# Patient Record
Sex: MALE | Race: WHITE | NOT HISPANIC OR LATINO | Employment: OTHER
[De-identification: names, ages, dates, MRNs, and addresses within clinical notes are randomized per-mention and may not be internally consistent; named-entity substitution may affect disease eponyms.]

---

## 2017-01-01 ENCOUNTER — HOSPITAL (OUTPATIENT)
Dept: OTHER | Age: 72
End: 2017-01-01
Attending: PAIN MEDICINE

## 2017-02-01 ENCOUNTER — HOSPITAL (OUTPATIENT)
Dept: OTHER | Age: 72
End: 2017-02-01
Attending: PAIN MEDICINE

## 2017-04-13 ENCOUNTER — PRIOR ORIGINAL RECORDS (OUTPATIENT)
Dept: OTHER | Age: 72
End: 2017-04-13

## 2017-04-13 ENCOUNTER — HOSPITAL (OUTPATIENT)
Dept: OTHER | Age: 72
End: 2017-04-13
Attending: INTERNAL MEDICINE

## 2017-04-13 LAB
ANION GAP SERPL CALC-SCNC: 14 MMOL/L (ref 10–20)
BNP SERPL-MCNC: 104 PG/ML
BUN SERPL-MCNC: 16 MG/DL (ref 6–20)
BUN/CREAT SERPL: 15 (ref 7–25)
CALCIUM SERPL-MCNC: 8.7 MG/DL (ref 8.4–10.2)
CHLORIDE: 109 MMOL/L (ref 98–107)
CHOLEST SERPL-MCNC: 121 MG/DL
CHOLEST/HDLC SERPL: 1.7 {RATIO}
CO2 SERPL-SCNC: 26 MMOL/L (ref 21–32)
CREAT SERPL-MCNC: 1.05 MG/DL (ref 0.67–1.17)
GLUCOSE SERPL-MCNC: 91 MG/DL (ref 65–99)
HDLC SERPL-MCNC: 72 MG/DL
LDLC SERPL CALC-MCNC: 39 MG/DL
LENGTH OF FAST TIME PATIENT: ABNORMAL HR
LENGTH OF FAST TIME PATIENT: NORMAL HR
NONHDLC SERPL-MCNC: 49 MG/DL
POTASSIUM SERPL-SCNC: 4.3 MMOL/L (ref 3.4–5.1)
SODIUM SERPL-SCNC: 145 MMOL/L (ref 135–145)
TRIGLYCERIDE (TRIGP): 52 MG/DL

## 2017-04-14 LAB
BNP: 104 PMOL/L
BUN: 16 MG/DL
CALCIUM: 8.7 MG/DL
CHLORIDE: 109 MEQ/L
CHOLESTEROL, TOTAL: 121 MG/DL
CREATININE, SERUM: 1.05 MG/DL
GLUCOSE: 91 MG/DL
GLUCOSE: 91 MG/DL
HDL CHOLESTEROL: 72 MG/DL
LDL CHOLESTEROL: 39 MG/DL
POTASSIUM, SERUM: 4.3 MEQ/L
SODIUM: 145 MEQ/L
TRIGLYCERIDES: 52 MG/DL

## 2017-04-19 ENCOUNTER — PRIOR ORIGINAL RECORDS (OUTPATIENT)
Dept: OTHER | Age: 72
End: 2017-04-19

## 2017-06-14 ENCOUNTER — PRIOR ORIGINAL RECORDS (OUTPATIENT)
Dept: OTHER | Age: 72
End: 2017-06-14

## 2017-06-15 ENCOUNTER — PRIOR ORIGINAL RECORDS (OUTPATIENT)
Dept: OTHER | Age: 72
End: 2017-06-15

## 2017-08-23 ENCOUNTER — PRIOR ORIGINAL RECORDS (OUTPATIENT)
Dept: OTHER | Age: 72
End: 2017-08-23

## 2017-08-23 ENCOUNTER — HOSPITAL (OUTPATIENT)
Dept: OTHER | Age: 72
End: 2017-08-23
Attending: INTERNAL MEDICINE

## 2017-08-23 LAB
ANION GAP SERPL CALC-SCNC: 14 MMOL/L (ref 10–20)
BNP SERPL-MCNC: 59 PG/ML
BUN SERPL-MCNC: 21 MG/DL (ref 6–20)
BUN/CREAT SERPL: 18 (ref 7–25)
CALCIUM SERPL-MCNC: 9.6 MG/DL (ref 8.4–10.2)
CHLORIDE: 108 MMOL/L (ref 98–107)
CO2 SERPL-SCNC: 27 MMOL/L (ref 21–32)
CREAT SERPL-MCNC: 1.15 MG/DL (ref 0.67–1.17)
GLUCOSE SERPL-MCNC: 98 MG/DL (ref 65–99)
LENGTH OF FAST TIME PATIENT: ABNORMAL HR
POTASSIUM SERPL-SCNC: 4.5 MMOL/L (ref 3.4–5.1)
SODIUM SERPL-SCNC: 144 MMOL/L (ref 135–145)

## 2017-08-24 LAB
BNP: 59 PMOL/L
BUN: 21 MG/DL
CALCIUM: 9.6 MG/DL
CHLORIDE: 108 MEQ/L
CREATININE, SERUM: 1.15 MG/DL
GLUCOSE: 98 MG/DL
POTASSIUM, SERUM: 4.5 MEQ/L
SODIUM: 144 MEQ/L

## 2017-09-13 ENCOUNTER — PRIOR ORIGINAL RECORDS (OUTPATIENT)
Dept: OTHER | Age: 72
End: 2017-09-13

## 2017-09-15 ENCOUNTER — PRIOR ORIGINAL RECORDS (OUTPATIENT)
Dept: OTHER | Age: 72
End: 2017-09-15

## 2017-09-26 ENCOUNTER — HOSPITAL (OUTPATIENT)
Dept: OTHER | Age: 72
End: 2017-09-26
Attending: PAIN MEDICINE

## 2018-01-18 ENCOUNTER — HOSPITAL ENCOUNTER (OUTPATIENT)
Dept: GENERAL RADIOLOGY | Age: 73
Discharge: HOME | End: 2018-01-18
Attending: FAMILY MEDICINE
Payer: MEDICARE

## 2018-01-18 DIAGNOSIS — M54.50 LUMBAGO: ICD-10-CM

## 2018-01-18 PROCEDURE — 72110 X-RAY EXAM L-2 SPINE 4/>VWS: CPT | Performed by: FAMILY MEDICINE

## 2018-03-06 ENCOUNTER — LAB ENCOUNTER (OUTPATIENT)
Dept: LAB | Facility: HOSPITAL | Age: 73
End: 2018-03-06
Attending: UROLOGY
Payer: MEDICARE

## 2018-03-06 DIAGNOSIS — N13.9 ACUTE UNILATERAL OBSTRUCTIVE UROPATHY: Primary | ICD-10-CM

## 2018-03-06 LAB — CREAT SERPL-MCNC: 1.15 MG/DL (ref 0.5–1.5)

## 2018-03-06 PROCEDURE — 36415 COLL VENOUS BLD VENIPUNCTURE: CPT

## 2018-03-06 PROCEDURE — 82565 ASSAY OF CREATININE: CPT

## 2018-03-07 ENCOUNTER — HOSPITAL ENCOUNTER (OUTPATIENT)
Dept: ULTRASOUND IMAGING | Facility: HOSPITAL | Age: 73
Discharge: HOME | End: 2018-03-07
Attending: UROLOGY
Payer: MEDICARE

## 2018-03-07 DIAGNOSIS — N13.9 OBSTRUCTIVE UROPATHY: ICD-10-CM

## 2018-03-07 PROCEDURE — 76857 US EXAM PELVIC LIMITED: CPT | Performed by: UROLOGY

## 2018-03-30 ENCOUNTER — HOSPITAL (OUTPATIENT)
Dept: OTHER | Age: 73
End: 2018-03-30
Attending: ORTHOPAEDIC SURGERY

## 2018-03-30 LAB — URATE SERPL-MCNC: 6.3 MG/DL (ref 3.5–7.2)

## 2018-04-29 ENCOUNTER — HOSPITAL (OUTPATIENT)
Dept: OTHER | Age: 73
End: 2018-04-29
Attending: INTERNAL MEDICINE

## 2018-04-29 LAB
ANALYZER ANC (IANC): ABNORMAL
ANION GAP SERPL CALC-SCNC: 12 MMOL/L (ref 10–20)
APTT PPP: 26 SECONDS (ref 22–30)
APTT PPP: NORMAL S
BASOPHILS # BLD: 0.1 THOUSAND/MCL (ref 0–0.3)
BASOPHILS NFR BLD: 1 %
BUN SERPL-MCNC: 19 MG/DL (ref 6–20)
BUN/CREAT SERPL: 15 (ref 7–25)
CALCIUM SERPL-MCNC: 8.8 MG/DL (ref 8.4–10.2)
CHLORIDE: 110 MMOL/L (ref 98–107)
CO2 SERPL-SCNC: 27 MMOL/L (ref 21–32)
CREAT SERPL-MCNC: 1.29 MG/DL (ref 0.67–1.17)
D DIMER PPP FEU-MCNC: 1.12 MG/L FEU
DIFFERENTIAL METHOD BLD: ABNORMAL
EOSINOPHIL # BLD: 0.2 THOUSAND/MCL (ref 0.1–0.5)
EOSINOPHIL NFR BLD: 3 %
ERYTHROCYTE [DISTWIDTH] IN BLOOD: 14.2 % (ref 11–15)
GLUCOSE SERPL-MCNC: 114 MG/DL (ref 65–99)
HEMATOCRIT: 39.4 % (ref 39–51)
HGB BLD-MCNC: 13 GM/DL (ref 13–17)
INR PPP: 1
LYMPHOCYTES # BLD: 1.1 THOUSAND/MCL (ref 1–4)
LYMPHOCYTES NFR BLD: 21 %
MCH RBC QN AUTO: 29.8 PG (ref 26–34)
MCHC RBC AUTO-ENTMCNC: 33 GM/DL (ref 32–36.5)
MCV RBC AUTO: 90.4 FL (ref 78–100)
MONOCYTES # BLD: 0.4 THOUSAND/MCL (ref 0.3–0.9)
MONOCYTES NFR BLD: 7 %
NEUTROPHILS # BLD: 3.5 THOUSAND/MCL (ref 1.8–7.7)
NEUTROPHILS NFR BLD: 68 %
NEUTS SEG NFR BLD: ABNORMAL %
PERCENT NRBC: ABNORMAL
PLATELET # BLD: 177 THOUSAND/MCL (ref 140–450)
POTASSIUM SERPL-SCNC: 3.2 MMOL/L (ref 3.4–5.1)
PROTHROMBIN TIME: 10.5 SECONDS (ref 9.7–11.8)
PROTHROMBIN TIME: NORMAL
RBC # BLD: 4.36 MILLION/MCL (ref 4.5–5.9)
SODIUM SERPL-SCNC: 146 MMOL/L (ref 135–145)
TROPONIN I SERPL HS-MCNC: <0.02 NG/ML
WBC # BLD: 5.2 THOUSAND/MCL (ref 4.2–11)

## 2018-04-30 ENCOUNTER — DIAGNOSTIC TRANS (OUTPATIENT)
Dept: OTHER | Age: 73
End: 2018-04-30

## 2018-04-30 ENCOUNTER — IMAGING SERVICES (OUTPATIENT)
Dept: OTHER | Age: 73
End: 2018-04-30

## 2018-04-30 ENCOUNTER — CHARTING TRANS (OUTPATIENT)
Dept: OTHER | Age: 73
End: 2018-04-30

## 2018-04-30 LAB
CREAT SERPL-MCNC: 1.14 MG/DL (ref 0.67–1.17)
GLUCOSE BLDC GLUCOMTR-MCNC: 83 MG/DL (ref 65–99)
POTASSIUM SERPL-SCNC: 3.7 MMOL/L (ref 3.4–5.1)
TROPONIN I SERPL HS-MCNC: 0.03 NG/ML

## 2018-05-09 ENCOUNTER — PRIOR ORIGINAL RECORDS (OUTPATIENT)
Dept: OTHER | Age: 73
End: 2018-05-09

## 2018-05-12 ENCOUNTER — PRIOR ORIGINAL RECORDS (OUTPATIENT)
Dept: OTHER | Age: 73
End: 2018-05-12

## 2018-05-12 ENCOUNTER — HOSPITAL (OUTPATIENT)
Dept: OTHER | Age: 73
End: 2018-05-12
Attending: ORTHOPAEDIC SURGERY

## 2018-05-12 LAB
AMORPH SED URNS QL MICRO: NORMAL
ANALYZER ANC (IANC): ABNORMAL
ANION GAP SERPL CALC-SCNC: 12 MMOL/L (ref 10–20)
APPEARANCE UR: CLEAR
BASOPHILS # BLD: 0.1 THOUSAND/MCL (ref 0–0.3)
BASOPHILS NFR BLD: 1 %
BILIRUB UR QL: NEGATIVE
BUN SERPL-MCNC: 19 MG/DL (ref 6–20)
BUN/CREAT SERPL: 19 (ref 7–25)
CALCIUM SERPL-MCNC: 9.4 MG/DL (ref 8.4–10.2)
CAOX CRY URNS QL MICRO: NORMAL
CHLORIDE: 107 MMOL/L (ref 98–107)
CO2 SERPL-SCNC: 28 MMOL/L (ref 21–32)
COLOR UR: YELLOW
CREAT SERPL-MCNC: 1.01 MG/DL (ref 0.67–1.17)
DIFFERENTIAL METHOD BLD: ABNORMAL
EOSINOPHIL # BLD: 0.1 THOUSAND/MCL (ref 0.1–0.5)
EOSINOPHIL NFR BLD: 2 %
EPITH CASTS #/AREA URNS LPF: NORMAL /[LPF]
ERYTHROCYTE [DISTWIDTH] IN BLOOD: 13.8 % (ref 11–15)
FATTY CASTS #/AREA URNS LPF: NORMAL /[LPF]
GLUCOSE SERPL-MCNC: 88 MG/DL (ref 65–99)
GLUCOSE UR-MCNC: NEGATIVE MG/DL
GRAN CASTS #/AREA URNS LPF: NORMAL /[LPF]
HEMATOCRIT: 41.3 % (ref 39–51)
HGB BLD-MCNC: 13.8 GM/DL (ref 13–17)
HGB UR QL: NEGATIVE
HYALINE CASTS #/AREA URNS LPF: NORMAL /[LPF]
INR PPP: 1
KETONES UR-MCNC: NEGATIVE MG/DL
LENGTH OF FAST TIME PATIENT: NORMAL HR
LEUKOCYTE ESTERASE UR QL STRIP: NEGATIVE
LYMPHOCYTES # BLD: 0.9 THOUSAND/MCL (ref 1–4)
LYMPHOCYTES NFR BLD: 15 %
MCH RBC QN AUTO: 30 PG (ref 26–34)
MCHC RBC AUTO-ENTMCNC: 33.4 GM/DL (ref 32–36.5)
MCV RBC AUTO: 89.8 FL (ref 78–100)
MICROSCOPIC (MT): NORMAL
MIXED CELL CASTS #/AREA URNS LPF: NORMAL /[LPF]
MONOCYTES # BLD: 0.4 THOUSAND/MCL (ref 0.3–0.9)
MONOCYTES NFR BLD: 6 %
MUCOUS THREADS URNS QL MICRO: NORMAL
NEUTROPHILS # BLD: 4.6 THOUSAND/MCL (ref 1.8–7.7)
NEUTROPHILS NFR BLD: 76 %
NEUTS SEG NFR BLD: ABNORMAL %
NITRITE UR QL: NEGATIVE
PERCENT NRBC: ABNORMAL
PH UR: 5 UNIT (ref 5–7)
PLATELET # BLD: 214 THOUSAND/MCL (ref 140–450)
POTASSIUM SERPL-SCNC: 4.2 MMOL/L (ref 3.4–5.1)
PROT UR QL: NEGATIVE MG/DL
PROTHROMBIN TIME: 10.2 SECONDS (ref 9.7–11.8)
PROTHROMBIN TIME: NORMAL
RBC # BLD: 4.6 MILLION/MCL (ref 4.5–5.9)
RBC CASTS #/AREA URNS LPF: NORMAL /[LPF]
RENAL EPI CELLS #/AREA URNS HPF: NORMAL /[HPF]
SODIUM SERPL-SCNC: 143 MMOL/L (ref 135–145)
SP GR UR: 1.02 (ref 1–1.03)
SPECIMEN SOURCE: NORMAL
SPERM URNS QL MICRO: NORMAL
T VAGINALIS URNS QL MICRO: NORMAL
TRI-PHOS CRY URNS QL MICRO: NORMAL
URATE CRY URNS QL MICRO: NORMAL
URNS CMNT MICRO: NORMAL
UROBILINOGEN UR QL: 0.2 MG/DL (ref 0–1)
WAXY CASTS #/AREA URNS LPF: NORMAL /[LPF]
WBC # BLD: 6 THOUSAND/MCL (ref 4.2–11)
WBC CASTS #/AREA URNS LPF: NORMAL /[LPF]
YEAST HYPHAE URNS QL MICRO: NORMAL
YEAST URNS QL MICRO: NORMAL

## 2018-05-13 ENCOUNTER — DIAGNOSTIC TRANS (OUTPATIENT)
Dept: OTHER | Age: 73
End: 2018-05-13

## 2018-05-16 ENCOUNTER — PRIOR ORIGINAL RECORDS (OUTPATIENT)
Dept: OTHER | Age: 73
End: 2018-05-16

## 2018-05-21 LAB
BUN: 19 MG/DL
CALCIUM: 9.4 MG/DL
CHLORIDE: 107 MEQ/L
CREATININE, SERUM: 1.01 MG/DL
GLUCOSE: 88 MG/DL
HEMATOCRIT: 41.3 %
HEMOGLOBIN: 13.8 G/DL
MCH: 30 PG
MCHC: 33.4 G/DL
MCV: 89.8 FL
PLATELETS: 214 K/UL
POTASSIUM, SERUM: 4.2 MEQ/L
RED BLOOD COUNT: 4.6 X 10-6/U
SODIUM: 143 MEQ/L
WHITE BLOOD COUNT: 6 X 10-3/U

## 2018-06-11 ENCOUNTER — PRIOR ORIGINAL RECORDS (OUTPATIENT)
Dept: OTHER | Age: 73
End: 2018-06-11

## 2018-06-14 ENCOUNTER — PRIOR ORIGINAL RECORDS (OUTPATIENT)
Dept: OTHER | Age: 73
End: 2018-06-14

## 2018-06-15 ENCOUNTER — PRIOR ORIGINAL RECORDS (OUTPATIENT)
Dept: OTHER | Age: 73
End: 2018-06-15

## 2018-06-18 ENCOUNTER — PRIOR ORIGINAL RECORDS (OUTPATIENT)
Dept: OTHER | Age: 73
End: 2018-06-18

## 2018-06-28 ENCOUNTER — PRIOR ORIGINAL RECORDS (OUTPATIENT)
Dept: OTHER | Age: 73
End: 2018-06-28

## 2018-07-11 ENCOUNTER — PRIOR ORIGINAL RECORDS (OUTPATIENT)
Dept: OTHER | Age: 73
End: 2018-07-11

## 2018-07-13 ENCOUNTER — PRIOR ORIGINAL RECORDS (OUTPATIENT)
Dept: OTHER | Age: 73
End: 2018-07-13

## 2018-08-13 ENCOUNTER — PRIOR ORIGINAL RECORDS (OUTPATIENT)
Dept: OTHER | Age: 73
End: 2018-08-13

## 2018-08-16 ENCOUNTER — PRIOR ORIGINAL RECORDS (OUTPATIENT)
Dept: OTHER | Age: 73
End: 2018-08-16

## 2018-09-10 ENCOUNTER — PRIOR ORIGINAL RECORDS (OUTPATIENT)
Dept: OTHER | Age: 73
End: 2018-09-10

## 2018-09-12 ENCOUNTER — PRIOR ORIGINAL RECORDS (OUTPATIENT)
Dept: OTHER | Age: 73
End: 2018-09-12

## 2018-09-13 ENCOUNTER — PRIOR ORIGINAL RECORDS (OUTPATIENT)
Dept: OTHER | Age: 73
End: 2018-09-13

## 2018-09-19 ENCOUNTER — PRIOR ORIGINAL RECORDS (OUTPATIENT)
Dept: OTHER | Age: 73
End: 2018-09-19

## 2018-09-27 ENCOUNTER — OFFICE VISIT (OUTPATIENT)
Dept: OTOLARYNGOLOGY | Facility: CLINIC | Age: 73
End: 2018-09-27
Payer: MEDICARE

## 2018-09-27 VITALS
BODY MASS INDEX: 34.07 KG/M2 | TEMPERATURE: 98 F | WEIGHT: 230 LBS | HEIGHT: 69 IN | DIASTOLIC BLOOD PRESSURE: 58 MMHG | SYSTOLIC BLOOD PRESSURE: 130 MMHG

## 2018-09-27 DIAGNOSIS — R13.10 DYSPHAGIA, UNSPECIFIED TYPE: Primary | ICD-10-CM

## 2018-09-27 PROCEDURE — 99203 OFFICE O/P NEW LOW 30 MIN: CPT | Performed by: OTOLARYNGOLOGY

## 2018-09-27 PROCEDURE — 31575 DIAGNOSTIC LARYNGOSCOPY: CPT | Performed by: OTOLARYNGOLOGY

## 2018-09-27 RX ORDER — ASPIRIN 325 MG
325 TABLET ORAL DAILY
Status: ON HOLD | COMMUNITY
End: 2020-09-18

## 2018-09-27 RX ORDER — FINASTERIDE 5 MG/1
5 TABLET, FILM COATED ORAL DAILY
COMMUNITY
Start: 2018-09-19 | End: 2021-01-07

## 2018-09-27 RX ORDER — AMLODIPINE BESYLATE 5 MG/1
5 TABLET ORAL DAILY
COMMUNITY
Start: 2018-09-19

## 2018-09-27 RX ORDER — TERAZOSIN 5 MG/1
5 CAPSULE ORAL
COMMUNITY
Start: 2018-09-19 | End: 2021-01-07

## 2018-09-27 RX ORDER — LISINOPRIL 20 MG/1
TABLET ORAL
Status: ON HOLD | COMMUNITY
Start: 2018-08-20 | End: 2020-09-18

## 2018-09-27 RX ORDER — ATORVASTATIN CALCIUM 20 MG/1
20 TABLET, FILM COATED ORAL NIGHTLY
COMMUNITY
Start: 2018-09-19

## 2018-09-27 RX ORDER — NITROGLYCERIN 0.4 MG/1
0.4 TABLET SUBLINGUAL EVERY 5 MIN PRN
COMMUNITY

## 2018-09-27 RX ORDER — CLOPIDOGREL BISULFATE 75 MG/1
75 TABLET ORAL DAILY
Status: ON HOLD | COMMUNITY
Start: 2018-09-19 | End: 2020-09-18

## 2018-09-27 NOTE — PROGRESS NOTES
Chuck Chávez is a 74year old male.  Patient presents with:  Throat Problem: phlegm build up in the back of throat, pt feels that pills and food get stuck in his throat for at least 2 years    HPI:   He has been experiencing increased difficulty with his Overall appearance - Normal.   Head/Face Normal Facial features - Normal. Eyebrows - Normal. Skull - Normal.   Oral/Oropharynx Normal Lips - Normal, Tonsils - Normal, Tongue - Normal    fiberoptic examination of the hypopharynx and larynx demonstrates no l type  He is having problems with dysphagia which seems to be becoming more troublesome over time. I recommended an esophagram to further evaluate his condition. - XR ESOPHAGUS (CPT=74220);  Future  - [place] [facility]      The patient ind

## 2018-10-02 ENCOUNTER — TELEPHONE (OUTPATIENT)
Dept: OTOLARYNGOLOGY | Facility: CLINIC | Age: 73
End: 2018-10-02

## 2018-10-02 ENCOUNTER — HOSPITAL (OUTPATIENT)
Dept: OTHER | Age: 73
End: 2018-10-02
Attending: PAIN MEDICINE

## 2018-10-02 NOTE — TELEPHONE ENCOUNTER
Pt is scheduled tomorrow for test - Spouse called ins co and they told her to call the office to find out if it is covered - - asking if everyone involved accepts medicare

## 2018-10-02 NOTE — TELEPHONE ENCOUNTER
Pt's wife called stating pt has not received a call back. Caller has question on if the test is covered 10-3-18. Pt to arrive at 8am and test is scheduled at 8:30am.  Esophagus.    Call to advise

## 2018-10-03 ENCOUNTER — HOSPITAL ENCOUNTER (OUTPATIENT)
Dept: GENERAL RADIOLOGY | Facility: HOSPITAL | Age: 73
Discharge: HOME | End: 2018-10-03
Attending: OTOLARYNGOLOGY
Payer: MEDICARE

## 2018-10-03 DIAGNOSIS — R13.10 DYSPHAGIA, UNSPECIFIED TYPE: ICD-10-CM

## 2018-10-03 PROCEDURE — 74220 X-RAY XM ESOPHAGUS 1CNTRST: CPT | Performed by: OTOLARYNGOLOGY

## 2018-11-20 ENCOUNTER — MYAURORA ACCOUNT LINK (OUTPATIENT)
Dept: OTHER | Age: 73
End: 2018-11-20

## 2018-11-23 ENCOUNTER — PRIOR ORIGINAL RECORDS (OUTPATIENT)
Dept: OTHER | Age: 73
End: 2018-11-23

## 2018-11-26 ENCOUNTER — PRIOR ORIGINAL RECORDS (OUTPATIENT)
Dept: OTHER | Age: 73
End: 2018-11-26

## 2018-11-28 ENCOUNTER — MYAURORA ACCOUNT LINK (OUTPATIENT)
Dept: OTHER | Age: 73
End: 2018-11-28

## 2018-11-28 ENCOUNTER — PRIOR ORIGINAL RECORDS (OUTPATIENT)
Dept: OTHER | Age: 73
End: 2018-11-28

## 2019-02-01 ENCOUNTER — PRIOR ORIGINAL RECORDS (OUTPATIENT)
Dept: OTHER | Age: 74
End: 2019-02-01

## 2019-02-05 LAB
ALBUMIN SERPL-MCNC: 4.1 G/DL
ALBUMIN/GLOB SERPL: NORMAL {RATIO}
ALP SERPL-CCNC: 67 U/L
ALT SERPL-CCNC: 26 U/L
ANION GAP SERPL CALC-SCNC: NORMAL MMOL/L
AST SERPL-CCNC: 25 U/L
BILIRUB SERPL-MCNC: 0.7 MG/DL
BUN SERPL-MCNC: 27 MG/DL
BUN/CREAT SERPL: NORMAL
CALCIUM SERPL-MCNC: 9.2 MG/DL
CHLORIDE SERPL-SCNC: 110 MMOL/L
CHOLEST SERPL-MCNC: 124 MG/DL
CHOLEST/HDLC SERPL: 2 {RATIO}
CO2 SERPL-SCNC: NORMAL MMOL/L
CREAT SERPL-MCNC: 1.14 MG/DL
GLOBULIN SER-MCNC: 2.9 G/DL
GLUCOSE SERPL-MCNC: 89 MG/DL
HDLC SERPL-MCNC: 62 MG/DL
LDLC SERPL CALC-MCNC: 44 MG/DL
LENGTH OF FAST TIME PATIENT: NORMAL H
LENGTH OF FAST TIME PATIENT: NORMAL H
NONHDLC SERPL-MCNC: 62 MG/DL
POTASSIUM SERPL-SCNC: 4.7 MMOL/L
PROT SERPL-MCNC: 7 G/DL
SODIUM SERPL-SCNC: 143 MMOL/L
TRIGL SERPL-MCNC: 91 MG/DL
VLDLC SERPL CALC-MCNC: NORMAL MG/DL

## 2019-02-07 LAB
ABSOLUTE IMMATURE GRANULOCYTES (OFFPRE24): NORMAL
ALBUMIN SERPL-MCNC: NORMAL G/DL
ALBUMIN/GLOB SERPL: NORMAL {RATIO}
ALP SERPL-CCNC: NORMAL U/L
ALT SERPL-CCNC: NORMAL U/L
ANION GAP SERPL CALC-SCNC: NORMAL MMOL/L
AST SERPL-CCNC: NORMAL U/L
BASO+EOS+MONOS # BLD: NORMAL 10*3/UL
BASO+EOS+MONOS NFR BLD: NORMAL %
BASOPHILS # BLD: NORMAL 10*3/UL
BASOPHILS NFR BLD: NORMAL %
BILIRUB SERPL-MCNC: NORMAL MG/DL
BUN SERPL-MCNC: 25 MG/DL
BUN/CREAT SERPL: NORMAL
CALCIUM SERPL-MCNC: 9.2 MG/DL
CHLORIDE SERPL-SCNC: 112 MMOL/L
CO2 SERPL-SCNC: NORMAL MMOL/L
CREAT SERPL-MCNC: 1.13 MG/DL
DIFFERENTIAL METHOD BLD: NORMAL
EOSINOPHIL # BLD: NORMAL 10*3/UL
EOSINOPHIL NFR BLD: NORMAL %
ERYTHROCYTE [DISTWIDTH] IN BLOOD: NORMAL %
GLOBULIN SER-MCNC: NORMAL G/DL
GLUCOSE SERPL-MCNC: 83 MG/DL
HCT VFR BLD CALC: 43.3 %
HGB BLD-MCNC: 14.3 G/DL
IMMATURE GRANULOCYTES (OFFPRE25): NORMAL
LENGTH OF FAST TIME PATIENT: NORMAL H
LYMPHOCYTES # BLD: NORMAL 10*3/UL
LYMPHOCYTES NFR BLD: NORMAL %
MCH RBC QN AUTO: NORMAL PG
MCHC RBC AUTO-ENTMCNC: NORMAL G/DL
MCV RBC AUTO: NORMAL FL
MONOCYTES # BLD: NORMAL 10*3/UL
MONOCYTES NFR BLD: NORMAL %
MPV (OFFPRE2): NORMAL
NEUTROPHILS # BLD: NORMAL 10*3/UL
NEUTROPHILS NFR BLD: NORMAL %
NRBC BLD MANUAL-RTO: NORMAL %
PLAT MORPH BLD: NORMAL
PLATELET # BLD: 165 10*3/UL
POTASSIUM SERPL-SCNC: 4.2 MMOL/L
PROT SERPL-MCNC: NORMAL G/DL
RBC # BLD: 4.73 10*6/UL
RBC MORPH BLD: NORMAL
SODIUM SERPL-SCNC: 142 MMOL/L
WBC # BLD: 6.2 10*3/UL
WBC MORPH BLD: NORMAL

## 2019-02-28 VITALS
HEIGHT: 69 IN | WEIGHT: 238 LBS | DIASTOLIC BLOOD PRESSURE: 58 MMHG | HEART RATE: 56 BPM | RESPIRATION RATE: 16 BRPM | BODY MASS INDEX: 35.25 KG/M2 | SYSTOLIC BLOOD PRESSURE: 140 MMHG

## 2019-02-28 VITALS
WEIGHT: 233.5 LBS | BODY MASS INDEX: 34.58 KG/M2 | SYSTOLIC BLOOD PRESSURE: 158 MMHG | DIASTOLIC BLOOD PRESSURE: 52 MMHG | RESPIRATION RATE: 16 BRPM | HEIGHT: 69 IN | HEART RATE: 60 BPM

## 2019-02-28 VITALS — HEART RATE: 60 BPM | DIASTOLIC BLOOD PRESSURE: 70 MMHG | WEIGHT: 232 LBS | SYSTOLIC BLOOD PRESSURE: 140 MMHG

## 2019-03-01 VITALS
HEIGHT: 69 IN | RESPIRATION RATE: 16 BRPM | BODY MASS INDEX: 36.58 KG/M2 | WEIGHT: 247 LBS | SYSTOLIC BLOOD PRESSURE: 152 MMHG | HEART RATE: 60 BPM | DIASTOLIC BLOOD PRESSURE: 68 MMHG

## 2019-03-13 RX ORDER — CLOPIDOGREL BISULFATE 75 MG/1
TABLET ORAL
COMMUNITY
Start: 2019-02-20 | End: 2019-08-28 | Stop reason: SDUPTHER

## 2019-03-13 RX ORDER — DIPHENOXYLATE HYDROCHLORIDE AND ATROPINE SULFATE 2.5; .025 MG/1; MG/1
1 TABLET ORAL DAILY
COMMUNITY
Start: 2010-03-23

## 2019-03-13 RX ORDER — ASPIRIN 81 MG/1
1 TABLET ORAL DAILY
COMMUNITY
Start: 2016-09-21

## 2019-03-13 RX ORDER — NITROGLYCERIN 0.4 MG/1
TABLET SUBLINGUAL
COMMUNITY
Start: 2017-09-13 | End: 2019-10-07 | Stop reason: SDUPTHER

## 2019-03-13 RX ORDER — FINASTERIDE 5 MG/1
1 TABLET, FILM COATED ORAL DAILY
COMMUNITY
Start: 2010-03-23

## 2019-03-13 RX ORDER — LISINOPRIL 20 MG/1
TABLET ORAL
COMMUNITY
Start: 2018-08-16 | End: 2019-03-27 | Stop reason: SDUPTHER

## 2019-03-13 RX ORDER — ATORVASTATIN CALCIUM 20 MG/1
TABLET, FILM COATED ORAL
COMMUNITY
Start: 2019-02-20 | End: 2019-08-28 | Stop reason: SDUPTHER

## 2019-03-13 RX ORDER — LISINOPRIL 20 MG/1
TABLET ORAL
COMMUNITY
Start: 2019-01-28 | End: 2019-10-02 | Stop reason: SDUPTHER

## 2019-03-13 RX ORDER — TERAZOSIN 5 MG/1
5 CAPSULE ORAL NIGHTLY
COMMUNITY
Start: 2017-09-13

## 2019-03-13 RX ORDER — AMLODIPINE BESYLATE 5 MG/1
TABLET ORAL
COMMUNITY
Start: 2019-02-20 | End: 2019-08-28 | Stop reason: SDUPTHER

## 2019-03-25 ENCOUNTER — ANCILLARY PROCEDURE (OUTPATIENT)
Dept: CARDIOLOGY | Age: 74
End: 2019-03-25
Attending: INTERNAL MEDICINE

## 2019-03-25 DIAGNOSIS — I35.0 AORTIC STENOSIS: ICD-10-CM

## 2019-03-25 PROCEDURE — 93306 TTE W/DOPPLER COMPLETE: CPT | Performed by: INTERNAL MEDICINE

## 2019-03-27 ENCOUNTER — TELEPHONE (OUTPATIENT)
Dept: CARDIOLOGY | Age: 74
End: 2019-03-27

## 2019-03-27 ENCOUNTER — CLINICAL ABSTRACT (OUTPATIENT)
Dept: CARDIOLOGY | Age: 74
End: 2019-03-27

## 2019-03-27 ENCOUNTER — OFFICE VISIT (OUTPATIENT)
Dept: CARDIOLOGY | Age: 74
End: 2019-03-27

## 2019-03-27 VITALS
RESPIRATION RATE: 16 BRPM | SYSTOLIC BLOOD PRESSURE: 162 MMHG | HEIGHT: 69 IN | DIASTOLIC BLOOD PRESSURE: 66 MMHG | WEIGHT: 241 LBS | HEART RATE: 64 BPM | BODY MASS INDEX: 35.7 KG/M2

## 2019-03-27 DIAGNOSIS — I65.23 ASYMPTOMATIC CAROTID ARTERY STENOSIS, BILATERAL: ICD-10-CM

## 2019-03-27 DIAGNOSIS — I25.10 CORONARY ARTERY DISEASE INVOLVING NATIVE CORONARY ARTERY OF NATIVE HEART WITHOUT ANGINA PECTORIS: Primary | ICD-10-CM

## 2019-03-27 DIAGNOSIS — I35.0 AORTIC VALVE STENOSIS, ETIOLOGY OF CARDIAC VALVE DISEASE UNSPECIFIED: ICD-10-CM

## 2019-03-27 DIAGNOSIS — I10 HYPERTENSION, UNSPECIFIED TYPE: ICD-10-CM

## 2019-03-27 DIAGNOSIS — I71.40 ABDOMINAL AORTIC ANEURYSM (AAA) WITHOUT RUPTURE (CMD): ICD-10-CM

## 2019-03-27 DIAGNOSIS — E78.2 MIXED HYPERLIPIDEMIA: ICD-10-CM

## 2019-03-27 DIAGNOSIS — N18.30 CHRONIC RENAL IMPAIRMENT, STAGE 3 (MODERATE) (CMD): ICD-10-CM

## 2019-03-27 PROBLEM — D68.9 COAGULATION DEFECT, UNSPECIFIED (CMD): Status: ACTIVE | Noted: 2019-03-27

## 2019-03-27 PROCEDURE — 99214 OFFICE O/P EST MOD 30 MIN: CPT | Performed by: INTERNAL MEDICINE

## 2019-03-27 RX ORDER — METHYLPREDNISOLONE 4 MG/1
TABLET ORAL
COMMUNITY
Start: 2016-08-01 | End: 2020-09-04 | Stop reason: ALTCHOICE

## 2019-03-27 RX ORDER — HYDROCODONE BITARTRATE AND ACETAMINOPHEN 5; 325 MG/1; MG/1
1 TABLET ORAL PRN
COMMUNITY
Start: 2016-08-01

## 2019-03-27 RX ORDER — CYCLOBENZAPRINE HCL 10 MG
10 TABLET ORAL
COMMUNITY
Start: 2017-09-10 | End: 2020-09-04 | Stop reason: ALTCHOICE

## 2019-03-27 SDOH — HEALTH STABILITY: PHYSICAL HEALTH: ON AVERAGE, HOW MANY DAYS PER WEEK DO YOU ENGAGE IN MODERATE TO STRENUOUS EXERCISE (LIKE A BRISK WALK)?: 0 DAYS

## 2019-04-24 ENCOUNTER — APPOINTMENT (OUTPATIENT)
Dept: CARDIOLOGY | Age: 74
End: 2019-04-24
Attending: INTERNAL MEDICINE

## 2019-05-14 ENCOUNTER — TELEPHONE (OUTPATIENT)
Dept: CARDIOLOGY | Age: 74
End: 2019-05-14

## 2019-05-16 ENCOUNTER — DOCUMENTATION (OUTPATIENT)
Dept: CARDIOLOGY | Age: 74
End: 2019-05-16

## 2019-05-16 ENCOUNTER — TELEPHONE (OUTPATIENT)
Dept: CARDIOLOGY | Age: 74
End: 2019-05-16

## 2019-05-16 RX ORDER — HYDROCHLOROTHIAZIDE 12.5 MG/1
12.5 TABLET ORAL DAILY
Qty: 90 TABLET | Refills: 1 | Status: SHIPPED | OUTPATIENT
Start: 2019-05-16 | End: 2019-09-26 | Stop reason: SDUPTHER

## 2019-06-19 ENCOUNTER — TELEPHONE (OUTPATIENT)
Dept: CARDIOLOGY | Age: 74
End: 2019-06-19

## 2019-08-20 ENCOUNTER — DOCUMENTATION (OUTPATIENT)
Dept: CARDIOLOGY | Age: 74
End: 2019-08-20

## 2019-08-29 RX ORDER — ATORVASTATIN CALCIUM 20 MG/1
20 TABLET, FILM COATED ORAL EVERY EVENING
Qty: 90 TABLET | Refills: 3 | Status: SHIPPED | OUTPATIENT
Start: 2019-08-29 | End: 2019-10-04 | Stop reason: SDUPTHER

## 2019-08-29 RX ORDER — CLOPIDOGREL BISULFATE 75 MG/1
75 TABLET ORAL DAILY
Qty: 90 TABLET | Refills: 3 | Status: SHIPPED | OUTPATIENT
Start: 2019-08-29 | End: 2020-01-03 | Stop reason: SDUPTHER

## 2019-08-29 RX ORDER — AMLODIPINE BESYLATE 5 MG/1
5 TABLET ORAL DAILY
Qty: 90 TABLET | Refills: 3 | Status: SHIPPED | OUTPATIENT
Start: 2019-08-29 | End: 2020-01-03 | Stop reason: SDUPTHER

## 2019-09-17 ENCOUNTER — APPOINTMENT (OUTPATIENT)
Dept: CARDIOLOGY | Age: 74
End: 2019-09-17
Attending: INTERNAL MEDICINE

## 2019-09-17 ENCOUNTER — ANCILLARY PROCEDURE (OUTPATIENT)
Dept: CARDIOLOGY | Age: 74
End: 2019-09-17
Attending: INTERNAL MEDICINE

## 2019-09-17 DIAGNOSIS — I71.40 ABDOMINAL AORTIC ANEURYSM (AAA) WITHOUT RUPTURE (CMD): ICD-10-CM

## 2019-09-17 DIAGNOSIS — I35.0 AORTIC VALVE STENOSIS, ETIOLOGY OF CARDIAC VALVE DISEASE UNSPECIFIED: ICD-10-CM

## 2019-09-17 PROCEDURE — 0399T TRANSTHORACIC ECHO(TTE) COMPLETE W/ W/O IMAGING AGENT: CPT | Performed by: INTERNAL MEDICINE

## 2019-09-17 PROCEDURE — 93975 VASCULAR STUDY: CPT | Performed by: INTERNAL MEDICINE

## 2019-09-17 PROCEDURE — 93306 TTE W/DOPPLER COMPLETE: CPT | Performed by: INTERNAL MEDICINE

## 2019-09-24 ENCOUNTER — HOSPITAL (OUTPATIENT)
Dept: OTHER | Age: 74
End: 2019-09-24
Attending: PAIN MEDICINE

## 2019-09-26 RX ORDER — HYDROCHLOROTHIAZIDE 12.5 MG/1
TABLET ORAL
Qty: 90 TABLET | Refills: 3 | Status: SHIPPED | OUTPATIENT
Start: 2019-09-26 | End: 2019-10-02 | Stop reason: SDUPTHER

## 2019-10-02 ENCOUNTER — OFFICE VISIT (OUTPATIENT)
Dept: CARDIOLOGY | Age: 74
End: 2019-10-02

## 2019-10-02 VITALS
BODY MASS INDEX: 34.8 KG/M2 | HEART RATE: 52 BPM | SYSTOLIC BLOOD PRESSURE: 158 MMHG | RESPIRATION RATE: 16 BRPM | DIASTOLIC BLOOD PRESSURE: 58 MMHG | WEIGHT: 235 LBS | HEIGHT: 69 IN

## 2019-10-02 DIAGNOSIS — E78.2 MIXED HYPERLIPIDEMIA: ICD-10-CM

## 2019-10-02 DIAGNOSIS — I71.40 ABDOMINAL AORTIC ANEURYSM (AAA) WITHOUT RUPTURE (CMD): ICD-10-CM

## 2019-10-02 DIAGNOSIS — I65.23 ASYMPTOMATIC CAROTID ARTERY STENOSIS, BILATERAL: ICD-10-CM

## 2019-10-02 DIAGNOSIS — I35.0 AORTIC VALVE STENOSIS, ETIOLOGY OF CARDIAC VALVE DISEASE UNSPECIFIED: ICD-10-CM

## 2019-10-02 DIAGNOSIS — I10 ESSENTIAL HYPERTENSION: ICD-10-CM

## 2019-10-02 DIAGNOSIS — I25.10 CORONARY ARTERY DISEASE INVOLVING NATIVE CORONARY ARTERY, ANGINA PRESENCE UNSPECIFIED, UNSPECIFIED WHETHER NATIVE OR TRANSPLANTED HEART: Primary | ICD-10-CM

## 2019-10-02 DIAGNOSIS — N18.9 CHRONIC RENAL IMPAIRMENT, UNSPECIFIED CKD STAGE: ICD-10-CM

## 2019-10-02 DIAGNOSIS — G47.30 SLEEP APNEA, UNSPECIFIED TYPE: ICD-10-CM

## 2019-10-02 PROCEDURE — 99215 OFFICE O/P EST HI 40 MIN: CPT | Performed by: INTERNAL MEDICINE

## 2019-10-02 RX ORDER — LISINOPRIL 20 MG/1
20 TABLET ORAL 2 TIMES DAILY
Qty: 180 TABLET | Refills: 3 | Status: SHIPPED | OUTPATIENT
Start: 2019-10-02 | End: 2019-10-04 | Stop reason: SDUPTHER

## 2019-10-02 RX ORDER — LISINOPRIL 20 MG/1
20 TABLET ORAL DAILY
Qty: 180 TABLET | Refills: 3 | Status: SHIPPED | OUTPATIENT
Start: 2019-10-02 | End: 2019-10-02 | Stop reason: CLARIF

## 2019-10-02 RX ORDER — HYDROCHLOROTHIAZIDE 12.5 MG/1
12.5 TABLET ORAL DAILY
Qty: 90 TABLET | Refills: 0 | Status: SHIPPED | OUTPATIENT
Start: 2019-10-02 | End: 2020-01-03 | Stop reason: SDUPTHER

## 2019-10-02 SDOH — HEALTH STABILITY: PHYSICAL HEALTH: ON AVERAGE, HOW MANY MINUTES DO YOU ENGAGE IN EXERCISE AT THIS LEVEL?: 0 MIN

## 2019-10-02 SDOH — HEALTH STABILITY: PHYSICAL HEALTH: ON AVERAGE, HOW MANY DAYS PER WEEK DO YOU ENGAGE IN MODERATE TO STRENUOUS EXERCISE (LIKE A BRISK WALK)?: 0 DAYS

## 2019-10-02 ASSESSMENT — ENCOUNTER SYMPTOMS
SUSPICIOUS LESIONS: 0
WEIGHT GAIN: 0
CHILLS: 0
HEMOPTYSIS: 0
WEIGHT LOSS: 0
HEMATOCHEZIA: 0
COUGH: 0
ALLERGIC/IMMUNOLOGIC COMMENTS: NO NEW FOOD ALLERGIES
FEVER: 0
BRUISES/BLEEDS EASILY: 0

## 2019-10-04 ENCOUNTER — TELEPHONE (OUTPATIENT)
Dept: CARDIOLOGY | Age: 74
End: 2019-10-04

## 2019-10-04 ENCOUNTER — LAB SERVICES (OUTPATIENT)
Dept: CARDIOLOGY | Age: 74
End: 2019-10-04

## 2019-10-04 RX ORDER — TERAZOSIN 5 MG/1
CAPSULE ORAL
Qty: 90 CAPSULE | Refills: 1 | Status: CANCELLED | OUTPATIENT
Start: 2019-10-04

## 2019-10-04 RX ORDER — ATORVASTATIN CALCIUM 20 MG/1
20 TABLET, FILM COATED ORAL EVERY EVENING
Qty: 90 TABLET | Refills: 3 | Status: CANCELLED | OUTPATIENT
Start: 2019-10-04

## 2019-10-04 RX ORDER — HYDROCHLOROTHIAZIDE 12.5 MG/1
12.5 TABLET ORAL DAILY
Qty: 90 TABLET | Refills: 0 | Status: CANCELLED | OUTPATIENT
Start: 2019-10-04

## 2019-10-04 RX ORDER — AMLODIPINE BESYLATE 5 MG/1
5 TABLET ORAL DAILY
Qty: 90 TABLET | Refills: 3 | Status: CANCELLED | OUTPATIENT
Start: 2019-10-04

## 2019-10-04 RX ORDER — LISINOPRIL 20 MG/1
20 TABLET ORAL 2 TIMES DAILY
Qty: 180 TABLET | Refills: 3 | Status: CANCELLED | OUTPATIENT
Start: 2019-10-04

## 2019-10-04 RX ORDER — CLOPIDOGREL BISULFATE 75 MG/1
75 TABLET ORAL DAILY
Qty: 90 TABLET | Refills: 3 | Status: CANCELLED | OUTPATIENT
Start: 2019-10-04

## 2019-10-04 RX ORDER — FINASTERIDE 5 MG/1
5 TABLET, FILM COATED ORAL DAILY
Qty: 90 TABLET | Refills: 2 | Status: CANCELLED | OUTPATIENT
Start: 2019-10-04

## 2019-10-04 RX ORDER — NITROGLYCERIN 0.4 MG/1
TABLET SUBLINGUAL
Qty: 90 TABLET | Refills: 1 | Status: CANCELLED | OUTPATIENT
Start: 2019-10-04

## 2019-10-07 ENCOUNTER — TELEPHONE (OUTPATIENT)
Dept: CARDIOLOGY | Age: 74
End: 2019-10-07

## 2019-10-07 RX ORDER — ATORVASTATIN CALCIUM 20 MG/1
TABLET, FILM COATED ORAL
Qty: 90 TABLET | Refills: 1 | Status: SHIPPED | OUTPATIENT
Start: 2019-10-07 | End: 2020-01-03 | Stop reason: SDUPTHER

## 2019-10-07 RX ORDER — NITROGLYCERIN 0.4 MG/1
0.4 TABLET SUBLINGUAL EVERY 5 MIN PRN
Qty: 25 TABLET | Refills: 1 | Status: SHIPPED | OUTPATIENT
Start: 2019-10-07 | End: 2020-01-03 | Stop reason: SDUPTHER

## 2019-10-07 RX ORDER — LISINOPRIL 20 MG/1
TABLET ORAL
Qty: 180 TABLET | Refills: 3 | Status: SHIPPED | OUTPATIENT
Start: 2019-10-07 | End: 2020-01-03 | Stop reason: SDUPTHER

## 2020-01-01 ENCOUNTER — EXTERNAL RECORD (OUTPATIENT)
Dept: HEALTH INFORMATION MANAGEMENT | Facility: OTHER | Age: 75
End: 2020-01-01

## 2020-01-03 ENCOUNTER — TELEPHONE (OUTPATIENT)
Dept: CARDIOLOGY | Age: 75
End: 2020-01-03

## 2020-01-03 RX ORDER — HYDROCHLOROTHIAZIDE 12.5 MG/1
12.5 TABLET ORAL DAILY
Qty: 90 TABLET | Refills: 3 | Status: SHIPPED | OUTPATIENT
Start: 2020-01-03 | End: 2020-09-22 | Stop reason: SDUPTHER

## 2020-01-03 RX ORDER — CLOPIDOGREL BISULFATE 75 MG/1
75 TABLET ORAL DAILY
Qty: 90 TABLET | Refills: 3 | Status: SHIPPED | OUTPATIENT
Start: 2020-01-03 | End: 2020-09-22

## 2020-01-03 RX ORDER — NITROGLYCERIN 0.4 MG/1
0.4 TABLET SUBLINGUAL EVERY 5 MIN PRN
Qty: 25 TABLET | Refills: 0 | Status: SHIPPED | OUTPATIENT
Start: 2020-01-03 | End: 2020-01-28 | Stop reason: SDUPTHER

## 2020-01-03 RX ORDER — ATORVASTATIN CALCIUM 20 MG/1
20 TABLET, FILM COATED ORAL DAILY
Qty: 90 TABLET | Refills: 3 | Status: SHIPPED | OUTPATIENT
Start: 2020-01-03 | End: 2021-01-04 | Stop reason: SDUPTHER

## 2020-01-03 RX ORDER — LISINOPRIL 20 MG/1
20 TABLET ORAL 2 TIMES DAILY
Qty: 180 TABLET | Refills: 3 | Status: SHIPPED | OUTPATIENT
Start: 2020-01-03 | End: 2020-06-29

## 2020-01-03 RX ORDER — AMLODIPINE BESYLATE 5 MG/1
5 TABLET ORAL DAILY
Qty: 90 TABLET | Refills: 3 | Status: SHIPPED | OUTPATIENT
Start: 2020-01-03 | End: 2021-01-04 | Stop reason: SDUPTHER

## 2020-01-03 RX ORDER — ERYTHROMYCIN 5 MG/G
OINTMENT OPHTHALMIC
Refills: 3 | COMMUNITY
Start: 2019-12-23 | End: 2020-09-04 | Stop reason: ALTCHOICE

## 2020-01-31 RX ORDER — NITROGLYCERIN 0.4 MG/1
TABLET SUBLINGUAL
Qty: 25 TABLET | Refills: 0 | Status: SHIPPED | OUTPATIENT
Start: 2020-01-31 | End: 2020-02-24 | Stop reason: SDUPTHER

## 2020-02-28 RX ORDER — NITROGLYCERIN 0.4 MG/1
0.4 TABLET SUBLINGUAL EVERY 5 MIN PRN
Qty: 25 TABLET | Refills: 0 | Status: SHIPPED | OUTPATIENT
Start: 2020-02-28 | End: 2021-09-27 | Stop reason: SDUPTHER

## 2020-06-03 ENCOUNTER — TELEPHONE (OUTPATIENT)
Dept: CARDIOLOGY | Age: 75
End: 2020-06-03

## 2020-06-03 RX ORDER — IRBESARTAN 150 MG/1
150 TABLET ORAL 2 TIMES DAILY
Qty: 60 TABLET | Refills: 1 | Status: SHIPPED | OUTPATIENT
Start: 2020-06-03 | End: 2020-06-29 | Stop reason: SDUPTHER

## 2020-06-25 ENCOUNTER — TELEPHONE (OUTPATIENT)
Dept: CARDIOLOGY | Age: 75
End: 2020-06-25

## 2020-06-29 ENCOUNTER — TELEPHONE (OUTPATIENT)
Dept: CARDIOLOGY | Age: 75
End: 2020-06-29

## 2020-06-29 RX ORDER — IRBESARTAN 150 MG/1
150 TABLET ORAL 2 TIMES DAILY
Qty: 180 TABLET | Refills: 3 | Status: SHIPPED | OUTPATIENT
Start: 2020-06-29 | End: 2020-09-08 | Stop reason: SDUPTHER

## 2020-06-30 ENCOUNTER — OFF PREMISE (OUTPATIENT)
Dept: CARDIOLOGY | Age: 75
End: 2020-06-30

## 2020-07-20 ENCOUNTER — TELEPHONE (OUTPATIENT)
Dept: CARDIOLOGY | Age: 75
End: 2020-07-20

## 2020-08-25 DIAGNOSIS — R13.10 DYSPHAGIA: Primary | ICD-10-CM

## 2020-08-25 DIAGNOSIS — Z11.52 ENCOUNTER FOR PREPROCEDURE SCREENING LABORATORY TESTING FOR COVID-19: Primary | ICD-10-CM

## 2020-08-25 DIAGNOSIS — Z01.812 ENCOUNTER FOR PREPROCEDURE SCREENING LABORATORY TESTING FOR COVID-19: Primary | ICD-10-CM

## 2020-08-28 ENCOUNTER — HOSPITAL ENCOUNTER (OUTPATIENT)
Dept: CT IMAGING | Age: 75
Discharge: HOME | End: 2020-08-28
Attending: OTOLARYNGOLOGY

## 2020-08-28 DIAGNOSIS — R22.1 NECK MASS: ICD-10-CM

## 2020-08-28 DIAGNOSIS — R04.2 COUGHING UP BLOOD: ICD-10-CM

## 2020-08-28 DIAGNOSIS — R05.9 COUGH: ICD-10-CM

## 2020-08-28 PROCEDURE — 71260 CT THORAX DX C+: CPT

## 2020-08-28 PROCEDURE — 70491 CT SOFT TISSUE NECK W/DYE: CPT

## 2020-08-28 PROCEDURE — 10002805 HB CONTRAST AGENT: Performed by: OTOLARYNGOLOGY

## 2020-08-28 RX ADMIN — IOHEXOL 100 ML: 350 INJECTION, SOLUTION INTRAVENOUS at 16:15

## 2020-09-01 ENCOUNTER — APPOINTMENT (OUTPATIENT)
Dept: RADIATION ONCOLOGY | Facility: HOSPITAL | Age: 75
End: 2020-09-01
Attending: RADIOLOGY
Payer: MEDICARE

## 2020-09-02 DIAGNOSIS — Z01.812 PRE-PROCEDURAL LABORATORY EXAMINATION: Primary | ICD-10-CM

## 2020-09-04 ENCOUNTER — TELEPHONE (OUTPATIENT)
Dept: PREADMISSION TESTING | Age: 75
End: 2020-09-04

## 2020-09-04 VITALS — WEIGHT: 225 LBS | HEIGHT: 69 IN | BODY MASS INDEX: 33.33 KG/M2

## 2020-09-04 RX ORDER — VIT C/B6/B5/MAGNESIUM/HERB 173 50-5-6-5MG
500 CAPSULE ORAL DAILY
COMMUNITY
End: 2021-09-27

## 2020-09-04 RX ORDER — DESIPRAMINE HYDROCHLORIDE 10 MG/1
40 TABLET ORAL NIGHTLY
COMMUNITY
End: 2021-09-27

## 2020-09-04 RX ORDER — CEFADROXIL 500 MG/1
500 CAPSULE ORAL 3 TIMES DAILY
COMMUNITY
End: 2021-09-27

## 2020-09-04 ASSESSMENT — ACTIVITIES OF DAILY LIVING (ADL)
SENSORY_SUPPORT_DEVICES: EYEGLASSES;OTHER (COMMENT)
ADL_BEFORE_ADMISSION: INDEPENDENT
ADL_SCORE: 12

## 2020-09-08 ENCOUNTER — TELEPHONE (OUTPATIENT)
Dept: CARDIOLOGY | Age: 75
End: 2020-09-08

## 2020-09-08 ENCOUNTER — LAB SERVICES (OUTPATIENT)
Dept: LAB | Age: 75
End: 2020-09-08

## 2020-09-08 ENCOUNTER — TELEPHONE (OUTPATIENT)
Dept: PREADMISSION TESTING | Age: 75
End: 2020-09-08

## 2020-09-08 DIAGNOSIS — Z01.812 PRE-PROCEDURAL LABORATORY EXAMINATION: ICD-10-CM

## 2020-09-08 LAB
SARS-COV-2 RNA RESP QL NAA+PROBE: NOT DETECTED
SERVICE CMNT-IMP: NORMAL
SPECIMEN SOURCE: NORMAL

## 2020-09-09 ENCOUNTER — HOSPITAL ENCOUNTER (OUTPATIENT)
Dept: INTERVENTIONAL RADIOLOGY/VASCULAR | Age: 75
Discharge: HOME | End: 2020-09-09
Attending: FAMILY MEDICINE

## 2020-09-09 ENCOUNTER — APPOINTMENT (OUTPATIENT)
Dept: LAB | Age: 75
End: 2020-09-09

## 2020-09-09 ENCOUNTER — TELEPHONE (OUTPATIENT)
Dept: CARDIOLOGY | Age: 75
End: 2020-09-09

## 2020-09-09 VITALS
TEMPERATURE: 97.3 F | BODY MASS INDEX: 33.99 KG/M2 | HEART RATE: 74 BPM | HEIGHT: 69 IN | WEIGHT: 229.5 LBS | SYSTOLIC BLOOD PRESSURE: 101 MMHG | RESPIRATION RATE: 16 BRPM | DIASTOLIC BLOOD PRESSURE: 62 MMHG | OXYGEN SATURATION: 91 %

## 2020-09-09 DIAGNOSIS — R22.1 NECK MASS: ICD-10-CM

## 2020-09-09 LAB
ANION GAP SERPL CALC-SCNC: 11 MMOL/L (ref 10–20)
BASOPHILS # BLD: 0.1 K/MCL (ref 0–0.3)
BASOPHILS NFR BLD: 1 %
BUN SERPL-MCNC: 37 MG/DL (ref 6–20)
BUN/CREAT SERPL: 30 (ref 7–25)
CALCIUM SERPL-MCNC: 9.1 MG/DL (ref 8.4–10.2)
CHLORIDE SERPL-SCNC: 102 MMOL/L (ref 98–107)
CO2 SERPL-SCNC: 25 MMOL/L (ref 21–32)
CREAT SERPL-MCNC: 1.23 MG/DL (ref 0.67–1.17)
DIFFERENTIAL METHOD BLD: ABNORMAL
EOSINOPHIL # BLD: 0.5 K/MCL (ref 0.1–0.5)
EOSINOPHIL NFR BLD: 5 %
ERYTHROCYTE [DISTWIDTH] IN BLOOD: 14 % (ref 11–15)
GLUCOSE SERPL-MCNC: 124 MG/DL (ref 65–99)
HCT VFR BLD CALC: 39.3 % (ref 39–51)
HGB BLD-MCNC: 12.5 G/DL (ref 13–17)
IMM GRANULOCYTES # BLD AUTO: 0.1 K/MCL (ref 0–0.2)
IMM GRANULOCYTES NFR BLD: 1 %
INR PPP: 1.1
LYMPHOCYTES # BLD: 0.6 K/MCL (ref 1–4)
LYMPHOCYTES NFR BLD: 6 %
MCH RBC QN AUTO: 28.2 PG (ref 26–34)
MCHC RBC AUTO-ENTMCNC: 31.8 G/DL (ref 32–36.5)
MCV RBC AUTO: 88.7 FL (ref 78–100)
MONOCYTES # BLD: 0.8 K/MCL (ref 0.3–0.9)
MONOCYTES NFR BLD: 8 %
NEUTROPHILS # BLD: 8.3 K/MCL (ref 1.8–7.7)
NEUTROPHILS NFR BLD: 79 %
NRBC BLD MANUAL-RTO: 0 /100 WBC
PLATELET # BLD: 257 K/MCL (ref 140–450)
POTASSIUM SERPL-SCNC: 4.1 MMOL/L (ref 3.4–5.1)
PROTHROMBIN TIME: 11.4 SEC (ref 9.7–11.8)
RBC # BLD: 4.43 MIL/MCL (ref 4.5–5.9)
SODIUM SERPL-SCNC: 134 MMOL/L (ref 135–145)
WBC # BLD: 10.4 K/MCL (ref 4.2–11)

## 2020-09-09 PROCEDURE — 85610 PROTHROMBIN TIME: CPT

## 2020-09-09 PROCEDURE — 88344 IMHCHEM/IMCYTCHM EA MLT ANTB: CPT

## 2020-09-09 PROCEDURE — 80048 BASIC METABOLIC PNL TOTAL CA: CPT

## 2020-09-09 PROCEDURE — 36415 COLL VENOUS BLD VENIPUNCTURE: CPT

## 2020-09-09 PROCEDURE — 88173 CYTOPATH EVAL FNA REPORT: CPT

## 2020-09-09 PROCEDURE — 85025 COMPLETE CBC W/AUTO DIFF WBC: CPT

## 2020-09-09 PROCEDURE — 38505 NEEDLE BIOPSY LYMPH NODES: CPT

## 2020-09-09 PROCEDURE — 88341 IMHCHEM/IMCYTCHM EA ADD ANTB: CPT

## 2020-09-09 PROCEDURE — 88305 TISSUE EXAM BY PATHOLOGIST: CPT

## 2020-09-09 PROCEDURE — 10002801 HB RX 250 W/O HCPCS: Performed by: RADIOLOGY

## 2020-09-09 PROCEDURE — 88342 IMHCHEM/IMCYTCHM 1ST ANTB: CPT

## 2020-09-09 PROCEDURE — 88172 CYTP DX EVAL FNA 1ST EA SITE: CPT

## 2020-09-09 RX ORDER — 0.9 % SODIUM CHLORIDE 0.9 %
2 VIAL (ML) INJECTION EVERY 12 HOURS SCHEDULED
Status: DISCONTINUED | OUTPATIENT
Start: 2020-09-09 | End: 2020-09-11 | Stop reason: HOSPADM

## 2020-09-09 RX ORDER — IRBESARTAN 150 MG/1
150 TABLET ORAL 2 TIMES DAILY
Qty: 180 TABLET | Refills: 0 | Status: SHIPPED | OUTPATIENT
Start: 2020-09-09 | End: 2020-09-22

## 2020-09-09 RX ORDER — LIDOCAINE HYDROCHLORIDE 10 MG/ML
INJECTION, SOLUTION INFILTRATION; PERINEURAL PRN
Status: COMPLETED | OUTPATIENT
Start: 2020-09-09 | End: 2020-09-09

## 2020-09-09 RX ADMIN — LIDOCAINE HYDROCHLORIDE 2 ML: 10 INJECTION, SOLUTION INFILTRATION; PERINEURAL at 13:01

## 2020-09-11 ENCOUNTER — APPOINTMENT (OUTPATIENT)
Dept: GENERAL RADIOLOGY | Facility: HOSPITAL | Age: 75
DRG: 843 | End: 2020-09-11
Payer: MEDICARE

## 2020-09-11 ENCOUNTER — APPOINTMENT (OUTPATIENT)
Dept: CT IMAGING | Facility: HOSPITAL | Age: 75
DRG: 843 | End: 2020-09-11
Attending: EMERGENCY MEDICINE
Payer: MEDICARE

## 2020-09-11 ENCOUNTER — HOSPITAL ENCOUNTER (INPATIENT)
Facility: HOSPITAL | Age: 75
LOS: 7 days | Discharge: HOME HEALTH | DRG: 843 | End: 2020-09-18
Attending: EMERGENCY MEDICINE | Admitting: HOSPITALIST
Payer: MEDICARE

## 2020-09-11 ENCOUNTER — APPOINTMENT (OUTPATIENT)
Dept: GENERAL RADIOLOGY | Age: 75
End: 2020-09-11
Attending: OTOLARYNGOLOGY

## 2020-09-11 ENCOUNTER — TELEPHONE (OUTPATIENT)
Dept: CARDIOLOGY | Age: 75
End: 2020-09-11

## 2020-09-11 DIAGNOSIS — C34.12 PRIMARY CANCER OF LEFT UPPER LOBE OF LUNG (HCC): Primary | ICD-10-CM

## 2020-09-11 DIAGNOSIS — R91.8 LUNG MASS: ICD-10-CM

## 2020-09-11 DIAGNOSIS — I31.3 PERICARDIAL EFFUSION: ICD-10-CM

## 2020-09-11 DIAGNOSIS — R09.02 HYPOXIA: ICD-10-CM

## 2020-09-11 PROBLEM — I31.39 PERICARDIAL EFFUSION: Status: ACTIVE | Noted: 2020-09-11

## 2020-09-11 LAB
ANION GAP SERPL CALC-SCNC: 10 MMOL/L (ref 0–18)
BASOPHILS # BLD AUTO: 0.05 X10(3) UL (ref 0–0.2)
BASOPHILS NFR BLD AUTO: 0.4 %
BUN BLD-MCNC: 45 MG/DL (ref 7–18)
BUN/CREAT SERPL: 25.3 (ref 10–20)
CALCIUM BLD-MCNC: 9.5 MG/DL (ref 8.5–10.1)
CHLORIDE SERPL-SCNC: 98 MMOL/L (ref 98–112)
CO2 SERPL-SCNC: 22 MMOL/L (ref 21–32)
CREAT BLD-MCNC: 1.78 MG/DL (ref 0.7–1.3)
DEPRECATED RDW RBC AUTO: 44.4 FL (ref 35.1–46.3)
EOSINOPHIL # BLD AUTO: 0.23 X10(3) UL (ref 0–0.7)
EOSINOPHIL NFR BLD AUTO: 1.6 %
ERYTHROCYTE [DISTWIDTH] IN BLOOD BY AUTOMATED COUNT: 13.9 % (ref 11–15)
GLUCOSE BLD-MCNC: 139 MG/DL (ref 70–99)
HCT VFR BLD AUTO: 39.8 % (ref 39–53)
HGB BLD-MCNC: 13.2 G/DL (ref 13–17.5)
IMM GRANULOCYTES # BLD AUTO: 0.07 X10(3) UL (ref 0–1)
IMM GRANULOCYTES NFR BLD: 0.5 %
LYMPHOCYTES # BLD AUTO: 0.53 X10(3) UL (ref 1–4)
LYMPHOCYTES NFR BLD AUTO: 3.7 %
MCH RBC QN AUTO: 28.9 PG (ref 26–34)
MCHC RBC AUTO-ENTMCNC: 33.2 G/DL (ref 31–37)
MCV RBC AUTO: 87.1 FL (ref 80–100)
MONOCYTES # BLD AUTO: 0.8 X10(3) UL (ref 0.1–1)
MONOCYTES NFR BLD AUTO: 5.6 %
NEUTROPHILS # BLD AUTO: 12.58 X10 (3) UL (ref 1.5–7.7)
NEUTROPHILS # BLD AUTO: 12.58 X10(3) UL (ref 1.5–7.7)
NEUTROPHILS NFR BLD AUTO: 88.2 %
NT-PROBNP SERPL-MCNC: 1030 PG/ML (ref ?–450)
OSMOLALITY SERPL CALC.SUM OF ELEC: 284 MOSM/KG (ref 275–295)
PATH REPORT, FNA: NORMAL
PLATELET # BLD AUTO: 288 10(3)UL (ref 150–450)
POTASSIUM SERPL-SCNC: 4.4 MMOL/L (ref 3.5–5.1)
RBC # BLD AUTO: 4.57 X10(6)UL (ref 3.8–5.8)
SODIUM SERPL-SCNC: 130 MMOL/L (ref 136–145)
TROPONIN I SERPL-MCNC: <0.045 NG/ML (ref ?–0.04)
WBC # BLD AUTO: 14.3 X10(3) UL (ref 4–11)

## 2020-09-11 PROCEDURE — 99223 1ST HOSP IP/OBS HIGH 75: CPT | Performed by: HOSPITALIST

## 2020-09-11 PROCEDURE — 71045 X-RAY EXAM CHEST 1 VIEW: CPT | Performed by: EMERGENCY MEDICINE

## 2020-09-11 PROCEDURE — 71260 CT THORAX DX C+: CPT | Performed by: EMERGENCY MEDICINE

## 2020-09-11 RX ORDER — IRBESARTAN 150 MG/1
TABLET ORAL
Qty: 60 TABLET | Refills: 0 | Status: CANCELLED | OUTPATIENT
Start: 2020-09-11

## 2020-09-11 RX ORDER — MORPHINE SULFATE 4 MG/ML
4 INJECTION, SOLUTION INTRAMUSCULAR; INTRAVENOUS EVERY 2 HOUR PRN
Status: DISCONTINUED | OUTPATIENT
Start: 2020-09-11 | End: 2020-09-18

## 2020-09-11 RX ORDER — CLOPIDOGREL BISULFATE 75 MG/1
75 TABLET ORAL DAILY
Status: DISCONTINUED | OUTPATIENT
Start: 2020-09-11 | End: 2020-09-12

## 2020-09-11 RX ORDER — GUAIFENESIN 100 MG/5ML
200 SOLUTION ORAL EVERY 4 HOURS PRN
Status: DISCONTINUED | OUTPATIENT
Start: 2020-09-11 | End: 2020-09-18

## 2020-09-11 RX ORDER — IPRATROPIUM BROMIDE AND ALBUTEROL SULFATE 2.5; .5 MG/3ML; MG/3ML
3 SOLUTION RESPIRATORY (INHALATION) EVERY 6 HOURS
Status: DISCONTINUED | OUTPATIENT
Start: 2020-09-11 | End: 2020-09-18

## 2020-09-11 RX ORDER — IRBESARTAN 150 MG/1
150 TABLET ORAL 2 TIMES DAILY
Status: ON HOLD | COMMUNITY
End: 2020-09-18

## 2020-09-11 RX ORDER — SODIUM CHLORIDE 9 MG/ML
INJECTION, SOLUTION INTRAVENOUS CONTINUOUS
Status: DISCONTINUED | OUTPATIENT
Start: 2020-09-11 | End: 2020-09-13

## 2020-09-11 RX ORDER — ACETAMINOPHEN 325 MG/1
650 TABLET ORAL EVERY 4 HOURS PRN
Status: DISCONTINUED | OUTPATIENT
Start: 2020-09-11 | End: 2020-09-18

## 2020-09-11 RX ORDER — MORPHINE SULFATE 2 MG/ML
2 INJECTION, SOLUTION INTRAMUSCULAR; INTRAVENOUS EVERY 2 HOUR PRN
Status: DISCONTINUED | OUTPATIENT
Start: 2020-09-11 | End: 2020-09-18

## 2020-09-11 RX ORDER — LOSARTAN POTASSIUM 50 MG/1
50 TABLET ORAL DAILY
Status: DISCONTINUED | OUTPATIENT
Start: 2020-09-11 | End: 2020-09-12

## 2020-09-11 RX ORDER — ASPIRIN 325 MG
325 TABLET ORAL DAILY
Status: DISCONTINUED | OUTPATIENT
Start: 2020-09-11 | End: 2020-09-12

## 2020-09-11 RX ORDER — FINASTERIDE 5 MG/1
5 TABLET, FILM COATED ORAL DAILY
Status: DISCONTINUED | OUTPATIENT
Start: 2020-09-11 | End: 2020-09-18

## 2020-09-11 RX ORDER — METHYLPREDNISOLONE SODIUM SUCCINATE 40 MG/ML
40 INJECTION, POWDER, LYOPHILIZED, FOR SOLUTION INTRAMUSCULAR; INTRAVENOUS EVERY 6 HOURS
Status: DISCONTINUED | OUTPATIENT
Start: 2020-09-11 | End: 2020-09-13

## 2020-09-11 RX ORDER — ATORVASTATIN CALCIUM 20 MG/1
20 TABLET, FILM COATED ORAL NIGHTLY
Status: DISCONTINUED | OUTPATIENT
Start: 2020-09-11 | End: 2020-09-18

## 2020-09-11 RX ORDER — HYDROCODONE BITARTRATE AND ACETAMINOPHEN 5; 325 MG/1; MG/1
1 TABLET ORAL EVERY 6 HOURS PRN
COMMUNITY
End: 2020-10-16

## 2020-09-11 RX ORDER — NITROGLYCERIN 0.4 MG/1
0.4 TABLET SUBLINGUAL EVERY 5 MIN PRN
Status: DISCONTINUED | OUTPATIENT
Start: 2020-09-11 | End: 2020-09-18

## 2020-09-11 RX ORDER — HYDROCODONE BITARTRATE AND ACETAMINOPHEN 5; 325 MG/1; MG/1
1 TABLET ORAL EVERY 4 HOURS PRN
Status: DISCONTINUED | OUTPATIENT
Start: 2020-09-11 | End: 2020-09-18

## 2020-09-11 RX ORDER — HYDROCHLOROTHIAZIDE 12.5 MG/1
12.5 CAPSULE, GELATIN COATED ORAL EVERY MORNING
Status: ON HOLD | COMMUNITY
End: 2020-09-18

## 2020-09-11 RX ORDER — METOCLOPRAMIDE HYDROCHLORIDE 5 MG/ML
5 INJECTION INTRAMUSCULAR; INTRAVENOUS EVERY 8 HOURS PRN
Status: DISCONTINUED | OUTPATIENT
Start: 2020-09-11 | End: 2020-09-18

## 2020-09-11 RX ORDER — IPRATROPIUM BROMIDE AND ALBUTEROL SULFATE 2.5; .5 MG/3ML; MG/3ML
3 SOLUTION RESPIRATORY (INHALATION) EVERY 6 HOURS PRN
Status: DISCONTINUED | OUTPATIENT
Start: 2020-09-11 | End: 2020-09-18

## 2020-09-11 RX ORDER — ACETAMINOPHEN 325 MG/1
650 TABLET ORAL EVERY 6 HOURS PRN
Status: DISCONTINUED | OUTPATIENT
Start: 2020-09-11 | End: 2020-09-18

## 2020-09-11 RX ORDER — ONDANSETRON 2 MG/ML
4 INJECTION INTRAMUSCULAR; INTRAVENOUS EVERY 6 HOURS PRN
Status: DISCONTINUED | OUTPATIENT
Start: 2020-09-11 | End: 2020-09-18

## 2020-09-11 RX ORDER — HYDROCODONE BITARTRATE AND ACETAMINOPHEN 5; 325 MG/1; MG/1
2 TABLET ORAL EVERY 4 HOURS PRN
Status: DISCONTINUED | OUTPATIENT
Start: 2020-09-11 | End: 2020-09-18

## 2020-09-11 RX ORDER — SODIUM CHLORIDE 0.9 % (FLUSH) 0.9 %
3 SYRINGE (ML) INJECTION AS NEEDED
Status: DISCONTINUED | OUTPATIENT
Start: 2020-09-11 | End: 2020-09-18

## 2020-09-11 RX ORDER — HEPARIN SODIUM 5000 [USP'U]/ML
5000 INJECTION, SOLUTION INTRAVENOUS; SUBCUTANEOUS EVERY 12 HOURS SCHEDULED
Status: DISCONTINUED | OUTPATIENT
Start: 2020-09-11 | End: 2020-09-18

## 2020-09-11 RX ORDER — TERAZOSIN 5 MG/1
5 CAPSULE ORAL
Status: DISCONTINUED | OUTPATIENT
Start: 2020-09-11 | End: 2020-09-18

## 2020-09-11 RX ORDER — MORPHINE SULFATE 2 MG/ML
1 INJECTION, SOLUTION INTRAMUSCULAR; INTRAVENOUS EVERY 2 HOUR PRN
Status: DISCONTINUED | OUTPATIENT
Start: 2020-09-11 | End: 2020-09-18

## 2020-09-11 RX ORDER — AMLODIPINE BESYLATE 5 MG/1
5 TABLET ORAL DAILY
Status: DISCONTINUED | OUTPATIENT
Start: 2020-09-11 | End: 2020-09-18

## 2020-09-11 NOTE — ED NOTES
Orders for admission, patient is aware of plan and ready to go upstairs. Any questions, please call ED RN la Floor  at [phone].    Type of COVID test sent:  COVID Suspicion level: Low    Titratable drug(s) infusing:  Rate:    LOC at time of transpo

## 2020-09-11 NOTE — ED PROVIDER NOTES
Patient Seen in: [facility] AND [facility] Emergency Department      History   Patient presents with:  Dyspnea CELSA SOB    Stated Complaint: other    HPI    47-year-old male with history of hypertension, hyperlipidemia, here with complaints of shortness of breath above.    Physical Exam     ED Triage Vitals   BP 09/11/20 1337 (!) 77/47   Pulse 09/11/20 1335 80   Resp 09/11/20 1335 22   Temp 09/11/20 1335 97.8 °F (36.6 °C)   Temp src 09/11/20 1335 Temporal   SpO2 09/11/20 1335 96 %   O2 Device 09/11/20 1335 None (Ro Value Ref Range    Hold Lavender Auto Resulted    RAINBOW DRAW LIGHT GREEN    Collection Time: 09/11/20  2:01 PM   Result Value Ref Range    Hold Lt Green Auto Resulted    RAINBOW DRAW GOLD    Collection Time: 09/11/20  2:01 PM   Result Value Ref Range Pro-Beta Natriuretic Peptide 1,030 (H) <450 pg/mL       Imaging Results Available and Reviewed by me while in ED:  Xr Chest Ap Portable  (cpt=71045)    Result Date: 9/11/2020  CONCLUSION:  1. Moderate cardiomegaly.   Pericardial effusion described on recent left hepatic lobe, indeterminate. A metastatic nodule is in the differential. 7. Moderate emphysema. 8. Coronary artery calcification. 9. Lesser incidental findings as above.    Dictated by (CST): Samia Sprague MD on 9/11/2020 at 4:22 PM     Finalized by effusion  Lung mass    Disposition:  Admit  9/11/2020  5:05 pm    Follow-up:  No follow-up provider specified.   We recommend that you schedule follow up care with a primary care provider within the next three months to obtain basic health screening includi

## 2020-09-11 NOTE — ED INITIAL ASSESSMENT (HPI)
C/o sob x 1 week. Dry cough noted in triage. Recently was diagnosed with lung ca. No resp distress noted. Began complaining of left chest/shoulder pain while being triaged. ecg ordered.

## 2020-09-11 NOTE — H&P
[facility]    PATIENT'S NAME: Cordelia Oliver   ATTENDING PHYSICIAN: Doug Hoffman MD   PATIENT ACCOUNT#:   [id number]    LOCATION:  [place] [address]  MEDICAL RECORD #:   [id number]       YOB: 1945  ADMISSION DATE:       09/11/2 insufficiency which is chronic and stable.   He had nonobstructive bilateral carotid atherosclerosis and abdominal aortic aneurysm, chronic kidney disease stage 2 to 3, obstructive sleep apnea, generalized osteoarthritis, hypertension, hyperlipidemia, enlar Motor and sensory intact. Cranial nerves II through XII are intact. ASSESSMENT AND PLAN:    1.    Left upper and middle lobe mass with extension to the left mediastinum with bilateral lung nodules and bilateral axillary and cervical lymphadenopathy that

## 2020-09-11 NOTE — ED NOTES
Pt assisted to restroom by CYNTHIA Campos. Pt reports when he attempted to sit on the toilet he missed the seat and landed on the ground. Staff called to room by patients wife and found seated on the ground next to the toilet.   Pt denies head injury, assisted e

## 2020-09-12 ENCOUNTER — APPOINTMENT (OUTPATIENT)
Dept: CV DIAGNOSTICS | Facility: HOSPITAL | Age: 75
DRG: 843 | End: 2020-09-12
Attending: HOSPITALIST
Payer: MEDICARE

## 2020-09-12 ENCOUNTER — APPOINTMENT (OUTPATIENT)
Dept: INTERVENTIONAL RADIOLOGY/VASCULAR | Facility: HOSPITAL | Age: 75
DRG: 843 | End: 2020-09-12
Attending: INTERNAL MEDICINE
Payer: MEDICARE

## 2020-09-12 ENCOUNTER — APPOINTMENT (OUTPATIENT)
Dept: CV DIAGNOSTICS | Facility: HOSPITAL | Age: 75
DRG: 843 | End: 2020-09-12
Attending: INTERNAL MEDICINE
Payer: MEDICARE

## 2020-09-12 LAB
ALBUMIN SERPL-MCNC: 3.2 G/DL (ref 3.4–5)
ALBUMIN/GLOB SERPL: 0.8 {RATIO} (ref 1–2)
ALP LIVER SERPL-CCNC: 85 U/L (ref 45–117)
ALT SERPL-CCNC: 35 U/L (ref 16–61)
ANION GAP SERPL CALC-SCNC: 11 MMOL/L (ref 0–18)
AST SERPL-CCNC: 23 U/L (ref 15–37)
BASOPHILS # BLD AUTO: 0.02 X10(3) UL (ref 0–0.2)
BASOPHILS NFR BLD AUTO: 0.2 %
BILIRUB SERPL-MCNC: 0.6 MG/DL (ref 0.1–2)
BUN BLD-MCNC: 49 MG/DL (ref 7–18)
BUN/CREAT SERPL: 31.6 (ref 10–20)
CALCIUM BLD-MCNC: 9.1 MG/DL (ref 8.5–10.1)
CHLORIDE SERPL-SCNC: 99 MMOL/L (ref 98–112)
CO2 SERPL-SCNC: 21 MMOL/L (ref 21–32)
CREAT BLD-MCNC: 1.55 MG/DL (ref 0.7–1.3)
DEPRECATED RDW RBC AUTO: 43.3 FL (ref 35.1–46.3)
EOSINOPHIL # BLD AUTO: 0.03 X10(3) UL (ref 0–0.7)
EOSINOPHIL NFR BLD AUTO: 0.2 %
ERYTHROCYTE [DISTWIDTH] IN BLOOD BY AUTOMATED COUNT: 14 % (ref 11–15)
GLOBULIN PLAS-MCNC: 3.9 G/DL (ref 2.8–4.4)
GLUCOSE BLD-MCNC: 142 MG/DL (ref 70–99)
GLUCOSE PCAR-MCNC: 91 MG/DL
HCT VFR BLD AUTO: 39.1 % (ref 39–53)
HCT VFR FLD CALC: 3.5 %
HGB BLD-MCNC: 13.2 G/DL (ref 13–17.5)
IMM GRANULOCYTES # BLD AUTO: 0.09 X10(3) UL (ref 0–1)
IMM GRANULOCYTES NFR BLD: 0.7 %
LYMPHOCYTES # BLD AUTO: 0.49 X10(3) UL (ref 1–4)
LYMPHOCYTES NFR BLD AUTO: 3.7 %
M PROTEIN MFR SERPL ELPH: 7.1 G/DL (ref 6.4–8.2)
MCH RBC QN AUTO: 28.8 PG (ref 26–34)
MCHC RBC AUTO-ENTMCNC: 33.8 G/DL (ref 31–37)
MCV RBC AUTO: 85.4 FL (ref 80–100)
MONOCYTES # BLD AUTO: 0.18 X10(3) UL (ref 0.1–1)
MONOCYTES NFR BLD AUTO: 1.4 %
NEUTROPHILS # BLD AUTO: 12.5 X10 (3) UL (ref 1.5–7.7)
NEUTROPHILS # BLD AUTO: 12.5 X10(3) UL (ref 1.5–7.7)
NEUTROPHILS NFR BLD AUTO: 93.8 %
OSMOLALITY SERPL CALC.SUM OF ELEC: 287 MOSM/KG (ref 275–295)
PLATELET # BLD AUTO: 284 10(3)UL (ref 150–450)
POTASSIUM SERPL-SCNC: 4.2 MMOL/L (ref 3.5–5.1)
PROCALCITONIN SERPL-MCNC: 0.17 NG/ML (ref ?–0.16)
RBC # BLD AUTO: 4.58 X10(6)UL (ref 3.8–5.8)
RBC # FLD: ABNORMAL /CUMM (ref ?–1)
SARS-COV-2 RNA RESP QL NAA+PROBE: NOT DETECTED
SODIUM SERPL-SCNC: 131 MMOL/L (ref 136–145)
WBC # BLD AUTO: 13.3 X10(3) UL (ref 4–11)
WBC # FLD: 1347 /CUMM (ref ?–1)

## 2020-09-12 PROCEDURE — 93308 TTE F-UP OR LMTD: CPT | Performed by: INTERNAL MEDICINE

## 2020-09-12 PROCEDURE — 99221 1ST HOSP IP/OBS SF/LOW 40: CPT | Performed by: INTERNAL MEDICINE

## 2020-09-12 PROCEDURE — 99152 MOD SED SAME PHYS/QHP 5/>YRS: CPT | Performed by: INTERNAL MEDICINE

## 2020-09-12 PROCEDURE — 93306 TTE W/DOPPLER COMPLETE: CPT | Performed by: HOSPITALIST

## 2020-09-12 PROCEDURE — 33016 PERICARDIOCENTESIS W/IMAGING: CPT | Performed by: INTERNAL MEDICINE

## 2020-09-12 PROCEDURE — 99233 SBSQ HOSP IP/OBS HIGH 50: CPT | Performed by: HOSPITALIST

## 2020-09-12 PROCEDURE — 0W9D30Z DRAINAGE OF PERICARDIAL CAVITY WITH DRAINAGE DEVICE, PERCUTANEOUS APPROACH: ICD-10-PCS | Performed by: INTERNAL MEDICINE

## 2020-09-12 RX ORDER — ACETYLCYSTEINE 200 MG/ML
2 SOLUTION ORAL; RESPIRATORY (INHALATION) 2 TIMES DAILY
Status: DISPENSED | OUTPATIENT
Start: 2020-09-12 | End: 2020-09-16

## 2020-09-12 RX ORDER — LIDOCAINE HYDROCHLORIDE 20 MG/ML
INJECTION, SOLUTION EPIDURAL; INFILTRATION; INTRACAUDAL; PERINEURAL
Status: COMPLETED
Start: 2020-09-12 | End: 2020-09-12

## 2020-09-12 RX ORDER — MIDAZOLAM HYDROCHLORIDE 1 MG/ML
INJECTION INTRAMUSCULAR; INTRAVENOUS
Status: COMPLETED
Start: 2020-09-12 | End: 2020-09-12

## 2020-09-12 RX ORDER — VANCOMYCIN HYDROCHLORIDE 125 MG/1
125 CAPSULE ORAL DAILY
Status: DISCONTINUED | OUTPATIENT
Start: 2020-09-12 | End: 2020-09-18

## 2020-09-12 NOTE — PROGRESS NOTES
[facility]    Progress Note    Scotty Huge Patient Status:  Inpatient    1945 MRN [id number]   Location [facility] Attending John Phan MD   Hosp Day # 1 PCP Chantal Araya MD       Subjective: atorvastatin  20 mg Oral Nightly   • finasteride  5 mg Oral Daily   • metoprolol Tartrate  25 mg Oral BID   • Terazosin HCl  5 mg Oral [email or web address]       Current PRN Inpatient Meds:      Normal Saline Flush, acetaminophen, ondansetron HCl, Metoclopramide HCl Large ill-defined 6.5 cm left upper lobe/lingular mass, which results in extensive bronchovascular encasement/invasion and extends toward the left hilum. This finding is highly suspicious for primary pulmonary neoplasm.   There are innumerable additional r pericardial effusion, suspect malignant  -notified cardiology  -stat echo done at bedside  -taken to IR suite for drainage.   Fluid to be sent for analysis  -monitor in PCU post drainage  -will hold ASA/plavix and plan for pericardial window next week  -onc

## 2020-09-12 NOTE — PLAN OF CARE
Patient resting in bed comfortably. Alert, oriented x 4. Vital signs stable. 92% on 2L nasal cannula, denied shortness of breath. PRN morphine & PRN norco for left axillae/left arm pain. IV zosyn infused. IV fluids infusing. IV solu-medrol infused.  Oncolog and pain management  - Manage/alleviate anxiety  - Utilize distraction and/or relaxation techniques  - Monitor for opioid side effects  - Notify MD/LIP if interventions unsuccessful or patient reports new pain  - Anticipate increased pain with activity and GASTROINTESTINAL - ADULT  Goal: Maintains or returns to baseline bowel function  Description  INTERVENTIONS:  - Assess bowel function  - Maintain adequate hydration with IV or PO as ordered and tolerated  - Evaluate effectiveness of GI medications  - Encou mobilization  - Obtain PT/OT consults as needed  - Advance activity as appropriate  - Communicate ordered activity level and limitations with patient/family  Outcome: Progressing  Goal: Maintain proper alignment of affected body part  Description  INTERVEN

## 2020-09-12 NOTE — CONSULTS
[facility] HOSP - [facility]    Report of Consultation    Tavia Lima Patient Status:  Inpatient    1945 MRN [id number]   Location [facility]5W Attending Skeeter Jeans, MD   Hosp Day # 1 PCP Page MD Toi     Date of Admission:   solution 2 mL, 2 mL, Nebulization, BID  Piperacillin Sod-Tazobactam So (ZOSYN) 4.5 g in dextrose 5 % 100 mL MBP/ADD-vantage, 4.5 g, Intravenous, Q8H  Normal Saline Flush 0.9 % injection 3 mL, 3 mL, Intravenous, PRN  0.9% NaCl infusion, , Intravenous, Abeba Brooke by mouth every morning. Irbesartan 150 MG Oral Tab, Take 150 mg by mouth 2 (two) times a day. HYDROcodone-acetaminophen 5-325 MG Oral Tab, Take 1 tablet by mouth every 6 (six) hours as needed for Pain.   AmLODIPine Besylate 5 MG Oral Tab, Take 5 mg by ayron Neck: no JVD and supple, symmetrical, trachea midline  Pulmonary:  diminished breath sounds bilaterally.   Coarse, Rales  Cardiovascular: S1, S2 normal, regular rate and rhythm  Abdominal: soft, non-tender; bowel sounds normal  Extremities: Mild edema, re for hematogenous metastases. 3. Enlarged mediastinal and left hilar lymph nodes, most likely metastatic in nature. 4. Suspicious lytic lesion involving the right lateral 6th rib with associated mildly displaced likely pathologic fracture.   This is suspicio

## 2020-09-12 NOTE — CONSULTS
[facility] HOSP - [facility]     [facility] Cardiology Consult Note    Anthony Recinos Patient Status:  Inpatient    1945 MRN [id number]   Location [facility]5W Attending Naveed Senior MD   Hosp Day # 1 PCP Noris Craig MD     Reason for cons Smokeless tobacco: Never Used      Tobacco comment: quit in 2004    Alcohol use: No      Frequency: Never    Drug use: No      Medications:  • vancomycin HCl  125 mg Oral Daily   • acetylcysteine  2 mL Nebulization BID   • piperacillin-tazobactam  4.5 g ALT  --  35   BILT  --  0.6   TP  --  7.1        Recent Labs   Lab 09/11/20  1401   TROP <0.045     No results for input(s): BNP in the last 168 hours. No results found for: PT, INR    Studies:  CT chest:  1. No evidence of acute pulmonary embolism.   No with abnormal left ventricular     relaxation (grade 1 diastolic dysfunction). 3. Aortic valve: Thickened and calcified aortic valve leaflets with     restricted opening in systole.  Aortic stenosis; inadequate     doppler envelope to assess severity but a atherosclerosis, AAA, CKD 2-3, TAO, HTN, HLD, COPD here with progressive SOB.  CT chest concerning for moderate pericardial effusion. Echo with large pericardial effusion with tamponade physiology.     Large pericardial effusion: likely malignant  - echo wi

## 2020-09-12 NOTE — PROCEDURES
Procedure: Urgent Pericardiocentesis.     [facility] AND [facility] Cardiology      Yolis Snell Patient Status:  Inpatient    1945 MRN [id number]   [facility] Attending Yamil Grimaldo MD   Hosp Day # 1 P was removed. We used a dilator over the J-wire inserted to the pericardium and then an 8.3 [place] Kika pericardial drain was inserted over the J-wire and positioned in the upper part of the pericardial space.   Pericardial drain was connected to 1 L back and th pericardial drain over the weekend   -Limited echo to assess for reaccumulation of pericardial effusion in a.m.  -Depends on reaccumulation and etiology of pericardial effusion patient may need pericardial window - Plavix was held - no recent coronary sten

## 2020-09-12 NOTE — PROGRESS NOTES
[facility] Pharmacy Note: Antimicrobial Weight Based Dose Adjustment for: piperacillin/tazobactam (Leta Cole)    Newton Pearson is a 66year old patient who has been prescribed piperacillin/tazobactam (ZOSYN) 3.375 g every 8 hours.     Estimated Creatinine Clearance:

## 2020-09-12 NOTE — PROGRESS NOTES
Patient continued to cough and feel full in the mouth throughout shift . Patient was seen by cardiologist and a stat Echo was ordered . Echo was performed and patient was transferred to [address] [place] [address] and to  ICU from [address] [place] [address]. Shift report given to Tatum.

## 2020-09-13 ENCOUNTER — APPOINTMENT (OUTPATIENT)
Dept: CV DIAGNOSTICS | Facility: HOSPITAL | Age: 75
DRG: 843 | End: 2020-09-13
Attending: INTERNAL MEDICINE
Payer: MEDICARE

## 2020-09-13 PROBLEM — C34.12 PRIMARY CANCER OF LEFT UPPER LOBE OF LUNG (HCC): Status: ACTIVE | Noted: 2020-09-11

## 2020-09-13 PROBLEM — I31.39 PERICARDIAL EFFUSION WITH CARDIAC TAMPONADE: Status: ACTIVE | Noted: 2020-09-11

## 2020-09-13 PROBLEM — I31.4 PERICARDIAL EFFUSION WITH CARDIAC TAMPONADE: Status: ACTIVE | Noted: 2020-09-11

## 2020-09-13 PROBLEM — I31.3 PERICARDIAL EFFUSION WITH CARDIAC TAMPONADE: Status: ACTIVE | Noted: 2020-09-11

## 2020-09-13 LAB
ANION GAP SERPL CALC-SCNC: 6 MMOL/L (ref 0–18)
ANION GAP SERPL CALC-SCNC: 7 MMOL/L (ref 0–18)
BASOPHILS # BLD AUTO: 0.02 X10(3) UL (ref 0–0.2)
BASOPHILS NFR BLD AUTO: 0.1 %
BUN BLD-MCNC: 45 MG/DL (ref 7–18)
BUN BLD-MCNC: 46 MG/DL (ref 7–18)
BUN/CREAT SERPL: 26.3 (ref 10–20)
BUN/CREAT SERPL: 30.6 (ref 10–20)
C DIFF TOX B STL QL: NEGATIVE
CALCIUM BLD-MCNC: 8.7 MG/DL (ref 8.5–10.1)
CALCIUM BLD-MCNC: 8.7 MG/DL (ref 8.5–10.1)
CHLORIDE SERPL-SCNC: 102 MMOL/L (ref 98–112)
CHLORIDE SERPL-SCNC: 104 MMOL/L (ref 98–112)
CO2 SERPL-SCNC: 24 MMOL/L (ref 21–32)
CO2 SERPL-SCNC: 26 MMOL/L (ref 21–32)
CREAT BLD-MCNC: 1.47 MG/DL (ref 0.7–1.3)
CREAT BLD-MCNC: 1.75 MG/DL (ref 0.7–1.3)
DEPRECATED RDW RBC AUTO: 42.8 FL (ref 35.1–46.3)
EOSINOPHIL # BLD AUTO: 0 X10(3) UL (ref 0–0.7)
EOSINOPHIL NFR BLD AUTO: 0 %
ERYTHROCYTE [DISTWIDTH] IN BLOOD BY AUTOMATED COUNT: 13.9 % (ref 11–15)
GLUCOSE BLD-MCNC: 135 MG/DL (ref 70–99)
GLUCOSE BLD-MCNC: 181 MG/DL (ref 70–99)
HAV IGM SER QL: 2.8 MG/DL (ref 1.6–2.6)
HAV IGM SER QL: 2.9 MG/DL (ref 1.6–2.6)
HCT VFR BLD AUTO: 37.6 % (ref 39–53)
HGB BLD-MCNC: 12.7 G/DL (ref 13–17.5)
IMM GRANULOCYTES # BLD AUTO: 0.09 X10(3) UL (ref 0–1)
IMM GRANULOCYTES NFR BLD: 0.6 %
LYMPHOCYTES # BLD AUTO: 0.44 X10(3) UL (ref 1–4)
LYMPHOCYTES NFR BLD AUTO: 2.8 %
MCH RBC QN AUTO: 28.7 PG (ref 26–34)
MCHC RBC AUTO-ENTMCNC: 33.8 G/DL (ref 31–37)
MCV RBC AUTO: 84.9 FL (ref 80–100)
MONOCYTES # BLD AUTO: 1.01 X10(3) UL (ref 0.1–1)
MONOCYTES NFR BLD AUTO: 6.5 %
NEUTROPHILS # BLD AUTO: 14.07 X10 (3) UL (ref 1.5–7.7)
NEUTROPHILS # BLD AUTO: 14.07 X10(3) UL (ref 1.5–7.7)
NEUTROPHILS NFR BLD AUTO: 90 %
OSMOLALITY SERPL CALC.SUM OF ELEC: 294 MOSM/KG (ref 275–295)
OSMOLALITY SERPL CALC.SUM OF ELEC: 294 MOSM/KG (ref 275–295)
PLATELET # BLD AUTO: 288 10(3)UL (ref 150–450)
POTASSIUM SERPL-SCNC: 3.9 MMOL/L (ref 3.5–5.1)
POTASSIUM SERPL-SCNC: 3.9 MMOL/L (ref 3.5–5.1)
RBC # BLD AUTO: 4.43 X10(6)UL (ref 3.8–5.8)
SODIUM SERPL-SCNC: 134 MMOL/L (ref 136–145)
SODIUM SERPL-SCNC: 135 MMOL/L (ref 136–145)
WBC # BLD AUTO: 15.6 X10(3) UL (ref 4–11)

## 2020-09-13 PROCEDURE — 99233 SBSQ HOSP IP/OBS HIGH 50: CPT | Performed by: HOSPITALIST

## 2020-09-13 PROCEDURE — 93308 TTE F-UP OR LMTD: CPT | Performed by: INTERNAL MEDICINE

## 2020-09-13 PROCEDURE — 99232 SBSQ HOSP IP/OBS MODERATE 35: CPT | Performed by: INTERNAL MEDICINE

## 2020-09-13 PROCEDURE — 99223 1ST HOSP IP/OBS HIGH 75: CPT | Performed by: INTERNAL MEDICINE

## 2020-09-13 RX ORDER — METHYLPREDNISOLONE SODIUM SUCCINATE 40 MG/ML
40 INJECTION, POWDER, LYOPHILIZED, FOR SOLUTION INTRAMUSCULAR; INTRAVENOUS EVERY 12 HOURS
Status: DISCONTINUED | OUTPATIENT
Start: 2020-09-13 | End: 2020-09-18

## 2020-09-13 NOTE — CONSULTS
[facility]    Report of Hematology/Oncology Consultation    Reanna Lopez Patient Status:  Inpatient    1945 MRN [id number]   Location 237/237-A Attending Gabriela Gunter MD   Date of admission 2020  1:43 PM   PCP RADHA States that for about the past year he has been having the sensation of things getting stuck in his throat. He states it was warm to try to clear his throat and found it difficult.   He states that the symptoms that progressed over time, for which he went the patient underwent with guided FNA of the left cervical lymph nodes. This, positive for CK7, TTF-1/Napsin a negative for P 40 and cytokeratin 20. The patient did not receive the results of the biopsy, which are resulted on 9/11/2020.   He was then admi No current facility-administered medications on file prior to encounter.    hydrochlorothiazide 12.5 MG Oral Cap, Take 12.5 mg by mouth every morning., Disp: , Rfl:   Irbesartan 150 MG Oral Tab, Take 150 mg by mouth 2 (two) times a day., Disp: , Rfl:   HYDR Review of Systems   Constitutional: Positive for appetite change, fatigue and unexpected weight change. HENT:   Positive for hearing loss and voice change. Respiratory: Positive for cough. Negative for shortness of breath.     Cardiovascular: Negative BASIC METABOLIC PANEL (8)    Collection Time: 09/13/20  3:56 AM   Result Value Ref Range    Glucose 135 (H) 70 - 99 mg/dL    Sodium 135 (L) 136 - 145 mmol/L    Potassium 3.9 3.5 - 5.1 mmol/L    Chloride 104 98 - 112 mmol/L    CO2 24.0 21.0 - 32.0 mmol/L CONCLUSION:  1. Moderate cardiomegaly. Pericardial effusion described on recent CT scan. 2. Atherosclerosis. 3. Prominent markings. 4. Masslike area of density in the left parahilar region.   A prior CT chest report from outside institution dated August 28 CONCLUSION:  1. No evidence of acute pulmonary embolism. No aortic dissection. 2. Large ill-defined 6.5 cm left upper lobe/lingular mass, which results in extensive bronchovascular encasement/invasion and extends toward the left hilum.   This finding is hi Discussed with the patient and his wife the natural history of the disease, and that the goal of treatment is palliative.   Discussed that molecular testing will be submitted on his tissue from [facility] or if there are enough cells in the per

## 2020-09-13 NOTE — CM/SW NOTE
Received MDO for evaluation. Spoke with wife Polly for assessment. Patient and Polly live in a trilevel, patient stays on the main floor. Polly helps with all ADLs/IADLs. Patient has a CPAP, cane, walker. No current home services. Was at [facility].     Discussed

## 2020-09-13 NOTE — PROGRESS NOTES
[facility]    Progress Note    Reggie Seip Patient Status:  Inpatient    1945 MRN [id number]   Location [facility] Attending Vivien Christine MD   Hosp Day # 2 PCP Mattie Palomares MD       Subjective: Oral Daily   • atorvastatin  20 mg Oral Nightly   • finasteride  5 mg Oral Daily   • metoprolol Tartrate  25 mg Oral BID   • Terazosin HCl  5 mg Oral [email or web address]       Current PRN Inpatient Meds:      Normal Saline Flush, acetaminophen, ondansetron HCl, Met left upper lobe and lingula as well as pulmonary metastases. 5. Demineralization. 6. Scoliosis. 7. Osteoarthritis. Dictated by (CST): Alona Oakley MD on 9/11/2020 at 2:42 PM     Finalized by (CST):  Alona Oakley MD on 9/11/2020 at 2:47 PM conduction defect -consider left ventricular hypertrophy.  -Old anterior infarct. - Diffuse nonspecific T-abnormality.  ABNORMAL When compared with ECG of 05/19/04 06:25:50loss or anterior r waves rt chest leads Electronically signed on 09/11/2020 at 16:16 illness, I anticipate that, after discharge, patient will require TBD.

## 2020-09-13 NOTE — PLAN OF CARE
Problem: Patient Centered Care  Goal: Patient preferences are identified and integrated in the patient's plan of care  Description  Interventions:  - What would you like us to know as we care for you?  I have palomo implant  - Provide timely, complete, RISK FOR INFECTION - ADULT  Goal: Absence of fever/infection during anticipated neutropenic period  Description  INTERVENTIONS  - Monitor WBC  - Administer growth factors as ordered  - Implement neutropenic guidelines  Outcome: Progressing     Problem: SAF needed  - Establish a toileting routine/schedule  - Consider collaborating with pharmacy to review patient's medication profile  Outcome: Progressing     Problem: METABOLIC/FLUID AND ELECTROLYTES - ADULT  Goal: Electrolytes maintained within normal limits provider's orders  - Instruct and reinforce with patient and family use of appropriate assistive device and precautions (e.g. spinal or hip dislocation precautions)  Outcome: Progressing     Problem: Impaired Swallowing  Goal: Minimize aspiration risk  Francisco

## 2020-09-13 NOTE — PROGRESS NOTES
9 beat run vtach while pt sleeping. Vs remain stable, aysmtomatic.  k was  3.9, mg added to am lab. Dr. Christine Grewal aware. No new orders at this time. Cont to monitor.

## 2020-09-13 NOTE — PROGRESS NOTES
[facility]     [facility] Cardiology Progress Note    Maddie Bernstein Patient Status:  Inpatient    1945 MRN [id number]   Location [facility] 2W/SW Attending Avelino Britt MD   Hosp Day # 2 PCP Ronny Fragoso MD     Subjective 09/12/20  0700 09/13/20  0356   * 142* 135*   BUN 45* 49* 45*   CREATSERUM 1.78* 1.55* 1.47*   GFRAA 42* 49* 52*   GFRNAA 36* 43* 45*   CA 9.5 9.1 8.7   ALB  --  3.2*  --    * 131* 135*   K 4.4 4.2 3.9   CL 98 99 104   CO2 22.0 21.0 24.0   ALK

## 2020-09-13 NOTE — PROGRESS NOTES
[facility]    Progress Note    Flakito Barbour Patient Status:  Inpatient    1945 MRN [id number]   Location [facility] 2W/SW Attending Montana Welsh MD   Hosp Day # 2 PCP Sudheer Adan MD       Subjective:   Flakito Barbour cc today  Window after Plavix and aspiri on hold for 5 days      COPD-decrease Solu-Medrol to 40 twice daily continue duo nebs    Lung mass-consistent with adeno CA of the lung  Work-up after cardiac tamponade is addressed  Postobstructive pneumonia-Zosyn mediastinal and left hilar lymph nodes, most likely metastatic in nature. 4. Suspicious lytic lesion involving the right lateral 6th rib with associated mildly displaced likely pathologic fracture. This is suspicious for osseous metastasis.   Additional mi

## 2020-09-13 NOTE — PLAN OF CARE
Patients vital signs were normal during shift,pain was controlled with  Norco, patient consistently  had non productive cough complaining of fullness of mouth and loss of appetite.   A Stat Echo  was ordered and Cath Lab for Pericardiocentesis and  Patient increased pain with activity and pre-medicate as appropriate  Outcome: Progressing     Problem: RISK FOR INFECTION - ADULT  Goal: Absence of fever/infection during anticipated neutropenic period  Description  INTERVENTIONS  - Monitor WBC  - Administer grow effectiveness of GI medications  - Encourage mobilization and activity  - Obtain nutritional consult as needed  - Establish a toileting routine/schedule  - Consider collaborating with pharmacy to review patient's medication profile  Outcome: Progressing affected body part  Description  INTERVENTIONS:  - Support and protect limb and body alignment per provider's orders  - Instruct and reinforce with patient and family use of appropriate assistive device and precautions (e.g. spinal or hip dislocation preca

## 2020-09-13 NOTE — DIETARY NOTE
ADULT NUTRITION BRIEF NOTE    Pt is at low nutrition risk. PATIENT STATUS: Patient identified at nutrition risk due to a 10# weight loss and eating poorly. Chart reviewed and patient visited during his lunch.   Per patient loss of appetite for ~ 2 week

## 2020-09-13 NOTE — PLAN OF CARE
No acute events overnight.  Pericardial drain output 418ml at 6am.    Problem: PAIN - ADULT  Goal: Verbalizes/displays adequate comfort level or patient's stated pain goal  Description  INTERVENTIONS:  - Encourage pt to monitor pain and request assistance minimize respiratory effort  - Oxygen supplementation based on oxygen saturation or ABGs  - Provide Smoking Cessation handout, if applicable  - Encourage broncho-pulmonary hygiene including cough, deep breathe, Incentive Spirometry  - Assess the need for s Encourage oral intake as appropriate  - Instruct patient on fluid and nutrition restrictions as appropriate  Outcome: Progressing     Problem: MUSCULOSKELETAL - ADULT  Goal: Return mobility to safest level of function  Description  INTERVENTIONS:  - Assess

## 2020-09-14 LAB
ANION GAP SERPL CALC-SCNC: 7 MMOL/L (ref 0–18)
BASOPHILS # BLD AUTO: 0.01 X10(3) UL (ref 0–0.2)
BASOPHILS NFR BLD AUTO: 0.1 %
BUN BLD-MCNC: 40 MG/DL (ref 7–18)
BUN/CREAT SERPL: 32.3 (ref 10–20)
CALCIUM BLD-MCNC: 8.7 MG/DL (ref 8.5–10.1)
CHLORIDE SERPL-SCNC: 106 MMOL/L (ref 98–112)
CO2 SERPL-SCNC: 24 MMOL/L (ref 21–32)
CREAT BLD-MCNC: 1.24 MG/DL (ref 0.7–1.3)
DEPRECATED RDW RBC AUTO: 43.6 FL (ref 35.1–46.3)
EOSINOPHIL # BLD AUTO: 0 X10(3) UL (ref 0–0.7)
EOSINOPHIL NFR BLD AUTO: 0 %
ERYTHROCYTE [DISTWIDTH] IN BLOOD BY AUTOMATED COUNT: 14.1 % (ref 11–15)
GLUCOSE BLD-MCNC: 124 MG/DL (ref 70–99)
HCT VFR BLD AUTO: 37 % (ref 39–53)
HGB BLD-MCNC: 12.4 G/DL (ref 13–17.5)
IMM GRANULOCYTES # BLD AUTO: 0.06 X10(3) UL (ref 0–1)
IMM GRANULOCYTES NFR BLD: 0.5 %
LYMPHOCYTES # BLD AUTO: 0.45 X10(3) UL (ref 1–4)
LYMPHOCYTES NFR BLD AUTO: 3.5 %
MCH RBC QN AUTO: 29 PG (ref 26–34)
MCHC RBC AUTO-ENTMCNC: 33.5 G/DL (ref 31–37)
MCV RBC AUTO: 86.4 FL (ref 80–100)
MONOCYTES # BLD AUTO: 0.63 X10(3) UL (ref 0.1–1)
MONOCYTES NFR BLD AUTO: 4.9 %
NEUTROPHILS # BLD AUTO: 11.69 X10 (3) UL (ref 1.5–7.7)
NEUTROPHILS # BLD AUTO: 11.69 X10(3) UL (ref 1.5–7.7)
NEUTROPHILS NFR BLD AUTO: 91 %
OSMOLALITY SERPL CALC.SUM OF ELEC: 295 MOSM/KG (ref 275–295)
PLATELET # BLD AUTO: 268 10(3)UL (ref 150–450)
POTASSIUM SERPL-SCNC: 3.8 MMOL/L (ref 3.5–5.1)
RBC # BLD AUTO: 4.28 X10(6)UL (ref 3.8–5.8)
SODIUM SERPL-SCNC: 137 MMOL/L (ref 136–145)
WBC # BLD AUTO: 12.8 X10(3) UL (ref 4–11)

## 2020-09-14 PROCEDURE — 99233 SBSQ HOSP IP/OBS HIGH 50: CPT | Performed by: HOSPITALIST

## 2020-09-14 PROCEDURE — 99232 SBSQ HOSP IP/OBS MODERATE 35: CPT | Performed by: INTERNAL MEDICINE

## 2020-09-14 RX ORDER — POTASSIUM CHLORIDE 20 MEQ/1
40 TABLET, EXTENDED RELEASE ORAL ONCE
Status: COMPLETED | OUTPATIENT
Start: 2020-09-14 | End: 2020-09-14

## 2020-09-14 NOTE — PROGRESS NOTES
[facility]    Progress Note    Donavon Parrish Patient Status:  Inpatient    1945 MRN [id number]   Location [facility] Attending Fiorella Ayala MD   Hosp Day # 3 PCP Kenyatta Lutz MD       Subjective: finasteride  5 mg Oral Daily   • metoprolol Tartrate  25 mg Oral BID   • Terazosin HCl  5 mg Oral [email or web address]       Current PRN Inpatient Meds:      Normal Saline Flush, acetaminophen, ondansetron HCl, Metoclopramide HCl, morphINE sulfate **OR** morphINE viramontes describes a large masslike opacity in the left upper lobe and lingula as well as pulmonary metastases. 5. Demineralization. 6. Scoliosis. 7. Osteoarthritis. Dictated by (CST): Bryn Salomon MD on 9/11/2020 at 2:42 PM     Finalized by (CST):  Maurice Avendano tamponade  2/2 large pericardial effusion, suspect malignant  -stat echo was done at bedside 9/12  -taken by cardiology for urgent pericardiocentesis.   1.1 L of SS fluid was removed   -fluid sent for analysis   -repeat echo donr  -monitor in PCU --> ok fo

## 2020-09-14 NOTE — PROGRESS NOTES
[facility]     [facility] Cardiology Progress Note    Maddie Bernstein Patient Status:  Inpatient    1945 MRN [id number]   Location [facility] 2W/SW Attending Avelino Britt MD   Hosp Day # 3 PCP Ronny Fragoso MD     Subjective ALB 3.2*  --   --   --    * 135* 134* 137   K 4.2 3.9 3.9 3.8   CL 99 104 102 106   CO2 21.0 24.0 26.0 24.0   ALKPHO 85  --   --   --    AST 23  --   --   --    ALT 35  --   --   --    BILT 0.6  --   --   --    TP 7.1  --   --   --         Recent L pending  - CV surgery over weekend recommending pericardial window this week after asa/plavix washout.       Severe AS/moderate AI  - progression since last echo in 2019    MD MICH LalaS/AYDEE Cardiology  9/14/2020  8:58 AM

## 2020-09-14 NOTE — CONSULTS
[facility] HOSP - [facility]    Report of Consultation    Mary Astorga Patient Status:  Inpatient    1945 MRN [id number]   Location [facility] 2W/SW Attending Dawson Tran MD   Hosp Day # 3 PCP Everett Mitchell MD     Date of Admission:  5 Daily  acetylcysteine (MUCOMYST) 20 % solution 2 mL, 2 mL, Nebulization, BID  Piperacillin Sod-Tazobactam So (ZOSYN) 4.5 g in dextrose 5 % 100 mL MBP/ADD-vantage, 4.5 g, Intravenous, Q8H  Normal Saline Flush 0.9 % injection 3 mL, 3 mL, Intravenous, PRN  He daily.    atorvastatin 20 MG Oral Tab, Take 20 mg by mouth nightly. Clopidogrel Bisulfate 75 MG Oral Tab, Take 75 mg by mouth daily. finasteride 5 MG Oral Tab, Take 5 mg by mouth daily.     metoprolol Tartrate 25 MG Oral Tab, Take 25 mg by mouth 2 (tw or JVD. Carotids are 2+ without bruits. Lungs are diminished on the left side   Cardiac exam there is a normal rate and rhythm with a normal S1,S2 and no pathological murmurs. No rub or extra heart sounds appreciated.   Abdomen is soft nontender with no do a pericardial window into the left or right pleural space with a VAT or minithoracotomy.   In light of patient's advanced malignancy and especially his severe aortic stenosis general anesthesia and further procedures should be avoided if possible and onl

## 2020-09-14 NOTE — PLAN OF CARE
Problem: RISK FOR INFECTION - ADULT  Goal: Absence of fever/infection during anticipated neutropenic period  Description  INTERVENTIONS  - Monitor WBC  - Administer growth factors as ordered  - Implement neutropenic guidelines  Outcome: Progressing     P needed  - Ensure adequate protection for wounds/incisions during mobilization  - Obtain PT/OT consults as needed  - Advance activity as appropriate  - Communicate ordered activity level and limitations with patient/family  Outcome: Progressing   Patient is

## 2020-09-14 NOTE — PROGRESS NOTES
[facility]    Progress Note    Reanna Lopez Patient Status:  Inpatient    1945 MRN [id number]   Location [facility] 2W/SW Attending Gabriela Gunter MD   Hosp Day # 3 PCP Alondra Malave MD       Subjective:   Reanna Lopez of lung ([facility] [address]  Bronchoscopy after cardiac tamponade issues are completely resolved    COPD-continue steroids    Pneumonia-continue Zosyn        Results:     Lab Results   Component Value Date    WBC 12.8 (H) 09/14/2020    HGB 12.4 (L) 09/14/2020    HCT 37.

## 2020-09-15 ENCOUNTER — APPOINTMENT (OUTPATIENT)
Dept: CT IMAGING | Facility: HOSPITAL | Age: 75
DRG: 843 | End: 2020-09-15
Attending: INTERNAL MEDICINE
Payer: MEDICARE

## 2020-09-15 ENCOUNTER — APPOINTMENT (OUTPATIENT)
Dept: GENERAL RADIOLOGY | Facility: HOSPITAL | Age: 75
DRG: 843 | End: 2020-09-15
Attending: INTERNAL MEDICINE
Payer: MEDICARE

## 2020-09-15 LAB
ANION GAP SERPL CALC-SCNC: 5 MMOL/L (ref 0–18)
BASOPHILS # BLD AUTO: 0.01 X10(3) UL (ref 0–0.2)
BASOPHILS NFR BLD AUTO: 0.1 %
BASOPHILS NFR FLD: 4 %
BUN BLD-MCNC: 32 MG/DL (ref 7–18)
BUN/CREAT SERPL: 27.6 (ref 10–20)
CALCIUM BLD-MCNC: 8.3 MG/DL (ref 8.5–10.1)
CHLORIDE SERPL-SCNC: 107 MMOL/L (ref 98–112)
CO2 SERPL-SCNC: 25 MMOL/L (ref 21–32)
CREAT BLD-MCNC: 1.16 MG/DL (ref 0.7–1.3)
DEPRECATED RDW RBC AUTO: 43.6 FL (ref 35.1–46.3)
EOSINOPHIL # BLD AUTO: 0.01 X10(3) UL (ref 0–0.7)
EOSINOPHIL NFR BLD AUTO: 0.1 %
EOSINOPHIL NFR FLD: 1 %
ERYTHROCYTE [DISTWIDTH] IN BLOOD BY AUTOMATED COUNT: 14.1 % (ref 11–15)
GLUCOSE BLD-MCNC: 110 MG/DL (ref 70–99)
HCT VFR BLD AUTO: 36.8 % (ref 39–53)
HGB BLD-MCNC: 12.2 G/DL (ref 13–17.5)
IMM GRANULOCYTES # BLD AUTO: 0.1 X10(3) UL (ref 0–1)
IMM GRANULOCYTES NFR BLD: 0.8 %
LYMPHOCYTES # BLD AUTO: 0.4 X10(3) UL (ref 1–4)
LYMPHOCYTES NFR BLD AUTO: 3.3 %
LYMPHOCYTES NFR FLD: 16 %
Lab: 1 %
MCH RBC QN AUTO: 28.5 PG (ref 26–34)
MCHC RBC AUTO-ENTMCNC: 33.2 G/DL (ref 31–37)
MCV RBC AUTO: 86 FL (ref 80–100)
MONOCYTES # BLD AUTO: 0.44 X10(3) UL (ref 0.1–1)
MONOCYTES NFR BLD AUTO: 3.7 %
MONOCYTES NFR FLD: 78 %
NEUTROPHILS # BLD AUTO: 11.01 X10 (3) UL (ref 1.5–7.7)
NEUTROPHILS # BLD AUTO: 11.01 X10(3) UL (ref 1.5–7.7)
NEUTROPHILS NFR BLD AUTO: 92 %
NT-PROBNP SERPL-MCNC: 8940 PG/ML (ref ?–450)
OSMOLALITY SERPL CALC.SUM OF ELEC: 292 MOSM/KG (ref 275–295)
PLATELET # BLD AUTO: 270 10(3)UL (ref 150–450)
POTASSIUM SERPL-SCNC: 4.1 MMOL/L (ref 3.5–5.1)
RBC # BLD AUTO: 4.28 X10(6)UL (ref 3.8–5.8)
SODIUM SERPL-SCNC: 137 MMOL/L (ref 136–145)
WBC # BLD AUTO: 12 X10(3) UL (ref 4–11)
WBC OTHER NFR FLD: 0 %

## 2020-09-15 PROCEDURE — 70470 CT HEAD/BRAIN W/O & W/DYE: CPT | Performed by: INTERNAL MEDICINE

## 2020-09-15 PROCEDURE — 99232 SBSQ HOSP IP/OBS MODERATE 35: CPT | Performed by: INTERNAL MEDICINE

## 2020-09-15 PROCEDURE — 99233 SBSQ HOSP IP/OBS HIGH 50: CPT | Performed by: HOSPITALIST

## 2020-09-15 PROCEDURE — 71045 X-RAY EXAM CHEST 1 VIEW: CPT | Performed by: INTERNAL MEDICINE

## 2020-09-15 RX ORDER — LOPERAMIDE HYDROCHLORIDE 2 MG/1
2 CAPSULE ORAL 4 TIMES DAILY PRN
Status: DISCONTINUED | OUTPATIENT
Start: 2020-09-15 | End: 2020-09-18

## 2020-09-15 RX ORDER — GARLIC EXTRACT 500 MG
1 CAPSULE ORAL DAILY
Status: DISCONTINUED | OUTPATIENT
Start: 2020-09-15 | End: 2020-09-18

## 2020-09-15 NOTE — PROGRESS NOTES
Misc. Note    Notified by bedside nurse with concern that the middle part of the tubing from the pericardial drain to Pleurovac noted with clot and concerned tube unable to drain fluid. Fluid noted above clotted area.   RN stated the wife accidentally knock

## 2020-09-15 NOTE — PHYSICAL THERAPY NOTE
PHYSICAL THERAPY EVALUATION - INPATIENT     Room Number: 237/237-A  Evaluation Date: 9/15/2020  Type of Evaluation: Initial   Physician Order: PT Eval and Treat    Presenting Problem: hypoxia  Reason for Therapy: Mobility Dysfunction and Discharge Plannin services to address these deficits in preparation for discharge. DISCHARGE RECOMMENDATIONS  PT Discharge Recommendations: Home with home health PT; Intermittent Supervision    PLAN  PT Treatment Plan: Bed mobility; Body mechanics; Endurance; Patient educati within functional limits     Lower extremity ROM is within functional limits     Lower extremity strength is within functional limits     BALANCE  Static Sitting: Good  Dynamic Sitting: Fair +  Static Standing: Fair  Dynamic Standing: Fair -    ACTIVITY TO Patient is able to demonstrate transfers Sit to/from Stand at assistance level: supervision with walker - rolling     Goal #2  Current Status    Goal #3 Patient is able to ambulate 150 feet with assist device: walker - rolling at assistance level: supervis

## 2020-09-15 NOTE — PLAN OF CARE
Problem: Patient Centered Care  Goal: Patient preferences are identified and integrated in the patient's plan of care  Description  Interventions:  - What would you like us to know as we care for you? Please keep me updated on my care.   - Provide timely, from fall injury  Description  INTERVENTIONS:  - Assess pt frequently for physical needs  - Identify cognitive and physical deficits and behaviors that affect risk of falls.   - Grawn fall precautions as indicated by assessment.  - Educate pt/family on stability and activity tolerance for standing, transferring and ambulating w/ or w/o assistive devices  - Assist with transfers and ambulation using safe patient handling equipment as needed  - Ensure adequate protection for wounds/incisions during mobiliz

## 2020-09-15 NOTE — OCCUPATIONAL THERAPY NOTE
OCCUPATIONAL THERAPY EVALUATION - INPATIENT     Room Number: 237/237-A  Evaluation Date: 9/15/2020  Type of Evaluation: Initial  Presenting Problem: (sob;recent diagnosis of lung ca)    Physician Order: IP Consult to Occupational Therapy  Reason for Roni Grounds benefit from CHERYL. DISCHARGE RECOMMENDATIONS  OT Discharge Recommendations: Home with home health PT/OT; Home;24 hour care/supervision  OT Device Recommendations: Shower chair    PLAN  OT Treatment Plan: Patient/Family education;Patient/Family training;E activity    COGNITION  Alert and oriented x 4    RANGE OF MOTION   Upper extremity ROM is within functional limits     STRENGTH ASSESSMENT  Upper extremity strength is within functional limits     COORDINATION  Gross Motor: WFL   Fine Motor: WFL     ACTIVI

## 2020-09-15 NOTE — PROGRESS NOTES
[facility]    Progress Note    Reggie Seip Patient Status:  Inpatient    1945 MRN [id number]   Location [facility] Attending Vivien Christine MD   Hosp Day # 4 PCP Mattie Palomares MD       Subjective: [email or web address]       Current PRN Inpatient Meds:      Normal Saline Flush, acetaminophen, ondansetron HCl, Metoclopramide HCl, morphINE sulfate **OR** morphINE sulfate **OR** morphINE sulfate, acetaminophen **OR** HYDROcodone-acetaminophen **OR** HYDROcodone-a suspicious for metastatic focus. There is prominent edema adjacent to this finding which involves the right frontal-parietal lobes. 2.  Post cochlear implant.   This device causes streak artifact limiting sensitivity for lesion assessment in the right cere pulmonary nodules, which are suspicious for hematogenous metastases. 3. Enlarged mediastinal and left hilar lymph nodes, most likely metastatic in nature.  4. Suspicious lytic lesion involving the right lateral 6th rib with associated mildly displaced likel left mediastinum with bilateral lung nodules and bilateral axillary and cervical lymphadenopathy. Path consistent with adenocarcinoma   -oncology consult noted  -pulmonary on consult  -CT brain with evidence of brain mets, will defer to oncology.   Suspect

## 2020-09-15 NOTE — PLAN OF CARE
Wife verbally abusive to staff, making snide remarks about \"people not doing their jobs\". C/o garbage not being emptied, the waste basket is empty and housekeeping not cleaning room,  had cleaned the room earlier in the day.  Upset see had not

## 2020-09-15 NOTE — PROGRESS NOTES
[facility]    [facility] Note    Bekah Collins Patient Status:  Inpatient    1945 MRN [id number]   Location [facility] 2W/SW Attending Gladis Dang MD   Hosp Day # 4 PCP Rukhsana Poole MD       Subjective:   Bekah Collins zosyn    Ok to transfer        Results:     Lab Results   Component Value Date    WBC 12.0 (H) 09/15/2020    HGB 12.2 (L) 09/15/2020    HCT 36.8 (L) 09/15/2020    .0 09/15/2020    CREATSERUM 1.16 09/15/2020    BUN 32 (H) 09/15/2020     09/15/2

## 2020-09-15 NOTE — PLAN OF CARE
Problem: Patient Centered Care  Goal: Patient preferences are identified and integrated in the patient's plan of care  Description  Interventions:  - What would you like us to know as we care for you? Please keep me updated on my care.   - Provide timely, Absence of fever/infection during anticipated neutropenic period  Description  INTERVENTIONS  - Monitor WBC  - Administer growth factors as ordered  - Implement neutropenic guidelines  Outcome: Progressing     Problem: SAFETY ADULT - FALL  Goal: Free from routine/schedule  - Consider collaborating with pharmacy to review patient's medication profile  Outcome: Progressing     Problem: METABOLIC/FLUID AND ELECTROLYTES - ADULT  Goal: Electrolytes maintained within normal limits  Description  INTERVENTIONS:  - reinforce with patient and family use of appropriate assistive device and precautions (e.g. spinal or hip dislocation precautions)  Outcome: Progressing     Problem: Impaired Swallowing  Goal: Minimize aspiration risk  Description  Interventions:  - Keli

## 2020-09-15 NOTE — PROGRESS NOTES
[facility] HOSP - [facility]    Progress Note    Yoliscrow Snell Patient Status:  Inpatient    1945 MRN [id number]   Location [facility] 2W/SW Attending Yamil Grimaldo MD   Hosp Day # 4 PCP Francisca Crow MD        Subjective:   Subjective: --Discussed that with current available therapies, and with expectancy is likely years. Treatment will be dependent on results of molecular testing for PDL-1, EGFR, BRAF, ALK, ROS-1, MET and RET have been requested for treatment decision making.   Discusse CONCLUSION:  1. There is a nodular focus in the posterior-lateral aspect of the right parietal lobe near the vertex of the gray-white junction measuring approximately 11 x 9 x 7 mm, suspicious for metastatic focus.   There is prominent edema adjacent to th

## 2020-09-15 NOTE — PROGRESS NOTES
[facility]     [facility] Cardiology Progress Note    Flakito Barbour Patient Status:  Inpatient    1945 MRN [id number]   Location [facility] 2W/SW Attending Montana Welsh MD   Hosp Day # 4 PCP Sudheer Adan MD     Subjective --    *   < > 134* 137 137   K 4.2   < > 3.9 3.8 4.1   CL 99   < > 102 106 107   CO2 21.0   < > 26.0 24.0 25.0   ALKPHO 85  --   --   --   --    AST 23  --   --   --   --    ALT 35  --   --   --   --    BILT 0.6  --   --   --   --    TP 7.1  --   -- this AM. Leave drain in today; hopefully output tapers and can pull tomorrow. - repeat echo 9/13 with trace effusion  - likely malignant in etiology; cytology pending  - seen by CV surgery.  Given risks a/w general anesthesia with his severe AS and advanc

## 2020-09-15 NOTE — PROGRESS NOTES
[facility]    [facility] Note    Flakito Barbour Patient Status:  Inpatient    1945 MRN [id number]   Location [facility] 2W/SW Attending Montana Welsh MD   Hosp Day # 4 PCP Sudheer Adan MD       Subjective:   Flakito Barbour gait  Pericardial drain under dry and clean dressing - secure in place and to atrium. 160 CC out since changed to atrium.      Results:     Lab Results   Component Value Date    WBC 12.0 (H) 09/15/2020    HGB 12.2 (L) 09/15/2020    HCT 36.8 (L) 09/15/2020

## 2020-09-16 PROCEDURE — 99233 SBSQ HOSP IP/OBS HIGH 50: CPT | Performed by: INTERNAL MEDICINE

## 2020-09-16 PROCEDURE — 99232 SBSQ HOSP IP/OBS MODERATE 35: CPT | Performed by: INTERNAL MEDICINE

## 2020-09-16 PROCEDURE — 99233 SBSQ HOSP IP/OBS HIGH 50: CPT | Performed by: HOSPITALIST

## 2020-09-16 RX ORDER — FUROSEMIDE 10 MG/ML
20 INJECTION INTRAMUSCULAR; INTRAVENOUS ONCE
Status: COMPLETED | OUTPATIENT
Start: 2020-09-16 | End: 2020-09-16

## 2020-09-16 NOTE — CDS QUERY
Clarification – Significance of Diagnostic Report/Results  Nessa Nielsen  Dear Doctor:  Clinical information (provided below) suggests a diagnosis in a diagnostic report and/or results.  For accurate ICD-10-CM code assignment to R parietal lobe metastasis. Will have RT consultation for evaluation for CK to solitary brain metastasis. Patient is on solumedrol. Will switch to dexamethasone po 4mg q 8 upon d/c. Will taper to off once completes CK.    HPI: Presented to Hospital with

## 2020-09-16 NOTE — PROGRESS NOTES
[facility]    Progress Note    Niurka Gonzalez Patient Status:  Inpatient    1945 MRN [id number]   Location [facility] 2W/SW Attending Suni Agee MD   Hosp Day # 5 PCP Morelia Moore MD        Subjective:   Subjective: oncology.   Suspect will need rad/onc to see     CAD  -as above, holding antiplts  -cont BB     AS/AI  -echo pending     HL  -statin     HTN  -BP well controlled  -amlodipine     Possible underlying post obstructive pneumonia with respiratory distress  - tr Tico Yang MD on 9/15/2020 at 8:29 AM     Finalized by (CST): Tico Yang MD on 9/15/2020 at 8:38 AM          Xr Chest Ap Portable  (cpt=71045)    Result Date: 9/15/2020  CONCLUSION:  1. Persistent masslike density in the left perihilar region.     Mina Agee

## 2020-09-16 NOTE — PROGRESS NOTES
[facility] HOSP - [facility]     [facility] Cardiology Progress Note    Carmen Mckeon Patient Status:  Inpatient    1945 MRN [id number]   Location [facility] 2W/SW Attending Monserrat Beauchamp MD   Hosp Day # 5 PCP Ann Jose MD     Subjective < > 134* 137 137   K 4.2   < > 3.9 3.8 4.1   CL 99   < > 102 106 107   CO2 21.0   < > 26.0 24.0 25.0   ALKPHO 85  --   --   --   --    AST 23  --   --   --   --    ALT 35  --   --   --   --    BILT 0.6  --   --   --   --    TP 7.1  --   --   --   --     < -> 418 mL 9/13 -> 140 mL 9/14 -> 160 mL 9/15 -> 48 mL (only 11 mL since yesterday AM). - seen by CV surgery.  Given risks a/w general anesthesia with his severe AS and advanced malignancy, felt surgical approach should be avoided at this time unless draina

## 2020-09-16 NOTE — PROGRESS NOTES
[facility]    Progress Note    Mary Astorga Patient Status:  Inpatient    1945 MRN [id number]   Location [facility] 2W/SW Attending Dawson Tran MD   Hosp Day # 5 PCP Everett Mitchell MD       Subjective:   Mary Astorga symmetric reflexes. Normal coordination and gait  Pericardial drain under dry and clean dressing - secure in place and to atrium. 160 CC out since changed to atrium. Results:     Lab Results   Component Value Date    WBC 12.0 (H) 09/15/2020    HGB 12. 2

## 2020-09-16 NOTE — PHYSICAL THERAPY NOTE
Attempted PT treatment- pt supine in bed, declining activity. \"No, not today, tomorrow. I had a very busy morning and I need to rest.\" Patient continued to refuse despite therapist educating pt on benefits of mobilization.      Thank you,  Daisy Owusu

## 2020-09-16 NOTE — PROGRESS NOTES
[facility]    Progress Note    Neha Weaver Patient Status:  Inpatient    1945 MRN [id number]   Location [facility] 2W/SW Attending Emma Layton MD   Hosp Day # 5 PCP Kevan Whitmore MD       Subjective:   Neha Weaver tamponade  Status post drain  CTS involved  Malignant      Primary cancer of left upper lobe of lung ([address]  Consistent with adenocarcinoma stage IV  Metastasis to the brain  Metastasis to pericardial fluid  Bronchoscopy only if indicated by oncology      B 11:21 AM                  Bethany Streeter MD  9/16/2020

## 2020-09-16 NOTE — PLAN OF CARE
Patient up to the bathroom with standby assist and rolling walker. He did have two episodes of diarrhea early in the shift. PRN Imodium and Norco given. Patient resting comfortably at this time.  He is calling appropriately for assistance, bed alarm on, pamela pain and pain management  - Manage/alleviate anxiety  - Utilize distraction and/or relaxation techniques  - Monitor for opioid side effects  - Notify MD/LIP if interventions unsuccessful or patient reports new pain  - Anticipate increased pain with activit GASTROINTESTINAL - ADULT  Goal: Maintains or returns to baseline bowel function  Description  INTERVENTIONS:  - Assess bowel function  - Maintain adequate hydration with IV or PO as ordered and tolerated  - Evaluate effectiveness of GI medications  - Encou mobilization  - Obtain PT/OT consults as needed  - Advance activity as appropriate  - Communicate ordered activity level and limitations with patient/family  Outcome: Progressing  Goal: Maintain proper alignment of affected body part  Description  INTERVEN

## 2020-09-16 NOTE — PROGRESS NOTES
[facility]    Progress Note    Kallie Gill Patient Status:  Inpatient    1945 MRN [id number]   Location [facility] 2W/SW Attending Savanna Yip MD   Hosp Day # 5 PCP Janelle Vasquez MD        Subjective:   Subjective: --Given, discussed that CT scan of the head with and without contrast is c/w one 11 mm R parietal lobe metastasis. Will have RT consultation for evaluation for CK to solitary brain metastasis. Patient is on solumedrol.   Will switch to dexamethasone po 4m CONCLUSION:  1. There is a nodular focus in the posterior-lateral aspect of the right parietal lobe near the vertex of the gray-white junction measuring approximately 11 x 9 x 7 mm, suspicious for metastatic focus.   There is prominent edema adjacent to th

## 2020-09-17 ENCOUNTER — APPOINTMENT (OUTPATIENT)
Dept: CARDIOLOGY | Age: 75
End: 2020-09-17
Attending: INTERNAL MEDICINE

## 2020-09-17 ENCOUNTER — APPOINTMENT (OUTPATIENT)
Dept: RADIATION ONCOLOGY | Facility: HOSPITAL | Age: 75
DRG: 843 | End: 2020-09-17
Attending: RADIOLOGY
Payer: MEDICARE

## 2020-09-17 LAB
ADENOVIRUS F 40/41 PCR: NEGATIVE
ANION GAP SERPL CALC-SCNC: 5 MMOL/L (ref 0–18)
ASTROVIRUS PCR: NEGATIVE
BASOPHILS # BLD AUTO: 0.01 X10(3) UL (ref 0–0.2)
BASOPHILS NFR BLD AUTO: 0.1 %
BUN BLD-MCNC: 41 MG/DL (ref 7–18)
BUN/CREAT SERPL: 30.4 (ref 10–20)
C CAYETANENSIS DNA SPEC QL NAA+PROBE: NEGATIVE
CALCIUM BLD-MCNC: 8.4 MG/DL (ref 8.5–10.1)
CAMPY SP DNA.DIARRHEA STL QL NAA+PROBE: NEGATIVE
CHLORIDE SERPL-SCNC: 107 MMOL/L (ref 98–112)
CO2 SERPL-SCNC: 27 MMOL/L (ref 21–32)
CREAT BLD-MCNC: 1.35 MG/DL (ref 0.7–1.3)
CRYPTOSP DNA SPEC QL NAA+PROBE: NEGATIVE
DEPRECATED RDW RBC AUTO: 45.1 FL (ref 35.1–46.3)
EAEC PAA PLAS AGGR+AATA ST NAA+NON-PRB: NEGATIVE
EC STX1+STX2 + H7 FLIC SPEC NAA+PROBE: NEGATIVE
ENTAMOEBA HISTOLYTICA PCR: NEGATIVE
EOSINOPHIL # BLD AUTO: 0.01 X10(3) UL (ref 0–0.7)
EOSINOPHIL NFR BLD AUTO: 0.1 %
EPEC EAE GENE STL QL NAA+NON-PROBE: NEGATIVE
ERYTHROCYTE [DISTWIDTH] IN BLOOD BY AUTOMATED COUNT: 14.4 % (ref 11–15)
ETEC LTA+ST1A+ST1B TOX ST NAA+NON-PROBE: NEGATIVE
GIARDIA LAMBLIA PCR: NEGATIVE
GLUCOSE BLD-MCNC: 110 MG/DL (ref 70–99)
HAV IGM SER QL: 2.6 MG/DL (ref 1.6–2.6)
HCT VFR BLD AUTO: 40.6 % (ref 39–53)
HGB BLD-MCNC: 13.4 G/DL (ref 13–17.5)
IMM GRANULOCYTES # BLD AUTO: 0.07 X10(3) UL (ref 0–1)
IMM GRANULOCYTES NFR BLD: 0.6 %
LYMPHOCYTES # BLD AUTO: 0.43 X10(3) UL (ref 1–4)
LYMPHOCYTES NFR BLD AUTO: 3.6 %
MCH RBC QN AUTO: 28.8 PG (ref 26–34)
MCHC RBC AUTO-ENTMCNC: 33 G/DL (ref 31–37)
MCV RBC AUTO: 87.3 FL (ref 80–100)
MONOCYTES # BLD AUTO: 0.33 X10(3) UL (ref 0.1–1)
MONOCYTES NFR BLD AUTO: 2.7 %
NEUTROPHILS # BLD AUTO: 11.22 X10 (3) UL (ref 1.5–7.7)
NEUTROPHILS # BLD AUTO: 11.22 X10(3) UL (ref 1.5–7.7)
NEUTROPHILS NFR BLD AUTO: 92.9 %
NOROVIRUS GI/GII PCR: NEGATIVE
OSMOLALITY SERPL CALC.SUM OF ELEC: 299 MOSM/KG (ref 275–295)
P SHIGELLOIDES DNA STL QL NAA+PROBE: NEGATIVE
PHOSPHATE SERPL-MCNC: 3.7 MG/DL (ref 2.5–4.9)
PLATELET # BLD AUTO: 274 10(3)UL (ref 150–450)
POTASSIUM SERPL-SCNC: 4.1 MMOL/L (ref 3.5–5.1)
RBC # BLD AUTO: 4.65 X10(6)UL (ref 3.8–5.8)
ROTAVIRUS A PCR: NEGATIVE
SALMONELLA DNA SPEC QL NAA+PROBE: NEGATIVE
SAPOVIRUS PCR: NEGATIVE
SHIGELLA SP+EIEC IPAH ST NAA+NON-PROBE: NEGATIVE
SODIUM SERPL-SCNC: 139 MMOL/L (ref 136–145)
V CHOLERAE DNA SPEC QL NAA+PROBE: NEGATIVE
VIBRIO DNA SPEC NAA+PROBE: NEGATIVE
WBC # BLD AUTO: 12.1 X10(3) UL (ref 4–11)
YERSINIA DNA SPEC NAA+PROBE: NEGATIVE

## 2020-09-17 PROCEDURE — 99232 SBSQ HOSP IP/OBS MODERATE 35: CPT | Performed by: INTERNAL MEDICINE

## 2020-09-17 PROCEDURE — 99233 SBSQ HOSP IP/OBS HIGH 50: CPT | Performed by: HOSPITALIST

## 2020-09-17 NOTE — PROGRESS NOTES
[facility]    Progress Note    Kalia Camacho Patient Status:  Inpatient    1945 MRN [id number]   Location [facility] 2W/SW Attending Stevie Kebede MD   Hosp Day # 6 PCP Gabe Jack MD        Subjective:   Subjective: mets, will defer to oncology.   Suspect will need rad/onc to see     CAD  -as above, holding antiplts  -cont BB     AS/AI  -echo pending     HL  -statin     HTN  -BP well controlled  -amlodipine     Possible underlying post obstructive pneumonia with respir

## 2020-09-17 NOTE — PHYSICAL THERAPY NOTE
PHYSICAL THERAPY TREATMENT NOTE - INPATIENT     Room Number: 237/237-A       Presenting Problem: hypoxia    Problem List  Principal Problem:    Hypoxia  Active Problems:    Pericardial effusion with cardiac tamponade    Primary cancer of left upper lobe of Static Sitting: Good  Dynamic Sitting: Good           Static Standing: Fair +  Dynamic Standing: Fair    ACTIVITY TOLERANCE                         O2 WALK     SPO2 Ambulation on Room Air: 94     Ambulation oxygen flow (liters per minute): 3 level: supervision   Goal #3   Current Status 300' with the RW SBA   Goal #4 Patient will negotiate 15 stairs/one curb w/ assistive device and SBA   Goal #4   Current Status NT   Goal #5 Patient to demonstrate independence with home activity/exercise instr

## 2020-09-17 NOTE — CONSULTS
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 Moderate cardiomegaly. Pericardial effusion described on recent CT scan. 2. Atherosclerosis.  3. P A metastatic nodule is in the differential. 7. Moderate emphysema. 8. Coronary artery calcification. 9. Lesser incidental findings as above.    Dictated by (CST): Yasmine Viera MD on 9/11/2020 at 4:22 PM     Finalized by (CST): Yasmine Viera MD on 9/11 is nonfocal.    COMPLETED TESTS:  I have reviewed the patient's clinical, radiographic, pathologic and laboratory studies.     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           Results From Past 48 Hours:  Recent Results (from the past 48 hour(s))   BASIC METABOLIC PANEL (8)    Collection Time: 09/17/20  4:46 AM   Result Value Ref Range    Glucose 110 (H) 70 - 99 mg/dL    Sodium 139 136 - 145 mmol/L    Po

## 2020-09-17 NOTE — PROGRESS NOTES
[facility]    Cardiology Progress Note  [facility]    Kalia Camacho Patient Status:  Inpatient    1945 MRN [id number]   Location [facility] 2W/SW Attending Stevie Kebede MD   [facility] Day # 6 PCP ANT Continuous Infusions:     Results:     Lab Results   Component Value Date    WBC 12.1 (H) 09/17/2020    HGB 13.4 09/17/2020    HCT 40.6 09/17/2020    .0 09/17/2020    CREATSERUM 1.35 (H) 09/17/2020    BUN 41 (H) 09/17/2020     09/17/2020 insuff/moderate    HTN, bp reasonable. CAD , resume asa when able.  Ok to not resume plavix                  Zoya Pizarro MD  9/17/2020

## 2020-09-17 NOTE — CM/SW NOTE
Care Progression Note:  Active Acute Medical Issue:   Hypoxia   Cardiac tamponade  2/2 large malignant pericardial effusion  - stat echo was done at bedside 9/12  - taken by cardiology for urgent pericardiocentesis.  1.1 L of SS fluid was removed   - fluid

## 2020-09-17 NOTE — PROGRESS NOTES
[facility] HOSP - [facility]    Progress Note    Warren Donovan Patient Status:  Inpatient    1945 MRN [id number]   Location [facility] 2W/SW Attending Nicole Mclain MD   Hosp Day # 6 PCP Filemon Cisneros MD       Subjective:   Warren Donovan 9/18/20  Metastatic adenocarcinoma of the lung with Mets.  Oncology note appreciated      Results:     Lab Results   Component Value Date    WBC 12.1 (H) 09/17/2020    HGB 13.4 09/17/2020    HCT 40.6 09/17/2020    .0 09/17/2020    CREATSERUM 1.35 (H)

## 2020-09-17 NOTE — PROGRESS NOTES
[facility]    Progress Note    Niurka Gonzalez Patient Status:  Inpatient    1945 MRN [id number]   Location [facility] 2W/SW Attending Suni Agee MD   Hosp Day # 6 PCP Morelia Moore MD       Subjective:   Niurka Gonzalez [facility])  Per oncology    Postobstructive pneumonia-continue Zosyn    Discussed with hospitalist  Discussed with nurse  Discussed with the family        Results:     Lab Results   Component Value Date    WBC 12.1 (H) 09/17/2020    HGB 13.4 09/17/2020    HCT 4

## 2020-09-17 NOTE — PLAN OF CARE
Patient alert and oriented, up to chair and bathroom throughout the shift with stand by assist and front wheel walker. Worked with PT/OT, ambulated in the hallway. VSS. Adequate appetite, frequent loose stools after meals. No complaints of pain.  Adequate o pain and pain management  - Manage/alleviate anxiety  - Utilize distraction and/or relaxation techniques  - Monitor for opioid side effects  - Notify MD/LIP if interventions unsuccessful or patient reports new pain  - Anticipate increased pain with activit GASTROINTESTINAL - ADULT  Goal: Maintains or returns to baseline bowel function  Description  INTERVENTIONS:  - Assess bowel function  - Maintain adequate hydration with IV or PO as ordered and tolerated  - Evaluate effectiveness of GI medications  - Encou mobilization  - Obtain PT/OT consults as needed  - Advance activity as appropriate  - Communicate ordered activity level and limitations with patient/family  Outcome: Progressing  Goal: Maintain proper alignment of affected body part  Description  INTERVEN

## 2020-09-18 ENCOUNTER — APPOINTMENT (OUTPATIENT)
Dept: CV DIAGNOSTICS | Facility: HOSPITAL | Age: 75
DRG: 843 | End: 2020-09-18
Attending: PHYSICIAN ASSISTANT
Payer: MEDICARE

## 2020-09-18 VITALS
OXYGEN SATURATION: 93 % | HEART RATE: 70 BPM | WEIGHT: 223.75 LBS | SYSTOLIC BLOOD PRESSURE: 130 MMHG | RESPIRATION RATE: 19 BRPM | DIASTOLIC BLOOD PRESSURE: 62 MMHG | BODY MASS INDEX: 33.14 KG/M2 | HEIGHT: 69 IN | TEMPERATURE: 98 F

## 2020-09-18 LAB
ADEQUACY OF SPECIMEN: ADEQUATE
ANION GAP SERPL CALC-SCNC: 6 MMOL/L (ref 0–18)
BASOPHILS # BLD AUTO: 0.01 X10(3) UL (ref 0–0.2)
BASOPHILS NFR BLD AUTO: 0.1 %
BUN BLD-MCNC: 39 MG/DL (ref 7–18)
BUN/CREAT SERPL: 27.1 (ref 10–20)
CALCIUM BLD-MCNC: 8.3 MG/DL (ref 8.5–10.1)
CHLORIDE SERPL-SCNC: 106 MMOL/L (ref 98–112)
CO2 SERPL-SCNC: 26 MMOL/L (ref 21–32)
CREAT BLD-MCNC: 1.44 MG/DL (ref 0.7–1.3)
DEPRECATED RDW RBC AUTO: 44.7 FL (ref 35.1–46.3)
EOSINOPHIL # BLD AUTO: 0.06 X10(3) UL (ref 0–0.7)
EOSINOPHIL NFR BLD AUTO: 0.4 %
ERYTHROCYTE [DISTWIDTH] IN BLOOD BY AUTOMATED COUNT: 14.3 % (ref 11–15)
GLUCOSE BLD-MCNC: 110 MG/DL (ref 70–99)
HAV IGM SER QL: 2.6 MG/DL (ref 1.6–2.6)
HCT VFR BLD AUTO: 39.9 % (ref 39–53)
HGB BLD-MCNC: 13.1 G/DL (ref 13–17.5)
IMM GRANULOCYTES # BLD AUTO: 0.14 X10(3) UL (ref 0–1)
IMM GRANULOCYTES NFR BLD: 0.9 %
LYMPHOCYTES # BLD AUTO: 0.45 X10(3) UL (ref 1–4)
LYMPHOCYTES NFR BLD AUTO: 3 %
MCH RBC QN AUTO: 28.6 PG (ref 26–34)
MCHC RBC AUTO-ENTMCNC: 32.8 G/DL (ref 31–37)
MCV RBC AUTO: 87.1 FL (ref 80–100)
MONOCYTES # BLD AUTO: 0.45 X10(3) UL (ref 0.1–1)
MONOCYTES NFR BLD AUTO: 3 %
NEUTROPHILS # BLD AUTO: 13.95 X10 (3) UL (ref 1.5–7.7)
NEUTROPHILS # BLD AUTO: 13.95 X10(3) UL (ref 1.5–7.7)
NEUTROPHILS NFR BLD AUTO: 92.6 %
OSMOLALITY SERPL CALC.SUM OF ELEC: 296 MOSM/KG (ref 275–295)
PLATELET # BLD AUTO: 276 10(3)UL (ref 150–450)
POTASSIUM SERPL-SCNC: 4.3 MMOL/L (ref 3.5–5.1)
RBC # BLD AUTO: 4.58 X10(6)UL (ref 3.8–5.8)
SODIUM SERPL-SCNC: 138 MMOL/L (ref 136–145)
WBC # BLD AUTO: 15.1 X10(3) UL (ref 4–11)

## 2020-09-18 PROCEDURE — 99239 HOSP IP/OBS DSCHRG MGMT >30: CPT | Performed by: HOSPITALIST

## 2020-09-18 PROCEDURE — 99232 SBSQ HOSP IP/OBS MODERATE 35: CPT | Performed by: INTERNAL MEDICINE

## 2020-09-18 PROCEDURE — 93308 TTE F-UP OR LMTD: CPT | Performed by: PHYSICIAN ASSISTANT

## 2020-09-18 RX ORDER — GARLIC EXTRACT 500 MG
1 CAPSULE ORAL DAILY
Qty: 14 CAPSULE | Refills: 0 | Status: SHIPPED | OUTPATIENT
Start: 2020-09-19 | End: 2020-10-02

## 2020-09-18 RX ORDER — ASPIRIN 81 MG/1
81 TABLET, CHEWABLE ORAL DAILY
Status: DISCONTINUED | OUTPATIENT
Start: 2020-09-18 | End: 2020-09-18

## 2020-09-18 RX ORDER — ASPIRIN 81 MG/1
81 TABLET, CHEWABLE ORAL DAILY
Qty: 30 TABLET | Refills: 2 | Status: SHIPPED | OUTPATIENT
Start: 2020-09-19

## 2020-09-18 RX ORDER — AMOXICILLIN AND CLAVULANATE POTASSIUM 875; 125 MG/1; MG/1
1 TABLET, FILM COATED ORAL ONCE
Status: COMPLETED | OUTPATIENT
Start: 2020-09-18 | End: 2020-09-18

## 2020-09-18 RX ORDER — ALBUTEROL SULFATE 90 UG/1
2 AEROSOL, METERED RESPIRATORY (INHALATION) EVERY 6 HOURS PRN
Qty: 1 INHALER | Refills: 0 | Status: SHIPPED | OUTPATIENT
Start: 2020-09-18 | End: 2020-10-16

## 2020-09-18 RX ORDER — LOPERAMIDE HYDROCHLORIDE 2 MG/1
2 CAPSULE ORAL 4 TIMES DAILY PRN
Qty: 20 CAPSULE | Refills: 0 | Status: SHIPPED | OUTPATIENT
Start: 2020-09-18

## 2020-09-18 RX ORDER — VANCOMYCIN HYDROCHLORIDE 125 MG/1
125 CAPSULE ORAL DAILY
Qty: 7 CAPSULE | Refills: 0 | Status: SHIPPED | OUTPATIENT
Start: 2020-09-19 | End: 2020-10-02 | Stop reason: ALTCHOICE

## 2020-09-18 RX ORDER — AMOXICILLIN AND CLAVULANATE POTASSIUM 875; 125 MG/1; MG/1
1 TABLET, FILM COATED ORAL 2 TIMES DAILY
Qty: 14 TABLET | Refills: 0 | Status: SHIPPED | OUTPATIENT
Start: 2020-09-18 | End: 2020-09-25

## 2020-09-18 NOTE — PHYSICAL THERAPY NOTE
PHYSICAL THERAPY TREATMENT NOTE - INPATIENT     Room Number: 237/237-A       Presenting Problem: hypoxia    Problem List  Principal Problem:    Hypoxia  Active Problems:    Pericardial effusion with cardiac tamponade    Primary cancer of left upper lobe of mechanics; Relaxation;Repositioning    BALANCE                                                                                                                     Static Sitting: Good  Dynamic Sitting: Good           Static Standing: Fair +  Dynamic Standin to ambulate 150 feet with assist device: walker - rolling at assistance level: supervision   Goal #3   Current Status 400' with the RW SBA   Goal #4 Patient will negotiate 15 stairs/one curb w/ assistive device and SBA   Goal #4   Current Status NT   Goal

## 2020-09-18 NOTE — OCCUPATIONAL THERAPY NOTE
OCCUPATIONAL THERAPY TREATMENT NOTE - INPATIENT        Room Number: 212/158-K           Presenting Problem: (sob;recent diagnosis of lung ca)    Problem List  Principal Problem:    Hypoxia  Active Problems:    Pericardial effusion with cardiac tamponade therapeutic range on 3L w/ activity    ACTIVITIES OF DAILY LIVING ASSESSMENT  AM-PAC ‘6-Clicks’ Inpatient Daily Activity Short Form  How much help from another person does the patient currently need…  -   Putting on and taking off regular lower body clothi

## 2020-09-18 NOTE — PROGRESS NOTES
[facility]    Cardiology Progress Note  [facility]    Karissa Cordova Patient Status:  Inpatient    1945 MRN [id number]   Location [facility] 2W/SW Attending Satish Aviles MD   [facility] Day # 7 PCP ANT parameters (last 24 hours):      Scheduled Meds:   • aspirin  81 mg Oral Daily   • Acidophilus/Pectin  1 capsule Oral Daily   • MethylPREDNISolone Sodium Succ  40 mg Intravenous Q12H   • vancomycin HCl  125 mg Oral Daily   • piperacillin-tazobactam  4.5 g rate and rhythm, aortic stenosis murmur sameExt: No edema  Abd: Abdomen soft, nontender, nondistended, no organomegaly, bowel sounds present      Assessment   Assessment and Plan:     Pericardial effusion with cardiac tamponade  Repeat echo with trivial ef

## 2020-09-18 NOTE — PROGRESS NOTES
[facility]    Progress Note    Ethyl Glass Patient Status:  Inpatient    1945 MRN [id number]   Location [facility] 2W/SW Attending Michael Tena MD   Hosp Day # 7 PCP Shanta Mccauley MD       Subjective:   Aurora Glass 23 09/12/2020    ALT 35 09/12/2020    MG 2.6 09/18/2020    PHOS 3.7 09/17/2020    TROP <0.045 09/11/2020       No results found.           Assessment and Plan:    S/P Pericardiocentesis 9/12/20 drain removed 9/16/20    Echo today - no significant pericardia

## 2020-09-18 NOTE — PROGRESS NOTES
[facility]    Progress Note    Pallavi Joann Patient Status:  Inpatient    1945 MRN [id number]   Location [facility] 2W/SW Attending Camilla Almeida MD   Hosp Day # 7 PCP Alexandria Harden MD       Subjective:   Pallavi David of left upper lobe of lung ([facility] [address]  Metastatic adenocarcinoma with brain lesion malignant pericardial effusion      Brain metastasis (HCC)  Per oncology     Pneumonia-Augmentin 875 p.o. twice daily for 5 days    COPD-prednisone 40 mg for 5 days and 20 mg for

## 2020-09-18 NOTE — PLAN OF CARE
Hearing aids and glasses with the patient. Weaned off of oxygen, adequate saturations on room air. Saturations >92% while ambulating. Breathing is unlabored and regular. HR stable. BP stable.  Ambulating with wheeling walker in the hallway and up to the bat Consider cultural and social influences on pain and pain management  - Manage/alleviate anxiety  - Utilize distraction and/or relaxation techniques  - Monitor for opioid side effects  - Notify MD/LIP if interventions unsuccessful or patient reports new deven retention  Outcome: Completed     Problem: GASTROINTESTINAL - ADULT  Goal: Maintains or returns to baseline bowel function  Description  INTERVENTIONS:  - Assess bowel function  - Maintain adequate hydration with IV or PO as ordered and tolerated  - Evalua protection for wounds/incisions during mobilization  - Obtain PT/OT consults as needed  - Advance activity as appropriate  - Communicate ordered activity level and limitations with patient/family  Outcome: Completed  Goal: Maintain proper alignment of affe

## 2020-09-18 NOTE — HOME CARE LIAISON
Received referral from [address] [place], [place] [address] Met with patient at the bedside and provided choice. Patient is agreeable to [facility]. Residential brochure provided with contact information. All questions addressed and answered.

## 2020-09-18 NOTE — PROGRESS NOTES
[facility]    Progress Note    Newton Pearson Patient Status:  Inpatient    1945 MRN [id number]   Location [facility] 2W/SW Attending Fernando Avila MD   Hosp Day # 7 PCP Juaquin García MD        Subjective:   Subjective: pending     HL  -statin     HTN  -BP well controlled  -amlodipine     Possible underlying post obstructive pneumonia with respiratory distress  - trend WBC but will be misleading while on steroids   - wean O2, now on 4L  - cont zosyn     Chronic obstructiv

## 2020-09-20 NOTE — DISCHARGE SUMMARY
[facility]    PATIENT'S NAME: Inez Emily   ATTENDING PHYSICIAN: Naresh Babin MD   PATIENT ACCOUNT#:   [id number]    LOCATION:  [address] [place] [address] RECORD #:   [id number]       YOB: 1945  ADMISSION DATE:       09/11/2 Approximately 1.1 L removed. A pericardial drain was placed. The patient had a repeat echo done prior to being discharged which showed severe aortic stenosis and trivial pericardial effusion. Cardiothoracic Surgery was also on consult.   They recommended MEDICATIONS:  Patient will be given a 7-day course of Augmentin along with oral vancomycin prophylaxis.      DISCHARGE INSTRUCTIONS:  The patient will also have close followup with primary care physician, Pulmonology, Cardiology, as well as Hematology/Oncol

## 2020-09-21 DIAGNOSIS — C34.12 PRIMARY CANCER OF LEFT UPPER LOBE OF LUNG (HCC): Primary | ICD-10-CM

## 2020-09-21 DIAGNOSIS — C79.31 BRAIN METASTASIS (HCC): ICD-10-CM

## 2020-09-21 LAB
ALK GENE - RESULT: NEGATIVE
ALK GENE FISH CELL COUNT: 100
BRAF CODON 600 MUTATION DETECT: NOT DETECTED
RET FISH RESULT: NEGATIVE
ROS1 CELL COUNT: 100
ROS1 FISH RESULT: NEGATIVE
TOTAL CELL COUNT: 100

## 2020-09-22 ENCOUNTER — OFFICE VISIT (OUTPATIENT)
Dept: CARDIOLOGY | Age: 75
End: 2020-09-22

## 2020-09-22 ENCOUNTER — NURSE ONLY (OUTPATIENT)
Dept: RADIATION ONCOLOGY | Facility: HOSPITAL | Age: 75
End: 2020-09-22
Attending: RADIOLOGY
Payer: MEDICARE

## 2020-09-22 ENCOUNTER — TELEPHONE (OUTPATIENT)
Dept: CARDIOLOGY | Age: 75
End: 2020-09-22

## 2020-09-22 VITALS
WEIGHT: 224 LBS | HEART RATE: 68 BPM | RESPIRATION RATE: 18 BRPM | OXYGEN SATURATION: 97 % | BODY MASS INDEX: 33 KG/M2 | TEMPERATURE: 98 F | SYSTOLIC BLOOD PRESSURE: 117 MMHG | DIASTOLIC BLOOD PRESSURE: 46 MMHG

## 2020-09-22 VITALS
HEART RATE: 89 BPM | WEIGHT: 225 LBS | SYSTOLIC BLOOD PRESSURE: 100 MMHG | HEIGHT: 69 IN | DIASTOLIC BLOOD PRESSURE: 50 MMHG | BODY MASS INDEX: 33.33 KG/M2

## 2020-09-22 DIAGNOSIS — I10 ESSENTIAL HYPERTENSION: ICD-10-CM

## 2020-09-22 DIAGNOSIS — I35.0 AORTIC VALVE STENOSIS, ETIOLOGY OF CARDIAC VALVE DISEASE UNSPECIFIED: ICD-10-CM

## 2020-09-22 DIAGNOSIS — I25.10 CORONARY ARTERY DISEASE INVOLVING NATIVE CORONARY ARTERY, ANGINA PRESENCE UNSPECIFIED, UNSPECIFIED WHETHER NATIVE OR TRANSPLANTED HEART: Primary | ICD-10-CM

## 2020-09-22 DIAGNOSIS — D68.9 COAGULATION DEFECT, UNSPECIFIED (CMD): ICD-10-CM

## 2020-09-22 DIAGNOSIS — N18.9 CHRONIC RENAL IMPAIRMENT, UNSPECIFIED CKD STAGE: ICD-10-CM

## 2020-09-22 LAB — EGFR BY PYROSEQUENCING RESULT: NOT DETECTED

## 2020-09-22 PROCEDURE — 77470 SPECIAL RADIATION TREATMENT: CPT | Performed by: RADIOLOGY

## 2020-09-22 PROCEDURE — 77290 THER RAD SIMULAJ FIELD CPLX: CPT | Performed by: RADIOLOGY

## 2020-09-22 PROCEDURE — 99212 OFFICE O/P EST SF 10 MIN: CPT

## 2020-09-22 PROCEDURE — 99215 OFFICE O/P EST HI 40 MIN: CPT | Performed by: INTERNAL MEDICINE

## 2020-09-22 PROCEDURE — 77334 RADIATION TREATMENT AID(S): CPT | Performed by: RADIOLOGY

## 2020-09-22 RX ORDER — ALBUTEROL SULFATE 90 UG/1
AEROSOL, METERED RESPIRATORY (INHALATION)
COMMUNITY
Start: 2020-09-19

## 2020-09-22 ASSESSMENT — ENCOUNTER SYMPTOMS
WEIGHT GAIN: 0
HEMOPTYSIS: 0
WEIGHT LOSS: 0
BRUISES/BLEEDS EASILY: 0
SUSPICIOUS LESIONS: 0
FEVER: 0
COUGH: 0
ALLERGIC/IMMUNOLOGIC COMMENTS: NO NEW FOOD ALLERGIES
CHILLS: 0
HEMATOCHEZIA: 0

## 2020-09-22 ASSESSMENT — PATIENT HEALTH QUESTIONNAIRE - PHQ9
CLINICAL INTERPRETATION OF PHQ2 SCORE: NO FURTHER SCREENING NEEDED
1. LITTLE INTEREST OR PLEASURE IN DOING THINGS: NOT AT ALL
CLINICAL INTERPRETATION OF PHQ9 SCORE: NO FURTHER SCREENING NEEDED
SUM OF ALL RESPONSES TO PHQ9 QUESTIONS 1 AND 2: 0
2. FEELING DOWN, DEPRESSED OR HOPELESS: NOT AT ALL
SUM OF ALL RESPONSES TO PHQ9 QUESTIONS 1 AND 2: 0

## 2020-09-22 NOTE — PROGRESS NOTES
Nursing Consultation Note  Patient: Reanna Lopez  YOB: 1945  Age: 66year old  Radiation Oncologist: Dr. Laura Kendrick  Referring Physician: Breanne Apple  Diagnosis:No diagnosis found.   Consult Date: 9/22/2020      Chemotherapy: No  Labs: mouth 4 (four) times daily as needed for Diarrhea. 20 capsule 0   • Albuterol Sulfate  (90 Base) MCG/ACT Inhalation Aero Soln Inhale 2 puffs into the lungs every 6 (six) hours as needed for Wheezing or Shortness of Breath.  1 Inhaler 0   • Amoxicilli file      Food insecurity        Worry: Not on file        Inability: Not on file      Transportation needs        Medical: Not on file        Non-medical: Not on file    Tobacco Use      Smoking status: Former Smoker      Smokeless tobacco: Never Used Outcomes:  No injury, trauma or fall  Knowledge of care plan    Progress Toward Outcome:  Making progress    Pamphlets/Handouts Given to Patient:  Home safety.        Knowledge Deficit Plan Of Care:    Problem:  Knowledge Deficit    Problems related to:

## 2020-09-23 ENCOUNTER — APPOINTMENT (OUTPATIENT)
Dept: CARDIOLOGY | Age: 75
End: 2020-09-23

## 2020-09-23 LAB
Lab: 1.5
Lab: 4.1
Lab: 40
Lab: 6.2

## 2020-09-24 ENCOUNTER — HOSPITAL ENCOUNTER (OUTPATIENT)
Dept: NUCLEAR MEDICINE | Facility: HOSPITAL | Age: 75
Discharge: HOME | End: 2020-09-24
Attending: INTERNAL MEDICINE
Payer: MEDICARE

## 2020-09-24 DIAGNOSIS — C34.12 PRIMARY CANCER OF LEFT UPPER LOBE OF LUNG (HCC): ICD-10-CM

## 2020-09-24 DIAGNOSIS — C79.31 BRAIN METASTASIS (HCC): ICD-10-CM

## 2020-09-24 PROCEDURE — 82962 GLUCOSE BLOOD TEST: CPT

## 2020-09-24 PROCEDURE — 78815 PET IMAGE W/CT SKULL-THIGH: CPT | Performed by: INTERNAL MEDICINE

## 2020-09-24 PROCEDURE — 77295 3-D RADIOTHERAPY PLAN: CPT | Performed by: RADIOLOGY

## 2020-09-25 ENCOUNTER — TELEPHONE (OUTPATIENT)
Dept: PULMONOLOGY | Facility: CLINIC | Age: 75
End: 2020-09-25

## 2020-09-25 ENCOUNTER — OFFICE VISIT (OUTPATIENT)
Dept: RADIATION ONCOLOGY | Facility: HOSPITAL | Age: 75
End: 2020-09-25
Attending: RADIOLOGY
Payer: MEDICARE

## 2020-09-25 VITALS
SYSTOLIC BLOOD PRESSURE: 133 MMHG | TEMPERATURE: 98 F | DIASTOLIC BLOOD PRESSURE: 50 MMHG | HEART RATE: 83 BPM | OXYGEN SATURATION: 96 % | RESPIRATION RATE: 18 BRPM

## 2020-09-25 DIAGNOSIS — C79.31 BRAIN METASTASES (HCC): Primary | ICD-10-CM

## 2020-09-25 PROCEDURE — 77300 RADIATION THERAPY DOSE PLAN: CPT | Performed by: RADIOLOGY

## 2020-09-25 PROCEDURE — 77336 RADIATION PHYSICS CONSULT: CPT | Performed by: RADIOLOGY

## 2020-09-25 PROCEDURE — 77334 RADIATION TREATMENT AID(S): CPT | Performed by: RADIOLOGY

## 2020-09-25 PROCEDURE — 77280 THER RAD SIMULAJ FIELD SMPL: CPT | Performed by: RADIOLOGY

## 2020-09-25 PROCEDURE — 77372 SRS LINEAR BASED: CPT | Performed by: RADIOLOGY

## 2020-09-25 NOTE — PROGRESS NOTES
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       Dear Val Jiménez and colleagues,    As you recall, Mr Griselda Allred is a pleasant 66year old mal

## 2020-09-25 NOTE — PATIENT INSTRUCTIONS
Follow up with Dr. Dimitry Ambrose in 3 months. Call [phone] to schedule a follow up appointment. Schedule your CT scan prior to your follow up. Call [phone] to schedule.

## 2020-09-25 NOTE — PROGRESS NOTES
c    [facility] Radiation Treatment Management Note 1-5    Patient:  Yolis Snell  Age:  66year old  Visit Diagnosis:  No diagnosis found.   Primary Rad/Onc:  Dr. Go Ritter Matt    Site Delivered Dose (cGy) Prescribed Dose (cGy) Fraction #

## 2020-09-25 NOTE — TELEPHONE ENCOUNTER
Pts wife Vianey Jacob states that pt was referred by Dr. Eun Belle due to problems with breathing. Pt has cancer and tumor is getting in the way of breathing. Pt would like to be seen sooner than first available. Aware PJC out of office today. Please call.

## 2020-09-28 ENCOUNTER — TELEPHONE (OUTPATIENT)
Dept: HEMATOLOGY/ONCOLOGY | Facility: HOSPITAL | Age: 75
End: 2020-09-28

## 2020-09-28 ENCOUNTER — APPOINTMENT (OUTPATIENT)
Dept: CV DIAGNOSTICS | Facility: HOSPITAL | Age: 75
End: 2020-09-28
Attending: EMERGENCY MEDICINE
Payer: MEDICARE

## 2020-09-28 ENCOUNTER — OFFICE VISIT (OUTPATIENT)
Dept: HEMATOLOGY/ONCOLOGY | Facility: HOSPITAL | Age: 75
End: 2020-09-28
Attending: INTERNAL MEDICINE
Payer: MEDICARE

## 2020-09-28 ENCOUNTER — APPOINTMENT (OUTPATIENT)
Dept: GENERAL RADIOLOGY | Facility: HOSPITAL | Age: 75
End: 2020-09-28
Attending: EMERGENCY MEDICINE
Payer: MEDICARE

## 2020-09-28 ENCOUNTER — HOSPITAL ENCOUNTER (EMERGENCY)
Facility: HOSPITAL | Age: 75
Discharge: HOME | End: 2020-09-28
Attending: EMERGENCY MEDICINE
Payer: MEDICARE

## 2020-09-28 VITALS
SYSTOLIC BLOOD PRESSURE: 142 MMHG | OXYGEN SATURATION: 96 % | TEMPERATURE: 97 F | WEIGHT: 218 LBS | DIASTOLIC BLOOD PRESSURE: 55 MMHG | HEART RATE: 81 BPM | RESPIRATION RATE: 18 BRPM | HEIGHT: 69 IN | BODY MASS INDEX: 32.29 KG/M2

## 2020-09-28 VITALS
HEART RATE: 84 BPM | WEIGHT: 200 LBS | OXYGEN SATURATION: 96 % | SYSTOLIC BLOOD PRESSURE: 125 MMHG | BODY MASS INDEX: 28 KG/M2 | TEMPERATURE: 98 F | DIASTOLIC BLOOD PRESSURE: 70 MMHG | HEIGHT: 71 IN | RESPIRATION RATE: 18 BRPM

## 2020-09-28 DIAGNOSIS — C34.12 PRIMARY CANCER OF LEFT UPPER LOBE OF LUNG (HCC): Primary | ICD-10-CM

## 2020-09-28 DIAGNOSIS — J91.0 MALIGNANT PLEURAL EFFUSION: ICD-10-CM

## 2020-09-28 DIAGNOSIS — R06.02 SHORTNESS OF BREATH: Primary | ICD-10-CM

## 2020-09-28 DIAGNOSIS — I31.4 PERICARDIAL EFFUSION WITH CARDIAC TAMPONADE: ICD-10-CM

## 2020-09-28 DIAGNOSIS — C78.00 MALIGNANT NEOPLASM METASTATIC TO LUNG, UNSPECIFIED LATERALITY (HCC): ICD-10-CM

## 2020-09-28 DIAGNOSIS — I31.3 PERICARDIAL EFFUSION WITH CARDIAC TAMPONADE: ICD-10-CM

## 2020-09-28 DIAGNOSIS — K63.89 MESENTERIC MASS: ICD-10-CM

## 2020-09-28 DIAGNOSIS — I31.3 MALIGNANT PERICARDIAL EFFUSION (HCC): ICD-10-CM

## 2020-09-28 DIAGNOSIS — C80.1 MALIGNANT PERICARDIAL EFFUSION (HCC): ICD-10-CM

## 2020-09-28 DIAGNOSIS — C79.51 BONE METASTASIS (HCC): ICD-10-CM

## 2020-09-28 DIAGNOSIS — C79.31 BRAIN METASTASIS (HCC): ICD-10-CM

## 2020-09-28 PROBLEM — I31.31 MALIGNANT PERICARDIAL EFFUSION: Status: ACTIVE | Noted: 2020-09-28

## 2020-09-28 PROCEDURE — 80048 BASIC METABOLIC PNL TOTAL CA: CPT | Performed by: EMERGENCY MEDICINE

## 2020-09-28 PROCEDURE — 99214 OFFICE O/P EST MOD 30 MIN: CPT | Performed by: INTERNAL MEDICINE

## 2020-09-28 PROCEDURE — 84484 ASSAY OF TROPONIN QUANT: CPT | Performed by: EMERGENCY MEDICINE

## 2020-09-28 PROCEDURE — 85730 THROMBOPLASTIN TIME PARTIAL: CPT | Performed by: EMERGENCY MEDICINE

## 2020-09-28 PROCEDURE — 93010 ELECTROCARDIOGRAM REPORT: CPT | Performed by: EMERGENCY MEDICINE

## 2020-09-28 PROCEDURE — 93308 TTE F-UP OR LMTD: CPT | Performed by: EMERGENCY MEDICINE

## 2020-09-28 PROCEDURE — 71045 X-RAY EXAM CHEST 1 VIEW: CPT | Performed by: EMERGENCY MEDICINE

## 2020-09-28 PROCEDURE — 99284 EMERGENCY DEPT VISIT MOD MDM: CPT

## 2020-09-28 PROCEDURE — 85025 COMPLETE CBC W/AUTO DIFF WBC: CPT | Performed by: EMERGENCY MEDICINE

## 2020-09-28 PROCEDURE — 83880 ASSAY OF NATRIURETIC PEPTIDE: CPT | Performed by: EMERGENCY MEDICINE

## 2020-09-28 PROCEDURE — 80061 LIPID PANEL: CPT | Performed by: EMERGENCY MEDICINE

## 2020-09-28 PROCEDURE — 36415 COLL VENOUS BLD VENIPUNCTURE: CPT

## 2020-09-28 PROCEDURE — 93005 ELECTROCARDIOGRAM TRACING: CPT

## 2020-09-28 PROCEDURE — 85610 PROTHROMBIN TIME: CPT | Performed by: EMERGENCY MEDICINE

## 2020-09-28 RX ORDER — ALPRAZOLAM 0.25 MG/1
TABLET ORAL 3 TIMES DAILY PRN
Qty: 20 TABLET | Refills: 0 | Status: SHIPPED | OUTPATIENT
Start: 2020-09-28 | End: 2020-10-02

## 2020-09-28 RX ORDER — ALPRAZOLAM 0.25 MG/1
TABLET ORAL NIGHTLY PRN
Qty: 2 TABLET | Refills: 0 | Status: SHIPPED | OUTPATIENT
Start: 2020-09-28 | End: 2020-10-05

## 2020-09-28 NOTE — CONSULTS
[facility]    PATIENT'S NAME: Cordelia Oliver   ATTENDING PHYSICIAN: Matias Mendez MD   CONSULTING PHYSICIAN: Naima Colindres.  Emily Resendez MD   PATIENT ACCOUNT#:   [id number]    LOCATION:  [place] [address]  MEDICAL RECORD #:   [id number]       DATE OF BIRTH: Home medications:  Albuterol inhaler p.r.n., amlodipine 5 q.a.m., aspirin 81 q.a.m., atorvastatin 20 at bedtime, Norpramin 40 mg q.p.m., finasteride 5 mg daily, metoprolol tartrate 25 b.i.d., multivitamin, Hytrin 5 mg at bedtime. ALLERGIES:  Demerol. fluid overloaded otherwise. 2.   Minimal elevation of troponin. No chest pain and no acute EKG abnormalities. Doubt this is an acute coronary syndrome. 3.   Non-small cell lung cancer with metastases. PLAN:    1. Repeat troponins.   If same or be

## 2020-09-28 NOTE — ED NOTES
Patient discharged home in no acute distress in care of spouse. A&Ox4, skin p/w/d, denies cp/sob. Ambulating with steady gait and verbalized understanding of d/c instructions and prescriptions. Wheelchair provided to bring patient to ED entrance.  MD Fay Og a

## 2020-09-28 NOTE — CONSULTS
Cardiology (consult dictated)    Assessment:  1. Orthopnea. Probable mild CHF, secondary to AS/AI. No symptoms with ADLs. 2. Non small cell lung cancer, with metastases    3. Minimal troponin elevation. No chest pain.  EKG different from 9/11/20, but wit

## 2020-09-28 NOTE — TELEPHONE ENCOUNTER
Dr. Zofia Low office called and notified Dr. Alexei Jenkins requesting pt to be seen. Informed wife called and no availability till [place]. Per office pt will be seen this week and will call pt.

## 2020-09-28 NOTE — PROGRESS NOTES
HPI     Caron Guardian is a 66year old male here for f/u of Primary cancer of left upper lobe of lung (hcc)  (primary encounter diagnosis)  Brain metastasis (hcc)  Pericardial effusion with cardiac tamponade  Malignant pericardial effusion (hcc)  Bone • Terazosin HCl 5 MG Oral Cap Take 5 mg by mouth Gopi@[employer]. • nitroGLYCERIN 0.4 MG Sublingual SL Tab Place 0.4 mg under the tongue every 5 (five) minutes as needed for Chest pain.      • Loperamide HCl 2 MG Oral Cap Take 1 capsule (2 mg total) by yaron Malignant neoplasm metastatic to lung, unspecified laterality (hcc)  Malignant pleural effusion  Mesenteric mass    Aidan Louiseolphus a 66year old male with a diagnosis of metastatic adenocarcinoma of the lung.     --Patient presented with cardiac tampona --He is having a second opinion at [facility] with Dr Tasneem Castro tomorrow. Discussed with the patient and his wife that would appreciate her feedback as to first-line therapy for MET mutation with either the cabmatinib versus the crizotinib.   --Given symptoms that HEAD/NECK:    There is focally diminished activity in the right parietal region, and there is corresponding surrounding vasogenic edema in this region, compatible with metastatic disease. Multiple prominent cervical lymph nodes are noted.  A r Aortic annular and valvular calcifications are observed. Hypodensity of the intracardiac blood pool relative to the myocardium may relate to underlying anemia. Atherosclerotic vascular calcifications are present in the coronary vessels.  Trace Metastatic foci in the proximal humeri have SUV max of 8.9 on the left and 8.1 on the right. Metastatic lesions of the ribs are present.  An infiltrative lesion of the right lateral 6th rib demonstrates associated pathologic fracture and has a 5. Extensive osseous metastatic disease is seen throughout the axial and appendicular skeleton. Notably, there are metastatic lesions involving the bilateral acetabula and proximal femora. These could predispose to pathologic fractures.      6. Bilateral ce M.S. Controls performed as expected.    INTERPRETIVE INFORMATION: MET Gene Amplification, FISH     Fluorescence in situ hybridization (FISH) analysis for MET gene   amplification was performed on a section from a paraffin-embedded   tissue block using [facility] [employer]. See Compliance Statement A: [email or web address]   Scoring Method Manual    Comment: Performed by [place] [address], [address] [phone]   www. Giorgio Kim MD, Lab.  Director   Total Cell Count 40    Resulting is determined by pyrosequencing. Limitations:  Mutations in other locations within the EGFR gene or   in other genes will not be detected. Limit of Detection:  10 percent mutant alleles.        Clinical Disclaimer: Results of this test must alway ROS1-rearranged carcinomas, except for rare instances of cryptic   rearrangements. Assay range and limit of detection were generated   using normal and known positive cases respectively.      ROS1 rearrangement occurs in approximately 1 percent of non-small evaluated tumor cells is considered a positive result.  Based on   the assay performance during test validation, the test is expected   to detect 100 percent of ALK rearrangements in patients with   ALK-rearranged carcinomas, except for rare instances of cr is probably sporadic and not associated with Del Angel syndrome   (HNPCC). However, if a BRAF mutation is not detected, the tumor   may either be sporadic or Del Angel syndrome associated.       Detection of BRAF mutations may also be useful in determining   patie differentially labeled fluorescent probes targeting the upstream   (5') and downstream (3') flanking regions of the RET gene ([employer]).  Cells were evaluated from regions of tumor identified   on histopathologic review of a matching hematoxylin- PD-L1 22C3 IHC W/TUMOR PROPORTION SCORE (TPS) INTERPRETATION, [facility])  Order: [id number]  Collected:  9/12/2020 17:05 Status:  Final result Dx:  Primary cancer of left upper lobe of . ..   Component 9/12/20 1705   PD-L1 Client Block ID [id number] metastatic nonsquamous NSCLC, with no EGFR or ALK genomic tumor   aberrations.      Pembrolizumab is approved in combination with carboplatin and   either paclitaxel or paclitaxel protein-bound, as first-line   treatment of patients with metastatic squamous

## 2020-09-29 ENCOUNTER — OFFICE VISIT (OUTPATIENT)
Dept: PULMONOLOGY | Facility: CLINIC | Age: 75
End: 2020-09-29
Payer: MEDICARE

## 2020-09-29 ENCOUNTER — TELEPHONE (OUTPATIENT)
Dept: HEMATOLOGY/ONCOLOGY | Facility: HOSPITAL | Age: 75
End: 2020-09-29

## 2020-09-29 VITALS
TEMPERATURE: 97 F | RESPIRATION RATE: 18 BRPM | SYSTOLIC BLOOD PRESSURE: 117 MMHG | HEART RATE: 93 BPM | WEIGHT: 215 LBS | OXYGEN SATURATION: 96 % | DIASTOLIC BLOOD PRESSURE: 71 MMHG | BODY MASS INDEX: 30.1 KG/M2 | HEIGHT: 71 IN

## 2020-09-29 DIAGNOSIS — C78.00 MALIGNANT NEOPLASM METASTATIC TO LUNG, UNSPECIFIED LATERALITY (HCC): Primary | ICD-10-CM

## 2020-09-29 PROCEDURE — 99204 OFFICE O/P NEW MOD 45 MIN: CPT | Performed by: INTERNAL MEDICINE

## 2020-09-29 PROCEDURE — G0463 HOSPITAL OUTPT CLINIC VISIT: HCPCS | Performed by: INTERNAL MEDICINE

## 2020-09-29 PROCEDURE — 1111F DSCHRG MED/CURRENT MED MERGE: CPT | Performed by: INTERNAL MEDICINE

## 2020-09-29 RX ORDER — IPRATROPIUM BROMIDE AND ALBUTEROL SULFATE 2.5; .5 MG/3ML; MG/3ML
3 SOLUTION RESPIRATORY (INHALATION) 4 TIMES DAILY
Qty: 120 VIAL | Refills: 5 | Status: SHIPPED | OUTPATIENT
Start: 2020-09-29 | End: 2020-09-30

## 2020-09-29 RX ORDER — FLUTICASONE FUROATE, UMECLIDINIUM BROMIDE AND VILANTEROL TRIFENATATE 100; 62.5; 25 UG/1; UG/1; UG/1
1 POWDER RESPIRATORY (INHALATION) DAILY
Qty: 1 EACH | Refills: 6 | Status: SHIPPED | OUTPATIENT
Start: 2020-09-29 | End: 2020-10-01

## 2020-09-29 NOTE — PROGRESS NOTES
[facility]:           As you know, Mr. Odalys Macias is a 78-year-old male who I am now evaluating for dyspnea. HISTORY OF PRESENT ILLNESS: The patient had smoked 1/2 pack/day having quit 17 years ago.   Over the past year, he has been feeling poorly had significant orthopnea, particularly in the middle of the night despite using his CPAP device for obstructive sleep apnea. While at [facility], supplemental oxygen was ordered.     PAST MEDICAL AND SURGICAL HISTORY:   1.  Stage IV lung adenocarcinoma with m left perihilar lymphadenopathy, compatible with metastatic disease. 4. Diminished activity in the right parietal region with surrounding vasogenic edema, compatible with intracranial metastatic disease.      5. Extensive osseous metastatic disease is se catheter; however, I think it reasonable to wait and see how quickly the pleural fluid re-accumulates. RECOMMENDATIONS:  1. Follow-up oncology  2. Repeat chest x-ray at the 3-week interval  3.   Contact me immediately if new respiratory problem in the

## 2020-09-29 NOTE — ED PROVIDER NOTES
Patient Seen in: [facility] AND [facility] Emergency Department      History   Patient presents with:  Difficulty Breathing    Stated Complaint: CP    HPI    66year old male with multiple medical issues including CAD, HTN, new diagnosis of metastatic adenocarci (36.4 °C) (Temporal)   Resp 18   Ht 180.3 cm (5' 11\")   Wt 90.7 kg   SpO2 96%   BMI 27.89 kg/m²         Physical Exam  Vitals signs and nursing note reviewed. Constitutional:       General: He is not in acute distress.      Appearance: He is well-develop Pro-Beta Natriuretic Peptide 3,905 (*)     All other components within normal limits   CBC W/ DIFFERENTIAL - Abnormal; Notable for the following components:    HGB 12.7 (*)     RDW-SD 47.4 (*)     Lymphocyte Absolute 0.53 (*)     Eosinophil Absolute 0.82 ( 9/28/2020 at 3:33 PM            Radiology exams  Viewed and reviewed by myself and findings discussed with patient including need for follow up    Critical Care:  I spent a total of 30 minutes of critical care time in obtaining history, performing a physica Prescribed:  Discharge Medication List as of 9/28/2020  5:35 PM

## 2020-09-29 NOTE — TELEPHONE ENCOUNTER
Received call from Dr. Marissa Ritter at [facility] regarding second opinion for patient.   She states that she also discussed with an expert at [facility] on MET positive lung cancer, and that best response to therapy is with 10 copies of the MET mutation, the patie

## 2020-09-30 ENCOUNTER — TELEPHONE (OUTPATIENT)
Dept: PULMONOLOGY | Facility: CLINIC | Age: 75
End: 2020-09-30

## 2020-09-30 DIAGNOSIS — Z51.81 MEDICATION MONITORING ENCOUNTER: ICD-10-CM

## 2020-09-30 DIAGNOSIS — J44.9 CHRONIC OBSTRUCTIVE PULMONARY DISEASE, UNSPECIFIED COPD TYPE (HCC): Primary | ICD-10-CM

## 2020-09-30 DIAGNOSIS — C34.12 PRIMARY CANCER OF LEFT UPPER LOBE OF LUNG (HCC): Primary | ICD-10-CM

## 2020-09-30 DIAGNOSIS — C78.00 MALIGNANT NEOPLASM METASTATIC TO LUNG, UNSPECIFIED LATERALITY (HCC): ICD-10-CM

## 2020-09-30 DIAGNOSIS — Z08 ENCOUNTER FOR FOLLOW-UP EXAMINATION AFTER COMPLETED TREATMENT FOR MALIGNANT NEOPLASM: ICD-10-CM

## 2020-09-30 DIAGNOSIS — C79.51 BONE METASTASIS (HCC): ICD-10-CM

## 2020-09-30 DIAGNOSIS — C79.31 BRAIN METASTASIS (HCC): ICD-10-CM

## 2020-09-30 NOTE — TELEPHONE ENCOUNTER
Patient's wife is following up on this. She states the patient is having difficulty breathing due to nebulizer machine. RN not available.  Please advise thank you # [phone]

## 2020-09-30 NOTE — TELEPHONE ENCOUNTER
Rx for nebulizer placed per Dr. James Day office notes from yesterday. DME rx, office visit encounter 9/29/20, & pt reg facesheet faxed to [facility] at #[phone]. Confirmation rcvd.  Spoke to HAUL @ [facility] (p. #[phone]) to see if nebulizer can be delivered

## 2020-09-30 NOTE — TELEPHONE ENCOUNTER
Pt states she is calling to talk to Sabiha Ramirez about her insurance and the nebulizer machine. Please call.

## 2020-09-30 NOTE — TELEPHONE ENCOUNTER
Informed Tucker dx code C78.00. She voiced understanding. Confirmed nothing further is needed from our office at this time & C78.00 is a qualifying dx code.

## 2020-09-30 NOTE — TELEPHONE ENCOUNTER
Pharmacy called in to get diagnosis code added to medication ipratropium-albuterol 0.5-2.5 (3) MG/3ML Inhalation Solution.  Please advise thank you

## 2020-09-30 NOTE — TELEPHONE ENCOUNTER
Rolan requests new script w/ dx code. Dr. Christina Bowers- [facility] sign rx if agreeable.

## 2020-09-30 NOTE — TELEPHONE ENCOUNTER
Per Jojo Copeland @ [facility] diagnosis of malignant neoplasm metastatic to lung is not a qualifying diagnosis & they will be able to deliver nebulizer tomorrow once they obtain new rx w/ qualifying dx. Dr. Adeline Martinez- pls sign rx w/ new dx if agreeable.

## 2020-10-01 ENCOUNTER — TELEPHONE (OUTPATIENT)
Dept: PULMONOLOGY | Facility: CLINIC | Age: 75
End: 2020-10-01

## 2020-10-01 RX ORDER — IPRATROPIUM BROMIDE AND ALBUTEROL SULFATE 2.5; .5 MG/3ML; MG/3ML
3 SOLUTION RESPIRATORY (INHALATION) 4 TIMES DAILY
Qty: 120 VIAL | Refills: 5 | Status: SHIPPED | OUTPATIENT
Start: 2020-10-01 | End: 2021-03-12

## 2020-10-01 RX ORDER — FLUTICASONE FUROATE, UMECLIDINIUM BROMIDE AND VILANTEROL TRIFENATATE 100; 62.5; 25 UG/1; UG/1; UG/1
1 POWDER RESPIRATORY (INHALATION) DAILY
Qty: 1 EACH | Refills: 6 | Status: SHIPPED | OUTPATIENT
Start: 2020-10-01 | End: 2021-03-15

## 2020-10-01 RX ORDER — IPRATROPIUM BROMIDE AND ALBUTEROL SULFATE 2.5; .5 MG/3ML; MG/3ML
3 SOLUTION RESPIRATORY (INHALATION) 4 TIMES DAILY
Qty: 120 VIAL | Refills: 5 | Status: SHIPPED | OUTPATIENT
Start: 2020-10-01 | End: 2020-10-01

## 2020-10-01 NOTE — TELEPHONE ENCOUNTER
Wife notified of below including Dr. Farideh Kaiser orders. Polly stts they will wait a couple days to see if pt's breathing gets worse and visiting nurse did come yesterday & said there were no crackles. Explained rx for Trelegy ready for p/u.  She stts they will b

## 2020-10-01 NOTE — TELEPHONE ENCOUNTER
Patient’s wife called in to leave a urgent message. She attempted to  the nebulizer medication but there is no code for CPOD for insurance to pay for it. Missing diagnosis code. Please resend prescription with the diagnosis code.  Please advise thank

## 2020-10-01 NOTE — TELEPHONE ENCOUNTER
Discussed below w/ spouse. Per wife pt has increased dyspnea when he gets up to go to bathroom & fatigue since seen by MD. She stts yesterday pt had difficulty walking & now is using walker inside of the home.  Spouse stts pt had fluid accumulate around [facility]

## 2020-10-01 NOTE — TELEPHONE ENCOUNTER
DME rx, consult, & pt reg facesheet faxed to [facility] at #[phone]. Confirmation rcvd.

## 2020-10-01 NOTE — TELEPHONE ENCOUNTER
Per Sydni Alejandre @ [facility] pt's nebulizer machine delivered today at 2:50 pm & dx code of emphysema used per office visit notes (9/29/20).

## 2020-10-01 NOTE — PATIENT INSTRUCTIONS
Medication Education Record: IV Therapy    Learner:  Patient    Barriers / Limitations:  None    Psychosocial Assessment:  patient psychosocial response appropriate    Diagnosis: Lung cancer     IV Cancer Treatment Name(s): pembrolizumab pemetrexed carbopl your provider):  Prochloperazine (Compazine) 10mg every 8 hours  Take as needed and Ondansetron (Zofran) 8 mg every 8 hours  Take as needed    Premedication- Dexamethasone 4 mg twice daily the day before, the day of and the day after chemotherapy     Helpf Care  o Avoid direct sunlight.  o Wear a broad-spectrum sunscreen with an SPF of 30 or higher on any skin exposed to the sun. Re-apply every 2 hours if in the sun and after bathing or sweating.   o For dry skin, use an alcohol-free lotion twice per day, es http://www.duvall.info/. html        Safety and Handling of Chemotherapy  While you or your family member is receiving chemotherapy, whether in the clinic or at home, the following precautions must be taken to lessen any exposure to the medications and for several months or years after therapy. Chemotherapy can have harmful side effects to the fetus, especially in the first trimester.   In addition, menstrual cycles can become irregular during and after treatment, so you may not know if y

## 2020-10-01 NOTE — TELEPHONE ENCOUNTER
Pls see TE 10/1/20 for Dr. Chris Stark. Per Samreen Montes (Pharmacist) dx code C78.00 not covered by Medicare. Explained RN was told C78.00 is a qualifying dx code & new rx w/ updated dx code will be sent. She voiced understanding.  New rx sent w/ dx code of J43.9 per mike

## 2020-10-01 NOTE — TELEPHONE ENCOUNTER
RN, the prescriptions are signed. No additional advice. If the shortness of breath is truly worse, we can order the chest x-ray now.

## 2020-10-02 ENCOUNTER — HOSPITAL ENCOUNTER (OUTPATIENT)
Dept: GENERAL RADIOLOGY | Facility: HOSPITAL | Age: 75
Discharge: HOME | End: 2020-10-02
Attending: INTERNAL MEDICINE
Payer: MEDICARE

## 2020-10-02 ENCOUNTER — NURSE ONLY (OUTPATIENT)
Dept: HEMATOLOGY/ONCOLOGY | Facility: HOSPITAL | Age: 75
End: 2020-10-02
Attending: NURSE PRACTITIONER
Payer: MEDICARE

## 2020-10-02 ENCOUNTER — TELEPHONE (OUTPATIENT)
Dept: HEMATOLOGY/ONCOLOGY | Facility: HOSPITAL | Age: 75
End: 2020-10-02

## 2020-10-02 VITALS
HEART RATE: 99 BPM | HEIGHT: 69 IN | OXYGEN SATURATION: 96 % | WEIGHT: 214 LBS | BODY MASS INDEX: 31.7 KG/M2 | RESPIRATION RATE: 20 BRPM | TEMPERATURE: 98 F | DIASTOLIC BLOOD PRESSURE: 58 MMHG | SYSTOLIC BLOOD PRESSURE: 119 MMHG

## 2020-10-02 DIAGNOSIS — Z51.81 MEDICATION MONITORING ENCOUNTER: ICD-10-CM

## 2020-10-02 DIAGNOSIS — R53.1 WEAKNESS: ICD-10-CM

## 2020-10-02 DIAGNOSIS — Z08 ENCOUNTER FOR FOLLOW-UP EXAMINATION AFTER COMPLETED TREATMENT FOR MALIGNANT NEOPLASM: ICD-10-CM

## 2020-10-02 DIAGNOSIS — C79.31 BRAIN METASTASIS (HCC): ICD-10-CM

## 2020-10-02 DIAGNOSIS — C78.00 MALIGNANT NEOPLASM METASTATIC TO LUNG, UNSPECIFIED LATERALITY (HCC): Primary | ICD-10-CM

## 2020-10-02 DIAGNOSIS — C78.00 MALIGNANT NEOPLASM METASTATIC TO LUNG, UNSPECIFIED LATERALITY (HCC): ICD-10-CM

## 2020-10-02 DIAGNOSIS — C79.51 BONE METASTASIS (HCC): ICD-10-CM

## 2020-10-02 DIAGNOSIS — C34.12 PRIMARY CANCER OF LEFT UPPER LOBE OF LUNG (HCC): ICD-10-CM

## 2020-10-02 DIAGNOSIS — C34.12 PRIMARY CANCER OF LEFT UPPER LOBE OF LUNG (HCC): Primary | ICD-10-CM

## 2020-10-02 PROCEDURE — 84439 ASSAY OF FREE THYROXINE: CPT

## 2020-10-02 PROCEDURE — 84443 ASSAY THYROID STIM HORMONE: CPT

## 2020-10-02 PROCEDURE — 99211 OFF/OP EST MAY X REQ PHY/QHP: CPT

## 2020-10-02 PROCEDURE — 80053 COMPREHEN METABOLIC PANEL: CPT

## 2020-10-02 PROCEDURE — 84480 ASSAY TRIIODOTHYRONINE (T3): CPT

## 2020-10-02 PROCEDURE — 85025 COMPLETE CBC W/AUTO DIFF WBC: CPT

## 2020-10-02 PROCEDURE — 36415 COLL VENOUS BLD VENIPUNCTURE: CPT

## 2020-10-02 PROCEDURE — 73552 X-RAY EXAM OF FEMUR 2/>: CPT | Performed by: INTERNAL MEDICINE

## 2020-10-02 RX ORDER — PROCHLORPERAZINE MALEATE 10 MG
10 TABLET ORAL EVERY 8 HOURS PRN
Qty: 30 TABLET | Refills: 3 | Status: SHIPPED | OUTPATIENT
Start: 2020-10-02 | End: 2021-12-28

## 2020-10-02 RX ORDER — FOLIC ACID 1 MG/1
1 TABLET ORAL DAILY
Qty: 90 TABLET | Refills: 3 | Status: SHIPPED | OUTPATIENT
Start: 2020-10-02 | End: 2021-04-26

## 2020-10-02 RX ORDER — ONDANSETRON HYDROCHLORIDE 8 MG/1
8 TABLET, FILM COATED ORAL EVERY 8 HOURS PRN
Qty: 30 TABLET | Refills: 3 | Status: SHIPPED | OUTPATIENT
Start: 2020-10-02 | End: 2021-12-28

## 2020-10-02 RX ORDER — DEXAMETHASONE 4 MG/1
4 TABLET ORAL 2 TIMES DAILY
Qty: 18 TABLET | Refills: 6 | Status: SHIPPED | OUTPATIENT
Start: 2020-10-02 | End: 2021-12-28

## 2020-10-02 NOTE — TELEPHONE ENCOUNTER
Polly picked up pt's script today in clinic. She requests to know how she can obtain the adapter for O2 to CPAP for pt. Spouse aware RN will f/u s/p contacting [facility]. Per Laron Whaley @ [facility] pt will have to contact oxygen supplier for adapter for CPAP.

## 2020-10-02 NOTE — TELEPHONE ENCOUNTER
Called pt's wife and pt is scheduled for treatment 8am. She says that is early but willing to take it to start treatment asap. Will schedule subsequent treaments later in the day. Reviewed dexamethasone to start MTW. Emotional support provided.

## 2020-10-05 ENCOUNTER — TELEPHONE (OUTPATIENT)
Dept: HEMATOLOGY/ONCOLOGY | Facility: HOSPITAL | Age: 75
End: 2020-10-05

## 2020-10-05 RX ORDER — CYANOCOBALAMIN 1000 UG/ML
1000 INJECTION INTRAMUSCULAR; SUBCUTANEOUS ONCE
Status: CANCELLED | OUTPATIENT
Start: 2020-10-05

## 2020-10-05 NOTE — TELEPHONE ENCOUNTER
Polly called and said Niru Bryson starts chemo tomorrow- she read the information and it said he was supposed to have a B12 inj one week prior to starting. He did not get one.    Also, wanted to know what number they should call should he get sick after hours or o

## 2020-10-05 NOTE — TELEPHONE ENCOUNTER
Returned phone call and notified pt will receive B12 injection tomorrow at start of tx. Informed to call [phone] if needs to call if pt not feeling well. Wife verbalizes understanding.

## 2020-10-06 ENCOUNTER — TELEPHONE (OUTPATIENT)
Dept: HEMATOLOGY/ONCOLOGY | Facility: HOSPITAL | Age: 75
End: 2020-10-06

## 2020-10-06 ENCOUNTER — OFFICE VISIT (OUTPATIENT)
Dept: HEMATOLOGY/ONCOLOGY | Facility: HOSPITAL | Age: 75
End: 2020-10-06
Attending: NURSE PRACTITIONER
Payer: MEDICARE

## 2020-10-06 VITALS
TEMPERATURE: 98 F | WEIGHT: 212.19 LBS | DIASTOLIC BLOOD PRESSURE: 83 MMHG | BODY MASS INDEX: 31 KG/M2 | SYSTOLIC BLOOD PRESSURE: 143 MMHG | HEART RATE: 78 BPM | RESPIRATION RATE: 18 BRPM | OXYGEN SATURATION: 96 %

## 2020-10-06 DIAGNOSIS — C79.51 BONE METASTASIS (HCC): ICD-10-CM

## 2020-10-06 DIAGNOSIS — C34.12 PRIMARY CANCER OF LEFT UPPER LOBE OF LUNG (HCC): Primary | ICD-10-CM

## 2020-10-06 DIAGNOSIS — Z51.81 MEDICATION MONITORING ENCOUNTER: ICD-10-CM

## 2020-10-06 PROCEDURE — 96367 TX/PROPH/DG ADDL SEQ IV INF: CPT

## 2020-10-06 PROCEDURE — 96413 CHEMO IV INFUSION 1 HR: CPT

## 2020-10-06 PROCEDURE — 96372 THER/PROPH/DIAG INJ SC/IM: CPT

## 2020-10-06 PROCEDURE — 96375 TX/PRO/DX INJ NEW DRUG ADDON: CPT

## 2020-10-06 PROCEDURE — 96417 CHEMO IV INFUS EACH ADDL SEQ: CPT

## 2020-10-06 PROCEDURE — 96411 CHEMO IV PUSH ADDL DRUG: CPT

## 2020-10-06 RX ORDER — ALPRAZOLAM 0.5 MG/1
0.5 TABLET ORAL 2 TIMES DAILY PRN
Qty: 60 TABLET | Refills: 0 | Status: SHIPPED | OUTPATIENT
Start: 2020-10-06 | End: 2020-11-05

## 2020-10-06 RX ORDER — CYANOCOBALAMIN 1000 UG/ML
INJECTION INTRAMUSCULAR; SUBCUTANEOUS
Status: COMPLETED
Start: 2020-10-06 | End: 2020-10-06

## 2020-10-06 RX ORDER — CYANOCOBALAMIN 1000 UG/ML
1000 INJECTION INTRAMUSCULAR; SUBCUTANEOUS ONCE
Status: COMPLETED | OUTPATIENT
Start: 2020-10-06 | End: 2020-10-06

## 2020-10-06 RX ADMIN — CYANOCOBALAMIN 1000 MCG: 1000 INJECTION INTRAMUSCULAR; SUBCUTANEOUS at 08:59:00

## 2020-10-06 NOTE — TELEPHONE ENCOUNTER
Called pt wife back. She is requesting refill of alpazolam. Pt is taking 2 tabs twice daily for anxiety. Discussed with Dr Pleitez Click will send Rx for 0.5 mg tabs for bid. She was also asking re flu shots. OK to get flu shots because it is not a live vaccine.  Pt

## 2020-10-06 NOTE — PROGRESS NOTES
Pt here for C  1  D 1   Carbo/Alimta/Keytruda/B12/Xgeva      arrives Ambulating independently, using walker   accompanied by Self           Patient reports possible pregnancy since last therapy cycle: Not Applicable    Modifications in dose or schedule: No

## 2020-10-06 NOTE — PATIENT INSTRUCTIONS
Medication Education Record: IV Therapy    Recommended Anti-nausea medications (as directed by your provider):  Prochloperazine (Compazine) 10mg every 8 hours  Take as needed and Ondansetron (Zofran) 8 mg every 8 hours  Take as needed    Premedication- Dex diarrhea or constipation, please contact the triage nurse for further instructions. Skin Care  o Avoid direct sunlight.  o Wear a broad-spectrum sunscreen with an SPF of 30 or higher on any skin exposed to the sun.   Re-apply every 2 hours if in the sun an chemotherapy for yourself or family members: http://www.duvall.info/. html        Safety and Handling of Chemotherapy  While you or your family member is receiving chemotherapy, whether in the clinic or at home, the following precaution treatment to prevent pregnancy while on these medications and for several months or years after therapy. Chemotherapy can have harmful side effects to the fetus, especially in the first trimester.   In addition, menstrual cycles can become irregular during

## 2020-10-06 NOTE — TELEPHONE ENCOUNTER
Needs a refill on one of his medications but also has a few questions. Please give her a call back. [phone].

## 2020-10-07 ENCOUNTER — TELEPHONE (OUTPATIENT)
Dept: HEMATOLOGY/ONCOLOGY | Facility: HOSPITAL | Age: 75
End: 2020-10-07

## 2020-10-07 NOTE — TELEPHONE ENCOUNTER
Called patient post C1D1, no complaints, had some pain during night, took Norco,  Reinforced plan and to call for any questions or problems. She verbalizes understanding.

## 2020-10-12 ENCOUNTER — APPOINTMENT (OUTPATIENT)
Dept: CARDIOLOGY | Age: 75
End: 2020-10-12
Attending: INTERNAL MEDICINE

## 2020-10-13 ENCOUNTER — ANCILLARY PROCEDURE (OUTPATIENT)
Dept: CARDIOLOGY | Age: 75
End: 2020-10-13
Attending: INTERNAL MEDICINE

## 2020-10-13 ENCOUNTER — TELEPHONE (OUTPATIENT)
Dept: CARDIOLOGY | Age: 75
End: 2020-10-13

## 2020-10-13 ENCOUNTER — TELEPHONE (OUTPATIENT)
Dept: PULMONOLOGY | Facility: CLINIC | Age: 75
End: 2020-10-13

## 2020-10-13 DIAGNOSIS — I35.0 AORTIC VALVE STENOSIS, ETIOLOGY OF CARDIAC VALVE DISEASE UNSPECIFIED: ICD-10-CM

## 2020-10-13 DIAGNOSIS — I10 ESSENTIAL HYPERTENSION: ICD-10-CM

## 2020-10-13 DIAGNOSIS — D68.9 COAGULATION DEFECT, UNSPECIFIED (CMD): ICD-10-CM

## 2020-10-13 DIAGNOSIS — I25.10 CORONARY ARTERY DISEASE INVOLVING NATIVE CORONARY ARTERY, ANGINA PRESENCE UNSPECIFIED, UNSPECIFIED WHETHER NATIVE OR TRANSPLANTED HEART: ICD-10-CM

## 2020-10-13 DIAGNOSIS — N18.9 CHRONIC RENAL IMPAIRMENT, UNSPECIFIED CKD STAGE: ICD-10-CM

## 2020-10-13 PROCEDURE — 93306 TTE W/DOPPLER COMPLETE: CPT | Performed by: INTERNAL MEDICINE

## 2020-10-13 NOTE — TELEPHONE ENCOUNTER
Spoke with wife regarding message below. Wife states patient has been moaning when moving around due to \"pain in chest between breast\", not eating and having trouble breathing for the last 4-5 days.  Wife states patient has lung cancer and has had fluid d

## 2020-10-13 NOTE — TELEPHONE ENCOUNTER
Spoke with wife Addis Deshpande (POA- form locacted in Media 9/20/20). Informed wife of Dr. Perera Pole message below. Chest xray scheduled for tomorrow 10/14/20 at 11:15 AM. Verified date, time and location. Wife voiced understanding.

## 2020-10-13 NOTE — TELEPHONE ENCOUNTER
Pts wife Jose Segura states that pt is having a lot of pain between breast bone. Pt called cardiologist and echo was done and it was not his heart. Pt was told to call Dr. Radha Crum. Pain for last 3 days. Pt has lung cancer. Call transferred to RN.

## 2020-10-14 ENCOUNTER — TELEPHONE (OUTPATIENT)
Dept: HEMATOLOGY/ONCOLOGY | Facility: HOSPITAL | Age: 75
End: 2020-10-14

## 2020-10-14 ENCOUNTER — HOSPITAL ENCOUNTER (OUTPATIENT)
Dept: GENERAL RADIOLOGY | Facility: HOSPITAL | Age: 75
Discharge: HOME | End: 2020-10-14
Attending: INTERNAL MEDICINE
Payer: MEDICARE

## 2020-10-14 DIAGNOSIS — C78.00 MALIGNANT NEOPLASM METASTATIC TO LUNG, UNSPECIFIED LATERALITY (HCC): ICD-10-CM

## 2020-10-14 PROCEDURE — 71046 X-RAY EXAM CHEST 2 VIEWS: CPT | Performed by: INTERNAL MEDICINE

## 2020-10-14 NOTE — TELEPHONE ENCOUNTER
Wife stopped at desk- xray is done- please call with results-  Also- wife left letter/instructions for letter needed for [employer]- put in RN box

## 2020-10-14 NOTE — TELEPHONE ENCOUNTER
Returned phone call and left message to disregard the information about Tribecta due to plan was changed to current tx plan as discussed with Dr. Espiridion Galeazzi. Informed that Dr. Mitch Henderson wants pt on the current tx plan.  Instructed to call [phone] if has further

## 2020-10-14 NOTE — TELEPHONE ENCOUNTER
Dr. Rothman Roll- Please advice on chest XR done today and review/sign pended letter for [employer] if agreeable.

## 2020-10-14 NOTE — TELEPHONE ENCOUNTER
Polly received a insurance form regarding the medication Tabrecta and they need more information about the Tumor ans Mutation and the form is time sensitive and Polly need to speak to someone regarding this this matter. She would like to speak with Timmy Garcia.

## 2020-10-14 NOTE — TELEPHONE ENCOUNTER
Letter to Jesse faxed to [phone]. Fax confirmation received. [employer] letter sent to HIM for scanning.

## 2020-10-15 ENCOUNTER — TELEPHONE (OUTPATIENT)
Dept: PULMONOLOGY | Facility: CLINIC | Age: 75
End: 2020-10-15

## 2020-10-15 ENCOUNTER — TELEPHONE (OUTPATIENT)
Dept: CARDIOLOGY | Age: 75
End: 2020-10-15

## 2020-10-15 NOTE — TELEPHONE ENCOUNTER
Polly states she is returning RN's call - doesn't know what the call was about. PLease call. Thank you.

## 2020-10-15 NOTE — TELEPHONE ENCOUNTER
Reviewed pt chart. He recently had a CXR completed due to acute pain and SOB. Dr. Saleem Carter called them directly and discussed the results at length yesterday. Michelle Johnston also faxed a letter to [address] [place] [address] yesterday afternoon to keep electricity on.      I called Polly and

## 2020-10-16 ENCOUNTER — APPOINTMENT (OUTPATIENT)
Dept: CARDIOLOGY | Age: 75
End: 2020-10-16
Attending: INTERNAL MEDICINE

## 2020-10-16 ENCOUNTER — TELEPHONE (OUTPATIENT)
Dept: INTERNAL MEDICINE CLINIC | Facility: CLINIC | Age: 75
End: 2020-10-16

## 2020-10-16 ENCOUNTER — OFFICE VISIT (OUTPATIENT)
Dept: INTERNAL MEDICINE CLINIC | Facility: CLINIC | Age: 75
End: 2020-10-16
Payer: MEDICARE

## 2020-10-16 VITALS
HEART RATE: 84 BPM | HEIGHT: 69 IN | DIASTOLIC BLOOD PRESSURE: 61 MMHG | WEIGHT: 202 LBS | SYSTOLIC BLOOD PRESSURE: 95 MMHG | BODY MASS INDEX: 29.92 KG/M2

## 2020-10-16 DIAGNOSIS — C79.51 BONE METASTASIS (HCC): ICD-10-CM

## 2020-10-16 DIAGNOSIS — C34.12 PRIMARY CANCER OF LEFT UPPER LOBE OF LUNG (HCC): ICD-10-CM

## 2020-10-16 DIAGNOSIS — J44.9 CHRONIC OBSTRUCTIVE PULMONARY DISEASE, UNSPECIFIED COPD TYPE (HCC): ICD-10-CM

## 2020-10-16 DIAGNOSIS — C79.31 BRAIN METASTASIS (HCC): ICD-10-CM

## 2020-10-16 DIAGNOSIS — C78.00 MALIGNANT NEOPLASM METASTATIC TO LUNG, UNSPECIFIED LATERALITY (HCC): ICD-10-CM

## 2020-10-16 DIAGNOSIS — R13.10 DYSPHAGIA, UNSPECIFIED TYPE: ICD-10-CM

## 2020-10-16 DIAGNOSIS — J91.0 MALIGNANT PLEURAL EFFUSION: ICD-10-CM

## 2020-10-16 DIAGNOSIS — Z76.89 ESTABLISHING CARE WITH NEW DOCTOR, ENCOUNTER FOR: Primary | ICD-10-CM

## 2020-10-16 DIAGNOSIS — Z23 NEED FOR INFLUENZA VACCINATION: ICD-10-CM

## 2020-10-16 PROCEDURE — 99203 OFFICE O/P NEW LOW 30 MIN: CPT | Performed by: INTERNAL MEDICINE

## 2020-10-16 PROCEDURE — G0008 ADMIN INFLUENZA VIRUS VAC: HCPCS | Performed by: INTERNAL MEDICINE

## 2020-10-16 PROCEDURE — G0463 HOSPITAL OUTPT CLINIC VISIT: HCPCS | Performed by: INTERNAL MEDICINE

## 2020-10-16 PROCEDURE — 90662 IIV NO PRSV INCREASED AG IM: CPT | Performed by: INTERNAL MEDICINE

## 2020-10-16 RX ORDER — ALBUTEROL SULFATE 90 UG/1
2 AEROSOL, METERED RESPIRATORY (INHALATION) EVERY 6 HOURS PRN
Qty: 2 INHALER | Refills: 5 | Status: SHIPPED | OUTPATIENT
Start: 2020-10-16

## 2020-10-16 RX ORDER — HYDROCODONE BITARTRATE AND ACETAMINOPHEN 10; 325 MG/1; MG/1
1 TABLET ORAL EVERY 6 HOURS PRN
Qty: 120 TABLET | Refills: 0 | Status: SHIPPED | OUTPATIENT
Start: 2020-10-16 | End: 2020-10-17

## 2020-10-16 RX ORDER — NALOXONE HYDROCHLORIDE 4 MG/.1ML
4 SPRAY, METERED NASAL AS NEEDED
Qty: 1 KIT | Refills: 0 | Status: SHIPPED | OUTPATIENT
Start: 2020-10-16

## 2020-10-16 RX ORDER — DESIPRAMINE HYDROCHLORIDE 10 MG/1
40 TABLET ORAL NIGHTLY
COMMUNITY
End: 2020-10-19

## 2020-10-16 RX ORDER — HYDROCODONE BITARTRATE AND ACETAMINOPHEN 5; 325 MG/1; MG/1
1 TABLET ORAL EVERY 6 HOURS
Qty: 120 TABLET | Refills: 0 | Status: SHIPPED | OUTPATIENT
Start: 2020-10-16 | End: 2020-10-17 | Stop reason: DRUGHIGH

## 2020-10-16 RX ORDER — HYDROCHLOROTHIAZIDE 12.5 MG/1
12.5 TABLET ORAL DAILY
COMMUNITY
End: 2021-12-13

## 2020-10-16 NOTE — TELEPHONE ENCOUNTER
That's fine, [place] 5[place]. Since he is in severe pain will rx 10mg and have them ffup with pain management.

## 2020-10-16 NOTE — TELEPHONE ENCOUNTER
Patient's wife has a medication question, she believes it should be 10 mg of hydrocodone-acetaminophen. She does not want to  the medication until issue resolved.  Please advise           HYDROcodone-acetaminophen 5-325 MG Oral Tab

## 2020-10-16 NOTE — PROGRESS NOTES
History of Present Illness   Patient ID: Billee Guardian is a 66year old male. Chief Complaint: Establish Care      HPI   57-year-old gentleman who presents to [facility]. Previously they were seeing Dr. Susanna Power.   August 31, 2020 patient was jorge is 29.83 kg/m². BP Readings from Last 3 Encounters:  10/16/20 : 95/61  10/06/20 : 143/83  10/02/20 : 119/58      Physical Exam   Nursing note and vitals reviewed. Constitutional: He is oriented to person, place, and time. He appears well-developed.    HE Problems:  Patient Active Problem List    Medication monitoring encounter      Malignant pericardial effusion (HCC)      Bone metastasis (HCC)      Lung metastasis (HCC)      Malignant pleural effusion      Mesenteric mass      Brain metastasis ([facility] [address] for Nausea. 30 tablet 3   • folic acid 1 MG Oral Tab Take 1 tablet (1 mg total) by mouth daily. 90 tablet 3   • dexamethasone (DECADRON) 4 MG tablet Take 1 tablet (4 mg total) by mouth 2 (two) times daily.  Take 4 mg twice daily the day before chemotherapy, left upper lobe of lung (HCC)  - GASTRO - INTERNAL    3. Malignant neoplasm metastatic to lung, unspecified [facility]    4. Bone metastasis (HCC)  - HYDROcodone-acetaminophen 5-325 MG Oral Tab; Take 1 tablet by mouth every 6 (six) hours.   Dispense: any acute changes in weight, respiratory status or any chest pains. Advised to continue monitoring blood pressure at home and if systolic is consistently below 100 the need to contact Dr. Lara Colon office right away and stop taking amlodipine.   Aspiration s

## 2020-10-17 ENCOUNTER — TELEPHONE (OUTPATIENT)
Dept: INTERNAL MEDICINE CLINIC | Facility: CLINIC | Age: 75
End: 2020-10-17

## 2020-10-17 DIAGNOSIS — C79.51 BONE METASTASIS: ICD-10-CM

## 2020-10-17 RX ORDER — HYDROCODONE BITARTRATE AND ACETAMINOPHEN 10; 325 MG/1; MG/1
1 TABLET ORAL EVERY 6 HOURS PRN
Qty: 120 TABLET | Refills: 0 | Status: SHIPPED | OUTPATIENT
Start: 2020-10-17 | End: 2020-11-17

## 2020-10-17 RX ORDER — HYDROCODONE BITARTRATE AND ACETAMINOPHEN 5; 325 MG/1; MG/1
1 TABLET ORAL EVERY 6 HOURS
Qty: 120 TABLET | Refills: 0 | Status: CANCELLED | OUTPATIENT
Start: 2020-10-17 | End: 2020-11-16

## 2020-10-17 NOTE — TELEPHONE ENCOUNTER
Alanis/ Pharmacy Tech of [address] [facility] is calling regarding patient's script HYDROcodone-acetaminophen (NORCO)  MG Oral Tab. Momo Gardner states both scripts were cancelled and she is requesting new script. Please reference back to encounter from yesterday 10/16/2020.

## 2020-10-17 NOTE — TELEPHONE ENCOUNTER
Stacia checking the status of the medication the patient need it he is in pain  Have stage four cancer and waiting on the the medicine       Please advise   [phone]

## 2020-10-19 ENCOUNTER — APPOINTMENT (OUTPATIENT)
Dept: CARDIOLOGY | Age: 75
End: 2020-10-19

## 2020-10-19 ENCOUNTER — OFFICE VISIT (OUTPATIENT)
Dept: PAIN CLINIC | Facility: HOSPITAL | Age: 75
End: 2020-10-19
Attending: INTERNAL MEDICINE
Payer: MEDICARE

## 2020-10-19 ENCOUNTER — TELEPHONE (OUTPATIENT)
Dept: INTERNAL MEDICINE CLINIC | Facility: CLINIC | Age: 75
End: 2020-10-19

## 2020-10-19 VITALS
HEIGHT: 69 IN | SYSTOLIC BLOOD PRESSURE: 104 MMHG | DIASTOLIC BLOOD PRESSURE: 62 MMHG | HEART RATE: 77 BPM | BODY MASS INDEX: 29.92 KG/M2 | RESPIRATION RATE: 18 BRPM | WEIGHT: 202 LBS

## 2020-10-19 DIAGNOSIS — C80.1 MALIGNANT PERICARDIAL EFFUSION (HCC): ICD-10-CM

## 2020-10-19 DIAGNOSIS — C79.51 BONE METASTASIS (HCC): ICD-10-CM

## 2020-10-19 DIAGNOSIS — C34.12 PRIMARY CANCER OF LEFT UPPER LOBE OF LUNG (HCC): ICD-10-CM

## 2020-10-19 DIAGNOSIS — G89.3 CANCER RELATED PAIN: Primary | ICD-10-CM

## 2020-10-19 DIAGNOSIS — I31.3 MALIGNANT PERICARDIAL EFFUSION (HCC): ICD-10-CM

## 2020-10-19 DIAGNOSIS — C79.31 BRAIN METASTASES (HCC): ICD-10-CM

## 2020-10-19 PROCEDURE — 99201 HC OUTPT EVAL AND MGNT NEW PT LEVEL 1: CPT

## 2020-10-19 RX ORDER — MORPHINE SULFATE 15 MG/1
15 TABLET, FILM COATED, EXTENDED RELEASE ORAL EVERY 12 HOURS SCHEDULED
Qty: 60 TABLET | Refills: 0 | Status: SHIPPED | OUTPATIENT
Start: 2020-10-19 | End: 2020-11-16

## 2020-10-19 NOTE — TELEPHONE ENCOUNTER
Patient's wife called saying she reviewed patient's AVS and noticed that Desipramine 10 mg was on his list of medications     Patient's wife states she went to the pharmacy and there was no prescription available for  for Desipramine     Advised thi

## 2020-10-19 NOTE — CHRONIC PAIN
[facility]  Report of Consultation    Gage Lewis Patient Status:  Outpatient    1945 MRN [id number]   Location [address] [facility] Attending Joseph Vidal MD    0 PCP Felicitas Choi MD       Date of Co Hcl [Meperi*    UNKNOWN    Medications:  No current facility-administered medications for this visit. Scheduled Meds:  Continuous Infusions:  PRN Meds:.    Review of Systems:  Pertinent items are noted in HPI.     Physical Exam:  Blood pressure 104/62, pu Negative.                =====  CONCLUSION:      Grossly unchanged confluent left perihilar mass. Improved aeration left perihilar lung and right lung base. No new focal opacity in the lungs or significant pleural effusion.            Dictated by (CST) Limited views are notable for postoperative changes of the lenses bilaterally. Trace metallic streak artifact from dental amalgam is noted.   LUNGS/PLEURA:           Background parenchymal findings of mild upper lobe predominant paraseptal emphysema are not right antecubital injection. No axillary lymphadenopathy or definite pathologic FDG activity is identified.     ABDOMEN/PELVIS:       Focal hypermetabolic activity is perceived in the spleen, with SUV of 5.6, without discernible underlying lesion visible on 11.6. Sacral metastases have SUV max of 12.6-13.6. An infiltrative superomedial right acetabular lesion has an SUV max of 12.4. A lesion of the left medial wall of the left acetabulum has SUV max of   11.9.                Hypermetabolic lesions of the proxi suggested. 11. Ascending thoracic aortic dilatation. 12. Dilatation of the main pulmonary artery trunk may relate to underlying pulmonary hypertension. 13. Small volume intraperitoneal ascites. 14. Mild central mesenteric edema. 15.  A BRAINSTEM:    No edema, hemorrhage, mass, acute infarction, or significant atrophy. CALVARIUM:    Streak artifact is present across the right cerebral hemisphere related to right-sided cochlear implant. Prior partial right mastoidectomy.   Fluid is pr current Norco 10 every 4 hours as needed patient to call pain clinic on Friday to report change in pain symptoms. 2.  Use of Dulcolax as well as MiraLAX for constipation  3.   Patient to call pain clinic on Friday to report outcome of MS Contin use      Co

## 2020-10-20 NOTE — TELEPHONE ENCOUNTER
They did not request any refills except for Norco, ideally medications need to be reconciled with what he is taking at home as they are unclear on what he is taking lei they come to the office and since he comes from an outside institution medications may

## 2020-10-21 NOTE — TELEPHONE ENCOUNTER
Pt spouse calling back and MD message given. Pt spouse verb understanding. Pt spouse states she usually gets pt [place] from [place] due to cost, and pt has still not received med.   Pt is running low and per spouse Dr Sebas Proctor did tell her he would [facility]

## 2020-10-26 ENCOUNTER — OFFICE VISIT (OUTPATIENT)
Dept: HEMATOLOGY/ONCOLOGY | Facility: HOSPITAL | Age: 75
End: 2020-10-26
Attending: NURSE PRACTITIONER
Payer: MEDICARE

## 2020-10-26 ENCOUNTER — TELEPHONE (OUTPATIENT)
Dept: HEMATOLOGY/ONCOLOGY | Facility: HOSPITAL | Age: 75
End: 2020-10-26

## 2020-10-26 VITALS
RESPIRATION RATE: 18 BRPM | DIASTOLIC BLOOD PRESSURE: 50 MMHG | WEIGHT: 203 LBS | BODY MASS INDEX: 30.07 KG/M2 | HEIGHT: 69 IN | TEMPERATURE: 98 F | HEART RATE: 87 BPM | SYSTOLIC BLOOD PRESSURE: 133 MMHG | OXYGEN SATURATION: 96 %

## 2020-10-26 DIAGNOSIS — C78.00 MALIGNANT NEOPLASM METASTATIC TO LUNG, UNSPECIFIED LATERALITY (HCC): ICD-10-CM

## 2020-10-26 DIAGNOSIS — C34.12 PRIMARY CANCER OF LEFT UPPER LOBE OF LUNG (HCC): ICD-10-CM

## 2020-10-26 DIAGNOSIS — C34.12 PRIMARY CANCER OF LEFT UPPER LOBE OF LUNG (HCC): Primary | ICD-10-CM

## 2020-10-26 DIAGNOSIS — G89.3 CANCER RELATED PAIN: ICD-10-CM

## 2020-10-26 DIAGNOSIS — Z51.11 CHEMOTHERAPY MANAGEMENT, ENCOUNTER FOR: ICD-10-CM

## 2020-10-26 DIAGNOSIS — Z51.81 MEDICATION MONITORING ENCOUNTER: Primary | ICD-10-CM

## 2020-10-26 DIAGNOSIS — C79.31 BRAIN METASTASIS (HCC): ICD-10-CM

## 2020-10-26 DIAGNOSIS — J91.0 MALIGNANT PLEURAL EFFUSION: ICD-10-CM

## 2020-10-26 DIAGNOSIS — C79.51 BONE METASTASIS (HCC): ICD-10-CM

## 2020-10-26 PROCEDURE — 99215 OFFICE O/P EST HI 40 MIN: CPT | Performed by: PHYSICIAN ASSISTANT

## 2020-10-26 PROCEDURE — 36415 COLL VENOUS BLD VENIPUNCTURE: CPT

## 2020-10-26 PROCEDURE — 85025 COMPLETE CBC W/AUTO DIFF WBC: CPT

## 2020-10-26 PROCEDURE — 80053 COMPREHEN METABOLIC PANEL: CPT

## 2020-10-26 RX ORDER — MORPHINE SULFATE 15 MG/1
15 TABLET ORAL EVERY 4 HOURS PRN
Qty: 180 TABLET | Refills: 0 | Status: SHIPPED | OUTPATIENT
Start: 2020-10-26 | End: 2020-12-07

## 2020-10-26 RX ORDER — POTASSIUM CHLORIDE 1500 MG/1
20 TABLET, FILM COATED, EXTENDED RELEASE ORAL 2 TIMES DAILY
Qty: 60 TABLET | Refills: 3 | Status: SHIPPED | OUTPATIENT
Start: 2020-10-26 | End: 2020-11-25

## 2020-10-26 NOTE — PATIENT INSTRUCTIONS
1. Proceed with cycle 2 chemotherapy     2. STOP hydrocodone and STAT immediate relief morphine 15 mg ONE tablet every 4 hours. Caution fatigue, constipation and confusion/dizziness    3. Start potassium chloride today 20 mEq twice daily    4.   Call as

## 2020-10-26 NOTE — PROGRESS NOTES
JOANNE Allred is a 66year old male here for f/u of Primary cancer of left upper lobe of lung (hcc)  (primary encounter diagnosis)  Brain metastasis (hcc)  Bone metastasis (hcc)  Malignant neoplasm metastatic to lung, unspecified laterality (hc • hydrochlorothiazide 12.5 MG Oral Tab Take 12.5 mg by mouth daily. • Naloxone HCl 4 MG/0.1ML Nasal Liquid 4 mg by Nasal route as needed. If patient remains unresponsive, repeat dose in other nostril 2-5 minutes after first dose.  1 kit 0   • Albuterol • atorvastatin 20 MG Oral Tab Take 20 mg by mouth nightly. • finasteride 5 MG Oral Tab Take 5 mg by mouth daily. • metoprolol Tartrate 25 MG Oral Tab Take 25 mg by mouth 2 (two) times daily.        • Terazosin HCl 5 MG Oral Cap Take 5 mg by mout Malignant neoplasm metastatic to lung, unspecified laterality (hcc)  Malignant pleural effusion  Cancer related pain  Chemotherapy management, encounter for    Conner Peck a 66year old male with a diagnosis of metastatic adenocarcinoma of the lung. --He is having a second opinion at [facility] with Dr Woody Mcdonald tomorrow. Discussed with the patient and his wife that would appreciate her feedback as to first-line therapy for MET mutation with either the cabmatinib versus the crizotinib.     S/p 1 cycle palliati HGB 10.6 (L) 13.0 - 17.5 g/dL    HCT 31.9 (L) 39.0 - 53.0 %    MCV 85.3 80.0 - 100.0 fL    MCH 28.3 26.0 - 34.0 pg    MCHC 33.2 31.0 - 37.0 g/dL    RDW-SD 42.8 35.1 - 46.3 fL    RDW 13.9 11.0 - 15.0 %    .0 150.0 - 450.0 10(3)uL    Neutrophil Absol HEAD/NECK:    There is focally diminished activity in the right parietal region, and there is corresponding surrounding vasogenic edema in this region, compatible with metastatic disease. Multiple prominent cervical lymph nodes are noted.  A r Aortic annular and valvular calcifications are observed. Hypodensity of the intracardiac blood pool relative to the myocardium may relate to underlying anemia. Atherosclerotic vascular calcifications are present in the coronary vessels.  Trace Metastatic foci in the proximal humeri have SUV max of 8.9 on the left and 8.1 on the right. Metastatic lesions of the ribs are present.  An infiltrative lesion of the right lateral 6th rib demonstrates associated pathologic fracture and has a 5. Extensive osseous metastatic disease is seen throughout the axial and appendicular skeleton. Notably, there are metastatic lesions involving the bilateral acetabula and proximal femora. These could predispose to pathologic fractures.      6. Bilateral ce M.S. Controls performed as expected.    INTERPRETIVE INFORMATION: MET Gene Amplification, FISH     Fluorescence in situ hybridization (FISH) analysis for MET gene   amplification was performed on a section from a paraffin-embedded   tissue block using [facility]. See Compliance Statement A: [employer]   Scoring Method Manual    Comment: Performed by [place] [address], [address] [facility] [phone]   www. Lee Yousif MD, Lab.  Director   Total Cell Count 40    Resulting is determined by pyrosequencing. Limitations:  Mutations in other locations within the EGFR gene or   in other genes will not be detected. Limit of Detection:  10 percent mutant alleles.        Clinical Disclaimer: Results of this test must alway ROS1-rearranged carcinomas, except for rare instances of cryptic   rearrangements. Assay range and limit of detection were generated   using normal and known positive cases respectively.      ROS1 rearrangement occurs in approximately 1 percent of non-small evaluated tumor cells is considered a positive result.  Based on   the assay performance during test validation, the test is expected   to detect 100 percent of ALK rearrangements in patients with   ALK-rearranged carcinomas, except for rare instances of cr is probably sporadic and not associated with Del Angel syndrome   (HNPCC). However, if a BRAF mutation is not detected, the tumor   may either be sporadic or Del Angel syndrome associated.       Detection of BRAF mutations may also be useful in determining   patie differentially labeled fluorescent probes targeting the upstream   (5') and downstream (3') flanking regions of the RET gene ([employer]).  Cells were evaluated from regions of tumor identified   on histopathologic review of a matching hematoxylin- PD-L1 22C3 IHC W/TUMOR PROPORTION SCORE (TPS) INTERPRETATION, [facility])  Order: [id number]  Collected:  9/12/2020 17:05 Status:  Final result Dx:  Primary cancer of left upper lobe of . ..   Component 9/12/20 170   PD-L1 Client Block ID [id number] metastatic nonsquamous NSCLC, with no EGFR or ALK genomic tumor   aberrations.      Pembrolizumab is approved in combination with carboplatin and   either paclitaxel or paclitaxel protein-bound, as first-line   treatment of patients with metastatic squamous

## 2020-10-27 ENCOUNTER — OFFICE VISIT (OUTPATIENT)
Dept: HEMATOLOGY/ONCOLOGY | Facility: HOSPITAL | Age: 75
End: 2020-10-27
Attending: NURSE PRACTITIONER
Payer: MEDICARE

## 2020-10-27 ENCOUNTER — TELEPHONE (OUTPATIENT)
Dept: HEMATOLOGY/ONCOLOGY | Facility: HOSPITAL | Age: 75
End: 2020-10-27

## 2020-10-27 VITALS
DIASTOLIC BLOOD PRESSURE: 58 MMHG | HEART RATE: 77 BPM | TEMPERATURE: 97 F | RESPIRATION RATE: 16 BRPM | SYSTOLIC BLOOD PRESSURE: 127 MMHG

## 2020-10-27 DIAGNOSIS — C34.12 PRIMARY CANCER OF LEFT UPPER LOBE OF LUNG (HCC): ICD-10-CM

## 2020-10-27 DIAGNOSIS — Z51.81 MEDICATION MONITORING ENCOUNTER: Primary | ICD-10-CM

## 2020-10-27 PROBLEM — Z45.2 ENCOUNTER FOR CENTRAL LINE CARE: Status: ACTIVE | Noted: 2020-10-27

## 2020-10-27 PROCEDURE — 96367 TX/PROPH/DG ADDL SEQ IV INF: CPT

## 2020-10-27 PROCEDURE — 96411 CHEMO IV PUSH ADDL DRUG: CPT

## 2020-10-27 PROCEDURE — 96372 THER/PROPH/DIAG INJ SC/IM: CPT

## 2020-10-27 PROCEDURE — 96413 CHEMO IV INFUSION 1 HR: CPT

## 2020-10-27 PROCEDURE — 96417 CHEMO IV INFUS EACH ADDL SEQ: CPT

## 2020-10-27 PROCEDURE — 96375 TX/PRO/DX INJ NEW DRUG ADDON: CPT

## 2020-10-27 NOTE — PROGRESS NOTES
Pt here for C2 keytruda, alimta, carboplatin + ziextenzo       arrives Ambulating independently, using walker   accompanied by Self           Patient reports possible pregnancy since last therapy cycle: Not Applicable    Modifications in dose or schedule: by this nurse.  Ordered every 4 weeks last given on 10/6

## 2020-10-27 NOTE — TELEPHONE ENCOUNTER
Returned phone call. Per wife pt has prechemo visit on 12/7 and chemo not scheduled till 12/15. Informed would send message to scheduling to correct dates.

## 2020-10-27 NOTE — TELEPHONE ENCOUNTER
Polly called saying she thinks the dates for her 's chemos are wrong because the dates are too far apart.

## 2020-10-30 NOTE — TELEPHONE ENCOUNTER
Wife is aware that letter for [employer] was faxed. Reassured her that Dr. Sulema Asencio can address pt's CPAP needs. Instructed her to contact us when the need arises. She voiced understanding & was agreeable.

## 2020-11-02 ENCOUNTER — TELEPHONE (OUTPATIENT)
Dept: INTERNAL MEDICINE CLINIC | Facility: CLINIC | Age: 75
End: 2020-11-02

## 2020-11-02 NOTE — TELEPHONE ENCOUNTER
Kwame Fernandez RN from [place] [address] wants to re-certify patient for another episode of home health to manage respiratory issues. Kwame Fernandez states her phone # has a confidential voicemail for a verbal order.

## 2020-11-03 ENCOUNTER — NURSE ONLY (OUTPATIENT)
Dept: HEMATOLOGY/ONCOLOGY | Facility: HOSPITAL | Age: 75
End: 2020-11-03
Attending: INTERNAL MEDICINE
Payer: MEDICARE

## 2020-11-03 VITALS
RESPIRATION RATE: 16 BRPM | HEART RATE: 82 BPM | DIASTOLIC BLOOD PRESSURE: 68 MMHG | TEMPERATURE: 98 F | SYSTOLIC BLOOD PRESSURE: 108 MMHG

## 2020-11-03 DIAGNOSIS — C79.51 BONE METASTASIS (HCC): ICD-10-CM

## 2020-11-03 DIAGNOSIS — Z45.2 ENCOUNTER FOR CENTRAL LINE CARE: Primary | ICD-10-CM

## 2020-11-03 DIAGNOSIS — Z51.81 MEDICATION MONITORING ENCOUNTER: ICD-10-CM

## 2020-11-03 PROCEDURE — 96372 THER/PROPH/DIAG INJ SC/IM: CPT

## 2020-11-03 NOTE — TELEPHONE ENCOUNTER
Left detailed message to CINTIA BOURNE that Dr. Carmina Carvalho is okay to re certify home health for respiratory issues. Gave RN Triage number for further questions.

## 2020-11-03 NOTE — PROGRESS NOTES
Xgeva given in right lower abdomen. Patient denies having any dental procedures, does not have any dental procedures scheduled. Ca level from 10/26 9.0  Discharged back to lobby per wheelchair. [place] will call wife to .

## 2020-11-05 RX ORDER — ALPRAZOLAM 0.5 MG/1
TABLET ORAL
Qty: 60 TABLET | Refills: 0 | Status: SHIPPED | OUTPATIENT
Start: 2020-11-05 | End: 2020-12-01

## 2020-11-16 ENCOUNTER — OFFICE VISIT (OUTPATIENT)
Dept: HEMATOLOGY/ONCOLOGY | Facility: HOSPITAL | Age: 75
End: 2020-11-16
Attending: NURSE PRACTITIONER
Payer: MEDICARE

## 2020-11-16 ENCOUNTER — NURSE ONLY (OUTPATIENT)
Dept: HEMATOLOGY/ONCOLOGY | Facility: HOSPITAL | Age: 75
End: 2020-11-16
Attending: PHYSICIAN ASSISTANT
Payer: MEDICARE

## 2020-11-16 VITALS
TEMPERATURE: 97 F | DIASTOLIC BLOOD PRESSURE: 83 MMHG | OXYGEN SATURATION: 97 % | WEIGHT: 198 LBS | HEART RATE: 78 BPM | RESPIRATION RATE: 16 BRPM | SYSTOLIC BLOOD PRESSURE: 100 MMHG | HEIGHT: 69 IN | BODY MASS INDEX: 29.33 KG/M2

## 2020-11-16 DIAGNOSIS — R79.89 ABNORMAL TSH: Primary | ICD-10-CM

## 2020-11-16 DIAGNOSIS — I31.3 MALIGNANT PERICARDIAL EFFUSION (HCC): ICD-10-CM

## 2020-11-16 DIAGNOSIS — R53.0 NEOPLASTIC MALIGNANT RELATED FATIGUE: ICD-10-CM

## 2020-11-16 DIAGNOSIS — C79.51 BONE METASTASIS (HCC): ICD-10-CM

## 2020-11-16 DIAGNOSIS — D64.9 ANEMIA, UNSPECIFIED TYPE: ICD-10-CM

## 2020-11-16 DIAGNOSIS — G89.3 CANCER RELATED PAIN: ICD-10-CM

## 2020-11-16 DIAGNOSIS — J91.0 MALIGNANT PLEURAL EFFUSION: ICD-10-CM

## 2020-11-16 DIAGNOSIS — Z51.12 ENCOUNTER FOR ANTINEOPLASTIC IMMUNOTHERAPY: ICD-10-CM

## 2020-11-16 DIAGNOSIS — R79.89 ABNORMAL TSH: ICD-10-CM

## 2020-11-16 DIAGNOSIS — C79.31 BRAIN METASTASIS (HCC): ICD-10-CM

## 2020-11-16 DIAGNOSIS — C34.12 PRIMARY CANCER OF LEFT UPPER LOBE OF LUNG (HCC): Primary | ICD-10-CM

## 2020-11-16 DIAGNOSIS — C80.1 MALIGNANT PERICARDIAL EFFUSION (HCC): ICD-10-CM

## 2020-11-16 DIAGNOSIS — Z51.11 CHEMOTHERAPY MANAGEMENT, ENCOUNTER FOR: ICD-10-CM

## 2020-11-16 DIAGNOSIS — Z45.2 ENCOUNTER FOR CENTRAL LINE CARE: Primary | ICD-10-CM

## 2020-11-16 DIAGNOSIS — C34.12 PRIMARY CANCER OF LEFT UPPER LOBE OF LUNG (HCC): ICD-10-CM

## 2020-11-16 DIAGNOSIS — Z51.81 MEDICATION MONITORING ENCOUNTER: ICD-10-CM

## 2020-11-16 PROCEDURE — 84443 ASSAY THYROID STIM HORMONE: CPT

## 2020-11-16 PROCEDURE — 84481 FREE ASSAY (FT-3): CPT

## 2020-11-16 PROCEDURE — 84439 ASSAY OF FREE THYROXINE: CPT

## 2020-11-16 PROCEDURE — 83540 ASSAY OF IRON: CPT

## 2020-11-16 PROCEDURE — 99215 OFFICE O/P EST HI 40 MIN: CPT | Performed by: PHYSICIAN ASSISTANT

## 2020-11-16 PROCEDURE — 82746 ASSAY OF FOLIC ACID SERUM: CPT

## 2020-11-16 PROCEDURE — 85025 COMPLETE CBC W/AUTO DIFF WBC: CPT

## 2020-11-16 PROCEDURE — 80053 COMPREHEN METABOLIC PANEL: CPT

## 2020-11-16 PROCEDURE — 82607 VITAMIN B-12: CPT

## 2020-11-16 PROCEDURE — 36415 COLL VENOUS BLD VENIPUNCTURE: CPT

## 2020-11-16 PROCEDURE — 84466 ASSAY OF TRANSFERRIN: CPT

## 2020-11-16 RX ORDER — MORPHINE SULFATE 15 MG/1
15 TABLET, FILM COATED, EXTENDED RELEASE ORAL EVERY 12 HOURS SCHEDULED
Qty: 60 TABLET | Refills: 0 | Status: CANCELLED | OUTPATIENT
Start: 2020-11-16 | End: 2020-12-16

## 2020-11-16 RX ORDER — MORPHINE SULFATE 15 MG/1
15 TABLET, FILM COATED, EXTENDED RELEASE ORAL EVERY 12 HOURS SCHEDULED
Qty: 60 TABLET | Refills: 0 | Status: SHIPPED | OUTPATIENT
Start: 2020-11-16 | End: 2020-12-11

## 2020-11-16 RX ORDER — LEVOTHYROXINE SODIUM 0.05 MG/1
50 TABLET ORAL
Qty: 30 TABLET | Refills: 1 | Status: SHIPPED | OUTPATIENT
Start: 2020-11-16 | End: 2020-12-11

## 2020-11-16 NOTE — TELEPHONE ENCOUNTER
Patient's wife requesting a refill. HYDROcodone-acetaminophen (NORCO)  MG Oral Tab 120 tablet 0 10/17/2020 11/16/2020   Sig:   Take 1 tablet by mouth every 6 (six) hours as needed for Pain.      Route:   Oral     Note to Pharmacy:   Chronic pain 2n

## 2020-11-16 NOTE — PATIENT INSTRUCTIONS
1. Proceed with cycle 3    2. Schedule CT head by calling [phone]    1. Schedule follow-up with Dr. Kavita Fulton 2-3 days after CT head complete    4. Call as needed    5. Follow-up in 3 weeks before cycle 4.   Will repeat imaging after cycle 4 to assess re

## 2020-11-16 NOTE — PROGRESS NOTES
JOANNE Collins is a 66year old male here for f/u of Primary cancer of left upper lobe of lung (hcc)  (primary encounter diagnosis)  Brain metastasis (hcc)  Bone metastasis (hcc)  Malignant pericardial effusion (hcc)  Malignant pleural effusion • Potassium Chloride ER 20 MEQ Oral Tab CR Take 20 mEq by mouth 2 (two) times daily. 60 tablet 3   • morphINE Sulfate ER 15 MG Oral Tab CR Take 1 tablet (15 mg total) by mouth every 12 (twelve) hours.  60 tablet 0   • HYDROcodone-acetaminophen (NORCO) 10-32 • Loperamide HCl 2 MG Oral Cap Take 1 capsule (2 mg total) by mouth 4 (four) times daily as needed for Diarrhea. 20 capsule 0   • AmLODIPine Besylate 5 MG Oral Tab Take 5 mg by mouth daily. • atorvastatin 20 MG Oral Tab Take 20 mg by mouth nightly. Neck: No Supple, normal AROM  Chest: Non-labored breathing, clear to auscultation. Heart: Regular rate and rhythm, + 3/6 murmur.   Abdomen- soft, non-tender, non-distended, normal BS  BLE:  No edema        ASSESSMENT/PLAN:   Primary cancer of left upper lo --Discussed that given positive MET mutation, per revised NCCN guidelines from August 2020 the preferred agent to initiate therapy is capmatinib.   Written information was discussed and provided to the patient and wife and discussed that the main toxicity t Collection Time: 11/16/20  9:45 AM   Result Value Ref Range    Glucose 86 70 - 99 mg/dL    Sodium 140 136 - 145 mmol/L    Potassium 3.8 3.5 - 5.1 mmol/L    Chloride 108 98 - 112 mmol/L    CO2 27.0 21.0 - 32.0 mmol/L    Anion Gap 5 0 - 18 mmol/L    BUN 19 PROCEDURE:  PET/CT STANDARD BODY SCAN (YRI=[id number])     COMPARISON: [facility], CT BRAIN (W+WO) (QNB=[id number]), 9/15/2020, 7:04 AM.  [facility], CT CHEST PE AORTA (IV ONLY) (CPT=[id number]), 9/11/2020, 4:00 PM.     INDICATIONS:  Initi LUNGS/PLEURA:           Background parenchymal findings of mild upper lobe predominant paraseptal emphysema are noted. A dominant spiculated mass in the left upper lobe measures up to 3.8 x 7.4 cm (series 2, image 152).  This is markedly hypermetabolic with ABDOMEN/PELVIS:       Focal hypermetabolic activity is perceived in the spleen, with SUV of 5.6, without discernible underlying lesion visible on CT images.                There is an indeterminate central mesenteric mass demonstrating irregular coarse calc A lesion of the left iliac wing has an SUV max of 11.6. Sacral metastases have SUV max of 12.6-13.6. An infiltrative superomedial right acetabular lesion has an SUV max of 12.4.  A lesion of the left medial wall of the left acetabulum has SUV m 10. Nonspecific diffuse hypermetabolic activity throughout the thyroid gland, without focally discernible localized activity. Correlation with thyroid function testing is suggested. 11. Ascending thoracic aortic dilatation.      12. Dilatation of the ma amplification is defined as either a MET/CEP7 ratio of 2.0 or   greater or an average MET gene copy number per cell of 6.0 or   greater.  Based on the assay performance during test validation,   the test is expected to detect MET amplification status in 100 Adequacy: Satisfactory for evaluation  General Category: Positive for malignancy  Diagnosis:  · Cytomorphologic features diagnostic of adenocarcinoma   · See comment      Non Gyne Interpretation    MalignantAbnormal                        Collected:  9/12/ Specimen Collected: 09/12/20 17:05 Last Resulted: 09/22/20 10:35              Collected:  9/12/2020 17:05 Status:  Final result Dx:  Primary cancer of left upper lobe of . ..   Component 9/12/20 1700   ROS1 FISH Reference Number [id number]    ROS1 FISH Sour Scoring Method Manual    Comment: Performed by Morro Johnson , [address] [phone]   www. Mckayla Martell MD, Lab.  Director   Resulting Agency [facility]         Specimen Collected: 09/12/20 17:05 Last Resulted: 09/21/20 Test developed and characteristics determined by [facility].  See Compliance Statement A: [employer]    ALK FISH Reference Number [id number]    ALK Gene FISH Cell Count 100    Scoring Method Manual    Comment: Performed by Rosio Rodriguez DNA is isolated from microdissected tumor tissue and amplified for   exon 15 of the BRAF gene. Mutation status is determined by   pyrosequencing. Limitations: Mutations in other locations within the BRAF gene or   in other genes will not be detected. RET-rearranged carcinomas, except for rare instances of cryptic   rearrangements. Assay range and limit of detection were generated   using normal and known positive cases respectively.      RET rearrangements occur in approximately 1-2 percent of lung   ad determined using the Tumor Proportion Score (TPS), which is the   percentage of viable tumor cells showing partial or complete   membrane staining at any intensity.      Pembrolizumab is approved as a single agent for the first-line   treatment of patients qualify for treatment with pembrolizumab [facility]). This test is validated and FDA-approved for NSCLC specimens only. For all other specimen types, results should be interpreted with   caution and within the appropriate clinical context.  The use of

## 2020-11-17 ENCOUNTER — OFFICE VISIT (OUTPATIENT)
Dept: HEMATOLOGY/ONCOLOGY | Facility: HOSPITAL | Age: 75
End: 2020-11-17
Attending: INTERNAL MEDICINE
Payer: MEDICARE

## 2020-11-17 ENCOUNTER — APPOINTMENT (OUTPATIENT)
Dept: HEMATOLOGY/ONCOLOGY | Facility: HOSPITAL | Age: 75
End: 2020-11-17
Payer: MEDICARE

## 2020-11-17 VITALS
OXYGEN SATURATION: 99 % | TEMPERATURE: 99 F | SYSTOLIC BLOOD PRESSURE: 113 MMHG | HEART RATE: 73 BPM | RESPIRATION RATE: 16 BRPM | DIASTOLIC BLOOD PRESSURE: 41 MMHG

## 2020-11-17 DIAGNOSIS — Z45.2 ENCOUNTER FOR CENTRAL LINE CARE: Primary | ICD-10-CM

## 2020-11-17 DIAGNOSIS — Z51.81 MEDICATION MONITORING ENCOUNTER: ICD-10-CM

## 2020-11-17 DIAGNOSIS — C34.12 PRIMARY CANCER OF LEFT UPPER LOBE OF LUNG (HCC): ICD-10-CM

## 2020-11-17 PROCEDURE — 96413 CHEMO IV INFUSION 1 HR: CPT

## 2020-11-17 PROCEDURE — 96372 THER/PROPH/DIAG INJ SC/IM: CPT

## 2020-11-17 PROCEDURE — 96417 CHEMO IV INFUS EACH ADDL SEQ: CPT

## 2020-11-17 PROCEDURE — 96411 CHEMO IV PUSH ADDL DRUG: CPT

## 2020-11-17 PROCEDURE — 96367 TX/PROPH/DG ADDL SEQ IV INF: CPT

## 2020-11-17 PROCEDURE — 96375 TX/PRO/DX INJ NEW DRUG ADDON: CPT

## 2020-11-17 RX ORDER — HYDROCODONE BITARTRATE AND ACETAMINOPHEN 10; 325 MG/1; MG/1
1 TABLET ORAL EVERY 6 HOURS PRN
Qty: 120 TABLET | Refills: 0 | Status: SHIPPED | OUTPATIENT
Start: 2020-11-17 | End: 2020-12-17

## 2020-11-17 NOTE — PROGRESS NOTES
Pt here for C keytruda, alimta, carboplatin + ziextenzo       arrives Ambulating independently, using walker   accompanied by Self           Patient reports possible pregnancy since last therapy cycle: Not Applicable    Modifications in dose or schedule: N

## 2020-11-23 ENCOUNTER — TELEPHONE (OUTPATIENT)
Dept: HEMATOLOGY/ONCOLOGY | Facility: HOSPITAL | Age: 75
End: 2020-11-23

## 2020-11-23 ENCOUNTER — OFFICE VISIT (OUTPATIENT)
Dept: HEMATOLOGY/ONCOLOGY | Facility: HOSPITAL | Age: 75
End: 2020-11-23
Attending: INTERNAL MEDICINE
Payer: MEDICARE

## 2020-11-23 ENCOUNTER — TELEPHONE (OUTPATIENT)
Dept: INTERNAL MEDICINE CLINIC | Facility: CLINIC | Age: 75
End: 2020-11-23

## 2020-11-23 VITALS
HEIGHT: 69 IN | TEMPERATURE: 99 F | SYSTOLIC BLOOD PRESSURE: 100 MMHG | DIASTOLIC BLOOD PRESSURE: 51 MMHG | OXYGEN SATURATION: 100 % | BODY MASS INDEX: 29.27 KG/M2 | WEIGHT: 197.63 LBS | RESPIRATION RATE: 18 BRPM | HEART RATE: 82 BPM

## 2020-11-23 DIAGNOSIS — K20.80 ESOPHAGITIS DUE TO DRUG: ICD-10-CM

## 2020-11-23 DIAGNOSIS — C34.12 PRIMARY CANCER OF LEFT UPPER LOBE OF LUNG (HCC): ICD-10-CM

## 2020-11-23 DIAGNOSIS — R13.19 OTHER DYSPHAGIA: ICD-10-CM

## 2020-11-23 DIAGNOSIS — C79.31 BRAIN METASTASIS (HCC): Primary | ICD-10-CM

## 2020-11-23 DIAGNOSIS — T36.4X5A ESOPHAGITIS DUE TO DRUG: ICD-10-CM

## 2020-11-23 DIAGNOSIS — C79.51 BONE METASTASIS (HCC): ICD-10-CM

## 2020-11-23 DIAGNOSIS — Z51.11 CHEMOTHERAPY MANAGEMENT, ENCOUNTER FOR: ICD-10-CM

## 2020-11-23 PROCEDURE — 99214 OFFICE O/P EST MOD 30 MIN: CPT | Performed by: NURSE PRACTITIONER

## 2020-11-23 NOTE — PROGRESS NOTES
JOANNE Castelan is a 66year old male here for f/u of Brain metastasis (hcc)  (primary encounter diagnosis)  Bone metastasis (hcc)  Primary cancer of left upper lobe of lung (hcc)  Other dysphagia    Had SBRT to solitary brain metastasis complete Negative except acute care visit HPI as above. Current Outpatient Medications   Medication Sig Dispense Refill   • maalox/diphenhydramine/lidocaine viscous Oral Suspension Take 5 mL by mouth 4 (four) times daily before meals and nightly.  480 mL 2 • Fluticasone-Umeclidin-Vilant (TRELEGY ELLIPTA) 100-62.5-25 MCG/INH Inhalation Aerosol Powder, Breath Activated Inhale 1 puff into the lungs daily. 1 each 6   • aspirin 81 MG Oral Chew Tab Chew 1 tablet (81 mg total) by mouth daily.  30 tablet 2   • cycloS Physical Exam   General: Patient is alert, no distress, presents with walker (last visit was w/c). HEENT: EOMs intact. PERRLA. Oral cavity clear. Posterior pharynx with mild erythematous injection.    Neck: No Supple, normal AROM  Chest: Non-labored breat --Discussed that given positive MET mutation, per revised NCCN guidelines from August 2020 the preferred agent to initiate therapy is capmatinib.   Written information was discussed and provided to the patient and wife and discussed that the main toxicity t --Discussed that likely in his kimberley when chemo toxicities may be heightened. Encouraged supportive care. --START stomatitis cocktail QID. Prn norco for any increased pain relief. Discussed aspiration precautions.   --Small frequent meals/snacks and supple Multiple prominent cervical lymph nodes are noted. A right-sided level II lymph node measuring 1.4 x 2.0 cm (series 2, image 200) has SUV max of 6.4. A reference left-sided abdominal II lymph node measuring 1.1 x 1.5 cm has SUV max of 6.6. Aortic annular and valvular calcifications are observed. Hypodensity of the intracardiac blood pool relative to the myocardium may relate to underlying anemia. Atherosclerotic vascular calcifications are present in the coronary vessels.  Trace Metastatic foci in the proximal humeri have SUV max of 8.9 on the left and 8.1 on the right. Metastatic lesions of the ribs are present.  An infiltrative lesion of the right lateral 6th rib demonstrates associated pathologic fracture and has a 5. Extensive osseous metastatic disease is seen throughout the axial and appendicular skeleton. Notably, there are metastatic lesions involving the bilateral acetabula and proximal femora. These could predispose to pathologic fractures.      6. Bilateral ce M.S. Controls performed as expected.    INTERPRETIVE INFORMATION: MET Gene Amplification, FISH     Fluorescence in situ hybridization (FISH) analysis for MET gene   amplification was performed on a section from a paraffin-embedded   tissue block using diffe Laboratories. See Compliance Statement A: [employer]/CS   Scoring Method Manual    Comment: Performed by [facility] [place] [address], [address] [facility] [phone]   www. Evan Mahoney MD, Lab.  Director   Total Cell Count 40    Resulting is determined by pyrosequencing. Limitations:  Mutations in other locations within the EGFR gene or   in other genes will not be detected. Limit of Detection:  10 percent mutant alleles.        Clinical Disclaimer: Results of this test must alway ROS1-rearranged carcinomas, except for rare instances of cryptic   rearrangements. Assay range and limit of detection were generated   using normal and known positive cases respectively.      ROS1 rearrangement occurs in approximately 1 percent of non-small evaluated tumor cells is considered a positive result.  Based on   the assay performance during test validation, the test is expected   to detect 100 percent of ALK rearrangements in patients with   ALK-rearranged carcinomas, except for rare instances of cr is probably sporadic and not associated with Del Angel syndrome   (HNPCC). However, if a BRAF mutation is not detected, the tumor   may either be sporadic or Del Angel syndrome associated.       Detection of BRAF mutations may also be useful in determining   patie differentially labeled fluorescent probes targeting the upstream   (5') and downstream (3') flanking regions of the RET gene ([place]).  Cells were evaluated from regions of tumor identified   on histopathologic review of a matching hematoxylin- PD-L1 22C3 IHC W/TUMOR PROPORTION SCORE (TPS) INTERPRETATION, Shreya Erickson [facility])  Order: [id number]  Collected:  9/12/2020 17:05 Status:  Final result Dx:  Primary cancer of left upper lobe of . ..   Component 9/12/20 1708   PD-L1 Client Block ID [id number] metastatic nonsquamous NSCLC, with no EGFR or ALK genomic tumor   aberrations.      Pembrolizumab is approved in combination with carboplatin and   either paclitaxel or paclitaxel protein-bound, as first-line   treatment of patients with metastatic squamous

## 2020-11-23 NOTE — PATIENT INSTRUCTIONS
Small frequent proteins snacks/meals and nutritional supplements (premiere supplements)--~every 3 hours. Continue good oral hydration.     For inflammation to throat/esophagus: magic mouthwash/GI cocktail (Equal parts liquid Benadryl, liquid viscious lidoca

## 2020-11-23 NOTE — TELEPHONE ENCOUNTER
Liv Lopez from [facility] orders for patient will be sent. Also requesting order for speech therapy evaluation due to horse voice and food getting stuck in throat. Confidential voicemail.  Ok to leave message

## 2020-11-23 NOTE — TELEPHONE ENCOUNTER
Lung Cancer - 11/17/20 - C3D1 Carbo/Keytruda/Alimta/Ziextenzo    [facility] feels as if something in throat making swallowingeating difficult. Voice hoarse, no fevers, no know Covid exposure. No other complaints per patient at this time.   Requesting to be see

## 2020-11-30 ENCOUNTER — TELEPHONE (OUTPATIENT)
Dept: INTERNAL MEDICINE CLINIC | Facility: CLINIC | Age: 75
End: 2020-11-30

## 2020-11-30 ENCOUNTER — MED REC SCAN ONLY (OUTPATIENT)
Dept: INTERNAL MEDICINE CLINIC | Facility: CLINIC | Age: 75
End: 2020-11-30

## 2020-11-30 NOTE — TELEPHONE ENCOUNTER
Carolee from  is following up on an order that was faxed. Please sign and faxed back.      Order Numbers-  [id number]      [phone] fax

## 2020-12-01 ENCOUNTER — NURSE ONLY (OUTPATIENT)
Dept: HEMATOLOGY/ONCOLOGY | Facility: HOSPITAL | Age: 75
End: 2020-12-01
Attending: INTERNAL MEDICINE
Payer: MEDICARE

## 2020-12-01 VITALS
SYSTOLIC BLOOD PRESSURE: 130 MMHG | HEART RATE: 95 BPM | OXYGEN SATURATION: 98 % | DIASTOLIC BLOOD PRESSURE: 62 MMHG | RESPIRATION RATE: 16 BRPM | TEMPERATURE: 99 F

## 2020-12-01 DIAGNOSIS — C79.51 BONE METASTASIS (HCC): ICD-10-CM

## 2020-12-01 DIAGNOSIS — Z45.2 ENCOUNTER FOR CENTRAL LINE CARE: Primary | ICD-10-CM

## 2020-12-01 DIAGNOSIS — Z51.81 MEDICATION MONITORING ENCOUNTER: ICD-10-CM

## 2020-12-01 PROCEDURE — 96372 THER/PROPH/DIAG INJ SC/IM: CPT

## 2020-12-01 RX ORDER — ALPRAZOLAM 0.5 MG/1
0.5 TABLET ORAL 2 TIMES DAILY PRN
Qty: 60 TABLET | Refills: 0 | Status: SHIPPED | OUTPATIENT
Start: 2020-12-01 | End: 2020-12-27

## 2020-12-01 NOTE — PROGRESS NOTES
Xgeva given in right lower abdomen. Patient denies having any dental procedures, does not have any dental procedures scheduled. Ca level from 11/16 8.9  Discharged back to [facility], ambulatory with rollator walker, wife waiting in [facility].

## 2020-12-07 ENCOUNTER — TELEPHONE (OUTPATIENT)
Dept: INTERNAL MEDICINE CLINIC | Facility: CLINIC | Age: 75
End: 2020-12-07

## 2020-12-07 ENCOUNTER — OFFICE VISIT (OUTPATIENT)
Dept: HEMATOLOGY/ONCOLOGY | Facility: HOSPITAL | Age: 75
End: 2020-12-07
Attending: INTERNAL MEDICINE
Payer: MEDICARE

## 2020-12-07 VITALS
OXYGEN SATURATION: 97 % | HEIGHT: 69 IN | RESPIRATION RATE: 18 BRPM | SYSTOLIC BLOOD PRESSURE: 145 MMHG | HEART RATE: 83 BPM | BODY MASS INDEX: 29.62 KG/M2 | DIASTOLIC BLOOD PRESSURE: 46 MMHG | WEIGHT: 200 LBS | TEMPERATURE: 98 F

## 2020-12-07 DIAGNOSIS — Z51.81 MEDICATION MONITORING ENCOUNTER: ICD-10-CM

## 2020-12-07 DIAGNOSIS — C34.12 PRIMARY CANCER OF LEFT UPPER LOBE OF LUNG (HCC): Primary | ICD-10-CM

## 2020-12-07 DIAGNOSIS — Z51.11 CHEMOTHERAPY MANAGEMENT, ENCOUNTER FOR: ICD-10-CM

## 2020-12-07 DIAGNOSIS — D64.9 ANEMIA, UNSPECIFIED TYPE: ICD-10-CM

## 2020-12-07 DIAGNOSIS — C79.51 BONE METASTASIS (HCC): ICD-10-CM

## 2020-12-07 DIAGNOSIS — C34.12 PRIMARY CANCER OF LEFT UPPER LOBE OF LUNG (HCC): ICD-10-CM

## 2020-12-07 DIAGNOSIS — C78.00 MALIGNANT NEOPLASM METASTATIC TO LUNG, UNSPECIFIED LATERALITY (HCC): ICD-10-CM

## 2020-12-07 DIAGNOSIS — I31.3 MALIGNANT PERICARDIAL EFFUSION (HCC): ICD-10-CM

## 2020-12-07 DIAGNOSIS — C80.1 MALIGNANT PERICARDIAL EFFUSION (HCC): ICD-10-CM

## 2020-12-07 DIAGNOSIS — Z45.2 ENCOUNTER FOR CENTRAL LINE CARE: Primary | ICD-10-CM

## 2020-12-07 DIAGNOSIS — C79.31 BRAIN METASTASIS (HCC): ICD-10-CM

## 2020-12-07 PROCEDURE — 85025 COMPLETE CBC W/AUTO DIFF WBC: CPT

## 2020-12-07 PROCEDURE — 80053 COMPREHEN METABOLIC PANEL: CPT

## 2020-12-07 PROCEDURE — 36415 COLL VENOUS BLD VENIPUNCTURE: CPT

## 2020-12-07 PROCEDURE — 99215 OFFICE O/P EST HI 40 MIN: CPT | Performed by: INTERNAL MEDICINE

## 2020-12-07 RX ORDER — CYANOCOBALAMIN 1000 UG/ML
1000 INJECTION INTRAMUSCULAR; SUBCUTANEOUS ONCE
Status: CANCELLED | OUTPATIENT
Start: 2020-12-08

## 2020-12-07 NOTE — PROGRESS NOTES
HPI     Billee Guardian is a 66year old male here for f/u of Primary cancer of left upper lobe of lung (hcc)  (primary encounter diagnosis)  Malignant pericardial effusion (hcc)  Malignant neoplasm metastatic to lung, unspecified laterality (hcc)  Brain abdominal pain, diarrhea, nausea and vomiting. Hiccups. Genitourinary: Negative for dysuria and frequency. Musculoskeletal: Positive for back pain (chronic LBP, stable.) and gait problem (uses walker and furniture walks).  Negative for arthralgi as needed for Nausea. 30 tablet 3   • Ondansetron HCl (ZOFRAN) 8 MG tablet Take 1 tablet (8 mg total) by mouth every 8 (eight) hours as needed for Nausea. 30 tablet 3   • folic acid 1 MG Oral Tab Take 1 tablet (1 mg total) by mouth daily.  90 tablet 3   • d SURGICAL HISTORY Left     leg surgery     Social History    Tobacco Use      Smoking status: Former Smoker      Smokeless tobacco: Never Used      Tobacco comment: quit in 2004    Alcohol use: No      Frequency: Never    Drug use: No      Family History have any further symptoms of reaccumulation of fluid. --Patient completed SBRT to solitary brain metastases on 9/25/2020. --Staging CT scan consistent with diffuse disease with metastases and bilateral lungs, and throughout the bones.   He does have les Improved/controlled. MS Contin 15 prescribed by Pain specialist. Requesting refill of MS Contin 15 mg. Patient discontinued MSIR 15 mg. Using one tablet Norco 10/325 mg every 1-3 days. Constipation:  Controlled with daily MiraLAX.     Anemia:  Likely Neutrophil Absolute 5.85 1.50 - 7.70 x10(3) uL    Lymphocyte Absolute 0.78 (L) 1.00 - 4.00 x10(3) uL    Monocyte Absolute 0.66 0.10 - 1.00 x10(3) uL    Eosinophil Absolute 0.04 0.00 - 0.70 x10(3) uL    Basophil Absolute 0.03 0.00 - 0.20 x10(3) uL    Immatu

## 2020-12-07 NOTE — TELEPHONE ENCOUNTER
Carolee-[facility] checking status on the order # [id number] for plan of care for the patient faxed on 11/30 and states will refax order today, 12/7, waiting for the signature and date and have not received from 11/30.     SEE COMMUNICATION from

## 2020-12-08 ENCOUNTER — APPOINTMENT (OUTPATIENT)
Dept: HEMATOLOGY/ONCOLOGY | Facility: HOSPITAL | Age: 75
End: 2020-12-08
Payer: MEDICARE

## 2020-12-08 ENCOUNTER — OFFICE VISIT (OUTPATIENT)
Dept: HEMATOLOGY/ONCOLOGY | Facility: HOSPITAL | Age: 75
End: 2020-12-08
Attending: INTERNAL MEDICINE
Payer: MEDICARE

## 2020-12-08 VITALS
DIASTOLIC BLOOD PRESSURE: 81 MMHG | RESPIRATION RATE: 18 BRPM | TEMPERATURE: 98 F | OXYGEN SATURATION: 97 % | SYSTOLIC BLOOD PRESSURE: 121 MMHG

## 2020-12-08 DIAGNOSIS — D64.9 ANEMIA, UNSPECIFIED TYPE: ICD-10-CM

## 2020-12-08 DIAGNOSIS — C34.12 PRIMARY CANCER OF LEFT UPPER LOBE OF LUNG (HCC): Primary | ICD-10-CM

## 2020-12-08 PROCEDURE — 86850 RBC ANTIBODY SCREEN: CPT

## 2020-12-08 PROCEDURE — 86901 BLOOD TYPING SEROLOGIC RH(D): CPT

## 2020-12-08 PROCEDURE — 96411 CHEMO IV PUSH ADDL DRUG: CPT

## 2020-12-08 PROCEDURE — 96367 TX/PROPH/DG ADDL SEQ IV INF: CPT

## 2020-12-08 PROCEDURE — 86920 COMPATIBILITY TEST SPIN: CPT

## 2020-12-08 PROCEDURE — 86900 BLOOD TYPING SEROLOGIC ABO: CPT

## 2020-12-08 PROCEDURE — 96417 CHEMO IV INFUS EACH ADDL SEQ: CPT

## 2020-12-08 PROCEDURE — 96413 CHEMO IV INFUSION 1 HR: CPT

## 2020-12-08 PROCEDURE — 96372 THER/PROPH/DIAG INJ SC/IM: CPT

## 2020-12-08 RX ORDER — CYANOCOBALAMIN 1000 UG/ML
1000 INJECTION INTRAMUSCULAR; SUBCUTANEOUS ONCE
Status: COMPLETED | OUTPATIENT
Start: 2020-12-08 | End: 2020-12-08

## 2020-12-08 RX ORDER — CYANOCOBALAMIN 1000 UG/ML
INJECTION INTRAMUSCULAR; SUBCUTANEOUS
Status: COMPLETED
Start: 2020-12-08 | End: 2020-12-08

## 2020-12-08 RX ADMIN — CYANOCOBALAMIN 1000 MCG: 1000 INJECTION INTRAMUSCULAR; SUBCUTANEOUS at 10:09:00

## 2020-12-08 NOTE — PROGRESS NOTES
Pt here for C4 Keytruda, Alimta, Carboplatin + Ziextenzo      Arrives Ambulating independently, using walker   accompanied by Self . Pt offers no new complaints today.             Patient reports possible pregnancy since last therapy cycle: Not Applicabl sleep/rest  family support/coping well  free of infection  maintains/gains weight  no active bleeding  nutritional needs met  optimal lab values  O2 saturations are WNL for patient  patient supported/coping well  symptoms relieved/minimized  understands pl

## 2020-12-08 NOTE — TELEPHONE ENCOUNTER
Yes, we did receive orders from [facility], orders were signed and sent 11/30, fax confirmation was successful.   Will send fax again

## 2020-12-09 DIAGNOSIS — C34.12 PRIMARY CANCER OF LEFT UPPER LOBE OF LUNG (HCC): ICD-10-CM

## 2020-12-09 DIAGNOSIS — D64.9 ANEMIA, UNSPECIFIED TYPE: Primary | ICD-10-CM

## 2020-12-09 RX ORDER — CLOPIDOGREL BISULFATE 75 MG/1
TABLET ORAL
Qty: 90 TABLET | Refills: 3 | Status: CANCELLED | OUTPATIENT
Start: 2020-12-09

## 2020-12-10 ENCOUNTER — OFFICE VISIT (OUTPATIENT)
Dept: HEMATOLOGY/ONCOLOGY | Facility: HOSPITAL | Age: 75
End: 2020-12-10
Attending: INTERNAL MEDICINE
Payer: MEDICARE

## 2020-12-10 VITALS
OXYGEN SATURATION: 96 % | TEMPERATURE: 98 F | SYSTOLIC BLOOD PRESSURE: 126 MMHG | DIASTOLIC BLOOD PRESSURE: 45 MMHG | RESPIRATION RATE: 18 BRPM | HEART RATE: 67 BPM

## 2020-12-10 DIAGNOSIS — D64.9 ANEMIA, UNSPECIFIED TYPE: Primary | ICD-10-CM

## 2020-12-10 DIAGNOSIS — C34.12 PRIMARY CANCER OF LEFT UPPER LOBE OF LUNG (HCC): ICD-10-CM

## 2020-12-10 PROCEDURE — 36430 TRANSFUSION BLD/BLD COMPNT: CPT

## 2020-12-10 NOTE — PROGRESS NOTES
Pt arrived for 1 unit of PRBC for HGB 7.7. Blood consent signed. He is hard of heading. PIV started in left FA on 3rd attempt by Raymond Lemus RN. Blood transfusion tolerated, no s/s of reaction noted. Pt tolerated well.  He is aware of future appointments

## 2020-12-11 RX ORDER — LEVOTHYROXINE SODIUM 0.05 MG/1
50 TABLET ORAL
Qty: 30 TABLET | Refills: 1 | Status: SHIPPED | OUTPATIENT
Start: 2020-12-11 | End: 2020-12-28

## 2020-12-11 RX ORDER — MORPHINE SULFATE 15 MG/1
15 TABLET, FILM COATED, EXTENDED RELEASE ORAL EVERY 12 HOURS SCHEDULED
Qty: 60 TABLET | Refills: 0 | Status: CANCELLED | OUTPATIENT
Start: 2020-12-11 | End: 2021-01-10

## 2020-12-11 RX ORDER — LEVOTHYROXINE SODIUM 0.05 MG/1
50 TABLET ORAL
Qty: 30 TABLET | Refills: 1 | Status: CANCELLED | OUTPATIENT
Start: 2020-12-11

## 2020-12-11 RX ORDER — MORPHINE SULFATE 15 MG/1
15 TABLET, FILM COATED, EXTENDED RELEASE ORAL EVERY 12 HOURS SCHEDULED
Qty: 60 TABLET | Refills: 0 | Status: SHIPPED | OUTPATIENT
Start: 2020-12-11 | End: 2021-01-15

## 2020-12-16 ENCOUNTER — TELEPHONE (OUTPATIENT)
Dept: PULMONOLOGY | Facility: CLINIC | Age: 75
End: 2020-12-16

## 2020-12-16 NOTE — TELEPHONE ENCOUNTER
Spouse states the [address] letter that was sent in October needs to be re faxed - they need the Drs signature on it - they are not accepting PA signature - pls have  sign and fax to [phone]

## 2020-12-17 NOTE — TELEPHONE ENCOUNTER
Note printed and placed in [facility] folder for sign off.

## 2020-12-18 ENCOUNTER — TELEPHONE (OUTPATIENT)
Dept: CARDIOLOGY | Age: 75
End: 2020-12-18

## 2020-12-18 RX ORDER — AMLODIPINE BESYLATE 5 MG/1
TABLET ORAL
Qty: 90 TABLET | Refills: 3 | Status: CANCELLED | OUTPATIENT
Start: 2020-12-18

## 2020-12-18 RX ORDER — ATORVASTATIN CALCIUM 20 MG/1
TABLET, FILM COATED ORAL
Qty: 90 TABLET | Refills: 3 | Status: CANCELLED | OUTPATIENT
Start: 2020-12-18

## 2020-12-18 RX ORDER — HYDROCHLOROTHIAZIDE 12.5 MG/1
TABLET ORAL
Qty: 90 TABLET | Refills: 3 | Status: CANCELLED | OUTPATIENT
Start: 2020-12-18

## 2020-12-19 ENCOUNTER — MED REC SCAN ONLY (OUTPATIENT)
Dept: INTERNAL MEDICINE CLINIC | Facility: CLINIC | Age: 75
End: 2020-12-19

## 2020-12-22 ENCOUNTER — HOSPITAL ENCOUNTER (OUTPATIENT)
Dept: NUCLEAR MEDICINE | Facility: HOSPITAL | Age: 75
Discharge: HOME | End: 2020-12-22
Attending: INTERNAL MEDICINE
Payer: MEDICARE

## 2020-12-22 DIAGNOSIS — C79.51 BONE METASTASIS (HCC): ICD-10-CM

## 2020-12-22 DIAGNOSIS — C78.00 MALIGNANT NEOPLASM METASTATIC TO LUNG, UNSPECIFIED LATERALITY (HCC): ICD-10-CM

## 2020-12-22 DIAGNOSIS — C79.31 BRAIN METASTASIS (HCC): ICD-10-CM

## 2020-12-22 DIAGNOSIS — I31.3 MALIGNANT PERICARDIAL EFFUSION (HCC): ICD-10-CM

## 2020-12-22 DIAGNOSIS — C80.1 MALIGNANT PERICARDIAL EFFUSION (HCC): ICD-10-CM

## 2020-12-22 DIAGNOSIS — C34.12 PRIMARY CANCER OF LEFT UPPER LOBE OF LUNG (HCC): ICD-10-CM

## 2020-12-22 PROCEDURE — 82962 GLUCOSE BLOOD TEST: CPT

## 2020-12-22 PROCEDURE — 78815 PET IMAGE W/CT SKULL-THIGH: CPT | Performed by: INTERNAL MEDICINE

## 2020-12-26 ENCOUNTER — HOSPITAL ENCOUNTER (OUTPATIENT)
Dept: CT IMAGING | Facility: HOSPITAL | Age: 75
Discharge: HOME | End: 2020-12-26
Attending: RADIOLOGY
Payer: MEDICARE

## 2020-12-26 DIAGNOSIS — C79.31 BRAIN METASTASES (HCC): ICD-10-CM

## 2020-12-26 PROCEDURE — 70470 CT HEAD/BRAIN W/O & W/DYE: CPT | Performed by: RADIOLOGY

## 2020-12-28 ENCOUNTER — OFFICE VISIT (OUTPATIENT)
Dept: HEMATOLOGY/ONCOLOGY | Facility: HOSPITAL | Age: 75
End: 2020-12-28
Attending: INTERNAL MEDICINE
Payer: MEDICARE

## 2020-12-28 ENCOUNTER — APPOINTMENT (OUTPATIENT)
Dept: RADIATION ONCOLOGY | Facility: HOSPITAL | Age: 75
End: 2020-12-28
Attending: RADIOLOGY
Payer: MEDICARE

## 2020-12-28 VITALS
HEIGHT: 69 IN | TEMPERATURE: 98 F | OXYGEN SATURATION: 94 % | RESPIRATION RATE: 18 BRPM | DIASTOLIC BLOOD PRESSURE: 56 MMHG | WEIGHT: 204 LBS | HEART RATE: 88 BPM | SYSTOLIC BLOOD PRESSURE: 148 MMHG | BODY MASS INDEX: 30.21 KG/M2

## 2020-12-28 DIAGNOSIS — Z51.81 MEDICATION MONITORING ENCOUNTER: ICD-10-CM

## 2020-12-28 DIAGNOSIS — Z29.8 ENCOUNTER FOR IMMUNOTHERAPY: ICD-10-CM

## 2020-12-28 DIAGNOSIS — I31.3 MALIGNANT PERICARDIAL EFFUSION (HCC): ICD-10-CM

## 2020-12-28 DIAGNOSIS — C80.1 MALIGNANT PERICARDIAL EFFUSION (HCC): ICD-10-CM

## 2020-12-28 DIAGNOSIS — Z45.2 ENCOUNTER FOR CENTRAL LINE CARE: Primary | ICD-10-CM

## 2020-12-28 DIAGNOSIS — Z51.11 CHEMOTHERAPY MANAGEMENT, ENCOUNTER FOR: ICD-10-CM

## 2020-12-28 DIAGNOSIS — C34.12 PRIMARY CANCER OF LEFT UPPER LOBE OF LUNG (HCC): Primary | ICD-10-CM

## 2020-12-28 DIAGNOSIS — C34.12 PRIMARY CANCER OF LEFT UPPER LOBE OF LUNG (HCC): ICD-10-CM

## 2020-12-28 DIAGNOSIS — C78.00 MALIGNANT NEOPLASM METASTATIC TO LUNG, UNSPECIFIED LATERALITY (HCC): ICD-10-CM

## 2020-12-28 DIAGNOSIS — C79.51 BONE METASTASIS (HCC): ICD-10-CM

## 2020-12-28 DIAGNOSIS — E03.2 HYPOTHYROIDISM DUE TO MEDICATION: ICD-10-CM

## 2020-12-28 PROBLEM — Z29.89 ENCOUNTER FOR IMMUNOTHERAPY: Status: ACTIVE | Noted: 2020-12-28

## 2020-12-28 PROCEDURE — 99215 OFFICE O/P EST HI 40 MIN: CPT | Performed by: INTERNAL MEDICINE

## 2020-12-28 PROCEDURE — 36415 COLL VENOUS BLD VENIPUNCTURE: CPT

## 2020-12-28 PROCEDURE — 80053 COMPREHEN METABOLIC PANEL: CPT

## 2020-12-28 PROCEDURE — 84443 ASSAY THYROID STIM HORMONE: CPT

## 2020-12-28 PROCEDURE — 85007 BL SMEAR W/DIFF WBC COUNT: CPT

## 2020-12-28 PROCEDURE — 85027 COMPLETE CBC AUTOMATED: CPT

## 2020-12-28 PROCEDURE — 85025 COMPLETE CBC W/AUTO DIFF WBC: CPT

## 2020-12-28 RX ORDER — ALPRAZOLAM 0.5 MG/1
0.5 TABLET ORAL 2 TIMES DAILY PRN
Qty: 60 TABLET | Refills: 0 | Status: SHIPPED | OUTPATIENT
Start: 2020-12-28 | End: 2021-01-25

## 2020-12-28 RX ORDER — LEVOTHYROXINE SODIUM 0.07 MG/1
75 TABLET ORAL
Qty: 30 TABLET | Refills: 1 | Status: SHIPPED | OUTPATIENT
Start: 2020-12-28 | End: 2021-02-16

## 2020-12-28 NOTE — PROGRESS NOTES
JOANNE Morales is a 66year old male here for f/u of Primary cancer of left upper lobe of lung (hcc)  (primary encounter diagnosis)  Bone metastasis (hcc)  Malignant pericardial effusion (hcc)  Malignant neoplasm metastatic to lung, unspecified pain improved.) and leg swelling. Gastrointestinal: Positive for constipation (improved). Negative for abdominal pain, blood in stool, diarrhea, nausea and vomiting. Hiccups. No epigastric pain.    Genitourinary: Negative for dysuria, frequency Nausea. 30 tablet 3   • folic acid 1 MG Oral Tab Take 1 tablet (1 mg total) by mouth daily. 90 tablet 3   • dexamethasone (DECADRON) 4 MG tablet Take 1 tablet (4 mg total) by mouth 2 (two) times daily.  Take 4 mg twice daily the day before chemotherapy, the Tobacco comment: quit in 2004    Alcohol use: No      Frequency: Never    Drug use: No      Family History   Problem Relation Age of Onset   • Cancer Father    • Hypertension Mother    • Hypertension Brother          PHYSICAL EXAM:    /56 (BP Locatio consistent with diffuse disease with metastases and bilateral lungs, and throughout the bones. He does have lesions at the bilateral acetabulum for which plain films will be ordered as patient could be at risk of impending pathologic fractures.   Discussed no abnormalities on the right. I recommend that the patient see Dr. Yahir Zuniga again for direct visualization of the right vocal cord given findings on PET/CT. Discussed this may be activity from compensation of the paralysis of the left.     Proceed with 1. 11 0.70 - 1.30 mg/dL    BUN/CREA Ratio 18.0 10.0 - 20.0    Calcium, Total 9.0 8.5 - 10.1 mg/dL    Calculated Osmolality 288 275 - 295 mOsm/kg    GFR, Non- 64 >=60    GFR, -American 74 >=60    ALT 38 16 - 61 U/L    AST 25 15 - 37 U/ (W+WO) (CPT=70470)     COMPARISON: [facility], PET/CT STANDARD BODY SCAN (NYS=[id number]), 12/22/2020, 9:13 AM.  [facility], CT BRAIN (W+WO) (WSG=[id number]), 9/15/2020, 7:04 AM.     INDICATIONS:  Brain metastases 1month post cyberkni postoperative changes of the lenses bilaterally. OTHER:             Atherosclerotic vascular calcifications are perceived in the carotid siphons and distal vertebral arteries.                =====  CONCLUSION:   1.  Favorable interval treatment response as scanner for   attenuation correction and anatomic localization only. FASTING PATIENT BLOOD GLUCOSE:             75 mg/dL. UPTAKE TIME: 77 minutes. FINDINGS:  REFERENCE VALUES: The SUV max of the mediastinal blood pool is 2.4.  The SUV max of th seen SUV max in this region measuring 4.8, decreased from 7.3. A small residual left pleural effusion is noted with SUV max of 5.7. MEDIASTINUM/ERLIN:    A prevascular space lymph node has resolved.  Precarinal lymphadenopathy and metabolic ac dilatation is seen, measuring at least 3.3 x 3.2 cm in the axial plane. 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 MUSCULOSKELETAL:  Extensive osseous metastatic disease is demonstrated.  Lesions in the proximal and d Cervical lymphadenopathy has decreased, and hypermetabolic activity has resolved. 6. Indeterminate partially calcified mesenteric mass with persistent low-level hypermetabolic activity.  This could reflect metastatic disease or separate tumor, such as c

## 2020-12-29 ENCOUNTER — APPOINTMENT (OUTPATIENT)
Dept: HEMATOLOGY/ONCOLOGY | Facility: HOSPITAL | Age: 75
End: 2020-12-29
Payer: MEDICARE

## 2020-12-29 ENCOUNTER — TELEPHONE (OUTPATIENT)
Dept: HEMATOLOGY/ONCOLOGY | Facility: HOSPITAL | Age: 75
End: 2020-12-29

## 2020-12-29 NOTE — TELEPHONE ENCOUNTER
Nnamdi's wife, January Williamson, called. She would like to speak with Michael Alves regarding his bone injection and if she should bring him in today for it. Please call.

## 2020-12-29 NOTE — TELEPHONE ENCOUNTER
Returned phone call and notified that Jorden Graves will be given on 1/5 same day as tx. Wife verbalizes understanding.

## 2020-12-30 ENCOUNTER — OFFICE VISIT (OUTPATIENT)
Dept: RADIATION ONCOLOGY | Facility: HOSPITAL | Age: 75
End: 2020-12-30
Attending: RADIOLOGY
Payer: MEDICARE

## 2020-12-30 ENCOUNTER — TELEPHONE (OUTPATIENT)
Dept: HEMATOLOGY/ONCOLOGY | Facility: HOSPITAL | Age: 75
End: 2020-12-30

## 2020-12-30 VITALS
OXYGEN SATURATION: 99 % | HEART RATE: 78 BPM | DIASTOLIC BLOOD PRESSURE: 40 MMHG | BODY MASS INDEX: 30.21 KG/M2 | WEIGHT: 204 LBS | SYSTOLIC BLOOD PRESSURE: 110 MMHG | HEIGHT: 69 IN | RESPIRATION RATE: 18 BRPM | TEMPERATURE: 97 F

## 2020-12-30 DIAGNOSIS — C79.31 BRAIN METASTASES (HCC): Primary | ICD-10-CM

## 2020-12-30 PROCEDURE — 99211 OFF/OP EST MAY X REQ PHY/QHP: CPT

## 2020-12-30 NOTE — TELEPHONE ENCOUNTER
Wife presented to  asking to speak to me. Wife called back and informed Dr. Stuart Craig sent PET scan results and LM for Dr. Soham Manzo and waiting for call back. Wife verbalizes understanding.

## 2020-12-30 NOTE — PROGRESS NOTES
Pt seen in follow up with Dr Tara Diaz, having completed CK brain 9/25/20. Accompanied by his wife. 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 Pain very well controlled with current regimen.  Recent CT brain and PET scan available for MD review

## 2020-12-30 NOTE — PROGRESS NOTES
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 3. Intracranial atherosclerosis. 4. Right canal wall up mastoidectomy with a right cochlear implant. Stable chronic subtotal right mastoid tip opacification.   Please note t partially visualized. 15. Lesser incidental findings as above. Remote.    Dictated by (CST): Karli Hayes MD on 12/22/2020 at 11:29 PM     Finalized by (CST): Karli Hayes MD on 12/22/2020 at 11:58 PM            ROS    A 12 Point review of system Inhalation Aerosol Powder, Breath Activated Inhale 1 puff into the lungs daily. 1 each 6   • aspirin 81 MG Oral Chew Tab Chew 1 tablet (81 mg total) by mouth daily.  30 tablet 2   • cycloSPORINE 0.05 % Ophthalmic Emulsion Place into both eyes 2 (two) times 92.1 kg (203 lb)       PHYSICAL EXAM  Blood pressure 110/40, pulse 78, temperature 97.3 °F (36.3 °C), temperature source Oral, resp. rate 18, height 1.753 m (5' 9\"), weight 92.5 kg (204 lb), SpO2 99 %.   PERFORMANCE STATUS 1 , denies PAIN  GENERAL:  Pt is

## 2021-01-04 ENCOUNTER — TELEPHONE (OUTPATIENT)
Dept: HEMATOLOGY/ONCOLOGY | Facility: HOSPITAL | Age: 76
End: 2021-01-04

## 2021-01-04 NOTE — TELEPHONE ENCOUNTER
Wife called and left message that Dr. Shawna Zhao spoke to Dr. Ro Rodriguez and ok to make f/u appointment.

## 2021-01-05 ENCOUNTER — OFFICE VISIT (OUTPATIENT)
Dept: HEMATOLOGY/ONCOLOGY | Facility: HOSPITAL | Age: 76
End: 2021-01-05
Attending: INTERNAL MEDICINE
Payer: MEDICARE

## 2021-01-05 VITALS
RESPIRATION RATE: 18 BRPM | OXYGEN SATURATION: 98 % | SYSTOLIC BLOOD PRESSURE: 114 MMHG | DIASTOLIC BLOOD PRESSURE: 47 MMHG | TEMPERATURE: 98 F

## 2021-01-05 DIAGNOSIS — Z51.81 MEDICATION MONITORING ENCOUNTER: ICD-10-CM

## 2021-01-05 DIAGNOSIS — C34.12 PRIMARY CANCER OF LEFT UPPER LOBE OF LUNG (HCC): ICD-10-CM

## 2021-01-05 DIAGNOSIS — C79.51 BONE METASTASIS (HCC): ICD-10-CM

## 2021-01-05 DIAGNOSIS — Z45.2 ENCOUNTER FOR CENTRAL LINE CARE: Primary | ICD-10-CM

## 2021-01-05 LAB
ALBUMIN SERPL-MCNC: 3.3 G/DL (ref 3.4–5)
ALBUMIN/GLOB SERPL: 0.8 {RATIO} (ref 1–2)
ALP LIVER SERPL-CCNC: 87 U/L
ALT SERPL-CCNC: 22 U/L
ANION GAP SERPL CALC-SCNC: 6 MMOL/L (ref 0–18)
AST SERPL-CCNC: 16 U/L (ref 15–37)
BASOPHILS # BLD AUTO: 0.03 X10(3) UL (ref 0–0.2)
BASOPHILS NFR BLD AUTO: 0.4 %
BILIRUB SERPL-MCNC: 0.3 MG/DL (ref 0.1–2)
BUN BLD-MCNC: 23 MG/DL (ref 7–18)
BUN/CREAT SERPL: 21.5 (ref 10–20)
CALCIUM BLD-MCNC: 9.4 MG/DL (ref 8.5–10.1)
CHLORIDE SERPL-SCNC: 108 MMOL/L (ref 98–112)
CO2 SERPL-SCNC: 24 MMOL/L (ref 21–32)
CREAT BLD-MCNC: 1.07 MG/DL
DEPRECATED RDW RBC AUTO: 100.5 FL (ref 35.1–46.3)
EOSINOPHIL # BLD AUTO: 0 X10(3) UL (ref 0–0.7)
EOSINOPHIL NFR BLD AUTO: 0 %
ERYTHROCYTE [DISTWIDTH] IN BLOOD BY AUTOMATED COUNT: 26.8 % (ref 11–15)
GLOBULIN PLAS-MCNC: 3.9 G/DL (ref 2.8–4.4)
GLUCOSE BLD-MCNC: 106 MG/DL (ref 70–99)
HCT VFR BLD AUTO: 28.5 %
HGB BLD-MCNC: 9 G/DL
IMM GRANULOCYTES # BLD AUTO: 0.04 X10(3) UL (ref 0–1)
IMM GRANULOCYTES NFR BLD: 0.5 %
LYMPHOCYTES # BLD AUTO: 0.39 X10(3) UL (ref 1–4)
LYMPHOCYTES NFR BLD AUTO: 5 %
M PROTEIN MFR SERPL ELPH: 7.2 G/DL (ref 6.4–8.2)
MCH RBC QN AUTO: 33.2 PG (ref 26–34)
MCHC RBC AUTO-ENTMCNC: 31.6 G/DL (ref 31–37)
MCV RBC AUTO: 105.2 FL
MONOCYTES # BLD AUTO: 0.37 X10(3) UL (ref 0.1–1)
MONOCYTES NFR BLD AUTO: 4.7 %
NEUTROPHILS # BLD AUTO: 6.99 X10 (3) UL (ref 1.5–7.7)
NEUTROPHILS # BLD AUTO: 6.99 X10(3) UL (ref 1.5–7.7)
NEUTROPHILS NFR BLD AUTO: 89.4 %
OSMOLALITY SERPL CALC.SUM OF ELEC: 290 MOSM/KG (ref 275–295)
PATIENT FASTING Y/N/NP: NO
PLATELET # BLD AUTO: 250 10(3)UL (ref 150–450)
PLATELET MORPHOLOGY: NORMAL
POTASSIUM SERPL-SCNC: 4.6 MMOL/L (ref 3.5–5.1)
RBC # BLD AUTO: 2.71 X10(6)UL
SODIUM SERPL-SCNC: 138 MMOL/L (ref 136–145)
WBC # BLD AUTO: 7.8 X10(3) UL (ref 4–11)

## 2021-01-05 PROCEDURE — 85025 COMPLETE CBC W/AUTO DIFF WBC: CPT

## 2021-01-05 PROCEDURE — 96411 CHEMO IV PUSH ADDL DRUG: CPT

## 2021-01-05 PROCEDURE — 96372 THER/PROPH/DIAG INJ SC/IM: CPT

## 2021-01-05 PROCEDURE — 96413 CHEMO IV INFUSION 1 HR: CPT

## 2021-01-05 PROCEDURE — 80053 COMPREHEN METABOLIC PANEL: CPT

## 2021-01-05 PROCEDURE — 96375 TX/PRO/DX INJ NEW DRUG ADDON: CPT

## 2021-01-05 NOTE — PROGRESS NOTES
Pt here for C5 Keytruda, Alimta and xgeva injection    Arrives Ambulating independently, using walker   accompanied by Self . Pt offers no new complaints today. Pt reported that he lost his balance in the lobby after his lab draw.   Pt did not have melody teaching  optimize nutrition status  patient/family supported  chemotherapy teaching  encourage activity as tolerated  monitor effect of therapy  monitor effect of nausea/vomiting management  monitor lab values  promoted rest  provided general teaching  Ex

## 2021-01-06 ENCOUNTER — PATIENT MESSAGE (OUTPATIENT)
Dept: INTERNAL MEDICINE CLINIC | Facility: CLINIC | Age: 76
End: 2021-01-06

## 2021-01-06 DIAGNOSIS — N40.0 BENIGN PROSTATIC HYPERPLASIA, UNSPECIFIED WHETHER LOWER URINARY TRACT SYMPTOMS PRESENT: Primary | ICD-10-CM

## 2021-01-06 NOTE — PROGRESS NOTES
Pt fell in the lobby coming back from his lab appointment. Witnessed by patients in lobby. Mendel Rand states that he was attempting to sit down after returning from his lab appointment and got dizzy.   He fell and landed on his butt and leaned into the douglas

## 2021-01-06 NOTE — TELEPHONE ENCOUNTER
From: Gloria Sales  To: Stefany Nolen MD  Sent: 1/6/2021 4:44 PM CST  Subject: Other    I need a refill on 2 medications that I am not currently able to request through my chart. Finasteride, 5mg, 1x am daily, and Terazosin HCL, 5mg, 1x pm daily.   We us

## 2021-01-07 ENCOUNTER — TELEPHONE (OUTPATIENT)
Dept: INTERNAL MEDICINE CLINIC | Facility: CLINIC | Age: 76
End: 2021-01-07

## 2021-01-07 RX ORDER — ATORVASTATIN CALCIUM 20 MG/1
20 TABLET, FILM COATED ORAL DAILY
Qty: 90 TABLET | Refills: 2 | Status: SHIPPED | OUTPATIENT
Start: 2021-01-07 | End: 2021-09-16 | Stop reason: SDUPTHER

## 2021-01-07 RX ORDER — HYDROCHLOROTHIAZIDE 12.5 MG/1
TABLET ORAL
Qty: 90 TABLET | Refills: 3 | Status: CANCELLED | OUTPATIENT
Start: 2021-01-07

## 2021-01-07 RX ORDER — FINASTERIDE 5 MG/1
5 TABLET, FILM COATED ORAL DAILY
Qty: 90 TABLET | Refills: 0 | Status: SHIPPED | OUTPATIENT
Start: 2021-01-07 | End: 2021-03-09

## 2021-01-07 RX ORDER — AMLODIPINE BESYLATE 5 MG/1
TABLET ORAL
Qty: 90 TABLET | Refills: 3 | Status: CANCELLED | OUTPATIENT
Start: 2021-01-07

## 2021-01-07 RX ORDER — ATORVASTATIN CALCIUM 20 MG/1
TABLET, FILM COATED ORAL
Qty: 90 TABLET | Refills: 3 | Status: CANCELLED | OUTPATIENT
Start: 2021-01-07

## 2021-01-07 RX ORDER — AMLODIPINE BESYLATE 5 MG/1
5 TABLET ORAL DAILY
Qty: 90 TABLET | Refills: 2 | Status: SHIPPED | OUTPATIENT
Start: 2021-01-07 | End: 2021-09-16 | Stop reason: SDUPTHER

## 2021-01-07 RX ORDER — TERAZOSIN 5 MG/1
5 CAPSULE ORAL
Qty: 90 CAPSULE | Refills: 0 | Status: SHIPPED | OUTPATIENT
Start: 2021-01-07 | End: 2021-03-09

## 2021-01-07 NOTE — TELEPHONE ENCOUNTER
Spouse advised of recommendations agreed with plan for follow up appt. Reports meds were being prescribed by his prior PCP, he will need future refills. She will call back to schedule follow up virtual visit for patient.

## 2021-01-07 NOTE — TELEPHONE ENCOUNTER
Paulina Pichardo with [facility] called. She needs a new order for re-certification for Home Health for Every other week. Verbal Order Acceptable. Please Advise.

## 2021-01-07 NOTE — TELEPHONE ENCOUNTER
Please schedule doximity or epic video to discuss these meds I have only seen him 1x last year and these meds were not discussed/provided by me. Last filled in our system in 2018?  Ideally these should come from the managing specialist but will give limited

## 2021-01-08 NOTE — TELEPHONE ENCOUNTER
Candy Fredrickson calling again to check status, did not receive order.  They are concerned about this delaying care as there is paperwork that must be done before recerification process can be completed

## 2021-01-08 NOTE — TELEPHONE ENCOUNTER
Left message on her personal answering machine about re certification. Instructed to call us back to verify received the order.

## 2021-01-11 ENCOUNTER — TELEPHONE (OUTPATIENT)
Dept: CARDIOLOGY | Age: 76
End: 2021-01-11

## 2021-01-11 RX ORDER — HYDROCHLOROTHIAZIDE 12.5 MG/1
12.5 TABLET ORAL DAILY
COMMUNITY
End: 2021-01-11 | Stop reason: SDUPTHER

## 2021-01-11 RX ORDER — HYDROCHLOROTHIAZIDE 12.5 MG/1
12.5 TABLET ORAL DAILY
Qty: 90 TABLET | Refills: 2 | Status: SHIPPED | OUTPATIENT
Start: 2021-01-11 | End: 2021-09-16 | Stop reason: SDUPTHER

## 2021-01-11 NOTE — TELEPHONE ENCOUNTER
Candy Fredrickson called back to inform that she did receive the message. No further action required.

## 2021-01-12 ENCOUNTER — HOSPITAL ENCOUNTER (EMERGENCY)
Facility: HOSPITAL | Age: 76
Discharge: HOME | End: 2021-01-12
Attending: EMERGENCY MEDICINE
Payer: MEDICARE

## 2021-01-12 ENCOUNTER — TELEPHONE (OUTPATIENT)
Dept: HEMATOLOGY/ONCOLOGY | Facility: HOSPITAL | Age: 76
End: 2021-01-12

## 2021-01-12 ENCOUNTER — APPOINTMENT (OUTPATIENT)
Dept: CT IMAGING | Facility: HOSPITAL | Age: 76
End: 2021-01-12
Attending: EMERGENCY MEDICINE
Payer: MEDICARE

## 2021-01-12 VITALS
RESPIRATION RATE: 19 BRPM | OXYGEN SATURATION: 90 % | BODY MASS INDEX: 29.62 KG/M2 | WEIGHT: 200 LBS | TEMPERATURE: 97 F | SYSTOLIC BLOOD PRESSURE: 104 MMHG | DIASTOLIC BLOOD PRESSURE: 51 MMHG | HEART RATE: 74 BPM | HEIGHT: 69 IN

## 2021-01-12 DIAGNOSIS — R10.9 ABDOMINAL PAIN, ACUTE: Primary | ICD-10-CM

## 2021-01-12 DIAGNOSIS — R53.1 WEAKNESS GENERALIZED: ICD-10-CM

## 2021-01-12 DIAGNOSIS — E86.0 DEHYDRATION: ICD-10-CM

## 2021-01-12 LAB
ALBUMIN SERPL-MCNC: 3.4 G/DL (ref 3.4–5)
ALP LIVER SERPL-CCNC: 70 U/L
ALT SERPL-CCNC: 28 U/L
ANION GAP SERPL CALC-SCNC: 5 MMOL/L (ref 0–18)
AST SERPL-CCNC: 25 U/L (ref 15–37)
BASOPHILS # BLD AUTO: 0.02 X10(3) UL (ref 0–0.2)
BASOPHILS NFR BLD AUTO: 0.5 %
BILIRUB DIRECT SERPL-MCNC: 0.3 MG/DL (ref 0–0.2)
BILIRUB SERPL-MCNC: 0.9 MG/DL (ref 0.1–2)
BILIRUB UR QL: NEGATIVE
BUN BLD-MCNC: 27 MG/DL (ref 7–18)
BUN/CREAT SERPL: 24.8 (ref 10–20)
CALCIUM BLD-MCNC: 8.8 MG/DL (ref 8.5–10.1)
CHLORIDE SERPL-SCNC: 104 MMOL/L (ref 98–112)
CLARITY UR: CLEAR
CO2 SERPL-SCNC: 26 MMOL/L (ref 21–32)
COLOR UR: YELLOW
CREAT BLD-MCNC: 1.09 MG/DL
DEPRECATED RDW RBC AUTO: 84.7 FL (ref 35.1–46.3)
EOSINOPHIL # BLD AUTO: 0.05 X10(3) UL (ref 0–0.7)
EOSINOPHIL NFR BLD AUTO: 1.2 %
ERYTHROCYTE [DISTWIDTH] IN BLOOD BY AUTOMATED COUNT: 21.9 % (ref 11–15)
GLUCOSE BLD-MCNC: 95 MG/DL (ref 70–99)
GLUCOSE UR-MCNC: NEGATIVE MG/DL
HCT VFR BLD AUTO: 26.5 %
HGB BLD-MCNC: 8.4 G/DL
HGB RETIC QN AUTO: 38.9 PG (ref 28.2–36.6)
HGB UR QL STRIP.AUTO: NEGATIVE
IMM GRANULOCYTES # BLD AUTO: 0.06 X10(3) UL (ref 0–1)
IMM GRANULOCYTES NFR BLD: 1.5 %
IMM RETICS NFR: 0.07 RATIO (ref 0.1–0.3)
KETONES UR-MCNC: NEGATIVE MG/DL
LEUKOCYTE ESTERASE UR QL STRIP.AUTO: NEGATIVE
LIPASE SERPL-CCNC: 47 U/L (ref 73–393)
LYMPHOCYTES # BLD AUTO: 0.22 X10(3) UL (ref 1–4)
LYMPHOCYTES NFR BLD AUTO: 5.4 %
M PROTEIN MFR SERPL ELPH: 7.3 G/DL (ref 6.4–8.2)
MCH RBC QN AUTO: 34.1 PG (ref 26–34)
MCHC RBC AUTO-ENTMCNC: 31.7 G/DL (ref 31–37)
MCV RBC AUTO: 107.7 FL
MONOCYTES # BLD AUTO: 0.06 X10(3) UL (ref 0.1–1)
MONOCYTES NFR BLD AUTO: 1.5 %
NEUTROPHILS # BLD AUTO: 3.68 X10 (3) UL (ref 1.5–7.7)
NEUTROPHILS # BLD AUTO: 3.68 X10(3) UL (ref 1.5–7.7)
NEUTROPHILS NFR BLD AUTO: 89.9 %
NITRITE UR QL STRIP.AUTO: NEGATIVE
OSMOLALITY SERPL CALC.SUM OF ELEC: 285 MOSM/KG (ref 275–295)
PH UR: 5 [PH] (ref 5–8)
PLATELET # BLD AUTO: 119 10(3)UL (ref 150–450)
PLATELET MORPHOLOGY: NORMAL
POTASSIUM SERPL-SCNC: 3.9 MMOL/L (ref 3.5–5.1)
PROT UR-MCNC: NEGATIVE MG/DL
RBC # BLD AUTO: 2.46 X10(6)UL
RETICS # AUTO: 5.9 X10(3) UL (ref 22.5–147.5)
RETICS/RBC NFR AUTO: 0.2 %
SODIUM SERPL-SCNC: 135 MMOL/L (ref 136–145)
SP GR UR STRIP: 1.02 (ref 1–1.03)
UROBILINOGEN UR STRIP-ACNC: <2
WBC # BLD AUTO: 4.1 X10(3) UL (ref 4–11)

## 2021-01-12 PROCEDURE — 85045 AUTOMATED RETICULOCYTE COUNT: CPT | Performed by: EMERGENCY MEDICINE

## 2021-01-12 PROCEDURE — 99284 EMERGENCY DEPT VISIT MOD MDM: CPT

## 2021-01-12 PROCEDURE — 85025 COMPLETE CBC W/AUTO DIFF WBC: CPT | Performed by: EMERGENCY MEDICINE

## 2021-01-12 PROCEDURE — 80048 BASIC METABOLIC PNL TOTAL CA: CPT | Performed by: EMERGENCY MEDICINE

## 2021-01-12 PROCEDURE — 83690 ASSAY OF LIPASE: CPT | Performed by: EMERGENCY MEDICINE

## 2021-01-12 PROCEDURE — 80076 HEPATIC FUNCTION PANEL: CPT | Performed by: EMERGENCY MEDICINE

## 2021-01-12 PROCEDURE — 96360 HYDRATION IV INFUSION INIT: CPT

## 2021-01-12 PROCEDURE — 81003 URINALYSIS AUTO W/O SCOPE: CPT | Performed by: EMERGENCY MEDICINE

## 2021-01-12 PROCEDURE — 96361 HYDRATE IV INFUSION ADD-ON: CPT

## 2021-01-12 PROCEDURE — 74177 CT ABD & PELVIS W/CONTRAST: CPT | Performed by: EMERGENCY MEDICINE

## 2021-01-12 RX ORDER — SODIUM CHLORIDE 9 MG/ML
INJECTION, SOLUTION INTRAVENOUS ONCE
Status: COMPLETED | OUTPATIENT
Start: 2021-01-12 | End: 2021-01-12

## 2021-01-12 NOTE — TELEPHONE ENCOUNTER
Patient's wife called indicating that her  \"was not doing well\". He is experiencing weakness, fatigue, yesterday he had stomach burning and he can barely walk. Thank you.  Kayy

## 2021-01-12 NOTE — TELEPHONE ENCOUNTER
Toxicities: C5 D1 Pebrolizumab/Premetrexed Disodium/Denosumab on 1/5/2021     Fatigue/Dyspnea/Weakness/Abdominal Pain      Fatigue: Grade 3 (Since Sunday Renita Nelson has been very fatigued. It has continued through to today. No chills, shakes or fever. Denies cold/flu symptoms. Denies any known Covid 19+ contacts. Needs assist with ADLs.)  Dyspnea: Grade 2 Casey Menjivar is now having shortness of breath with minimal exertion. He can only walk a few steps and then has to sit down to catch his breath. He denies chest pain, chest pressure. No pain when he takes a deep breath. His wife Verito Montemayor reports this is new for him.)  Generalized Muscle Weakness: Grade 2 (Since Sunday Renita Nelson has had progressive weakness. Today he can barely walk.)  Abdominal Pain: (Wife reports Renita Nelson started having abdominal pain on Sunday. He is having an \"aching/burning\" pain throughout the abdomen. Last night his pain was strong. Today the pain is less. He is tolerating a soft, bland diet. No vomiting.)    I explained to Polly and Renita Alcarazholly he will need to be evaluated in the [address] [place] [address] ER. His symptoms may be due to his treatment, but they also are on the Covid 19 symptom profile. They agreed with the plan. I recommended they call 911. Both Polly and Renita Nelson feel they can get him into the car. Renita Nelson is eating now. She will bring him over when he is done. Report called to the [address] [place] [address] Triage RN. ETA 1 hour.

## 2021-01-12 NOTE — ED INITIAL ASSESSMENT (HPI)
Pt to er with generalized abd pain and fatigue that started yesterday. Pt states that he currently is getting treatment for lung ca and believes last chemo was 1/5. Pt denies any vomiting or diarrhea. Pt states he just feels fatigued and generally weak.

## 2021-01-12 NOTE — ED PROVIDER NOTES
Patient Seen in: [facility] AND [facility] Emergency Department    History   Patient presents with:  Abdomen/Flank Pain    Stated Complaint: Weakness, Abd pain, Difficulty Breathing Hx lung cancer, cycle 5 on 1/5    HPI    Patient is here with complaint of weakn maalox/diphenhydramine/lidocaine viscous Oral Suspension,  Take 5 mL by mouth 4 (four) times daily before meals and nightly. hydrochlorothiazide 12.5 MG Oral Tab,  Take 12.5 mg by mouth daily.    Naloxone HCl 4 MG/0.1ML Nasal Liquid,  4 mg by Nasal route pain.         Review of Systems  Constitutional: no fever  HEENT: No cough, no congestion  Cardiovascular: no chest pain  Respiratory: no shortness of breath  Gastrointestinal:  no nausea, no vomiting  Genitourinary: no dysuria        Positive for stated c it straight so that it flows. I did show his wife as well so that she could see that. I had been in contact with oncologist Dr. Altagracia Alvarez, she does not know the patient well. She requested also doing a reticulocyte count which was ordered.   At this point we w RETICULOCYTE COUNT - Abnormal; Notable for the following components:    Retic% 0.2 (*)     Retic Absolute 5.9 (*)     Retic IRF 0.067 (*)     Reticulocyte Hemoglobin Equivalent 38.9 (*)     All other components within normal limits   CBC W/ DIFFERENTIAL

## 2021-01-15 ENCOUNTER — TELEPHONE (OUTPATIENT)
Dept: HEMATOLOGY/ONCOLOGY | Facility: HOSPITAL | Age: 76
End: 2021-01-15

## 2021-01-15 RX ORDER — MORPHINE SULFATE 15 MG/1
15 TABLET, FILM COATED, EXTENDED RELEASE ORAL EVERY 12 HOURS SCHEDULED
Qty: 60 TABLET | Refills: 0 | Status: SHIPPED | OUTPATIENT
Start: 2021-01-15 | End: 2021-01-19

## 2021-01-15 NOTE — TELEPHONE ENCOUNTER
Geeta called saying the patient changed to [facility], and his ins will only allow a 7 day supply of morphine. She says they could only dispense 14 tablets for 7 day supply. She says they may call for a new rx next week.

## 2021-01-15 NOTE — TELEPHONE ENCOUNTER
Patient need a refill on his Morphine Sulfate Er 15 mg oral tab per wife, he only have enough for two days left.

## 2021-01-18 ENCOUNTER — APPOINTMENT (OUTPATIENT)
Dept: HEMATOLOGY/ONCOLOGY | Facility: HOSPITAL | Age: 76
End: 2021-01-18
Payer: MEDICARE

## 2021-01-19 ENCOUNTER — TELEPHONE (OUTPATIENT)
Dept: HEMATOLOGY/ONCOLOGY | Facility: HOSPITAL | Age: 76
End: 2021-01-19

## 2021-01-19 ENCOUNTER — APPOINTMENT (OUTPATIENT)
Dept: HEMATOLOGY/ONCOLOGY | Facility: HOSPITAL | Age: 76
End: 2021-01-19
Payer: MEDICARE

## 2021-01-19 ENCOUNTER — OFFICE VISIT (OUTPATIENT)
Dept: ENDOCRINOLOGY CLINIC | Facility: CLINIC | Age: 76
End: 2021-01-19
Payer: MEDICARE

## 2021-01-19 VITALS
HEIGHT: 69 IN | BODY MASS INDEX: 30.81 KG/M2 | DIASTOLIC BLOOD PRESSURE: 60 MMHG | HEART RATE: 87 BPM | WEIGHT: 208 LBS | SYSTOLIC BLOOD PRESSURE: 116 MMHG

## 2021-01-19 DIAGNOSIS — G89.3 CANCER RELATED PAIN: Primary | ICD-10-CM

## 2021-01-19 DIAGNOSIS — C34.12 PRIMARY CANCER OF LEFT UPPER LOBE OF LUNG (HCC): ICD-10-CM

## 2021-01-19 DIAGNOSIS — E03.2 HYPOTHYROIDISM DUE TO MEDICATION: Primary | ICD-10-CM

## 2021-01-19 DIAGNOSIS — E03.9 HYPOTHYROIDISM, UNSPECIFIED TYPE: ICD-10-CM

## 2021-01-19 PROCEDURE — 99204 OFFICE O/P NEW MOD 45 MIN: CPT | Performed by: INTERNAL MEDICINE

## 2021-01-19 RX ORDER — MORPHINE SULFATE 15 MG/1
15 TABLET, FILM COATED, EXTENDED RELEASE ORAL EVERY 12 HOURS SCHEDULED
Qty: 60 TABLET | Refills: 0 | Status: SHIPPED | OUTPATIENT
Start: 2021-01-19 | End: 2021-02-16

## 2021-01-19 RX ORDER — MORPHINE SULFATE 15 MG/1
15 TABLET, FILM COATED, EXTENDED RELEASE ORAL EVERY 12 HOURS SCHEDULED
Qty: 60 TABLET | Refills: 0 | Status: CANCELLED | OUTPATIENT
Start: 2021-01-19 | End: 2021-02-18

## 2021-01-19 NOTE — H&P
Name: Niurka Gonzalez  Date: 1/19/2021    Referring Physician: Dr. Eron Amaya    Patient presents with:  Consult  Thyroid Problem: Medication discussion      HISTORY OF PRESENT ILLNESS   Niurka Gonzalez is a 64year old male who presents for Patient pre 0.5 MG Oral Tab, Take 1 tablet (0.5 mg total) by mouth 2 (two) times daily as needed for Anxiety. , Disp: 60 tablet, Rfl: 0  •  Levothyroxine Sodium 75 MCG Oral Tab, Take 1 tablet (75 mcg total) by mouth before breakfast., Disp: 30 tablet, Rfl: 1  •  maalox Powder, Breath Activated, Inhale 1 puff into the lungs daily. , Disp: 1 each, Rfl: 0  •  Loperamide HCl 2 MG Oral Cap, Take 1 capsule (2 mg total) by mouth 4 (four) times daily as needed for Diarrhea., Disp: 20 capsule, Rfl: 0  •  AmLODIPine Besylate 5 MG O place  Nutritional:  no abnormal weight gain or loss    Labs:  Date  TSH  FT4  LT4  10/02/20 28.80  0.7   11/16/20 26.00  0.7  12/28/20 89.20    50mcg    Imaging:  None     ASSESSMENT/PLAN:  -This is a 64year-old man patient with an umknown duration of hy

## 2021-01-19 NOTE — TELEPHONE ENCOUNTER
Wife calling to let you know that they have a new pharmacy [facility] and they only filled Nnamdi's morphINE Sulfate ER 15 MG Oral Tab CR for 7 days. She is not sure how to get the full amount of prescribed prescription.  Thank you Stuart Castano

## 2021-01-19 NOTE — TELEPHONE ENCOUNTER
Polly walked in and was looking to speak with somebody about her . She had some questions about an upcoming lab and the covid vaccine that Bob Maldonado has scheduled.  She said that she should be home in about 30 min and asked that she get a call back to go

## 2021-01-19 NOTE — TELEPHONE ENCOUNTER
Called to [facility] and was told no script filled there. Called to [facility]- 7 days supply given since it was first Rx given last week. They will now accept a full Rx for morphine Rx sent. Called Polly and left message- new 30 day Rx sent to [facility]

## 2021-01-19 NOTE — TELEPHONE ENCOUNTER
Called Polly back. Manjeet Adam saw the endocrinologist today. He does not want the TSH drawn until 2/2/21 d/t recent dosage change. I will adjust appt notes to reflect.  She also wanted to let us know that the [place] is offering Covid vaccine at this time and spouse m

## 2021-01-25 ENCOUNTER — OFFICE VISIT (OUTPATIENT)
Dept: HEMATOLOGY/ONCOLOGY | Facility: HOSPITAL | Age: 76
End: 2021-01-25
Attending: NURSE PRACTITIONER
Payer: MEDICARE

## 2021-01-25 VITALS
BODY MASS INDEX: 30.96 KG/M2 | TEMPERATURE: 99 F | WEIGHT: 209 LBS | HEART RATE: 68 BPM | RESPIRATION RATE: 18 BRPM | HEIGHT: 69 IN | SYSTOLIC BLOOD PRESSURE: 115 MMHG | OXYGEN SATURATION: 99 % | DIASTOLIC BLOOD PRESSURE: 44 MMHG

## 2021-01-25 DIAGNOSIS — Z51.11 CHEMOTHERAPY MANAGEMENT, ENCOUNTER FOR: ICD-10-CM

## 2021-01-25 DIAGNOSIS — C79.51 BONE METASTASIS (HCC): ICD-10-CM

## 2021-01-25 DIAGNOSIS — I31.3 MALIGNANT PERICARDIAL EFFUSION (HCC): ICD-10-CM

## 2021-01-25 DIAGNOSIS — C80.1 MALIGNANT PERICARDIAL EFFUSION (HCC): ICD-10-CM

## 2021-01-25 DIAGNOSIS — C34.12 PRIMARY CANCER OF LEFT UPPER LOBE OF LUNG (HCC): ICD-10-CM

## 2021-01-25 DIAGNOSIS — C79.31 BRAIN METASTASIS (HCC): ICD-10-CM

## 2021-01-25 DIAGNOSIS — Z51.81 MEDICATION MONITORING ENCOUNTER: ICD-10-CM

## 2021-01-25 DIAGNOSIS — Z45.2 ENCOUNTER FOR CENTRAL LINE CARE: Primary | ICD-10-CM

## 2021-01-25 DIAGNOSIS — C34.12 PRIMARY CANCER OF LEFT UPPER LOBE OF LUNG (HCC): Primary | ICD-10-CM

## 2021-01-25 LAB
ALBUMIN SERPL-MCNC: 3.1 G/DL (ref 3.4–5)
ALBUMIN/GLOB SERPL: 0.7 {RATIO} (ref 1–2)
ALP LIVER SERPL-CCNC: 76 U/L
ALT SERPL-CCNC: 29 U/L
ANION GAP SERPL CALC-SCNC: 4 MMOL/L (ref 0–18)
AST SERPL-CCNC: 30 U/L (ref 15–37)
BASOPHILS # BLD AUTO: 0.01 X10(3) UL (ref 0–0.2)
BASOPHILS NFR BLD AUTO: 0.2 %
BILIRUB SERPL-MCNC: 0.4 MG/DL (ref 0.1–2)
BUN BLD-MCNC: 25 MG/DL (ref 7–18)
BUN/CREAT SERPL: 22.5 (ref 10–20)
CALCIUM BLD-MCNC: 9.2 MG/DL (ref 8.5–10.1)
CHLORIDE SERPL-SCNC: 110 MMOL/L (ref 98–112)
CO2 SERPL-SCNC: 26 MMOL/L (ref 21–32)
CREAT BLD-MCNC: 1.11 MG/DL
DEPRECATED RDW RBC AUTO: 86.9 FL (ref 35.1–46.3)
EOSINOPHIL # BLD AUTO: 0.16 X10(3) UL (ref 0–0.7)
EOSINOPHIL NFR BLD AUTO: 3.6 %
ERYTHROCYTE [DISTWIDTH] IN BLOOD BY AUTOMATED COUNT: 21.3 % (ref 11–15)
GLOBULIN PLAS-MCNC: 4.3 G/DL (ref 2.8–4.4)
GLUCOSE BLD-MCNC: 80 MG/DL (ref 70–99)
HCT VFR BLD AUTO: 26.8 %
HGB BLD-MCNC: 8.4 G/DL
IMM GRANULOCYTES # BLD AUTO: 0.02 X10(3) UL (ref 0–1)
IMM GRANULOCYTES NFR BLD: 0.4 %
LYMPHOCYTES # BLD AUTO: 0.48 X10(3) UL (ref 1–4)
LYMPHOCYTES NFR BLD AUTO: 10.7 %
M PROTEIN MFR SERPL ELPH: 7.4 G/DL (ref 6.4–8.2)
MCH RBC QN AUTO: 35.3 PG (ref 26–34)
MCHC RBC AUTO-ENTMCNC: 31.3 G/DL (ref 31–37)
MCV RBC AUTO: 112.6 FL
MONOCYTES # BLD AUTO: 0.48 X10(3) UL (ref 0.1–1)
MONOCYTES NFR BLD AUTO: 10.7 %
NEUTROPHILS # BLD AUTO: 3.35 X10 (3) UL (ref 1.5–7.7)
NEUTROPHILS # BLD AUTO: 3.35 X10(3) UL (ref 1.5–7.7)
NEUTROPHILS NFR BLD AUTO: 74.4 %
OSMOLALITY SERPL CALC.SUM OF ELEC: 293 MOSM/KG (ref 275–295)
PLATELET # BLD AUTO: 213 10(3)UL (ref 150–450)
PLATELET MORPHOLOGY: NORMAL
POTASSIUM SERPL-SCNC: 4.2 MMOL/L (ref 3.5–5.1)
RBC # BLD AUTO: 2.38 X10(6)UL
SODIUM SERPL-SCNC: 140 MMOL/L (ref 136–145)
WBC # BLD AUTO: 4.5 X10(3) UL (ref 4–11)

## 2021-01-25 PROCEDURE — 80053 COMPREHEN METABOLIC PANEL: CPT

## 2021-01-25 PROCEDURE — 85060 BLOOD SMEAR INTERPRETATION: CPT

## 2021-01-25 PROCEDURE — 85025 COMPLETE CBC W/AUTO DIFF WBC: CPT

## 2021-01-25 PROCEDURE — 36415 COLL VENOUS BLD VENIPUNCTURE: CPT

## 2021-01-25 PROCEDURE — 99215 OFFICE O/P EST HI 40 MIN: CPT | Performed by: NURSE PRACTITIONER

## 2021-01-25 RX ORDER — POTASSIUM CHLORIDE 1500 MG/1
TABLET, FILM COATED, EXTENDED RELEASE ORAL 2 TIMES DAILY
COMMUNITY
Start: 2021-01-15 | End: 2021-03-08

## 2021-01-25 RX ORDER — ALPRAZOLAM 0.5 MG/1
0.5 TABLET ORAL 2 TIMES DAILY PRN
Qty: 60 TABLET | Refills: 0 | Status: SHIPPED | OUTPATIENT
Start: 2021-01-25 | End: 2021-02-25

## 2021-01-25 NOTE — PROGRESS NOTES
JOANNE Gill is a 64year old male here for f/u of Primary cancer of left upper lobe of lung (hcc)  (primary encounter diagnosis)  Brain metastasis (hcc)  Bone metastasis (hcc)  Malignant pericardial effusion (hcc)  Chemotherapy management, en change (hoarse, chronic). Negative for trouble swallowing. Respiratory: Negative for cough and shortness of breath (MICHAEL, not currently). Cardiovascular: Negative for chest pain (mid sternal pain improved.) and leg swelling.    Gastrointestinal: Positi Albuterol Sulfate  (90 Base) MCG/ACT Inhalation Aero Soln Inhale 2 puffs into the lungs every 6 (six) hours as needed for Wheezing or Shortness of Breath.  2 Inhaler 5   • Prochlorperazine Maleate (COMPAZINE) 10 mg tablet Take 1 tablet (10 mg total) Hyperlipidemia    • Lung cancer ([facility] [address]     metastatic adenocarcinoma of the lung     Past Surgical History:   Procedure Laterality Date   • OTHER SURGICAL HISTORY      cardiac stent    • OTHER SURGICAL HISTORY Left     leg surgery     Social History    [place] the lung. Discussed he has stage IV disease. --Thus far as, the patient does not have any further symptoms of reaccumulation of fluid. --Patient completed SBRT to solitary brain metastases on 9/25/2020.     --Staging CT scan consistent with diffuse disea cord, the patient did see Dr. Darby Harp who is an ENT for evaluation of the left vocal cord paralysis. This was on December 7, 2020.   Patient had close range videostroboscopy, which showed a paralysis of the left vocal cord and no abnormalities on the ri orders of the defined types were placed in this encounter.       Results From Past 48 Hours:  Recent Results (from the past 48 hour(s))   COMP METABOLIC PANEL (14)    Collection Time: 01/25/21 10:42 AM   Result Value Ref Range    Glucose 80 70 - 99 mg/dL

## 2021-01-26 ENCOUNTER — OFFICE VISIT (OUTPATIENT)
Dept: HEMATOLOGY/ONCOLOGY | Facility: HOSPITAL | Age: 76
End: 2021-01-26
Attending: INTERNAL MEDICINE
Payer: MEDICARE

## 2021-01-26 VITALS
HEART RATE: 81 BPM | DIASTOLIC BLOOD PRESSURE: 51 MMHG | RESPIRATION RATE: 18 BRPM | SYSTOLIC BLOOD PRESSURE: 145 MMHG | OXYGEN SATURATION: 97 % | TEMPERATURE: 98 F

## 2021-01-26 DIAGNOSIS — Z51.81 MEDICATION MONITORING ENCOUNTER: ICD-10-CM

## 2021-01-26 DIAGNOSIS — Z23 NEED FOR VACCINATION: ICD-10-CM

## 2021-01-26 DIAGNOSIS — C34.12 PRIMARY CANCER OF LEFT UPPER LOBE OF LUNG (HCC): ICD-10-CM

## 2021-01-26 DIAGNOSIS — Z45.2 ENCOUNTER FOR CENTRAL LINE CARE: Primary | ICD-10-CM

## 2021-01-26 PROCEDURE — 96367 TX/PROPH/DG ADDL SEQ IV INF: CPT

## 2021-01-26 PROCEDURE — 96411 CHEMO IV PUSH ADDL DRUG: CPT

## 2021-01-26 PROCEDURE — 96413 CHEMO IV INFUSION 1 HR: CPT

## 2021-01-26 NOTE — PROGRESS NOTES
Pt here for C6 Keytruda, Walker     Arrives Ambulating independently, using walker   accompanied by Self . Pt offers no new complaints today. Patient states he is eating and drinking well.      Patient reports possible pregnancy since last therapy cyc towards outcome:  unchanged    Education Record    Learner:  Patient  Barriers / Limitations:  None  Method:  discussion  Outcome:  Expressed understanding  Comments:

## 2021-01-30 ENCOUNTER — MED REC SCAN ONLY (OUTPATIENT)
Dept: INTERNAL MEDICINE CLINIC | Facility: CLINIC | Age: 76
End: 2021-01-30

## 2021-02-01 DIAGNOSIS — Z51.11 CHEMOTHERAPY MANAGEMENT, ENCOUNTER FOR: ICD-10-CM

## 2021-02-01 DIAGNOSIS — Z29.8 ENCOUNTER FOR IMMUNOTHERAPY: ICD-10-CM

## 2021-02-01 DIAGNOSIS — E03.2 HYPOTHYROIDISM DUE TO MEDICATION: ICD-10-CM

## 2021-02-01 DIAGNOSIS — C34.12 PRIMARY CANCER OF LEFT UPPER LOBE OF LUNG (HCC): Primary | ICD-10-CM

## 2021-02-02 ENCOUNTER — OFFICE VISIT (OUTPATIENT)
Dept: HEMATOLOGY/ONCOLOGY | Facility: HOSPITAL | Age: 76
End: 2021-02-02
Attending: INTERNAL MEDICINE
Payer: MEDICARE

## 2021-02-02 ENCOUNTER — HOSPITAL ENCOUNTER (OUTPATIENT)
Dept: GENERAL RADIOLOGY | Facility: HOSPITAL | Age: 76
Discharge: HOME | End: 2021-02-02
Attending: NURSE PRACTITIONER
Payer: MEDICARE

## 2021-02-02 ENCOUNTER — TELEPHONE (OUTPATIENT)
Dept: HEMATOLOGY/ONCOLOGY | Facility: HOSPITAL | Age: 76
End: 2021-02-02

## 2021-02-02 VITALS
RESPIRATION RATE: 18 BRPM | SYSTOLIC BLOOD PRESSURE: 144 MMHG | TEMPERATURE: 98 F | OXYGEN SATURATION: 96 % | HEART RATE: 83 BPM | DIASTOLIC BLOOD PRESSURE: 61 MMHG

## 2021-02-02 VITALS
HEIGHT: 69 IN | RESPIRATION RATE: 18 BRPM | BODY MASS INDEX: 29.92 KG/M2 | OXYGEN SATURATION: 96 % | HEART RATE: 83 BPM | SYSTOLIC BLOOD PRESSURE: 144 MMHG | DIASTOLIC BLOOD PRESSURE: 61 MMHG | TEMPERATURE: 98 F | WEIGHT: 202 LBS

## 2021-02-02 DIAGNOSIS — Z51.81 MEDICATION MONITORING ENCOUNTER: ICD-10-CM

## 2021-02-02 DIAGNOSIS — D64.9 ANEMIA, UNSPECIFIED TYPE: ICD-10-CM

## 2021-02-02 DIAGNOSIS — Z29.8 ENCOUNTER FOR IMMUNOTHERAPY: ICD-10-CM

## 2021-02-02 DIAGNOSIS — E03.2 HYPOTHYROIDISM DUE TO MEDICATION: ICD-10-CM

## 2021-02-02 DIAGNOSIS — C79.51 BONE METASTASIS (HCC): ICD-10-CM

## 2021-02-02 DIAGNOSIS — C34.12 PRIMARY CANCER OF LEFT UPPER LOBE OF LUNG (HCC): ICD-10-CM

## 2021-02-02 DIAGNOSIS — Z51.11 CHEMOTHERAPY MANAGEMENT, ENCOUNTER FOR: ICD-10-CM

## 2021-02-02 DIAGNOSIS — C79.31 BRAIN METASTASIS (HCC): ICD-10-CM

## 2021-02-02 DIAGNOSIS — C34.12 PRIMARY CANCER OF LEFT UPPER LOBE OF LUNG (HCC): Primary | ICD-10-CM

## 2021-02-02 DIAGNOSIS — R35.0 URINARY FREQUENCY: ICD-10-CM

## 2021-02-02 DIAGNOSIS — Z45.2 ENCOUNTER FOR CENTRAL LINE CARE: ICD-10-CM

## 2021-02-02 LAB
ALBUMIN SERPL-MCNC: 3.4 G/DL (ref 3.4–5)
ANTIBODY SCREEN: NEGATIVE
BASOPHILS # BLD AUTO: 0.01 X10(3) UL (ref 0–0.2)
BASOPHILS NFR BLD AUTO: 0.3 %
BILIRUB UR QL: NEGATIVE
CALCIUM BLD-MCNC: 9.2 MG/DL (ref 8.5–10.1)
CLARITY UR: CLEAR
COLOR UR: YELLOW
CREAT BLD-MCNC: 1.16 MG/DL
DEPRECATED RDW RBC AUTO: 73.8 FL (ref 35.1–46.3)
EOSINOPHIL # BLD AUTO: 0.02 X10(3) UL (ref 0–0.7)
EOSINOPHIL NFR BLD AUTO: 0.6 %
ERYTHROCYTE [DISTWIDTH] IN BLOOD BY AUTOMATED COUNT: 17.8 % (ref 11–15)
GLUCOSE UR-MCNC: NEGATIVE MG/DL
HAV IGM SER QL: 2.4 MG/DL (ref 1.6–2.6)
HCT VFR BLD AUTO: 26.4 %
HGB BLD-MCNC: 8.2 G/DL
HGB UR QL STRIP.AUTO: NEGATIVE
IMM GRANULOCYTES # BLD AUTO: 0.01 X10(3) UL (ref 0–1)
IMM GRANULOCYTES NFR BLD: 0.3 %
KETONES UR-MCNC: NEGATIVE MG/DL
LEUKOCYTE ESTERASE UR QL STRIP.AUTO: NEGATIVE
LYMPHOCYTES # BLD AUTO: 0.27 X10(3) UL (ref 1–4)
LYMPHOCYTES NFR BLD AUTO: 8.3 %
MCH RBC QN AUTO: 34.5 PG (ref 26–34)
MCHC RBC AUTO-ENTMCNC: 31.1 G/DL (ref 31–37)
MCV RBC AUTO: 110.9 FL
MONOCYTES # BLD AUTO: 0.03 X10(3) UL (ref 0.1–1)
MONOCYTES NFR BLD AUTO: 0.9 %
NEUTROPHILS # BLD AUTO: 2.91 X10 (3) UL (ref 1.5–7.7)
NEUTROPHILS # BLD AUTO: 2.91 X10(3) UL (ref 1.5–7.7)
NEUTROPHILS NFR BLD AUTO: 89.6 %
NITRITE UR QL STRIP.AUTO: NEGATIVE
PH UR: 5 [PH] (ref 5–8)
PHOSPHATE SERPL-MCNC: 2.7 MG/DL (ref 2.5–4.9)
PLATELET # BLD AUTO: 124 10(3)UL (ref 150–450)
PLATELET MORPHOLOGY: NORMAL
PROT UR-MCNC: NEGATIVE MG/DL
RBC # BLD AUTO: 2.38 X10(6)UL
RH BLOOD TYPE: POSITIVE
SP GR UR STRIP: 1.02 (ref 1–1.03)
TSI SER-ACNC: 44.8 MIU/ML (ref 0.36–3.74)
UROBILINOGEN UR STRIP-ACNC: <2
WBC # BLD AUTO: 3.3 X10(3) UL (ref 4–11)

## 2021-02-02 PROCEDURE — 83735 ASSAY OF MAGNESIUM: CPT

## 2021-02-02 PROCEDURE — 84443 ASSAY THYROID STIM HORMONE: CPT

## 2021-02-02 PROCEDURE — 36415 COLL VENOUS BLD VENIPUNCTURE: CPT

## 2021-02-02 PROCEDURE — 82565 ASSAY OF CREATININE: CPT

## 2021-02-02 PROCEDURE — 85025 COMPLETE CBC W/AUTO DIFF WBC: CPT

## 2021-02-02 PROCEDURE — 86900 BLOOD TYPING SEROLOGIC ABO: CPT

## 2021-02-02 PROCEDURE — 82040 ASSAY OF SERUM ALBUMIN: CPT

## 2021-02-02 PROCEDURE — 81003 URINALYSIS AUTO W/O SCOPE: CPT

## 2021-02-02 PROCEDURE — 96372 THER/PROPH/DIAG INJ SC/IM: CPT

## 2021-02-02 PROCEDURE — 84100 ASSAY OF PHOSPHORUS: CPT

## 2021-02-02 PROCEDURE — 99215 OFFICE O/P EST HI 40 MIN: CPT | Performed by: NURSE PRACTITIONER

## 2021-02-02 PROCEDURE — 82310 ASSAY OF CALCIUM: CPT

## 2021-02-02 PROCEDURE — 86901 BLOOD TYPING SEROLOGIC RH(D): CPT

## 2021-02-02 PROCEDURE — 71046 X-RAY EXAM CHEST 2 VIEWS: CPT | Performed by: NURSE PRACTITIONER

## 2021-02-02 PROCEDURE — 86850 RBC ANTIBODY SCREEN: CPT

## 2021-02-02 NOTE — TELEPHONE ENCOUNTER
Called Polly and let her know Bobs labs results are stable. CXR did not show pneumonia. Urine did not show infection. Thyroid level is high but it is improving. Continue to keep him active and eating as best as he can.

## 2021-02-02 NOTE — PROGRESS NOTES
Patient arrives ambulatory with rollator walker, reports he is very weak. Appears short of breath with exertion. Will see Kirby UNDERWOOD for Acute Care visit at [phone]. Labs drawn from left hand with #23g butterfly X1, then sent to lab.   Calcium 9.2   Margo

## 2021-02-02 NOTE — PROGRESS NOTES
JOANNE Bonds is a 64year old male here for f/u of Primary cancer of left upper lobe of lung (hcc)  (primary encounter diagnosis)  Brain metastasis (hcc)    Had SBRT to solitary brain metastasis completed treatment on 09/25/20. Off steroids. Review of Systems   Constitutional: Positive for fatigue. Negative for appetite change, fever and unexpected weight change. HENT:   Positive for hearing loss and voice change (hoarse, chronic). Negative for trouble swallowing.     Respiratory: Negativ Oral Tab Take 12.5 mg by mouth daily. • Naloxone HCl 4 MG/0.1ML Nasal Liquid 4 mg by Nasal route as needed. If patient remains unresponsive, repeat dose in other nostril 2-5 minutes after first dose.  1 kit 0   • Albuterol Sulfate  (90 Base) MCG/ [Meperi*    UNKNOWN    Past Medical History:   Diagnosis Date   • Aneurysm of abdominal vessel (HCC)    • Aortic valve defect    • Brain cancer [facility])    • Essential hypertension    • Heart attack ([facility] [address]    • Hyperlipidemia    • [facility]     metasta diagnosis of metastatic adenocarcinoma of the lung. --Patient presented with cardiac tamponade.  He is status post pericardiocentesis.    --Cytology was c/w adenocarcinoma of the lung. Discussed he has stage IV disease.   --Thus far as, the patient does by the pleural enhancement likely pseudoprogression and will continue to improve with time given his excellent response thus far.   Regarding the activity of the right vocal cord, the patient did see Dr. Viviane Resendez who is an ENT for evaluation of the left v Controlled with daily MiraLAX. Anemia:  Likely chemotherapy-induced. Will have treatment deferred. Hypothyroidism:  Increase levothyroxine, refer to endocrinology. Due to immunotherapy. Call prn.  RTC prior to C7      All questions answered to - 0.20 x10(3) uL    Immature Granulocyte Absolute 0.01 0.00 - 1.00 x10(3) uL    Neutrophil % 89.6 %    Lymphocyte % 8.3 %    Monocyte % 0.9 %    Eosinophil % 0.6 %    Basophil % 0.3 %    Immature Granulocyte % 0.3 %   ABORH (BLOOD TYPE)    Collection Time:

## 2021-02-08 ENCOUNTER — APPOINTMENT (OUTPATIENT)
Dept: HEMATOLOGY/ONCOLOGY | Facility: HOSPITAL | Age: 76
End: 2021-02-08
Payer: MEDICARE

## 2021-02-15 ENCOUNTER — OFFICE VISIT (OUTPATIENT)
Dept: HEMATOLOGY/ONCOLOGY | Facility: HOSPITAL | Age: 76
End: 2021-02-15
Attending: NURSE PRACTITIONER
Payer: MEDICARE

## 2021-02-15 VITALS
HEIGHT: 69.02 IN | WEIGHT: 203 LBS | SYSTOLIC BLOOD PRESSURE: 114 MMHG | BODY MASS INDEX: 30.07 KG/M2 | OXYGEN SATURATION: 98 % | DIASTOLIC BLOOD PRESSURE: 44 MMHG | TEMPERATURE: 98 F | HEART RATE: 69 BPM | RESPIRATION RATE: 18 BRPM

## 2021-02-15 DIAGNOSIS — C34.12 PRIMARY CANCER OF LEFT UPPER LOBE OF LUNG (HCC): ICD-10-CM

## 2021-02-15 DIAGNOSIS — I31.3 MALIGNANT PERICARDIAL EFFUSION (HCC): ICD-10-CM

## 2021-02-15 DIAGNOSIS — C80.1 MALIGNANT PERICARDIAL EFFUSION (HCC): ICD-10-CM

## 2021-02-15 DIAGNOSIS — Z51.11 CHEMOTHERAPY MANAGEMENT, ENCOUNTER FOR: ICD-10-CM

## 2021-02-15 DIAGNOSIS — Z45.2 ENCOUNTER FOR CENTRAL LINE CARE: Primary | ICD-10-CM

## 2021-02-15 DIAGNOSIS — C79.51 BONE METASTASIS (HCC): ICD-10-CM

## 2021-02-15 DIAGNOSIS — Z51.81 MEDICATION MONITORING ENCOUNTER: ICD-10-CM

## 2021-02-15 DIAGNOSIS — C34.12 PRIMARY CANCER OF LEFT UPPER LOBE OF LUNG (HCC): Primary | ICD-10-CM

## 2021-02-15 DIAGNOSIS — C79.31 BRAIN METASTASIS (HCC): ICD-10-CM

## 2021-02-15 LAB
ALBUMIN SERPL-MCNC: 3.2 G/DL (ref 3.4–5)
ALBUMIN/GLOB SERPL: 0.7 {RATIO} (ref 1–2)
ALP LIVER SERPL-CCNC: 78 U/L
ALT SERPL-CCNC: 31 U/L
ANION GAP SERPL CALC-SCNC: 5 MMOL/L (ref 0–18)
AST SERPL-CCNC: 33 U/L (ref 15–37)
BASOPHILS # BLD AUTO: 0.02 X10(3) UL (ref 0–0.2)
BASOPHILS NFR BLD AUTO: 0.5 %
BILIRUB SERPL-MCNC: 0.5 MG/DL (ref 0.1–2)
BUN BLD-MCNC: 21 MG/DL (ref 7–18)
BUN/CREAT SERPL: 18.1 (ref 10–20)
CALCIUM BLD-MCNC: 9 MG/DL (ref 8.5–10.1)
CHLORIDE SERPL-SCNC: 104 MMOL/L (ref 98–112)
CO2 SERPL-SCNC: 31 MMOL/L (ref 21–32)
CREAT BLD-MCNC: 1.16 MG/DL
DEPRECATED RDW RBC AUTO: 75.9 FL (ref 35.1–46.3)
EOSINOPHIL # BLD AUTO: 0.09 X10(3) UL (ref 0–0.7)
EOSINOPHIL NFR BLD AUTO: 2 %
ERYTHROCYTE [DISTWIDTH] IN BLOOD BY AUTOMATED COUNT: 18.9 % (ref 11–15)
GLOBULIN PLAS-MCNC: 4.3 G/DL (ref 2.8–4.4)
GLUCOSE BLD-MCNC: 96 MG/DL (ref 70–99)
HCT VFR BLD AUTO: 24.9 %
HGB BLD-MCNC: 7.9 G/DL
IMM GRANULOCYTES # BLD AUTO: 0.04 X10(3) UL (ref 0–1)
IMM GRANULOCYTES NFR BLD: 0.9 %
IRON SATURATION: 19 %
IRON SERPL-MCNC: 63 UG/DL
LYMPHOCYTES # BLD AUTO: 0.65 X10(3) UL (ref 1–4)
LYMPHOCYTES NFR BLD AUTO: 14.7 %
M PROTEIN MFR SERPL ELPH: 7.5 G/DL (ref 6.4–8.2)
MCH RBC QN AUTO: 35.3 PG (ref 26–34)
MCHC RBC AUTO-ENTMCNC: 31.7 G/DL (ref 31–37)
MCV RBC AUTO: 111.2 FL
MONOCYTES # BLD AUTO: 0.41 X10(3) UL (ref 0.1–1)
MONOCYTES NFR BLD AUTO: 9.3 %
NEUTROPHILS # BLD AUTO: 3.21 X10 (3) UL (ref 1.5–7.7)
NEUTROPHILS # BLD AUTO: 3.21 X10(3) UL (ref 1.5–7.7)
NEUTROPHILS NFR BLD AUTO: 72.6 %
OSMOLALITY SERPL CALC.SUM OF ELEC: 293 MOSM/KG (ref 275–295)
PLATELET # BLD AUTO: 163 10(3)UL (ref 150–450)
PLATELET MORPHOLOGY: NORMAL
POTASSIUM SERPL-SCNC: 3.3 MMOL/L (ref 3.5–5.1)
RBC # BLD AUTO: 2.24 X10(6)UL
SODIUM SERPL-SCNC: 140 MMOL/L (ref 136–145)
TOTAL IRON BINDING CAPACITY: 332 UG/DL (ref 240–450)
TRANSFERRIN SERPL-MCNC: 223 MG/DL (ref 200–360)
WBC # BLD AUTO: 4.4 X10(3) UL (ref 4–11)

## 2021-02-15 PROCEDURE — 36415 COLL VENOUS BLD VENIPUNCTURE: CPT

## 2021-02-15 PROCEDURE — 80053 COMPREHEN METABOLIC PANEL: CPT

## 2021-02-15 PROCEDURE — 84466 ASSAY OF TRANSFERRIN: CPT

## 2021-02-15 PROCEDURE — 85025 COMPLETE CBC W/AUTO DIFF WBC: CPT

## 2021-02-15 PROCEDURE — 83540 ASSAY OF IRON: CPT

## 2021-02-15 PROCEDURE — 99215 OFFICE O/P EST HI 40 MIN: CPT | Performed by: NURSE PRACTITIONER

## 2021-02-15 RX ORDER — CYANOCOBALAMIN 1000 UG/ML
1000 INJECTION INTRAMUSCULAR; SUBCUTANEOUS ONCE
Status: CANCELLED | OUTPATIENT
Start: 2021-02-16

## 2021-02-15 NOTE — PROGRESS NOTES
JOANNE Camporen Freire is a 64year old male here for f/u of Primary cancer of left upper lobe of lung (hcc)  (primary encounter diagnosis)  Brain metastasis (hcc)  Malignant pericardial effusion (hcc)  Bone metastasis (hcc)  Chemotherapy management, en their Covid vaccine at [place], just received part 2. Pt had a low grade temp afterwards. Mariposa Nascimento ECOG PS 2    Review of Systems:     Review of Systems   Constitutional: Positive for fatigue. Negative for appetite change, fever and unexpected weight change.    H Oral Suspension Take 5 mL by mouth 4 (four) times daily before meals and nightly. 480 mL 2   • hydrochlorothiazide 12.5 MG Oral Tab Take 12.5 mg by mouth daily. • Naloxone HCl 4 MG/0.1ML Nasal Liquid 4 mg by Nasal route as needed.  If patient remains un Sublingual SL Tab Place 0.4 mg under the tongue every 5 (five) minutes as needed for Chest pain.        Allergies:     Demerol Hcl [Meperi*    UNKNOWN    Past Medical History:   Diagnosis Date   • Aneurysm of abdominal vessel (HCC)    • Aortic valve defect lobe of lung (hcc)  (primary encounter diagnosis)  Brain metastasis (hcc)  Malignant pericardial effusion (hcc)  Bone metastasis (hcc)  Chemotherapy management, encounter for    Manasa Cabral a 64year old male with a diagnosis of metastatic adenocarc therapy for MET mutation with either the cabmatinib versus the crizotinib. S/p 4 cycle palliative Carboplatin/Pemetrexed/Pembrolizumab.   PET/CT showing very favorable response to therapy, discussed that some of the area by the pleural enhancement likely 10/325 mg prn, none recently. Constipation:  Controlled with daily MiraLAX. Anemia:  Likely chemotherapy-induced. Hypothyroidism:  Increase levothyroxine, refer to endocrinology. Due to immunotherapy. Call prn.  RTC prior to C8      All ques Eosinophil Absolute 0.09 0.00 - 0.70 x10(3) uL    Basophil Absolute 0.02 0.00 - 0.20 x10(3) uL    Immature Granulocyte Absolute 0.04 0.00 - 1.00 x10(3) uL    Neutrophil % 72.6 %    Lymphocyte % 14.7 %    Monocyte % 9.3 %    Eosinophil % 2.0 %    Basophil %

## 2021-02-16 ENCOUNTER — OFFICE VISIT (OUTPATIENT)
Dept: HEMATOLOGY/ONCOLOGY | Facility: HOSPITAL | Age: 76
End: 2021-02-16
Attending: INTERNAL MEDICINE
Payer: MEDICARE

## 2021-02-16 ENCOUNTER — TELEPHONE (OUTPATIENT)
Dept: HEMATOLOGY/ONCOLOGY | Facility: HOSPITAL | Age: 76
End: 2021-02-16

## 2021-02-16 VITALS
DIASTOLIC BLOOD PRESSURE: 56 MMHG | RESPIRATION RATE: 18 BRPM | OXYGEN SATURATION: 97 % | SYSTOLIC BLOOD PRESSURE: 139 MMHG | HEART RATE: 80 BPM | TEMPERATURE: 98 F

## 2021-02-16 DIAGNOSIS — Z45.2 ENCOUNTER FOR CENTRAL LINE CARE: Primary | ICD-10-CM

## 2021-02-16 DIAGNOSIS — C34.12 PRIMARY CANCER OF LEFT UPPER LOBE OF LUNG (HCC): ICD-10-CM

## 2021-02-16 DIAGNOSIS — Z51.81 MEDICATION MONITORING ENCOUNTER: ICD-10-CM

## 2021-02-16 DIAGNOSIS — G89.3 CANCER RELATED PAIN: ICD-10-CM

## 2021-02-16 PROCEDURE — 96375 TX/PRO/DX INJ NEW DRUG ADDON: CPT

## 2021-02-16 PROCEDURE — 96413 CHEMO IV INFUSION 1 HR: CPT

## 2021-02-16 PROCEDURE — 96372 THER/PROPH/DIAG INJ SC/IM: CPT

## 2021-02-16 PROCEDURE — 96411 CHEMO IV PUSH ADDL DRUG: CPT

## 2021-02-16 RX ORDER — LEVOTHYROXINE SODIUM 0.07 MG/1
75 TABLET ORAL
Qty: 30 TABLET | Refills: 1 | OUTPATIENT
Start: 2021-02-16

## 2021-02-16 RX ORDER — CYANOCOBALAMIN 1000 UG/ML
INJECTION INTRAMUSCULAR; SUBCUTANEOUS
Status: DISCONTINUED
Start: 2021-02-16 | End: 2021-02-16

## 2021-02-16 RX ORDER — LEVOTHYROXINE SODIUM 0.07 MG/1
75 TABLET ORAL
Qty: 30 TABLET | Refills: 1 | Status: SHIPPED | OUTPATIENT
Start: 2021-02-16 | End: 2021-05-27

## 2021-02-16 RX ORDER — CYANOCOBALAMIN 1000 UG/ML
1000 INJECTION INTRAMUSCULAR; SUBCUTANEOUS ONCE
Status: COMPLETED | OUTPATIENT
Start: 2021-02-16 | End: 2021-02-16

## 2021-02-16 RX ADMIN — CYANOCOBALAMIN 1000 MCG: 1000 INJECTION INTRAMUSCULAR; SUBCUTANEOUS at 09:54:00

## 2021-02-16 NOTE — TELEPHONE ENCOUNTER
The thyroid med that pt picked up was 50mg instead of the 75 that mycreyt stated. [address] said they would need a nw script.  Please advise

## 2021-02-16 NOTE — PROGRESS NOTES
PPt here for C7 D1 Keytruda, Alimta , Vit B12 Inj    Arrives Ambulating independently, using walker   accompanied by Self . Pt offers no new complaints today. Patient states he is eating and drinking well.      Patient reports possible pregnancy since maintain oral integrity  adequate sleep/rest  family support/coping well  free of infection  maintains/gains weight  no active bleeding  nutritional needs met  optimal lab values  O2 saturations are WNL for patient  patient supported/coping well  symptoms

## 2021-02-16 NOTE — TELEPHONE ENCOUNTER
[facility] [address] [facility], [phone] need a refill on Levothyroxine 75 mcg oral tab

## 2021-02-17 RX ORDER — MORPHINE SULFATE 15 MG/1
15 TABLET, FILM COATED, EXTENDED RELEASE ORAL EVERY 12 HOURS SCHEDULED
Qty: 60 TABLET | Refills: 0 | Status: SHIPPED | OUTPATIENT
Start: 2021-02-17 | End: 2021-03-14

## 2021-02-22 ENCOUNTER — TELEPHONE (OUTPATIENT)
Dept: HEMATOLOGY/ONCOLOGY | Facility: HOSPITAL | Age: 76
End: 2021-02-22

## 2021-02-22 ENCOUNTER — OFFICE VISIT (OUTPATIENT)
Dept: HEMATOLOGY/ONCOLOGY | Facility: HOSPITAL | Age: 76
End: 2021-02-22
Attending: INTERNAL MEDICINE
Payer: MEDICARE

## 2021-02-22 ENCOUNTER — APPOINTMENT (OUTPATIENT)
Dept: CT IMAGING | Facility: HOSPITAL | Age: 76
End: 2021-02-22
Attending: EMERGENCY MEDICINE
Payer: MEDICARE

## 2021-02-22 ENCOUNTER — APPOINTMENT (OUTPATIENT)
Dept: GENERAL RADIOLOGY | Facility: HOSPITAL | Age: 76
End: 2021-02-22
Payer: MEDICARE

## 2021-02-22 ENCOUNTER — HOSPITAL ENCOUNTER (OUTPATIENT)
Facility: HOSPITAL | Age: 76
Setting detail: OBSERVATION
Discharge: HOME | End: 2021-02-23
Admitting: HOSPITALIST
Payer: MEDICARE

## 2021-02-22 ENCOUNTER — NURSE ONLY (OUTPATIENT)
Dept: HEMATOLOGY/ONCOLOGY | Facility: HOSPITAL | Age: 76
End: 2021-02-22
Attending: PHYSICIAN ASSISTANT
Payer: MEDICARE

## 2021-02-22 VITALS
HEART RATE: 85 BPM | SYSTOLIC BLOOD PRESSURE: 138 MMHG | DIASTOLIC BLOOD PRESSURE: 57 MMHG | WEIGHT: 205 LBS | RESPIRATION RATE: 18 BRPM | TEMPERATURE: 98 F | HEIGHT: 69 IN | BODY MASS INDEX: 30.36 KG/M2 | OXYGEN SATURATION: 96 %

## 2021-02-22 DIAGNOSIS — R53.1 WEAKNESS: ICD-10-CM

## 2021-02-22 DIAGNOSIS — C34.12 PRIMARY CANCER OF LEFT UPPER LOBE OF LUNG (HCC): Primary | ICD-10-CM

## 2021-02-22 DIAGNOSIS — M25.512 ACUTE PAIN OF LEFT SHOULDER: ICD-10-CM

## 2021-02-22 DIAGNOSIS — C79.51 BONE METASTASIS (HCC): ICD-10-CM

## 2021-02-22 DIAGNOSIS — D64.9 ANEMIA, UNSPECIFIED TYPE: Primary | ICD-10-CM

## 2021-02-22 DIAGNOSIS — D64.9 ANEMIA, UNSPECIFIED TYPE: ICD-10-CM

## 2021-02-22 LAB
ALBUMIN SERPL-MCNC: 3.1 G/DL (ref 3.4–5)
ALBUMIN/GLOB SERPL: 0.8 {RATIO} (ref 1–2)
ALP LIVER SERPL-CCNC: 68 U/L
ALT SERPL-CCNC: 47 U/L
ANION GAP SERPL CALC-SCNC: 4 MMOL/L (ref 0–18)
ANION GAP SERPL CALC-SCNC: 5 MMOL/L (ref 0–18)
ANTIBODY SCREEN: NEGATIVE
APTT PPP: 30 SECONDS (ref 23.2–35.3)
AST SERPL-CCNC: 38 U/L (ref 15–37)
BASOPHILS # BLD: 0.04 X10(3) UL (ref 0–0.2)
BASOPHILS NFR BLD: 1 %
BILIRUB SERPL-MCNC: 0.8 MG/DL (ref 0.1–2)
BUN BLD-MCNC: 24 MG/DL (ref 7–18)
BUN BLD-MCNC: 26 MG/DL (ref 7–18)
BUN/CREAT SERPL: 21.2 (ref 10–20)
BUN/CREAT SERPL: 23 (ref 10–20)
CALCIUM BLD-MCNC: 9.1 MG/DL (ref 8.5–10.1)
CALCIUM BLD-MCNC: 9.2 MG/DL (ref 8.5–10.1)
CHLORIDE SERPL-SCNC: 104 MMOL/L (ref 98–112)
CHLORIDE SERPL-SCNC: 105 MMOL/L (ref 98–112)
CO2 SERPL-SCNC: 30 MMOL/L (ref 21–32)
CO2 SERPL-SCNC: 30 MMOL/L (ref 21–32)
CREAT BLD-MCNC: 1.13 MG/DL
CREAT BLD-MCNC: 1.13 MG/DL
DEPRECATED RDW RBC AUTO: 74.2 FL (ref 35.1–46.3)
EOSINOPHIL # BLD: 0.04 X10(3) UL (ref 0–0.7)
EOSINOPHIL NFR BLD: 1 %
ERYTHROCYTE [DISTWIDTH] IN BLOOD BY AUTOMATED COUNT: 17.7 % (ref 11–15)
GLOBULIN PLAS-MCNC: 4.1 G/DL (ref 2.8–4.4)
GLUCOSE BLD-MCNC: 102 MG/DL (ref 70–99)
GLUCOSE BLD-MCNC: 129 MG/DL (ref 70–99)
HCT VFR BLD AUTO: 22.8 %
HGB BLD-MCNC: 7.2 G/DL
INR BLD: 1.14 (ref 0.9–1.2)
LYMPHOCYTES NFR BLD: 0.36 X10(3) UL (ref 1–4)
LYMPHOCYTES NFR BLD: 9 %
M PROTEIN MFR SERPL ELPH: 7.2 G/DL (ref 6.4–8.2)
MCH RBC QN AUTO: 35.8 PG (ref 26–34)
MCHC RBC AUTO-ENTMCNC: 31.6 G/DL (ref 31–37)
MCV RBC AUTO: 113.4 FL
MONOCYTES # BLD: 0.04 X10(3) UL (ref 0.1–1)
MONOCYTES NFR BLD: 1 %
NEUTROPHILS # BLD AUTO: 3.64 X10 (3) UL (ref 1.5–7.7)
NEUTROPHILS NFR BLD: 82 %
NEUTS BAND NFR BLD: 6 %
NEUTS HYPERSEG # BLD: 3.52 X10(3) UL (ref 1.5–7.7)
NT-PROBNP SERPL-MCNC: 1159 PG/ML (ref ?–450)
OSMOLALITY SERPL CALC.SUM OF ELEC: 291 MOSM/KG (ref 275–295)
OSMOLALITY SERPL CALC.SUM OF ELEC: 296 MOSM/KG (ref 275–295)
PLATELET # BLD AUTO: 117 10(3)UL (ref 150–450)
PLATELET MORPHOLOGY: NORMAL
POTASSIUM SERPL-SCNC: 3.7 MMOL/L (ref 3.5–5.1)
POTASSIUM SERPL-SCNC: 3.8 MMOL/L (ref 3.5–5.1)
PROTHROMBIN TIME: 14.4 SECONDS (ref 11.8–14.5)
RBC # BLD AUTO: 2.01 X10(6)UL
RH BLOOD TYPE: POSITIVE
SARS-COV-2 RNA RESP QL NAA+PROBE: NOT DETECTED
SODIUM SERPL-SCNC: 138 MMOL/L (ref 136–145)
SODIUM SERPL-SCNC: 140 MMOL/L (ref 136–145)
TOTAL CELLS COUNTED: 100
TROPONIN I SERPL-MCNC: <0.045 NG/ML (ref ?–0.04)
WBC # BLD AUTO: 4 X10(3) UL (ref 4–11)

## 2021-02-22 PROCEDURE — 99214 OFFICE O/P EST MOD 30 MIN: CPT | Performed by: PHYSICIAN ASSISTANT

## 2021-02-22 PROCEDURE — 71260 CT THORAX DX C+: CPT | Performed by: EMERGENCY MEDICINE

## 2021-02-22 PROCEDURE — 71045 X-RAY EXAM CHEST 1 VIEW: CPT

## 2021-02-22 PROCEDURE — 36415 COLL VENOUS BLD VENIPUNCTURE: CPT

## 2021-02-22 PROCEDURE — 99220 INITIAL OBSERVATION CARE,LEVL III: CPT | Performed by: HOSPITALIST

## 2021-02-22 RX ORDER — METOCLOPRAMIDE HYDROCHLORIDE 5 MG/ML
10 INJECTION INTRAMUSCULAR; INTRAVENOUS EVERY 8 HOURS PRN
Status: DISCONTINUED | OUTPATIENT
Start: 2021-02-22 | End: 2021-02-23

## 2021-02-22 RX ORDER — FINASTERIDE 5 MG/1
5 TABLET, FILM COATED ORAL DAILY
Status: DISCONTINUED | OUTPATIENT
Start: 2021-02-22 | End: 2021-02-23

## 2021-02-22 RX ORDER — LEVOTHYROXINE SODIUM 0.07 MG/1
75 TABLET ORAL
Status: DISCONTINUED | OUTPATIENT
Start: 2021-02-22 | End: 2021-02-23

## 2021-02-22 RX ORDER — ALPRAZOLAM 0.5 MG/1
0.5 TABLET ORAL 2 TIMES DAILY PRN
Status: DISCONTINUED | OUTPATIENT
Start: 2021-02-22 | End: 2021-02-23

## 2021-02-22 RX ORDER — FOLIC ACID 1 MG/1
1 TABLET ORAL DAILY
Status: DISCONTINUED | OUTPATIENT
Start: 2021-02-22 | End: 2021-02-23

## 2021-02-22 RX ORDER — IPRATROPIUM BROMIDE AND ALBUTEROL SULFATE 2.5; .5 MG/3ML; MG/3ML
3 SOLUTION RESPIRATORY (INHALATION) EVERY 6 HOURS PRN
Status: DISCONTINUED | OUTPATIENT
Start: 2021-02-22 | End: 2021-02-23

## 2021-02-22 RX ORDER — TERAZOSIN 5 MG/1
5 CAPSULE ORAL
Status: DISCONTINUED | OUTPATIENT
Start: 2021-02-22 | End: 2021-02-23

## 2021-02-22 RX ORDER — MORPHINE SULFATE 15 MG/1
15 TABLET, FILM COATED, EXTENDED RELEASE ORAL EVERY 12 HOURS SCHEDULED
Status: DISCONTINUED | OUTPATIENT
Start: 2021-02-22 | End: 2021-02-23

## 2021-02-22 RX ORDER — AMLODIPINE BESYLATE 5 MG/1
5 TABLET ORAL DAILY
Status: DISCONTINUED | OUTPATIENT
Start: 2021-02-22 | End: 2021-02-23

## 2021-02-22 RX ORDER — NITROGLYCERIN 0.4 MG/1
0.4 TABLET SUBLINGUAL EVERY 5 MIN PRN
Status: DISCONTINUED | OUTPATIENT
Start: 2021-02-22 | End: 2021-02-23

## 2021-02-22 RX ORDER — LOPERAMIDE HYDROCHLORIDE 2 MG/1
2 CAPSULE ORAL 4 TIMES DAILY PRN
Status: DISCONTINUED | OUTPATIENT
Start: 2021-02-22 | End: 2021-02-23

## 2021-02-22 RX ORDER — PROCHLORPERAZINE MALEATE 10 MG
10 TABLET ORAL EVERY 8 HOURS PRN
Status: DISCONTINUED | OUTPATIENT
Start: 2021-02-22 | End: 2021-02-23

## 2021-02-22 RX ORDER — HYDROCHLOROTHIAZIDE 25 MG/1
12.5 TABLET ORAL DAILY
Status: DISCONTINUED | OUTPATIENT
Start: 2021-02-22 | End: 2021-02-23

## 2021-02-22 RX ORDER — ONDANSETRON 2 MG/ML
4 INJECTION INTRAMUSCULAR; INTRAVENOUS EVERY 6 HOURS PRN
Status: DISCONTINUED | OUTPATIENT
Start: 2021-02-22 | End: 2021-02-23

## 2021-02-22 RX ORDER — ATORVASTATIN CALCIUM 20 MG/1
20 TABLET, FILM COATED ORAL NIGHTLY
Status: DISCONTINUED | OUTPATIENT
Start: 2021-02-22 | End: 2021-02-23

## 2021-02-22 RX ORDER — ACETAMINOPHEN 325 MG/1
650 TABLET ORAL EVERY 6 HOURS PRN
Status: DISCONTINUED | OUTPATIENT
Start: 2021-02-22 | End: 2021-02-23

## 2021-02-22 RX ORDER — ASPIRIN 81 MG/1
81 TABLET, CHEWABLE ORAL DAILY
Status: DISCONTINUED | OUTPATIENT
Start: 2021-02-22 | End: 2021-02-23

## 2021-02-22 RX ORDER — ALBUTEROL SULFATE 90 UG/1
2 AEROSOL, METERED RESPIRATORY (INHALATION) EVERY 6 HOURS PRN
Status: DISCONTINUED | OUTPATIENT
Start: 2021-02-22 | End: 2021-02-23

## 2021-02-22 RX ORDER — ONDANSETRON 4 MG/1
8 TABLET, FILM COATED ORAL EVERY 8 HOURS PRN
Status: DISCONTINUED | OUTPATIENT
Start: 2021-02-22 | End: 2021-02-23

## 2021-02-22 RX ORDER — TEMAZEPAM 15 MG/1
15 CAPSULE ORAL NIGHTLY PRN
Status: DISCONTINUED | OUTPATIENT
Start: 2021-02-22 | End: 2021-02-23

## 2021-02-22 NOTE — PROGRESS NOTES
S:  64year old male presents today, with his spouse, with c/o progressive weakness since Friday, 2/19/21, following his 7th cycle of maintenance Pemetrexed/Pembrolizumab on 2/16/21.   He reports anorexia, early satiety, dyspnea even at rest, and left shoul

## 2021-02-22 NOTE — ED INITIAL ASSESSMENT (HPI)
Hx cardaic tamponade arrives c/o left shoudler pain. Hx lung ca w mets in left upper lobe    And low hbg on laws.  +fatige/weakness    [place]reme [place]

## 2021-02-22 NOTE — TELEPHONE ENCOUNTER
Lung Cancer - 2/16/21 - C1D1 Keytruda/Alimta - cyanocobalamin    Spouse called, Bob Sober weak not eating, having left shoulder pain (5-6/10)) not helped by MS BID ER, has cough, no fevers, no chest pain, little sob same, not needed 02.   Denies dizziness or l

## 2021-02-23 ENCOUNTER — TELEPHONE (OUTPATIENT)
Dept: HEMATOLOGY/ONCOLOGY | Facility: HOSPITAL | Age: 76
End: 2021-02-23

## 2021-02-23 VITALS
BODY MASS INDEX: 30.05 KG/M2 | TEMPERATURE: 98 F | HEIGHT: 69 IN | OXYGEN SATURATION: 96 % | DIASTOLIC BLOOD PRESSURE: 62 MMHG | SYSTOLIC BLOOD PRESSURE: 132 MMHG | RESPIRATION RATE: 18 BRPM | HEART RATE: 78 BPM | WEIGHT: 202.88 LBS

## 2021-02-23 LAB
ANION GAP SERPL CALC-SCNC: 6 MMOL/L (ref 0–18)
BASOPHILS # BLD AUTO: 0.01 X10(3) UL (ref 0–0.2)
BASOPHILS # BLD: 0.04 X10(3) UL (ref 0–0.2)
BASOPHILS NFR BLD AUTO: 0.4 %
BASOPHILS NFR BLD: 1 %
BUN BLD-MCNC: 23 MG/DL (ref 7–18)
BUN/CREAT SERPL: 21.5 (ref 10–20)
CALCIUM BLD-MCNC: 8.7 MG/DL (ref 8.5–10.1)
CHLORIDE SERPL-SCNC: 104 MMOL/L (ref 98–112)
CO2 SERPL-SCNC: 29 MMOL/L (ref 21–32)
CREAT BLD-MCNC: 1.07 MG/DL
DEPRECATED RDW RBC AUTO: 72.9 FL (ref 35.1–46.3)
DEPRECATED RDW RBC AUTO: 74 FL (ref 35.1–46.3)
EOSINOPHIL # BLD AUTO: 0.01 X10(3) UL (ref 0–0.7)
EOSINOPHIL # BLD: 0.07 X10(3) UL (ref 0–0.7)
EOSINOPHIL NFR BLD AUTO: 0.4 %
EOSINOPHIL NFR BLD: 2 %
ERYTHROCYTE [DISTWIDTH] IN BLOOD BY AUTOMATED COUNT: 17.9 % (ref 11–15)
ERYTHROCYTE [DISTWIDTH] IN BLOOD BY AUTOMATED COUNT: 18.4 % (ref 11–15)
GLUCOSE BLD-MCNC: 125 MG/DL (ref 70–99)
HAV IGM SER QL: 2.4 MG/DL (ref 1.6–2.6)
HCT VFR BLD AUTO: 22.1 %
HCT VFR BLD AUTO: 23.3 %
HGB BLD-MCNC: 7 G/DL
HGB BLD-MCNC: 7.6 G/DL
HGB BLD-MCNC: 8.6 G/DL
IMM GRANULOCYTES # BLD AUTO: 0.03 X10(3) UL (ref 0–1)
IMM GRANULOCYTES NFR BLD: 1.1 %
LYMPHOCYTES # BLD AUTO: 0.29 X10(3) UL (ref 1–4)
LYMPHOCYTES NFR BLD AUTO: 11 %
LYMPHOCYTES NFR BLD: 0.3 X10(3) UL (ref 1–4)
LYMPHOCYTES NFR BLD: 8 %
MCH RBC QN AUTO: 35 PG (ref 26–34)
MCH RBC QN AUTO: 35.9 PG (ref 26–34)
MCHC RBC AUTO-ENTMCNC: 31.7 G/DL (ref 31–37)
MCHC RBC AUTO-ENTMCNC: 32.6 G/DL (ref 31–37)
MCV RBC AUTO: 107.4 FL
MCV RBC AUTO: 113.3 FL
MONOCYTES # BLD AUTO: 0.04 X10(3) UL (ref 0.1–1)
MONOCYTES # BLD: 0.04 X10(3) UL (ref 0.1–1)
MONOCYTES NFR BLD AUTO: 1.5 %
MONOCYTES NFR BLD: 1 %
NEUTROPHILS # BLD AUTO: 2.26 X10 (3) UL (ref 1.5–7.7)
NEUTROPHILS # BLD AUTO: 2.26 X10(3) UL (ref 1.5–7.7)
NEUTROPHILS # BLD AUTO: 3.26 X10 (3) UL (ref 1.5–7.7)
NEUTROPHILS NFR BLD AUTO: 85.6 %
NEUTROPHILS NFR BLD: 80 %
NEUTS BAND NFR BLD: 8 %
NEUTS HYPERSEG # BLD: 3.26 X10(3) UL (ref 1.5–7.7)
OSMOLALITY SERPL CALC.SUM OF ELEC: 293 MOSM/KG (ref 275–295)
PHOSPHATE SERPL-MCNC: 2.7 MG/DL (ref 2.5–4.9)
PLATELET # BLD AUTO: 115 10(3)UL (ref 150–450)
PLATELET # BLD AUTO: 96 10(3)UL (ref 150–450)
PLATELET MORPHOLOGY: NORMAL
POTASSIUM SERPL-SCNC: 3.6 MMOL/L (ref 3.5–5.1)
RBC # BLD AUTO: 1.95 X10(6)UL
RBC # BLD AUTO: 2.17 X10(6)UL
SODIUM SERPL-SCNC: 139 MMOL/L (ref 136–145)
TOTAL CELLS COUNTED: 100
WBC # BLD AUTO: 2.6 X10(3) UL (ref 4–11)
WBC # BLD AUTO: 3.7 X10(3) UL (ref 4–11)

## 2021-02-23 PROCEDURE — 99217 OBSERVATION CARE DISCHARGE: CPT | Performed by: INTERNAL MEDICINE

## 2021-02-23 PROCEDURE — 99215 OFFICE O/P EST HI 40 MIN: CPT | Performed by: INTERNAL MEDICINE

## 2021-02-23 PROCEDURE — 99214 OFFICE O/P EST MOD 30 MIN: CPT | Performed by: STUDENT IN AN ORGANIZED HEALTH CARE EDUCATION/TRAINING PROGRAM

## 2021-02-23 PROCEDURE — 30233N1 TRANSFUSION OF NONAUTOLOGOUS RED BLOOD CELLS INTO PERIPHERAL VEIN, PERCUTANEOUS APPROACH: ICD-10-PCS | Performed by: INTERNAL MEDICINE

## 2021-02-23 RX ORDER — POTASSIUM CHLORIDE 20 MEQ/1
40 TABLET, EXTENDED RELEASE ORAL EVERY 4 HOURS
Status: COMPLETED | OUTPATIENT
Start: 2021-02-23 | End: 2021-02-23

## 2021-02-23 NOTE — CONSULTS
[facility]    Cardiology Consultation  [facility]    Scotty Vela Patient Status:  Observation    1945 MRN [id number]   Location [facility] 4W/SW/SE Attending Temitope Bach MD   [facility] Day # 0 PC ondansetron HCl (ZOFRAN) injection 4 mg, 4 mg, Intravenous, Q6H PRN    •  Metoclopramide HCl (REGLAN) injection 10 mg, 10 mg, Intravenous, Q8H PRN    •  temazepam (RESTORIL) cap 15 mg, 15 mg, Oral, Nightly PRN    •  Albuterol Sulfate  (90 Base) MCG/ Tab, Take 1 tablet (0.5 mg total) by mouth 2 (two) times daily as needed for Anxiety. •  finasteride 5 MG Oral Tab, Take 1 tablet (5 mg total) by mouth daily. •  Terazosin HCl 5 MG Oral Cap, Take 1 capsule (5 mg total) by mouth Abdifatah@[employer].     •  hydr Fluticasone-Umeclidin-Vilant 100-62.5-25 MCG/INH Inhalation Aerosol Powder, Breath Activated, Inhale 1 puff into the lungs daily. •  Loperamide HCl 2 MG Oral Cap, Take 1 capsule (2 mg total) by mouth 4 (four) times daily as needed for Diarrhea.     •  ni -- 1 x    Total Output -- 475 --       Net I/O     -- 383.8 360        Scheduled Meds:   • amLODIPine Besylate  5 mg Oral Daily   • aspirin  81 mg Oral Daily   • atorvastatin  20 mg Oral Nightly   • finasteride  5 mg Oral Daily   • Fluticasone-Umeclidin-Vi Lab 02/22/21  1402 02/22/21  1614 02/23/21  0225   * 102* 125*   BUN 24* 26* 23*   CREATSERUM 1.13 1.13 1.07   GFRAA 72 72 76   GFRNAA 62 62 66   CA 9.2 9.1 8.7    138 139   K 3.8 3.7 3.6    104 104   CO2 30.0 30.0 29.0     Recent Labs of vertebral body height is new since 4/24/20. . 3.  Cardiomegaly. Pulmonary artery enlargement. These findings suggest pulmonary artery hypertension. 4.  Coronary atherosclerosis. 5.  Cardiac valvular calcifications.   Fusiform aneurysmal enlargement of t stenosis, prior history of CAD s/p remote PCI  - Brief asymptomatic 7 second run early this morning, occasional PVCs on telemetry  - Given his cancer diagnoses and poor prognosis, would not recommend aggressive treatment  - Continue metoprolol 25 mg BID, i

## 2021-02-23 NOTE — CM/SW NOTE
2/23 203pm: SW was notified the pt did not want to work with PT as he stated he is at his baseline. Plan will be for the pt. To return home with RN services from [place] [id number]. Resume order has been entered. --------------------  2/23 1142am: The pt.

## 2021-02-23 NOTE — PLAN OF CARE
Pt A/O x4. VSS. Tolerating diet. Denies nausea. Cardiology on consult, no new orders. Pt okay to dc from their standpoint. 1 unit of PRBCs transfused, repeat hgb 8.6. K+ replaced per protocol. No calls from tele. Ambulating with assist. Voiding.  Augustin MS Co non-pharmacological measures as appropriate and evaluate response  - Consider cultural and social influences on pain and pain management  - Manage/alleviate anxiety  - Utilize distraction and/or relaxation techniques  - Monitor for opioid side effects  - N Progressing     Problem: RESPIRATORY - ADULT  Goal: Achieves optimal ventilation and oxygenation  Description: INTERVENTIONS:  - Assess for changes in respiratory status  - Assess for changes in mentation and behavior  - Position to facilitate oxygenation rectal medication administration  - Ensure safe mobilization of patient  - Hold pressure on venipuncture sites to achieve adequate hemostasis  - Assess for signs and symptoms of internal bleeding  - Monitor lab trends  - Patient is to report abnormal signs

## 2021-02-23 NOTE — PLAN OF CARE
Problem: Patient Centered Care  Goal: Patient preferences are identified and integrated in the patient's plan of care  Description: Interventions:  - What would you like us to know as we care for you?   - Provide timely, complete, and accurate informatio FALL  Goal: Free from fall injury  Description: INTERVENTIONS:  - Assess pt frequently for physical needs  - Identify cognitive and physical deficits and behaviors that affect risk of falls.   - Laurier fall precautions as indicated by assessment.  - Educ breathe, Incentive Spirometry  - Assess the need for suctioning and perform as needed  - Assess and instruct to report SOB or any respiratory difficulty  - Respiratory Therapy support as indicated  - Manage/alleviate anxiety  - Monitor for signs/symptoms o admit from ED. Patient alert and oriented x 4. Vitals stable. Very [place] - bilateral hearing aids/cochlear implants in place. On room air.  Remote tele - received a call about 17 beats of V-tach, -150, now SR HR 84 - Dr. Otilia Sparrow paged - awaiting respo

## 2021-02-23 NOTE — ED NOTES
Orders for admission, patient is aware of plan and ready to go upstairs. Any questions, please call ED CINTIA Copeland extension [phone].       Type of COVID test sent: Rapid  COVID Suspicion level: Not detected    Titratable drug(s) infusing: NA  Rate: NA    LOC

## 2021-02-23 NOTE — ED PROVIDER NOTES
Patient Seen in: [facility] AND [facility] Emergency Department      History   Patient presents with:  Fatigue  Abnormal Result    Stated Complaint: weakness, low hgb, r/o cardiac tamponade    HPI/Subjective:   HPI    64 yoM with metastatic stage IV non-small ce °C) (Oral)   Resp 20   Ht 175.3 cm (5' 9\")   Wt 92 kg   SpO2 94%   BMI 29.96 kg/m²         Physical Exam  Vitals signs and nursing note reviewed. Constitutional:       Appearance: He is well-developed. HENT:      Head: Normocephalic and atraumatic. (*)     RDW-SD 74.0 (*)     RDW 17.9 (*)     .0 (*)     All other components within normal limits   PROTHROMBIN TIME (PT) - Normal   PTT, ACTIVATED - Normal   TROPONIN I - Normal   RAPID SARS-COV-2 BY PCR - Normal   CBC WITH DIFFERENTIAL WITH PLATEL demonstrated on 9/11/20 has resolved. Multiple lucent and sclerotic foci are present within the bilateral ribs, compatible with bone metastasis.   Several these foci appear to be new since PET-CT from 9/24/20 and therefore recommend correlation with bone s Clinical Impression:  Anemia, unspecified type  (primary encounter diagnosis)    Disposition:  Admit  2/22/2021  7:27 pm    Follow-up:  No follow-up provider specified.   We recommend that you schedule follow up care with a primary care provider within

## 2021-02-23 NOTE — DISCHARGE SUMMARY
[facility]    Discharge Summary    Sujit Kay Patient Status:  Observation    1945 MRN [id number]   Location [facility] 4W/SW/SE Attending Mahsa Martin MD   Hosp Day # 0 PCP Jocelyn Brown MD     Date of Admission: results found for this visit on 02/22/21. IMAGING STUDIES: SOME MAY NEED FOLLOW UP WITH PCP   Xr Chest Ap Portable  (cpt=71045)    Result Date: 2/22/2021  CONCLUSION:  1.  Chronic opacification left lower lung with chronic left basilar pleural effusion/r humerus are not included in the field of imaging. There is lucency within the inferior aspect of the left scapula, compatible with bone metastasis, as  this lucency was FDG avid on 9/24/20.   Dictated by (CST): Tavo Pleitez MD on 2/22/2021 at 7:08 PM     F Discharge Medications:      Discharge Medications      CONTINUE taking these medications      Instructions Prescription details   Albuterol Sulfate  (90 Base) MCG/ACT Aers      Inhale 2 puffs into the lungs every 6 (six) hours as needed for [facility] 1 tablet (75 mcg total) by mouth before breakfast.   Quantity: 30 tablet  Refills: 1     Loperamide HCl 2 MG Caps  Commonly known as: IMODIUM      Take 1 capsule (2 mg total) by mouth 4 (four) times daily as needed for Diarrhea.    Quantity: 20 capsule  Ref Interventional, Cardiology    Miguelito Diaz MD Consulting Physician  Hematology and Oncology    Maren Neely MD Consulting Physician  ONCOLOGY            Other Discharge Instructions:       ----------------------------------------------------  >30

## 2021-02-23 NOTE — H&P
[facility]    PATIENT'S NAME: Joeschasity Lennons   ATTENDING PHYSICIAN: Cayla Horn MD   PATIENT ACCOUNT#:   [id number]    LOCATION:  [address] [place] [address] RECORD #:   [id number]       YOB: 1945  ADMISSION DATE:       02/22/20 Hernia repair, pericardiocentesis, and right cochlear implant. MEDICATIONS:  Please see medication reconciliation list.    ALLERGIES:  Demerol, side effects. No known drug allergies. SOCIAL HISTORY:  Ex-tobacco user.   No current tobacco, alcohol, or admitted to general medical floor for observation. Blood transfusion. Continue his home medications. No current evidence of pericardial effusion. Further recommendations to follow.     Dictated By Klever Sotomayor MD  d: 02/22/2021 19:46:45  t: 02/22/202

## 2021-02-23 NOTE — TELEPHONE ENCOUNTER
Liza Espinoza RN Called back. We reviewed plan of care to be sure we are all on same plan. She agreed, there has been no hospice discussion from her as pt is still on active treatment. She will continue to see patient post discharge.

## 2021-02-23 NOTE — CONSULTS
[facility]OSP - [facility]    Report of Hematology/Oncology Consultation    Bekah Collins Patient Status:  Inpatient    1945 MRN [id number]   Location 439/439-A Attending Sari Darby MD   Date of admission 2021  5:54 PM   PCP Deanna Lane Primary cancer of left upper lobe of lung ([facility]   9/11/2020 Initial Diagnosis    Primary cancer of left upper lobe of lung ([facility] [address]     9/21/2020 Cancer Staged    Staging form: Lung, AJCC 8th Edition  - Clinical stage from 9/21/2020: Stage IVB (pM1c)     10/6 •  [COMPLETED] pembrolizumab (KEYTRUDA) 200 mg in sodium chloride 0.9% 108 mL chemo-IVPB, 200 mg, Intravenous, Once, Viv Sarmiento APRN, Stopped at 01/26/21 1050    •  [COMPLETED] PEMEtrexed Disodium (ALIMTA) 1,075 mg in sodium chloride 0.9% 143 mL chem •  maalox/diphenhydramine/lidocaine viscous Oral Suspension, Take 5 mL by mouth 4 (four) times daily before meals and nightly., Disp: 480 mL, Rfl: 2    •  Naloxone HCl 4 MG/0.1ML Nasal Liquid, 4 mg by Nasal route as needed.  If patient remains unresponsive, • Levothyroxine Sodium  75 mcg Oral Before breakfast   • maalox/diphenhydramine/lidocaine viscous  5 mL Oral TID AC and HS   • metoprolol Tartrate  25 mg Oral BID   • morphINE Sulfate ER  15 mg Oral Q12H   • Terazosin HCl  5 mg Oral Sol@[employer]         Fami GFR, -American 72 >=60    ALT 47 16 - 61 U/L    AST 38 (H) 15 - 37 U/L    Alkaline Phosphatase 68 45 - 117 U/L    Bilirubin, Total 0.8 0.1 - 2.0 mg/dL    Total Protein 7.2 6.4 - 8.2 g/dL    Albumin 3.1 (L) 3.4 - 5.0 g/dL    Globulin  4.1 2.8 - 4.4 Collection Time: 02/22/21  4:14 PM   Result Value Ref Range    Hold Lavender Auto Resulted    RAINBOW DRAW LIGHT GREEN    Collection Time: 02/22/21  4:14 PM   Result Value Ref Range    Hold Lt Green Auto Resulted    RAINBOW DRAW GOLD    Collection Time: 0 Rapid SARS-CoV-2 by PCR Not Detected Not Detected   PREPARE RBC    Collection Time: 02/22/21  9:22 PM   Result Value Ref Range    Blood Product [id number]     Unit Number [id number]     UNIT ABO O     UNIT RH POS     Product Status Issued     Expiratio Collection Time: 02/23/21  2:25 AM   Result Value Ref Range    Magnesium 2.4 1.6 - 2.6 mg/dL   PHOSPHORUS    Collection Time: 02/23/21  2:25 AM   Result Value Ref Range    Phosphorus 2.7 2.5 - 4.9 mg/dL         Imaging:  Xr Chest Ap Portable  (cpt=71072) CONCLUSION:  1. No acute pulmonary embolus to the subsegmental pulmonary artery level. No acute aortic injury.  2.  There is a wedge-shaped pulmonary opacity in the left upper lobe which has decreased in size since prior exam compatible with favorable pos Staging form: Lung, AJCC 8th Edition  - Clinical stage from 9/21/2020: Stage IVB (pM1c) - Signed by Veda Reynolds MD on 9/28/2020  Prognostic indicators: PDL1 %  MET amplified. EGFR, ALK, ROS-1, BRAF, and RET negative.       Patient was admitted se

## 2021-02-23 NOTE — PHYSICAL THERAPY NOTE
Orders received and chart reviewed. Discussed with CINTIA Proctor. Patient admitted with anemia and weakness. Approached patient in his room. Patient states he has been ambulating in room with walker, believes to be at his baseline.  Patient expressed no concer

## 2021-02-23 NOTE — TELEPHONE ENCOUNTER
Call placed to [facility] to speak with patient nurse. She was unavailable, spoke with Vicki Hunter- per documentation pt has been seen by [facility] recently. Discussion included hospice care in future, they will call back in 6 weeks time.  Pt is

## 2021-02-23 NOTE — PROGRESS NOTES
[facility]    Progress Note    Joseph Bonds Patient Status:  Observation    1945 MRN [id number]   Location [facility] 4W/SW/SE Attending Chhaya Dominguez MD   Hosp Day # 0 PCP Anamika Livingston MD     CC: Latisha dhaliwal finasteride  5 mg Oral Daily   • Fluticasone-Umeclidin-Vilant  1 puff Inhalation Daily   • folic acid  1 mg Oral Daily   • hydrochlorothiazide  12.5 mg Oral Daily   • Levothyroxine Sodium  75 mcg Oral Before breakfast   • maalox/diphenhydramine/lidocaine v effusion/reaction.     Dictated by (CST): Marisela Hammer MD on 2/22/2021 at 5:43 PM     Finalized by (CST): Luigi Mulligan MD on 2/22/2021 at 5:45 PM          Ct Chest Pe Aorta (iv Only) (cpt=71260)    Result Date: 2/22/2021  CONCLUSION: Finalized by (CST): Darek Torres MD on 2/22/2021 at 7:21 PM             Ekg 12-lead    Result Date: 2/22/2021  ECG Report  Interpretation  -------------------------- Sinus Rhythm Left bundle branch block ABNORMAL When compared with ECG of 09/28/2020 16:

## 2021-02-24 LAB
BLOOD TYPE BARCODE: 5100
BLOOD TYPE BARCODE: 5100

## 2021-02-25 ENCOUNTER — TELEPHONE (OUTPATIENT)
Dept: INTERNAL MEDICINE CLINIC | Facility: CLINIC | Age: 76
End: 2021-02-25

## 2021-02-25 DIAGNOSIS — C34.12 PRIMARY CANCER OF LEFT UPPER LOBE OF LUNG (HCC): ICD-10-CM

## 2021-02-25 NOTE — TELEPHONE ENCOUNTER
Fiorella Porter with [facility] calling to inform that there is a delay with re-certifying patient, they will be meeting with patient early next week, week of 3/1/21. May return call if any questions.

## 2021-02-26 RX ORDER — ALPRAZOLAM 0.5 MG/1
0.5 TABLET ORAL 2 TIMES DAILY PRN
Qty: 60 TABLET | Refills: 1 | Status: SHIPPED | OUTPATIENT
Start: 2021-02-26 | End: 2021-03-29

## 2021-03-01 ENCOUNTER — TELEPHONE (OUTPATIENT)
Dept: HEMATOLOGY/ONCOLOGY | Facility: HOSPITAL | Age: 76
End: 2021-03-01

## 2021-03-01 NOTE — TELEPHONE ENCOUNTER
[address] [facility] at [phone] is calling  Because the patient's feet are swollen and redness around his ankles that does not faizan, pain is 7/10 and he is having pain in lower extremities and it's hard for him to walk, Dr. Margo Salcedo pt

## 2021-03-01 NOTE — TELEPHONE ENCOUNTER
Called David Weaver from [facility] back, she wanted to someone look at [place] legs due to more swelling legs and shooting discomfort when walking, making it hard for him to walk.   Stating pain can be 7/10 wih shooting pain, no pain in calf's, selene

## 2021-03-02 ENCOUNTER — NURSE ONLY (OUTPATIENT)
Dept: HEMATOLOGY/ONCOLOGY | Facility: HOSPITAL | Age: 76
End: 2021-03-02
Attending: INTERNAL MEDICINE
Payer: MEDICARE

## 2021-03-02 VITALS
WEIGHT: 202.81 LBS | DIASTOLIC BLOOD PRESSURE: 53 MMHG | RESPIRATION RATE: 18 BRPM | OXYGEN SATURATION: 97 % | TEMPERATURE: 98 F | SYSTOLIC BLOOD PRESSURE: 120 MMHG | HEIGHT: 69 IN | BODY MASS INDEX: 30.04 KG/M2 | HEART RATE: 78 BPM

## 2021-03-02 DIAGNOSIS — C34.12 PRIMARY CANCER OF LEFT UPPER LOBE OF LUNG (HCC): Primary | ICD-10-CM

## 2021-03-02 DIAGNOSIS — C79.51 BONE METASTASIS (HCC): ICD-10-CM

## 2021-03-02 DIAGNOSIS — Z51.81 MEDICATION MONITORING ENCOUNTER: ICD-10-CM

## 2021-03-02 DIAGNOSIS — C79.31 BRAIN METASTASIS (HCC): ICD-10-CM

## 2021-03-02 DIAGNOSIS — Z45.2 ENCOUNTER FOR CENTRAL LINE CARE: Primary | ICD-10-CM

## 2021-03-02 PROCEDURE — 96372 THER/PROPH/DIAG INJ SC/IM: CPT

## 2021-03-02 PROCEDURE — 99213 OFFICE O/P EST LOW 20 MIN: CPT | Performed by: NURSE PRACTITIONER

## 2021-03-02 RX ORDER — AMOXICILLIN AND CLAVULANATE POTASSIUM 875; 125 MG/1; MG/1
1 TABLET, FILM COATED ORAL 2 TIMES DAILY
Qty: 28 TABLET | Refills: 0 | Status: SHIPPED | OUTPATIENT
Start: 2021-03-02 | End: 2021-03-16

## 2021-03-02 NOTE — PROGRESS NOTES
Patient arrives per wheelchair for Xgeva and Acute Care visit with [facility]. Patient has acute care visit due to swelling both ankles. Patient denies any dental issues, no scheduled dental work scheduled.   I gave Baron Smallwood while patient was in Exam Room

## 2021-03-02 NOTE — PROGRESS NOTES
Tavia Janie is a 64year old here today for acurte care visit re Primary cancer of left upper lobe of lung (hcc)  (primary encounter diagnosis)  Brain metastasis (hcc)  Bone metastasis (hcc)        Pt's wife called yesterday asking for patient to be ev

## 2021-03-03 ENCOUNTER — TELEPHONE (OUTPATIENT)
Dept: INTERNAL MEDICINE CLINIC | Facility: CLINIC | Age: 76
End: 2021-03-03

## 2021-03-03 DIAGNOSIS — C79.31 BRAIN METASTASIS (HCC): ICD-10-CM

## 2021-03-03 DIAGNOSIS — G89.3 CANCER RELATED PAIN: ICD-10-CM

## 2021-03-03 DIAGNOSIS — R53.1 WEAKNESS: ICD-10-CM

## 2021-03-03 DIAGNOSIS — C34.12 PRIMARY CANCER OF LEFT UPPER LOBE OF LUNG (HCC): Primary | ICD-10-CM

## 2021-03-03 NOTE — TELEPHONE ENCOUNTER
Vanessa Arellano RN from [facility] contacted office. Needs to \"re-certify\" patient for home health. Will need  a new order to be evaluated every other week.     Thank you

## 2021-03-04 ENCOUNTER — MED REC SCAN ONLY (OUTPATIENT)
Dept: INTERNAL MEDICINE CLINIC | Facility: CLINIC | Age: 76
End: 2021-03-04

## 2021-03-08 ENCOUNTER — OFFICE VISIT (OUTPATIENT)
Dept: HEMATOLOGY/ONCOLOGY | Facility: HOSPITAL | Age: 76
End: 2021-03-08
Attending: NURSE PRACTITIONER
Payer: MEDICARE

## 2021-03-08 ENCOUNTER — HOSPITAL ENCOUNTER (OUTPATIENT)
Dept: ULTRASOUND IMAGING | Facility: HOSPITAL | Age: 76
Discharge: HOME | End: 2021-03-08
Attending: NURSE PRACTITIONER
Payer: MEDICARE

## 2021-03-08 ENCOUNTER — NURSE ONLY (OUTPATIENT)
Dept: HEMATOLOGY/ONCOLOGY | Facility: HOSPITAL | Age: 76
End: 2021-03-08
Attending: PHYSICIAN ASSISTANT
Payer: MEDICARE

## 2021-03-08 ENCOUNTER — TELEPHONE (OUTPATIENT)
Dept: INTERNAL MEDICINE CLINIC | Facility: CLINIC | Age: 76
End: 2021-03-08

## 2021-03-08 VITALS
HEIGHT: 69 IN | BODY MASS INDEX: 29.77 KG/M2 | TEMPERATURE: 98 F | SYSTOLIC BLOOD PRESSURE: 114 MMHG | HEART RATE: 82 BPM | RESPIRATION RATE: 18 BRPM | WEIGHT: 201 LBS | DIASTOLIC BLOOD PRESSURE: 55 MMHG | OXYGEN SATURATION: 93 %

## 2021-03-08 DIAGNOSIS — C79.51 BONE METASTASIS (HCC): ICD-10-CM

## 2021-03-08 DIAGNOSIS — N40.0 BENIGN PROSTATIC HYPERPLASIA, UNSPECIFIED WHETHER LOWER URINARY TRACT SYMPTOMS PRESENT: ICD-10-CM

## 2021-03-08 DIAGNOSIS — C34.12 PRIMARY CANCER OF LEFT UPPER LOBE OF LUNG (HCC): ICD-10-CM

## 2021-03-08 DIAGNOSIS — M79.89 PAIN AND SWELLING OF LOWER LEG, LEFT: ICD-10-CM

## 2021-03-08 DIAGNOSIS — M79.662 PAIN AND SWELLING OF LOWER LEG, LEFT: ICD-10-CM

## 2021-03-08 DIAGNOSIS — Z29.8 ENCOUNTER FOR IMMUNOTHERAPY: ICD-10-CM

## 2021-03-08 DIAGNOSIS — E03.2 HYPOTHYROIDISM DUE TO MEDICATION: ICD-10-CM

## 2021-03-08 DIAGNOSIS — C34.12 PRIMARY CANCER OF LEFT UPPER LOBE OF LUNG (HCC): Primary | ICD-10-CM

## 2021-03-08 DIAGNOSIS — C79.31 BRAIN METASTASIS (HCC): ICD-10-CM

## 2021-03-08 DIAGNOSIS — Z45.2 ENCOUNTER FOR CENTRAL LINE CARE: Primary | ICD-10-CM

## 2021-03-08 DIAGNOSIS — Z51.81 MEDICATION MONITORING ENCOUNTER: ICD-10-CM

## 2021-03-08 LAB
ALBUMIN SERPL-MCNC: 3 G/DL (ref 3.4–5)
ALBUMIN/GLOB SERPL: 0.7 {RATIO} (ref 1–2)
ALP LIVER SERPL-CCNC: 175 U/L
ALT SERPL-CCNC: 61 U/L
ANION GAP SERPL CALC-SCNC: 4 MMOL/L (ref 0–18)
AST SERPL-CCNC: 43 U/L (ref 15–37)
BASOPHILS # BLD AUTO: 0.01 X10(3) UL (ref 0–0.2)
BASOPHILS NFR BLD AUTO: 0.3 %
BILIRUB SERPL-MCNC: 0.4 MG/DL (ref 0.1–2)
BUN BLD-MCNC: 22 MG/DL (ref 7–18)
BUN/CREAT SERPL: 18.3 (ref 10–20)
CALCIUM BLD-MCNC: 9.2 MG/DL (ref 8.5–10.1)
CHLORIDE SERPL-SCNC: 103 MMOL/L (ref 98–112)
CO2 SERPL-SCNC: 32 MMOL/L (ref 21–32)
CREAT BLD-MCNC: 1.2 MG/DL
DEPRECATED RDW RBC AUTO: 67.1 FL (ref 35.1–46.3)
EOSINOPHIL # BLD AUTO: 0.06 X10(3) UL (ref 0–0.7)
EOSINOPHIL NFR BLD AUTO: 2 %
ERYTHROCYTE [DISTWIDTH] IN BLOOD BY AUTOMATED COUNT: 18.7 % (ref 11–15)
GLOBULIN PLAS-MCNC: 4.6 G/DL (ref 2.8–4.4)
GLUCOSE BLD-MCNC: 90 MG/DL (ref 70–99)
HCT VFR BLD AUTO: 24.9 %
HGB BLD-MCNC: 8.2 G/DL
IMM GRANULOCYTES # BLD AUTO: 0.02 X10(3) UL (ref 0–1)
IMM GRANULOCYTES NFR BLD: 0.7 %
LYMPHOCYTES # BLD AUTO: 0.63 X10(3) UL (ref 1–4)
LYMPHOCYTES NFR BLD AUTO: 20.9 %
M PROTEIN MFR SERPL ELPH: 7.6 G/DL (ref 6.4–8.2)
MCH RBC QN AUTO: 34.9 PG (ref 26–34)
MCHC RBC AUTO-ENTMCNC: 32.9 G/DL (ref 31–37)
MCV RBC AUTO: 106 FL
MONOCYTES # BLD AUTO: 0.46 X10(3) UL (ref 0.1–1)
MONOCYTES NFR BLD AUTO: 15.2 %
NEUTROPHILS # BLD AUTO: 1.84 X10 (3) UL (ref 1.5–7.7)
NEUTROPHILS # BLD AUTO: 1.84 X10(3) UL (ref 1.5–7.7)
NEUTROPHILS NFR BLD AUTO: 60.9 %
OSMOLALITY SERPL CALC.SUM OF ELEC: 291 MOSM/KG (ref 275–295)
PLATELET # BLD AUTO: 139 10(3)UL (ref 150–450)
PLATELET MORPHOLOGY: NORMAL
POTASSIUM SERPL-SCNC: 3.1 MMOL/L (ref 3.5–5.1)
RBC # BLD AUTO: 2.35 X10(6)UL
SODIUM SERPL-SCNC: 139 MMOL/L (ref 136–145)
TSI SER-ACNC: 61.5 MIU/ML (ref 0.36–3.74)
URATE SERPL-MCNC: 7.6 MG/DL
WBC # BLD AUTO: 3 X10(3) UL (ref 4–11)

## 2021-03-08 PROCEDURE — 84443 ASSAY THYROID STIM HORMONE: CPT

## 2021-03-08 PROCEDURE — 93971 EXTREMITY STUDY: CPT | Performed by: NURSE PRACTITIONER

## 2021-03-08 PROCEDURE — 85025 COMPLETE CBC W/AUTO DIFF WBC: CPT

## 2021-03-08 PROCEDURE — 80053 COMPREHEN METABOLIC PANEL: CPT

## 2021-03-08 PROCEDURE — 84550 ASSAY OF BLOOD/URIC ACID: CPT

## 2021-03-08 PROCEDURE — 99215 OFFICE O/P EST HI 40 MIN: CPT | Performed by: NURSE PRACTITIONER

## 2021-03-08 PROCEDURE — 36415 COLL VENOUS BLD VENIPUNCTURE: CPT

## 2021-03-08 RX ORDER — HYDROCODONE BITARTRATE AND ACETAMINOPHEN 5; 325 MG/1; MG/1
1 TABLET ORAL EVERY 6 HOURS PRN
Qty: 30 TABLET | Refills: 0 | Status: SHIPPED | OUTPATIENT
Start: 2021-03-08 | End: 2021-03-11

## 2021-03-08 RX ORDER — POTASSIUM CHLORIDE 1500 MG/1
1 TABLET, FILM COATED, EXTENDED RELEASE ORAL 2 TIMES DAILY
Qty: 60 TABLET | Refills: 0 | Status: SHIPPED | OUTPATIENT
Start: 2021-03-08 | End: 2021-04-27

## 2021-03-08 NOTE — PROGRESS NOTES
JOANNE     Anthony Recinos is a 64year old male here for f/u of Primary cancer of left upper lobe of lung (hcc)  (primary encounter diagnosis)  Brain metastasis (hcc)  Bone metastasis (hcc)  Encounter for immunotherapy    Had SBRT to solitary brain metasta Pt had a low grade temp afterwards. ECOG PS 2    Review of Systems:     Review of Systems   Constitutional: Positive for fatigue. Negative for appetite change, fever and unexpected weight change.    HENT:   Positive for hearing loss and voice change capsule (5 mg total) by mouth [employer] 90 capsule 0   • maalox/diphenhydramine/lidocaine viscous Oral Suspension Take 5 mL by mouth 4 (four) times daily before meals and nightly.  480 mL 2   • hydrochlorothiazide 12.5 MG Oral Tab Take 12.5 mg by mouth da Tartrate 25 MG Oral Tab Take 25 mg by mouth 2 (two) times daily. • nitroGLYCERIN 0.4 MG Sublingual SL Tab Place 0.4 mg under the tongue every 5 (five) minutes as needed for Chest pain.        Allergies:     Demerol Hcl [Meperi*    UNKNOWN    Past Medi cervical, supraclavicular,B axillary, or inguinal regions. Psych/Depression: nl  No spinal tenderness.         ASSESSMENT/PLAN:   Primary cancer of left upper lobe of lung (hcc)  (primary encounter diagnosis)  Brain metastasis (hcc)  Bone metastasis (hcc Dr Bharati Walton tomorrow. Discussed with the patient and his wife that would appreciate her feedback as to first-line therapy for MET mutation with either the cabmatinib versus the crizotinib. S/p 4 cycle palliative Carboplatin/Pemetrexed/Pembrolizumab.   PET refill of MS Contin 15 mg. Patient discontinued MSIR 15 mg. Norco 5/325 prn for foot pain        Constipation:  Controlled with daily MiraLAX. Anemia:  Likely chemotherapy-induced. Hypothyroidism: Continue levothyroxine, refer to endocrinology. .0 (L) 150.0 - 450.0 10(3)uL    Neutrophil Absolute Prelim 1.84 1.50 - 7.70 x10 (3) uL    Neutrophil Absolute 1.84 1.50 - 7.70 x10(3) uL    Lymphocyte Absolute 0.63 (L) 1.00 - 4.00 x10(3) uL    Monocyte Absolute 0.46 0.10 - 1.00 x10(3) uL    Eosinop

## 2021-03-08 NOTE — TELEPHONE ENCOUNTER
Goldie from [facility] calling to follow up on outstanding orders from 3/1 Order number [id number]

## 2021-03-09 ENCOUNTER — APPOINTMENT (OUTPATIENT)
Dept: HEMATOLOGY/ONCOLOGY | Facility: HOSPITAL | Age: 76
End: 2021-03-09
Payer: MEDICARE

## 2021-03-09 RX ORDER — TERAZOSIN 5 MG/1
CAPSULE ORAL
Qty: 90 CAPSULE | Refills: 0 | Status: SHIPPED | OUTPATIENT
Start: 2021-03-09 | End: 2021-05-18

## 2021-03-09 RX ORDER — FINASTERIDE 5 MG/1
TABLET, FILM COATED ORAL
Qty: 90 TABLET | Refills: 0 | Status: SHIPPED | OUTPATIENT
Start: 2021-03-09 | End: 2021-05-18

## 2021-03-09 NOTE — TELEPHONE ENCOUNTER
Spoke with nuris from [facility], have not received order #[id number]. She is going to fax now.  Will look out for fax

## 2021-03-11 ENCOUNTER — TELEPHONE (OUTPATIENT)
Dept: HEMATOLOGY/ONCOLOGY | Facility: HOSPITAL | Age: 76
End: 2021-03-11

## 2021-03-11 RX ORDER — HYDROCODONE BITARTRATE AND ACETAMINOPHEN 5; 325 MG/1; MG/1
1 TABLET ORAL EVERY 6 HOURS PRN
Qty: 30 TABLET | Refills: 0 | Status: CANCELLED | OUTPATIENT
Start: 2021-03-11

## 2021-03-11 RX ORDER — HYDROCODONE BITARTRATE AND ACETAMINOPHEN 5; 325 MG/1; MG/1
1 TABLET ORAL EVERY 6 HOURS PRN
Qty: 90 TABLET | Refills: 0 | Status: SHIPPED | OUTPATIENT
Start: 2021-03-11 | End: 2021-06-14

## 2021-03-11 NOTE — TELEPHONE ENCOUNTER
Called and spoke with Polly the [facility] pharmacy is stating a prior Naoma Sharan is still needed- Did send info and filled out prior auth on sure [employer]. [employer] stated prior auth not needed on website.   Per [facility] -prior Naoma Sharan is still needed- sent me Key to

## 2021-03-11 NOTE — TELEPHONE ENCOUNTER
[address], [address], [phone] is requesting a notification to the Pharmacy when prior authorization is approved for Hydrocod/Acetam 5-325 mg, Qty 30, Sig: Take 1 tablet by mouth every 6 Hours as need

## 2021-03-11 NOTE — TELEPHONE ENCOUNTER
Patient's wife called looking to speak with Iwona Alexander about the patient's hydrocodone from [facility]. She has been having issues getting it filled and keeps receiving phone calls from [facility]. de de/ [facility] [place]

## 2021-03-12 RX ORDER — IPRATROPIUM BROMIDE AND ALBUTEROL SULFATE 2.5; .5 MG/3ML; MG/3ML
SOLUTION RESPIRATORY (INHALATION)
Qty: 360 ML | Refills: 0 | Status: SHIPPED | OUTPATIENT
Start: 2021-03-12 | End: 2021-03-15

## 2021-03-12 NOTE — TELEPHONE ENCOUNTER
Wife requesting written script for medication. Please call [phone] once script is ready for pickup. Wife states it is ok to leave a VM.          Current Outpatient Medications:     •  Fluticasone-Umeclidin-Vilant (TRELEGY ELLIPTA) 100-62.5-25 MCG/INH I

## 2021-03-14 DIAGNOSIS — G89.3 CANCER RELATED PAIN: ICD-10-CM

## 2021-03-15 ENCOUNTER — TELEPHONE (OUTPATIENT)
Dept: PULMONOLOGY | Facility: CLINIC | Age: 76
End: 2021-03-15

## 2021-03-15 ENCOUNTER — OFFICE VISIT (OUTPATIENT)
Dept: HEMATOLOGY/ONCOLOGY | Facility: HOSPITAL | Age: 76
End: 2021-03-15
Attending: INTERNAL MEDICINE
Payer: MEDICARE

## 2021-03-15 VITALS
SYSTOLIC BLOOD PRESSURE: 84 MMHG | WEIGHT: 200.69 LBS | RESPIRATION RATE: 16 BRPM | OXYGEN SATURATION: 97 % | HEART RATE: 63 BPM | BODY MASS INDEX: 29.72 KG/M2 | TEMPERATURE: 98 F | DIASTOLIC BLOOD PRESSURE: 31 MMHG | HEIGHT: 69 IN

## 2021-03-15 DIAGNOSIS — C79.31 BRAIN METASTASIS (HCC): ICD-10-CM

## 2021-03-15 DIAGNOSIS — Z51.81 MEDICATION MONITORING ENCOUNTER: ICD-10-CM

## 2021-03-15 DIAGNOSIS — C79.51 BONE METASTASIS (HCC): ICD-10-CM

## 2021-03-15 DIAGNOSIS — C34.12 PRIMARY CANCER OF LEFT UPPER LOBE OF LUNG (HCC): Primary | ICD-10-CM

## 2021-03-15 DIAGNOSIS — C80.1 MALIGNANT PERICARDIAL EFFUSION (HCC): ICD-10-CM

## 2021-03-15 DIAGNOSIS — Z51.11 CHEMOTHERAPY MANAGEMENT, ENCOUNTER FOR: ICD-10-CM

## 2021-03-15 DIAGNOSIS — C34.12 PRIMARY CANCER OF LEFT UPPER LOBE OF LUNG (HCC): ICD-10-CM

## 2021-03-15 DIAGNOSIS — I31.3 MALIGNANT PERICARDIAL EFFUSION (HCC): ICD-10-CM

## 2021-03-15 DIAGNOSIS — Z45.2 ENCOUNTER FOR CENTRAL LINE CARE: Primary | ICD-10-CM

## 2021-03-15 LAB
ALBUMIN SERPL-MCNC: 3.2 G/DL (ref 3.4–5)
ALBUMIN/GLOB SERPL: 0.7 {RATIO} (ref 1–2)
ALP LIVER SERPL-CCNC: 123 U/L
ALT SERPL-CCNC: 30 U/L
ANION GAP SERPL CALC-SCNC: 8 MMOL/L (ref 0–18)
AST SERPL-CCNC: 23 U/L (ref 15–37)
BASOPHILS # BLD AUTO: 0.04 X10(3) UL (ref 0–0.2)
BASOPHILS NFR BLD AUTO: 0.9 %
BILIRUB SERPL-MCNC: 0.4 MG/DL (ref 0.1–2)
BUN BLD-MCNC: 24 MG/DL (ref 7–18)
BUN/CREAT SERPL: 20.2 (ref 10–20)
CALCIUM BLD-MCNC: 10.1 MG/DL (ref 8.5–10.1)
CHLORIDE SERPL-SCNC: 104 MMOL/L (ref 98–112)
CO2 SERPL-SCNC: 26 MMOL/L (ref 21–32)
CREAT BLD-MCNC: 1.19 MG/DL
DEPRECATED RDW RBC AUTO: 80.9 FL (ref 35.1–46.3)
EOSINOPHIL # BLD AUTO: 0.07 X10(3) UL (ref 0–0.7)
EOSINOPHIL NFR BLD AUTO: 1.5 %
ERYTHROCYTE [DISTWIDTH] IN BLOOD BY AUTOMATED COUNT: 20.1 % (ref 11–15)
GLOBULIN PLAS-MCNC: 4.3 G/DL (ref 2.8–4.4)
GLUCOSE BLD-MCNC: 84 MG/DL (ref 70–99)
HCT VFR BLD AUTO: 30.9 %
HGB BLD-MCNC: 9.8 G/DL
IMM GRANULOCYTES # BLD AUTO: 0.02 X10(3) UL (ref 0–1)
IMM GRANULOCYTES NFR BLD: 0.4 %
LYMPHOCYTES # BLD AUTO: 0.68 X10(3) UL (ref 1–4)
LYMPHOCYTES NFR BLD AUTO: 14.5 %
M PROTEIN MFR SERPL ELPH: 7.5 G/DL (ref 6.4–8.2)
MCH RBC QN AUTO: 35.4 PG (ref 26–34)
MCHC RBC AUTO-ENTMCNC: 31.7 G/DL (ref 31–37)
MCV RBC AUTO: 111.6 FL
MONOCYTES # BLD AUTO: 0.45 X10(3) UL (ref 0.1–1)
MONOCYTES NFR BLD AUTO: 9.6 %
NEUTROPHILS # BLD AUTO: 3.42 X10 (3) UL (ref 1.5–7.7)
NEUTROPHILS # BLD AUTO: 3.42 X10(3) UL (ref 1.5–7.7)
NEUTROPHILS NFR BLD AUTO: 73.1 %
OSMOLALITY SERPL CALC.SUM OF ELEC: 289 MOSM/KG (ref 275–295)
PATIENT FASTING Y/N/NP: NO
PLATELET # BLD AUTO: 182 10(3)UL (ref 150–450)
PLATELET MORPHOLOGY: NORMAL
POTASSIUM SERPL-SCNC: 4 MMOL/L (ref 3.5–5.1)
RBC # BLD AUTO: 2.77 X10(6)UL
SODIUM SERPL-SCNC: 138 MMOL/L (ref 136–145)
WBC # BLD AUTO: 4.7 X10(3) UL (ref 4–11)

## 2021-03-15 PROCEDURE — 99215 OFFICE O/P EST HI 40 MIN: CPT | Performed by: NURSE PRACTITIONER

## 2021-03-15 PROCEDURE — 80053 COMPREHEN METABOLIC PANEL: CPT

## 2021-03-15 PROCEDURE — 85025 COMPLETE CBC W/AUTO DIFF WBC: CPT

## 2021-03-15 PROCEDURE — 36415 COLL VENOUS BLD VENIPUNCTURE: CPT

## 2021-03-15 PROCEDURE — 85060 BLOOD SMEAR INTERPRETATION: CPT

## 2021-03-15 RX ORDER — MORPHINE SULFATE 15 MG/1
15 TABLET, FILM COATED, EXTENDED RELEASE ORAL EVERY 12 HOURS SCHEDULED
Qty: 60 TABLET | Refills: 0 | Status: SHIPPED | OUTPATIENT
Start: 2021-03-15 | End: 2021-04-11

## 2021-03-15 NOTE — PROGRESS NOTES
JOANNE Lewis is a 64year old male here for f/u of No diagnosis found. Had SBRT to solitary brain metastasis completed treatment on 09/25/20. Off steroids. Denies HAs. S/p 4 cycles Carboplatin/Pemetrexed/Pembrolizumab.      Currently s for trouble swallowing. Respiratory: Negative for cough and shortness of breath (MICHAEL, not currently). Cardiovascular: Negative for chest pain (mid sternal pain improved.) L lower leg +2 edema. Gastrointestinal: Positive for constipation (improved). tablet 1   • maalox/diphenhydramine/lidocaine viscous Oral Suspension Take 5 mL by mouth 4 (four) times daily before meals and nightly. 480 mL 2   • hydrochlorothiazide 12.5 MG Oral Tab Take 12.5 mg by mouth daily.      • Naloxone HCl 4 MG/0.1ML Nasal Liqui (two) times daily. • nitroGLYCERIN 0.4 MG Sublingual SL Tab Place 0.4 mg under the tongue every 5 (five) minutes as needed for Chest pain.        Allergies:     Demerol Hcl [Meperi*    UNKNOWN    Past Medical History:   Diagnosis Date   • Aneurysm of diagnosis of metastatic adenocarcinoma of the lung. --Patient presented with cardiac tamponade.  He is status post pericardiocentesis.    --Cytology was c/w adenocarcinoma of the lung. Discussed he has stage IV disease.   --Thus far as, the patient does by the pleural enhancement likely pseudoprogression and will continue to improve with time given his excellent response thus far.   Regarding the activity of the right vocal cord, the patient did see Dr. Yoshi Pickens who is an ENT for evaluation of the left v chemotherapy-induced. improving. Less fatigued. Monitor. Hypothyroidism, likely acquired from immunotherapy:  Continue levothyroxine, refer to endocrinology. Due to immunotherapy.  Last level 61 on 3/8--reinforced that dosing needs at least ~6 weeks

## 2021-03-15 NOTE — TELEPHONE ENCOUNTER
Polly states pharm will not refill Ipratropium Albuterol Neb Solution without ICD 10 Dx Code - please sent new rx. Thank you.

## 2021-03-16 ENCOUNTER — OFFICE VISIT (OUTPATIENT)
Dept: HEMATOLOGY/ONCOLOGY | Facility: HOSPITAL | Age: 76
End: 2021-03-16
Attending: INTERNAL MEDICINE
Payer: MEDICARE

## 2021-03-16 ENCOUNTER — TELEPHONE (OUTPATIENT)
Dept: PULMONOLOGY | Facility: CLINIC | Age: 76
End: 2021-03-16

## 2021-03-16 VITALS
RESPIRATION RATE: 16 BRPM | TEMPERATURE: 98 F | HEART RATE: 71 BPM | DIASTOLIC BLOOD PRESSURE: 49 MMHG | SYSTOLIC BLOOD PRESSURE: 128 MMHG | OXYGEN SATURATION: 96 %

## 2021-03-16 DIAGNOSIS — C34.12 PRIMARY CANCER OF LEFT UPPER LOBE OF LUNG (HCC): ICD-10-CM

## 2021-03-16 DIAGNOSIS — Z51.81 MEDICATION MONITORING ENCOUNTER: Primary | ICD-10-CM

## 2021-03-16 PROCEDURE — 96413 CHEMO IV INFUSION 1 HR: CPT

## 2021-03-16 PROCEDURE — 96367 TX/PROPH/DG ADDL SEQ IV INF: CPT

## 2021-03-16 RX ORDER — FLUTICASONE FUROATE, UMECLIDINIUM BROMIDE AND VILANTEROL TRIFENATATE 100; 62.5; 25 UG/1; UG/1; UG/1
1 POWDER RESPIRATORY (INHALATION) DAILY
Qty: 1 EACH | Refills: 6 | Status: SHIPPED | OUTPATIENT
Start: 2021-03-16

## 2021-03-16 RX ORDER — IPRATROPIUM BROMIDE AND ALBUTEROL SULFATE 2.5; .5 MG/3ML; MG/3ML
3 SOLUTION RESPIRATORY (INHALATION) 4 TIMES DAILY
Qty: 360 ML | Refills: 5 | Status: SHIPPED | OUTPATIENT
Start: 2021-03-16 | End: 2021-03-16

## 2021-03-16 RX ORDER — IPRATROPIUM BROMIDE AND ALBUTEROL SULFATE 2.5; .5 MG/3ML; MG/3ML
3 SOLUTION RESPIRATORY (INHALATION) 4 TIMES DAILY
Qty: 360 ML | Refills: 5 | Status: SHIPPED | OUTPATIENT
Start: 2021-03-16 | End: 2021-08-16

## 2021-03-16 NOTE — PROGRESS NOTES
Pt here for C8D1 Keytruda    Arrives Ambulating independently, using rolling walker accompanied by Self . Pt offers no new complaints today.             Patient reports possible pregnancy since last therapy cycle: Not Applicable    Modifications in dose relieved/minimized  understands plan of care  Progress towards outcome:  unchanged    Education Record    Learner:  Patient  Barriers / Limitations:  None  Method:  discussion  Outcome:  Expressed understanding  Comments:

## 2021-03-16 NOTE — TELEPHONE ENCOUNTER
[facility] states dx code for Ipratropium Albuterol is incorrect. Requesting RN to correct dx to J43.9 and send new rx reflecting corrected ICD 10 code. For additional questions please call. Thank you.

## 2021-03-16 NOTE — TELEPHONE ENCOUNTER
Spoke with patient's wife informed her that refill was sent to [facility]. Wife requesting Trelegy prescription mailed to patient. Prescription mailed to address on file.

## 2021-03-16 NOTE — TELEPHONE ENCOUNTER
pts wife calling to check status on refill. Wife states that the pt. Is out of his med., and pt. Has Stage 4 lung Cancer Please call pts wife. Wife states that the Rx needs to have ICD 10 code. Wife states that it has been 5 days and the pt.  Is not able to

## 2021-03-16 NOTE — TELEPHONE ENCOUNTER
pts wife is calling to f/up on getting refill. Rx must have ICD 10 code on it. Wife states that the pt. Has Run out of Ipratropium Bromide 0.5,mg and Albuterol Sulfate 3mg. Inhalation solution.      Pts wife also wants to know if she is able to  writ

## 2021-03-23 ENCOUNTER — OFFICE VISIT (OUTPATIENT)
Dept: ENDOCRINOLOGY CLINIC | Facility: CLINIC | Age: 76
End: 2021-03-23
Payer: MEDICARE

## 2021-03-23 VITALS
HEIGHT: 69 IN | BODY MASS INDEX: 29.62 KG/M2 | DIASTOLIC BLOOD PRESSURE: 62 MMHG | WEIGHT: 200 LBS | HEART RATE: 69 BPM | SYSTOLIC BLOOD PRESSURE: 130 MMHG

## 2021-03-23 DIAGNOSIS — E03.2 HYPOTHYROIDISM DUE TO MEDICATION: Primary | ICD-10-CM

## 2021-03-23 PROCEDURE — 1111F DSCHRG MED/CURRENT MED MERGE: CPT | Performed by: INTERNAL MEDICINE

## 2021-03-23 PROCEDURE — 99214 OFFICE O/P EST MOD 30 MIN: CPT | Performed by: INTERNAL MEDICINE

## 2021-03-23 RX ORDER — LEVOTHYROXINE SODIUM 112 UG/1
112 TABLET ORAL
Qty: 90 TABLET | Refills: 1 | Status: SHIPPED | OUTPATIENT
Start: 2021-03-23 | End: 2021-05-27

## 2021-03-23 NOTE — PROGRESS NOTES
Name: Bekah Collins  Date: 3/23/21    Referring Physician: Dr. Cara Fleming    Patient presents with:  Hypothyroidism: follow up      INITIAL VISIT:   Bekah Collins is a 64year old male who presents for Patient presents with:  Hypothyroidism: follow ipratropium-albuterol 0.5-2.5 (3) MG/3ML Inhalation Solution, Take 3 mL by nebulization 4 (four) times daily. DX code: R09.02,, Disp: 360 mL, Rfl: 5  •  morphINE Sulfate ER 15 MG Oral Tab CR, Take 1 tablet (15 mg total) by mouth every 12 (twelve) hours. , D mouth daily. , Disp: 90 tablet, Rfl: 3  •  dexamethasone (DECADRON) 4 MG tablet, Take 1 tablet (4 mg total) by mouth 2 (two) times daily. Take 4 mg twice daily the day before chemotherapy, the day of chemotherapy, and the day after chemotherapy. , Disp: 18 t oriented to time, self, and place  Nutritional:  no abnormal weight gain or loss    Labs:  Date  TSH  FT4  LT4mcg  10/02/20 28.80  0.7   11/16/20 26.00  0.7  12/28/20 89.20    50  02/02/21 44.80  03/08/21 61.50    75    Imaging:  None     ASSESSMENT/PLAN: determined for follow-up. Greater than 50% of the time was spent in counseling, anticipatory guidance, and coordnation of care. Patient and family member concerns were answered to the best of my knowledge. 3/23/21  Apurva De MD

## 2021-03-29 DIAGNOSIS — C34.12 PRIMARY CANCER OF LEFT UPPER LOBE OF LUNG (HCC): ICD-10-CM

## 2021-03-29 RX ORDER — ALPRAZOLAM 0.5 MG/1
0.5 TABLET ORAL 2 TIMES DAILY PRN
Qty: 60 TABLET | Refills: 1 | Status: SHIPPED | OUTPATIENT
Start: 2021-03-29 | End: 2021-04-27

## 2021-03-30 ENCOUNTER — NURSE ONLY (OUTPATIENT)
Dept: HEMATOLOGY/ONCOLOGY | Facility: HOSPITAL | Age: 76
End: 2021-03-30
Attending: INTERNAL MEDICINE
Payer: MEDICARE

## 2021-03-30 VITALS
SYSTOLIC BLOOD PRESSURE: 150 MMHG | HEART RATE: 65 BPM | RESPIRATION RATE: 18 BRPM | DIASTOLIC BLOOD PRESSURE: 64 MMHG | TEMPERATURE: 98 F | OXYGEN SATURATION: 99 %

## 2021-03-30 DIAGNOSIS — Z45.2 ENCOUNTER FOR CENTRAL LINE CARE: Primary | ICD-10-CM

## 2021-03-30 DIAGNOSIS — Z51.81 MEDICATION MONITORING ENCOUNTER: ICD-10-CM

## 2021-03-30 DIAGNOSIS — C79.51 BONE METASTASIS (HCC): ICD-10-CM

## 2021-03-30 PROCEDURE — 96372 THER/PROPH/DIAG INJ SC/IM: CPT

## 2021-03-30 NOTE — PROGRESS NOTES
Pt to infusion for monthly Xgeva. Arrived ambulating with cane. Pt feeling well, denies any recent dental work. No mouth/jaw pain. Injection given in L lower abdomen and tolerated well. Discharged with next appointments scheduled.

## 2021-03-31 ENCOUNTER — HOSPITAL ENCOUNTER (OUTPATIENT)
Dept: NUCLEAR MEDICINE | Facility: HOSPITAL | Age: 76
Discharge: HOME | End: 2021-03-31
Attending: NURSE PRACTITIONER
Payer: MEDICARE

## 2021-03-31 DIAGNOSIS — C79.31 BRAIN METASTASIS (HCC): ICD-10-CM

## 2021-03-31 DIAGNOSIS — C34.12 PRIMARY CANCER OF LEFT UPPER LOBE OF LUNG (HCC): ICD-10-CM

## 2021-03-31 DIAGNOSIS — C79.51 BONE METASTASIS (HCC): ICD-10-CM

## 2021-03-31 PROCEDURE — 78815 PET IMAGE W/CT SKULL-THIGH: CPT | Performed by: NURSE PRACTITIONER

## 2021-03-31 PROCEDURE — 82962 GLUCOSE BLOOD TEST: CPT

## 2021-04-01 ENCOUNTER — TELEPHONE (OUTPATIENT)
Dept: INTERNAL MEDICINE CLINIC | Facility: CLINIC | Age: 76
End: 2021-04-01

## 2021-04-05 ENCOUNTER — OFFICE VISIT (OUTPATIENT)
Dept: HEMATOLOGY/ONCOLOGY | Facility: HOSPITAL | Age: 76
End: 2021-04-05
Attending: NURSE PRACTITIONER
Payer: MEDICARE

## 2021-04-05 ENCOUNTER — NURSE ONLY (OUTPATIENT)
Dept: HEMATOLOGY/ONCOLOGY | Facility: HOSPITAL | Age: 76
End: 2021-04-05
Attending: INTERNAL MEDICINE
Payer: MEDICARE

## 2021-04-05 VITALS
BODY MASS INDEX: 28.88 KG/M2 | OXYGEN SATURATION: 95 % | HEART RATE: 73 BPM | DIASTOLIC BLOOD PRESSURE: 57 MMHG | RESPIRATION RATE: 18 BRPM | SYSTOLIC BLOOD PRESSURE: 126 MMHG | HEIGHT: 69 IN | TEMPERATURE: 98 F | WEIGHT: 195 LBS

## 2021-04-05 DIAGNOSIS — Z29.8 ENCOUNTER FOR IMMUNOTHERAPY: ICD-10-CM

## 2021-04-05 DIAGNOSIS — C34.12 PRIMARY CANCER OF LEFT UPPER LOBE OF LUNG (HCC): Primary | ICD-10-CM

## 2021-04-05 DIAGNOSIS — I31.3 MALIGNANT PERICARDIAL EFFUSION (HCC): ICD-10-CM

## 2021-04-05 DIAGNOSIS — C80.1 MALIGNANT PERICARDIAL EFFUSION (HCC): ICD-10-CM

## 2021-04-05 DIAGNOSIS — C79.51 BONE METASTASIS (HCC): ICD-10-CM

## 2021-04-05 DIAGNOSIS — C79.31 BRAIN METASTASIS (HCC): ICD-10-CM

## 2021-04-05 PROCEDURE — 85025 COMPLETE CBC W/AUTO DIFF WBC: CPT

## 2021-04-05 PROCEDURE — 84443 ASSAY THYROID STIM HORMONE: CPT

## 2021-04-05 PROCEDURE — 80053 COMPREHEN METABOLIC PANEL: CPT

## 2021-04-05 PROCEDURE — 99215 OFFICE O/P EST HI 40 MIN: CPT | Performed by: NURSE PRACTITIONER

## 2021-04-05 PROCEDURE — 36415 COLL VENOUS BLD VENIPUNCTURE: CPT

## 2021-04-05 NOTE — PROGRESS NOTES
JOANNE Cordova is a 64year old male here for f/u of Primary cancer of left upper lobe of lung (hcc)  (primary encounter diagnosis)  Brain metastasis (hcc)  Malignant pericardial effusion (hcc)  Bone metastasis (hcc)  Encounter for immunotherapy for fatigue. Negative for appetite change, fever and unexpected weight change. HENT:   Positive for hearing loss and voice change (hoarse, chronic). Negative for trouble swallowing.     Respiratory: Negative for cough and shortness of breath (MICHAEL, not cur HCL 5 MG Oral Cap TAKE 1 CAPSULE EVERY DAY AT 4:00 PM 90 capsule 0   • FINASTERIDE 5 MG Oral Tab TAKE 1 TABLET EVERY DAY 90 tablet 0   • Potassium Chloride ER 20 MEQ Oral Tab CR Take 1 tablet by mouth 2 (two) times a day.  60 tablet 0   • Levothyroxine Sodi Besylate 5 MG Oral Tab Take 5 mg by mouth daily. • atorvastatin 20 MG Oral Tab Take 20 mg by mouth nightly. • metoprolol Tartrate 25 MG Oral Tab Take 25 mg by mouth 2 (two) times daily.        • nitroGLYCERIN 0.4 MG Sublingual SL Tab Place 0.4 m Neurological: Grossly intact. Lymphatics: There is no palpable lymphadenopathy throughout in the cervical, supraclavicular,B axillary, or inguinal regions. Psych/Depression: nl  No spinal tenderness.         ASSESSMENT/PLAN:   Primary cancer of left Crizotinib. Patient also has PDL 1 positivity, and at time of progression could have immunotherapy. --He is having a second opinion at [facility] with Dr Yoshi Fritz tomorrow.   Discussed with the patient and his wife that would appreciate her feedback as to first-l with Cycle 9 - pembrolizumab alone     Reviewed PET scan with Dr Samuel Hernandez-   PET scan after cycle 8 -L pleural effusion, opacity   Continue to monitor - follow up 2 months    CT brain ordered by MD Vernell -wife to schedule in coming weeks and f/u with RadOnc.     C Time: 04/05/21 10:23 AM   Result Value Ref Range    TSH 20.200 (H) 0.358 - 3.740 mIU/mL   CBC W/ DIFFERENTIAL    Collection Time: 04/05/21 10:23 AM   Result Value Ref Range    WBC 4.6 4.0 - 11.0 x10(3) uL    RBC 3.08 (L) 3.80 - 5.80 x10(6)uL    HGB 10.4 (L right antecubital vein.  Low dose non-contrast CT scanning was performed using a dedicated integrated PET/CT scanner for   attenuation correction and anatomic localization only.       FASTING PATIENT BLOOD GLUCOSE: 84 mg/dL.     UPTAKE TIME: 65 minutes.   Normal.  No pathologic FDG activity.     ABDOMEN/PELVIS: Previously seen partially calcified central mesenteric mass is re-identified.  This measures 31 x 35 mm (image 104), previously it measured 35 x 45 mm.  This is increased activity with an SUV max deacon size and activity in the left upper lobe mass   6. Stable subcarinal lymph node.     7. Persistent hypermetabolic retroperitoneal lymphadenopathy   8.  Stable mesenteric mass.  This is partially calcified and has mildly increased activity.  This is unlikely

## 2021-04-06 ENCOUNTER — OFFICE VISIT (OUTPATIENT)
Dept: HEMATOLOGY/ONCOLOGY | Facility: HOSPITAL | Age: 76
End: 2021-04-06
Attending: INTERNAL MEDICINE
Payer: MEDICARE

## 2021-04-06 VITALS
RESPIRATION RATE: 18 BRPM | OXYGEN SATURATION: 95 % | HEART RATE: 70 BPM | TEMPERATURE: 98 F | DIASTOLIC BLOOD PRESSURE: 55 MMHG | SYSTOLIC BLOOD PRESSURE: 141 MMHG

## 2021-04-06 DIAGNOSIS — C34.12 PRIMARY CANCER OF LEFT UPPER LOBE OF LUNG (HCC): ICD-10-CM

## 2021-04-06 DIAGNOSIS — Z45.2 ENCOUNTER FOR CENTRAL LINE CARE: Primary | ICD-10-CM

## 2021-04-06 DIAGNOSIS — Z51.81 MEDICATION MONITORING ENCOUNTER: ICD-10-CM

## 2021-04-06 PROCEDURE — 96375 TX/PRO/DX INJ NEW DRUG ADDON: CPT

## 2021-04-06 PROCEDURE — 96413 CHEMO IV INFUSION 1 HR: CPT

## 2021-04-06 NOTE — PROGRESS NOTES
Pt here for C8D1 Keytruda  Due with next tx xgeva. Arrives Ambulating independently, using rolling walker accompanied by Self .             Patient reports possible pregnancy since last therapy cycle: Not Applicable    Modifications in dose or schedule: relieved/minimized  understands plan of care  Progress towards outcome:  unchanged    Education Record    Learner:  Patient  Barriers / Limitations:  None  Method:  discussion  Outcome:  Expressed understanding  Comments: reviewed schedule, xgeva due at th

## 2021-04-11 DIAGNOSIS — G89.3 CANCER RELATED PAIN: ICD-10-CM

## 2021-04-12 ENCOUNTER — HOSPITAL ENCOUNTER (OUTPATIENT)
Dept: CT IMAGING | Facility: HOSPITAL | Age: 76
Discharge: HOME | End: 2021-04-12
Attending: RADIOLOGY
Payer: MEDICARE

## 2021-04-12 ENCOUNTER — TELEPHONE (OUTPATIENT)
Dept: PULMONOLOGY | Facility: CLINIC | Age: 76
End: 2021-04-12

## 2021-04-12 DIAGNOSIS — C79.31 BRAIN METASTASES (HCC): ICD-10-CM

## 2021-04-12 PROCEDURE — 70470 CT HEAD/BRAIN W/O & W/DYE: CPT | Performed by: RADIOLOGY

## 2021-04-12 RX ORDER — LEVOFLOXACIN 500 MG/1
500 TABLET, FILM COATED ORAL DAILY
Qty: 7 TABLET | Refills: 0 | Status: SHIPPED | OUTPATIENT
Start: 2021-04-12 | End: 2021-04-26 | Stop reason: ALTCHOICE

## 2021-04-12 RX ORDER — MORPHINE SULFATE 15 MG/1
15 TABLET, FILM COATED, EXTENDED RELEASE ORAL EVERY 12 HOURS SCHEDULED
Qty: 60 TABLET | Refills: 0 | Status: SHIPPED | OUTPATIENT
Start: 2021-04-12 | End: 2021-05-17

## 2021-04-12 NOTE — TELEPHONE ENCOUNTER
pt'omerfe Polly states that the home health nurse told her that if his sputum that he is spitting up turns green to let Dr know and it is has a green tinge to it. Please call pt is stage 4 lung cancer.

## 2021-04-12 NOTE — TELEPHONE ENCOUNTER
Spoke to patient and spouse regarding message below. Patient has Stage IV lung adenocarcinoma and has been spitting up green sputum since Saturday night. According to spouse, Patient had PET scan on 3/31/21 with some abnormalities.  Denies fever or any othe

## 2021-04-15 ENCOUNTER — OFFICE VISIT (OUTPATIENT)
Dept: RADIATION ONCOLOGY | Facility: HOSPITAL | Age: 76
End: 2021-04-15
Attending: RADIOLOGY
Payer: MEDICARE

## 2021-04-15 VITALS
TEMPERATURE: 98 F | BODY MASS INDEX: 28.94 KG/M2 | RESPIRATION RATE: 18 BRPM | HEIGHT: 69 IN | HEART RATE: 78 BPM | OXYGEN SATURATION: 95 % | WEIGHT: 195.38 LBS | DIASTOLIC BLOOD PRESSURE: 67 MMHG | SYSTOLIC BLOOD PRESSURE: 154 MMHG

## 2021-04-15 DIAGNOSIS — C79.31 BRAIN METASTASES (HCC): Primary | ICD-10-CM

## 2021-04-15 PROCEDURE — 99211 OFF/OP EST MAY X REQ PHY/QHP: CPT

## 2021-04-15 NOTE — PROGRESS NOTES
Pt seen in follow up with Dr Robert Rodriguez, having completed CK brain 9/25/20. Accompanied by his wife. Using a quad cane for ambulation. C/o moderate to severe fatigue, and states \"I can't do anything, I'm so tired. \" Pt currently receiving Lum Comas and xge

## 2021-04-15 NOTE — PROGRESS NOTES
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 The enlarging left pleural effusion 5. Continued interval decrease in size and activity in the left upper lobe mass 6. Stable subcarinal lymph node. 7. Persistent hypermetabolic retroperitoneal lymphadenopathy 8.  Stable mesenteric ma 0.5-2.5 (3) MG/3ML Inhalation Solution Take 3 mL by nebulization 4 (four) times daily. DX code: R09.02, 360 mL 5   • HYDROcodone-acetaminophen 5-325 MG Oral Tab Take 1 tablet by mouth every 6 (six) hours as needed for Pain.  90 tablet 0   • TERAZOSIN HCL 5 tablet 2   • cycloSPORINE 0.05 % Ophthalmic Emulsion Place into both eyes 2 (two) times daily. • Fluticasone-Umeclidin-Vilant 100-62.5-25 MCG/INH Inhalation Aerosol Powder, Breath Activated Inhale 1 puff into the lungs daily.  1 each 0   • Loperamide HC 1,  Denies PAIN  GENERAL:  Pt is alert and oriented times three in no acute distress. HEENT:  PERRLA, EOMI, oropharynx is clear with no lesions. NECK:   no cervical, supraclavicular or axillary lymphadenopathy.    CHEST:  Clear  EXTREMITIES:  There is n PEMEtrexed Disodium (ALIMTA)  mg/m2  = 1,075 mg 500 mg/m2  = 1,075 mg 500 mg/m2  = 1,075 mg 500 mg/m2  = 1,075 mg 500 mg/m2  = 1,075 mg 500 mg/m2  = 1,075 mg 400 mg/m2  = 850 mg

## 2021-04-26 ENCOUNTER — OFFICE VISIT (OUTPATIENT)
Dept: HEMATOLOGY/ONCOLOGY | Facility: HOSPITAL | Age: 76
End: 2021-04-26
Attending: NURSE PRACTITIONER
Payer: MEDICARE

## 2021-04-26 ENCOUNTER — NURSE ONLY (OUTPATIENT)
Dept: HEMATOLOGY/ONCOLOGY | Facility: HOSPITAL | Age: 76
End: 2021-04-26
Attending: INTERNAL MEDICINE
Payer: MEDICARE

## 2021-04-26 VITALS
HEIGHT: 69 IN | RESPIRATION RATE: 18 BRPM | HEART RATE: 65 BPM | BODY MASS INDEX: 29.18 KG/M2 | SYSTOLIC BLOOD PRESSURE: 117 MMHG | TEMPERATURE: 98 F | WEIGHT: 197 LBS | OXYGEN SATURATION: 95 % | DIASTOLIC BLOOD PRESSURE: 46 MMHG

## 2021-04-26 DIAGNOSIS — Z51.81 MEDICATION MONITORING ENCOUNTER: ICD-10-CM

## 2021-04-26 DIAGNOSIS — G89.3 CANCER RELATED PAIN: ICD-10-CM

## 2021-04-26 DIAGNOSIS — C80.1 MALIGNANT PERICARDIAL EFFUSION (HCC): ICD-10-CM

## 2021-04-26 DIAGNOSIS — C34.12 PRIMARY CANCER OF LEFT UPPER LOBE OF LUNG (HCC): ICD-10-CM

## 2021-04-26 DIAGNOSIS — C34.12 PRIMARY CANCER OF LEFT UPPER LOBE OF LUNG (HCC): Primary | ICD-10-CM

## 2021-04-26 DIAGNOSIS — Z45.2 ENCOUNTER FOR CENTRAL LINE CARE: Primary | ICD-10-CM

## 2021-04-26 DIAGNOSIS — C79.31 BRAIN METASTASIS (HCC): ICD-10-CM

## 2021-04-26 DIAGNOSIS — I31.3 MALIGNANT PERICARDIAL EFFUSION (HCC): ICD-10-CM

## 2021-04-26 DIAGNOSIS — C78.00 MALIGNANT NEOPLASM METASTATIC TO LUNG, UNSPECIFIED LATERALITY (HCC): ICD-10-CM

## 2021-04-26 DIAGNOSIS — C79.51 BONE METASTASIS (HCC): ICD-10-CM

## 2021-04-26 DIAGNOSIS — Z51.11 CHEMOTHERAPY MANAGEMENT, ENCOUNTER FOR: ICD-10-CM

## 2021-04-26 PROCEDURE — 99215 OFFICE O/P EST HI 40 MIN: CPT | Performed by: INTERNAL MEDICINE

## 2021-04-26 PROCEDURE — 36415 COLL VENOUS BLD VENIPUNCTURE: CPT

## 2021-04-26 PROCEDURE — 85025 COMPLETE CBC W/AUTO DIFF WBC: CPT

## 2021-04-26 PROCEDURE — 80053 COMPREHEN METABOLIC PANEL: CPT

## 2021-04-26 RX ORDER — CYANOCOBALAMIN 1000 UG/ML
1000 INJECTION INTRAMUSCULAR; SUBCUTANEOUS ONCE
Status: CANCELLED | OUTPATIENT
Start: 2021-04-27

## 2021-04-26 NOTE — PROGRESS NOTES
JOANNE Snell is a 64year old male here for f/u of Primary cancer of left upper lobe of lung (hcc)  (primary encounter diagnosis)  Malignant pericardial effusion (hcc)  Malignant neoplasm metastatic to lung, unspecified laterality (hcc)  Brain for chest pain (when has a deep cough. ). Negative for leg swelling and palpitations. Gastrointestinal: Positive for constipation (clear lax and two stool softeners daily). Negative for abdominal pain, diarrhea and nausea.    Musculoskeletal: Positive for MG Oral Tab Take 12.5 mg by mouth daily. • Naloxone HCl 4 MG/0.1ML Nasal Liquid 4 mg by Nasal route as needed. If patient remains unresponsive, repeat dose in other nostril 2-5 minutes after first dose.  1 kit 0   • Albuterol Sulfate  (90 Base) M Aortic valve defect    • Brain cancer [facility])    • Essential hypertension    • Heart attack ([facility] [address]    • Hyperlipidemia    • Lung cancer ([facility] [address]     metastatic adenocarcinoma of the lung     Past Surgical History:   Procedure Laterality Date   • OTHER SURGICAL H metastasis (hcc)  Cancer related pain  Chemotherapy management, encounter for    Patient completed 4 cycles of carboplatin/pemetrexed he was then initiated on maintenance with and pembrolizumab. He had an excellent response to treatment.       Pemetrexed a (from the past 48 hour(s))   COMP METABOLIC PANEL (14)    Collection Time: 04/26/21 10:31 AM   Result Value Ref Range    Glucose 127 (H) 70 - 99 mg/dL    Sodium 141 136 - 145 mmol/L    Potassium 3.9 3.5 - 5.1 mmol/L    Chloride 106 98 - 112 mmol/L    CO2 2

## 2021-04-27 ENCOUNTER — OFFICE VISIT (OUTPATIENT)
Dept: HEMATOLOGY/ONCOLOGY | Facility: HOSPITAL | Age: 76
End: 2021-04-27
Attending: INTERNAL MEDICINE
Payer: MEDICARE

## 2021-04-27 VITALS
HEART RATE: 81 BPM | TEMPERATURE: 98 F | SYSTOLIC BLOOD PRESSURE: 145 MMHG | OXYGEN SATURATION: 95 % | RESPIRATION RATE: 18 BRPM | DIASTOLIC BLOOD PRESSURE: 69 MMHG

## 2021-04-27 DIAGNOSIS — C34.12 PRIMARY CANCER OF LEFT UPPER LOBE OF LUNG (HCC): ICD-10-CM

## 2021-04-27 DIAGNOSIS — C79.51 BONE METASTASIS (HCC): ICD-10-CM

## 2021-04-27 DIAGNOSIS — Z51.81 MEDICATION MONITORING ENCOUNTER: ICD-10-CM

## 2021-04-27 DIAGNOSIS — C34.12 PRIMARY CANCER OF LEFT UPPER LOBE OF LUNG (HCC): Primary | ICD-10-CM

## 2021-04-27 DIAGNOSIS — Z45.2 ENCOUNTER FOR CENTRAL LINE CARE: ICD-10-CM

## 2021-04-27 PROCEDURE — 96372 THER/PROPH/DIAG INJ SC/IM: CPT

## 2021-04-27 PROCEDURE — 96367 TX/PROPH/DG ADDL SEQ IV INF: CPT

## 2021-04-27 PROCEDURE — 96413 CHEMO IV INFUSION 1 HR: CPT

## 2021-04-27 RX ORDER — CYANOCOBALAMIN 1000 UG/ML
INJECTION INTRAMUSCULAR; SUBCUTANEOUS
Status: COMPLETED
Start: 2021-04-27 | End: 2021-04-27

## 2021-04-27 RX ORDER — CYANOCOBALAMIN 1000 UG/ML
1000 INJECTION INTRAMUSCULAR; SUBCUTANEOUS ONCE
Status: COMPLETED | OUTPATIENT
Start: 2021-04-27 | End: 2021-04-27

## 2021-04-27 RX ADMIN — CYANOCOBALAMIN 1000 MCG: 1000 INJECTION INTRAMUSCULAR; SUBCUTANEOUS at 09:45:00

## 2021-04-28 RX ORDER — ALPRAZOLAM 0.5 MG/1
0.5 TABLET ORAL 2 TIMES DAILY PRN
Qty: 60 TABLET | Refills: 1 | Status: SHIPPED | OUTPATIENT
Start: 2021-04-28 | End: 2021-06-22

## 2021-04-28 RX ORDER — POTASSIUM CHLORIDE 1500 MG/1
1 TABLET, EXTENDED RELEASE ORAL 2 TIMES DAILY
Qty: 60 TABLET | Refills: 0 | Status: SHIPPED | OUTPATIENT
Start: 2021-04-28 | End: 2021-06-22

## 2021-04-29 ENCOUNTER — TELEPHONE (OUTPATIENT)
Dept: HEMATOLOGY/ONCOLOGY | Facility: HOSPITAL | Age: 76
End: 2021-04-29

## 2021-04-29 NOTE — TELEPHONE ENCOUNTER
Emmanuelle Bustillo in scheduling received a call from Demetra Lozano says he needed to repeat pet scan in two months. Last pet scan done in March. Please advise.  Thank you alma

## 2021-05-04 DIAGNOSIS — Z01.812 PRE-PROCEDURAL LABORATORY EXAMINATION: Primary | ICD-10-CM

## 2021-05-04 DIAGNOSIS — M54.50 LOW BACK PAIN: Primary | ICD-10-CM

## 2021-05-05 ENCOUNTER — TELEPHONE (OUTPATIENT)
Dept: INTERNAL MEDICINE CLINIC | Facility: CLINIC | Age: 76
End: 2021-05-05

## 2021-05-05 NOTE — TELEPHONE ENCOUNTER
Derick Flores from [facility] is requesting a verbal order to discharge patient on 5/19, no further skill needs. Please call back.     Voicemail is confidential, please leave a detailed message if no answer

## 2021-05-07 NOTE — TELEPHONE ENCOUNTER
Called and spoke to Milagros Patton from home health. Relayed message per Dr. Murray Suh. Milagros Patton had no further questions.

## 2021-05-08 ENCOUNTER — HOSPITAL ENCOUNTER (OUTPATIENT)
Dept: LAB | Age: 76
Discharge: HOME | End: 2021-05-08
Attending: PAIN MEDICINE

## 2021-05-08 DIAGNOSIS — Z01.812 PRE-PROCEDURAL LABORATORY EXAMINATION: ICD-10-CM

## 2021-05-08 LAB
SARS-COV-2 RNA RESP QL NAA+PROBE: NOT DETECTED
SERVICE CMNT-IMP: NORMAL
SERVICE CMNT-IMP: NORMAL

## 2021-05-08 PROCEDURE — U0005 INFEC AGEN DETEC AMPLI PROBE: HCPCS

## 2021-05-11 ENCOUNTER — HOSPITAL ENCOUNTER (OUTPATIENT)
Dept: GASTROENTEROLOGY | Age: 76
Discharge: HOME | End: 2021-05-11
Attending: PAIN MEDICINE

## 2021-05-11 ENCOUNTER — HOSPITAL ENCOUNTER (OUTPATIENT)
Dept: GENERAL RADIOLOGY | Age: 76
Discharge: HOME | End: 2021-05-11
Attending: PAIN MEDICINE

## 2021-05-11 ENCOUNTER — ANESTHESIA EVENT (OUTPATIENT)
Dept: GASTROENTEROLOGY | Age: 76
End: 2021-05-11

## 2021-05-11 ENCOUNTER — ANESTHESIA (OUTPATIENT)
Dept: GASTROENTEROLOGY | Age: 76
End: 2021-05-11

## 2021-05-11 VITALS
WEIGHT: 194.22 LBS | RESPIRATION RATE: 16 BRPM | HEIGHT: 69 IN | BODY MASS INDEX: 28.77 KG/M2 | HEART RATE: 56 BPM | OXYGEN SATURATION: 92 % | TEMPERATURE: 98.2 F | DIASTOLIC BLOOD PRESSURE: 60 MMHG | SYSTOLIC BLOOD PRESSURE: 128 MMHG

## 2021-05-11 DIAGNOSIS — R52 ENCOUNTER FOR PAIN MANAGEMENT: ICD-10-CM

## 2021-05-11 DIAGNOSIS — M54.50 LOW BACK PAIN: ICD-10-CM

## 2021-05-11 PROCEDURE — 64493 INJ PARAVERT F JNT L/S 1 LEV: CPT

## 2021-05-11 PROCEDURE — 10002801 HB RX 250 W/O HCPCS: Performed by: PAIN MEDICINE

## 2021-05-11 PROCEDURE — 13000001 HB PHASE II RECOVERY EA 30 MINUTES

## 2021-05-11 PROCEDURE — 72100 X-RAY EXAM L-S SPINE 2/3 VWS: CPT

## 2021-05-11 PROCEDURE — 10002800 HB RX 250 W HCPCS: Performed by: PAIN MEDICINE

## 2021-05-11 PROCEDURE — 10004451 HB PACU RECOVERY 1ST 30 MINUTES

## 2021-05-11 PROCEDURE — 10002807 HB RX 258: Performed by: PAIN MEDICINE

## 2021-05-11 PROCEDURE — 13000067 HB PAIN MANAGEMENT GROUP 2

## 2021-05-11 PROCEDURE — 10002805 HB CONTRAST AGENT: Performed by: PAIN MEDICINE

## 2021-05-11 PROCEDURE — 10002800 HB RX 250 W HCPCS

## 2021-05-11 PROCEDURE — 13000008 HB ANESTHESIA MAC OUTSIDE OR

## 2021-05-11 RX ORDER — TRIAMCINOLONE ACETONIDE 40 MG/ML
INJECTION, SUSPENSION INTRA-ARTICULAR; INTRAMUSCULAR PRN
Status: COMPLETED | OUTPATIENT
Start: 2021-05-11 | End: 2021-05-11

## 2021-05-11 RX ORDER — LEVOTHYROXINE SODIUM 0.12 MG/1
125 TABLET ORAL DAILY
COMMUNITY

## 2021-05-11 RX ORDER — SODIUM CHLORIDE 9 MG/ML
INJECTION, SOLUTION INTRAVENOUS CONTINUOUS
Status: DISCONTINUED | OUTPATIENT
Start: 2021-05-11 | End: 2021-05-13 | Stop reason: HOSPADM

## 2021-05-11 RX ORDER — MIDAZOLAM HYDROCHLORIDE 1 MG/ML
INJECTION, SOLUTION INTRAMUSCULAR; INTRAVENOUS PRN
Status: DISCONTINUED | OUTPATIENT
Start: 2021-05-11 | End: 2021-05-11

## 2021-05-11 RX ORDER — MORPHINE SULFATE 15 MG/1
15 TABLET ORAL EVERY 4 HOURS PRN
COMMUNITY
End: 2021-09-27

## 2021-05-11 RX ORDER — BUPIVACAINE HYDROCHLORIDE 2.5 MG/ML
INJECTION, SOLUTION EPIDURAL; INFILTRATION; INTRACAUDAL PRN
Status: COMPLETED | OUTPATIENT
Start: 2021-05-11 | End: 2021-05-11

## 2021-05-11 RX ORDER — PROPOFOL 10 MG/ML
INJECTION, EMULSION INTRAVENOUS PRN
Status: DISCONTINUED | OUTPATIENT
Start: 2021-05-11 | End: 2021-05-11

## 2021-05-11 RX ORDER — LIDOCAINE HYDROCHLORIDE 10 MG/ML
INJECTION, SOLUTION INFILTRATION; PERINEURAL PRN
Status: COMPLETED | OUTPATIENT
Start: 2021-05-11 | End: 2021-05-11

## 2021-05-11 RX ADMIN — LIDOCAINE HYDROCHLORIDE 2 ML: 10 INJECTION, SOLUTION INFILTRATION; PERINEURAL at 09:26

## 2021-05-11 RX ADMIN — SODIUM CHLORIDE: 9 INJECTION, SOLUTION INTRAVENOUS at 08:44

## 2021-05-11 RX ADMIN — TRIAMCINOLONE ACETONIDE 80 MG: 40 INJECTION, SUSPENSION INTRA-ARTICULAR; INTRAMUSCULAR at 09:28

## 2021-05-11 RX ADMIN — PROPOFOL 40 MCG/KG/MIN: 10 INJECTION, EMULSION INTRAVENOUS at 09:20

## 2021-05-11 RX ADMIN — BUPIVACAINE HYDROCHLORIDE 1 ML: 2.5 INJECTION, SOLUTION EPIDURAL; INFILTRATION; INTRACAUDAL; PERINEURAL at 09:28

## 2021-05-11 RX ADMIN — IOHEXOL 50 ML: 300 INJECTION, SOLUTION INTRAVENOUS at 09:32

## 2021-05-11 RX ADMIN — FENTANYL CITRATE 100 MCG: 50 INJECTION, SOLUTION INTRAMUSCULAR; INTRAVENOUS at 09:18

## 2021-05-11 RX ADMIN — IOHEXOL 2 ML: 300 INJECTION, SOLUTION INTRAVENOUS at 09:27

## 2021-05-11 RX ADMIN — MIDAZOLAM HYDROCHLORIDE 2 MG: 1 INJECTION, SOLUTION INTRAMUSCULAR; INTRAVENOUS at 09:18

## 2021-05-11 RX ADMIN — PROPOFOL 40 MG: 10 INJECTION, EMULSION INTRAVENOUS at 09:20

## 2021-05-11 ASSESSMENT — ACTIVITIES OF DAILY LIVING (ADL)
ADL_BEFORE_ADMISSION: NEEDS/REQUIRES ASSISTANCE
ADL_SCORE: 1
HISTORY OF FALLING IN THE LAST YEAR (PRIOR TO ADMISSION): NO
MOBILITY_ASSIST_DEVICES: WHEELCHAIR;CANE
TRANSFERRING: NEEDS ASSISTANCE
NEEDS_ASSIST: YES
SENSORY_SUPPORT_DEVICES: EYEGLASSES;HEARING AIDS

## 2021-05-11 ASSESSMENT — PAIN SCALES - GENERAL
PAINLEVEL_OUTOF10: 0

## 2021-05-11 ASSESSMENT — PAIN SCALES - WONG BAKER: WONGBAKER_NUMERICALRESPONSE: 0

## 2021-05-11 ASSESSMENT — COGNITIVE AND FUNCTIONAL STATUS - GENERAL: ARE YOU DEAF OR DO YOU HAVE SERIOUS DIFFICULTY  HEARING: YES

## 2021-05-15 DIAGNOSIS — N40.0 BENIGN PROSTATIC HYPERPLASIA, UNSPECIFIED WHETHER LOWER URINARY TRACT SYMPTOMS PRESENT: ICD-10-CM

## 2021-05-17 ENCOUNTER — NURSE ONLY (OUTPATIENT)
Dept: HEMATOLOGY/ONCOLOGY | Facility: HOSPITAL | Age: 76
End: 2021-05-17
Attending: INTERNAL MEDICINE
Payer: MEDICARE

## 2021-05-17 ENCOUNTER — OFFICE VISIT (OUTPATIENT)
Dept: HEMATOLOGY/ONCOLOGY | Facility: HOSPITAL | Age: 76
End: 2021-05-17
Attending: NURSE PRACTITIONER
Payer: MEDICARE

## 2021-05-17 VITALS
TEMPERATURE: 99 F | RESPIRATION RATE: 18 BRPM | DIASTOLIC BLOOD PRESSURE: 44 MMHG | HEIGHT: 69 IN | OXYGEN SATURATION: 97 % | HEART RATE: 52 BPM | BODY MASS INDEX: 28.73 KG/M2 | WEIGHT: 194 LBS | SYSTOLIC BLOOD PRESSURE: 133 MMHG

## 2021-05-17 DIAGNOSIS — G89.3 CANCER RELATED PAIN: ICD-10-CM

## 2021-05-17 DIAGNOSIS — C34.12 PRIMARY CANCER OF LEFT UPPER LOBE OF LUNG (HCC): ICD-10-CM

## 2021-05-17 DIAGNOSIS — C79.31 BRAIN METASTASIS (HCC): ICD-10-CM

## 2021-05-17 DIAGNOSIS — Z51.81 MEDICATION MONITORING ENCOUNTER: ICD-10-CM

## 2021-05-17 DIAGNOSIS — E03.2 HYPOTHYROIDISM DUE TO MEDICATION: ICD-10-CM

## 2021-05-17 DIAGNOSIS — Z45.2 ENCOUNTER FOR CENTRAL LINE CARE: Primary | ICD-10-CM

## 2021-05-17 DIAGNOSIS — C79.51 BONE METASTASIS (HCC): ICD-10-CM

## 2021-05-17 DIAGNOSIS — Z51.11 CHEMOTHERAPY MANAGEMENT, ENCOUNTER FOR: ICD-10-CM

## 2021-05-17 DIAGNOSIS — C34.12 PRIMARY CANCER OF LEFT UPPER LOBE OF LUNG (HCC): Primary | ICD-10-CM

## 2021-05-17 PROCEDURE — 84443 ASSAY THYROID STIM HORMONE: CPT

## 2021-05-17 PROCEDURE — 36415 COLL VENOUS BLD VENIPUNCTURE: CPT

## 2021-05-17 PROCEDURE — 85025 COMPLETE CBC W/AUTO DIFF WBC: CPT

## 2021-05-17 PROCEDURE — 99215 OFFICE O/P EST HI 40 MIN: CPT | Performed by: NURSE PRACTITIONER

## 2021-05-17 PROCEDURE — 80053 COMPREHEN METABOLIC PANEL: CPT

## 2021-05-17 PROCEDURE — 84439 ASSAY OF FREE THYROXINE: CPT

## 2021-05-17 RX ORDER — MORPHINE SULFATE 15 MG/1
15 TABLET, FILM COATED, EXTENDED RELEASE ORAL EVERY 12 HOURS SCHEDULED
Qty: 60 TABLET | Refills: 0 | Status: SHIPPED | OUTPATIENT
Start: 2021-05-17 | End: 2021-06-14

## 2021-05-17 NOTE — PROGRESS NOTES
HPI     Warren Donovan is a 64year old male here for f/u of Primary cancer of left upper lobe of lung (hcc)  (primary encounter diagnosis)  Brain metastasis (hcc)  Bone metastasis (hcc)  Chemotherapy management, encounter for    Had SBRT to solitary br Skin: Negative for rash. Neurological: Positive for dizziness (once in a while.) and gait problem (uses cane). Negative for headaches. Hematological: Negative for adenopathy. Psychiatric/Behavioral: Negative for sleep disturbance.            Current tablet (10 mg total) by mouth every 8 (eight) hours as needed for Nausea. 30 tablet 3   • Ondansetron HCl (ZOFRAN) 8 MG tablet Take 1 tablet (8 mg total) by mouth every 8 (eight) hours as needed for Nausea.  30 tablet 3   • aspirin 81 MG Oral Chew Tab Chew No      Family History   Problem Relation Age of Onset   • Cancer Father    • Hypertension Mother    • Hypertension Brother          PHYSICAL EXAM:    /44 (BP Location: Left arm, Patient Position: Sitting, Cuff Size: large)   Pulse 52   Temp 98.7 °F PET/CT on 3/31/2021 with left pleural effusion/opacity in the base. These could be infectious or inflammatory in nature, and malignancy cannot be excluded. Therefore recommend a follow-up imaging with a PET/CT, which will be in 2 months.   This will be en THYROID STIM HORMONE    Collection Time: 05/17/21  9:15 AM   Result Value Ref Range    TSH 26.400 (H) 0.358 - 3.740 mIU/mL   CBC W/ DIFFERENTIAL    Collection Time: 05/17/21  9:15 AM   Result Value Ref Range    WBC 5.6 4.0 - 11.0 x10(3) uL    RBC 3.93 3.80

## 2021-05-18 ENCOUNTER — TELEPHONE (OUTPATIENT)
Dept: HEMATOLOGY/ONCOLOGY | Facility: HOSPITAL | Age: 76
End: 2021-05-18

## 2021-05-18 ENCOUNTER — OFFICE VISIT (OUTPATIENT)
Dept: HEMATOLOGY/ONCOLOGY | Facility: HOSPITAL | Age: 76
End: 2021-05-18
Attending: INTERNAL MEDICINE
Payer: MEDICARE

## 2021-05-18 VITALS
RESPIRATION RATE: 18 BRPM | DIASTOLIC BLOOD PRESSURE: 49 MMHG | TEMPERATURE: 98 F | OXYGEN SATURATION: 99 % | SYSTOLIC BLOOD PRESSURE: 135 MMHG | HEART RATE: 55 BPM

## 2021-05-18 DIAGNOSIS — Z51.81 MEDICATION MONITORING ENCOUNTER: ICD-10-CM

## 2021-05-18 DIAGNOSIS — C34.12 PRIMARY CANCER OF LEFT UPPER LOBE OF LUNG (HCC): ICD-10-CM

## 2021-05-18 DIAGNOSIS — Z45.2 ENCOUNTER FOR CENTRAL LINE CARE: Primary | ICD-10-CM

## 2021-05-18 PROCEDURE — 96413 CHEMO IV INFUSION 1 HR: CPT

## 2021-05-18 PROCEDURE — 96375 TX/PRO/DX INJ NEW DRUG ADDON: CPT

## 2021-05-18 RX ORDER — HEPARIN SODIUM (PORCINE) LOCK FLUSH IV SOLN 100 UNIT/ML 100 UNIT/ML
5 SOLUTION INTRAVENOUS ONCE
Status: CANCELLED | OUTPATIENT
Start: 2021-05-25

## 2021-05-18 RX ORDER — SODIUM CHLORIDE 9 MG/ML
10 INJECTION INTRAVENOUS ONCE
Status: CANCELLED | OUTPATIENT
Start: 2021-05-25

## 2021-05-18 RX ORDER — HEPARIN SODIUM (PORCINE) LOCK FLUSH IV SOLN 100 UNIT/ML 100 UNIT/ML
5 SOLUTION INTRAVENOUS ONCE
Status: CANCELLED | OUTPATIENT
Start: 2021-05-18

## 2021-05-18 RX ORDER — FINASTERIDE 5 MG/1
TABLET, FILM COATED ORAL
Qty: 90 TABLET | Refills: 0 | Status: SHIPPED | OUTPATIENT
Start: 2021-05-18 | End: 2021-07-20

## 2021-05-18 RX ORDER — SODIUM CHLORIDE 9 MG/ML
10 INJECTION INTRAVENOUS ONCE
Status: CANCELLED | OUTPATIENT
Start: 2021-05-18

## 2021-05-18 RX ORDER — TERAZOSIN 5 MG/1
CAPSULE ORAL
Qty: 90 CAPSULE | Refills: 0 | Status: SHIPPED | OUTPATIENT
Start: 2021-05-18 | End: 2021-07-20

## 2021-05-18 NOTE — TELEPHONE ENCOUNTER
Wife called and notified that Dr. Tremaine Clark place and order for IR port insertion due to difficulty finding veins for tx. Informed IR will call to schedule. Wife verbalizes understanding.

## 2021-05-18 NOTE — PROGRESS NOTES
Pt here for C11D1 Keytruda    Arrives Ambulating independently, using cane accompanied by Self . Pt offers no new complaints today.     S    Patient reports possible pregnancy since last therapy cycle: Not Applicable    Modifications in dose or schedule: relieved/minimized  understands plan of care  Progress towards outcome:  unchanged    Education Record    Learner:  Patient  Barriers / Limitations:  None  Method:  discussion  Outcome:  Expressed understanding  Comments:

## 2021-05-22 ENCOUNTER — LAB ENCOUNTER (OUTPATIENT)
Dept: LAB | Facility: HOSPITAL | Age: 76
End: 2021-05-22
Attending: RADIOLOGY
Payer: MEDICARE

## 2021-05-22 DIAGNOSIS — Z01.818 PREOP TESTING: ICD-10-CM

## 2021-05-24 RX ORDER — SODIUM CHLORIDE 9 MG/ML
INJECTION, SOLUTION INTRAVENOUS CONTINUOUS
Status: CANCELLED | OUTPATIENT
Start: 2021-05-24

## 2021-05-25 ENCOUNTER — HOSPITAL ENCOUNTER (OUTPATIENT)
Dept: INTERVENTIONAL RADIOLOGY/VASCULAR | Facility: HOSPITAL | Age: 76
Discharge: HOME | End: 2021-05-25
Attending: INTERNAL MEDICINE | Admitting: RADIOLOGY
Payer: MEDICARE

## 2021-05-25 ENCOUNTER — NURSE ONLY (OUTPATIENT)
Dept: HEMATOLOGY/ONCOLOGY | Facility: HOSPITAL | Age: 76
End: 2021-05-25
Attending: INTERNAL MEDICINE
Payer: MEDICARE

## 2021-05-25 VITALS
OXYGEN SATURATION: 97 % | HEART RATE: 53 BPM | DIASTOLIC BLOOD PRESSURE: 43 MMHG | SYSTOLIC BLOOD PRESSURE: 140 MMHG | RESPIRATION RATE: 18 BRPM | TEMPERATURE: 98 F

## 2021-05-25 VITALS
SYSTOLIC BLOOD PRESSURE: 116 MMHG | BODY MASS INDEX: 28.73 KG/M2 | HEART RATE: 54 BPM | WEIGHT: 194 LBS | HEIGHT: 69 IN | OXYGEN SATURATION: 98 % | DIASTOLIC BLOOD PRESSURE: 49 MMHG | RESPIRATION RATE: 16 BRPM

## 2021-05-25 DIAGNOSIS — Z45.2 ENCOUNTER FOR CENTRAL LINE CARE: Primary | ICD-10-CM

## 2021-05-25 DIAGNOSIS — C34.12 PRIMARY CANCER OF LEFT UPPER LOBE OF LUNG (HCC): ICD-10-CM

## 2021-05-25 DIAGNOSIS — Z01.818 PREOP TESTING: Primary | ICD-10-CM

## 2021-05-25 DIAGNOSIS — Z45.2 ENCOUNTER FOR CENTRAL LINE CARE: ICD-10-CM

## 2021-05-25 DIAGNOSIS — Z51.81 MEDICATION MONITORING ENCOUNTER: ICD-10-CM

## 2021-05-25 DIAGNOSIS — C79.51 BONE METASTASIS (HCC): ICD-10-CM

## 2021-05-25 PROCEDURE — 36415 COLL VENOUS BLD VENIPUNCTURE: CPT

## 2021-05-25 PROCEDURE — 36561 INSERT TUNNELED CV CATH: CPT

## 2021-05-25 PROCEDURE — 02HV33Z INSERTION OF INFUSION DEVICE INTO SUPERIOR VENA CAVA, PERCUTANEOUS APPROACH: ICD-10-PCS | Performed by: RADIOLOGY

## 2021-05-25 PROCEDURE — 96372 THER/PROPH/DIAG INJ SC/IM: CPT

## 2021-05-25 PROCEDURE — 99152 MOD SED SAME PHYS/QHP 5/>YRS: CPT

## 2021-05-25 PROCEDURE — 77001 FLUOROGUIDE FOR VEIN DEVICE: CPT

## 2021-05-25 RX ORDER — HEPARIN SODIUM (PORCINE) LOCK FLUSH IV SOLN 100 UNIT/ML 100 UNIT/ML
SOLUTION INTRAVENOUS
Status: COMPLETED
Start: 2021-05-25 | End: 2021-05-25

## 2021-05-25 RX ORDER — SODIUM CHLORIDE 9 MG/ML
10 INJECTION INTRAVENOUS ONCE
Status: CANCELLED | OUTPATIENT
Start: 2021-06-22

## 2021-05-25 RX ORDER — MIDAZOLAM HYDROCHLORIDE 1 MG/ML
INJECTION INTRAMUSCULAR; INTRAVENOUS
Status: COMPLETED
Start: 2021-05-25 | End: 2021-05-25

## 2021-05-25 RX ORDER — CEFAZOLIN SODIUM/WATER 2 G/20 ML
SYRINGE (ML) INTRAVENOUS
Status: COMPLETED
Start: 2021-05-25 | End: 2021-05-25

## 2021-05-25 RX ORDER — HEPARIN SODIUM (PORCINE) LOCK FLUSH IV SOLN 100 UNIT/ML 100 UNIT/ML
5 SOLUTION INTRAVENOUS ONCE
Status: CANCELLED | OUTPATIENT
Start: 2021-06-22

## 2021-05-25 RX ORDER — LIDOCAINE HYDROCHLORIDE 20 MG/ML
INJECTION, SOLUTION EPIDURAL; INFILTRATION; INTRACAUDAL; PERINEURAL
Status: COMPLETED
Start: 2021-05-25 | End: 2021-05-25

## 2021-05-25 NOTE — PROGRESS NOTES
Patient here for monthly Xgeva today. Calcium from Lab draw on 5/17 9.5. Patient denies Jaw pain or any other dental issues today. Xgeva given in Left lower abdomen, no bleeding noted. Site left open to air.     Discharged ambulatory with 4 point cane

## 2021-05-25 NOTE — INTERVAL H&P NOTE
The above referenced H&P was reviewed by Evalina Fabry, MD on 5/25/2021, the patient was examined and no significant changes have occurred in the patient's condition since the H&P was performed.   Risks, benefits, alternative treatments and consequences of no

## 2021-05-25 NOTE — IVS NOTE
Post Procedure: Port Insertion    Pt is able to sit up and ambulate without difficulty. Pt voided and tolerated fluids/food. Pt's vital signs are stable. Procedural site remains dry and intact with good circulation, motion and sensation.  No signs and sympt

## 2021-05-26 ENCOUNTER — OFFICE VISIT (OUTPATIENT)
Dept: ENDOCRINOLOGY CLINIC | Facility: CLINIC | Age: 76
End: 2021-05-26
Payer: MEDICARE

## 2021-05-26 ENCOUNTER — PATIENT MESSAGE (OUTPATIENT)
Dept: ENDOCRINOLOGY CLINIC | Facility: CLINIC | Age: 76
End: 2021-05-26

## 2021-05-26 VITALS
HEART RATE: 59 BPM | WEIGHT: 194 LBS | BODY MASS INDEX: 28.73 KG/M2 | DIASTOLIC BLOOD PRESSURE: 57 MMHG | HEIGHT: 69 IN | RESPIRATION RATE: 16 BRPM | SYSTOLIC BLOOD PRESSURE: 131 MMHG

## 2021-05-26 DIAGNOSIS — E03.2 HYPOTHYROIDISM DUE TO MEDICATION: Primary | ICD-10-CM

## 2021-05-26 PROCEDURE — 99214 OFFICE O/P EST MOD 30 MIN: CPT | Performed by: INTERNAL MEDICINE

## 2021-05-26 PROCEDURE — 1111F DSCHRG MED/CURRENT MED MERGE: CPT | Performed by: INTERNAL MEDICINE

## 2021-05-26 NOTE — PROGRESS NOTES
Name: Tavia Lima  Date: 5/26/21    Referring Physician: Dr. Lulu Miller    Patient presents with:  Hypothyroidism: follow up.        INITIAL VISIT:   Tavia Lima is a 64year old male who presents for Patient presents with:  Hypothyroidism: follo Rfl: 0  •  morphINE Sulfate ER 15 MG Oral Tab CR, Take 1 tablet (15 mg total) by mouth every 12 (twelve) hours. , Disp: 60 tablet, Rfl: 0  •  Potassium Chloride ER 20 MEQ Oral Tab CR, Take 1 tablet by mouth 2 (two) times a day., Disp: 60 tablet, Rfl: 0  • tablet (4 mg total) by mouth 2 (two) times daily. Take 4 mg twice daily the day before chemotherapy, the day of chemotherapy, and the day after chemotherapy. , Disp: 18 tablet, Rfl: 6  •  aspirin 81 MG Oral Chew Tab, Chew 1 tablet (81 mg total) by mouth aidee no acute distress, well developed  Eyes: normal conjunctivae, sclera. Psychiatric:  oriented to time, self, and place  Nutritional:  no abnormal weight gain or loss    Labs:  Date  TSH  FT4  LT4mcg  10/02/20 28.80  0.7   11/16/20 26.00  0.7  12/28/20 89. 2

## 2021-05-27 RX ORDER — LEVOTHYROXINE SODIUM 0.12 MG/1
125 TABLET ORAL
Qty: 90 TABLET | Refills: 0 | Status: SHIPPED | OUTPATIENT
Start: 2021-05-27 | End: 2021-06-23

## 2021-05-27 NOTE — TELEPHONE ENCOUNTER
From: Chuck Chávez  To: Simon Anand. Deonte Cuello MD  Sent: 5/26/2021 11:24 PM CDT  Subject: Prescription Question    I don't see a prescription for the new dosage of Levothyroxine. Are we changing the dosage or just staying with the 112mg?

## 2021-05-27 NOTE — TELEPHONE ENCOUNTER
Per OV 5/26/21, \"- He will continue the 112mcg until he runs out and then increase to 125mcg. He will then return to clinic with blood tests in two months. \"     Levothyroxine 125mcg Rx was sent today.

## 2021-06-01 ENCOUNTER — TELEPHONE (OUTPATIENT)
Dept: PULMONOLOGY | Facility: CLINIC | Age: 76
End: 2021-06-01

## 2021-06-01 DIAGNOSIS — J44.9 CHRONIC OBSTRUCTIVE PULMONARY DISEASE, UNSPECIFIED COPD TYPE (HCC): Primary | ICD-10-CM

## 2021-06-01 NOTE — TELEPHONE ENCOUNTER
Spoke with patient's wife Polly (SANDRA on file), states patient's nebulizer broke requesting new order for nebulizer machine to be faxed to [facility] Dr. Ariana Evans- Please review/sign pended order for nebulizer machine.

## 2021-06-01 NOTE — TELEPHONE ENCOUNTER
Incoming call from patient's wife Artem . Informed her order faxed to [facility]. Patient verbalized understanding, states she will give [facility] a call.

## 2021-06-01 NOTE — TELEPHONE ENCOUNTER
Wife called in to request a nebulizer order for the pt. Please fax to [facility] [phone] . Please call wife when the order is put in.   Pt needs this as soon as possible

## 2021-06-02 ENCOUNTER — HOSPITAL ENCOUNTER (OUTPATIENT)
Dept: NUCLEAR MEDICINE | Facility: HOSPITAL | Age: 76
Discharge: HOME | End: 2021-06-02
Attending: NURSE PRACTITIONER
Payer: MEDICARE

## 2021-06-02 DIAGNOSIS — C34.12 PRIMARY CANCER OF LEFT UPPER LOBE OF LUNG (HCC): ICD-10-CM

## 2021-06-02 DIAGNOSIS — C79.51 BONE METASTASIS (HCC): ICD-10-CM

## 2021-06-02 PROCEDURE — 82962 GLUCOSE BLOOD TEST: CPT

## 2021-06-02 PROCEDURE — 78815 PET IMAGE W/CT SKULL-THIGH: CPT | Performed by: NURSE PRACTITIONER

## 2021-06-07 ENCOUNTER — NURSE ONLY (OUTPATIENT)
Dept: HEMATOLOGY/ONCOLOGY | Facility: HOSPITAL | Age: 76
End: 2021-06-07
Attending: NURSE PRACTITIONER
Payer: MEDICARE

## 2021-06-07 ENCOUNTER — NURSE ONLY (OUTPATIENT)
Dept: HEMATOLOGY/ONCOLOGY | Facility: HOSPITAL | Age: 76
End: 2021-06-07
Attending: INTERNAL MEDICINE
Payer: MEDICARE

## 2021-06-07 VITALS
TEMPERATURE: 98 F | WEIGHT: 189 LBS | HEART RATE: 59 BPM | OXYGEN SATURATION: 97 % | RESPIRATION RATE: 18 BRPM | DIASTOLIC BLOOD PRESSURE: 57 MMHG | BODY MASS INDEX: 27.99 KG/M2 | SYSTOLIC BLOOD PRESSURE: 126 MMHG | HEIGHT: 69 IN

## 2021-06-07 DIAGNOSIS — Z29.8 ENCOUNTER FOR IMMUNOTHERAPY: ICD-10-CM

## 2021-06-07 DIAGNOSIS — C79.51 BONE METASTASIS (HCC): ICD-10-CM

## 2021-06-07 DIAGNOSIS — Z51.81 MEDICATION MONITORING ENCOUNTER: ICD-10-CM

## 2021-06-07 DIAGNOSIS — C34.12 PRIMARY CANCER OF LEFT UPPER LOBE OF LUNG (HCC): Primary | ICD-10-CM

## 2021-06-07 DIAGNOSIS — C34.12 PRIMARY CANCER OF LEFT UPPER LOBE OF LUNG (HCC): ICD-10-CM

## 2021-06-07 DIAGNOSIS — Z45.2 ENCOUNTER FOR CENTRAL LINE CARE: Primary | ICD-10-CM

## 2021-06-07 DIAGNOSIS — C79.31 BRAIN METASTASIS (HCC): ICD-10-CM

## 2021-06-07 PROCEDURE — 85025 COMPLETE CBC W/AUTO DIFF WBC: CPT

## 2021-06-07 PROCEDURE — 80053 COMPREHEN METABOLIC PANEL: CPT

## 2021-06-07 PROCEDURE — 99215 OFFICE O/P EST HI 40 MIN: CPT | Performed by: NURSE PRACTITIONER

## 2021-06-07 PROCEDURE — 36591 DRAW BLOOD OFF VENOUS DEVICE: CPT

## 2021-06-07 RX ORDER — SODIUM CHLORIDE 9 MG/ML
INJECTION INTRAVENOUS
Status: DISCONTINUED
Start: 2021-06-07 | End: 2021-06-07

## 2021-06-07 NOTE — PROGRESS NOTES
HPI     Parker Brown is a 64year old male here for f/u of Primary cancer of left upper lobe of lung (hcc)  (primary encounter diagnosis)  Bone metastasis (hcc)  Brain metastasis (hcc)  Encounter for immunotherapy    Had SBRT to solitary brain metasta for arthralgias. No bone pain   Skin: Negative for rash. Neurological: Positive for dizziness (once in a while.) and gait problem (uses cane). Negative for headaches. Hematological: Negative for adenopathy. Bruises/bleeds easily.    Psychiatric/B 5   • Prochlorperazine Maleate (COMPAZINE) 10 mg tablet Take 1 tablet (10 mg total) by mouth every 8 (eight) hours as needed for Nausea.  30 tablet 3   • Ondansetron HCl (ZOFRAN) 8 MG tablet Take 1 tablet (8 mg total) by mouth every 8 (eight) hours as neede use: No    Drug use: No      Family History   Problem Relation Age of Onset   • Cancer Father    • Hypertension Mother    • Hypertension Brother          PHYSICAL EXAM:    /57 (BP Location: Left arm, Patient Position: Sitting, Cuff Size: adult)   Pul pembrolizumab. PET scan continues to show improvement- reviewed with patient and wife. Proceed with cycle 12. Patient's last PET/CT on 3/31/2021 with left pleural effusion/opacity in the base.   These could be infectious or inflammatory in nature, a - 4.4 g/dL    A/G Ratio 0.7 (L) 1.0 - 2.0    FASTING Patient not present    CBC W/ DIFFERENTIAL    Collection Time: 06/07/21  9:22 AM   Result Value Ref Range    WBC 4.1 4.0 - 11.0 x10(3) uL    RBC 3.63 (L) 3.80 - 5.80 x10(6)uL    HGB 10.6 (L) 13.0 - 17.5 injected into a right antecubital vein.  Low dose non-contrast CT scanning was performed using a dedicated integrated PET/CT scanner for   attenuation correction and anatomic localization only.       FASTING PATIENT BLOOD GLUCOSE: 78 mg/dL.     UPTAKE TIME pathologic FDG activity.     ABDOMEN/PELVIS: The previously seen partially calcified central mesenteric mass is re-identified.  This measures 29 x 44 mm (image 108), previously it measured 31 x 35 mm .  This has persistent mildly increased activity.  The S interval decrease in size and activity in the subcarinal lymph node   7. There has been interval decrease in the size and activity in the paratracheal soft tissue fullness/lymph node in the right neck   8.  Persistent hypermetabolic retroperitoneal lymphade

## 2021-06-08 ENCOUNTER — OFFICE VISIT (OUTPATIENT)
Dept: HEMATOLOGY/ONCOLOGY | Facility: HOSPITAL | Age: 76
End: 2021-06-08
Attending: INTERNAL MEDICINE
Payer: MEDICARE

## 2021-06-08 VITALS
OXYGEN SATURATION: 99 % | HEART RATE: 59 BPM | DIASTOLIC BLOOD PRESSURE: 45 MMHG | SYSTOLIC BLOOD PRESSURE: 133 MMHG | TEMPERATURE: 98 F | RESPIRATION RATE: 18 BRPM

## 2021-06-08 DIAGNOSIS — Z51.81 MEDICATION MONITORING ENCOUNTER: ICD-10-CM

## 2021-06-08 DIAGNOSIS — Z45.2 ENCOUNTER FOR CENTRAL LINE CARE: Primary | ICD-10-CM

## 2021-06-08 DIAGNOSIS — C34.12 PRIMARY CANCER OF LEFT UPPER LOBE OF LUNG (HCC): ICD-10-CM

## 2021-06-08 PROCEDURE — 96413 CHEMO IV INFUSION 1 HR: CPT

## 2021-06-08 PROCEDURE — 96367 TX/PROPH/DG ADDL SEQ IV INF: CPT

## 2021-06-08 RX ORDER — HEPARIN SODIUM (PORCINE) LOCK FLUSH IV SOLN 100 UNIT/ML 100 UNIT/ML
SOLUTION INTRAVENOUS
Status: COMPLETED
Start: 2021-06-08 | End: 2021-06-08

## 2021-06-08 RX ORDER — HEPARIN SODIUM (PORCINE) LOCK FLUSH IV SOLN 100 UNIT/ML 100 UNIT/ML
5 SOLUTION INTRAVENOUS ONCE
Status: COMPLETED | OUTPATIENT
Start: 2021-06-08 | End: 2021-06-08

## 2021-06-08 RX ORDER — SODIUM CHLORIDE 9 MG/ML
INJECTION INTRAVENOUS
Status: DISCONTINUED
Start: 2021-06-08 | End: 2021-06-08

## 2021-06-08 RX ORDER — HEPARIN SODIUM (PORCINE) LOCK FLUSH IV SOLN 100 UNIT/ML 100 UNIT/ML
5 SOLUTION INTRAVENOUS ONCE
Status: CANCELLED | OUTPATIENT
Start: 2021-06-22

## 2021-06-08 RX ORDER — SODIUM CHLORIDE 9 MG/ML
10 INJECTION INTRAVENOUS ONCE
Status: CANCELLED | OUTPATIENT
Start: 2021-06-22

## 2021-06-08 RX ADMIN — HEPARIN SODIUM (PORCINE) LOCK FLUSH IV SOLN 100 UNIT/ML 500 UNITS: 100 SOLUTION INTRAVENOUS at 10:50:00

## 2021-06-08 NOTE — PROGRESS NOTES
Pt here for C12D1 Keytruda    Arrives Ambulating independently, using cane accompanied by Self . Pt offers no new complaints today.         Patient reports possible pregnancy since last therapy cycle: Not Applicable    Modifications in dose or schedule discussion  Outcome:  Expressed understanding  Comments:

## 2021-06-14 ENCOUNTER — OFFICE VISIT (OUTPATIENT)
Dept: HEMATOLOGY/ONCOLOGY | Facility: HOSPITAL | Age: 76
End: 2021-06-14
Attending: INTERNAL MEDICINE
Payer: MEDICARE

## 2021-06-14 ENCOUNTER — TELEPHONE (OUTPATIENT)
Dept: HEMATOLOGY/ONCOLOGY | Facility: HOSPITAL | Age: 76
End: 2021-06-14

## 2021-06-14 VITALS
SYSTOLIC BLOOD PRESSURE: 126 MMHG | TEMPERATURE: 98 F | DIASTOLIC BLOOD PRESSURE: 61 MMHG | HEIGHT: 69 IN | RESPIRATION RATE: 18 BRPM | HEART RATE: 60 BPM | BODY MASS INDEX: 28.29 KG/M2 | OXYGEN SATURATION: 97 % | WEIGHT: 191 LBS

## 2021-06-14 DIAGNOSIS — C79.31 BRAIN METASTASIS (HCC): ICD-10-CM

## 2021-06-14 DIAGNOSIS — I31.3 MALIGNANT PERICARDIAL EFFUSION (HCC): ICD-10-CM

## 2021-06-14 DIAGNOSIS — C78.00 MALIGNANT NEOPLASM METASTATIC TO LUNG, UNSPECIFIED LATERALITY (HCC): ICD-10-CM

## 2021-06-14 DIAGNOSIS — Z29.8 ENCOUNTER FOR IMMUNOTHERAPY: ICD-10-CM

## 2021-06-14 DIAGNOSIS — C79.51 BONE METASTASIS (HCC): ICD-10-CM

## 2021-06-14 DIAGNOSIS — C80.1 MALIGNANT PERICARDIAL EFFUSION (HCC): ICD-10-CM

## 2021-06-14 DIAGNOSIS — C34.12 PRIMARY CANCER OF LEFT UPPER LOBE OF LUNG (HCC): Primary | ICD-10-CM

## 2021-06-14 PROCEDURE — 99215 OFFICE O/P EST HI 40 MIN: CPT | Performed by: INTERNAL MEDICINE

## 2021-06-14 RX ORDER — HYDROCODONE BITARTRATE AND ACETAMINOPHEN 10; 325 MG/1; MG/1
TABLET ORAL EVERY 6 HOURS PRN
Qty: 120 TABLET | Refills: 0 | Status: SHIPPED | OUTPATIENT
Start: 2021-06-14

## 2021-06-14 NOTE — TELEPHONE ENCOUNTER
Called to pharmacy and clarified pt is tapering off of morphine and onto norco prn. No further questions.

## 2021-06-14 NOTE — PROGRESS NOTES
JOANNE Allred is a 64year old male here for f/u of Primary cancer of left upper lobe of lung (hcc)  (primary encounter diagnosis)  Bone metastasis (hcc)  Malignant neoplasm metastatic to lung, unspecified laterality (hcc)  Brain metastasis (hc 5 MG Oral Tab TAKE 1 TABLET EVERY DAY 90 tablet 0   • TERAZOSIN HCL 5 MG Oral Cap TAKE 1 CAPSULE EVERY DAY AT 4:00 PM 90 capsule 0   • morphINE Sulfate ER 15 MG Oral Tab CR Take 1 tablet (15 mg total) by mouth every 12 (twelve) hours.  60 tablet 0   • Potas • aspirin 81 MG Oral Chew Tab Chew 1 tablet (81 mg total) by mouth daily. 30 tablet 2   • cycloSPORINE 0.05 % Ophthalmic Emulsion Place into both eyes 2 (two) times daily.      • Fluticasone-Umeclidin-Vilant 100-62.5-25 MCG/INH Inhalation Aerosol Powder, lb)  05/18/21 : 88 kg (194 lb)  05/17/21 : 88 kg (194 lb)  04/26/21 : 89.4 kg (197 lb)    General: Patient is alert, not in acute distress. HEENT: EOMs intact. PERRL. No oropharyngeal reddness Cochlear implant  Neck: No JVD. No palpable lymphadenopathy. Dr. Beauchamp Loosen, which has been ordered. Cancer-related pain:  discontinue morphine. Will transition to prn norco 5-10 mg prn. Constipation:  Controlled with daily MiraLAX. Anemia:  Likely chemotherapy-induced. improving. Less fatigued. Monitor.     Hypo CREATININE      0.70 - 1.30 mg/dL 1.04   BUN/CREAT Ratio      10.0 - 20.0 27.9 (H)   CALCIUM      8.5 - 10.1 mg/dL 9.3   CALCULATED OSMOLALITY      275 - 295 mOsm/kg 297 (H)   eGFR NON-AFR.  AMERICAN      >=60 68   eGFR AFRICAN AMERICAN      >=60 79   ALT

## 2021-06-18 ENCOUNTER — TELEPHONE (OUTPATIENT)
Dept: HEMATOLOGY/ONCOLOGY | Facility: HOSPITAL | Age: 76
End: 2021-06-18

## 2021-06-18 ENCOUNTER — HOSPITAL ENCOUNTER (EMERGENCY)
Facility: HOSPITAL | Age: 76
Discharge: HOME | End: 2021-06-18
Attending: EMERGENCY MEDICINE
Payer: MEDICARE

## 2021-06-18 VITALS
HEART RATE: 76 BPM | TEMPERATURE: 97 F | DIASTOLIC BLOOD PRESSURE: 62 MMHG | SYSTOLIC BLOOD PRESSURE: 136 MMHG | RESPIRATION RATE: 18 BRPM | OXYGEN SATURATION: 96 %

## 2021-06-18 DIAGNOSIS — Z92.89 HISTORY OF IMMUNOTHERAPY: ICD-10-CM

## 2021-06-18 DIAGNOSIS — R19.7 DIARRHEA, UNSPECIFIED TYPE: Primary | ICD-10-CM

## 2021-06-18 PROCEDURE — 87427 SHIGA-LIKE TOXIN AG IA: CPT | Performed by: EMERGENCY MEDICINE

## 2021-06-18 PROCEDURE — 99284 EMERGENCY DEPT VISIT MOD MDM: CPT

## 2021-06-18 PROCEDURE — 80053 COMPREHEN METABOLIC PANEL: CPT | Performed by: EMERGENCY MEDICINE

## 2021-06-18 PROCEDURE — 87046 STOOL CULTR AEROBIC BACT EA: CPT | Performed by: EMERGENCY MEDICINE

## 2021-06-18 PROCEDURE — 83735 ASSAY OF MAGNESIUM: CPT | Performed by: EMERGENCY MEDICINE

## 2021-06-18 PROCEDURE — 82272 OCCULT BLD FECES 1-3 TESTS: CPT | Performed by: EMERGENCY MEDICINE

## 2021-06-18 PROCEDURE — 87493 C DIFF AMPLIFIED PROBE: CPT | Performed by: EMERGENCY MEDICINE

## 2021-06-18 PROCEDURE — 85025 COMPLETE CBC W/AUTO DIFF WBC: CPT | Performed by: EMERGENCY MEDICINE

## 2021-06-18 PROCEDURE — 87045 FECES CULTURE AEROBIC BACT: CPT | Performed by: EMERGENCY MEDICINE

## 2021-06-18 PROCEDURE — 96360 HYDRATION IV INFUSION INIT: CPT

## 2021-06-18 RX ORDER — POTASSIUM CHLORIDE 20 MEQ/1
40 TABLET, EXTENDED RELEASE ORAL ONCE
Status: COMPLETED | OUTPATIENT
Start: 2021-06-18 | End: 2021-06-18

## 2021-06-18 RX ORDER — POTASSIUM CHLORIDE 20 MEQ/1
20 TABLET, EXTENDED RELEASE ORAL 2 TIMES DAILY
Qty: 6 TABLET | Refills: 0 | Status: SHIPPED | OUTPATIENT
Start: 2021-06-18 | End: 2021-06-21

## 2021-06-18 RX ORDER — DICYCLOMINE HCL 20 MG
20 TABLET ORAL 4 TIMES DAILY PRN
Qty: 20 TABLET | Refills: 0 | Status: SHIPPED | OUTPATIENT
Start: 2021-06-18 | End: 2021-06-23

## 2021-06-18 NOTE — TELEPHONE ENCOUNTER
Called Polly and spoke to her. Pt was constipated and had to dig himself out last weekend. Tues he had a small amount of diarrhea. Wed had a small amount of diarrhea, went out to dinner and ate Fish and then the diarrhea became worse.  Wed night and Thurs, Th

## 2021-06-18 NOTE — ED PROVIDER NOTES
Patient Seen in: [facility] Emergency Department    History   Patient presents with:  Diarrhea    Stated Complaint: [facility]     HPI    63 yo M with PMH CAD, HL, lung CA with metastatic disease on Savannah Prows presenting Suspension,  Take 5 mL by mouth 4 (four) times daily before meals and nightly. hydrochlorothiazide 12.5 MG Oral Tab,  Take 12.5 mg by mouth daily. Naloxone HCl 4 MG/0.1ML Nasal Liquid,  4 mg by Nasal route as needed.  If patient remains unresponsive, re Vaping Use      Vaping Use: Never used    Alcohol use: No    Drug use: No      Review of Systems :  Constitutional: As per HPI  Respiratory: Negative for cough and shortness of breath. Cardiovascular: Negative for chest pain and palpitations.    Krystal Garza All other components within normal limits   MAGNESIUM - Normal   C. DIFFICILE(TOXIGENIC)PCR - Normal   CBC WITH DIFFERENTIAL WITH PLATELET    Narrative: The following orders were created for panel order CBC With Differential With Platelet.   Procedure throughout this encounter with handwashing performed prior and after patient evaluation without personal hand/facial/oropharyngeal contact and gloves worn throughout encounter. See note and/or contact this provider for further PPE details.     We recommend

## 2021-06-18 NOTE — ED INITIAL ASSESSMENT (HPI)
Pt was constipated and had to self disimpact himself  last weekend. Tues he had a small amount of diarrhea. Wed had a small amount of diarrhea, went out to dinner and ate Fish and then the diarrhea became worse.  Wed night and Thurs, Thursday night worse di

## 2021-06-18 NOTE — TELEPHONE ENCOUNTER
Lung Cancer - 6/8/21 - C12 Keytruda    Diarrhea - Grade 2-3 - having \"Massive\" diarrhea stool-liquid brown, has been incontinent at times. Started on Wednesday.  Had seen MD on Monday and had had constipation to where he had to\"dig it out\", due to pain

## 2021-06-21 ENCOUNTER — TELEPHONE (OUTPATIENT)
Dept: HEMATOLOGY/ONCOLOGY | Facility: HOSPITAL | Age: 76
End: 2021-06-21

## 2021-06-21 RX ORDER — DIPHENOXYLATE HYDROCHLORIDE AND ATROPINE SULFATE 2.5; .025 MG/1; MG/1
1 TABLET ORAL 4 TIMES DAILY PRN
Qty: 30 TABLET | Refills: 1 | Status: SHIPPED | OUTPATIENT
Start: 2021-06-21

## 2021-06-21 NOTE — TELEPHONE ENCOUNTER
Polly called back. Narciso Pallas took two doses of imodium at 1:30pm. He drank some broth and drank 1/2 a bottle of child's pedialyte. He had another yellow, liquid stool. Polly is asking what to do now? I updated [facility], APN.  She said she may give him another i

## 2021-06-21 NOTE — TELEPHONE ENCOUNTER
Condition Update: Diarrhea    Diarrhea: (Polly reports after Ilir Ozuna was discharged from the [address] [place] [address] ER on Friday he has continued to have multiple episodes of liquid stool. She is giving him Dicyclomine HCL 1 tab four times a day.  She has not given him any imodi

## 2021-06-22 ENCOUNTER — NURSE ONLY (OUTPATIENT)
Dept: HEMATOLOGY/ONCOLOGY | Facility: HOSPITAL | Age: 76
End: 2021-06-22
Attending: INTERNAL MEDICINE
Payer: MEDICARE

## 2021-06-22 VITALS
SYSTOLIC BLOOD PRESSURE: 111 MMHG | OXYGEN SATURATION: 97 % | DIASTOLIC BLOOD PRESSURE: 53 MMHG | HEART RATE: 80 BPM | RESPIRATION RATE: 18 BRPM | TEMPERATURE: 98 F

## 2021-06-22 DIAGNOSIS — Z45.2 ENCOUNTER FOR CENTRAL LINE CARE: Primary | ICD-10-CM

## 2021-06-22 DIAGNOSIS — C34.12 PRIMARY CANCER OF LEFT UPPER LOBE OF LUNG (HCC): ICD-10-CM

## 2021-06-22 DIAGNOSIS — C79.51 BONE METASTASIS (HCC): ICD-10-CM

## 2021-06-22 DIAGNOSIS — E03.2 HYPOTHYROIDISM DUE TO MEDICATION: ICD-10-CM

## 2021-06-22 DIAGNOSIS — Z51.81 MEDICATION MONITORING ENCOUNTER: ICD-10-CM

## 2021-06-22 PROCEDURE — 96372 THER/PROPH/DIAG INJ SC/IM: CPT

## 2021-06-22 PROCEDURE — 96361 HYDRATE IV INFUSION ADD-ON: CPT

## 2021-06-22 PROCEDURE — 96360 HYDRATION IV INFUSION INIT: CPT

## 2021-06-22 RX ORDER — HEPARIN SODIUM (PORCINE) LOCK FLUSH IV SOLN 100 UNIT/ML 100 UNIT/ML
5 SOLUTION INTRAVENOUS ONCE
Status: COMPLETED | OUTPATIENT
Start: 2021-06-22 | End: 2021-06-22

## 2021-06-22 RX ORDER — SODIUM CHLORIDE 9 MG/ML
10 INJECTION INTRAVENOUS ONCE
Status: CANCELLED | OUTPATIENT
Start: 2021-07-20

## 2021-06-22 RX ORDER — HEPARIN SODIUM (PORCINE) LOCK FLUSH IV SOLN 100 UNIT/ML 100 UNIT/ML
5 SOLUTION INTRAVENOUS ONCE
Status: CANCELLED | OUTPATIENT
Start: 2021-07-20

## 2021-06-22 RX ORDER — SODIUM CHLORIDE 9 MG/ML
INJECTION INTRAVENOUS
Status: DISCONTINUED
Start: 2021-06-22 | End: 2021-06-22

## 2021-06-22 RX ORDER — HEPARIN SODIUM (PORCINE) LOCK FLUSH IV SOLN 100 UNIT/ML 100 UNIT/ML
SOLUTION INTRAVENOUS
Status: COMPLETED
Start: 2021-06-22 | End: 2021-06-22

## 2021-06-22 RX ORDER — ALPRAZOLAM 0.5 MG/1
0.5 TABLET ORAL 2 TIMES DAILY PRN
Qty: 60 TABLET | Refills: 1 | Status: SHIPPED | OUTPATIENT
Start: 2021-06-22 | End: 2021-10-06

## 2021-06-22 RX ORDER — POTASSIUM CHLORIDE 1500 MG/1
1 TABLET, EXTENDED RELEASE ORAL 2 TIMES DAILY
Qty: 60 TABLET | Refills: 0 | Status: SHIPPED | OUTPATIENT
Start: 2021-06-22 | End: 2021-08-17

## 2021-06-22 RX ADMIN — HEPARIN SODIUM (PORCINE) LOCK FLUSH IV SOLN 100 UNIT/ML 500 UNITS: 100 SOLUTION INTRAVENOUS at 10:52:00

## 2021-06-22 NOTE — PROGRESS NOTES
Patient here for Valentine Robins. Arrives ambulatory with a cane. Patient reports he is feeling weak. States he had diarrhea for a week, was in the Er on 6/18. Patient states no diarrhea since yesterday after he took a second dose of Imodium.  Patient states he i

## 2021-06-22 NOTE — PATIENT INSTRUCTIONS
Cancer and Nutrition: Foods to Manage Diarrhea   During cancer treatment, you need enough calories and protein. The foods you choose can also help you cope with side effects of cancer and its treatment.  One side effect of cancer or cancer treatment is di help stop diarrhea. Take this medicine exactly as directed. Marco last reviewed this educational content on 12/1/2020  © 4096-1872 [employer]. All rights reserved.  This information is not intended as a substitute for professional medica

## 2021-06-22 NOTE — PROGRESS NOTES
Pt added on for fluids after xgeva injection in lab. Reports multiple days of diarrhea and has not been able to eat or drink for \"a while\". Appeared to tolerate fluids w/o issue.   Discharge home ambulating independently w/ assist of quad cane to person

## 2021-06-23 RX ORDER — LEVOTHYROXINE SODIUM 125 UG/1
TABLET ORAL
Qty: 90 TABLET | Refills: 0 | Status: SHIPPED | OUTPATIENT
Start: 2021-06-23 | End: 2021-10-06

## 2021-06-23 RX ORDER — LEVOTHYROXINE SODIUM 112 UG/1
TABLET ORAL
Qty: 90 TABLET | Refills: 0 | Status: SHIPPED | OUTPATIENT
Start: 2021-06-23 | End: 2021-06-29 | Stop reason: ALTCHOICE

## 2021-06-23 NOTE — TELEPHONE ENCOUNTER
[facility] checking status on refills. States patient is picking up meds today. Please call. Thank you.

## 2021-06-23 NOTE — TELEPHONE ENCOUNTER
LOV: 05/26/21    LR: 05/27/21    \" - He will continue the 112mcg until he runs out and then increase to 125mcg. He will then return to clinic with blood tests in two months. \"    Please refuse request for 112 mcg dose. Thank you.

## 2021-06-28 ENCOUNTER — APPOINTMENT (OUTPATIENT)
Dept: HEMATOLOGY/ONCOLOGY | Facility: HOSPITAL | Age: 76
End: 2021-06-28
Attending: INTERNAL MEDICINE
Payer: MEDICARE

## 2021-06-28 NOTE — PROGRESS NOTES
Pt here for C13 Keytruda (deferred). Arrives Ambulating with cane. Patient reports possible pregnancy since last therapy cycle: Not Applicable    Modifications in dose or schedule:  Yes, pt being deferred one week to be reevaluated for diarrhea.   Hydra

## 2021-06-29 ENCOUNTER — OFFICE VISIT (OUTPATIENT)
Dept: HEMATOLOGY/ONCOLOGY | Facility: HOSPITAL | Age: 76
End: 2021-06-29
Attending: NURSE PRACTITIONER
Payer: MEDICARE

## 2021-06-29 ENCOUNTER — NURSE ONLY (OUTPATIENT)
Dept: HEMATOLOGY/ONCOLOGY | Facility: HOSPITAL | Age: 76
End: 2021-06-29
Attending: INTERNAL MEDICINE
Payer: MEDICARE

## 2021-06-29 VITALS
BODY MASS INDEX: 26.36 KG/M2 | HEART RATE: 77 BPM | WEIGHT: 178 LBS | SYSTOLIC BLOOD PRESSURE: 109 MMHG | RESPIRATION RATE: 18 BRPM | DIASTOLIC BLOOD PRESSURE: 68 MMHG | TEMPERATURE: 98 F | HEIGHT: 69.02 IN | OXYGEN SATURATION: 98 %

## 2021-06-29 VITALS
RESPIRATION RATE: 18 BRPM | OXYGEN SATURATION: 98 % | HEART RATE: 77 BPM | WEIGHT: 178 LBS | TEMPERATURE: 98 F | SYSTOLIC BLOOD PRESSURE: 109 MMHG | BODY MASS INDEX: 26.36 KG/M2 | DIASTOLIC BLOOD PRESSURE: 68 MMHG | HEIGHT: 69 IN

## 2021-06-29 DIAGNOSIS — Z45.2 ENCOUNTER FOR CENTRAL LINE CARE: Primary | ICD-10-CM

## 2021-06-29 DIAGNOSIS — Z51.81 MEDICATION MONITORING ENCOUNTER: ICD-10-CM

## 2021-06-29 DIAGNOSIS — C34.12 PRIMARY CANCER OF LEFT UPPER LOBE OF LUNG (HCC): Primary | ICD-10-CM

## 2021-06-29 DIAGNOSIS — C34.12 PRIMARY CANCER OF LEFT UPPER LOBE OF LUNG (HCC): ICD-10-CM

## 2021-06-29 DIAGNOSIS — C79.51 BONE METASTASIS (HCC): ICD-10-CM

## 2021-06-29 DIAGNOSIS — Z51.11 CHEMOTHERAPY MANAGEMENT, ENCOUNTER FOR: ICD-10-CM

## 2021-06-29 DIAGNOSIS — Z45.2 ENCOUNTER FOR CENTRAL LINE CARE: ICD-10-CM

## 2021-06-29 DIAGNOSIS — C79.31 BRAIN METASTASIS (HCC): ICD-10-CM

## 2021-06-29 DIAGNOSIS — C78.00 MALIGNANT NEOPLASM METASTATIC TO LUNG, UNSPECIFIED LATERALITY (HCC): ICD-10-CM

## 2021-06-29 PROCEDURE — 80053 COMPREHEN METABOLIC PANEL: CPT

## 2021-06-29 PROCEDURE — 99215 OFFICE O/P EST HI 40 MIN: CPT | Performed by: NURSE PRACTITIONER

## 2021-06-29 PROCEDURE — 85025 COMPLETE CBC W/AUTO DIFF WBC: CPT

## 2021-06-29 PROCEDURE — 84443 ASSAY THYROID STIM HORMONE: CPT

## 2021-06-29 PROCEDURE — 96360 HYDRATION IV INFUSION INIT: CPT

## 2021-06-29 RX ORDER — HEPARIN SODIUM (PORCINE) LOCK FLUSH IV SOLN 100 UNIT/ML 100 UNIT/ML
5 SOLUTION INTRAVENOUS ONCE
Status: CANCELLED | OUTPATIENT
Start: 2021-07-20

## 2021-06-29 RX ORDER — SODIUM CHLORIDE 9 MG/ML
INJECTION INTRAVENOUS
Status: DISPENSED
Start: 2021-06-29 | End: 2021-06-29

## 2021-06-29 RX ORDER — SODIUM CHLORIDE 9 MG/ML
10 INJECTION INTRAVENOUS ONCE
Status: CANCELLED | OUTPATIENT
Start: 2021-07-20

## 2021-06-29 RX ORDER — HEPARIN SODIUM (PORCINE) LOCK FLUSH IV SOLN 100 UNIT/ML 100 UNIT/ML
SOLUTION INTRAVENOUS
Status: COMPLETED
Start: 2021-06-29 | End: 2021-06-29

## 2021-06-29 RX ORDER — HEPARIN SODIUM (PORCINE) LOCK FLUSH IV SOLN 100 UNIT/ML 100 UNIT/ML
5 SOLUTION INTRAVENOUS ONCE
Status: COMPLETED | OUTPATIENT
Start: 2021-06-29 | End: 2021-06-29

## 2021-06-29 RX ADMIN — HEPARIN SODIUM (PORCINE) LOCK FLUSH IV SOLN 100 UNIT/ML 500 UNITS: 100 SOLUTION INTRAVENOUS at 11:20:00

## 2021-06-29 NOTE — PROGRESS NOTES
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            Current Outpatient Medications   Medication Sig Dispense Refill   • EUTHYROX 125 MCG Oral Tab TAKE 1 TABLET BY MOUTH BEFORE BREAKFAST 90 tablet 0   • EUTHYROX 112 MCG Oral Tab TAKE 1 TABLET BY MOUTH BEFORE BREAKFAST 90 ta 8 MG tablet Take 1 tablet (8 mg total) by mouth every 8 (eight) hours as needed for Nausea. 30 tablet 3   • dexamethasone (DECADRON) 4 MG tablet Take 1 tablet (4 mg total) by mouth 2 (two) times daily.  Take 4 mg twice daily the day before chemotherapy, the 109/68 (BP Location: Left arm, Patient Position: Sitting, Cuff Size: adult)   Pulse 77   Temp 97.8 °F (36.6 °C) (Oral)   Resp 18   Ht 1.753 m (5' 9\")   Wt 80.7 kg (178 lb)   SpO2 98%   BMI 26.29 kg/m²   Wt Readings from Last 6 Encounters:  06/14/21 : 86. 6 and wife. HOLD cycle 13 due to diarrhea   Hydrate today 1 liter 0.9 NS    Plan to treat next week if diarrhea resolves.    If diarrhea worsens or does not resolve will treat as immune mediated colitis- prednisone 1 mg / kg /day  Taper by 10 mg every 3 d g/dL    Globulin  4.7 (H) 2.8 - 4.4 g/dL    A/G Ratio 0.7 (L) 1.0 - 2.0    FASTING Patient not present    ASSAY, THYROID STIM HORMONE    Collection Time: 06/29/21  8:25 AM   Result Value Ref Range    TSH 6.910 (H) 0.358 - 3.740 mIU/mL   CBC W/ DIFFERENTIAL 0.00 - 1.00 x10(3) uL 0.01   Neutrophils %      % 72.3   Lymphocytes %      % 14.1   Monocytes %      % 7.8   Eosinophils %      % 4.9   Basophils %      % 0.7   Immature Granulocyte %      % 0.2   Glucose      70 - 99 mg/dL 81   Sodium      136 - 145 mmol

## 2021-07-05 ENCOUNTER — APPOINTMENT (OUTPATIENT)
Dept: CT IMAGING | Facility: HOSPITAL | Age: 76
End: 2021-07-05
Attending: EMERGENCY MEDICINE
Payer: MEDICARE

## 2021-07-05 ENCOUNTER — APPOINTMENT (OUTPATIENT)
Dept: GENERAL RADIOLOGY | Facility: HOSPITAL | Age: 76
End: 2021-07-05
Attending: EMERGENCY MEDICINE
Payer: MEDICARE

## 2021-07-05 ENCOUNTER — HOSPITAL ENCOUNTER (EMERGENCY)
Facility: HOSPITAL | Age: 76
Discharge: HOME | End: 2021-07-05
Attending: EMERGENCY MEDICINE
Payer: MEDICARE

## 2021-07-05 VITALS
HEART RATE: 80 BPM | TEMPERATURE: 98 F | WEIGHT: 178 LBS | OXYGEN SATURATION: 96 % | RESPIRATION RATE: 22 BRPM | BODY MASS INDEX: 26 KG/M2 | SYSTOLIC BLOOD PRESSURE: 122 MMHG | DIASTOLIC BLOOD PRESSURE: 67 MMHG

## 2021-07-05 DIAGNOSIS — R07.9 RIGHT-SIDED CHEST PAIN: Primary | ICD-10-CM

## 2021-07-05 LAB
ALBUMIN SERPL-MCNC: 3 G/DL (ref 3.4–5)
ALP LIVER SERPL-CCNC: 72 U/L
ALT SERPL-CCNC: 18 U/L
ANION GAP SERPL CALC-SCNC: 8 MMOL/L (ref 0–18)
AST SERPL-CCNC: 12 U/L (ref 15–37)
BASOPHILS # BLD AUTO: 0.03 X10(3) UL (ref 0–0.2)
BASOPHILS NFR BLD AUTO: 0.4 %
BILIRUB DIRECT SERPL-MCNC: 0.2 MG/DL (ref 0–0.2)
BILIRUB SERPL-MCNC: 0.5 MG/DL (ref 0.1–2)
BUN BLD-MCNC: 29 MG/DL (ref 7–18)
BUN/CREAT SERPL: 28.7 (ref 10–20)
CALCIUM BLD-MCNC: 9.6 MG/DL (ref 8.5–10.1)
CHLORIDE SERPL-SCNC: 107 MMOL/L (ref 98–112)
CO2 SERPL-SCNC: 23 MMOL/L (ref 21–32)
CREAT BLD-MCNC: 1.01 MG/DL
D DIMER PPP FEU-MCNC: 4.02 UG/ML FEU (ref ?–0.78)
DEPRECATED RDW RBC AUTO: 54.2 FL (ref 35.1–46.3)
EOSINOPHIL # BLD AUTO: 0.09 X10(3) UL (ref 0–0.7)
EOSINOPHIL NFR BLD AUTO: 1.1 %
ERYTHROCYTE [DISTWIDTH] IN BLOOD BY AUTOMATED COUNT: 16.5 % (ref 11–15)
GLUCOSE BLD-MCNC: 142 MG/DL (ref 70–99)
HCT VFR BLD AUTO: 34.9 %
HGB BLD-MCNC: 11 G/DL
IMM GRANULOCYTES # BLD AUTO: 0.04 X10(3) UL (ref 0–1)
IMM GRANULOCYTES NFR BLD: 0.5 %
LIPASE SERPL-CCNC: 97 U/L (ref 73–393)
LYMPHOCYTES # BLD AUTO: 0.36 X10(3) UL (ref 1–4)
LYMPHOCYTES NFR BLD AUTO: 4.4 %
M PROTEIN MFR SERPL ELPH: 8.1 G/DL (ref 6.4–8.2)
MCH RBC QN AUTO: 28.4 PG (ref 26–34)
MCHC RBC AUTO-ENTMCNC: 31.5 G/DL (ref 31–37)
MCV RBC AUTO: 89.9 FL
MONOCYTES # BLD AUTO: 0.39 X10(3) UL (ref 0.1–1)
MONOCYTES NFR BLD AUTO: 4.8 %
NEUTROPHILS # BLD AUTO: 7.28 X10 (3) UL (ref 1.5–7.7)
NEUTROPHILS # BLD AUTO: 7.28 X10(3) UL (ref 1.5–7.7)
NEUTROPHILS NFR BLD AUTO: 88.8 %
OSMOLALITY SERPL CALC.SUM OF ELEC: 294 MOSM/KG (ref 275–295)
PLATELET # BLD AUTO: 204 10(3)UL (ref 150–450)
POTASSIUM SERPL-SCNC: 3.4 MMOL/L (ref 3.5–5.1)
RBC # BLD AUTO: 3.88 X10(6)UL
SODIUM SERPL-SCNC: 138 MMOL/L (ref 136–145)
TROPONIN I SERPL-MCNC: <0.045 NG/ML (ref ?–0.04)
WBC # BLD AUTO: 8.2 X10(3) UL (ref 4–11)

## 2021-07-05 PROCEDURE — 85025 COMPLETE CBC W/AUTO DIFF WBC: CPT | Performed by: EMERGENCY MEDICINE

## 2021-07-05 PROCEDURE — 80048 BASIC METABOLIC PNL TOTAL CA: CPT | Performed by: EMERGENCY MEDICINE

## 2021-07-05 PROCEDURE — 93005 ELECTROCARDIOGRAM TRACING: CPT

## 2021-07-05 PROCEDURE — 36415 COLL VENOUS BLD VENIPUNCTURE: CPT

## 2021-07-05 PROCEDURE — 85379 FIBRIN DEGRADATION QUANT: CPT | Performed by: EMERGENCY MEDICINE

## 2021-07-05 PROCEDURE — 80076 HEPATIC FUNCTION PANEL: CPT | Performed by: EMERGENCY MEDICINE

## 2021-07-05 PROCEDURE — 71045 X-RAY EXAM CHEST 1 VIEW: CPT | Performed by: EMERGENCY MEDICINE

## 2021-07-05 PROCEDURE — 71260 CT THORAX DX C+: CPT | Performed by: EMERGENCY MEDICINE

## 2021-07-05 PROCEDURE — 99284 EMERGENCY DEPT VISIT MOD MDM: CPT

## 2021-07-05 PROCEDURE — 93010 ELECTROCARDIOGRAM REPORT: CPT | Performed by: EMERGENCY MEDICINE

## 2021-07-05 PROCEDURE — 83690 ASSAY OF LIPASE: CPT | Performed by: EMERGENCY MEDICINE

## 2021-07-05 PROCEDURE — 84484 ASSAY OF TROPONIN QUANT: CPT | Performed by: EMERGENCY MEDICINE

## 2021-07-05 RX ORDER — HEPARIN SODIUM (PORCINE) LOCK FLUSH IV SOLN 100 UNIT/ML 100 UNIT/ML
SOLUTION INTRAVENOUS
Status: COMPLETED
Start: 2021-07-05 | End: 2021-07-05

## 2021-07-05 RX ORDER — HYDROCODONE BITARTRATE AND ACETAMINOPHEN 5; 325 MG/1; MG/1
1-2 TABLET ORAL EVERY 4 HOURS PRN
Qty: 15 TABLET | Refills: 0 | Status: SHIPPED | OUTPATIENT
Start: 2021-07-05

## 2021-07-05 RX ORDER — HEPARIN SODIUM (PORCINE) LOCK FLUSH IV SOLN 100 UNIT/ML 100 UNIT/ML
5 SOLUTION INTRAVENOUS ONCE
Status: COMPLETED | OUTPATIENT
Start: 2021-07-05 | End: 2021-07-05

## 2021-07-05 NOTE — ED QUICK NOTES
ASSUMED CARE TO THIS PT , RECEIVED REPORT FROM SUELLEN CHAMBERLAIN  PER SUELLEN CHAMBERLAIN PT  CAME IN FOR CHEST PAIN. PT HAS A PORT TO THE RIGHT INTERNAL JUGULAR. PT SEEN ON CART A/O X 4, BREATHING IS UNLABORED.   WILL CONTINUE TO MONITOR PT.

## 2021-07-05 NOTE — ED INITIAL ASSESSMENT (HPI)
Patient complains of chest pain for a couple of days with diff in breathing, states he fell 5 days ago and the pain began after

## 2021-07-05 NOTE — ED PROVIDER NOTES
Patient Seen in: [facility] AND [facility] Emergency Department      History   Patient presents with:  Chest Pain Angina    Stated Complaint: cp    HPI/Subjective:   HPI    55-year-old male with history of hypertension, hyperlipidemia, valvular heart disease, an 1223 96 %   O2 Device 07/05/21 1510 None (Room air)       Current:/67   Pulse 80   Temp 98 °F (36.7 °C)   Resp 22   Wt 80.7 kg   SpO2 96%   BMI 26.27 kg/m²         Physical Exam      General Appearance: alert, no distress  Eyes: pupils equal and roun DIFFERENTIAL[[id number]]          Abnormal            Final result                 Please view results for these tests on the individual orders.    RAINBOW DRAW LAVENDER   RAINBOW DRAW GOLD   RAINBOW DRAW LIGHT GREEN     EKG    Rate, intervals and axes as no

## 2021-07-06 ENCOUNTER — OFFICE VISIT (OUTPATIENT)
Dept: INTERNAL MEDICINE CLINIC | Facility: CLINIC | Age: 76
End: 2021-07-06
Payer: MEDICARE

## 2021-07-06 VITALS
HEART RATE: 83 BPM | DIASTOLIC BLOOD PRESSURE: 70 MMHG | SYSTOLIC BLOOD PRESSURE: 120 MMHG | TEMPERATURE: 97 F | WEIGHT: 177 LBS | HEIGHT: 69 IN | BODY MASS INDEX: 26.22 KG/M2

## 2021-07-06 DIAGNOSIS — R07.81 RIB PAIN ON RIGHT SIDE: Primary | ICD-10-CM

## 2021-07-06 PROCEDURE — 99214 OFFICE O/P EST MOD 30 MIN: CPT | Performed by: INTERNAL MEDICINE

## 2021-07-06 NOTE — PROGRESS NOTES
History of Present Illness   Patient ID: Karissa Cordova is a 64year old male. 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   This maicol General: No focal deficit present. Mental Status: He is alert and oriented to person, place, and time. Mental status is at baseline. Psychiatric:         Mood and Affect: Mood normal.         Thought Content:  Thought content normal.          Assessm Oral Tab CR Take 1 tablet by mouth 2 (two) times a day. 60 tablet 0   • ALPRAZolam 0.5 MG Oral Tab Take 1 tablet (0.5 mg total) by mouth 2 (two) times daily as needed for Anxiety.  60 tablet 1   • diphenoxylate-atropine 2.5-0.025 MG Oral Tab Take 1 tablet b chemotherapy. 18 tablet 6   • aspirin 81 MG Oral Chew Tab Chew 1 tablet (81 mg total) by mouth daily. 30 tablet 2   • cycloSPORINE 0.05 % Ophthalmic Emulsion Place into both eyes 2 (two) times daily.      • Fluticasone-Umeclidin-Vilant 100-62.5-25 MCG/INH I

## 2021-07-07 ENCOUNTER — NURSE ONLY (OUTPATIENT)
Dept: HEMATOLOGY/ONCOLOGY | Facility: HOSPITAL | Age: 76
End: 2021-07-07
Attending: INTERNAL MEDICINE
Payer: MEDICARE

## 2021-07-07 ENCOUNTER — TELEPHONE (OUTPATIENT)
Dept: HEMATOLOGY/ONCOLOGY | Facility: HOSPITAL | Age: 76
End: 2021-07-07

## 2021-07-07 VITALS
HEART RATE: 82 BPM | RESPIRATION RATE: 18 BRPM | OXYGEN SATURATION: 96 % | BODY MASS INDEX: 26 KG/M2 | DIASTOLIC BLOOD PRESSURE: 53 MMHG | SYSTOLIC BLOOD PRESSURE: 140 MMHG | TEMPERATURE: 98 F | WEIGHT: 179 LBS

## 2021-07-07 VITALS
OXYGEN SATURATION: 96 % | BODY MASS INDEX: 26.51 KG/M2 | HEIGHT: 69 IN | SYSTOLIC BLOOD PRESSURE: 140 MMHG | HEART RATE: 82 BPM | TEMPERATURE: 98 F | WEIGHT: 179 LBS | RESPIRATION RATE: 18 BRPM | DIASTOLIC BLOOD PRESSURE: 53 MMHG

## 2021-07-07 DIAGNOSIS — Z51.11 CHEMOTHERAPY MANAGEMENT, ENCOUNTER FOR: ICD-10-CM

## 2021-07-07 DIAGNOSIS — C78.00 MALIGNANT NEOPLASM METASTATIC TO LUNG, UNSPECIFIED LATERALITY (HCC): ICD-10-CM

## 2021-07-07 DIAGNOSIS — Z45.2 ENCOUNTER FOR CENTRAL LINE CARE: ICD-10-CM

## 2021-07-07 DIAGNOSIS — C79.51 BONE METASTASIS (HCC): ICD-10-CM

## 2021-07-07 DIAGNOSIS — C34.12 PRIMARY CANCER OF LEFT UPPER LOBE OF LUNG (HCC): Primary | ICD-10-CM

## 2021-07-07 DIAGNOSIS — Z29.8 ENCOUNTER FOR IMMUNOTHERAPY: ICD-10-CM

## 2021-07-07 DIAGNOSIS — C79.31 BRAIN METASTASIS (HCC): ICD-10-CM

## 2021-07-07 PROCEDURE — 99214 OFFICE O/P EST MOD 30 MIN: CPT | Performed by: NURSE PRACTITIONER

## 2021-07-07 PROCEDURE — 96413 CHEMO IV INFUSION 1 HR: CPT

## 2021-07-07 PROCEDURE — 96375 TX/PRO/DX INJ NEW DRUG ADDON: CPT

## 2021-07-07 RX ORDER — SODIUM CHLORIDE 9 MG/ML
10 INJECTION INTRAVENOUS ONCE
Status: CANCELLED | OUTPATIENT
Start: 2021-07-20

## 2021-07-07 RX ORDER — HEPARIN SODIUM (PORCINE) LOCK FLUSH IV SOLN 100 UNIT/ML 100 UNIT/ML
SOLUTION INTRAVENOUS
Status: DISCONTINUED
Start: 2021-07-07 | End: 2021-07-07

## 2021-07-07 RX ORDER — HEPARIN SODIUM (PORCINE) LOCK FLUSH IV SOLN 100 UNIT/ML 100 UNIT/ML
5 SOLUTION INTRAVENOUS ONCE
Status: COMPLETED | OUTPATIENT
Start: 2021-07-07 | End: 2021-07-07

## 2021-07-07 RX ORDER — SODIUM CHLORIDE 9 MG/ML
INJECTION INTRAVENOUS
Status: DISCONTINUED
Start: 2021-07-07 | End: 2021-07-07

## 2021-07-07 RX ORDER — HEPARIN SODIUM (PORCINE) LOCK FLUSH IV SOLN 100 UNIT/ML 100 UNIT/ML
5 SOLUTION INTRAVENOUS ONCE
Status: CANCELLED | OUTPATIENT
Start: 2021-07-20

## 2021-07-07 RX ADMIN — HEPARIN SODIUM (PORCINE) LOCK FLUSH IV SOLN 100 UNIT/ML 500 UNITS: 100 SOLUTION INTRAVENOUS at 16:28:00

## 2021-07-07 NOTE — PROGRESS NOTES
HPI     Carmen Mckeon is a 64year old male here for f/u of Primary cancer of left upper lobe of lung (hcc)  (primary encounter diagnosis)  Malignant neoplasm metastatic to lung, unspecified laterality (hcc)  Bone metastasis (hcc)  Brain metastasis (hc Psychiatric/Behavioral: Positive for sleep disturbance (uses CPAP).            Current Outpatient Medications   Medication Sig Dispense Refill   • HYDROcodone-acetaminophen 5-325 MG Oral Tab Take 1-2 tablets by mouth every 4 (four) hours as needed for [employer] mouth every 8 (eight) hours as needed for Nausea. 30 tablet 3   • Ondansetron HCl (ZOFRAN) 8 MG tablet Take 1 tablet (8 mg total) by mouth every 8 (eight) hours as needed for Nausea.  30 tablet 3   • dexamethasone (DECADRON) 4 MG tablet Take 1 tablet (4 mg Father    • Hypertension Mother    • Hypertension Brother          PHYSICAL EXAM:    /53 (BP Location: Left arm, Patient Position: Sitting, Cuff Size: adult)   Pulse 82   Temp 98.2 °F (36.8 °C) (Oral)   Resp 18   Ht 1.753 m (5' 9\")   Wt 81.2 kg (179 maintenance therapy with single agent pembrolizumab. PET scan after cycle 11 continues to show improvement- reviewed with patient and wife.      Proceed with cycle 13 today ( delay x 1 week due to diarrhea)     We will have repeat imaging in September 2021 x10(3) uL 0.03   Immature Granulocyte Absolute      0.00 - 1.00 x10(3) uL 0.04   Neutrophils %      % 88.8   Lymphocytes %      % 4.4   Monocytes %      % 4.8   Eosinophils %      % 1.1   Basophils %      % 0.4   Immature Granulocyte %      % 0.5   Glucose Granulocyte Absolute      0.00 - 1.00 x10(3) uL 0.01   Neutrophils %      % 72.3   Lymphocytes %      % 14.1   Monocytes %      % 7.8   Eosinophils %      % 4.9   Basophils %      % 0.7   Immature Granulocyte %      % 0.2   Glucose      70 - 99 mg/dL 81

## 2021-07-07 NOTE — PROGRESS NOTES
Pt here for [address]   Arrives Ambulating with cane, accompanied by Self           Pregnancy screening: Not applicable    Modifications in dose or schedule: No     Frequency of blood return and site check throughout administration: Prior to [employer]

## 2021-07-12 ENCOUNTER — TELEPHONE (OUTPATIENT)
Dept: HEMATOLOGY/ONCOLOGY | Facility: HOSPITAL | Age: 76
End: 2021-07-12

## 2021-07-12 NOTE — TELEPHONE ENCOUNTER
Returned phone call and scheduled 7/27/21 appointment with Dr. Emily Doyle. Wife verbalizes understanding.

## 2021-07-12 NOTE — TELEPHONE ENCOUNTER
PT's wife calling. Stated follow-up appointment for 7/19 was cancelled (possible because of deferred Chemo ). Wife Stated That Mallory  Did want to see PT as he has lost a lot of weight. Has a follow-up with Viv Rai on the 27th.   Please contact

## 2021-07-13 ENCOUNTER — TELEPHONE (OUTPATIENT)
Dept: CARDIOLOGY | Age: 76
End: 2021-07-13

## 2021-07-13 DIAGNOSIS — I35.0 AORTIC VALVE STENOSIS, ETIOLOGY OF CARDIAC VALVE DISEASE UNSPECIFIED: Primary | ICD-10-CM

## 2021-07-13 DIAGNOSIS — I10 ESSENTIAL HYPERTENSION: ICD-10-CM

## 2021-07-13 DIAGNOSIS — E78.2 MIXED HYPERLIPIDEMIA: ICD-10-CM

## 2021-07-13 DIAGNOSIS — I65.23 ASYMPTOMATIC CAROTID ARTERY STENOSIS, BILATERAL: ICD-10-CM

## 2021-07-19 ENCOUNTER — APPOINTMENT (OUTPATIENT)
Dept: HEMATOLOGY/ONCOLOGY | Facility: HOSPITAL | Age: 76
End: 2021-07-19
Attending: INTERNAL MEDICINE
Payer: MEDICARE

## 2021-07-19 ENCOUNTER — APPOINTMENT (OUTPATIENT)
Dept: HEMATOLOGY/ONCOLOGY | Facility: HOSPITAL | Age: 76
End: 2021-07-19
Attending: NURSE PRACTITIONER
Payer: MEDICARE

## 2021-07-19 DIAGNOSIS — N40.0 BENIGN PROSTATIC HYPERPLASIA, UNSPECIFIED WHETHER LOWER URINARY TRACT SYMPTOMS PRESENT: ICD-10-CM

## 2021-07-20 ENCOUNTER — APPOINTMENT (OUTPATIENT)
Dept: HEMATOLOGY/ONCOLOGY | Facility: HOSPITAL | Age: 76
End: 2021-07-20
Attending: INTERNAL MEDICINE
Payer: MEDICARE

## 2021-07-20 DIAGNOSIS — Z51.81 MEDICATION MONITORING ENCOUNTER: ICD-10-CM

## 2021-07-20 DIAGNOSIS — Z45.2 ENCOUNTER FOR CENTRAL LINE CARE: Primary | ICD-10-CM

## 2021-07-20 DIAGNOSIS — C79.51 BONE METASTASIS (HCC): ICD-10-CM

## 2021-07-20 PROCEDURE — 96372 THER/PROPH/DIAG INJ SC/IM: CPT

## 2021-07-20 RX ORDER — HEPARIN SODIUM (PORCINE) LOCK FLUSH IV SOLN 100 UNIT/ML 100 UNIT/ML
5 SOLUTION INTRAVENOUS ONCE
Status: CANCELLED | OUTPATIENT
Start: 2021-08-17

## 2021-07-20 RX ORDER — TERAZOSIN 5 MG/1
5 CAPSULE ORAL DAILY
Qty: 90 CAPSULE | Refills: 3 | Status: SHIPPED | OUTPATIENT
Start: 2021-07-20 | End: 2022-05-05

## 2021-07-20 RX ORDER — SODIUM CHLORIDE 9 MG/ML
10 INJECTION INTRAVENOUS ONCE
Status: CANCELLED | OUTPATIENT
Start: 2021-08-17

## 2021-07-20 RX ORDER — FINASTERIDE 5 MG/1
5 TABLET, FILM COATED ORAL DAILY
Qty: 90 TABLET | Refills: 3 | Status: SHIPPED | OUTPATIENT
Start: 2021-07-20 | End: 2022-05-05

## 2021-07-20 NOTE — PROGRESS NOTES
Pt to infusion for Xgeva. Arrived ambulating with cane. Pt denies any recent or planned dental work. Xgeva given in L lower abdomen and tolerated well. Discharged with next appointment scheduled.

## 2021-07-26 ENCOUNTER — TELEPHONE (OUTPATIENT)
Dept: INTERNAL MEDICINE CLINIC | Facility: CLINIC | Age: 76
End: 2021-07-26

## 2021-07-26 ENCOUNTER — TELEPHONE (OUTPATIENT)
Dept: PULMONOLOGY | Facility: CLINIC | Age: 76
End: 2021-07-26

## 2021-07-26 DIAGNOSIS — C78.00 MALIGNANT NEOPLASM METASTATIC TO LUNG, UNSPECIFIED LATERALITY (HCC): Primary | ICD-10-CM

## 2021-07-26 NOTE — TELEPHONE ENCOUNTER
Pt's wife states she needs an order for a portable O2 machine. .. pt can not breathe outside. . Please call

## 2021-07-26 NOTE — TELEPHONE ENCOUNTER
Per patient's spouse, patient received the double shingles vaccine in April and June of 2019 through the [place].

## 2021-07-27 ENCOUNTER — NURSE ONLY (OUTPATIENT)
Dept: HEMATOLOGY/ONCOLOGY | Facility: HOSPITAL | Age: 76
End: 2021-07-27
Attending: INTERNAL MEDICINE
Payer: MEDICARE

## 2021-07-27 VITALS
BODY MASS INDEX: 26.07 KG/M2 | SYSTOLIC BLOOD PRESSURE: 125 MMHG | DIASTOLIC BLOOD PRESSURE: 58 MMHG | HEIGHT: 69 IN | TEMPERATURE: 98 F | WEIGHT: 176 LBS | RESPIRATION RATE: 18 BRPM | HEART RATE: 85 BPM | OXYGEN SATURATION: 97 %

## 2021-07-27 DIAGNOSIS — C34.12 PRIMARY CANCER OF LEFT UPPER LOBE OF LUNG (HCC): ICD-10-CM

## 2021-07-27 DIAGNOSIS — C34.12 PRIMARY CANCER OF LEFT UPPER LOBE OF LUNG (HCC): Primary | ICD-10-CM

## 2021-07-27 DIAGNOSIS — C79.51 BONE METASTASIS (HCC): ICD-10-CM

## 2021-07-27 DIAGNOSIS — Z45.2 ENCOUNTER FOR CENTRAL LINE CARE: Primary | ICD-10-CM

## 2021-07-27 DIAGNOSIS — E03.2 HYPOTHYROIDISM DUE TO MEDICATION: ICD-10-CM

## 2021-07-27 DIAGNOSIS — C79.31 BRAIN METASTASIS (HCC): ICD-10-CM

## 2021-07-27 DIAGNOSIS — K52.1 DIARRHEA DUE TO DRUG: ICD-10-CM

## 2021-07-27 DIAGNOSIS — I31.3 MALIGNANT PERICARDIAL EFFUSION (HCC): ICD-10-CM

## 2021-07-27 DIAGNOSIS — Z51.81 MEDICATION MONITORING ENCOUNTER: ICD-10-CM

## 2021-07-27 DIAGNOSIS — Z29.8 ENCOUNTER FOR IMMUNOTHERAPY: ICD-10-CM

## 2021-07-27 DIAGNOSIS — C80.1 MALIGNANT PERICARDIAL EFFUSION (HCC): ICD-10-CM

## 2021-07-27 DIAGNOSIS — J91.0 MALIGNANT PLEURAL EFFUSION: ICD-10-CM

## 2021-07-27 DIAGNOSIS — C78.00 MALIGNANT NEOPLASM METASTATIC TO LUNG, UNSPECIFIED LATERALITY (HCC): ICD-10-CM

## 2021-07-27 LAB
ALBUMIN SERPL-MCNC: 3.1 G/DL (ref 3.4–5)
ALBUMIN/GLOB SERPL: 0.6 {RATIO} (ref 1–2)
ALP LIVER SERPL-CCNC: 69 U/L
ALT SERPL-CCNC: 25 U/L
ANION GAP SERPL CALC-SCNC: 7 MMOL/L (ref 0–18)
AST SERPL-CCNC: 15 U/L (ref 15–37)
BASOPHILS # BLD AUTO: 0.04 X10(3) UL (ref 0–0.2)
BASOPHILS NFR BLD AUTO: 0.8 %
BILIRUB SERPL-MCNC: 0.6 MG/DL (ref 0.1–2)
BUN BLD-MCNC: 25 MG/DL (ref 7–18)
BUN/CREAT SERPL: 25.3 (ref 10–20)
CALCIUM BLD-MCNC: 9.7 MG/DL (ref 8.5–10.1)
CHLORIDE SERPL-SCNC: 107 MMOL/L (ref 98–112)
CO2 SERPL-SCNC: 25 MMOL/L (ref 21–32)
CREAT BLD-MCNC: 0.99 MG/DL
DEPRECATED RDW RBC AUTO: 55.7 FL (ref 35.1–46.3)
EOSINOPHIL # BLD AUTO: 0.13 X10(3) UL (ref 0–0.7)
EOSINOPHIL NFR BLD AUTO: 2.5 %
ERYTHROCYTE [DISTWIDTH] IN BLOOD BY AUTOMATED COUNT: 16.8 % (ref 11–15)
GLOBULIN PLAS-MCNC: 4.8 G/DL (ref 2.8–4.4)
GLUCOSE BLD-MCNC: 95 MG/DL (ref 70–99)
HCT VFR BLD AUTO: 33 %
HGB BLD-MCNC: 10.6 G/DL
IMM GRANULOCYTES # BLD AUTO: 0.01 X10(3) UL (ref 0–1)
IMM GRANULOCYTES NFR BLD: 0.2 %
LYMPHOCYTES # BLD AUTO: 0.47 X10(3) UL (ref 1–4)
LYMPHOCYTES NFR BLD AUTO: 9 %
M PROTEIN MFR SERPL ELPH: 7.9 G/DL (ref 6.4–8.2)
MCH RBC QN AUTO: 28.8 PG (ref 26–34)
MCHC RBC AUTO-ENTMCNC: 32.1 G/DL (ref 31–37)
MCV RBC AUTO: 89.7 FL
MONOCYTES # BLD AUTO: 0.56 X10(3) UL (ref 0.1–1)
MONOCYTES NFR BLD AUTO: 10.8 %
NEUTROPHILS # BLD AUTO: 3.99 X10 (3) UL (ref 1.5–7.7)
NEUTROPHILS # BLD AUTO: 3.99 X10(3) UL (ref 1.5–7.7)
NEUTROPHILS NFR BLD AUTO: 76.7 %
OSMOLALITY SERPL CALC.SUM OF ELEC: 292 MOSM/KG (ref 275–295)
PLATELET # BLD AUTO: 196 10(3)UL (ref 150–450)
POTASSIUM SERPL-SCNC: 3.4 MMOL/L (ref 3.5–5.1)
RBC # BLD AUTO: 3.68 X10(6)UL
SODIUM SERPL-SCNC: 139 MMOL/L (ref 136–145)
WBC # BLD AUTO: 5.2 X10(3) UL (ref 4–11)

## 2021-07-27 PROCEDURE — 99215 OFFICE O/P EST HI 40 MIN: CPT | Performed by: INTERNAL MEDICINE

## 2021-07-27 PROCEDURE — 85025 COMPLETE CBC W/AUTO DIFF WBC: CPT

## 2021-07-27 PROCEDURE — 80053 COMPREHEN METABOLIC PANEL: CPT

## 2021-07-27 PROCEDURE — 36591 DRAW BLOOD OFF VENOUS DEVICE: CPT

## 2021-07-27 RX ORDER — HEPARIN SODIUM (PORCINE) LOCK FLUSH IV SOLN 100 UNIT/ML 100 UNIT/ML
5 SOLUTION INTRAVENOUS ONCE
Status: CANCELLED | OUTPATIENT
Start: 2021-08-17

## 2021-07-27 RX ORDER — HEPARIN SODIUM (PORCINE) LOCK FLUSH IV SOLN 100 UNIT/ML 100 UNIT/ML
5 SOLUTION INTRAVENOUS ONCE
Status: COMPLETED | OUTPATIENT
Start: 2021-07-27 | End: 2021-07-27

## 2021-07-27 RX ORDER — SODIUM CHLORIDE 9 MG/ML
INJECTION INTRAVENOUS
Status: DISCONTINUED
Start: 2021-07-27 | End: 2021-07-27

## 2021-07-27 RX ORDER — HEPARIN SODIUM (PORCINE) LOCK FLUSH IV SOLN 100 UNIT/ML 100 UNIT/ML
SOLUTION INTRAVENOUS
Status: COMPLETED
Start: 2021-07-27 | End: 2021-07-27

## 2021-07-27 RX ORDER — SODIUM CHLORIDE 9 MG/ML
10 INJECTION INTRAVENOUS ONCE
Status: CANCELLED | OUTPATIENT
Start: 2021-08-17

## 2021-07-27 RX ADMIN — HEPARIN SODIUM (PORCINE) LOCK FLUSH IV SOLN 100 UNIT/ML 500 UNITS: 100 SOLUTION INTRAVENOUS at 12:45:00

## 2021-07-27 NOTE — TELEPHONE ENCOUNTER
Left message for patients wife asking her to have the [facility] fax over the documentation of the shingles vaccines he received

## 2021-07-28 NOTE — TELEPHONE ENCOUNTER
Incoming call from patient's wife Mika Buys providing pulmo office with Dr. James Huerta fax number [phone] Attn: Gia White. DME order faxed to Dr. James Huerta office. Fax confirmation received.

## 2021-07-28 NOTE — TELEPHONE ENCOUNTER
Spoke with patient's wife Polly (SANDRA on file) requesting orders for a portable oxygen concentrator, going to try to get the POC through the [place] Dr. Dennie Laurel.   Patient currently has home oxygen 3L through [address] [place] [address] and smaller tanks which are too hea

## 2021-07-30 ENCOUNTER — TELEPHONE (OUTPATIENT)
Dept: PULMONOLOGY | Facility: CLINIC | Age: 76
End: 2021-07-30

## 2021-07-30 NOTE — TELEPHONE ENCOUNTER
Spouse calling back re CPAP - pt needs an order to get a new CPAP per the [employer] company   Asking for an emergency order

## 2021-07-30 NOTE — TELEPHONE ENCOUNTER
Spoke to [facility] and she will have them fax that over the next time he is there

## 2021-08-02 RX ORDER — AMLODIPINE BESYLATE 5 MG/1
TABLET ORAL
Qty: 90 TABLET | Refills: 2 | Status: CANCELLED | OUTPATIENT
Start: 2021-08-02

## 2021-08-02 RX ORDER — ATORVASTATIN CALCIUM 20 MG/1
TABLET, FILM COATED ORAL
Qty: 90 TABLET | Refills: 2 | Status: CANCELLED | OUTPATIENT
Start: 2021-08-02

## 2021-08-02 RX ORDER — HYDROCHLOROTHIAZIDE 12.5 MG/1
TABLET ORAL
Qty: 90 TABLET | Refills: 2 | Status: CANCELLED | OUTPATIENT
Start: 2021-08-02

## 2021-08-02 NOTE — TELEPHONE ENCOUNTER
Please see telephone encounter from 7/26. Patient needs appointment and is scheduled tomorrow 8/3. Left message notifying patient.

## 2021-08-03 ENCOUNTER — OFFICE VISIT (OUTPATIENT)
Dept: HEMATOLOGY/ONCOLOGY | Facility: HOSPITAL | Age: 76
End: 2021-08-03
Attending: INTERNAL MEDICINE
Payer: MEDICARE

## 2021-08-03 ENCOUNTER — OFFICE VISIT (OUTPATIENT)
Dept: PULMONOLOGY | Facility: CLINIC | Age: 76
End: 2021-08-03
Payer: MEDICARE

## 2021-08-03 ENCOUNTER — HOSPITAL ENCOUNTER (OUTPATIENT)
Dept: CT IMAGING | Facility: HOSPITAL | Age: 76
Discharge: HOME | End: 2021-08-03
Attending: RADIOLOGY
Payer: MEDICARE

## 2021-08-03 VITALS
BODY MASS INDEX: 26.61 KG/M2 | RESPIRATION RATE: 18 BRPM | DIASTOLIC BLOOD PRESSURE: 59 MMHG | HEART RATE: 77 BPM | HEIGHT: 69 IN | SYSTOLIC BLOOD PRESSURE: 119 MMHG | OXYGEN SATURATION: 97 % | WEIGHT: 179.63 LBS

## 2021-08-03 VITALS
RESPIRATION RATE: 18 BRPM | WEIGHT: 179 LBS | DIASTOLIC BLOOD PRESSURE: 56 MMHG | BODY MASS INDEX: 26.51 KG/M2 | HEART RATE: 81 BPM | OXYGEN SATURATION: 96 % | SYSTOLIC BLOOD PRESSURE: 143 MMHG | TEMPERATURE: 99 F | HEIGHT: 69.02 IN

## 2021-08-03 VITALS
WEIGHT: 179 LBS | BODY MASS INDEX: 26.51 KG/M2 | TEMPERATURE: 99 F | HEIGHT: 69 IN | RESPIRATION RATE: 18 BRPM | DIASTOLIC BLOOD PRESSURE: 56 MMHG | SYSTOLIC BLOOD PRESSURE: 143 MMHG | HEART RATE: 81 BPM | OXYGEN SATURATION: 96 %

## 2021-08-03 DIAGNOSIS — I31.3 MALIGNANT PERICARDIAL EFFUSION (HCC): Primary | ICD-10-CM

## 2021-08-03 DIAGNOSIS — Z51.81 MEDICATION MONITORING ENCOUNTER: ICD-10-CM

## 2021-08-03 DIAGNOSIS — C80.1 MALIGNANT PERICARDIAL EFFUSION (HCC): Primary | ICD-10-CM

## 2021-08-03 DIAGNOSIS — Z29.8 ENCOUNTER FOR IMMUNOTHERAPY: ICD-10-CM

## 2021-08-03 DIAGNOSIS — C79.51 BONE METASTASIS (HCC): ICD-10-CM

## 2021-08-03 DIAGNOSIS — I31.3 MALIGNANT PERICARDIAL EFFUSION (HCC): ICD-10-CM

## 2021-08-03 DIAGNOSIS — C79.31 BRAIN METASTASES (HCC): ICD-10-CM

## 2021-08-03 DIAGNOSIS — C34.12 PRIMARY CANCER OF LEFT UPPER LOBE OF LUNG (HCC): ICD-10-CM

## 2021-08-03 DIAGNOSIS — C80.1 MALIGNANT PERICARDIAL EFFUSION (HCC): ICD-10-CM

## 2021-08-03 DIAGNOSIS — C34.12 PRIMARY CANCER OF LEFT UPPER LOBE OF LUNG (HCC): Primary | ICD-10-CM

## 2021-08-03 DIAGNOSIS — Z45.2 ENCOUNTER FOR CENTRAL LINE CARE: Primary | ICD-10-CM

## 2021-08-03 DIAGNOSIS — G47.33 OSA (OBSTRUCTIVE SLEEP APNEA): ICD-10-CM

## 2021-08-03 DIAGNOSIS — C79.31 BRAIN METASTASIS (HCC): ICD-10-CM

## 2021-08-03 LAB
ALBUMIN SERPL-MCNC: 2.9 G/DL (ref 3.4–5)
ALBUMIN/GLOB SERPL: 0.6 {RATIO} (ref 1–2)
ALP LIVER SERPL-CCNC: 70 U/L
ALT SERPL-CCNC: 23 U/L
ANION GAP SERPL CALC-SCNC: 6 MMOL/L (ref 0–18)
AST SERPL-CCNC: 17 U/L (ref 15–37)
BASOPHILS # BLD AUTO: 0.04 X10(3) UL (ref 0–0.2)
BASOPHILS NFR BLD AUTO: 0.7 %
BILIRUB SERPL-MCNC: 0.4 MG/DL (ref 0.1–2)
BUN BLD-MCNC: 22 MG/DL (ref 7–18)
BUN/CREAT SERPL: 22.2 (ref 10–20)
CALCIUM BLD-MCNC: 8.4 MG/DL (ref 8.5–10.1)
CHLORIDE SERPL-SCNC: 106 MMOL/L (ref 98–112)
CO2 SERPL-SCNC: 26 MMOL/L (ref 21–32)
CREAT BLD-MCNC: 0.99 MG/DL
DEPRECATED RDW RBC AUTO: 54 FL (ref 35.1–46.3)
EOSINOPHIL # BLD AUTO: 0.14 X10(3) UL (ref 0–0.7)
EOSINOPHIL NFR BLD AUTO: 2.6 %
ERYTHROCYTE [DISTWIDTH] IN BLOOD BY AUTOMATED COUNT: 16.6 % (ref 11–15)
GLOBULIN PLAS-MCNC: 4.9 G/DL (ref 2.8–4.4)
GLUCOSE BLD-MCNC: 114 MG/DL (ref 70–99)
HCT VFR BLD AUTO: 31.5 %
HGB BLD-MCNC: 10 G/DL
IMM GRANULOCYTES # BLD AUTO: 0.01 X10(3) UL (ref 0–1)
IMM GRANULOCYTES NFR BLD: 0.2 %
LYMPHOCYTES # BLD AUTO: 0.35 X10(3) UL (ref 1–4)
LYMPHOCYTES NFR BLD AUTO: 6.5 %
M PROTEIN MFR SERPL ELPH: 7.8 G/DL (ref 6.4–8.2)
MCH RBC QN AUTO: 28.3 PG (ref 26–34)
MCHC RBC AUTO-ENTMCNC: 31.7 G/DL (ref 31–37)
MCV RBC AUTO: 89.2 FL
MONOCYTES # BLD AUTO: 0.48 X10(3) UL (ref 0.1–1)
MONOCYTES NFR BLD AUTO: 9 %
NEUTROPHILS # BLD AUTO: 4.33 X10 (3) UL (ref 1.5–7.7)
NEUTROPHILS # BLD AUTO: 4.33 X10(3) UL (ref 1.5–7.7)
NEUTROPHILS NFR BLD AUTO: 81 %
OSMOLALITY SERPL CALC.SUM OF ELEC: 290 MOSM/KG (ref 275–295)
PLATELET # BLD AUTO: 209 10(3)UL (ref 150–450)
POTASSIUM SERPL-SCNC: 3.9 MMOL/L (ref 3.5–5.1)
RBC # BLD AUTO: 3.53 X10(6)UL
SODIUM SERPL-SCNC: 138 MMOL/L (ref 136–145)
WBC # BLD AUTO: 5.4 X10(3) UL (ref 4–11)

## 2021-08-03 PROCEDURE — 85025 COMPLETE CBC W/AUTO DIFF WBC: CPT

## 2021-08-03 PROCEDURE — 99213 OFFICE O/P EST LOW 20 MIN: CPT | Performed by: INTERNAL MEDICINE

## 2021-08-03 PROCEDURE — 70470 CT HEAD/BRAIN W/O & W/DYE: CPT | Performed by: RADIOLOGY

## 2021-08-03 PROCEDURE — 99215 OFFICE O/P EST HI 40 MIN: CPT | Performed by: NURSE PRACTITIONER

## 2021-08-03 PROCEDURE — 96413 CHEMO IV INFUSION 1 HR: CPT

## 2021-08-03 PROCEDURE — 96367 TX/PROPH/DG ADDL SEQ IV INF: CPT

## 2021-08-03 PROCEDURE — 80053 COMPREHEN METABOLIC PANEL: CPT

## 2021-08-03 RX ORDER — SODIUM CHLORIDE 9 MG/ML
10 INJECTION INTRAVENOUS ONCE
Status: CANCELLED | OUTPATIENT
Start: 2021-08-17

## 2021-08-03 RX ORDER — SODIUM CHLORIDE 9 MG/ML
INJECTION INTRAVENOUS
Status: DISCONTINUED
Start: 2021-08-03 | End: 2021-08-03

## 2021-08-03 RX ORDER — FLUTICASONE FUROATE, UMECLIDINIUM BROMIDE AND VILANTEROL TRIFENATATE 100; 62.5; 25 UG/1; UG/1; UG/1
1 POWDER RESPIRATORY (INHALATION) DAILY
Qty: 3 EACH | Refills: 3 | Status: SHIPPED | OUTPATIENT
Start: 2021-08-03 | End: 2021-09-14

## 2021-08-03 RX ORDER — HEPARIN SODIUM (PORCINE) LOCK FLUSH IV SOLN 100 UNIT/ML 100 UNIT/ML
5 SOLUTION INTRAVENOUS ONCE
Status: COMPLETED | OUTPATIENT
Start: 2021-08-03 | End: 2021-08-03

## 2021-08-03 RX ORDER — HEPARIN SODIUM (PORCINE) LOCK FLUSH IV SOLN 100 UNIT/ML 100 UNIT/ML
SOLUTION INTRAVENOUS
Status: COMPLETED
Start: 2021-08-03 | End: 2021-08-03

## 2021-08-03 RX ORDER — HEPARIN SODIUM (PORCINE) LOCK FLUSH IV SOLN 100 UNIT/ML 100 UNIT/ML
5 SOLUTION INTRAVENOUS ONCE
Status: CANCELLED | OUTPATIENT
Start: 2021-08-17

## 2021-08-03 RX ADMIN — HEPARIN SODIUM (PORCINE) LOCK FLUSH IV SOLN 100 UNIT/ML 500 UNITS: 100 SOLUTION INTRAVENOUS at 16:05:00

## 2021-08-03 NOTE — PROGRESS NOTES
The patient is a 78-year-old male who Bryant from prior evaluation comes in now for follow-up. He is done remarkably well over the past year since I had last seen him.   His recent CT scan of the chest did not demonstrate worsening burden of the [facility] SITE

## 2021-08-03 NOTE — PROGRESS NOTES
Pt here for C14D1 Keytruda    Arrives Ambulating independently, using cane accompanied by Self . Pt offers no new complaints today. Arrives from clinic appointment prior to infusion.     Patient reports possible pregnancy since last therapy cycle: N for patient  patient supported/coping well  symptoms relieved/minimized  understands plan of care  Progress towards outcome:  unchanged    Education Record    Learner:  Patient  Barriers / Limitations:  None  Method:  discussion  Outcome:  Expressed unders

## 2021-08-03 NOTE — PROGRESS NOTES
JOANNE     Brandan Freire is a 64year old male here for f/u of Primary cancer of left upper lobe of lung (hcc)  (primary encounter diagnosis)  Malignant pericardial effusion (hcc)  Bone metastasis (hcc)  Brain metastasis (hcc)  Encounter for immunotherapy into the lungs daily. 3 each 3   • Terazosin HCl 5 MG Oral Cap Take 1 capsule (5 mg total) by mouth daily. AT 4PM 90 capsule 3   • finasteride 5 MG Oral Tab Take 1 tablet (5 mg total) by mouth daily.  90 tablet 3   • HYDROcodone-acetaminophen 5-325 MG Oral HCl (ZOFRAN) 8 MG tablet Take 1 tablet (8 mg total) by mouth every 8 (eight) hours as needed for Nausea. 30 tablet 3   • dexamethasone (DECADRON) 4 MG tablet Take 1 tablet (4 mg total) by mouth 2 (two) times daily.  Take 4 mg twice daily the day before chem EXAM:    /56 (BP Location: Left arm, Patient Position: Sitting, Cuff Size: adult)   Pulse 81   Temp 99 °F (37.2 °C) (Oral)   Resp 18   Ht 1.753 m (5' 9\")   Wt 81.2 kg (179 lb)   SpO2 96%   BMI 26.43 kg/m²   Wt Readings from Last 6 Encounters:  08/03 deferred a week d/t diarrhea- which has resolved. Proceed with cycle 14   Immune mediated colitis:  Monitor closely for diarrhea. If no improvement with imodium, start on steroids.      Gastritis: prilosec once a day in am.    We will have repeat imaging present    CBC W/ DIFFERENTIAL    Collection Time: 08/03/21  1:50 PM   Result Value Ref Range    WBC 5.4 4.0 - 11.0 x10(3) uL    RBC 3.53 (L) 3.80 - 5.80 x10(6)uL    HGB 10.0 (L) 13.0 - 17.5 g/dL    HCT 31.5 (L) 39.0 - 53.0 %    MCV 89.2 80.0 - 100.0 fL

## 2021-08-05 ENCOUNTER — TELEPHONE (OUTPATIENT)
Dept: PULMONOLOGY | Facility: CLINIC | Age: 76
End: 2021-08-05

## 2021-08-05 NOTE — TELEPHONE ENCOUNTER
Dr. Adriane Vasquez sign pended CPAP order (ordered during office visit 8/3/21). I believe you have to addend your office visit note to sign order.

## 2021-08-05 NOTE — TELEPHONE ENCOUNTER
Spoke to Alisa regarding message below. CPAP order was faxed to [facility]. Fax confirmation received. *patient wants CPAP ordered faxed to [address] if [facility] doesn't have [facility] in stock.  Spoke to [employer] at [address] who stated they

## 2021-08-06 ENCOUNTER — APPOINTMENT (OUTPATIENT)
Dept: RADIATION ONCOLOGY | Facility: HOSPITAL | Age: 76
End: 2021-08-06
Attending: RADIOLOGY
Payer: MEDICARE

## 2021-08-06 NOTE — TELEPHONE ENCOUNTER
Dr. Yaakov Johnson addend office visit note 8/3/21 to state \"broken BEYOND REPAIR\" and sign pended CPAP order attached to office visit note.

## 2021-08-10 ENCOUNTER — APPOINTMENT (OUTPATIENT)
Dept: HEMATOLOGY/ONCOLOGY | Facility: HOSPITAL | Age: 76
End: 2021-08-10
Attending: INTERNAL MEDICINE
Payer: MEDICARE

## 2021-08-12 ENCOUNTER — TELEPHONE (OUTPATIENT)
Dept: PULMONOLOGY | Facility: CLINIC | Age: 76
End: 2021-08-12

## 2021-08-12 ENCOUNTER — OFFICE VISIT (OUTPATIENT)
Dept: RADIATION ONCOLOGY | Facility: HOSPITAL | Age: 76
End: 2021-08-12
Attending: RADIOLOGY
Payer: MEDICARE

## 2021-08-12 VITALS
RESPIRATION RATE: 16 BRPM | HEART RATE: 80 BPM | HEIGHT: 69.02 IN | WEIGHT: 176 LBS | OXYGEN SATURATION: 96 % | SYSTOLIC BLOOD PRESSURE: 117 MMHG | TEMPERATURE: 98 F | DIASTOLIC BLOOD PRESSURE: 49 MMHG | BODY MASS INDEX: 26.07 KG/M2

## 2021-08-12 DIAGNOSIS — C79.31 BRAIN METASTASIS (HCC): Primary | ICD-10-CM

## 2021-08-12 PROCEDURE — 99211 OFF/OP EST MAY X REQ PHY/QHP: CPT

## 2021-08-12 NOTE — PROGRESS NOTES
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      Dear Val P[place] and  Lab 08/03/21  1350   WBC 5.4   HGB 10.0*   .0   NE 4.33            Imaging:  CT BRAIN (W+WO) (QOJ=[id number])    Result Date: 8/3/2021  CONCLUSION:  1. No current evidence of metastatic disease.   Small focus of gliosis/encephalomalacia at site of previ 100-62.5-25 MCG/INH Inhalation Aerosol Powder, Breath Activated Inhale 1 puff into the lungs daily. 1 each 6   • ipratropium-albuterol 0.5-2.5 (3) MG/3ML Inhalation Solution Take 3 mL by nebulization 4 (four) times daily.  DX code: R09.02, 360 mL 5   • hydr MG tablet Take 1 tablet (4 mg total) by mouth 2 (two) times daily. Take 4 mg twice daily the day before chemotherapy, the day of chemotherapy, and the day after chemotherapy.  (Patient not taking: Reported on 8/12/2021 ) 18 tablet 6       PAIN:   , Pain Sco adenocarcinoma of the lung with brain metastases, s/p Cyberknife SRS on  9/25/2020, doing well radiographically, to continue further immunotherapy.       Patient is unable to have an MRI due to cochlear device  He also denies any new neurologic symptoms and

## 2021-08-12 NOTE — TELEPHONE ENCOUNTER
Spoke to [employer] at [facility]. Everything has been received and they are in the verification process. [facility] will call [address] [phone] office by the end of the day to update. Patient informed.

## 2021-08-12 NOTE — PROGRESS NOTES
Nursing Follow-Up Note    Patient: Donavon Parrish  YOB: 1945  Age: 64year old  Radiation Oncologist: Dr. Sybil Bustillo  Referring Physician: Micah Nguyen  Chief Complaint: Patient presents with:  Radiation    Date: 8/12/2021    Toxicities: None

## 2021-08-12 NOTE — TELEPHONE ENCOUNTER
Deadra Means requesting notes to say \"Patient was benefiting and using prior to machine breaking beyond repair. \" (she apologizes for not mentioning this previously)  Also requesting copies of baseline sleep study. Please fax to [phone].   For additional q

## 2021-08-12 NOTE — PATIENT INSTRUCTIONS
Get another CT scan in 3-4 months. Dr. Ramsey Hilton will have a virtual appointment with you following. Call [phone] to schedule    Call [phone] for any questions or concerns.

## 2021-08-13 NOTE — TELEPHONE ENCOUNTER
Addended note faxed to [facility]. Fax confirmation received.

## 2021-08-13 NOTE — TELEPHONE ENCOUNTER
Jeovany Whitney 21 hours ago (11:35 AM)   Natalie Mckeon requesting notes to say \"Patient was benefiting and using prior to machine breaking beyond repair. \" (she apologizes for not mentioning this previously)  Also requesting copies of baseline sleep st

## 2021-08-13 NOTE — TELEPHONE ENCOUNTER
Dr. Gutierrez Johnson addend office visit note to say \"patient was benefiting and using prior to machine breaking beyond repair\".

## 2021-08-16 RX ORDER — IPRATROPIUM BROMIDE AND ALBUTEROL SULFATE 2.5; .5 MG/3ML; MG/3ML
3 SOLUTION RESPIRATORY (INHALATION) 4 TIMES DAILY
Qty: 360 ML | Refills: 5 | Status: SHIPPED | OUTPATIENT
Start: 2021-08-16

## 2021-08-17 ENCOUNTER — APPOINTMENT (OUTPATIENT)
Dept: HEMATOLOGY/ONCOLOGY | Facility: HOSPITAL | Age: 76
End: 2021-08-17
Attending: INTERNAL MEDICINE
Payer: MEDICARE

## 2021-08-17 ENCOUNTER — APPOINTMENT (OUTPATIENT)
Dept: HEMATOLOGY/ONCOLOGY | Facility: HOSPITAL | Age: 76
End: 2021-08-17
Attending: NURSE PRACTITIONER
Payer: MEDICARE

## 2021-08-17 DIAGNOSIS — C79.51 BONE METASTASIS (HCC): ICD-10-CM

## 2021-08-17 DIAGNOSIS — Z45.2 ENCOUNTER FOR CENTRAL LINE CARE: Primary | ICD-10-CM

## 2021-08-17 DIAGNOSIS — Z51.81 MEDICATION MONITORING ENCOUNTER: ICD-10-CM

## 2021-08-17 PROCEDURE — 96372 THER/PROPH/DIAG INJ SC/IM: CPT

## 2021-08-17 RX ORDER — POTASSIUM CHLORIDE 1500 MG/1
1 TABLET, EXTENDED RELEASE ORAL 2 TIMES DAILY
Qty: 60 TABLET | Refills: 0 | Status: SHIPPED | OUTPATIENT
Start: 2021-08-17 | End: 2021-09-15

## 2021-08-17 RX ORDER — SODIUM CHLORIDE 9 MG/ML
10 INJECTION INTRAVENOUS ONCE
Status: CANCELLED | OUTPATIENT
Start: 2021-08-24

## 2021-08-17 RX ORDER — HEPARIN SODIUM (PORCINE) LOCK FLUSH IV SOLN 100 UNIT/ML 100 UNIT/ML
5 SOLUTION INTRAVENOUS ONCE
Status: CANCELLED | OUTPATIENT
Start: 2021-08-24

## 2021-08-17 NOTE — TELEPHONE ENCOUNTER
Ralf Cedillo @ [facility] st rx is verification now, unable to tell if anything else is needed yet, & to call back tomorrow around 10 am.

## 2021-08-17 NOTE — PROGRESS NOTES
Patient arrives for every 4 week xgeva. Reports he is well. Denies any plans or recent dental work. Labs present from 8/3/21. Xgeva given in left lower abdomen, patient tolerated well. Discharged ambulating with cane with future appointments scheduled.

## 2021-08-17 NOTE — TELEPHONE ENCOUNTER
Per Grady Amaya @ [facility] they only rcvd old office notes prior to addendum. Explained office notes from 8/3/21 w/ addendum will be sent to #[phone] shortly. He voiced understanding. Office visit enctr 8/3/21 w/ addendum faxed to [facility] @ #[phone].  Confirmatio

## 2021-08-17 NOTE — TELEPHONE ENCOUNTER
Polly is aware that office notes w/ addendum were faxed to [facility] & confirmation rcvd. Instructed her to f/u w/ [facility] at #[phone] if she doesn't hear anything from them w/in 2-3 days. Reassured her to contact us if we may be of further assistance.  She voiced

## 2021-08-18 NOTE — TELEPHONE ENCOUNTER
Per Coleman Scheuermann @ [facility] they are looking for sleep studies for pt.  Jerzy Flores pt rcvd machine from [place] on 7/29/15, they have CPAP titration study dated 7/23/15 from [facility]), & their notes state pt had sleep study prior to seeing Dr. Isabella Mayo

## 2021-08-24 ENCOUNTER — OFFICE VISIT (OUTPATIENT)
Dept: HEMATOLOGY/ONCOLOGY | Facility: HOSPITAL | Age: 76
End: 2021-08-24
Attending: INTERNAL MEDICINE
Payer: MEDICARE

## 2021-08-24 ENCOUNTER — APPOINTMENT (OUTPATIENT)
Dept: CARDIOLOGY | Age: 76
End: 2021-08-24
Attending: INTERNAL MEDICINE

## 2021-08-24 VITALS
WEIGHT: 176 LBS | TEMPERATURE: 98 F | BODY MASS INDEX: 26.07 KG/M2 | RESPIRATION RATE: 16 BRPM | OXYGEN SATURATION: 97 % | HEIGHT: 69 IN | HEART RATE: 74 BPM | SYSTOLIC BLOOD PRESSURE: 118 MMHG | DIASTOLIC BLOOD PRESSURE: 56 MMHG

## 2021-08-24 DIAGNOSIS — J91.0 MALIGNANT PLEURAL EFFUSION: ICD-10-CM

## 2021-08-24 DIAGNOSIS — C80.1 MALIGNANT PERICARDIAL EFFUSION (HCC): ICD-10-CM

## 2021-08-24 DIAGNOSIS — C34.12 PRIMARY CANCER OF LEFT UPPER LOBE OF LUNG (HCC): Primary | ICD-10-CM

## 2021-08-24 DIAGNOSIS — Z51.12 ENCOUNTER FOR ANTINEOPLASTIC IMMUNOTHERAPY: ICD-10-CM

## 2021-08-24 DIAGNOSIS — C34.12 PRIMARY CANCER OF LEFT UPPER LOBE OF LUNG (HCC): ICD-10-CM

## 2021-08-24 DIAGNOSIS — K52.1 DIARRHEA DUE TO DRUG: ICD-10-CM

## 2021-08-24 DIAGNOSIS — Z45.2 ENCOUNTER FOR CENTRAL LINE CARE: Primary | ICD-10-CM

## 2021-08-24 DIAGNOSIS — E03.2 HYPOTHYROIDISM DUE TO MEDICATION: ICD-10-CM

## 2021-08-24 DIAGNOSIS — I31.3 MALIGNANT PERICARDIAL EFFUSION (HCC): ICD-10-CM

## 2021-08-24 DIAGNOSIS — C79.31 BRAIN METASTASIS (HCC): ICD-10-CM

## 2021-08-24 DIAGNOSIS — Z51.81 MEDICATION MONITORING ENCOUNTER: ICD-10-CM

## 2021-08-24 DIAGNOSIS — D64.9 ANEMIA, UNSPECIFIED TYPE: ICD-10-CM

## 2021-08-24 LAB
ALBUMIN SERPL-MCNC: 2.9 G/DL (ref 3.4–5)
ALBUMIN/GLOB SERPL: 0.6 {RATIO} (ref 1–2)
ALP LIVER SERPL-CCNC: 65 U/L
ALT SERPL-CCNC: 18 U/L
ANION GAP SERPL CALC-SCNC: 4 MMOL/L (ref 0–18)
AST SERPL-CCNC: 10 U/L (ref 15–37)
BASOPHILS # BLD AUTO: 0.05 X10(3) UL (ref 0–0.2)
BASOPHILS NFR BLD AUTO: 0.9 %
BILIRUB SERPL-MCNC: 0.4 MG/DL (ref 0.1–2)
BUN BLD-MCNC: 25 MG/DL (ref 7–18)
BUN/CREAT SERPL: 27.8 (ref 10–20)
CALCIUM BLD-MCNC: 9.4 MG/DL (ref 8.5–10.1)
CHLORIDE SERPL-SCNC: 111 MMOL/L (ref 98–112)
CO2 SERPL-SCNC: 27 MMOL/L (ref 21–32)
CREAT BLD-MCNC: 0.9 MG/DL
DEPRECATED HBV CORE AB SER IA-ACNC: 405.5 NG/ML
DEPRECATED RDW RBC AUTO: 54.4 FL (ref 35.1–46.3)
EOSINOPHIL # BLD AUTO: 0.18 X10(3) UL (ref 0–0.7)
EOSINOPHIL NFR BLD AUTO: 3.2 %
ERYTHROCYTE [DISTWIDTH] IN BLOOD BY AUTOMATED COUNT: 16.2 % (ref 11–15)
GLOBULIN PLAS-MCNC: 4.9 G/DL (ref 2.8–4.4)
GLUCOSE BLD-MCNC: 87 MG/DL (ref 70–99)
HCT VFR BLD AUTO: 32.2 %
HGB BLD-MCNC: 9.9 G/DL
IMM GRANULOCYTES # BLD AUTO: 0.02 X10(3) UL (ref 0–1)
IMM GRANULOCYTES NFR BLD: 0.4 %
IRON SATURATION: 16 %
IRON SERPL-MCNC: 35 UG/DL
LYMPHOCYTES # BLD AUTO: 0.62 X10(3) UL (ref 1–4)
LYMPHOCYTES NFR BLD AUTO: 11 %
M PROTEIN MFR SERPL ELPH: 7.8 G/DL (ref 6.4–8.2)
MCH RBC QN AUTO: 28.2 PG (ref 26–34)
MCHC RBC AUTO-ENTMCNC: 30.7 G/DL (ref 31–37)
MCV RBC AUTO: 91.7 FL
MONOCYTES # BLD AUTO: 0.46 X10(3) UL (ref 0.1–1)
MONOCYTES NFR BLD AUTO: 8.1 %
NEUTROPHILS # BLD AUTO: 4.33 X10 (3) UL (ref 1.5–7.7)
NEUTROPHILS # BLD AUTO: 4.33 X10(3) UL (ref 1.5–7.7)
NEUTROPHILS NFR BLD AUTO: 76.4 %
OSMOLALITY SERPL CALC.SUM OF ELEC: 298 MOSM/KG (ref 275–295)
PLATELET # BLD AUTO: 215 10(3)UL (ref 150–450)
POTASSIUM SERPL-SCNC: 3.3 MMOL/L (ref 3.5–5.1)
RBC # BLD AUTO: 3.51 X10(6)UL
SODIUM SERPL-SCNC: 142 MMOL/L (ref 136–145)
TOTAL IRON BINDING CAPACITY: 225 UG/DL (ref 240–450)
TRANSFERRIN SERPL-MCNC: 151 MG/DL (ref 200–360)
TSI SER-ACNC: 3.34 MIU/ML (ref 0.36–3.74)
VIT B12 SERPL-MCNC: 382 PG/ML (ref 193–986)
WBC # BLD AUTO: 5.7 X10(3) UL (ref 4–11)

## 2021-08-24 PROCEDURE — 96413 CHEMO IV INFUSION 1 HR: CPT

## 2021-08-24 PROCEDURE — 84466 ASSAY OF TRANSFERRIN: CPT

## 2021-08-24 PROCEDURE — 85025 COMPLETE CBC W/AUTO DIFF WBC: CPT

## 2021-08-24 PROCEDURE — 82607 VITAMIN B-12: CPT

## 2021-08-24 PROCEDURE — 84443 ASSAY THYROID STIM HORMONE: CPT

## 2021-08-24 PROCEDURE — 80053 COMPREHEN METABOLIC PANEL: CPT

## 2021-08-24 PROCEDURE — 83540 ASSAY OF IRON: CPT

## 2021-08-24 PROCEDURE — 99215 OFFICE O/P EST HI 40 MIN: CPT | Performed by: INTERNAL MEDICINE

## 2021-08-24 PROCEDURE — 83921 ORGANIC ACID SINGLE QUANT: CPT

## 2021-08-24 PROCEDURE — 82728 ASSAY OF FERRITIN: CPT

## 2021-08-24 PROCEDURE — 96367 TX/PROPH/DG ADDL SEQ IV INF: CPT

## 2021-08-24 RX ORDER — HEPARIN SODIUM (PORCINE) LOCK FLUSH IV SOLN 100 UNIT/ML 100 UNIT/ML
SOLUTION INTRAVENOUS
Status: DISCONTINUED
Start: 2021-08-24 | End: 2021-08-24

## 2021-08-24 RX ORDER — SODIUM CHLORIDE 9 MG/ML
INJECTION INTRAVENOUS
Status: DISCONTINUED
Start: 2021-08-24 | End: 2021-08-24

## 2021-08-24 NOTE — PROGRESS NOTES
HPI Reggie Seip is a 64year old male here for f/u of Primary cancer of left upper lobe of lung (hcc)  (primary encounter diagnosis)  Malignant pericardial effusion (hcc)  Malignant pleural effusion  Brain metastasis (hcc)  Encounter for antineop 0.5-2.5 (3) MG/3ML Inhalation Solution Take 3 mL by nebulization 4 (four) times daily.  DX code: R09.02, 360 mL 5   • fluticasone-Umeclidin-Vilant (TRELEGY ELLIPTA) 100-62.5-25 MCG/INH Inhalation Aerosol Powder, Breath Activated Inhale 1 puff into the lungs Oral Chew Tab Chew 1 tablet (81 mg total) by mouth daily. 30 tablet 2   • cycloSPORINE 0.05 % Ophthalmic Emulsion Place into both eyes 2 (two) times daily.      • Fluticasone-Umeclidin-Vilant 100-62.5-25 MCG/INH Inhalation Aerosol Powder, Breath Activated I • Cancer Father    • Hypertension Mother    • Hypertension Brother          PHYSICAL EXAM:    /56 (BP Location: Left arm, Patient Position: Sitting, Cuff Size: adult)   Pulse 74   Temp 97.8 °F (36.6 °C) (Oral)   Resp 16   Ht 1.753 m (5' 9\")   Wt 7 maintenance therapy with single agent pembrolizumab. PET scan after cycle 11 continues to show improvement- reviewed with patient and wife. Proceed with cycle 15.      Immune mediated colitis:  Will hold treatment this week and work on imodium to [employer] 2.0 mg/dL    Total Protein 7.8 6.4 - 8.2 g/dL    Albumin 2.9 (L) 3.4 - 5.0 g/dL    Globulin  4.9 (H) 2.8 - 4.4 g/dL    A/G Ratio 0.6 (L) 1.0 - 2.0    FASTING Patient not present    ASSAY, THYROID STIM HORMONE    Collection Time: 08/24/21  9:23 AM   Result

## 2021-08-24 NOTE — PROGRESS NOTES
Pt here for C15D1 Keytruda.   Arrives Ambulating with cane, accompanied by Self            Pregnancy screening: Not applicable     Modifications in dose or schedule: No                              Frequency of blood return and site check throughout adminis

## 2021-08-27 LAB — MMA: 0.24 UMOL/L

## 2021-09-07 ENCOUNTER — APPOINTMENT (OUTPATIENT)
Dept: HEMATOLOGY/ONCOLOGY | Facility: HOSPITAL | Age: 76
End: 2021-09-07
Attending: NURSE PRACTITIONER
Payer: MEDICARE

## 2021-09-09 ENCOUNTER — HOSPITAL ENCOUNTER (OUTPATIENT)
Dept: NUCLEAR MEDICINE | Facility: HOSPITAL | Age: 76
Discharge: HOME | End: 2021-09-09
Attending: INTERNAL MEDICINE
Payer: MEDICARE

## 2021-09-09 DIAGNOSIS — C79.31 BRAIN METASTASIS (HCC): ICD-10-CM

## 2021-09-09 DIAGNOSIS — C80.1 MALIGNANT PERICARDIAL EFFUSION (HCC): ICD-10-CM

## 2021-09-09 DIAGNOSIS — C34.12 PRIMARY CANCER OF LEFT UPPER LOBE OF LUNG (HCC): ICD-10-CM

## 2021-09-09 DIAGNOSIS — J91.0 MALIGNANT PLEURAL EFFUSION: ICD-10-CM

## 2021-09-09 DIAGNOSIS — I31.3 MALIGNANT PERICARDIAL EFFUSION (HCC): ICD-10-CM

## 2021-09-09 LAB — GLUCOSE BLDC GLUCOMTR-MCNC: 86 MG/DL (ref 70–99)

## 2021-09-09 PROCEDURE — 78815 PET IMAGE W/CT SKULL-THIGH: CPT | Performed by: INTERNAL MEDICINE

## 2021-09-09 PROCEDURE — 82962 GLUCOSE BLOOD TEST: CPT

## 2021-09-14 ENCOUNTER — OFFICE VISIT (OUTPATIENT)
Dept: HEMATOLOGY/ONCOLOGY | Facility: HOSPITAL | Age: 76
End: 2021-09-14
Attending: NURSE PRACTITIONER
Payer: MEDICARE

## 2021-09-14 ENCOUNTER — NURSE ONLY (OUTPATIENT)
Dept: HEMATOLOGY/ONCOLOGY | Facility: HOSPITAL | Age: 76
End: 2021-09-14
Attending: INTERNAL MEDICINE
Payer: MEDICARE

## 2021-09-14 VITALS
WEIGHT: 182 LBS | OXYGEN SATURATION: 99 % | HEART RATE: 71 BPM | BODY MASS INDEX: 27 KG/M2 | SYSTOLIC BLOOD PRESSURE: 129 MMHG | RESPIRATION RATE: 18 BRPM | DIASTOLIC BLOOD PRESSURE: 54 MMHG | TEMPERATURE: 98 F

## 2021-09-14 VITALS
SYSTOLIC BLOOD PRESSURE: 129 MMHG | HEART RATE: 71 BPM | OXYGEN SATURATION: 99 % | WEIGHT: 182 LBS | BODY MASS INDEX: 27 KG/M2 | DIASTOLIC BLOOD PRESSURE: 54 MMHG | TEMPERATURE: 98 F | RESPIRATION RATE: 18 BRPM

## 2021-09-14 DIAGNOSIS — Z51.81 MEDICATION MONITORING ENCOUNTER: ICD-10-CM

## 2021-09-14 DIAGNOSIS — C78.00 MALIGNANT NEOPLASM METASTATIC TO LUNG, UNSPECIFIED LATERALITY (HCC): ICD-10-CM

## 2021-09-14 DIAGNOSIS — Z51.81 MEDICATION MONITORING ENCOUNTER: Primary | ICD-10-CM

## 2021-09-14 DIAGNOSIS — C80.1 MALIGNANT PERICARDIAL EFFUSION (HCC): ICD-10-CM

## 2021-09-14 DIAGNOSIS — C79.51 BONE METASTASIS (HCC): ICD-10-CM

## 2021-09-14 DIAGNOSIS — C34.12 PRIMARY CANCER OF LEFT UPPER LOBE OF LUNG (HCC): ICD-10-CM

## 2021-09-14 DIAGNOSIS — Z45.2 ENCOUNTER FOR CENTRAL LINE CARE: Primary | ICD-10-CM

## 2021-09-14 DIAGNOSIS — Z29.8 ENCOUNTER FOR IMMUNOTHERAPY: ICD-10-CM

## 2021-09-14 DIAGNOSIS — I31.3 MALIGNANT PERICARDIAL EFFUSION (HCC): ICD-10-CM

## 2021-09-14 DIAGNOSIS — C79.31 BRAIN METASTASIS (HCC): ICD-10-CM

## 2021-09-14 DIAGNOSIS — C34.12 PRIMARY CANCER OF LEFT UPPER LOBE OF LUNG (HCC): Primary | ICD-10-CM

## 2021-09-14 LAB
ALBUMIN SERPL-MCNC: 2.9 G/DL (ref 3.4–5)
ALBUMIN/GLOB SERPL: 0.6 {RATIO} (ref 1–2)
ALP LIVER SERPL-CCNC: 62 U/L
ALT SERPL-CCNC: 18 U/L
ANION GAP SERPL CALC-SCNC: 8 MMOL/L (ref 0–18)
AST SERPL-CCNC: 15 U/L (ref 15–37)
BASOPHILS # BLD AUTO: 0.04 X10(3) UL (ref 0–0.2)
BASOPHILS NFR BLD AUTO: 0.8 %
BILIRUB SERPL-MCNC: 0.4 MG/DL (ref 0.1–2)
BUN BLD-MCNC: 24 MG/DL (ref 7–18)
BUN/CREAT SERPL: 25 (ref 10–20)
CALCIUM BLD-MCNC: 8.9 MG/DL (ref 8.5–10.1)
CHLORIDE SERPL-SCNC: 112 MMOL/L (ref 98–112)
CO2 SERPL-SCNC: 23 MMOL/L (ref 21–32)
CREAT BLD-MCNC: 0.96 MG/DL
DEPRECATED RDW RBC AUTO: 51.8 FL (ref 35.1–46.3)
EOSINOPHIL # BLD AUTO: 0.19 X10(3) UL (ref 0–0.7)
EOSINOPHIL NFR BLD AUTO: 3.6 %
ERYTHROCYTE [DISTWIDTH] IN BLOOD BY AUTOMATED COUNT: 15.8 % (ref 11–15)
GLOBULIN PLAS-MCNC: 4.6 G/DL (ref 2.8–4.4)
GLUCOSE BLD-MCNC: 90 MG/DL (ref 70–99)
HCT VFR BLD AUTO: 32.7 %
HGB BLD-MCNC: 10.1 G/DL
IMM GRANULOCYTES # BLD AUTO: 0.01 X10(3) UL (ref 0–1)
IMM GRANULOCYTES NFR BLD: 0.2 %
LYMPHOCYTES # BLD AUTO: 0.52 X10(3) UL (ref 1–4)
LYMPHOCYTES NFR BLD AUTO: 10 %
MCH RBC QN AUTO: 27.6 PG (ref 26–34)
MCHC RBC AUTO-ENTMCNC: 30.9 G/DL (ref 31–37)
MCV RBC AUTO: 89.3 FL
MONOCYTES # BLD AUTO: 0.32 X10(3) UL (ref 0.1–1)
MONOCYTES NFR BLD AUTO: 6.1 %
NEUTROPHILS # BLD AUTO: 4.13 X10 (3) UL (ref 1.5–7.7)
NEUTROPHILS # BLD AUTO: 4.13 X10(3) UL (ref 1.5–7.7)
NEUTROPHILS NFR BLD AUTO: 79.3 %
OSMOLALITY SERPL CALC.SUM OF ELEC: 300 MOSM/KG (ref 275–295)
PLATELET # BLD AUTO: 226 10(3)UL (ref 150–450)
POTASSIUM SERPL-SCNC: 3.2 MMOL/L (ref 3.5–5.1)
PROT SERPL-MCNC: 7.5 G/DL (ref 6.4–8.2)
RBC # BLD AUTO: 3.66 X10(6)UL
SODIUM SERPL-SCNC: 143 MMOL/L (ref 136–145)
WBC # BLD AUTO: 5.2 X10(3) UL (ref 4–11)

## 2021-09-14 PROCEDURE — 80053 COMPREHEN METABOLIC PANEL: CPT

## 2021-09-14 PROCEDURE — 96375 TX/PRO/DX INJ NEW DRUG ADDON: CPT

## 2021-09-14 PROCEDURE — 99215 OFFICE O/P EST HI 40 MIN: CPT | Performed by: NURSE PRACTITIONER

## 2021-09-14 PROCEDURE — 85025 COMPLETE CBC W/AUTO DIFF WBC: CPT

## 2021-09-14 PROCEDURE — 96372 THER/PROPH/DIAG INJ SC/IM: CPT

## 2021-09-14 PROCEDURE — 96413 CHEMO IV INFUSION 1 HR: CPT

## 2021-09-14 RX ORDER — SODIUM CHLORIDE 9 MG/ML
INJECTION INTRAVENOUS
Status: DISCONTINUED
Start: 2021-09-14 | End: 2021-09-14

## 2021-09-14 RX ORDER — SODIUM CHLORIDE 9 MG/ML
10 INJECTION INTRAVENOUS ONCE
Status: CANCELLED | OUTPATIENT
Start: 2021-09-21

## 2021-09-14 RX ORDER — HEPARIN SODIUM (PORCINE) LOCK FLUSH IV SOLN 100 UNIT/ML 100 UNIT/ML
5 SOLUTION INTRAVENOUS ONCE
Status: COMPLETED | OUTPATIENT
Start: 2021-09-14 | End: 2021-09-14

## 2021-09-14 RX ORDER — HEPARIN SODIUM (PORCINE) LOCK FLUSH IV SOLN 100 UNIT/ML 100 UNIT/ML
5 SOLUTION INTRAVENOUS ONCE
Status: CANCELLED | OUTPATIENT
Start: 2021-09-21

## 2021-09-14 RX ORDER — HEPARIN SODIUM (PORCINE) LOCK FLUSH IV SOLN 100 UNIT/ML 100 UNIT/ML
SOLUTION INTRAVENOUS
Status: COMPLETED
Start: 2021-09-14 | End: 2021-09-14

## 2021-09-14 RX ADMIN — HEPARIN SODIUM (PORCINE) LOCK FLUSH IV SOLN 100 UNIT/ML 500 UNITS: 100 SOLUTION INTRAVENOUS at 14:25:00

## 2021-09-14 NOTE — PROGRESS NOTES
HPI     Kalia Camacho is a 64year old male here for f/u of Primary cancer of left upper lobe of lung (hcc)  (primary encounter diagnosis)  Malignant pericardial effusion (hcc)  Malignant neoplasm metastatic to lung, unspecified laterality (hcc)  Bone by mouth 2 (two) times a day. 60 tablet 0   • ipratropium-albuterol 0.5-2.5 (3) MG/3ML Inhalation Solution Take 3 mL by nebulization 4 (four) times daily.  DX code: R09.02, 360 mL 5   • fluticasone-Umeclidin-Vilant (Michele Villa) 495-21.3-47 MCG/INH Vesta Belt tablet Take 1 tablet (10 mg total) by mouth every 8 (eight) hours as needed for Nausea. 30 tablet 3   • Ondansetron HCl (ZOFRAN) 8 MG tablet Take 1 tablet (8 mg total) by mouth every 8 (eight) hours as needed for Nausea.  30 tablet 3   • dexamethasone (DECA No    Drug use: No      Family History   Problem Relation Age of Onset   • Cancer Father    • Hypertension Mother    • Hypertension Brother          PHYSICAL EXAM:    /54 (BP Location: Left arm, Patient Position: Sitting, Cuff Size: adult)   Pulse 71 He is currently status post cycle 15 of maintenance therapy with single agent pembrolizumab. PET scan after cycle 15 continues to show improvement- reviewed with patient and wife. Proceed with cycle 16 pembrolizumab.      Immune mediated colitis: i 3.4 - 5.0 g/dL    Globulin  4.6 (H) 2.8 - 4.4 g/dL    A/G Ratio 0.6 (L) 1.0 - 2.0    FASTING Patient not present    CBC W/ DIFFERENTIAL    Collection Time: 09/14/21 10:45 AM   Result Value Ref Range    WBC 5.2 4.0 - 11.0 x10(3) uL    RBC 3.66 (L) 3.80 - 5. PATIENT BLOOD GLUCOSE: 86 mg/dL.     UPTAKE TIME: 66 minutes.             FINDINGS:   Mediastinal blood pool is 2.5 max SUV, and hepatic blood pool is 3.7 max SUV.       HEAD/NECK: Normal.  No pathologic FDG activity.     LUNGS: There has been a slight dec Joshua Mulligan MD on 9/09/2021 at 10:40 AM

## 2021-09-14 NOTE — PROGRESS NOTES
Pt here for C16D1 Keytruda and X-Geva. Arrives Ambulating with cane, accompanied by Self            Pregnancy screening: Not applicable     Modifications in dose or schedule: No    No complaints today. Denies any diarrhea. X-Geva due today.  Denies any rec

## 2021-09-15 RX ORDER — POTASSIUM CHLORIDE 1500 MG/1
1 TABLET, EXTENDED RELEASE ORAL 2 TIMES DAILY
Qty: 60 TABLET | Refills: 3 | Status: SHIPPED | OUTPATIENT
Start: 2021-09-15 | End: 2021-10-20

## 2021-09-16 ENCOUNTER — ANCILLARY PROCEDURE (OUTPATIENT)
Dept: CARDIOLOGY | Age: 76
End: 2021-09-16
Attending: INTERNAL MEDICINE

## 2021-09-16 ENCOUNTER — LAB SERVICES (OUTPATIENT)
Dept: LAB | Age: 76
End: 2021-09-16

## 2021-09-16 ENCOUNTER — TELEPHONE (OUTPATIENT)
Dept: INTERNAL MEDICINE CLINIC | Facility: CLINIC | Age: 76
End: 2021-09-16

## 2021-09-16 DIAGNOSIS — I35.0 AORTIC VALVE STENOSIS, ETIOLOGY OF CARDIAC VALVE DISEASE UNSPECIFIED: ICD-10-CM

## 2021-09-16 DIAGNOSIS — E78.2 MIXED HYPERLIPIDEMIA: ICD-10-CM

## 2021-09-16 DIAGNOSIS — I10 ESSENTIAL HYPERTENSION: ICD-10-CM

## 2021-09-16 DIAGNOSIS — I65.23 ASYMPTOMATIC CAROTID ARTERY STENOSIS, BILATERAL: ICD-10-CM

## 2021-09-16 LAB
CHOLEST SERPL-MCNC: 107 MG/DL
CHOLEST/HDLC SERPL: 2.1 {RATIO}
FASTING DURATION TIME PATIENT: 12 HOURS (ref 0–999)
HDLC SERPL-MCNC: 52 MG/DL
LDLC SERPL CALC-MCNC: 45 MG/DL
NONHDLC SERPL-MCNC: 55 MG/DL
TRIGL SERPL-MCNC: 49 MG/DL

## 2021-09-16 PROCEDURE — 93306 TTE W/DOPPLER COMPLETE: CPT | Performed by: INTERNAL MEDICINE

## 2021-09-16 PROCEDURE — 36415 COLL VENOUS BLD VENIPUNCTURE: CPT | Performed by: CLINICAL MEDICAL LABORATORY

## 2021-09-16 PROCEDURE — 80061 LIPID PANEL: CPT | Performed by: CLINICAL MEDICAL LABORATORY

## 2021-09-16 PROCEDURE — 93356 MYOCRD STRAIN IMG SPCKL TRCK: CPT | Performed by: INTERNAL MEDICINE

## 2021-09-16 PROCEDURE — 93880 EXTRACRANIAL BILAT STUDY: CPT | Performed by: INTERNAL MEDICINE

## 2021-09-16 PROCEDURE — 76376 3D RENDER W/INTRP POSTPROCES: CPT | Performed by: INTERNAL MEDICINE

## 2021-09-16 RX ORDER — ALBUTEROL SULFATE 90 UG/1
2 AEROSOL, METERED RESPIRATORY (INHALATION) EVERY 6 HOURS PRN
Qty: 2 EACH | Refills: 5 | Status: SHIPPED | OUTPATIENT
Start: 2021-09-16

## 2021-09-16 RX ORDER — AMLODIPINE BESYLATE 5 MG/1
5 TABLET ORAL DAILY
Qty: 90 TABLET | Refills: 0 | Status: SHIPPED | OUTPATIENT
Start: 2021-09-16 | End: 2021-09-27 | Stop reason: SDUPTHER

## 2021-09-16 RX ORDER — HYDROCHLOROTHIAZIDE 12.5 MG/1
12.5 TABLET ORAL DAILY
Qty: 90 TABLET | Refills: 0 | Status: SHIPPED | OUTPATIENT
Start: 2021-09-16 | End: 2021-09-27 | Stop reason: SDUPTHER

## 2021-09-16 RX ORDER — ATORVASTATIN CALCIUM 20 MG/1
20 TABLET, FILM COATED ORAL DAILY
Qty: 90 TABLET | Refills: 0 | Status: SHIPPED | OUTPATIENT
Start: 2021-09-16 | End: 2021-09-27 | Stop reason: SDUPTHER

## 2021-09-16 NOTE — TELEPHONE ENCOUNTER
Sam/Linn [phone] states that the insurance does not cover the albuterol inhaler. It only covers the ventolin. Would, the doctor like to prescribe this medication instead.

## 2021-09-16 NOTE — TELEPHONE ENCOUNTER
Dr. Karl Andres, please disregard. Spoke to pharmacy at [facility]. Patient is requesting refill of Albuterol inhaler last refilled by Dr. Davonna Schirmer 10/16/20. Albuterol is no longer being covered by insurance, only Ventolin inhaler. Medication pended for review.

## 2021-09-20 ENCOUNTER — TELEPHONE (OUTPATIENT)
Dept: CARDIOLOGY | Age: 76
End: 2021-09-20

## 2021-09-20 ENCOUNTER — TELEPHONE (OUTPATIENT)
Dept: PULMONOLOGY | Facility: CLINIC | Age: 76
End: 2021-09-20

## 2021-09-20 DIAGNOSIS — G47.33 OSA (OBSTRUCTIVE SLEEP APNEA): Primary | ICD-10-CM

## 2021-09-20 NOTE — TELEPHONE ENCOUNTER
Polly requesting to speak with RN regarding Cpap. Please call to discuss treatment - has new machine and states pressures is too strong. Thank you.

## 2021-09-20 NOTE — TELEPHONE ENCOUNTER
RN, facility decrease of CPAP pressure to 8 cm water pressure as well as the adaptive device he is requesting. Tell the patient that he needs a download of his data at the 2 to 3-month period of time.

## 2021-09-20 NOTE — TELEPHONE ENCOUNTER
Signed order faxed to [facility] at [phone]. Fax confirmation received. Spoke with patient's wife informed her of Dr. Gracia Sickle message/order. Wife verbalized understanding.   Follow up appointment with Dr. Christina Bowers scheduled on 11/22/21 at 4:30pm, verified d

## 2021-09-20 NOTE — TELEPHONE ENCOUNTER
Spoke with patient's wife, Jose Segura, states patient's CPAP pressure is too high. Patient cannot sleep with pressure set at 10cwp. Requesting order for decrease in pressure to 5 or 6cwp. Also requesting order for adapter for oxygen bleed-in for CPAP.

## 2021-09-21 ENCOUNTER — MED REC SCAN ONLY (OUTPATIENT)
Dept: INTERNAL MEDICINE CLINIC | Facility: CLINIC | Age: 76
End: 2021-09-21

## 2021-09-27 ENCOUNTER — OFFICE VISIT (OUTPATIENT)
Dept: CARDIOLOGY | Age: 76
End: 2021-09-27

## 2021-09-27 VITALS
BODY MASS INDEX: 26.59 KG/M2 | OXYGEN SATURATION: 97 % | SYSTOLIC BLOOD PRESSURE: 116 MMHG | WEIGHT: 179.5 LBS | HEIGHT: 69 IN | HEART RATE: 68 BPM | DIASTOLIC BLOOD PRESSURE: 62 MMHG

## 2021-09-27 DIAGNOSIS — I25.10 CORONARY ARTERY DISEASE WITHOUT ANGINA PECTORIS, UNSPECIFIED VESSEL OR LESION TYPE, UNSPECIFIED WHETHER NATIVE OR TRANSPLANTED HEART: Primary | ICD-10-CM

## 2021-09-27 DIAGNOSIS — I35.0 AORTIC VALVE STENOSIS, ETIOLOGY OF CARDIAC VALVE DISEASE UNSPECIFIED: ICD-10-CM

## 2021-09-27 DIAGNOSIS — N18.9 CHRONIC RENAL IMPAIRMENT, UNSPECIFIED CKD STAGE: ICD-10-CM

## 2021-09-27 DIAGNOSIS — I31.31 MALIGNANT PERICARDIAL EFFUSION: ICD-10-CM

## 2021-09-27 DIAGNOSIS — I65.23 ASYMPTOMATIC CAROTID ARTERY STENOSIS, BILATERAL: ICD-10-CM

## 2021-09-27 DIAGNOSIS — I10 ESSENTIAL HYPERTENSION: ICD-10-CM

## 2021-09-27 PROBLEM — J91.0 MALIGNANT PLEURAL EFFUSION: Status: ACTIVE | Noted: 2020-09-28

## 2021-09-27 PROCEDURE — 99214 OFFICE O/P EST MOD 30 MIN: CPT | Performed by: INTERNAL MEDICINE

## 2021-09-27 RX ORDER — HYDROCHLOROTHIAZIDE 12.5 MG/1
12.5 TABLET ORAL DAILY
Qty: 90 TABLET | Refills: 3 | Status: CANCELLED | OUTPATIENT
Start: 2021-09-27

## 2021-09-27 RX ORDER — DIPHENOXYLATE HYDROCHLORIDE AND ATROPINE SULFATE 2.5; .025 MG/1; MG/1
TABLET ORAL
COMMUNITY
Start: 2021-06-21 | End: 2021-09-27

## 2021-09-27 RX ORDER — IPRATROPIUM BROMIDE AND ALBUTEROL SULFATE 2.5; .5 MG/3ML; MG/3ML
3 SOLUTION RESPIRATORY (INHALATION)
COMMUNITY
Start: 2021-08-16

## 2021-09-27 RX ORDER — NITROGLYCERIN 0.4 MG/1
0.4 TABLET SUBLINGUAL EVERY 5 MIN PRN
Qty: 25 TABLET | Refills: 1 | Status: SHIPPED | OUTPATIENT
Start: 2021-09-27 | End: 2023-01-01

## 2021-09-27 RX ORDER — ATORVASTATIN CALCIUM 20 MG/1
20 TABLET, FILM COATED ORAL DAILY
Qty: 90 TABLET | Refills: 3 | Status: SHIPPED | OUTPATIENT
Start: 2021-09-27 | End: 2022-01-01

## 2021-09-27 RX ORDER — HYDROCHLOROTHIAZIDE 12.5 MG/1
12.5 TABLET ORAL DAILY
Qty: 90 TABLET | Refills: 3 | Status: SHIPPED | OUTPATIENT
Start: 2021-09-27 | End: 2022-06-06

## 2021-09-27 RX ORDER — ALPRAZOLAM 0.5 MG/1
0.5 TABLET ORAL 2 TIMES DAILY PRN
COMMUNITY
Start: 2021-07-22

## 2021-09-27 RX ORDER — AMLODIPINE BESYLATE 5 MG/1
5 TABLET ORAL DAILY
Qty: 90 TABLET | Refills: 3 | Status: SHIPPED | OUTPATIENT
Start: 2021-09-27 | End: 2022-01-01

## 2021-09-27 RX ORDER — AMLODIPINE BESYLATE 5 MG/1
5 TABLET ORAL DAILY
Qty: 90 TABLET | Refills: 3 | Status: CANCELLED | OUTPATIENT
Start: 2021-09-27

## 2021-09-27 RX ORDER — ATORVASTATIN CALCIUM 20 MG/1
20 TABLET, FILM COATED ORAL DAILY
Qty: 90 TABLET | Refills: 3 | Status: CANCELLED | OUTPATIENT
Start: 2021-09-27

## 2021-09-27 SDOH — HEALTH STABILITY: PHYSICAL HEALTH: ON AVERAGE, HOW MANY DAYS PER WEEK DO YOU ENGAGE IN MODERATE TO STRENUOUS EXERCISE (LIKE A BRISK WALK)?: 7 DAYS

## 2021-09-27 ASSESSMENT — PATIENT HEALTH QUESTIONNAIRE - PHQ9
SUM OF ALL RESPONSES TO PHQ9 QUESTIONS 1 AND 2: 0
SUM OF ALL RESPONSES TO PHQ9 QUESTIONS 1 AND 2: 0
2. FEELING DOWN, DEPRESSED OR HOPELESS: NOT AT ALL
CLINICAL INTERPRETATION OF PHQ2 SCORE: NO FURTHER SCREENING NEEDED
CLINICAL INTERPRETATION OF PHQ9 SCORE: NO FURTHER SCREENING NEEDED
1. LITTLE INTEREST OR PLEASURE IN DOING THINGS: NOT AT ALL

## 2021-09-28 ENCOUNTER — APPOINTMENT (OUTPATIENT)
Dept: HEMATOLOGY/ONCOLOGY | Facility: HOSPITAL | Age: 76
End: 2021-09-28
Attending: NURSE PRACTITIONER
Payer: MEDICARE

## 2021-09-28 ENCOUNTER — APPOINTMENT (OUTPATIENT)
Dept: HEMATOLOGY/ONCOLOGY | Facility: HOSPITAL | Age: 76
End: 2021-09-28
Attending: INTERNAL MEDICINE
Payer: MEDICARE

## 2021-10-04 ENCOUNTER — CASE MANAGEMENT (OUTPATIENT)
Dept: CARE COORDINATION | Age: 76
End: 2021-10-04

## 2021-10-04 NOTE — PROGRESS NOTES
Pt here for C17 Keytruda.   Arrives Ambulating with cane from [facility].     Pregnancy screening: Not applicable     Modifications in dose or schedule: No                              Frequency of blood return and site check throughout administration: Prior to ad

## 2021-10-05 ENCOUNTER — TELEPHONE (OUTPATIENT)
Dept: HEMATOLOGY/ONCOLOGY | Facility: HOSPITAL | Age: 76
End: 2021-10-05

## 2021-10-05 ENCOUNTER — NURSE ONLY (OUTPATIENT)
Dept: HEMATOLOGY/ONCOLOGY | Facility: HOSPITAL | Age: 76
End: 2021-10-05
Attending: INTERNAL MEDICINE
Payer: MEDICARE

## 2021-10-05 ENCOUNTER — OFFICE VISIT (OUTPATIENT)
Dept: HEMATOLOGY/ONCOLOGY | Facility: HOSPITAL | Age: 76
End: 2021-10-05
Attending: NURSE PRACTITIONER
Payer: MEDICARE

## 2021-10-05 VITALS
BODY MASS INDEX: 26.66 KG/M2 | HEIGHT: 69 IN | WEIGHT: 180 LBS | DIASTOLIC BLOOD PRESSURE: 61 MMHG | RESPIRATION RATE: 18 BRPM | TEMPERATURE: 98 F | OXYGEN SATURATION: 96 % | SYSTOLIC BLOOD PRESSURE: 131 MMHG | HEART RATE: 78 BPM

## 2021-10-05 VITALS
RESPIRATION RATE: 18 BRPM | TEMPERATURE: 98 F | SYSTOLIC BLOOD PRESSURE: 131 MMHG | BODY MASS INDEX: 27 KG/M2 | HEART RATE: 78 BPM | WEIGHT: 180 LBS | OXYGEN SATURATION: 96 % | DIASTOLIC BLOOD PRESSURE: 61 MMHG

## 2021-10-05 DIAGNOSIS — C34.12 PRIMARY CANCER OF LEFT UPPER LOBE OF LUNG (HCC): Primary | ICD-10-CM

## 2021-10-05 DIAGNOSIS — Z45.2 ENCOUNTER FOR CENTRAL LINE CARE: Primary | ICD-10-CM

## 2021-10-05 DIAGNOSIS — Z29.8 ENCOUNTER FOR IMMUNOTHERAPY: ICD-10-CM

## 2021-10-05 DIAGNOSIS — C79.51 BONE METASTASIS (HCC): ICD-10-CM

## 2021-10-05 DIAGNOSIS — C79.31 BRAIN METASTASIS (HCC): ICD-10-CM

## 2021-10-05 DIAGNOSIS — C34.12 PRIMARY CANCER OF LEFT UPPER LOBE OF LUNG (HCC): ICD-10-CM

## 2021-10-05 DIAGNOSIS — Z51.81 MEDICATION MONITORING ENCOUNTER: ICD-10-CM

## 2021-10-05 DIAGNOSIS — C78.00 MALIGNANT NEOPLASM METASTATIC TO LUNG, UNSPECIFIED LATERALITY (HCC): ICD-10-CM

## 2021-10-05 DIAGNOSIS — C80.1 MALIGNANT PERICARDIAL EFFUSION (HCC): ICD-10-CM

## 2021-10-05 DIAGNOSIS — I31.3 MALIGNANT PERICARDIAL EFFUSION (HCC): ICD-10-CM

## 2021-10-05 PROCEDURE — 96413 CHEMO IV INFUSION 1 HR: CPT

## 2021-10-05 PROCEDURE — 84443 ASSAY THYROID STIM HORMONE: CPT

## 2021-10-05 PROCEDURE — 85025 COMPLETE CBC W/AUTO DIFF WBC: CPT

## 2021-10-05 PROCEDURE — 99215 OFFICE O/P EST HI 40 MIN: CPT | Performed by: NURSE PRACTITIONER

## 2021-10-05 PROCEDURE — 96375 TX/PRO/DX INJ NEW DRUG ADDON: CPT

## 2021-10-05 PROCEDURE — 80053 COMPREHEN METABOLIC PANEL: CPT

## 2021-10-05 RX ORDER — HEPARIN SODIUM (PORCINE) LOCK FLUSH IV SOLN 100 UNIT/ML 100 UNIT/ML
5 SOLUTION INTRAVENOUS ONCE
Status: CANCELLED | OUTPATIENT
Start: 2021-10-12

## 2021-10-05 RX ORDER — SODIUM CHLORIDE 9 MG/ML
INJECTION INTRAVENOUS
Status: DISCONTINUED
Start: 2021-10-05 | End: 2021-10-05

## 2021-10-05 RX ORDER — HEPARIN SODIUM (PORCINE) LOCK FLUSH IV SOLN 100 UNIT/ML 100 UNIT/ML
5 SOLUTION INTRAVENOUS ONCE
Status: COMPLETED | OUTPATIENT
Start: 2021-10-05 | End: 2021-10-05

## 2021-10-05 RX ORDER — SODIUM CHLORIDE 9 MG/ML
10 INJECTION INTRAVENOUS ONCE
Status: CANCELLED | OUTPATIENT
Start: 2021-10-12

## 2021-10-05 RX ADMIN — HEPARIN SODIUM (PORCINE) LOCK FLUSH IV SOLN 100 UNIT/ML 500 UNITS: 100 SOLUTION INTRAVENOUS at 14:00:00

## 2021-10-05 NOTE — PROGRESS NOTES
HPI     Billee Guardian is a 64year old male here for f/u of Primary cancer of left upper lobe of lung (hcc)  (primary encounter diagnosis)  Malignant pericardial effusion (hcc)  Malignant neoplasm metastatic to lung, unspecified laterality (hcc)  Bone disturbance (uses CPAP). Current Outpatient Medications   Medication Sig Dispense Refill   • Albuterol Sulfate HFA (VENTOLIN HFA) 108 (90 Base) MCG/ACT Inhalation Aero Soln Inhale 2 puffs into the lungs every 6 (six) hours as needed for Wheezing. 108 (90 Base) MCG/ACT Inhalation Aero Soln Inhale 2 puffs into the lungs every 6 (six) hours as needed for Wheezing or Shortness of Breath.  2 Inhaler 5   • Prochlorperazine Maleate (COMPAZINE) 10 mg tablet Take 1 tablet (10 mg total) by mouth every 8 (eigh History    Tobacco Use      Smoking status: Former Smoker      Smokeless tobacco: Never Used      Tobacco comment: quit in 2004    Vaping Use      Vaping Use: Never used    Alcohol use: No    Drug use: No      Family History   Problem Relation Age of Onset maintenance pembrolizumab. Had SBRT to solitary brain metastasis completed treatment on 09/25/20. CT head 12/21/20 with marked response to SBRT.   Hypokalemia: Potassium chloride 20 mEq BID -resume      He is currently status post cycle 16 of micheleanc 15 - 37 U/L    Alkaline Phosphatase 65 45 - 117 U/L    Bilirubin, Total 0.3 0.1 - 2.0 mg/dL    Total Protein 7.8 6.4 - 8.2 g/dL    Albumin 3.0 (L) 3.4 - 5.0 g/dL    Globulin  4.8 (H) 2.8 - 4.4 g/dL    A/G Ratio 0.6 (L) 1.0 - 2.0    FASTING Patient not pres

## 2021-10-05 NOTE — TELEPHONE ENCOUNTER
Polly came up and said that she forgot to ask if [place] is able to get his flu shot. She asked if she could get a call just to go over that.

## 2021-10-06 ENCOUNTER — CASE MANAGEMENT (OUTPATIENT)
Dept: CARE COORDINATION | Age: 76
End: 2021-10-06

## 2021-10-06 DIAGNOSIS — E03.2 HYPOTHYROIDISM DUE TO MEDICATION: ICD-10-CM

## 2021-10-06 DIAGNOSIS — C34.12 PRIMARY CANCER OF LEFT UPPER LOBE OF LUNG (HCC): ICD-10-CM

## 2021-10-07 ENCOUNTER — TELEPHONE (OUTPATIENT)
Dept: ENDOCRINOLOGY CLINIC | Facility: CLINIC | Age: 76
End: 2021-10-07

## 2021-10-07 DIAGNOSIS — C34.12 PRIMARY CANCER OF LEFT UPPER LOBE OF LUNG (HCC): ICD-10-CM

## 2021-10-07 RX ORDER — ALPRAZOLAM 0.5 MG/1
0.5 TABLET ORAL 2 TIMES DAILY PRN
Qty: 60 TABLET | Refills: 1 | Status: SHIPPED | OUTPATIENT
Start: 2021-10-07 | End: 2021-12-21

## 2021-10-07 RX ORDER — LEVOTHYROXINE SODIUM 0.12 MG/1
125 TABLET ORAL
Qty: 90 TABLET | Refills: 0 | Status: SHIPPED | OUTPATIENT
Start: 2021-10-07 | End: 2021-12-23

## 2021-10-07 RX ORDER — ALPRAZOLAM 0.5 MG/1
0.5 TABLET ORAL 2 TIMES DAILY PRN
Qty: 60 TABLET | Refills: 1 | Status: SHIPPED | OUTPATIENT
Start: 2021-10-07 | End: 2021-10-07

## 2021-10-07 NOTE — TELEPHONE ENCOUNTER
LOV 05/26/21. RTC 2 months. No f/u scheduled at this time. Sent patient a [employer] message today for a follow up appointment. Pended 90 days supply.

## 2021-10-08 ENCOUNTER — CASE MANAGEMENT (OUTPATIENT)
Dept: CARE COORDINATION | Age: 76
End: 2021-10-08

## 2021-10-11 ENCOUNTER — CASE MANAGEMENT (OUTPATIENT)
Dept: CARE COORDINATION | Age: 76
End: 2021-10-11

## 2021-10-12 ENCOUNTER — NURSE ONLY (OUTPATIENT)
Dept: HEMATOLOGY/ONCOLOGY | Facility: HOSPITAL | Age: 76
End: 2021-10-12
Attending: INTERNAL MEDICINE
Payer: MEDICARE

## 2021-10-12 DIAGNOSIS — Z45.2 ENCOUNTER FOR CENTRAL LINE CARE: Primary | ICD-10-CM

## 2021-10-12 DIAGNOSIS — C79.51 BONE METASTASIS (HCC): ICD-10-CM

## 2021-10-12 DIAGNOSIS — Z51.81 MEDICATION MONITORING ENCOUNTER: ICD-10-CM

## 2021-10-12 PROCEDURE — 96372 THER/PROPH/DIAG INJ SC/IM: CPT

## 2021-10-12 RX ORDER — HEPARIN SODIUM (PORCINE) LOCK FLUSH IV SOLN 100 UNIT/ML 100 UNIT/ML
5 SOLUTION INTRAVENOUS ONCE
Status: CANCELLED | OUTPATIENT
Start: 2021-10-19

## 2021-10-12 RX ORDER — SODIUM CHLORIDE 9 MG/ML
10 INJECTION INTRAVENOUS ONCE
Status: CANCELLED | OUTPATIENT
Start: 2021-10-19

## 2021-10-12 NOTE — PROGRESS NOTES
Monthly Xgeva given in L lower abd and tolerated well. Pt denies any current mouth/jaw pain. No recent or planned dental procedures. Next injection scheduled.

## 2021-10-15 ENCOUNTER — TELEPHONE (OUTPATIENT)
Dept: PULMONOLOGY | Facility: CLINIC | Age: 76
End: 2021-10-15

## 2021-10-19 ENCOUNTER — CASE MANAGEMENT (OUTPATIENT)
Dept: CARE COORDINATION | Age: 76
End: 2021-10-19

## 2021-10-21 RX ORDER — POTASSIUM CHLORIDE 1500 MG/1
1 TABLET, EXTENDED RELEASE ORAL 2 TIMES DAILY
Qty: 60 TABLET | Refills: 3 | Status: SHIPPED | OUTPATIENT
Start: 2021-10-21 | End: 2021-12-13

## 2021-10-26 ENCOUNTER — NURSE ONLY (OUTPATIENT)
Dept: HEMATOLOGY/ONCOLOGY | Facility: HOSPITAL | Age: 76
End: 2021-10-26
Attending: INTERNAL MEDICINE
Payer: MEDICARE

## 2021-10-26 ENCOUNTER — OFFICE VISIT (OUTPATIENT)
Dept: HEMATOLOGY/ONCOLOGY | Facility: HOSPITAL | Age: 76
End: 2021-10-26
Attending: NURSE PRACTITIONER
Payer: MEDICARE

## 2021-10-26 VITALS
HEIGHT: 69 IN | TEMPERATURE: 97 F | OXYGEN SATURATION: 97 % | BODY MASS INDEX: 26.51 KG/M2 | SYSTOLIC BLOOD PRESSURE: 133 MMHG | WEIGHT: 179 LBS | RESPIRATION RATE: 18 BRPM | HEART RATE: 76 BPM | DIASTOLIC BLOOD PRESSURE: 62 MMHG

## 2021-10-26 DIAGNOSIS — Z51.81 MEDICATION MONITORING ENCOUNTER: ICD-10-CM

## 2021-10-26 DIAGNOSIS — C34.12 PRIMARY CANCER OF LEFT UPPER LOBE OF LUNG (HCC): ICD-10-CM

## 2021-10-26 DIAGNOSIS — C79.31 BRAIN METASTASIS (HCC): ICD-10-CM

## 2021-10-26 DIAGNOSIS — I31.3 MALIGNANT PERICARDIAL EFFUSION (HCC): ICD-10-CM

## 2021-10-26 DIAGNOSIS — C79.51 BONE METASTASIS (HCC): ICD-10-CM

## 2021-10-26 DIAGNOSIS — Z45.2 ENCOUNTER FOR CENTRAL LINE CARE: Primary | ICD-10-CM

## 2021-10-26 DIAGNOSIS — Z29.8 ENCOUNTER FOR IMMUNOTHERAPY: ICD-10-CM

## 2021-10-26 DIAGNOSIS — C78.00 MALIGNANT NEOPLASM METASTATIC TO LUNG, UNSPECIFIED LATERALITY (HCC): ICD-10-CM

## 2021-10-26 DIAGNOSIS — C80.1 MALIGNANT PERICARDIAL EFFUSION (HCC): ICD-10-CM

## 2021-10-26 DIAGNOSIS — C34.12 PRIMARY CANCER OF LEFT UPPER LOBE OF LUNG (HCC): Primary | ICD-10-CM

## 2021-10-26 PROCEDURE — 96375 TX/PRO/DX INJ NEW DRUG ADDON: CPT

## 2021-10-26 PROCEDURE — 85025 COMPLETE CBC W/AUTO DIFF WBC: CPT

## 2021-10-26 PROCEDURE — 96413 CHEMO IV INFUSION 1 HR: CPT

## 2021-10-26 PROCEDURE — 80053 COMPREHEN METABOLIC PANEL: CPT

## 2021-10-26 PROCEDURE — 99215 OFFICE O/P EST HI 40 MIN: CPT | Performed by: NURSE PRACTITIONER

## 2021-10-26 RX ORDER — HEPARIN SODIUM (PORCINE) LOCK FLUSH IV SOLN 100 UNIT/ML 100 UNIT/ML
5 SOLUTION INTRAVENOUS ONCE
Status: COMPLETED | OUTPATIENT
Start: 2021-10-26 | End: 2021-10-26

## 2021-10-26 RX ORDER — HEPARIN SODIUM (PORCINE) LOCK FLUSH IV SOLN 100 UNIT/ML 100 UNIT/ML
5 SOLUTION INTRAVENOUS ONCE
Status: CANCELLED | OUTPATIENT
Start: 2021-11-09

## 2021-10-26 RX ORDER — SODIUM CHLORIDE 9 MG/ML
10 INJECTION INTRAVENOUS ONCE
Status: CANCELLED | OUTPATIENT
Start: 2021-11-09

## 2021-10-26 RX ORDER — SODIUM CHLORIDE 9 MG/ML
INJECTION INTRAVENOUS
Status: DISCONTINUED
Start: 2021-10-26 | End: 2021-10-26

## 2021-10-26 RX ORDER — HEPARIN SODIUM (PORCINE) LOCK FLUSH IV SOLN 100 UNIT/ML 100 UNIT/ML
SOLUTION INTRAVENOUS
Status: COMPLETED
Start: 2021-10-26 | End: 2021-10-26

## 2021-10-26 RX ADMIN — HEPARIN SODIUM (PORCINE) LOCK FLUSH IV SOLN 100 UNIT/ML 500 UNITS: 100 SOLUTION INTRAVENOUS at 13:45:00

## 2021-10-26 NOTE — PROGRESS NOTES
JOANNE Fields is a 64year old male here for f/u of Primary cancer of left upper lobe of lung (hcc)  (primary encounter diagnosis)  Malignant pericardial effusion (hcc)  Malignant neoplasm metastatic to lung, unspecified laterality (hcc)  Brain Psychiatric/Behavioral: Positive for sleep disturbance (uses CPAP). Current Outpatient Medications   Medication Sig Dispense Refill   • Potassium Chloride ER 20 MEQ Oral Tab CR Take 1 tablet by mouth 2 (two) times a day.  60 tablet 3   • levothy nostril 2-5 minutes after first dose. 1 kit 0   • Albuterol Sulfate  (90 Base) MCG/ACT Inhalation Aero Soln Inhale 2 puffs into the lungs every 6 (six) hours as needed for Wheezing or Shortness of Breath.  2 Inhaler 5   • Prochlorperazine Maleate (CO SURGICAL HISTORY Left     leg surgery     Social History    Tobacco Use      Smoking status: Former Smoker      Smokeless tobacco: Never Used      Tobacco comment: quit in 2004    Vaping Use      Vaping Use: Never used    Alcohol use: No    Drug use:  No pemetrexed was discontinued and he is on maintenance pembrolizumab. Had SBRT to solitary brain metastasis completed treatment on 09/25/20. CT head 12/21/20 with marked response to SBRT.   Hypokalemia: Potassium chloride 20 mEq BID -resume      He is cur American 61 >=60    GFR, -American 71 >=60    ALT 19 16 - 61 U/L    AST 14 (L) 15 - 37 U/L    Alkaline Phosphatase 64 45 - 117 U/L    Bilirubin, Total 0.5 0.1 - 2.0 mg/dL    Total Protein 8.0 6.4 - 8.2 g/dL    Albumin 2.9 (L) 3.4 - 5.0 g/dL    Globu

## 2021-10-26 NOTE — PROGRESS NOTES
Pt here for C18 Keytruda.   Arrives Ambulating with cane from [facility] denies any current complaints.      Pregnancy screening: Not applicable     Modifications in dose or schedule: No                              Port accessed in lab, flushed and noted with pos

## 2021-10-29 LAB
AORTIC VALVE AREA: 0.77
AV MEAN GRADIENT (AVMG): 39
AV MEAN VELOCITY (AVMV): 3.02
AV PEAK GRADIENT (AVPG): 65
AV PEAK VELOCITY (AVPV): 4.01
AV STENOSIS SEVERITY TEXT: NORMAL
LV EF: NORMAL %

## 2021-11-01 ENCOUNTER — OFFICE VISIT (OUTPATIENT)
Dept: INTERNAL MEDICINE CLINIC | Facility: CLINIC | Age: 76
End: 2021-11-01
Payer: MEDICARE

## 2021-11-01 VITALS
DIASTOLIC BLOOD PRESSURE: 71 MMHG | WEIGHT: 179 LBS | SYSTOLIC BLOOD PRESSURE: 115 MMHG | BODY MASS INDEX: 26.51 KG/M2 | HEIGHT: 69 IN | HEART RATE: 67 BPM

## 2021-11-01 DIAGNOSIS — R53.1 WEAKNESS: ICD-10-CM

## 2021-11-01 DIAGNOSIS — C78.00 MALIGNANT NEOPLASM METASTATIC TO LUNG, UNSPECIFIED LATERALITY (HCC): ICD-10-CM

## 2021-11-01 DIAGNOSIS — C79.51 BONE METASTASIS (HCC): ICD-10-CM

## 2021-11-01 DIAGNOSIS — R09.02 HYPOXIA: ICD-10-CM

## 2021-11-01 DIAGNOSIS — C79.31 BRAIN METASTASIS (HCC): ICD-10-CM

## 2021-11-01 DIAGNOSIS — G89.3 CANCER RELATED PAIN: ICD-10-CM

## 2021-11-01 DIAGNOSIS — N40.0 BENIGN PROSTATIC HYPERPLASIA, UNSPECIFIED WHETHER LOWER URINARY TRACT SYMPTOMS PRESENT: Primary | ICD-10-CM

## 2021-11-01 PROCEDURE — 99213 OFFICE O/P EST LOW 20 MIN: CPT | Performed by: INTERNAL MEDICINE

## 2021-11-01 NOTE — PROGRESS NOTES
History of Present Illness   Patient ID: Lory Castelan is a 64year old male. Today's Date: 11/01/21  Chief Complaint: Urinary Symptoms (discuss medication) and Forms Completion (parking placard)      HPI   1.  He has been on finasteride and terazosin Neurological:      General: No focal deficit present. Mental Status: He is alert and oriented to person, place, and time. Mental status is at baseline.    Psychiatric:         Mood and Affect: Mood normal.         Behavior: Behavior normal. daily.     • Naloxone HCl 4 MG/0.1ML Nasal Liquid 4 mg by Nasal route as needed. If patient remains unresponsive, repeat dose in other nostril 2-5 minutes after first dose.  1 kit 0   • Albuterol Sulfate  (90 Base) MCG/ACT Inhalation Aero Soln Inhale these medications discussed, recommend he follow-up with urology for further evaluation discussion and to see if these medications are still needed/beneficial, would like to reduce polypharmacy. Parking placard forms completed.       Follow Up: As needed/i

## 2021-11-09 ENCOUNTER — NURSE ONLY (OUTPATIENT)
Dept: HEMATOLOGY/ONCOLOGY | Facility: HOSPITAL | Age: 76
End: 2021-11-09
Attending: INTERNAL MEDICINE
Payer: MEDICARE

## 2021-11-09 VITALS
TEMPERATURE: 97 F | SYSTOLIC BLOOD PRESSURE: 120 MMHG | RESPIRATION RATE: 16 BRPM | HEART RATE: 67 BPM | DIASTOLIC BLOOD PRESSURE: 51 MMHG | OXYGEN SATURATION: 100 %

## 2021-11-09 DIAGNOSIS — C79.51 BONE METASTASIS (HCC): ICD-10-CM

## 2021-11-09 DIAGNOSIS — Z45.2 ENCOUNTER FOR CENTRAL LINE CARE: Primary | ICD-10-CM

## 2021-11-09 DIAGNOSIS — Z51.81 MEDICATION MONITORING ENCOUNTER: ICD-10-CM

## 2021-11-09 PROCEDURE — 96372 THER/PROPH/DIAG INJ SC/IM: CPT

## 2021-11-09 RX ORDER — SODIUM CHLORIDE 9 MG/ML
10 INJECTION INTRAVENOUS ONCE
Status: CANCELLED | OUTPATIENT
Start: 2021-11-16

## 2021-11-09 RX ORDER — HEPARIN SODIUM (PORCINE) LOCK FLUSH IV SOLN 100 UNIT/ML 100 UNIT/ML
5 SOLUTION INTRAVENOUS ONCE
Status: CANCELLED | OUTPATIENT
Start: 2021-11-16

## 2021-11-09 NOTE — PROGRESS NOTES
Patient here for Xgeva injection. Patient denies any dental issues or Jaw Pain. Does not have any dental procedures scheduled. Calcium drawn on 10/26 9.1. Xgeva given subcutaneous in left lower abdomen. Tolerated injection well.   No bleeding not

## 2021-11-16 ENCOUNTER — OFFICE VISIT (OUTPATIENT)
Dept: HEMATOLOGY/ONCOLOGY | Facility: HOSPITAL | Age: 76
End: 2021-11-16
Attending: INTERNAL MEDICINE
Payer: MEDICARE

## 2021-11-16 VITALS
TEMPERATURE: 98 F | RESPIRATION RATE: 16 BRPM | HEIGHT: 69 IN | OXYGEN SATURATION: 99 % | HEART RATE: 64 BPM | BODY MASS INDEX: 26.98 KG/M2 | WEIGHT: 182.19 LBS | SYSTOLIC BLOOD PRESSURE: 145 MMHG | DIASTOLIC BLOOD PRESSURE: 53 MMHG

## 2021-11-16 VITALS
WEIGHT: 182 LBS | SYSTOLIC BLOOD PRESSURE: 145 MMHG | BODY MASS INDEX: 26.96 KG/M2 | RESPIRATION RATE: 16 BRPM | OXYGEN SATURATION: 99 % | HEART RATE: 64 BPM | HEIGHT: 69 IN | TEMPERATURE: 98 F | DIASTOLIC BLOOD PRESSURE: 53 MMHG

## 2021-11-16 DIAGNOSIS — Z29.8 ENCOUNTER FOR IMMUNOTHERAPY: ICD-10-CM

## 2021-11-16 DIAGNOSIS — I31.3 MALIGNANT PERICARDIAL EFFUSION (HCC): ICD-10-CM

## 2021-11-16 DIAGNOSIS — Z51.81 MEDICATION MONITORING ENCOUNTER: ICD-10-CM

## 2021-11-16 DIAGNOSIS — C79.31 BRAIN METASTASIS (HCC): ICD-10-CM

## 2021-11-16 DIAGNOSIS — C79.51 BONE METASTASIS (HCC): ICD-10-CM

## 2021-11-16 DIAGNOSIS — Z45.2 ENCOUNTER FOR CENTRAL LINE CARE: Primary | ICD-10-CM

## 2021-11-16 DIAGNOSIS — C34.12 PRIMARY CANCER OF LEFT UPPER LOBE OF LUNG (HCC): ICD-10-CM

## 2021-11-16 DIAGNOSIS — C78.00 MALIGNANT NEOPLASM METASTATIC TO LUNG, UNSPECIFIED LATERALITY (HCC): ICD-10-CM

## 2021-11-16 DIAGNOSIS — C80.1 MALIGNANT PERICARDIAL EFFUSION (HCC): ICD-10-CM

## 2021-11-16 DIAGNOSIS — R53.83 FATIGUE, UNSPECIFIED TYPE: ICD-10-CM

## 2021-11-16 DIAGNOSIS — C34.12 PRIMARY CANCER OF LEFT UPPER LOBE OF LUNG (HCC): Primary | ICD-10-CM

## 2021-11-16 PROCEDURE — 96375 TX/PRO/DX INJ NEW DRUG ADDON: CPT

## 2021-11-16 PROCEDURE — 80053 COMPREHEN METABOLIC PANEL: CPT

## 2021-11-16 PROCEDURE — 99215 OFFICE O/P EST HI 40 MIN: CPT | Performed by: NURSE PRACTITIONER

## 2021-11-16 PROCEDURE — 85025 COMPLETE CBC W/AUTO DIFF WBC: CPT

## 2021-11-16 PROCEDURE — 84443 ASSAY THYROID STIM HORMONE: CPT

## 2021-11-16 PROCEDURE — 96413 CHEMO IV INFUSION 1 HR: CPT

## 2021-11-16 PROCEDURE — 84439 ASSAY OF FREE THYROXINE: CPT

## 2021-11-16 RX ORDER — HEPARIN SODIUM (PORCINE) LOCK FLUSH IV SOLN 100 UNIT/ML 100 UNIT/ML
SOLUTION INTRAVENOUS
Status: DISPENSED
Start: 2021-11-16 | End: 2021-11-17

## 2021-11-16 RX ORDER — HEPARIN SODIUM (PORCINE) LOCK FLUSH IV SOLN 100 UNIT/ML 100 UNIT/ML
5 SOLUTION INTRAVENOUS ONCE
Status: CANCELLED | OUTPATIENT
Start: 2021-12-07

## 2021-11-16 RX ORDER — SODIUM CHLORIDE 9 MG/ML
INJECTION INTRAVENOUS
Status: DISCONTINUED
Start: 2021-11-16 | End: 2021-11-16

## 2021-11-16 RX ORDER — SODIUM CHLORIDE 9 MG/ML
10 INJECTION INTRAVENOUS ONCE
Status: CANCELLED | OUTPATIENT
Start: 2021-12-07

## 2021-11-16 RX ORDER — HEPARIN SODIUM (PORCINE) LOCK FLUSH IV SOLN 100 UNIT/ML 100 UNIT/ML
5 SOLUTION INTRAVENOUS ONCE
Status: COMPLETED | OUTPATIENT
Start: 2021-11-16 | End: 2021-11-16

## 2021-11-16 RX ADMIN — HEPARIN SODIUM (PORCINE) LOCK FLUSH IV SOLN 100 UNIT/ML 500 UNITS: 100 SOLUTION INTRAVENOUS at 13:54:00

## 2021-11-16 NOTE — PROGRESS NOTES
HPI     Joseph Bonds is a 64year old male here for f/u of Primary cancer of left upper lobe of lung (hcc)  (primary encounter diagnosis)  Malignant pericardial effusion (hcc)  Malignant neoplasm metastatic to lung, unspecified laterality (hcc)  Bone Positive for gait problem (uses cane). Negative for dizziness and headaches. Hematological: Negative for adenopathy. Does not bruise/bleed easily. Psychiatric/Behavioral: Positive for sleep disturbance (uses CPAP).            Current Outpatient [facility] Nasal route as needed. If patient remains unresponsive, repeat dose in other nostril 2-5 minutes after first dose.  1 kit 0   • Albuterol Sulfate  (90 Base) MCG/ACT Inhalation Aero Soln Inhale 2 puffs into the lungs every 6 (six) hours as needed for Smoking status: Former Smoker      Smokeless tobacco: Never Used      Tobacco comment: quit in 2004    Vaping Use      Vaping Use: Never used    Alcohol use: No    Drug use: No      Family History   Problem Relation Age of Onset   • Cancer Father    • Hyp 09/25/20. CT head 12/21/20 with marked response to SBRT. Hypokalemia: Potassium chloride 20 mEq BID -resume      He is currently status post cycle 18 of maintenance therapy with single agent pembrolizumab.   PET scan after cycle 15 continues to show impro U/L    Bilirubin, Total 0.4 0.1 - 2.0 mg/dL    Total Protein 8.0 6.4 - 8.2 g/dL    Albumin 2.9 (L) 3.4 - 5.0 g/dL    Globulin  5.1 (H) 2.8 - 4.4 g/dL    A/G Ratio 0.6 (L) 1.0 - 2.0    FASTING Patient not present    ASSAY, THYROID STIM HORMONE    Collection

## 2021-11-16 NOTE — PROGRESS NOTES
Pt here for C19 Keytruda.   Arrives Ambulating with cane from [facility] denies any current complaints.      Pregnancy screening: Not applicable     Modifications in dose or schedule: No                              Port accessed in lab, flushed and noted with pos

## 2021-11-22 ENCOUNTER — OFFICE VISIT (OUTPATIENT)
Dept: PULMONOLOGY | Facility: CLINIC | Age: 76
End: 2021-11-22
Payer: MEDICARE

## 2021-11-22 VITALS
HEIGHT: 69 IN | HEART RATE: 69 BPM | BODY MASS INDEX: 26.81 KG/M2 | RESPIRATION RATE: 16 BRPM | WEIGHT: 181 LBS | DIASTOLIC BLOOD PRESSURE: 57 MMHG | SYSTOLIC BLOOD PRESSURE: 114 MMHG | OXYGEN SATURATION: 97 %

## 2021-11-22 DIAGNOSIS — C34.12 PRIMARY CANCER OF LEFT UPPER LOBE OF LUNG (HCC): Primary | ICD-10-CM

## 2021-11-22 PROBLEM — J44.9 CHRONIC OBSTRUCTIVE PULMONARY DISEASE, UNSPECIFIED (HCC): Status: ACTIVE | Noted: 2021-11-22

## 2021-11-22 PROCEDURE — 99213 OFFICE O/P EST LOW 20 MIN: CPT | Performed by: INTERNAL MEDICINE

## 2021-11-22 NOTE — PROGRESS NOTES
The patient is a 55-year-old male who Bryant from prior evaluation comes in now for follow-up. In general, he is doing pretty well.   He is looking for oxygen recertification but may want to pursue that through the [place] which she had initially provided hi

## 2021-12-03 ENCOUNTER — HOSPITAL ENCOUNTER (OUTPATIENT)
Dept: NUCLEAR MEDICINE | Facility: HOSPITAL | Age: 76
Discharge: HOME | End: 2021-12-03
Attending: NURSE PRACTITIONER
Payer: MEDICARE

## 2021-12-03 DIAGNOSIS — I31.3 MALIGNANT PERICARDIAL EFFUSION (HCC): ICD-10-CM

## 2021-12-03 DIAGNOSIS — C80.1 MALIGNANT PERICARDIAL EFFUSION (HCC): ICD-10-CM

## 2021-12-03 DIAGNOSIS — C79.31 BRAIN METASTASIS (HCC): ICD-10-CM

## 2021-12-03 DIAGNOSIS — C78.00 MALIGNANT NEOPLASM METASTATIC TO LUNG, UNSPECIFIED LATERALITY (HCC): ICD-10-CM

## 2021-12-03 DIAGNOSIS — C79.51 BONE METASTASIS (HCC): ICD-10-CM

## 2021-12-03 DIAGNOSIS — C34.12 PRIMARY CANCER OF LEFT UPPER LOBE OF LUNG (HCC): ICD-10-CM

## 2021-12-03 PROCEDURE — 82962 GLUCOSE BLOOD TEST: CPT

## 2021-12-03 PROCEDURE — 78815 PET IMAGE W/CT SKULL-THIGH: CPT | Performed by: NURSE PRACTITIONER

## 2021-12-07 ENCOUNTER — APPOINTMENT (OUTPATIENT)
Dept: HEMATOLOGY/ONCOLOGY | Facility: HOSPITAL | Age: 76
End: 2021-12-07
Attending: INTERNAL MEDICINE
Payer: MEDICARE

## 2021-12-07 ENCOUNTER — HOSPITAL ENCOUNTER (INPATIENT)
Facility: HOSPITAL | Age: 76
LOS: 6 days | Discharge: HOME | DRG: 683 | End: 2021-12-13
Attending: EMERGENCY MEDICINE | Admitting: HOSPITALIST
Payer: MEDICARE

## 2021-12-07 ENCOUNTER — OFFICE VISIT (OUTPATIENT)
Dept: HEMATOLOGY/ONCOLOGY | Facility: HOSPITAL | Age: 76
End: 2021-12-07
Attending: NURSE PRACTITIONER
Payer: MEDICARE

## 2021-12-07 VITALS
OXYGEN SATURATION: 98 % | HEART RATE: 80 BPM | SYSTOLIC BLOOD PRESSURE: 143 MMHG | DIASTOLIC BLOOD PRESSURE: 62 MMHG | HEIGHT: 69 IN | BODY MASS INDEX: 25.48 KG/M2 | TEMPERATURE: 98 F | RESPIRATION RATE: 16 BRPM | WEIGHT: 172 LBS

## 2021-12-07 VITALS — BODY MASS INDEX: 25 KG/M2 | WEIGHT: 171.69 LBS

## 2021-12-07 DIAGNOSIS — C79.31 BRAIN METASTASIS (HCC): ICD-10-CM

## 2021-12-07 DIAGNOSIS — Z29.8 ENCOUNTER FOR IMMUNOTHERAPY: ICD-10-CM

## 2021-12-07 DIAGNOSIS — C34.12 PRIMARY CANCER OF LEFT UPPER LOBE OF LUNG (HCC): ICD-10-CM

## 2021-12-07 DIAGNOSIS — C80.1 MALIGNANT PERICARDIAL EFFUSION (HCC): ICD-10-CM

## 2021-12-07 DIAGNOSIS — C79.51 BONE METASTASIS (HCC): ICD-10-CM

## 2021-12-07 DIAGNOSIS — I31.3 MALIGNANT PERICARDIAL EFFUSION (HCC): ICD-10-CM

## 2021-12-07 DIAGNOSIS — Z45.2 ENCOUNTER FOR CENTRAL LINE CARE: Primary | ICD-10-CM

## 2021-12-07 DIAGNOSIS — Z51.81 MEDICATION MONITORING ENCOUNTER: ICD-10-CM

## 2021-12-07 DIAGNOSIS — C34.12 PRIMARY CANCER OF LEFT UPPER LOBE OF LUNG (HCC): Primary | ICD-10-CM

## 2021-12-07 DIAGNOSIS — N17.9 AKI (ACUTE KIDNEY INJURY) (HCC): Primary | ICD-10-CM

## 2021-12-07 DIAGNOSIS — E86.0 DEHYDRATION: ICD-10-CM

## 2021-12-07 PROCEDURE — 80053 COMPREHEN METABOLIC PANEL: CPT

## 2021-12-07 PROCEDURE — 36591 DRAW BLOOD OFF VENOUS DEVICE: CPT

## 2021-12-07 PROCEDURE — 85025 COMPLETE CBC W/AUTO DIFF WBC: CPT

## 2021-12-07 PROCEDURE — 99223 1ST HOSP IP/OBS HIGH 75: CPT | Performed by: HOSPITALIST

## 2021-12-07 PROCEDURE — 99215 OFFICE O/P EST HI 40 MIN: CPT | Performed by: NURSE PRACTITIONER

## 2021-12-07 PROCEDURE — 99223 1ST HOSP IP/OBS HIGH 75: CPT | Performed by: INTERNAL MEDICINE

## 2021-12-07 RX ORDER — ONDANSETRON 2 MG/ML
4 INJECTION INTRAMUSCULAR; INTRAVENOUS EVERY 6 HOURS PRN
Status: DISCONTINUED | OUTPATIENT
Start: 2021-12-07 | End: 2021-12-13

## 2021-12-07 RX ORDER — DIPHENOXYLATE HYDROCHLORIDE AND ATROPINE SULFATE 2.5; .025 MG/1; MG/1
1 TABLET ORAL 4 TIMES DAILY PRN
Status: DISCONTINUED | OUTPATIENT
Start: 2021-12-07 | End: 2021-12-13

## 2021-12-07 RX ORDER — IPRATROPIUM BROMIDE AND ALBUTEROL SULFATE 2.5; .5 MG/3ML; MG/3ML
3 SOLUTION RESPIRATORY (INHALATION) 4 TIMES DAILY
Status: DISCONTINUED | OUTPATIENT
Start: 2021-12-07 | End: 2021-12-08

## 2021-12-07 RX ORDER — HYDROCODONE BITARTRATE AND ACETAMINOPHEN 10; 325 MG/1; MG/1
1 TABLET ORAL EVERY 6 HOURS PRN
Status: DISCONTINUED | OUTPATIENT
Start: 2021-12-07 | End: 2021-12-13

## 2021-12-07 RX ORDER — TERAZOSIN 5 MG/1
5 CAPSULE ORAL DAILY
Status: DISCONTINUED | OUTPATIENT
Start: 2021-12-07 | End: 2021-12-13

## 2021-12-07 RX ORDER — ALPRAZOLAM 0.5 MG/1
0.5 TABLET ORAL 2 TIMES DAILY PRN
Status: DISCONTINUED | OUTPATIENT
Start: 2021-12-07 | End: 2021-12-13

## 2021-12-07 RX ORDER — METOCLOPRAMIDE HYDROCHLORIDE 5 MG/ML
5 INJECTION INTRAMUSCULAR; INTRAVENOUS EVERY 8 HOURS PRN
Status: DISCONTINUED | OUTPATIENT
Start: 2021-12-07 | End: 2021-12-13

## 2021-12-07 RX ORDER — ATORVASTATIN CALCIUM 20 MG/1
20 TABLET, FILM COATED ORAL NIGHTLY
Status: DISCONTINUED | OUTPATIENT
Start: 2021-12-07 | End: 2021-12-13

## 2021-12-07 RX ORDER — ALBUTEROL SULFATE 90 UG/1
2 AEROSOL, METERED RESPIRATORY (INHALATION) EVERY 6 HOURS PRN
Status: DISCONTINUED | OUTPATIENT
Start: 2021-12-07 | End: 2021-12-13

## 2021-12-07 RX ORDER — ACETAMINOPHEN 325 MG/1
650 TABLET ORAL EVERY 6 HOURS PRN
Status: DISCONTINUED | OUTPATIENT
Start: 2021-12-07 | End: 2021-12-13

## 2021-12-07 RX ORDER — HEPARIN SODIUM 5000 [USP'U]/ML
5000 INJECTION, SOLUTION INTRAVENOUS; SUBCUTANEOUS EVERY 12 HOURS SCHEDULED
Status: DISCONTINUED | OUTPATIENT
Start: 2021-12-07 | End: 2021-12-13

## 2021-12-07 RX ORDER — LEVOTHYROXINE SODIUM 0.12 MG/1
125 TABLET ORAL
Status: DISCONTINUED | OUTPATIENT
Start: 2021-12-08 | End: 2021-12-13

## 2021-12-07 RX ORDER — SODIUM CHLORIDE 9 MG/ML
INJECTION, SOLUTION INTRAVENOUS CONTINUOUS
Status: DISCONTINUED | OUTPATIENT
Start: 2021-12-07 | End: 2021-12-07

## 2021-12-07 RX ORDER — IPRATROPIUM BROMIDE AND ALBUTEROL SULFATE 2.5; .5 MG/3ML; MG/3ML
3 SOLUTION RESPIRATORY (INHALATION) 4 TIMES DAILY
Status: DISCONTINUED | OUTPATIENT
Start: 2021-12-07 | End: 2021-12-07

## 2021-12-07 RX ORDER — LOPERAMIDE HYDROCHLORIDE 2 MG/1
2 CAPSULE ORAL 4 TIMES DAILY PRN
Status: DISCONTINUED | OUTPATIENT
Start: 2021-12-07 | End: 2021-12-13

## 2021-12-07 NOTE — PROGRESS NOTES
Pt here for C20 Keytruda.   Arrives Ambulating with cane from [facility].     Pregnancy screening: Not applicable     Modifications in dose or schedule: No                              Frequency of blood return and site check throughout administration: Prior to ad

## 2021-12-07 NOTE — PROGRESS NOTES
HPI     Billee Guardian is a 64year old male here for f/u of Primary cancer of left upper lobe of lung (hcc)  (primary encounter diagnosis)  Malignant pericardial effusion (hcc)  Brain metastasis (hcc)  Bone metastasis (hcc)  Encounter for immunotherapy Musculoskeletal: Positive for gait problem (uses cane). Negative for arthralgias and back pain. No bone pain   Skin: Negative for rash. Neurological: Positive for gait problem (uses cane). Negative for dizziness and headaches.    Hematological: N Suspension Take 5 mL by mouth 4 (four) times daily before meals and nightly. 480 mL 2   • hydrochlorothiazide 12.5 MG Oral Tab Take 12.5 mg by mouth daily. • Naloxone HCl 4 MG/0.1ML Nasal Liquid 4 mg by Nasal route as needed.  If patient remains unrespo History:   Diagnosis Date   • Aneurysm of abdominal vessel (HCC)    • Aortic valve defect    • Brain cancer [facility])    • Essential hypertension    • Heart attack ([facility]    • Hyperlipidemia    • Lung cancer ([facility]     metastatic adenocarcinoma of the lung     P (hcc)  Encounter for immunotherapy    Patient completed 4 cycles of carboplatin/pemetrexed he was then initiated on maintenance with and pembrolizumab. He had an excellent response to treatment. Pemetrexed and pembrolizumab.   However, given anemia, t mg/dL    Sodium 139 136 - 145 mmol/L    Potassium 3.9 3.5 - 5.1 mmol/L    Chloride 116 (H) 98 - 112 mmol/L    CO2 11.0 (L) 21.0 - 32.0 mmol/L    Anion Gap 12 0 - 18 mmol/L    BUN 90 (H) 7 - 18 mg/dL    Creatinine 4.05 (H) 0.70 - 1.30 mg/dL    BUN/CREA [facility], CT BRAIN (W+WO) (KLX=[id number]), 8/03/2021, 9:17 AM. [facility], PET STANDARD BODY SCAN (BFS=[id number]),   9/09/2021, 8:15 AM.       INDICATIONS: Subsequent treatment strategy.  64year-old male with left upper lobe lung cancer calcified soft tissue focus located beneath the pancreatic body measuring approximately 29 x 50 mm (currently 3.2 max SUV, previously 2.3 max SUV).  Petey Hove is a 20 x 27 mm (currently 2 point max SUV, previously 2.6 max   SUV) periaortic node in the upper re

## 2021-12-07 NOTE — ED QUICK NOTES
Orders for admission, patient is aware of plan and ready to go upstairs. Any questions, please call ED RN Libertad Payan  at extension [phone].    Type of COVID test sent: Rapid  COVID Suspicion level: Low    LOC at time of transport: A&O 4/4

## 2021-12-07 NOTE — CONSULTS
[facility]    Report of Consultation    Date of Admission:  12/7/2021  Date of Consult:  12/7/2021   Reason for Consultation:     OVI and metabolic acidosis     History of Present Illness:   Patient is a 64year old male with pmh of metasta loss and sore throat  Respiratory: negative for cough, hemoptysis and wheezing  Cardiovascular: negative for chest pain, exertional dyspnea  Gastrointestinal: negative for abdominal pain  Genitourinary:negative for dysuria, frequency and hematuria  Hematol Range Status   02/22/2021 30.0 23.2 - 35.3 seconds Final     INR   Date Value Ref Range Status   02/22/2021 1.14 0.90 - 1.20 Final     Comment:     Only the INR (not the Protime value) should be utilized for   the monitoring of oral anticoagulant therapy.

## 2021-12-07 NOTE — ED PROVIDER NOTES
Patient Seen in: [facility] AND [facility] Emergency Department      History   Patient presents with:  Abnormal Result    Stated Complaint: Abnormal labs    Subjective:   HPI    64-year-old male with metastatic lung cancer undergoing immunotherapy presents for e 10\")   Wt 78 kg   SpO2 93%   BMI 24.67 kg/m²         Physical Exam  Vitals and nursing note reviewed. Constitutional:       Appearance: He is well-developed. HENT:      Head: Normocephalic and atraumatic.       Nose: Nose normal.      Mouth/Throat: change compared to prior EKG from July 5, 2021                       [facility]      Vital signs stable in the ED. Patient appears hypovolemic and dehydrated.   Admission disposition: 12/7/2021  3:41 PM       Laboratory studies reviewed with creatinine of 4.05 co

## 2021-12-07 NOTE — ED INITIAL ASSESSMENT (HPI)
SENT FROM CANCER CENTER FOR EVALUATION OF ABNORMAL LABS. PT UNSURE OF WHICH LABS WERE ABNORMAL. C/O FEELING FATIGUE. HISTORY OF LUNG CA WITH METS.

## 2021-12-08 ENCOUNTER — APPOINTMENT (OUTPATIENT)
Dept: ULTRASOUND IMAGING | Facility: HOSPITAL | Age: 76
DRG: 683 | End: 2021-12-08
Attending: INTERNAL MEDICINE
Payer: MEDICARE

## 2021-12-08 PROCEDURE — 99233 SBSQ HOSP IP/OBS HIGH 50: CPT | Performed by: HOSPITALIST

## 2021-12-08 PROCEDURE — 76770 US EXAM ABDO BACK WALL COMP: CPT | Performed by: INTERNAL MEDICINE

## 2021-12-08 PROCEDURE — 99233 SBSQ HOSP IP/OBS HIGH 50: CPT | Performed by: INTERNAL MEDICINE

## 2021-12-08 PROCEDURE — 99222 1ST HOSP IP/OBS MODERATE 55: CPT | Performed by: INTERNAL MEDICINE

## 2021-12-08 RX ORDER — IPRATROPIUM BROMIDE AND ALBUTEROL SULFATE 2.5; .5 MG/3ML; MG/3ML
3 SOLUTION RESPIRATORY (INHALATION)
Status: DISCONTINUED | OUTPATIENT
Start: 2021-12-08 | End: 2021-12-13

## 2021-12-08 RX ORDER — IPRATROPIUM BROMIDE AND ALBUTEROL SULFATE 2.5; .5 MG/3ML; MG/3ML
3 SOLUTION RESPIRATORY (INHALATION) EVERY 6 HOURS PRN
Status: DISCONTINUED | OUTPATIENT
Start: 2021-12-08 | End: 2021-12-13

## 2021-12-08 RX ORDER — POTASSIUM CHLORIDE 20 MEQ/1
20 TABLET, EXTENDED RELEASE ORAL ONCE
Status: COMPLETED | OUTPATIENT
Start: 2021-12-08 | End: 2021-12-08

## 2021-12-08 NOTE — H&P
[facility]    PATIENT'S NAME: Blue Paris   ATTENDING PHYSICIAN: Veronica Mckeon MD   PATIENT ACCOUNT#:   [id number]    LOCATION:  [address] [place] [address] RECORD #:   [id number]       YOB: 1945  ADMISSION DATE:       12/07/20 right cochlear implant. MEDICATIONS:  Please see medication reconciliation list.    ALLERGIES:  Demerol side effects. No known drug allergies. SOCIAL HISTORY:  Ex-tobacco user. No current tobacco, alcohol, or drug use. Lives with his wife.   Kt Solorzano nephrology and hematology/oncology consults. Monitor his electrolytes and kidney function. Follow up on blood cultures. Further recommendations to follow.     Dictated By Tia Colunga MD  d: 12/07/2021 15:26:10  t: 12/07/2021 17:48:13  [facility] [id number]

## 2021-12-08 NOTE — PLAN OF CARE
A&Ox4 , difficulty hearing, R cochlear implant at bedside. General diet. Voiding with a urinal at bedside. Ambulates with an assist and walker. Pt denies pain. Infusing sodium bicarb 75 mEq with Dextrose 5-NaCl 45 at 75ml and 1 g Rocephin for UTI.    Proble ordered  - Evaluate effectiveness of ordered antiemetic medications  - Provide nonpharmacologic comfort measures as appropriate  - Advance diet as tolerated, if ordered  - Obtain nutritional consult as needed  - Evaluate fluid balance  Outcome: Progressing

## 2021-12-08 NOTE — CONSULTS
Oncology Consultation Note    Patient Name: Aurora Glass   YOB: 1945   Medical Record Number: [id number]   CSN: [id number]   Consulting Physician: Tashia Arceo MD  Referring Physician(s): Addie Russ MD  Date of Consultation: 12/8/2021 Marital status:       Spouse name: Not on file      Number of children: Not on file      Years of education: Not on file      Highest education level: Not on file    Occupational History      Not on file    Tobacco Use      Smoking status: Former °F (36.7 °C) (Oral)   Resp 18   Ht 1.778 m (5' 10\")   Wt 78 kg (171 lb 15.3 oz)   SpO2 97%   BMI 24.67 kg/m²     Physical Examination:    General: Patient is alert and oriented x 3, not in acute distress.   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

## 2021-12-08 NOTE — PROGRESS NOTES
[facility]    Progress Note    Kallie Gill Patient Status:  Inpatient    1945 MRN [id number]   Location [facility] 4W/SW/SE Attending Luis Slaughter MD   Hosp Day # 1 PCP Tori Tiwari MD     Subjective:   Subjective defect -consider left ventricular hypertrophy.  -Left atrial enlargement.  -Old inferior infarct.  - Nonspecific T-abnormality.  ABNORMAL When compared with ECG of 07/05/2021 12:20:47 no pvcs Electronically signed on 12/07/2021 at 15:02 by Sondra Almanzar

## 2021-12-08 NOTE — PROGRESS NOTES
[facility]  Nephrology Daily Progress Note    Brandan Freire  [id number]  64year old      HPI:   Brandan Freire is a 64year old male. States he feels OK. No CP or SOB. Eating and drinking OK.        ROS:     Constitutional:  Negative 6.8 12/08/2021    HGB 9.0 12/08/2021    HCT 28.1 12/08/2021    .0 12/08/2021    CREATSERUM 3.99 12/08/2021    BUN 93 12/08/2021     12/08/2021    K 3.6 12/08/2021     12/08/2021    CO2 14.0 12/08/2021     12/08/2021    CA 7.8 12/0 drop, Both Eyes, QID PRN  •  diphenoxylate-atropine (LOMOTIL) 2.5-0.025 MG per tab 1 tablet, 1 tablet, Oral, QID PRN  •  fluticasone-umeclidin-vilant (TRELEGY ELLIPTA) 100-62.5-25 MCG/INH inhaler 1 puff, 1 puff, Inhalation, Daily  •  HYDROcodone-acetaminop

## 2021-12-08 NOTE — PLAN OF CARE
Admitted to 471 from ER. Oriented to room and safety precautions. Pts wife at bedside. Sodium bicarb infusing per orders. Pt voided 200mL, PVR 51mL. Walking stand-by assist with walker. Denies pain.  Bed in lowest position, call light in reach, fall precaut

## 2021-12-09 PROCEDURE — 99232 SBSQ HOSP IP/OBS MODERATE 35: CPT | Performed by: STUDENT IN AN ORGANIZED HEALTH CARE EDUCATION/TRAINING PROGRAM

## 2021-12-09 PROCEDURE — 99233 SBSQ HOSP IP/OBS HIGH 50: CPT | Performed by: INTERNAL MEDICINE

## 2021-12-09 PROCEDURE — 99233 SBSQ HOSP IP/OBS HIGH 50: CPT | Performed by: HOSPITALIST

## 2021-12-09 RX ORDER — POTASSIUM CHLORIDE 20 MEQ/1
40 TABLET, EXTENDED RELEASE ORAL ONCE
Status: COMPLETED | OUTPATIENT
Start: 2021-12-09 | End: 2021-12-09

## 2021-12-09 NOTE — PROGRESS NOTES
[facility] AT [facility]  Inpatient Progress Note    Carmen Mckeon Patient Status:  Inpatient    1945 MRN [id number]   Location [facility] 4W/SW/SE Attending Monserrat Beauchamp MD   Hosp Day # 2 PCP MD Keila Schaffer 0.2-0. 2-1 % ophthalmic solution 1 drop, 1 drop, Both Eyes, QID PRN  diphenoxylate-atropine (LOMOTIL) 2.5-0.025 MG per tab 1 tablet, 1 tablet, Oral, QID PRN  fluticasone-umeclidin-vilant (TRELEGY ELLIPTA) 100-62.5-25 MCG/INH inhaler 1 puff, 1 puff, Inhalati improving, but would possibly expect a more significant improvement in the setting of prerenal OVI. We will have a low threshold to start steroids with prednisone 1 mg/kg, will discuss with nephrology.     [facility] high [place], [id number] [address]. Shazia Patricia.

## 2021-12-09 NOTE — PROGRESS NOTES
[facility]    Progress Note      Subjective:     Patient lying comfortably -no sob or chest pain or NV    Review of Systems:     Constitutional: negative for fatigue, fevers and weight loss  Eyes: negative for irritation, redness and visu injection 5,000 Units, 5,000 Units, Subcutaneous, 2 times per day  acetaminophen (TYLENOL) tab 650 mg, 650 mg, Oral, Q6H PRN  ondansetron (ZOFRAN) injection 4 mg, 4 mg, Intravenous, Q6H PRN  metoclopramide (REGLAN) injection 5 mg, 5 mg, Intravenous, Q8H NJ 02/22/2021 30.0 23.2 - 35.3 seconds Final     INR   Date Value Ref Range Status   02/22/2021 1.14 0.90 - 1.20 Final     Comment:     Only the INR (not the Protime value) should be utilized for   the monitoring of oral anticoagulant therapy.      Recommend another 24-48 hrs to see how Cr is responding as its down trending currently.       Loc Arredondo MD  12/9/2021

## 2021-12-09 NOTE — PLAN OF CARE
Patient alert and oriented x4. Hard of hearing. General diet, tolerating well. Up with standby assist with walker. Voiding freely. Continuing IV fluids and antibiotics. Call light in reach. Frequent rounding performed.      Problem: Patient Centered Care  G Evaluate effectiveness of ordered antiemetic medications  - Provide nonpharmacologic comfort measures as appropriate  - Advance diet as tolerated, if ordered  - Obtain nutritional consult as needed  - Evaluate fluid balance  Outcome: Not Progressing  Goal: results as appropriate  - Fluid restriction as ordered  - Instruct patient on fluid and nutrition restrictions as appropriate  Outcome: Not Progressing  Goal: Hemodynamic stability and optimal renal function maintained  Description: INTERVENTIONS:  - Monit

## 2021-12-09 NOTE — PROGRESS NOTES
[facility]    Progress Note    Joseph Bonds Patient Status:  Inpatient    1945 MRN [id number]   Location [facility] 4W/SW/SE Attending Farzaneh Cowart MD   Hosp Day # 2 PCP Anamika Livingston MD     Subjective:   Subjective Rhythm -Intraventricular conduction defect -consider left ventricular hypertrophy.  -Left atrial enlargement.  -Old inferior infarct.  - Nonspecific T-abnormality.  ABNORMAL When compared with ECG of 07/05/2021 12:20:47 no pvcs Electronically signed on 12/0

## 2021-12-09 NOTE — PLAN OF CARE
Problem: Patient Centered Care  Goal: Patient preferences are identified and integrated in the patient's plan of care  Description: Interventions:  - What would you like us to know as we care for you?  I'm suppose to have immunotherapy today  - Provide ti treatment plan  12/9/2021 0509 by Joanie Olmedo RN  Outcome: Progressing  12/9/2021 0509 by Joanie Olmedo RN  Outcome: Progressing  12/9/2021 0509 by Joanie Olmedo RN  Outcome: Progressing     Problem: GASTROINTESTINAL - ADULT  Goal: Afua Ledesma by Maricarmen Eaton, RN  Outcome: Progressing  12/9/2021 0509 by Maricarmen Marroquin RN  Outcome: Progressing  12/9/2021 0509 by Maricarmen Marroquin RN  Outcome: Progressing     Problem: GENITOURINARY - ADULT  Goal: Absence of urinary retention  Descr appropriate  12/9/2021 0509 by Laura Heath, RN  Outcome: Progressing  12/9/2021 0509 by Laura Heath, RN  Outcome: Progressing     Patient is alert and oriented x 4. Vitals stable. On room air during day. Wears CPAP at night.  [place] - right brit

## 2021-12-09 NOTE — DIETARY NOTE
ADULT NUTRITION INITIAL ASSESSMENT    Pt is at high nutrition risk. Pt meets severe malnutrition criteria.       CRITERIA FOR MALNUTRITION DIAGNOSIS:  Criteria for severe malnutrition diagnosis: acute illness/injury related to wt loss greater than 5% in 1 Meeting Needs: No, even with oral nutrition supplements (ONS) ordered  Percent Meals Eaten (last 3 days)     Date/Time Percent Meals Eaten (%)    12/08/21 1000 80 %    12/08/21 1513 75 %    12/08/21 1812 75 %    12/09/21 0820 90 %         Food Allergies: N 11.2 oz)  11/22/21 : 82.1 kg (181 lb)  11/16/21 : 82.6 kg (182 lb)  11/16/21 : 82.6 kg (182 lb 3.2 oz)  11/01/21 : 81.2 kg (179 lb)  10/26/21 : 81.2 kg (179 lb)  10/05/21 : 81.6 kg (180 lb)  10/05/21 : 81.6 kg (180 lb)    NUTRITION DIAGNOSIS/PROBLEM:  Maln improved GI status    DIETITIAN FOLLOW UP: RD to follow and monitor nutrition status      Gia Brewer, [address] [place], C/ Willie Troncoso

## 2021-12-10 PROCEDURE — 99233 SBSQ HOSP IP/OBS HIGH 50: CPT | Performed by: INTERNAL MEDICINE

## 2021-12-10 PROCEDURE — 99233 SBSQ HOSP IP/OBS HIGH 50: CPT | Performed by: STUDENT IN AN ORGANIZED HEALTH CARE EDUCATION/TRAINING PROGRAM

## 2021-12-10 RX ORDER — MAGNESIUM SULFATE 1 G/100ML
1 INJECTION INTRAVENOUS ONCE
Status: COMPLETED | OUTPATIENT
Start: 2021-12-10 | End: 2021-12-10

## 2021-12-10 RX ORDER — POTASSIUM CHLORIDE 20 MEQ/1
40 TABLET, EXTENDED RELEASE ORAL ONCE
Status: COMPLETED | OUTPATIENT
Start: 2021-12-10 | End: 2021-12-10

## 2021-12-10 NOTE — PROGRESS NOTES
[facility]    Progress Note      Subjective:     Patient lying comfortably -no sob or chest pain or NV    Review of Systems:     Constitutional: negative for fatigue, fevers and weight loss  Eyes: negative for irritation, redness and visu mg, 650 mg, Oral, Q6H PRN  ondansetron (ZOFRAN) injection 4 mg, 4 mg, Intravenous, Q6H PRN  metoclopramide (REGLAN) injection 5 mg, 5 mg, Intravenous, Q8H PRN  sodium bicarbonate 75 mEq in Dextrose-NaCl 5-0.45 % 1,000 mL infusion, 50 mL/hr, Intravenous, Co 0.3  --   --   --   --    TP 8.4*  --   --   --   --     < > = values in this interval not displayed.      PTT   Date Value Ref Range Status   02/22/2021 30.0 23.2 - 35.3 seconds Final     INR   Date Value Ref Range Status   02/22/2021 1.14 0.90 - 1.20 Sandra

## 2021-12-10 NOTE — SLP NOTE
SPEECH PATHOLOGY NOTE:    Order received, chart reviewed. Attempted to see pt for bedside swallow evaluation 2/2 c/o Globus sensation inconsistent.   Pt sleeping soundly at the time with RN kindly requesting to defer this assessment until 12/10 when pt keon

## 2021-12-10 NOTE — PROGRESS NOTES
[facility] AT [facility]emat[facility]On[facility]  Inpatient Progress Note    Bekah Collins Patient Status:  Inpatient    1945 MRN [id number]   Location [facility] 4W/SW/SE Attending Bhaskar Wong MD   Hosp Day # 3 PCP MD Silvana Garcia (ARTIFICAL TEARS) 0.2-0.2-1 % ophthalmic solution 1 drop, 1 drop, Both Eyes, QID PRN  diphenoxylate-atropine (LOMOTIL) 2.5-0.025 MG per tab 1 tablet, 1 tablet, Oral, QID PRN  fluticasone-umeclidin-vilant (TRELEGY ELLIPTA) 100-62.5-25 MCG/INH inhaler 1 puff improving, but would possibly expect a more significant improvement in the setting of prerenal OVI.   I am inclined to start prednisone 1mg/kg today, but after an extensive conversation with the patient and family we will continue IVF and hold off on steroi

## 2021-12-10 NOTE — PLAN OF CARE
Pt A/O x4, forgetful at times. Hard of hearing. Zofran & Reglan PRN for nausea. IVF continued. IV abx initiated. Imodium for diarrhea. Ambulating with assist. UA. Wife at bedside aware of care of plan. Fall precautions in place. Call light within reach.  C adequate hydration with IV or PO as ordered and tolerated  - Nasogastric tube to low intermittent suction as ordered  - Evaluate effectiveness of ordered antiemetic medications  - Provide nonpharmacologic comfort measures as appropriate  - Advance diet as replacement as ordered  - Monitor response to electrolyte replacements, including rhythm and repeat lab results as appropriate  - Fluid restriction as ordered  - Instruct patient on fluid and nutrition restrictions as appropriate  Outcome: Gianfranco Mosley pt to call for assistance with activity based on assessment  - Modify environment to reduce risk of injury  - Provide assistive devices as appropriate  - Consider OT/PT consult to assist with strengthening/mobility  - Encourage toileting schedule  Outcome:

## 2021-12-10 NOTE — PLAN OF CARE
Problem: Patient Centered Care  Goal: Patient preferences are identified and integrated in the patient's plan of care  Description: Interventions:  - What would you like us to know as we care for you?  I'm suppose to have immunotherapy today  - Provide ti Progressing  Goal: Maintains or returns to baseline bowel function  Description: INTERVENTIONS:  - Assess bowel function  - Maintain adequate hydration with IV or PO as ordered and tolerated  - Evaluate effectiveness of GI medications  - Encourage mobiliza Monitor labs and assess for signs and symptoms of volume excess or deficit  - Monitor intake, output and patient weight  - Monitor urine specific gravity, serum osmolarity and serum sodium as indicated or ordered  - Monitor response to interventions for pa

## 2021-12-10 NOTE — PROGRESS NOTES
[facility]    Progress Note    Saminaluis angel Vidaleshillary Patient Status:  Inpatient    1945 MRN [id number]   Location [facility] 4W/SW/SE Attending Nicole Mclain MD   Hosp Day # 3 PCP Kaye Vergara MD     Subjective:   Subjective mediated nephritis  -Creatinine with mild improvement  -Currently on bicarb drip appreciate nephrology recommendations  -Metabolic acidosis with mild improvement  -We will continue monitor closely    Acute UTI  -UA reviewed  -on IV Rocephin  -We will await

## 2021-12-10 NOTE — SLP NOTE
ADULT SWALLOWING EVALUATION    ASSESSMENT    ASSESSMENT/OVERALL IMPRESSION:  PPE REQUIRED. THIS THERAPIST WORE GLOVES, DROPLET MASK, AND GOGGLES FOR DURATION OF EVALUATION. HANDS WASHED UPON ENTRANCE/EXIT. SLP BSSE orders received and acknowledged.  RONAL aguayo evaluation. Extensive education completed and recommendations remain on whiteboard. Pt and pt's RN v/u to all recommendations and POC. PLAN: SLP to f/u x2 meal assessments, monitor imaging, and VFSS if clinically warranted.      RECOMMENDATIONS   Diet R Quality: Clear  Respiratory Status: Unlabored  Consistencies Trialed: Thin liquids;Puree; Soft solid;Hard solid  Method of Presentation: Self presentation;Spoon;Cup;Straw;Single sips  Patient Positioning: Upright;Midline    Oral Phase of Swallow:  Within [facility]

## 2021-12-11 PROCEDURE — 99233 SBSQ HOSP IP/OBS HIGH 50: CPT | Performed by: HOSPITALIST

## 2021-12-11 PROCEDURE — 99233 SBSQ HOSP IP/OBS HIGH 50: CPT | Performed by: INTERNAL MEDICINE

## 2021-12-11 PROCEDURE — 99232 SBSQ HOSP IP/OBS MODERATE 35: CPT | Performed by: STUDENT IN AN ORGANIZED HEALTH CARE EDUCATION/TRAINING PROGRAM

## 2021-12-11 RX ORDER — MAGNESIUM OXIDE 400 MG (241.3 MG MAGNESIUM) TABLET
400 TABLET ONCE
Status: COMPLETED | OUTPATIENT
Start: 2021-12-11 | End: 2021-12-11

## 2021-12-11 RX ORDER — PANTOPRAZOLE SODIUM 40 MG/1
40 TABLET, DELAYED RELEASE ORAL
Status: DISCONTINUED | OUTPATIENT
Start: 2021-12-12 | End: 2021-12-13

## 2021-12-11 RX ORDER — POTASSIUM CHLORIDE 20 MEQ/1
20 TABLET, EXTENDED RELEASE ORAL ONCE
Status: COMPLETED | OUTPATIENT
Start: 2021-12-11 | End: 2021-12-11

## 2021-12-11 RX ORDER — MELATONIN
2000 DAILY
Status: DISCONTINUED | OUTPATIENT
Start: 2021-12-11 | End: 2021-12-13

## 2021-12-11 RX ORDER — DOCUSATE SODIUM 100 MG/1
100 CAPSULE, LIQUID FILLED ORAL 2 TIMES DAILY
Status: DISCONTINUED | OUTPATIENT
Start: 2021-12-11 | End: 2021-12-13

## 2021-12-11 RX ORDER — POTASSIUM CHLORIDE 20 MEQ/1
40 TABLET, EXTENDED RELEASE ORAL ONCE
Status: COMPLETED | OUTPATIENT
Start: 2021-12-11 | End: 2021-12-11

## 2021-12-11 NOTE — PROGRESS NOTES
[facility] AT [facility]ematology/Oncology Group  Inpatient Progress Note    Loryirlanda Badillobalwinder Patient Status:  Inpatient    1945 MRN [id number]   Location [facility] 4W/SW/SE Attending Jolyn Libman, MD   Hosp Day # 4 PCP MD Martin Perez tab 0.5 mg, 0.5 mg, Oral, BID PRN  atorvastatin (LIPITOR) tab 20 mg, 20 mg, Oral, Nightly  glycerin-hypromellose- (ARTIFICAL TEARS) 0.2-0.2-1 % ophthalmic solution 1 drop, 1 drop, Both Eyes, QID PRN  diphenoxylate-atropine (LOMOTIL) 2.5-0.025 MG per in the setting of dehydration vs possible immune mediated nephritis. Renal function only marginally improved, will start prednisone today. Appreciate nephrology assistance.      [facility] high    Ceci Guard, [id number] [address] Shazia Patricia. Hematology/Oncology Group

## 2021-12-11 NOTE — PLAN OF CARE
No acute changes overnight. No complaints of pain. No complaints of nausea/vomiting. IVF running. Tolerating diet without nausea/vomiting. Cpap worn for most of the night. Up x1 assist and walker. Voiding freely. Will continue to monitor.        Problem: P Nasogastric tube to low intermittent suction as ordered  - Evaluate effectiveness of ordered antiemetic medications  - Provide nonpharmacologic comfort measures as appropriate  - Advance diet as tolerated, if ordered  - Obtain nutritional consult as needed replacements, including rhythm and repeat lab results as appropriate  - Fluid restriction as ordered  - Instruct patient on fluid and nutrition restrictions as appropriate  Outcome: Progressing  Goal: Hemodynamic stability and optimal renal function mainta assessment  - Modify environment to reduce risk of injury  - Provide assistive devices as appropriate  - Consider OT/PT consult to assist with strengthening/mobility  - Encourage toileting schedule  Outcome: Progressing     Problem: [address] Cespedes

## 2021-12-11 NOTE — PROGRESS NOTES
[facility] - [facility]    Progress Note      Subjective:     Patient lying comfortably -no sob or chest pain or NV    Wife at bedside     Review of Systems:     Constitutional: negative for fatigue, fevers and weight loss  Eyes: negative for irritati nebulizer solution 3 mL, 3 mL, Nebulization, Q6H PRN  Heparin Sodium (Porcine) 5000 UNIT/ML injection 5,000 Units, 5,000 Units, Subcutaneous, 2 times per day  acetaminophen (TYLENOL) tab 650 mg, 650 mg, Oral, Q6H PRN  ondansetron (ZOFRAN) injection 4 mg, 4 --   --   --  68   AST 24  --   --   --  27   ALT 47  --   --   --  45   BILT 0.3  --   --   --  0.3   TP 8.4*  --   --   --  7.5    < > = values in this interval not displayed.      PTT   Date Value Ref Range Status   02/22/2021 30.0 23.2 - 35.3 seconds Fi improvement in renal function then likely won't be able to get [facility] - will reassess and rediscuss pending response  - defer kidney biopsy for limited functional status and will get response with prednisone trial  - start vitamin D and PPI  - check urin

## 2021-12-11 NOTE — PROGRESS NOTES
[facility]    Progress Note    Leilani Morales Patient Status:  Inpatient    1945 MRN [id number]   Location [facility] 4W/SW/SE Attending Larry Morse MD   Hosp Day # 4 PCP Caryn Pedroza MD     Subjective:   Subjective nephritis  -Creatinine with mild improvement  -Currently on bicarb drip appreciate nephrology recommendations  -Metabolic acidosis with mild improvement  -We will continue monitor closely    Acute UTI  -UA reviewed  -on IV Rocephin  -We will await finaliza

## 2021-12-12 PROCEDURE — 99233 SBSQ HOSP IP/OBS HIGH 50: CPT | Performed by: HOSPITALIST

## 2021-12-12 PROCEDURE — 99233 SBSQ HOSP IP/OBS HIGH 50: CPT | Performed by: INTERNAL MEDICINE

## 2021-12-12 RX ORDER — MAGNESIUM OXIDE 400 MG (241.3 MG MAGNESIUM) TABLET
400 TABLET ONCE
Status: COMPLETED | OUTPATIENT
Start: 2021-12-12 | End: 2021-12-12

## 2021-12-12 RX ORDER — POTASSIUM CHLORIDE 20 MEQ/1
40 TABLET, EXTENDED RELEASE ORAL ONCE
Status: COMPLETED | OUTPATIENT
Start: 2021-12-12 | End: 2021-12-12

## 2021-12-12 NOTE — CDS QUERY
How to answer this Query:  1.) Click \"Edit button\" on the toolbar.  2.) Type an \"X\" in the bracket for the diagnosis that applies. (You may also add additional clinical details as you feel necessary to substantiate your response).    3.) Finally click \ each) Daily Vanilla or Chocolate. Rational/use of oral supplements discussed.   - Vitamin and mineral supplements: none  - Feeding assistance: meal set up  - Nutrition education: not appropriate at this time    Other:SEE BRIGIDA JUNG FOR FURTHER DETAILS    If yo

## 2021-12-12 NOTE — PROGRESS NOTES
[facility] - [facility]    Progress Note      Subjective:     C/o nausea for last 2-3 days    S/p BM yesterday     No abd.pain or diarrhea     Extreme [place]    Review of Systems:     Constitutional: negative for fatigue, fevers and weight loss  Eyes: ne Sodium (ROCEPHIN) 2 g in sodium chloride 0.9% 100 mL IVPB-ADDV, 2 g, Intravenous, Q24H  ipratropium-albuterol (DUONEB) nebulizer solution 3 mL, 3 mL, Nebulization, TID  ipratropium-albuterol (DUONEB) nebulizer solution 3 mL, 3 mL, Nebulization, Q6H PRN  He CREATSERUM 4.05*   < > 3.71*   < > 3.46*  --  3.37*  --  3.29*   GFRAA 15*   < > 17*   < > 18*  --  19*  --  20*   GFRNAA 13*   < > 15*   < > 16*  --  17*  --  17*   CA 7.8*   < > 7.6*   < > 7.1*  --  7.1*  --  6.8*   ALB 2.8*  --  2.5*  --   --   --  2. BUN/Cr was 32/1.22 mg/dl in November with an eGFR 56 and baseline around 1.0 mg/dl with an eGFR 71 ml/min  - UA mild protein with WBC- moderate blood with no RBC - CS negtive  - CPK negative  - urine eos +ve - concern for immune mediated npehritis from [place]

## 2021-12-12 NOTE — PLAN OF CARE
Alert and oriented x 4. Room air. Ambulates to bathroom and chair. Pt reports feeling nauseous after medications and breakfast. Unrelieved by zofran. Patient denies pain. Urine culture specimen sent to lab. Magnesium and potassium replaced.    Right ABI co intermittent suction as ordered  - Evaluate effectiveness of ordered antiemetic medications  - Provide nonpharmacologic comfort measures as appropriate  - Advance diet as tolerated, if ordered  - Obtain nutritional consult as needed  - Evaluate fluid shannan rhythm and repeat lab results as appropriate  - Fluid restriction as ordered  - Instruct patient on fluid and nutrition restrictions as appropriate  Outcome: Progressing  Goal: Hemodynamic stability and optimal renal function maintained  Description: INTER to reduce risk of injury  - Provide assistive devices as appropriate  - Consider OT/PT consult to assist with strengthening/mobility  - Encourage toileting schedule  Outcome: Progressing     Problem: DISCHARGE PLANNING  Goal: Discharge to home or other fac

## 2021-12-12 NOTE — PLAN OF CARE
Alert and oriented x 4. Room air. IV antibiotics refused. Ambulates with walker. Hard of Hearing with R cochlear implant. Trending creatinine. Reports constipation. Colace given. Fall precautions in place.      Problem: Patient Centered Care  Goal: Patient ordered antiemetic medications  - Provide nonpharmacologic comfort measures as appropriate  - Advance diet as tolerated, if ordered  - Obtain nutritional consult as needed  - Evaluate fluid balance  Outcome: Progressing  Goal: Maintains or returns to basel restriction as ordered  - Instruct patient on fluid and nutrition restrictions as appropriate  Outcome: Progressing  Goal: Hemodynamic stability and optimal renal function maintained  Description: INTERVENTIONS:  - Monitor labs and assess for signs and sym as appropriate  - Consider OT/PT consult to assist with strengthening/mobility  - Encourage toileting schedule  Outcome: Progressing     Problem: DISCHARGE PLANNING  Goal: Discharge to home or other facility with appropriate resources  Description: Farhana Ba

## 2021-12-12 NOTE — PROGRESS NOTES
[facility]    Progress Note    Parker Brown Patient Status:  Inpatient    1945 MRN [id number]   Location [facility] 4W/SW/SE Attending Dhruv Blackman MD   Hosp Day # 5 PCP Edd Martinez MD     Subjective:   Subjective (acute kidney injury) ([facility] [address]  -Likely prerenal vs immune mediated nephritis  -Creatinine with mild improvement  -Currently on bicarb drip appreciate nephrology recommendations  -Metabolic acidosis with mild improvement  -We will continue monitor closely

## 2021-12-12 NOTE — PLAN OF CARE
Demetra Lozano is alert and oriented. [place], with R cochlear implant. CPAP at night. Voiding. Denied pain and nausea. Repeat potassium 3.6. Pt is having an increasingly hard time communicating, his hearing aids are not working.      Problem: Patient Centered Care effectiveness of ordered antiemetic medications  - Provide nonpharmacologic comfort measures as appropriate  - Advance diet as tolerated, if ordered  - Obtain nutritional consult as needed  - Evaluate fluid balance  Outcome: Progressing  Goal: Maintains or Fluid restriction as ordered  - Instruct patient on fluid and nutrition restrictions as appropriate  Outcome: Progressing  Goal: Hemodynamic stability and optimal renal function maintained  Description: INTERVENTIONS:  - Monitor labs and assess for signs a devices as appropriate  - Consider OT/PT consult to assist with strengthening/mobility  - Encourage toileting schedule  Outcome: Progressing     Problem: DISCHARGE PLANNING  Goal: Discharge to home or other facility with appropriate resources  Description:

## 2021-12-13 VITALS
DIASTOLIC BLOOD PRESSURE: 58 MMHG | SYSTOLIC BLOOD PRESSURE: 140 MMHG | HEART RATE: 69 BPM | BODY MASS INDEX: 24.61 KG/M2 | TEMPERATURE: 98 F | WEIGHT: 171.94 LBS | HEIGHT: 70 IN | OXYGEN SATURATION: 97 % | RESPIRATION RATE: 16 BRPM

## 2021-12-13 PROCEDURE — 99233 SBSQ HOSP IP/OBS HIGH 50: CPT | Performed by: INTERNAL MEDICINE

## 2021-12-13 PROCEDURE — 99239 HOSP IP/OBS DSCHRG MGMT >30: CPT | Performed by: HOSPITALIST

## 2021-12-13 RX ORDER — PREDNISONE 1 MG/1
35 TABLET ORAL 2 TIMES DAILY
Qty: 210 TABLET | Refills: 0 | Status: SHIPPED | OUTPATIENT
Start: 2021-12-13 | End: 2021-12-17

## 2021-12-13 RX ORDER — CEFUROXIME AXETIL 500 MG/1
250 TABLET ORAL 2 TIMES DAILY
Qty: 3 TABLET | Refills: 0 | Status: SHIPPED | OUTPATIENT
Start: 2021-12-13 | End: 2021-12-13

## 2021-12-13 RX ORDER — PANTOPRAZOLE SODIUM 40 MG/1
40 TABLET, DELAYED RELEASE ORAL
Qty: 14 TABLET | Refills: 0 | Status: SHIPPED | OUTPATIENT
Start: 2021-12-14 | End: 2021-12-28

## 2021-12-13 RX ORDER — CHOLECALCIFEROL (VITAMIN D3) 25 MCG
2000 TABLET ORAL DAILY
Qty: 60 TABLET | Refills: 0 | Status: SHIPPED | OUTPATIENT
Start: 2021-12-14

## 2021-12-13 RX ORDER — CEFUROXIME AXETIL 500 MG/1
250 TABLET ORAL 2 TIMES DAILY
Qty: 5 TABLET | Refills: 0 | Status: SHIPPED | OUTPATIENT
Start: 2021-12-13 | End: 2021-12-18

## 2021-12-13 NOTE — DISCHARGE SUMMARY
[facility]  Discharge Summary    Leilani Morales Patient Status:  Inpatient    1945 MRN [id number]   Location [facility] 4W/SW/SE Attending Larry Morse MD   [facility] Day # 6 PCP Caryn Pedroza MD     Date of Admission: 2021    D Today, he had blood test prior to his chemoimmunotherapy infusion and was noted to have GFR of 13 which is way below his baseline. His kidney function was normal on November 16. BUN and creatinine of 90 and 4.09, chloride 116, bicarb 11, glucose 126.   CB breakfast.  Qty: 14 tablet Refills: 0    predniSONE 5 MG Oral Tab  Take 7 tablets (35 mg total) by mouth 2 (two) times a day for 15 days.   Qty: 210 tablet Refills: 0    Vitamin D3, Cholecalciferol, 25 MCG Oral Tab  Take 2 tablets (2,000 Units total) by yaron Albuterol Sulfate  (90 Base) MCG/ACT Inhalation Aero Soln  Inhale 2 puffs into the lungs every 6 (six) hours as needed for Wheezing or Shortness of Breath.   Qty: 2 Inhaler Refills: 5  Associated Diagnoses:Chronic obstructive pulmonary disease, unspe tablet (8 mg total) by mouth every 8 (eight) hours as needed for Nausea. Qty: 30 tablet Refills: 3  Associated Diagnoses:Primary cancer of left upper lobe of lung ([facility] [address]  Medication monitoring encounter    dexamethasone (DECADRON) 4 MG tablet  Take 1 table

## 2021-12-13 NOTE — PLAN OF CARE
Problem: Patient Centered Care  Goal: Patient preferences are identified and integrated in the patient's plan of care  Description: Interventions:  - What would you like us to know as we care for you?  I'm suppose to have immunotherapy today  - Provide ti Progressing  Goal: Maintains or returns to baseline bowel function  Description: INTERVENTIONS:  - Assess bowel function  - Maintain adequate hydration with IV or PO as ordered and tolerated  - Evaluate effectiveness of GI medications  - Encourage mobiliza Monitor labs and assess for signs and symptoms of volume excess or deficit  - Monitor intake, output and patient weight  - Monitor urine specific gravity, serum osmolarity and serum sodium as indicated or ordered  - Monitor response to interventions for pa appropriate resources  Description: INTERVENTIONS:  - Identify barriers to discharge w/pt and caregiver  - Include patient/family/discharge partner in discharge planning  - Arrange for needed discharge resources and transportation as appropriate  - Identif

## 2021-12-13 NOTE — PLAN OF CARE
A/O x 4. Rocephin given via port. Ambulating with cane. Labs improving. Ok to discharge home today. Discharge instructions explained to pt and wife, all questions answered, verbalized understanding.

## 2021-12-13 NOTE — PROGRESS NOTES
[facility]    Progress Note      Subjective:     Feels better this am     Extremely [place]     Review of Systems:     Constitutional: negative for fatigue, fevers and weight loss  Eyes: negative for irritation, redness and visual disturbance nebulizer solution 3 mL, 3 mL, Nebulization, TID  ipratropium-albuterol (DUONEB) nebulizer solution 3 mL, 3 mL, Nebulization, Q6H PRN  Heparin Sodium (Porcine) 5000 UNIT/ML injection 5,000 Units, 5,000 Units, Subcutaneous, 2 times per day  acetaminophen (T GFRNAA 13*   < > 15*   < > 17*  --   --  17* 20*   CA 7.8*   < > 7.6*   < > 7.1*  --   --  6.8* 7.1*   ALB 2.8*  --  2.5*  --  2.5*  --   --   --   --       < > 144   < > 145  --   --  142 143   K 3.9   < > 3.2*   < > 3.0*   < > 3.6 3.6 4.0   CL 11 baseline around 1.0 mg/dl with an eGFR 71 ml/min  - UA mild protein with WBC- moderate blood with no RBC - CS negtive  - CPK negative  - urine eos +ve - concern for immune mediated npehritis from [facility].  repeat UA still +ve WBC  -  started patient on pema

## 2021-12-13 NOTE — CM/SW NOTE
BPCI-Advanced Medicare Program Note:  Plan of care reviewed for care coordination and discharge planning. Noted patient falls under  BPCI/Medicare program, with  for renal failure. BRENDA tool was used to help determine next care setting.  Thus, [address]

## 2021-12-14 ENCOUNTER — PATIENT OUTREACH (OUTPATIENT)
Dept: CASE MANAGEMENT | Age: 76
End: 2021-12-14

## 2021-12-14 NOTE — PROGRESS NOTES
NCM placed call to patient for TCM, LM requesting a call to [phone] back with a condition update. PT SLEEPING AT THIS TIME. FAMILY REQUESTING THAT VITAL SIGNS BE SKIPPED AT
THIS TIME. RESPIRATIONS EVEN AND NON LABORED. CALL LIGHT IN REACH, WILL
CONTINUE WITH PLAN OF CARE.

## 2021-12-15 ENCOUNTER — HOSPITAL ENCOUNTER (OUTPATIENT)
Dept: CT IMAGING | Facility: HOSPITAL | Age: 76
Discharge: HOME | End: 2021-12-15
Attending: RADIOLOGY
Payer: MEDICARE

## 2021-12-15 ENCOUNTER — LAB ENCOUNTER (OUTPATIENT)
Dept: LAB | Facility: HOSPITAL | Age: 76
End: 2021-12-15
Attending: RADIOLOGY
Payer: MEDICARE

## 2021-12-15 ENCOUNTER — TELEPHONE (OUTPATIENT)
Dept: HEMATOLOGY/ONCOLOGY | Facility: HOSPITAL | Age: 76
End: 2021-12-15

## 2021-12-15 DIAGNOSIS — C79.31 BRAIN METASTASIS (HCC): ICD-10-CM

## 2021-12-15 DIAGNOSIS — N17.9 AKI (ACUTE KIDNEY INJURY) (HCC): ICD-10-CM

## 2021-12-15 PROCEDURE — 80048 BASIC METABOLIC PNL TOTAL CA: CPT

## 2021-12-15 PROCEDURE — 36415 COLL VENOUS BLD VENIPUNCTURE: CPT

## 2021-12-15 PROCEDURE — 70450 CT HEAD/BRAIN W/O DYE: CPT | Performed by: RADIOLOGY

## 2021-12-15 NOTE — PROGRESS NOTES
LM for pt to call [facility] for TCM since discharge. [facility] phone number was provided for pt to call back.

## 2021-12-15 NOTE — TELEPHONE ENCOUNTER
Called Polly, there was some confusion about labs be able to be done at [facility] or [facility]. Let her know that an apt would need to be made to do labs from his port at the [facility] or he would need to have peripheral at [facility] lab.   She verbalizes unders

## 2021-12-15 NOTE — TELEPHONE ENCOUNTER
Polly stopped by the  and had some questions about Saratha Smiling and blood work. She asked that she get a call back to go over a few things about this.

## 2021-12-17 ENCOUNTER — TELEPHONE (OUTPATIENT)
Dept: NEPHROLOGY | Facility: CLINIC | Age: 76
End: 2021-12-17

## 2021-12-17 ENCOUNTER — OFFICE VISIT (OUTPATIENT)
Dept: NEPHROLOGY | Facility: CLINIC | Age: 76
End: 2021-12-17
Payer: MEDICARE

## 2021-12-17 VITALS
HEIGHT: 70 IN | SYSTOLIC BLOOD PRESSURE: 122 MMHG | DIASTOLIC BLOOD PRESSURE: 62 MMHG | WEIGHT: 174 LBS | HEART RATE: 64 BPM | BODY MASS INDEX: 24.91 KG/M2

## 2021-12-17 DIAGNOSIS — C34.90 PRIMARY MALIGNANT NEOPLASM OF LUNG METASTATIC TO OTHER SITE, UNSPECIFIED LATERALITY (HCC): Primary | ICD-10-CM

## 2021-12-17 DIAGNOSIS — N17.9 AKI (ACUTE KIDNEY INJURY) (HCC): ICD-10-CM

## 2021-12-17 DIAGNOSIS — N18.30 STAGE 3 CHRONIC KIDNEY DISEASE, UNSPECIFIED WHETHER STAGE 3A OR 3B CKD (HCC): ICD-10-CM

## 2021-12-17 PROCEDURE — 99214 OFFICE O/P EST MOD 30 MIN: CPT | Performed by: INTERNAL MEDICINE

## 2021-12-17 PROCEDURE — 1111F DSCHRG MED/CURRENT MED MERGE: CPT | Performed by: INTERNAL MEDICINE

## 2021-12-17 RX ORDER — PREDNISONE 1 MG/1
30 TABLET ORAL 2 TIMES DAILY
Qty: 180 TABLET | Refills: 0 | COMMUNITY
Start: 2021-12-17 | End: 2022-01-17 | Stop reason: DRUGHIGH

## 2021-12-17 NOTE — PROGRESS NOTES
Progress Note     Patient is a 64year old male with pmh of metastatic lung ca (brain, bone, malignant pericardial effusion) s/p chemo and now on immunotherapy s/p cycle 19 of pembrolizumab and now off of it, HTN, HL, s/p cochlear implant, hypothyroidism total) by mouth 2 (two) times daily for 5 days.  5 tablet 0   • levothyroxine (EUTHYROX) 125 MCG Oral Tab Take 1 tablet (125 mcg total) by mouth before breakfast. 90 tablet 0   • ALPRAZolam 0.5 MG Oral Tab Take 1 tablet (0.5 mg total) by mouth 2 (two) times total) by mouth 4 (four) times daily as needed for Diarrhea. 20 capsule 0   • AmLODIPine Besylate 5 MG Oral Tab Take 5 mg by mouth daily. • atorvastatin 20 MG Oral Tab Take 20 mg by mouth nightly.        • metoprolol Tartrate 25 MG Oral Tab Take 25 mg Wt Readings from Last 6 Encounters:  12/17/21 : 174 lb (78.9 kg)  12/07/21 : 171 lb 15.3 oz (78 kg)  12/07/21 : 172 lb (78 kg)  12/07/21 : 171 lb 11.2 oz (77.9 kg)  11/22/21 : 181 lb (82.1 kg)  11/16/21 : 182 lb (82.6 kg)        PHYSICAL EXAM:   Consti

## 2021-12-21 DIAGNOSIS — N17.9 ACUTE RENAL FAILURE (ARF) (HCC): ICD-10-CM

## 2021-12-21 DIAGNOSIS — D64.9 ANEMIA, UNSPECIFIED TYPE: ICD-10-CM

## 2021-12-21 DIAGNOSIS — C79.31 BRAIN METASTASIS (HCC): ICD-10-CM

## 2021-12-21 DIAGNOSIS — Z29.8 ENCOUNTER FOR IMMUNOTHERAPY: ICD-10-CM

## 2021-12-21 DIAGNOSIS — C34.12 PRIMARY CANCER OF LEFT UPPER LOBE OF LUNG (HCC): Primary | ICD-10-CM

## 2021-12-21 DIAGNOSIS — C34.12 PRIMARY CANCER OF LEFT UPPER LOBE OF LUNG (HCC): ICD-10-CM

## 2021-12-21 DIAGNOSIS — E03.2 HYPOTHYROIDISM DUE TO MEDICATION: ICD-10-CM

## 2021-12-22 ENCOUNTER — TELEPHONE (OUTPATIENT)
Dept: NEPHROLOGY | Facility: CLINIC | Age: 76
End: 2021-12-22

## 2021-12-22 ENCOUNTER — NURSE ONLY (OUTPATIENT)
Dept: HEMATOLOGY/ONCOLOGY | Facility: HOSPITAL | Age: 76
End: 2021-12-22
Attending: INTERNAL MEDICINE
Payer: MEDICARE

## 2021-12-22 ENCOUNTER — PATIENT MESSAGE (OUTPATIENT)
Dept: NEPHROLOGY | Facility: CLINIC | Age: 76
End: 2021-12-22

## 2021-12-22 DIAGNOSIS — N17.9 ACUTE RENAL FAILURE (ARF) (HCC): ICD-10-CM

## 2021-12-22 DIAGNOSIS — E03.2 HYPOTHYROIDISM DUE TO MEDICATION: ICD-10-CM

## 2021-12-22 DIAGNOSIS — Z45.2 ENCOUNTER FOR CENTRAL LINE CARE: Primary | ICD-10-CM

## 2021-12-22 DIAGNOSIS — N18.30 STAGE 3 CHRONIC KIDNEY DISEASE, UNSPECIFIED WHETHER STAGE 3A OR 3B CKD (HCC): ICD-10-CM

## 2021-12-22 DIAGNOSIS — Z51.81 MEDICATION MONITORING ENCOUNTER: ICD-10-CM

## 2021-12-22 DIAGNOSIS — C34.12 PRIMARY CANCER OF LEFT UPPER LOBE OF LUNG (HCC): ICD-10-CM

## 2021-12-22 DIAGNOSIS — C79.31 BRAIN METASTASIS (HCC): ICD-10-CM

## 2021-12-22 DIAGNOSIS — C79.51 BONE METASTASIS (HCC): ICD-10-CM

## 2021-12-22 DIAGNOSIS — N17.9 AKI (ACUTE KIDNEY INJURY) (HCC): Primary | ICD-10-CM

## 2021-12-22 DIAGNOSIS — Z29.8 ENCOUNTER FOR IMMUNOTHERAPY: ICD-10-CM

## 2021-12-22 DIAGNOSIS — D64.9 ANEMIA, UNSPECIFIED TYPE: ICD-10-CM

## 2021-12-22 PROCEDURE — 80053 COMPREHEN METABOLIC PANEL: CPT

## 2021-12-22 PROCEDURE — 84100 ASSAY OF PHOSPHORUS: CPT

## 2021-12-22 PROCEDURE — 82310 ASSAY OF CALCIUM: CPT

## 2021-12-22 PROCEDURE — 82040 ASSAY OF SERUM ALBUMIN: CPT

## 2021-12-22 PROCEDURE — 82565 ASSAY OF CREATININE: CPT

## 2021-12-22 PROCEDURE — 83735 ASSAY OF MAGNESIUM: CPT

## 2021-12-22 PROCEDURE — 36591 DRAW BLOOD OFF VENOUS DEVICE: CPT

## 2021-12-22 PROCEDURE — 85025 COMPLETE CBC W/AUTO DIFF WBC: CPT

## 2021-12-22 RX ORDER — ALPRAZOLAM 0.5 MG/1
0.5 TABLET ORAL 2 TIMES DAILY PRN
Qty: 60 TABLET | Refills: 1 | Status: SHIPPED | OUTPATIENT
Start: 2021-12-22 | End: 2022-01-19

## 2021-12-22 RX ORDER — HEPARIN SODIUM (PORCINE) LOCK FLUSH IV SOLN 100 UNIT/ML 100 UNIT/ML
5 SOLUTION INTRAVENOUS ONCE
Status: CANCELLED | OUTPATIENT
Start: 2022-01-05

## 2021-12-22 RX ORDER — HEPARIN SODIUM (PORCINE) LOCK FLUSH IV SOLN 100 UNIT/ML 100 UNIT/ML
5 SOLUTION INTRAVENOUS ONCE
Status: COMPLETED | OUTPATIENT
Start: 2021-12-22 | End: 2021-12-22

## 2021-12-22 RX ORDER — SODIUM CHLORIDE 9 MG/ML
10 INJECTION INTRAVENOUS ONCE
Status: CANCELLED | OUTPATIENT
Start: 2022-01-05

## 2021-12-22 RX ADMIN — HEPARIN SODIUM (PORCINE) LOCK FLUSH IV SOLN 100 UNIT/ML 500 UNITS: 100 SOLUTION INTRAVENOUS at 08:29:00

## 2021-12-22 NOTE — TELEPHONE ENCOUNTER
Dr. Reynaldo Shepard    Please see message and advise if ok to send refill. LOV 5/26/21    \"at this time I am unable to make an appointment. He spent 1 week in the hospital and he is to weak, cold and sleeping a lot.  Let me know if you will refill the pills\"

## 2021-12-22 NOTE — TELEPHONE ENCOUNTER
Sent pt Zuldi message to schedule follow up appointment with Dr Sony Drake. Orders are still pending.

## 2021-12-22 NOTE — TELEPHONE ENCOUNTER
LOV 5/26/2021; RTC in 2 months with labs completed; labs have not been completed and no future appointment has been made. Pended supply.

## 2021-12-22 NOTE — TELEPHONE ENCOUNTER
Polly requesting to speak with RN regarding lab results and if doses need adjustment. Please call. Thank you.

## 2021-12-22 NOTE — TELEPHONE ENCOUNTER
Pt's wife states someone picked up and hung up on her at the office.  Please call pt's wife Ulises Arora

## 2021-12-23 RX ORDER — LEVOTHYROXINE SODIUM 0.12 MG/1
TABLET ORAL
Qty: 90 TABLET | Refills: 0 | Status: SHIPPED | OUTPATIENT
Start: 2021-12-23

## 2021-12-23 RX ORDER — LEVOTHYROXINE SODIUM 0.12 MG/1
125 TABLET ORAL
Qty: 90 TABLET | Refills: 0 | Status: SHIPPED | OUTPATIENT
Start: 2021-12-23

## 2021-12-23 NOTE — TELEPHONE ENCOUNTER
Orders placed. Left message to call back. Just confirming the eosinophil smear - urine is the correct order, Dr. Merry Lacey? Thank you!

## 2021-12-23 NOTE — TELEPHONE ENCOUNTER
zofran is for nausea.  I think his stomach pain was prior to starting prednisone     Have him connect to primary care / oncology team

## 2021-12-23 NOTE — TELEPHONE ENCOUNTER
Reduce prednisone to 25 mg twice a day for 1 weeks and than reduce to 20 mg twice a day till his next follow up appointment    Potassium low - increase intake in diet     Lab test prior to next visit - pls order BMP and UA and urine eosinophil

## 2021-12-23 NOTE — TELEPHONE ENCOUNTER
Spoke with pt, discussed below message. Pt verbalizes understanding. No questions at this time. Potassium diet pamphlet sent to patient.

## 2021-12-23 NOTE — TELEPHONE ENCOUNTER
Spoke with patient's wife, states pt has stomach pain 9/10 after he takes the prednisone. States he has been experiencing this since he was placed on it in the hospital and was prescribed zofran for the pain. Describes it as a constant cramp.  Denies fever,

## 2021-12-23 NOTE — TELEPHONE ENCOUNTER
From: Karissa Cordova  To: Fuad Kumar MD  Sent: 12/22/2021 8:52 PM CST  Subject: medication dosage    I left a message at your office today and did not receive a returned phone call. The blood test for the creatine went from 2.38 to 1.46.  Do I need t

## 2021-12-24 NOTE — TELEPHONE ENCOUNTER
Spoke with patient, discussed below message. Pt verbalizes understanding. [FreeTextEntry1] : 1) Needs a cardiac echo to evaluate for mitral regurg. Will get it on return to [place].\par 2) No change in current meds.\par 3) F/u next year.

## 2021-12-28 ENCOUNTER — APPOINTMENT (OUTPATIENT)
Dept: HEMATOLOGY/ONCOLOGY | Facility: HOSPITAL | Age: 76
End: 2021-12-28
Attending: INTERNAL MEDICINE
Payer: MEDICARE

## 2021-12-28 ENCOUNTER — OFFICE VISIT (OUTPATIENT)
Dept: HEMATOLOGY/ONCOLOGY | Facility: HOSPITAL | Age: 76
End: 2021-12-28
Attending: NURSE PRACTITIONER
Payer: MEDICARE

## 2021-12-28 ENCOUNTER — TELEPHONE (OUTPATIENT)
Dept: ENDOCRINOLOGY CLINIC | Facility: CLINIC | Age: 76
End: 2021-12-28

## 2021-12-28 VITALS
SYSTOLIC BLOOD PRESSURE: 141 MMHG | DIASTOLIC BLOOD PRESSURE: 63 MMHG | BODY MASS INDEX: 25.48 KG/M2 | HEIGHT: 69 IN | OXYGEN SATURATION: 98 % | TEMPERATURE: 98 F | RESPIRATION RATE: 18 BRPM | WEIGHT: 172 LBS | HEART RATE: 65 BPM

## 2021-12-28 DIAGNOSIS — E03.2 HYPOTHYROIDISM DUE TO MEDICATION: Primary | ICD-10-CM

## 2021-12-28 DIAGNOSIS — N14.2: ICD-10-CM

## 2021-12-28 DIAGNOSIS — Z45.2 ENCOUNTER FOR CENTRAL LINE CARE: Primary | ICD-10-CM

## 2021-12-28 DIAGNOSIS — C78.00 MALIGNANT NEOPLASM METASTATIC TO LUNG, UNSPECIFIED LATERALITY (HCC): ICD-10-CM

## 2021-12-28 DIAGNOSIS — C80.1 MALIGNANT PERICARDIAL EFFUSION (HCC): ICD-10-CM

## 2021-12-28 DIAGNOSIS — C34.12 PRIMARY CANCER OF LEFT UPPER LOBE OF LUNG (HCC): ICD-10-CM

## 2021-12-28 DIAGNOSIS — Z51.81 MEDICATION MONITORING ENCOUNTER: ICD-10-CM

## 2021-12-28 DIAGNOSIS — E03.2 HYPOTHYROIDISM DUE TO MEDICATION: ICD-10-CM

## 2021-12-28 DIAGNOSIS — Z29.8 ENCOUNTER FOR IMMUNOTHERAPY: ICD-10-CM

## 2021-12-28 DIAGNOSIS — N17.9 ACUTE RENAL FAILURE (ARF) (HCC): ICD-10-CM

## 2021-12-28 DIAGNOSIS — C79.31 BRAIN METASTASIS (HCC): ICD-10-CM

## 2021-12-28 DIAGNOSIS — Z92.89 HISTORY OF IMMUNOTHERAPY: ICD-10-CM

## 2021-12-28 DIAGNOSIS — C34.12 PRIMARY CANCER OF LEFT UPPER LOBE OF LUNG (HCC): Primary | ICD-10-CM

## 2021-12-28 DIAGNOSIS — C79.51 BONE METASTASIS (HCC): ICD-10-CM

## 2021-12-28 DIAGNOSIS — I31.3 MALIGNANT PERICARDIAL EFFUSION (HCC): ICD-10-CM

## 2021-12-28 PROCEDURE — 84443 ASSAY THYROID STIM HORMONE: CPT

## 2021-12-28 PROCEDURE — 36591 DRAW BLOOD OFF VENOUS DEVICE: CPT

## 2021-12-28 PROCEDURE — 96372 THER/PROPH/DIAG INJ SC/IM: CPT

## 2021-12-28 PROCEDURE — 80053 COMPREHEN METABOLIC PANEL: CPT

## 2021-12-28 PROCEDURE — 99215 OFFICE O/P EST HI 40 MIN: CPT | Performed by: INTERNAL MEDICINE

## 2021-12-28 RX ORDER — SODIUM CHLORIDE 9 MG/ML
10 INJECTION INTRAVENOUS ONCE
OUTPATIENT
Start: 2022-01-18

## 2021-12-28 RX ORDER — PANTOPRAZOLE SODIUM 40 MG/1
40 TABLET, DELAYED RELEASE ORAL
Qty: 28 TABLET | Refills: 0 | Status: SHIPPED | OUTPATIENT
Start: 2021-12-28 | End: 2022-01-24

## 2021-12-28 RX ORDER — SUCRALFATE 1 G/1
1 TABLET ORAL
Qty: 120 TABLET | Refills: 0 | Status: SHIPPED | OUTPATIENT
Start: 2021-12-28 | End: 2022-01-17 | Stop reason: SINTOL

## 2021-12-28 RX ORDER — HEPARIN SODIUM (PORCINE) LOCK FLUSH IV SOLN 100 UNIT/ML 100 UNIT/ML
5 SOLUTION INTRAVENOUS ONCE
Status: CANCELLED | OUTPATIENT
Start: 2022-01-18

## 2021-12-28 NOTE — PROGRESS NOTES
HPI     Joseph Bonds is a 64year old male here for f/u of Primary cancer of left upper lobe of lung (hcc)  (primary encounter diagnosis)  Malignant pericardial effusion (hcc)  Malignant neoplasm metastatic to lung, unspecified laterality (hcc)  Brain for chest tightness and cough. Cardiovascular: Negative for chest pain and leg swelling. Gastrointestinal: Positive for abdominal pain (epigastric). Negative for constipation and diarrhea. Genitourinary: Positive for nocturia.     Musculoskeletal: Po Diarrhea. If imodium ineffective 30 tablet 1   • HYDROcodone-acetaminophen  MG Oral Tab Take 0.5-1 tablets by mouth every 6 (six) hours as needed for Pain.  120 tablet 0   • fluticasone-Umeclidin-Vilant (TRELEGY ELLIPTA) 100-62.5-25 MCG/INH Inhalation Chest pain.        Allergies:     Demerol Hcl [Meperi*    UNKNOWN    Past Medical History:   Diagnosis Date   • Aneurysm of abdominal vessel (HCC)    • Aortic valve defect    • Brain cancer [facility])    • Essential hypertension    • Heart attack ([facility] [address]    • High Psych/Depression: nl        ASSESSMENT/PLAN:   Primary cancer of left upper lobe of lung (hcc)  (primary encounter diagnosis)  Malignant pericardial effusion (hcc)  Malignant neoplasm metastatic to lung, unspecified laterality (hcc)  Brain metastasis (hc parietal metastases. There is no new metastatic disease. Anemia:  Likely chemotherapy-induced. Stable and less symptomatic. Taking oral iron studies and Bcomplex. Hypothyroidism, likely acquired from immunotherapy:  Stable.  Continue levothyroxin strategy.  64year-old male with left upper lobe lung cancer diagnosed 09/2020 post SB RT for brain metastasis.  Bone metastasis.  C79.51 Bone metastasis ([address] C79.31 Brain metastasis (HCC) C78.00 Malignant neoplasm   metastatic to lung, unspecified latera SUV, previously 2.6 max   SUV) periaortic node in the upper retroperitoneum located below the level of the pancreas.  There is a left periaortic node measuring 22 x 17 mm (currently 3.3 max SUV, previously 2.7 max SUV).   Additional retroperitoneal lymphade 11:38 AM.      INDICATIONS: Patient has a history of lung ca eval for brain mets . Patient unable to have contrast abnormal labs      TECHNIQUE: CT images were obtained without contrast material.  Automated exposure control for dose reduction was used.   Do these limitations, no acute intracranial abnormality is seen. Multiple other incidental findings as described in the body of the report which are unchanged.       elm-remote         Dictated by (CST): Maureen Ramirez MD on 12/15/2021 at 3:11 PM       Fin

## 2021-12-28 NOTE — PROGRESS NOTES
Monthly Xgeva given in L lower abdomen and tolerated well. Per AMARILIS Topete, APN labs OK from 12/22 for injection. CMP collected per orders. Discharged with next appointment  scheduled.

## 2021-12-29 NOTE — TELEPHONE ENCOUNTER
Hi!  Can we please call this patient and let him know that I have reviewed his most recent blood tests and hey are within normal limits.  I would like him to stay on this dose (125mcg of Euthyrox) for now and I would like to see him in three months with marcy

## 2021-12-29 NOTE — TELEPHONE ENCOUNTER
Spoke to patients spouse (HIPPA verified) and relayed Dr. Jacome Seek message as shown below. Spouse verbalized understanding of whole message and had no further questions at this time.

## 2021-12-31 ENCOUNTER — TELEPHONE (OUTPATIENT)
Dept: NEPHROLOGY | Facility: CLINIC | Age: 76
End: 2021-12-31

## 2021-12-31 RX ORDER — PREDNISONE 5 MG/1
30 TABLET ORAL 2 TIMES DAILY
Qty: 180 TABLET | Refills: 0 | Status: CANCELLED | OUTPATIENT
Start: 2021-12-31

## 2021-12-31 RX ORDER — PREDNISONE 5 MG/1
TABLET ORAL
Qty: 120 TABLET | Refills: 0 | Status: SHIPPED | OUTPATIENT
Start: 2021-12-31 | End: 2022-02-25

## 2022-01-01 ENCOUNTER — TELEPHONE (OUTPATIENT)
Dept: CARDIOLOGY | Age: 77
End: 2022-01-01

## 2022-01-01 ENCOUNTER — TELEPHONE (OUTPATIENT)
Dept: HEMATOLOGY/ONCOLOGY | Facility: HOSPITAL | Age: 77
End: 2022-01-01

## 2022-01-01 ENCOUNTER — OFFICE VISIT (OUTPATIENT)
Dept: CARDIOLOGY | Age: 77
End: 2022-01-01

## 2022-01-01 ENCOUNTER — TELEPHONE (OUTPATIENT)
Dept: NEPHROLOGY | Facility: CLINIC | Age: 77
End: 2022-01-01

## 2022-01-01 ENCOUNTER — DOCUMENTATION (OUTPATIENT)
Dept: CARDIOLOGY | Age: 77
End: 2022-01-01

## 2022-01-01 ENCOUNTER — LAB SERVICES (OUTPATIENT)
Dept: LAB | Age: 77
End: 2022-01-01

## 2022-01-01 ENCOUNTER — APPOINTMENT (OUTPATIENT)
Dept: CARDIOLOGY | Age: 77
End: 2022-01-01

## 2022-01-01 VITALS
HEART RATE: 78 BPM | SYSTOLIC BLOOD PRESSURE: 102 MMHG | DIASTOLIC BLOOD PRESSURE: 54 MMHG | WEIGHT: 159.83 LBS | BODY MASS INDEX: 23.67 KG/M2 | HEIGHT: 69 IN

## 2022-01-01 DIAGNOSIS — E78.2 MIXED HYPERLIPIDEMIA: ICD-10-CM

## 2022-01-01 DIAGNOSIS — I35.0 AORTIC VALVE STENOSIS, ETIOLOGY OF CARDIAC VALVE DISEASE UNSPECIFIED: ICD-10-CM

## 2022-01-01 DIAGNOSIS — I65.23 ASYMPTOMATIC CAROTID ARTERY STENOSIS, BILATERAL: ICD-10-CM

## 2022-01-01 DIAGNOSIS — I31.31 MALIGNANT PERICARDIAL EFFUSION: ICD-10-CM

## 2022-01-01 DIAGNOSIS — N18.9 CHRONIC RENAL IMPAIRMENT, UNSPECIFIED CKD STAGE: ICD-10-CM

## 2022-01-01 DIAGNOSIS — I10 BENIGN ESSENTIAL HYPERTENSION: ICD-10-CM

## 2022-01-01 DIAGNOSIS — C34.12 PRIMARY CANCER OF LEFT UPPER LOBE OF LUNG (HCC): ICD-10-CM

## 2022-01-01 DIAGNOSIS — I25.10 CORONARY ARTERY DISEASE WITHOUT ANGINA PECTORIS, UNSPECIFIED VESSEL OR LESION TYPE, UNSPECIFIED WHETHER NATIVE OR TRANSPLANTED HEART: ICD-10-CM

## 2022-01-01 DIAGNOSIS — I25.10 CORONARY ARTERY DISEASE WITHOUT ANGINA PECTORIS, UNSPECIFIED VESSEL OR LESION TYPE, UNSPECIFIED WHETHER NATIVE OR TRANSPLANTED HEART: Primary | ICD-10-CM

## 2022-01-01 DIAGNOSIS — N18.30 STAGE 3 CHRONIC KIDNEY DISEASE, UNSPECIFIED WHETHER STAGE 3A OR 3B CKD (HCC): Primary | ICD-10-CM

## 2022-01-01 LAB
BASOPHILS # BLD: 0 K/MCL (ref 0–0.3)
BASOPHILS NFR BLD: 0 %
DEPRECATED RDW RBC: 64 FL (ref 39–50)
EOSINOPHIL # BLD: 0 K/MCL (ref 0–0.5)
EOSINOPHIL NFR BLD: 0 %
ERYTHROCYTE [DISTWIDTH] IN BLOOD: 17.8 % (ref 11–15)
HCT VFR BLD CALC: 24.4 % (ref 39–51)
HGB BLD-MCNC: 7.3 G/DL (ref 13–17)
IMM GRANULOCYTES # BLD AUTO: 0 K/MCL (ref 0–0.2)
IMM GRANULOCYTES # BLD: 0 %
LYMPHOCYTES # BLD: 0.2 K/MCL (ref 1–4)
LYMPHOCYTES NFR BLD: 3 %
MCH RBC QN AUTO: 29.7 PG (ref 26–34)
MCHC RBC AUTO-ENTMCNC: 29.9 G/DL (ref 32–36.5)
MCV RBC AUTO: 99.2 FL (ref 78–100)
MONOCYTES # BLD: 0.2 K/MCL (ref 0.3–0.9)
MONOCYTES NFR BLD: 3 %
NEUTROPHILS # BLD: 6.8 K/MCL (ref 1.8–7.7)
NEUTROPHILS NFR BLD: 94 %
NRBC BLD MANUAL-RTO: 0 /100 WBC
PLATELET # BLD AUTO: 143 K/MCL (ref 140–450)
RBC # BLD: 2.46 MIL/MCL (ref 4.5–5.9)
WBC # BLD: 7.3 K/MCL (ref 4.2–11)

## 2022-01-01 PROCEDURE — 85025 COMPLETE CBC W/AUTO DIFF WBC: CPT | Performed by: CLINICAL MEDICAL LABORATORY

## 2022-01-01 PROCEDURE — 99215 OFFICE O/P EST HI 40 MIN: CPT | Performed by: INTERNAL MEDICINE

## 2022-01-01 PROCEDURE — 36415 COLL VENOUS BLD VENIPUNCTURE: CPT | Performed by: CLINICAL MEDICAL LABORATORY

## 2022-01-01 RX ORDER — PREDNISONE 5 MG/1
30 TABLET ORAL 2 TIMES DAILY
Qty: 180 TABLET | Refills: 0 | Status: CANCELLED | OUTPATIENT
Start: 2022-01-01

## 2022-01-01 RX ORDER — PREDNISONE 5 MG/1
5 TABLET ORAL DAILY
COMMUNITY

## 2022-01-01 RX ORDER — ATORVASTATIN CALCIUM 20 MG/1
TABLET, FILM COATED ORAL
Qty: 90 TABLET | Refills: 3 | Status: SHIPPED | OUTPATIENT
Start: 2022-01-01 | End: 2023-01-01 | Stop reason: ALTCHOICE

## 2022-01-01 RX ORDER — PANTOPRAZOLE SODIUM 40 MG/1
40 TABLET, DELAYED RELEASE ORAL
Qty: 28 TABLET | Refills: 2 | Status: ON HOLD | OUTPATIENT
Start: 2022-01-01 | End: 2022-01-01

## 2022-01-01 RX ORDER — ALPRAZOLAM 0.5 MG/1
0.5 TABLET ORAL 2 TIMES DAILY PRN
Qty: 60 TABLET | Refills: 1 | Status: SHIPPED | OUTPATIENT
Start: 2022-01-01 | End: 2022-01-01

## 2022-01-01 RX ORDER — AMLODIPINE BESYLATE 5 MG/1
TABLET ORAL
Qty: 90 TABLET | Refills: 3 | Status: SHIPPED | OUTPATIENT
Start: 2022-01-01 | End: 2023-01-01 | Stop reason: SDUPTHER

## 2022-01-01 SDOH — HEALTH STABILITY: PHYSICAL HEALTH: ON AVERAGE, HOW MANY MINUTES DO YOU ENGAGE IN EXERCISE AT THIS LEVEL?: 0 MIN

## 2022-01-01 SDOH — HEALTH STABILITY: MENTAL HEALTH: DEPRESSION SCREENING SCORE: 0

## 2022-01-01 SDOH — HEALTH STABILITY: MENTAL HEALTH: FEELING DOWN, DEPRESSED OR HOPELESS?: NOT AT ALL

## 2022-01-01 SDOH — HEALTH STABILITY: PHYSICAL HEALTH: ON AVERAGE, HOW MANY DAYS PER WEEK DO YOU ENGAGE IN MODERATE TO STRENUOUS EXERCISE (LIKE A BRISK WALK)?: 0 DAYS

## 2022-01-01 SDOH — HEALTH STABILITY: MENTAL HEALTH: PHQ2 INTERPRETATION: NO FURTHER SCREENING NEEDED

## 2022-01-01 SDOH — HEALTH STABILITY: MENTAL HEALTH: LITTLE INTEREST OR PLEASURE IN ACTIVITY?: NOT AT ALL

## 2022-01-01 ASSESSMENT — PATIENT HEALTH QUESTIONNAIRE - PHQ9: SUM OF ALL RESPONSES TO PHQ9 QUESTIONS 1 AND 2: 0

## 2022-01-03 RX ORDER — PREDNISONE 5 MG/1
30 TABLET ORAL 2 TIMES DAILY
Qty: 180 TABLET | Refills: 0 | Status: CANCELLED | OUTPATIENT
Start: 2022-01-03

## 2022-01-10 ENCOUNTER — TELEPHONE (OUTPATIENT)
Dept: NEPHROLOGY | Facility: CLINIC | Age: 77
End: 2022-01-10

## 2022-01-10 NOTE — TELEPHONE ENCOUNTER
Wife requesting a call back regarding instructions for prednisone. Wife states pts appt was rescheduled to 2/11/22 and pt will run out of medication by then.  Please call [phone]        Current Outpatient Medications:   •  predniSONE 5 MG Oral Tab, Christa Rice

## 2022-01-11 NOTE — TELEPHONE ENCOUNTER
Spoke with pt's wife- states pt cannot receive cancer treatments while he is on prednisone. Pt is currently tapering off the steroid as ordered. Pt currently taking prednisone 5mg- 2 tablets twice day. Next week, pt will be taking 1 tablet twice daily.

## 2022-01-11 NOTE — TELEPHONE ENCOUNTER
Patient was to have follow up visit in 4 weeks     Please schedule visit from 1/17 - Monday and will discuss further at that visit - virtual visit

## 2022-01-17 ENCOUNTER — TELEMEDICINE (OUTPATIENT)
Dept: NEPHROLOGY | Facility: CLINIC | Age: 77
End: 2022-01-17

## 2022-01-17 ENCOUNTER — PATIENT MESSAGE (OUTPATIENT)
Dept: NEPHROLOGY | Facility: CLINIC | Age: 77
End: 2022-01-17

## 2022-01-17 DIAGNOSIS — N17.9 AKI (ACUTE KIDNEY INJURY) (HCC): Primary | ICD-10-CM

## 2022-01-17 DIAGNOSIS — N18.30 STAGE 3 CHRONIC KIDNEY DISEASE, UNSPECIFIED WHETHER STAGE 3A OR 3B CKD (HCC): ICD-10-CM

## 2022-01-17 PROCEDURE — 99215 OFFICE O/P EST HI 40 MIN: CPT | Performed by: INTERNAL MEDICINE

## 2022-01-17 NOTE — PATIENT INSTRUCTIONS
Lab tests as ordered this week - no need to fast   Follow up 4 weeks   Take prednisone 5 mg twice a day for one week and reduce to prednisone 5 mg daily for one week and followed by 2.5 mg daily for one week and than stop

## 2022-01-17 NOTE — TELEPHONE ENCOUNTER
From: Kade Shah  To: Deric Benson MD  Sent: 1/17/2022 11:56 AM CST  Subject: urine & blood test    I am currently looking in my chart and I do not see an order for a urine test or a blood test for Sosa Maya to go to the [place] [facility] tomorrow.    I

## 2022-01-17 NOTE — PROGRESS NOTES
Progress Note     Patient is a 64year old male with pmh of metastatic lung ca (brain, bone, malignant pericardial effusion) s/p chemo and now on immunotherapy s/p cycle 19 of pembrolizumab and now off of it, HTN, HL, s/p cochlear implant, hypothyroidism levothyroxine (EUTHYROX) 125 MCG Oral Tab Take 1 tablet (125 mcg total) by mouth before breakfast. 90 tablet 0   • LEVOTHYROXINE 125 MCG Oral Tab TAKE 1 TABLET BY MOUTH BEFORE BREAKFAST 90 tablet 0   • ALPRAZolam 0.5 MG Oral Tab Take 1 tablet (0.5 mg total MG/0.1ML Nasal Liquid 4 mg by Nasal route as needed. If patient remains unresponsive, repeat dose in other nostril 2-5 minutes after first dose.  1 kit 0   • Albuterol Sulfate  (90 Base) MCG/ACT Inhalation Aero Soln Inhale 2 puffs into the lungs ever noted  Musculoskeletal: no deformities  Extremities: no edema  Neurological:  Grossly normal       ASSESSMENT/PLAN:     1.  OVI on CKD stage III:   - kidney function recovering well with prednisone  -  urine eos +ve with UA +ve wbc - concern for immune medi

## 2022-01-18 ENCOUNTER — APPOINTMENT (OUTPATIENT)
Dept: HEMATOLOGY/ONCOLOGY | Facility: HOSPITAL | Age: 77
End: 2022-01-18
Attending: NURSE PRACTITIONER
Payer: MEDICARE

## 2022-01-18 ENCOUNTER — TELEPHONE (OUTPATIENT)
Dept: PHYSICAL THERAPY | Facility: HOSPITAL | Age: 77
End: 2022-01-18

## 2022-01-18 ENCOUNTER — LAB ENCOUNTER (OUTPATIENT)
Dept: LAB | Facility: HOSPITAL | Age: 77
End: 2022-01-18
Attending: INTERNAL MEDICINE
Payer: MEDICARE

## 2022-01-18 ENCOUNTER — PATIENT MESSAGE (OUTPATIENT)
Dept: HEMATOLOGY/ONCOLOGY | Facility: HOSPITAL | Age: 77
End: 2022-01-18

## 2022-01-18 DIAGNOSIS — R53.0 NEOPLASTIC MALIGNANT RELATED FATIGUE: Primary | ICD-10-CM

## 2022-01-18 DIAGNOSIS — N18.30 STAGE 3 CHRONIC KIDNEY DISEASE, UNSPECIFIED WHETHER STAGE 3A OR 3B CKD (HCC): ICD-10-CM

## 2022-01-18 DIAGNOSIS — N17.9 AKI (ACUTE KIDNEY INJURY) (HCC): ICD-10-CM

## 2022-01-18 LAB
ANION GAP SERPL CALC-SCNC: 10 MMOL/L (ref 0–18)
BILIRUB UR QL: NEGATIVE
BUN BLD-MCNC: 39 MG/DL (ref 7–18)
BUN/CREAT SERPL: 33.3 (ref 10–20)
CALCIUM BLD-MCNC: 8.7 MG/DL (ref 8.5–10.1)
CHLORIDE SERPL-SCNC: 109 MMOL/L (ref 98–112)
CO2 SERPL-SCNC: 22 MMOL/L (ref 21–32)
COLOR UR: YELLOW
CREAT BLD-MCNC: 1.17 MG/DL
FASTING STATUS PATIENT QL REPORTED: NO
GLUCOSE BLD-MCNC: 81 MG/DL (ref 70–99)
GLUCOSE UR-MCNC: NEGATIVE MG/DL
KETONES UR-MCNC: NEGATIVE MG/DL
LEUKOCYTE ESTERASE UR QL STRIP.AUTO: NEGATIVE
NITRITE UR QL STRIP.AUTO: NEGATIVE
OSMOLALITY SERPL CALC.SUM OF ELEC: 300 MOSM/KG (ref 275–295)
PH UR: 5 [PH] (ref 5–8)
POTASSIUM SERPL-SCNC: 3.8 MMOL/L (ref 3.5–5.1)
PROT UR-MCNC: 30 MG/DL
SODIUM SERPL-SCNC: 141 MMOL/L (ref 136–145)
SP GR UR STRIP: 1.02 (ref 1–1.03)
UROBILINOGEN UR STRIP-ACNC: <2

## 2022-01-18 PROCEDURE — 36415 COLL VENOUS BLD VENIPUNCTURE: CPT

## 2022-01-18 PROCEDURE — 80048 BASIC METABOLIC PNL TOTAL CA: CPT

## 2022-01-18 PROCEDURE — 81001 URINALYSIS AUTO W/SCOPE: CPT

## 2022-01-18 NOTE — TELEPHONE ENCOUNTER
From: Talon Green  To:  Minna Segal MD  Sent: 1/18/2022 9:50 AM CST  Subject: physical therapy     Good morning,  I was wondering if there is any way that we can get Cristiano Espinoza into some type of physical therapy at [facility]? He doesn't have any pain but hes

## 2022-01-19 DIAGNOSIS — C34.12 PRIMARY CANCER OF LEFT UPPER LOBE OF LUNG (HCC): ICD-10-CM

## 2022-01-19 RX ORDER — ALPRAZOLAM 0.5 MG/1
0.5 TABLET ORAL 2 TIMES DAILY PRN
Qty: 60 TABLET | Refills: 1 | Status: SHIPPED | OUTPATIENT
Start: 2022-01-19 | End: 2022-02-20

## 2022-01-20 ENCOUNTER — OFFICE VISIT (OUTPATIENT)
Dept: PHYSICAL THERAPY | Age: 77
End: 2022-01-20
Attending: INTERNAL MEDICINE
Payer: MEDICARE

## 2022-01-20 DIAGNOSIS — R53.0 NEOPLASTIC MALIGNANT RELATED FATIGUE: ICD-10-CM

## 2022-01-20 PROCEDURE — 97110 THERAPEUTIC EXERCISES: CPT

## 2022-01-20 PROCEDURE — 97163 PT EVAL HIGH COMPLEX 45 MIN: CPT
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
## 2022-01-24 RX ORDER — PANTOPRAZOLE SODIUM 40 MG/1
40 TABLET, DELAYED RELEASE ORAL
Qty: 28 TABLET | Refills: 0 | Status: SHIPPED | OUTPATIENT
Start: 2022-01-24 | End: 2022-02-20

## 2022-01-25 ENCOUNTER — NURSE ONLY (OUTPATIENT)
Dept: HEMATOLOGY/ONCOLOGY | Facility: HOSPITAL | Age: 77
End: 2022-01-25
Attending: INTERNAL MEDICINE
Payer: MEDICARE

## 2022-01-25 DIAGNOSIS — Z29.8 ENCOUNTER FOR IMMUNOTHERAPY: ICD-10-CM

## 2022-01-25 DIAGNOSIS — C34.12 PRIMARY CANCER OF LEFT UPPER LOBE OF LUNG (HCC): Primary | ICD-10-CM

## 2022-01-25 DIAGNOSIS — C79.51 BONE METASTASIS (HCC): ICD-10-CM

## 2022-01-25 DIAGNOSIS — Z45.2 ENCOUNTER FOR CENTRAL LINE CARE: ICD-10-CM

## 2022-01-25 DIAGNOSIS — D64.9 ANEMIA, UNSPECIFIED TYPE: ICD-10-CM

## 2022-01-25 DIAGNOSIS — C79.31 BRAIN METASTASIS (HCC): ICD-10-CM

## 2022-01-25 DIAGNOSIS — Z51.81 MEDICATION MONITORING ENCOUNTER: ICD-10-CM

## 2022-01-25 DIAGNOSIS — N17.9 ACUTE RENAL FAILURE (ARF) (HCC): ICD-10-CM

## 2022-01-25 LAB
ALBUMIN SERPL-MCNC: 2.4 G/DL (ref 3.4–5)
ALBUMIN/GLOB SERPL: 0.6 {RATIO} (ref 1–2)
ALP LIVER SERPL-CCNC: 51 U/L
ALT SERPL-CCNC: 26 U/L
ANION GAP SERPL CALC-SCNC: 6 MMOL/L (ref 0–18)
AST SERPL-CCNC: 16 U/L (ref 15–37)
BASOPHILS # BLD AUTO: 0.02 X10(3) UL (ref 0–0.2)
BASOPHILS NFR BLD AUTO: 0.4 %
BILIRUB SERPL-MCNC: 0.2 MG/DL (ref 0.1–2)
BUN BLD-MCNC: 38 MG/DL (ref 7–18)
BUN/CREAT SERPL: 33 (ref 10–20)
CALCIUM BLD-MCNC: 8.6 MG/DL (ref 8.5–10.1)
CHLORIDE SERPL-SCNC: 115 MMOL/L (ref 98–112)
CO2 SERPL-SCNC: 22 MMOL/L (ref 21–32)
CREAT BLD-MCNC: 1.15 MG/DL
DEPRECATED RDW RBC AUTO: 64.1 FL (ref 35.1–46.3)
EOSINOPHIL # BLD AUTO: 0.05 X10(3) UL (ref 0–0.7)
EOSINOPHIL NFR BLD AUTO: 1.1 %
ERYTHROCYTE [DISTWIDTH] IN BLOOD BY AUTOMATED COUNT: 19.2 % (ref 11–15)
FASTING STATUS PATIENT QL REPORTED: NO
GLOBULIN PLAS-MCNC: 4 G/DL (ref 2.8–4.4)
GLUCOSE BLD-MCNC: 101 MG/DL (ref 70–99)
HCT VFR BLD AUTO: 31.7 %
HGB BLD-MCNC: 9.8 G/DL
IMM GRANULOCYTES # BLD AUTO: 0.04 X10(3) UL (ref 0–1)
IMM GRANULOCYTES NFR BLD: 0.9 %
LYMPHOCYTES # BLD AUTO: 0.27 X10(3) UL (ref 1–4)
LYMPHOCYTES NFR BLD AUTO: 6.1 %
MCH RBC QN AUTO: 28 PG (ref 26–34)
MCHC RBC AUTO-ENTMCNC: 30.9 G/DL (ref 31–37)
MCV RBC AUTO: 90.6 FL
MONOCYTES # BLD AUTO: 0.27 X10(3) UL (ref 0.1–1)
MONOCYTES NFR BLD AUTO: 6.1 %
NEUTROPHILS # BLD AUTO: 3.81 X10 (3) UL (ref 1.5–7.7)
NEUTROPHILS # BLD AUTO: 3.81 X10(3) UL (ref 1.5–7.7)
NEUTROPHILS NFR BLD AUTO: 85.4 %
OSMOLALITY SERPL CALC.SUM OF ELEC: 305 MOSM/KG (ref 275–295)
PLATELET # BLD AUTO: 202 10(3)UL (ref 150–450)
POTASSIUM SERPL-SCNC: 3.1 MMOL/L (ref 3.5–5.1)
PROT SERPL-MCNC: 6.4 G/DL (ref 6.4–8.2)
RBC # BLD AUTO: 3.5 X10(6)UL
SODIUM SERPL-SCNC: 143 MMOL/L (ref 136–145)
WBC # BLD AUTO: 4.5 X10(3) UL (ref 4–11)

## 2022-01-25 PROCEDURE — 80053 COMPREHEN METABOLIC PANEL: CPT

## 2022-01-25 PROCEDURE — 96372 THER/PROPH/DIAG INJ SC/IM: CPT

## 2022-01-25 PROCEDURE — 36591 DRAW BLOOD OFF VENOUS DEVICE: CPT

## 2022-01-25 PROCEDURE — 85025 COMPLETE CBC W/AUTO DIFF WBC: CPT

## 2022-01-25 RX ORDER — HEPARIN SODIUM (PORCINE) LOCK FLUSH IV SOLN 100 UNIT/ML 100 UNIT/ML
5 SOLUTION INTRAVENOUS ONCE
Status: COMPLETED | OUTPATIENT
Start: 2022-01-25 | End: 2022-01-25

## 2022-01-25 RX ORDER — SODIUM CHLORIDE 9 MG/ML
10 INJECTION INTRAVENOUS ONCE
OUTPATIENT
Start: 2022-02-15

## 2022-01-25 RX ORDER — HEPARIN SODIUM (PORCINE) LOCK FLUSH IV SOLN 100 UNIT/ML 100 UNIT/ML
5 SOLUTION INTRAVENOUS ONCE
OUTPATIENT
Start: 2022-02-15

## 2022-01-25 RX ADMIN — HEPARIN SODIUM (PORCINE) LOCK FLUSH IV SOLN 100 UNIT/ML 500 UNITS: 100 SOLUTION INTRAVENOUS at 10:11:00

## 2022-01-25 NOTE — PROGRESS NOTES
Janett Dave here for is monthly  Xgeva injection. Patient denies any dental issues or scheduled dental work. .  Labs drawn via port and tolerated well    Xgeva given subcutaneous in left lower abdomen. Tolerated injection well. No bleeding noted.   Site left

## 2022-01-26 ENCOUNTER — OFFICE VISIT (OUTPATIENT)
Dept: PHYSICAL THERAPY | Age: 77
End: 2022-01-26
Attending: INTERNAL MEDICINE
Payer: MEDICARE

## 2022-01-26 PROCEDURE — 97110 THERAPEUTIC EXERCISES: CPT

## 2022-01-26 NOTE — PROGRESS NOTES
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 Sylvia Frausto MD visit:   Fall Risk: standard         Precautions: lung cancer, brain and bone mets          Subj

## 2022-01-31 ENCOUNTER — TELEPHONE (OUTPATIENT)
Dept: PULMONOLOGY | Facility: CLINIC | Age: 77
End: 2022-01-31

## 2022-01-31 ENCOUNTER — OFFICE VISIT (OUTPATIENT)
Dept: PHYSICAL THERAPY | Age: 77
End: 2022-01-31
Attending: INTERNAL MEDICINE
Payer: MEDICARE

## 2022-01-31 PROCEDURE — 97110 THERAPEUTIC EXERCISES: CPT

## 2022-01-31 NOTE — TELEPHONE ENCOUNTER
Received Nebulizer order from [facility] and placed order in [facility] folder for signature.

## 2022-01-31 NOTE — PROGRESS NOTES
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 Adan Frausto MD visit:   Fall Risk: standard         Precautions: lung cancer, brain and bone mets          Subj

## 2022-02-01 ENCOUNTER — PATIENT MESSAGE (OUTPATIENT)
Dept: NEPHROLOGY | Facility: CLINIC | Age: 77
End: 2022-02-01

## 2022-02-01 NOTE — TELEPHONE ENCOUNTER
Dr. Ange Cassidy, pt requesting if he should start back on potassium chloride tablets? Last K 3.1 on 1/25/22.

## 2022-02-01 NOTE — TELEPHONE ENCOUNTER
From: Aracelis Moise  To: Davon Allen MD  Sent: 2/1/2022 10:13 AM CST  Subject: medication    Since you stated that Gaston's potassium is low should I put him back on his potassium chloride pills?

## 2022-02-02 ENCOUNTER — APPOINTMENT (OUTPATIENT)
Dept: PHYSICAL THERAPY | Age: 77
End: 2022-02-02
Attending: INTERNAL MEDICINE
Payer: MEDICARE

## 2022-02-02 RX ORDER — POTASSIUM CHLORIDE 1500 MG/1
1 TABLET, FILM COATED, EXTENDED RELEASE ORAL 2 TIMES DAILY
Qty: 60 TABLET | Refills: 2 | Status: SHIPPED | OUTPATIENT
Start: 2022-02-02 | End: 2022-03-04

## 2022-02-07 ENCOUNTER — OFFICE VISIT (OUTPATIENT)
Dept: PHYSICAL THERAPY | Age: 77
End: 2022-02-07
Attending: INTERNAL MEDICINE
Payer: MEDICARE

## 2022-02-07 PROCEDURE — 97110 THERAPEUTIC EXERCISES: CPT

## 2022-02-07 PROCEDURE — 97140 MANUAL THERAPY 1/> REGIONS: CPT

## 2022-02-07 NOTE — PROGRESS NOTES
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 Catherine Frausto MD visit:   Fall Risk: standard         Precautions: lung cancer, brain and bone mets          Subjective: Pt states that his L knee has been hurting him a lot past week. He reports he is not sure if exercises are aggravating it or not. Pain level: 6-7/10 L knee  Objective: see flow sheet below for treatment       Assessment: Pt reported decreased L knee pain by end of session. Provided quad strengthening exercises to replace seated LAQ to reduce knee pain. Goals:   1. Pt will demonstrate knee flex/ext 3+/5 for ease with sit to stand transfer mod I from regular chair. 2.   Pt will demonstrate supine to sit transfer min Ax1 to mod I.   3.   Pt will be able to ambulate household distances with least restrictive AD safely. 4.   Pt will be independent and compliant with comprehensive HEP to maintain progress achieved in PT. Plan: continue with LE strengthening exercises  Date: 1/26/2022  TX#: 2/8 Date: 1/31/22              TX#: 3/8 Date: 2/7/22            TX#: 4/8 Date:                 TX#: 5/ Date:    Tx#: 6/ There ex:  nustep L1 x 10 min  Sit to stand from chair w/airex 5x2 2 hand pushoff  Hip add ball squeeze 5 secx10  LAQ [id number] R/L   There ex:  nustep L1 x 10 min  Sit to stand from chair w/airex 5x2 no UE support  Seated Hip flex w/o back support 15x R/L  LAQ w/o back support 15x R/L  Standing heel raises 10x  Standing hip abd/ext 10xea    There ex:  nustep L2 x 10 min  Supine leg press green TB 10x L/R  Bridges 10x2         Manual therapy:  Supine R medial knee STM     HEP: 1/26/22- sit to stand w/pad, hip add ball squeeze           1/31/22- sit to stand w/o UE support, seated hip flex/LAQ without back support            2/7/22- bridges, leg press green TB; hold LAQ/hip flex    Charges: TEx2, MTx1       Total Timed Treatment: 40 min  Total Treatment Time: 40 min

## 2022-02-10 ENCOUNTER — OFFICE VISIT (OUTPATIENT)
Dept: PHYSICAL THERAPY | Age: 77
End: 2022-02-10
Attending: INTERNAL MEDICINE
Payer: MEDICARE

## 2022-02-10 PROCEDURE — 97110 THERAPEUTIC EXERCISES: CPT

## 2022-02-10 NOTE — PROGRESS NOTES
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 Ayleen Purcell  Next MD visit:   Fall Risk: standard         Precautions: lung cancer, brain and bone mets, [place]    Subjective: Pt states that his L knee is better but has increased pain in L ankle. He reports that he has an appt with surgeon next week for it. He states ankle pain is mostly at night. \"I think a screw came loose. \"  Pain level: 8/10 L ankle  Objective: see flow sheet below for treatment       Assessment: Pt able to transfer supine to sit with more ease. Goals:   1. Pt will demonstrate knee flex/ext 3+/5 for ease with sit to stand transfer mod I from regular chair. 2.   Pt will demonstrate supine to sit transfer min Ax1 to mod I. - MET,pt able to transfer mod I.   3.   Pt will be able to ambulate household distances with least restrictive AD safely. 4.   Pt will be independent and compliant with comprehensive HEP to maintain progress achieved in PT. Plan: continue with LE strengthening exercises  Date: 1/26/2022  TX#: 2/8 Date: 1/31/22              TX#: 3/8 Date: 2/7/22            TX#: 4/8 Date: 2/10/22         TX#: 5/8 Date:    Tx#: 6/   There ex:  nustep L1 x 10 min  Sit to stand from chair w/airex 5x2 2 hand pushoff  Hip add ball squeeze 5 secx10  LAQ [id number] R/L   There ex:  nustep L1 x 10 min  Sit to stand from chair w/airex 5x2 no UE support  Seated Hip flex w/o back support 15x R/L  LAQ w/o back support 15x R/L  Standing heel raises 10x  Standing hip abd/ext 10xea    There ex:  nustep L2 x 10 min  Supine leg press green TB 10x L/R  Bridges 10x2   There ex:  nustep L3 x 10 min  Bridges 10x2  Supine leg press green TB 10x2 L/R  sidelying clamshell 10x2 R/L  Sit to stand without hand suppport 10x  Standing hip abd 10x2 R/L        Manual therapy:  Supine R medial knee STM     HEP: 1/26/22- sit to stand w/pad, hip add ball squeeze           1/31/22- sit to stand w/o UE support, seated hip flex/LAQ without back support            2/7/22- bridges, leg press green TB; hold LAQ/hip flex    Charges: TEx2, MTx1       Total Timed Treatment: 40 min  Total Treatment Time: 40 min

## 2022-02-14 ENCOUNTER — OFFICE VISIT (OUTPATIENT)
Dept: PHYSICAL THERAPY | Age: 77
End: 2022-02-14
Attending: INTERNAL MEDICINE
Payer: MEDICARE

## 2022-02-14 PROCEDURE — 97110 THERAPEUTIC EXERCISES: CPT

## 2022-02-14 NOTE — PROGRESS NOTES
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 Noemi Frausto MD visit:   Fall Risk: standard         Precautions: lung cancer, brain and bone mets, [place]    Subjective: Pt states that left ankle continues to bother him and he will see dr on Thursday for it. He reports pain is keeping him up at night. Pain level: 8/10 L ankle  Objective: see flow sheet below for treatment       Assessment: Continued with hip strengthening exercises. Goals:   1. Pt will demonstrate knee flex/ext 3+/5 for ease with sit to stand transfer mod I from regular chair. 2.   Pt will demonstrate supine to sit transfer min Ax1 to mod I. - MET,pt able to transfer mod I. - MET, pt able to transfer supine to sit mod I  3. Pt will be able to ambulate household distances with least restrictive AD safely.     4.   Pt will be independent and compliant with comprehensive HEP to maintain progress achieved in PT.- MET    Plan: continue with LE strengthening exercises  Date: 1/26/2022  TX#: 2/8 Date: 1/31/22              TX#: 3/8 Date: 2/7/22            TX#: 4/8 Date: 2/10/22         TX#: 5/8 Date: 2/14/22  Tx#: 6/8   There ex:  nustep L1 x 10 min  Sit to stand from chair w/airex 5x2 2 hand pushoff  Hip add ball squeeze 5 secx10  LAQ [id number] R/L   There ex:  nustep L1 x 10 min  Sit to stand from chair w/airex 5x2 no UE support  Seated Hip flex w/o back support 15x R/L  LAQ w/o back support 15x R/L  Standing heel raises 10x  Standing hip abd/ext 10xea    There ex:  nustep L2 x 10 min  Supine leg press green TB 10x L/R  Bridges 10x2   There ex:  nustep L3 x 10 min  Bridges 10x2  Supine leg press green TB 10x2 L/R  sidelying clamshell 10x2 R/L  Sit to stand without hand suppport 10x  Standing hip abd 10x2 R/L   There ex:  nustep L3 x 10 min  Hooklying hip abd yellow TB 15x R/L  Bridge w/hip abd 15 R/L  Clamshell 15x R/L  Standing hip abd/ext 10x2ea R/L             Manual therapy:  Supine R medial knee STM     HEP: 1/26/22- sit to stand w/pad, hip add ball squeeze           1/31/22- sit to stand w/o UE support, seated hip flex/LAQ without back support            2/7/22- bridges, leg press green TB; hold LAQ/hip flex    Charges: TEx3   Total Timed Treatment: 40 min  Total Treatment Time: 40 min

## 2022-02-17 ENCOUNTER — OFFICE VISIT (OUTPATIENT)
Dept: PHYSICAL THERAPY | Age: 77
End: 2022-02-17
Attending: INTERNAL MEDICINE
Payer: MEDICARE

## 2022-02-17 PROCEDURE — 97110 THERAPEUTIC EXERCISES: CPT

## 2022-02-17 NOTE — PROGRESS NOTES
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 Sylvia Frausto MD visit:   Fall Risk: standard         Precautions: lung cancer, brain and bone mets, [place]    Subjective: Pt states that he saw  yesterday for his foot and was told he has an infection. He was given meds for it. Pain level: 7-8/10 L ankle  Objective: see flow sheet below for treatment   MMT: knee flex R 4-/5, L 4-/5                   Ext: R 4-/5, L 4-/5       Assessment: Progressed hip strengthening exercises and provided yellow TB for HEP. Goals:   1. Pt will demonstrate knee flex/ext 3+/5 for ease with sit to stand transfer mod I from regular chair. 2.   Pt will demonstrate supine to sit transfer min Ax1 to mod I. - MET,pt able to transfer mod I. - MET, pt able to transfer supine to sit mod I  3. Pt will be able to ambulate household distances with least restrictive AD safely.     4.   Pt will be independent and compliant with comprehensive HEP to maintain progress achieved in PT.- MET    Plan: continue with LE strengthening exercises  Date: 1/26/2022  TX#: 2/8 Date: 1/31/22              TX#: 3/8 Date: 2/7/22            TX#: 4/8 Date: 2/10/22         TX#: 5/8 Date: 2/14/22  Tx#: 6/8 Date: 2/17/22  Tx: 7/8   There ex:  nustep L1 x 10 min  Sit to stand from chair w/airex 5x2 2 hand pushoff  Hip add ball squeeze 5 secx10  LAQ [id number] R/L   There ex:  nustep L1 x 10 min  Sit to stand from chair w/airex 5x2 no UE support  Seated Hip flex w/o back support 15x R/L  LAQ w/o back support 15x R/L  Standing heel raises 10x  Standing hip abd/ext 10xea    There ex:  nustep L2 x 10 min  Supine leg press green TB 10x L/R  Bridges 10x2   There ex:  nustep L3 x 10 min  Bridges 10x2  Supine leg press green TB 10x2 L/R  sidelying clamshell 10x2 R/L  Sit to stand without hand suppport 10x  Standing hip abd 10x2 R/L   There ex:  nustep L3 x 10 min  Hooklying hip abd yellow TB 15x R/L  Bridge w/hip abd 15 R/L  Clamshell 15x R/L  Standing hip abd/ext 10x2ea R/L         There ex:  nustep L4 x 10 min  Standing hip abd/ext yellow TB 10x2 R/L  Standing heel raises 10x2  4\"step up 10xR  sidelying hip abd 10x2 R/L           Manual therapy:  Supine R medial knee STM      HEP: 1/26/22- sit to stand w/pad, hip add ball squeeze           1/31/22- sit to stand w/o UE support, seated hip flex/LAQ without back support            2/7/22- bridges, leg press green TB; hold LAQ/hip flex           2/17/22- yellow TB for hip abd/ext     Charges: TEx3   Total Timed Treatment: 40 min  Total Treatment Time: 40 min

## 2022-02-20 DIAGNOSIS — C34.12 PRIMARY CANCER OF LEFT UPPER LOBE OF LUNG (HCC): ICD-10-CM

## 2022-02-20 RX ORDER — ALPRAZOLAM 0.5 MG/1
0.5 TABLET ORAL 2 TIMES DAILY PRN
Qty: 60 TABLET | Refills: 1 | Status: SHIPPED | OUTPATIENT
Start: 2022-02-20 | End: 2022-05-14

## 2022-02-21 ENCOUNTER — OFFICE VISIT (OUTPATIENT)
Dept: PHYSICAL THERAPY | Age: 77
End: 2022-02-21
Attending: INTERNAL MEDICINE
Payer: MEDICARE

## 2022-02-21 PROCEDURE — 97110 THERAPEUTIC EXERCISES: CPT

## 2022-02-21 RX ORDER — PANTOPRAZOLE SODIUM 40 MG/1
40 TABLET, DELAYED RELEASE ORAL
Qty: 28 TABLET | Refills: 2 | Status: SHIPPED | OUTPATIENT
Start: 2022-02-21 | End: 2022-05-14

## 2022-02-21 NOTE — PROGRESS NOTES
ProgressSummary  Pt has attended 8 visits in Physical Therapy. 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 Catherine Frausto MD visit:   Fall Risk: standard         Precautions: lung cancer, brain and bone mets, [place]    Subjective: Pt states that left ankle is doing better with meds. Pain level: 6/10 L ankle  Objective: see flow sheet below for treatment     Strength/MMT: (* denotes performed with pain)  Hip Knee   Flexion: R 4-/5; L 4/5  Extension: R NT/5; L NT/5  Abduction: R 3-/5; L 3-/5 Flexion: R 4-/5; L 4-/5  Extension: R 3+/5; L 4-/5        Functional mobility:  Rolling: mod I  Supine to sit: mod I  Sit to stand: mod I   5 times sit to stand test: 27 sec  6 min walk test: 372 ft w/RW, 1 seated rest break      Assessment: Pt has attended 8 PT sessions for deconditioning and weakness. Pt presents with improved LE strength, improved transfers, decreased functional mobility testing. Pt is very motivated and compliant with HEP. Pt will benefit from continued skilled PT to continue to improve strength for functional mobility for an additional 8 visits. Goals:   1. Pt will demonstrate knee flex/ext 3+/5 for ease with sit to stand transfer mod I from regular chair.- MET  2. Pt will demonstrate supine to sit transfer min Ax1 to mod I. - MET,pt able to transfer mod I.   3.   Pt will be able to ambulate household distances with least restrictive AD safely.- MET  4. Pt will be independent and compliant with comprehensive HEP to maintain progress achieved in PT.- MET  New goal 2/21/22:  5. Pt will demonstrate 15 sec or less on 5 times sit to stand test for overall improved functional LE strength. 6. Pt will demonstrate improved ambulation >500 ft during 6 min walk test for improved endurance and activity tolerance in preparation for walking outside.        Plan: Continue skilled Physical Therapy 2 x/week or a total of 8 additional visits for a total of 16 visits over a 90 day period. Treatment will include: there ex, gait training, NMRE, HEP/pt education       Patient/Family/Caregiver was advised of these findings, precautions, and treatment options and has agreed to actively participate in planning and for this course of care. Thank you for your referral. If you have any questions, please contact me at Dept: [phone]. Sincerely,  Electronically signed by therapist: Radhika Bennett PT     Physician's certification required:  Yes  Please co-sign or sign and return this letter via fax as soon as possible to [phone]. I certify the need for these services furnished under this plan of treatment and while under my care.     X___________________________________________________ Date____________________    Certification From: 6/50/4587  To:5/22/2022   Date: 2/21/22  TX#: 8/8        There ex:  nustep L4 x 10 min  Re-assessment   sidelying hip abd 10x2 R/L  Seated PF green TB 20x R/L                HEP: 1/26/22- sit to stand w/pad, hip add ball squeeze           1/31/22- sit to stand w/o UE support, seated hip flex/LAQ without back support            2/7/22- bridges, leg press green TB; hold LAQ/hip flex           2/17/22- yellow TB for hip abd/ext            2/21/22- seated PF green TB     Charges: TEx3   Total Timed Treatment: 40 min  Total Treatment Time: 40 min

## 2022-02-22 ENCOUNTER — HOSPITAL ENCOUNTER (INPATIENT)
Facility: HOSPITAL | Age: 77
LOS: 2 days | Discharge: HOME | DRG: 683 | End: 2022-02-25
Attending: EMERGENCY MEDICINE | Admitting: HOSPITALIST
Payer: MEDICARE

## 2022-02-22 ENCOUNTER — TELEPHONE (OUTPATIENT)
Dept: HEMATOLOGY/ONCOLOGY | Facility: HOSPITAL | Age: 77
End: 2022-02-22

## 2022-02-22 ENCOUNTER — NURSE ONLY (OUTPATIENT)
Dept: HEMATOLOGY/ONCOLOGY | Facility: HOSPITAL | Age: 77
End: 2022-02-22
Attending: INTERNAL MEDICINE
Payer: MEDICARE

## 2022-02-22 ENCOUNTER — PATIENT MESSAGE (OUTPATIENT)
Dept: NEPHROLOGY | Facility: CLINIC | Age: 77
End: 2022-02-22

## 2022-02-22 VITALS
SYSTOLIC BLOOD PRESSURE: 130 MMHG | RESPIRATION RATE: 16 BRPM | HEART RATE: 81 BPM | TEMPERATURE: 98 F | DIASTOLIC BLOOD PRESSURE: 62 MMHG

## 2022-02-22 DIAGNOSIS — N17.9 ACUTE RENAL FAILURE (ARF) (HCC): ICD-10-CM

## 2022-02-22 DIAGNOSIS — E87.0 HYPERNATREMIA: ICD-10-CM

## 2022-02-22 DIAGNOSIS — Z51.81 MEDICATION MONITORING ENCOUNTER: ICD-10-CM

## 2022-02-22 DIAGNOSIS — N17.9 AKI (ACUTE KIDNEY INJURY) (HCC): Primary | ICD-10-CM

## 2022-02-22 DIAGNOSIS — D64.9 ANEMIA, UNSPECIFIED TYPE: ICD-10-CM

## 2022-02-22 DIAGNOSIS — C79.51 BONE METASTASIS (HCC): ICD-10-CM

## 2022-02-22 DIAGNOSIS — E87.6 HYPOKALEMIA: ICD-10-CM

## 2022-02-22 DIAGNOSIS — Z29.8 ENCOUNTER FOR IMMUNOTHERAPY: ICD-10-CM

## 2022-02-22 DIAGNOSIS — Z45.2 ENCOUNTER FOR CENTRAL LINE CARE: ICD-10-CM

## 2022-02-22 DIAGNOSIS — C34.12 PRIMARY CANCER OF LEFT UPPER LOBE OF LUNG (HCC): Primary | ICD-10-CM

## 2022-02-22 DIAGNOSIS — C79.31 BRAIN METASTASIS (HCC): ICD-10-CM

## 2022-02-22 LAB
ALBUMIN SERPL-MCNC: 2.7 G/DL (ref 3.4–5)
ALBUMIN/GLOB SERPL: 0.6 {RATIO} (ref 1–2)
ALP LIVER SERPL-CCNC: 66 U/L
ALT SERPL-CCNC: 24 U/L
ANION GAP SERPL CALC-SCNC: 11 MMOL/L (ref 0–18)
ANION GAP SERPL CALC-SCNC: 7 MMOL/L (ref 0–18)
AST SERPL-CCNC: 21 U/L (ref 15–37)
BASOPHILS # BLD AUTO: 0.03 X10(3) UL (ref 0–0.2)
BASOPHILS # BLD AUTO: 0.04 X10(3) UL (ref 0–0.2)
BASOPHILS NFR BLD AUTO: 0.5 %
BASOPHILS NFR BLD AUTO: 0.7 %
BILIRUB SERPL-MCNC: 0.3 MG/DL (ref 0.1–2)
BILIRUB UR QL: NEGATIVE
BUN BLD-MCNC: 47 MG/DL (ref 7–18)
BUN BLD-MCNC: 50 MG/DL (ref 7–18)
BUN/CREAT SERPL: 21.1 (ref 10–20)
BUN/CREAT SERPL: 23.3 (ref 10–20)
CALCIUM BLD-MCNC: 7.8 MG/DL (ref 8.5–10.1)
CALCIUM BLD-MCNC: 7.9 MG/DL (ref 8.5–10.1)
CHLORIDE SERPL-SCNC: 120 MMOL/L (ref 98–112)
CHLORIDE SERPL-SCNC: 124 MMOL/L (ref 98–112)
CLARITY UR: CLEAR
CO2 SERPL-SCNC: 15 MMOL/L (ref 21–32)
CO2 SERPL-SCNC: 17 MMOL/L (ref 21–32)
COLOR UR: YELLOW
CREAT BLD-MCNC: 2.15 MG/DL
CREAT BLD-MCNC: 2.23 MG/DL
CREAT UR-SCNC: 31.2 MG/DL
DEPRECATED RDW RBC AUTO: 60.5 FL (ref 35.1–46.3)
DEPRECATED RDW RBC AUTO: 61.3 FL (ref 35.1–46.3)
EOSINOPHIL # BLD AUTO: 0.18 X10(3) UL (ref 0–0.7)
EOSINOPHIL # BLD AUTO: 0.21 X10(3) UL (ref 0–0.7)
EOSINOPHIL NFR BLD AUTO: 2.8 %
EOSINOPHIL NFR BLD AUTO: 3.7 %
ERYTHROCYTE [DISTWIDTH] IN BLOOD BY AUTOMATED COUNT: 17.6 % (ref 11–15)
ERYTHROCYTE [DISTWIDTH] IN BLOOD BY AUTOMATED COUNT: 17.8 % (ref 11–15)
GLOBULIN PLAS-MCNC: 4.4 G/DL (ref 2.8–4.4)
GLUCOSE BLD-MCNC: 118 MG/DL (ref 70–99)
GLUCOSE BLD-MCNC: 119 MG/DL (ref 70–99)
GLUCOSE UR-MCNC: NEGATIVE MG/DL
HCT VFR BLD AUTO: 27 %
HCT VFR BLD AUTO: 28 %
HGB BLD-MCNC: 8.4 G/DL
HGB BLD-MCNC: 8.7 G/DL
IMM GRANULOCYTES # BLD AUTO: 0.02 X10(3) UL (ref 0–1)
IMM GRANULOCYTES # BLD AUTO: 0.03 X10(3) UL (ref 0–1)
IMM GRANULOCYTES NFR BLD: 0.3 %
IMM GRANULOCYTES NFR BLD: 0.5 %
KETONES UR-MCNC: NEGATIVE MG/DL
LYMPHOCYTES # BLD AUTO: 0.43 X10(3) UL (ref 1–4)
LYMPHOCYTES # BLD AUTO: 0.48 X10(3) UL (ref 1–4)
LYMPHOCYTES NFR BLD AUTO: 7.5 %
LYMPHOCYTES NFR BLD AUTO: 7.5 %
MAGNESIUM SERPL-MCNC: 2 MG/DL (ref 1.6–2.6)
MCH RBC QN AUTO: 28.9 PG (ref 26–34)
MCH RBC QN AUTO: 29.2 PG (ref 26–34)
MCHC RBC AUTO-ENTMCNC: 31.1 G/DL (ref 31–37)
MCHC RBC AUTO-ENTMCNC: 31.1 G/DL (ref 31–37)
MCV RBC AUTO: 93 FL
MCV RBC AUTO: 93.8 FL
MONOCYTES # BLD AUTO: 0.44 X10(3) UL (ref 0.1–1)
MONOCYTES # BLD AUTO: 0.44 X10(3) UL (ref 0.1–1)
MONOCYTES NFR BLD AUTO: 6.9 %
MONOCYTES NFR BLD AUTO: 7.7 %
NEUTROPHILS # BLD AUTO: 4.59 X10 (3) UL (ref 1.5–7.7)
NEUTROPHILS # BLD AUTO: 4.59 X10(3) UL (ref 1.5–7.7)
NEUTROPHILS # BLD AUTO: 5.2 X10 (3) UL (ref 1.5–7.7)
NEUTROPHILS # BLD AUTO: 5.2 X10(3) UL (ref 1.5–7.7)
NEUTROPHILS NFR BLD AUTO: 80.1 %
NEUTROPHILS NFR BLD AUTO: 81.8 %
NITRITE UR QL STRIP.AUTO: NEGATIVE
OSMOLALITY SERPL CALC.SUM OF ELEC: 315 MOSM/KG (ref 275–295)
OSMOLALITY SERPL CALC.SUM OF ELEC: 320 MOSM/KG (ref 275–295)
PH UR: 6 [PH] (ref 5–8)
PLATELET # BLD AUTO: 248 10(3)UL (ref 150–450)
PLATELET # BLD AUTO: 253 10(3)UL (ref 150–450)
POTASSIUM SERPL-SCNC: 2.9 MMOL/L (ref 3.5–5.1)
POTASSIUM SERPL-SCNC: 3.2 MMOL/L (ref 3.5–5.1)
PROT SERPL-MCNC: 7.1 G/DL (ref 6.4–8.2)
PROT UR-MCNC: 30 MG/DL
RBC # BLD AUTO: 2.88 X10(6)UL
SARS-COV-2 RNA RESP QL NAA+PROBE: NOT DETECTED
SODIUM SERPL-SCNC: 146 MMOL/L (ref 136–145)
SODIUM SERPL-SCNC: 148 MMOL/L (ref 136–145)
SODIUM SERPL-SCNC: 46 MMOL/L
SP GR UR STRIP: 1.01 (ref 1–1.03)
UROBILINOGEN UR STRIP-ACNC: <2
WBC # BLD AUTO: 5.7 X10(3) UL (ref 4–11)
WBC # BLD AUTO: 6.4 X10(3) UL (ref 4–11)

## 2022-02-22 PROCEDURE — 36591 DRAW BLOOD OFF VENOUS DEVICE: CPT

## 2022-02-22 PROCEDURE — 96372 THER/PROPH/DIAG INJ SC/IM: CPT

## 2022-02-22 PROCEDURE — 85025 COMPLETE CBC W/AUTO DIFF WBC: CPT

## 2022-02-22 PROCEDURE — 99222 1ST HOSP IP/OBS MODERATE 55: CPT | Performed by: HOSPITALIST

## 2022-02-22 PROCEDURE — 80053 COMPREHEN METABOLIC PANEL: CPT

## 2022-02-22 RX ORDER — SODIUM BICARBONATE 650 MG/1
650 TABLET ORAL 4 TIMES DAILY
Status: DISCONTINUED | OUTPATIENT
Start: 2022-02-22 | End: 2022-02-23

## 2022-02-22 RX ORDER — SODIUM CHLORIDE 9 MG/ML
10 INJECTION INTRAVENOUS ONCE
Status: CANCELLED | OUTPATIENT
Start: 2022-03-15

## 2022-02-22 RX ORDER — HEPARIN SODIUM (PORCINE) LOCK FLUSH IV SOLN 100 UNIT/ML 100 UNIT/ML
5 SOLUTION INTRAVENOUS ONCE
Status: CANCELLED | OUTPATIENT
Start: 2022-03-15

## 2022-02-22 RX ORDER — HEPARIN SODIUM (PORCINE) LOCK FLUSH IV SOLN 100 UNIT/ML 100 UNIT/ML
5 SOLUTION INTRAVENOUS ONCE
Status: COMPLETED | OUTPATIENT
Start: 2022-02-22 | End: 2022-02-22

## 2022-02-22 RX ORDER — POTASSIUM CHLORIDE 20 MEQ/1
40 TABLET, EXTENDED RELEASE ORAL ONCE
Status: COMPLETED | OUTPATIENT
Start: 2022-02-22 | End: 2022-02-22

## 2022-02-22 RX ADMIN — HEPARIN SODIUM (PORCINE) LOCK FLUSH IV SOLN 100 UNIT/ML 500 UNITS: 100 SOLUTION INTRAVENOUS at 11:35:00

## 2022-02-22 NOTE — TELEPHONE ENCOUNTER
[facility] called and notified that patient has critical K+ level 2.9. Dr. Ayleen Purcell notified. Will call patient with increased oral K+ dose for today as ordered by Dr. Ayleen Purcell.

## 2022-02-22 NOTE — TELEPHONE ENCOUNTER
Wife called and notified K+ level 2.9 today. Per wife patient taking oral potassium once a day. Instructed per Dr. Vivienne Sharma to take 2 oral potassium tonight and go back to taking oral potassium twice a day instead of once a day. Wife verbalizes understanding.

## 2022-02-22 NOTE — TELEPHONE ENCOUNTER
From: Kade Shah  To: Deric Benson MD  Sent: 2/22/2022 12:56 PM CST  Subject: Creatini level blood test    Good Afternoon. Koby Del Valle took a blood test this morning and his creatinine are back up to 2.20. Is there anything we need to do here?

## 2022-02-22 NOTE — TELEPHONE ENCOUNTER
Not only patient has OVI, but NA is up and potassium is down and bicarb is low     Wonder if patient is having GI loss     pls send to ED

## 2022-02-22 NOTE — TELEPHONE ENCOUNTER
Spoke with wife regarding message below. She states she is not comfortable with taking him to the ED because she did it one time before and they waited for 5 hours and were sent home. She states they did nothing for him. She was advised that this is the recommendation from both Dr. Noelle Bearden as well as AMARILIS Acevedo. She states if they are concerned with GI loss they can order GI tests for him to have done and she will take him tomorrow.  She was advised that this message will be sent over to the MD.

## 2022-02-22 NOTE — PROGRESS NOTES
Patient here to have Cbc,cmp drawn from [facility] followed by Tori Stoner. Patient with no complaints today. Patient denies dental issues or any scheduled dental procedures. Corrected calcium 9.88. Xgeva given subcutaneous in left lower abdomen. Tolerated injection well. No bleeding noted, site left open to air. Discharged ambulatory with quad cane, gait steady. AVS printed with future appointment made.

## 2022-02-23 ENCOUNTER — TELEPHONE (OUTPATIENT)
Dept: PHYSICAL THERAPY | Facility: HOSPITAL | Age: 77
End: 2022-02-23

## 2022-02-23 ENCOUNTER — TELEPHONE (OUTPATIENT)
Dept: HEMATOLOGY/ONCOLOGY | Facility: HOSPITAL | Age: 77
End: 2022-02-23

## 2022-02-23 PROBLEM — E87.6 HYPOKALEMIA: Status: ACTIVE | Noted: 2022-02-23

## 2022-02-23 PROBLEM — E87.0 HYPERNATREMIA: Status: ACTIVE | Noted: 2022-02-23

## 2022-02-23 LAB
ANION GAP SERPL CALC-SCNC: 7 MMOL/L (ref 0–18)
ANION GAP SERPL CALC-SCNC: 7 MMOL/L (ref 0–18)
BASOPHILS # BLD AUTO: 0.04 X10(3) UL (ref 0–0.2)
BASOPHILS NFR BLD AUTO: 0.8 %
BUN BLD-MCNC: 37 MG/DL (ref 7–18)
BUN BLD-MCNC: 44 MG/DL (ref 7–18)
BUN/CREAT SERPL: 18.2 (ref 10–20)
BUN/CREAT SERPL: 21.9 (ref 10–20)
CALCIUM BLD-MCNC: 7.8 MG/DL (ref 8.5–10.1)
CALCIUM BLD-MCNC: 7.9 MG/DL (ref 8.5–10.1)
CHLORIDE SERPL-SCNC: 122 MMOL/L (ref 98–112)
CHLORIDE SERPL-SCNC: 126 MMOL/L (ref 98–112)
CO2 SERPL-SCNC: 16 MMOL/L (ref 21–32)
CO2 SERPL-SCNC: 16 MMOL/L (ref 21–32)
CREAT BLD-MCNC: 2.01 MG/DL
CREAT BLD-MCNC: 2.03 MG/DL
DEPRECATED RDW RBC AUTO: 60.2 FL (ref 35.1–46.3)
EOSINOPHIL # BLD AUTO: 0.2 X10(3) UL (ref 0–0.7)
EOSINOPHIL NFR BLD AUTO: 3.8 %
ERYTHROCYTE [DISTWIDTH] IN BLOOD BY AUTOMATED COUNT: 17.6 % (ref 11–15)
GLUCOSE BLD-MCNC: 174 MG/DL (ref 70–99)
GLUCOSE BLD-MCNC: 93 MG/DL (ref 70–99)
HCT VFR BLD AUTO: 24.2 %
HGB BLD-MCNC: 7.8 G/DL
IMM GRANULOCYTES # BLD AUTO: 0.02 X10(3) UL (ref 0–1)
IMM GRANULOCYTES NFR BLD: 0.4 %
LYMPHOCYTES NFR BLD AUTO: 7.9 %
MCH RBC QN AUTO: 29.9 PG (ref 26–34)
MCHC RBC AUTO-ENTMCNC: 32.2 G/DL (ref 31–37)
MCV RBC AUTO: 92.7 FL
MONOCYTES # BLD AUTO: 0.45 X10(3) UL (ref 0.1–1)
MONOCYTES NFR BLD AUTO: 8.6 %
NEUTROPHILS # BLD AUTO: 4.1 X10 (3) UL (ref 1.5–7.7)
NEUTROPHILS # BLD AUTO: 4.1 X10(3) UL (ref 1.5–7.7)
NEUTROPHILS NFR BLD AUTO: 78.5 %
OSMOLALITY SERPL CALC.SUM OF ELEC: 313 MOSM/KG (ref 275–295)
OSMOLALITY SERPL CALC.SUM OF ELEC: 319 MOSM/KG (ref 275–295)
PLATELET # BLD AUTO: 230 10(3)UL (ref 150–450)
POTASSIUM SERPL-SCNC: 3.2 MMOL/L (ref 3.5–5.1)
POTASSIUM SERPL-SCNC: 3.7 MMOL/L (ref 3.5–5.1)
RBC # BLD AUTO: 2.61 X10(6)UL
SODIUM SERPL-SCNC: 145 MMOL/L (ref 136–145)
SODIUM SERPL-SCNC: 149 MMOL/L (ref 136–145)
WBC # BLD AUTO: 5.2 X10(3) UL (ref 4–11)

## 2022-02-23 PROCEDURE — 99233 SBSQ HOSP IP/OBS HIGH 50: CPT | Performed by: HOSPITALIST

## 2022-02-23 PROCEDURE — 99223 1ST HOSP IP/OBS HIGH 75: CPT | Performed by: INTERNAL MEDICINE

## 2022-02-23 RX ORDER — FINASTERIDE 5 MG/1
5 TABLET, FILM COATED ORAL DAILY
Status: DISCONTINUED | OUTPATIENT
Start: 2022-02-23 | End: 2022-02-25

## 2022-02-23 RX ORDER — IPRATROPIUM BROMIDE AND ALBUTEROL SULFATE 2.5; .5 MG/3ML; MG/3ML
3 SOLUTION RESPIRATORY (INHALATION)
Status: DISCONTINUED | OUTPATIENT
Start: 2022-02-23 | End: 2022-02-25

## 2022-02-23 RX ORDER — ALPRAZOLAM 0.5 MG/1
0.5 TABLET ORAL 2 TIMES DAILY PRN
Status: DISCONTINUED | OUTPATIENT
Start: 2022-02-23 | End: 2022-02-25

## 2022-02-23 RX ORDER — PREDNISONE 1 MG/1
20 TABLET ORAL 2 TIMES DAILY WITH MEALS
Status: DISCONTINUED | OUTPATIENT
Start: 2022-02-23 | End: 2022-02-25

## 2022-02-23 RX ORDER — IPRATROPIUM BROMIDE AND ALBUTEROL SULFATE 2.5; .5 MG/3ML; MG/3ML
SOLUTION RESPIRATORY (INHALATION)
Status: DISPENSED
Start: 2022-02-23 | End: 2022-02-24

## 2022-02-23 RX ORDER — DEXTROSE MONOHYDRATE 50 MG/ML
INJECTION, SOLUTION INTRAVENOUS CONTINUOUS
Status: DISCONTINUED | OUTPATIENT
Start: 2022-02-23 | End: 2022-02-23

## 2022-02-23 RX ORDER — ACETAMINOPHEN 325 MG/1
650 TABLET ORAL EVERY 6 HOURS PRN
Status: DISCONTINUED | OUTPATIENT
Start: 2022-02-23 | End: 2022-02-25

## 2022-02-23 RX ORDER — ONDANSETRON 2 MG/ML
4 INJECTION INTRAMUSCULAR; INTRAVENOUS EVERY 6 HOURS PRN
Status: DISCONTINUED | OUTPATIENT
Start: 2022-02-23 | End: 2022-02-25

## 2022-02-23 RX ORDER — IPRATROPIUM BROMIDE AND ALBUTEROL SULFATE 2.5; .5 MG/3ML; MG/3ML
3 SOLUTION RESPIRATORY (INHALATION) EVERY 6 HOURS PRN
Status: DISCONTINUED | OUTPATIENT
Start: 2022-02-23 | End: 2022-02-23

## 2022-02-23 RX ORDER — POTASSIUM CHLORIDE 1.5 G/1.77G
20 POWDER, FOR SOLUTION ORAL 2 TIMES DAILY
Status: DISCONTINUED | OUTPATIENT
Start: 2022-02-23 | End: 2022-02-25

## 2022-02-23 RX ORDER — ZOLPIDEM TARTRATE 5 MG/1
5 TABLET ORAL NIGHTLY PRN
Status: DISCONTINUED | OUTPATIENT
Start: 2022-02-23 | End: 2022-02-25

## 2022-02-23 RX ORDER — POTASSIUM CHLORIDE 1.5 G/1.77G
40 POWDER, FOR SOLUTION ORAL ONCE
Status: COMPLETED | OUTPATIENT
Start: 2022-02-23 | End: 2022-02-23

## 2022-02-23 RX ORDER — ASPIRIN 81 MG/1
81 TABLET, CHEWABLE ORAL DAILY
Status: DISCONTINUED | OUTPATIENT
Start: 2022-02-23 | End: 2022-02-25

## 2022-02-23 RX ORDER — ATORVASTATIN CALCIUM 20 MG/1
20 TABLET, FILM COATED ORAL NIGHTLY
Status: DISCONTINUED | OUTPATIENT
Start: 2022-02-23 | End: 2022-02-25

## 2022-02-23 RX ORDER — HYDROCODONE BITARTRATE AND ACETAMINOPHEN 5; 325 MG/1; MG/1
1 TABLET ORAL EVERY 4 HOURS PRN
Status: DISCONTINUED | OUTPATIENT
Start: 2022-02-23 | End: 2022-02-25

## 2022-02-23 RX ORDER — LOPERAMIDE HYDROCHLORIDE 2 MG/1
2 CAPSULE ORAL 4 TIMES DAILY PRN
Status: DISCONTINUED | OUTPATIENT
Start: 2022-02-23 | End: 2022-02-25

## 2022-02-23 RX ORDER — HYDRALAZINE HYDROCHLORIDE 20 MG/ML
10 INJECTION INTRAMUSCULAR; INTRAVENOUS EVERY 4 HOURS PRN
Status: DISCONTINUED | OUTPATIENT
Start: 2022-02-23 | End: 2022-02-25

## 2022-02-23 RX ORDER — POTASSIUM CHLORIDE 1.5 G/1.77G
20 POWDER, FOR SOLUTION ORAL ONCE
Status: COMPLETED | OUTPATIENT
Start: 2022-02-23 | End: 2022-02-23

## 2022-02-23 RX ORDER — ALBUTEROL SULFATE 90 UG/1
2 AEROSOL, METERED RESPIRATORY (INHALATION) EVERY 6 HOURS PRN
Status: DISCONTINUED | OUTPATIENT
Start: 2022-02-23 | End: 2022-02-25

## 2022-02-23 RX ORDER — PANTOPRAZOLE SODIUM 40 MG/1
40 TABLET, DELAYED RELEASE ORAL
Status: DISCONTINUED | OUTPATIENT
Start: 2022-02-23 | End: 2022-02-25

## 2022-02-23 RX ORDER — HEPARIN SODIUM 5000 [USP'U]/ML
5000 INJECTION, SOLUTION INTRAVENOUS; SUBCUTANEOUS EVERY 12 HOURS SCHEDULED
Status: DISCONTINUED | OUTPATIENT
Start: 2022-02-23 | End: 2022-02-25

## 2022-02-23 RX ORDER — TERAZOSIN 5 MG/1
5 CAPSULE ORAL DAILY
Status: DISCONTINUED | OUTPATIENT
Start: 2022-02-23 | End: 2022-02-25

## 2022-02-23 RX ORDER — AMLODIPINE BESYLATE 5 MG/1
5 TABLET ORAL DAILY
Status: DISCONTINUED | OUTPATIENT
Start: 2022-02-23 | End: 2022-02-25

## 2022-02-23 NOTE — TELEPHONE ENCOUNTER
Called returned. D/w his wife results of tests and that kidney issues may still be from immunotherapy.

## 2022-02-23 NOTE — PLAN OF CARE
Matthew Caldwell was admitted from ED at approx 0125. Awake & alert oriented x 4. Denies pain or any other complaints. Seen by Dr. Deana Dias at bedside aware of admit labs and treatment in ED. Up with 1 assist and patient's own cane. Rec'd with right chest port accessed. Started on IVF. Reviewed safety plan with Matthew Caldwell. Ibed awareness and bed alarm active. Room close to nurses station. Severely [place]. RIght Cochlear implant present. CBC and BMP to be drawn this am. Eventual DC plan to home, date TBD. Problem: Patient Centered Care  Goal: Patient preferences are identified and integrated in the patient's plan of care  Description: Interventions:  - What would you like us to know as we care for you? I can't hear very well.   - Provide timely, complete, and accurate information to patient/family  - Incorporate patient and family knowledge, values, beliefs, and cultural backgrounds into the planning and delivery of care  - Encourage patient/family to participate in care and decision-making at the level they choose  - Honor patient and family perspectives and choices  Outcome: Progressing     Problem: Patient/Family Goals  Goal: Patient/Family Long Term Goal  Description: Patient's Long Term Goal: Return to home    Interventions:  - Lab work, Start IVF  - See additional Care Plan goals for specific interventions  Outcome: Progressing  Goal: Patient/Family Short Term Goal  Description: Patient's Short Term Goal: Get some sleep    Interventions:   -   - See additional Care Plan goals for specific interventions  Outcome: Progressing     Problem: METABOLIC/FLUID AND ELECTROLYTES - ADULT  Goal: Electrolytes maintained within normal limits  Description: INTERVENTIONS:  - Monitor labs and rhythm and assess patient for signs and symptoms of electrolyte imbalances  - Administer electrolyte replacement as ordered  - Monitor response to electrolyte replacements, including rhythm and repeat lab results as appropriate  - Fluid restriction as ordered  - Instruct patient on fluid and nutrition restrictions as appropriate  Outcome: Progressing  Goal: Hemodynamic stability and optimal renal function maintained  Description: INTERVENTIONS:  - Monitor labs and assess for signs and symptoms of volume excess or deficit  - Monitor intake, output and patient weight  - Monitor urine specific gravity, serum osmolarity and serum sodium as indicated or ordered  - Monitor response to interventions for patient's volume status, including labs, urine output, blood pressure (other measures as available)  - Encourage oral intake as appropriate  - Instruct patient on fluid and nutrition restrictions as appropriate  Outcome: Progressing     Problem: SAFETY ADULT - FALL  Goal: Free from fall injury  Description: INTERVENTIONS:  - Assess pt frequently for physical needs  - Identify cognitive and physical deficits and behaviors that affect risk of falls.   - [place] fall precautions as indicated by assessment.  - Educate pt/family on patient safety including physical limitations  - Instruct pt to call for assistance with activity based on assessment  - Modify environment to reduce risk of injury  - Provide assistive devices as appropriate  - Consider OT/PT consult to assist with strengthening/mobility  - Encourage toileting schedule  Outcome: Progressing     Problem: DISCHARGE PLANNING  Goal: Discharge to home or other facility with appropriate resources  Description: INTERVENTIONS:  - Identify barriers to discharge w/pt and caregiver  - Include patient/family/discharge partner in discharge planning  - Arrange for needed discharge resources and transportation as appropriate  - Identify discharge learning needs (meds, wound care, etc)  - Arrange for interpreters to assist at discharge as needed  - Consider post-discharge preferences of patient/family/discharge partner  - Complete POLST form as appropriate  - Assess patient's ability to be responsible for managing their own health  - Refer to Case Management Department for coordinating discharge planning if the patient needs post-hospital services based on physician/LIP order or complex needs related to functional status, cognitive ability or social support system  Outcome: Progressing

## 2022-02-23 NOTE — ED QUICK NOTES
Orders for admission, patient is aware of plan and ready to go upstairs. Any questions, please call ED RN Bam Castro at extension [phone]. Patient Covid vaccination status: Fully vaccinated and immunocompromised     COVID Test Ordered in ED: Rapid SARS-CoV-2 by PCR    COVID Suspicion at Admission: N/A    Running Infusions:  None    Mental Status/LOC at time of transport: Aox3    Other pertinent information: patient hard of hearing.  Port to right chest.     CIWA score: N/A   NIH score:  N/A

## 2022-02-23 NOTE — PLAN OF CARE
Problem: METABOLIC/FLUID AND ELECTROLYTES - ADULT  Goal: Electrolytes maintained within normal limits  Description: INTERVENTIONS:  - Monitor labs and rhythm and assess patient for signs and symptoms of electrolyte imbalances  - Administer electrolyte replacement as ordered  - Monitor response to electrolyte replacements, including rhythm and repeat lab results as appropriate  - Fluid restriction as ordered  - Instruct patient on fluid and nutrition restrictions as appropriate  Outcome: Progressing  Goal: Hemodynamic stability and optimal renal function maintained  Description: INTERVENTIONS:  - Monitor labs and assess for signs and symptoms of volume excess or deficit  - Monitor intake, output and patient weight  - Monitor urine specific gravity, serum osmolarity and serum sodium as indicated or ordered  - Monitor response to interventions for patient's volume status, including labs, urine output, blood pressure (other measures as available)  - Encourage oral intake as appropriate  - Instruct patient on fluid and nutrition restrictions as appropriate  Outcome: Progressing     Problem: SAFETY ADULT - FALL  Goal: Free from fall injury  Description: INTERVENTIONS:  - Assess pt frequently for physical needs  - Identify cognitive and physical deficits and behaviors that affect risk of falls.   - [place] fall precautions as indicated by assessment.  - Educate pt/family on patient safety including physical limitations  - Instruct pt to call for assistance with activity based on assessment  - Modify environment to reduce risk of injury  - Provide assistive devices as appropriate  - Consider OT/PT consult to assist with strengthening/mobility  - Encourage toileting schedule  Outcome: Progressing     Problem: DISCHARGE PLANNING  Goal: Discharge to home or other facility with appropriate resources  Description: INTERVENTIONS:  - Identify barriers to discharge w/pt and caregiver  - Include patient/family/discharge partner in discharge planning  - Arrange for needed discharge resources and transportation as appropriate  - Identify discharge learning needs (meds, wound care, etc)  - Arrange for interpreters to assist at discharge as needed  - Consider post-discharge preferences of patient/family/discharge partner  - Complete POLST form as appropriate  - Assess patient's ability to be responsible for managing their own health  - Refer to Case Management Department for coordinating discharge planning if the patient needs post-hospital services based on physician/LIP order or complex needs related to functional status, cognitive ability or social support system  Outcome: Progressing   Pt alert and oriented , hard of hearing both ear, hearing and cochlear implant to rt ear. Up to bath room for voiding. K level was 3.2 and k replacement done per protocol. wife at bed side . call light with in reach and instructed to call for assistance

## 2022-02-23 NOTE — ED QUICK NOTES
Care endorsed to [facility]. Patient awaiting bed assignment. Family remains at bedside. Medications reviewed, updated in system. No other current needs.

## 2022-02-23 NOTE — ED INITIAL ASSESSMENT (HPI)
Pt was sent over by primary for abnormal labs. Wife states his sodium, potassium, and creatinine were abnormal. Pts wife states renal doc thinks the issue may be gastro related.  Pt asymptomatic

## 2022-02-23 NOTE — ED QUICK NOTES
Assumed care of patient at this time. Patient is resting comfortably on stretcher, labs are pending. No current needs.

## 2022-02-23 NOTE — TELEPHONE ENCOUNTER
Patients wife calling and stated Rolanda Goode is in [facility]  2/22/2022 - present (1 day)  [facility]    Was told that all of his number are too high ore to low    But hemoglobins is not low enough yet for blood transfusion. Wife is very frustrated- as she ca never get a direct answer as to what the issues are. Stated last time he was in- she really never knew why. Stated he looks godd  And is felling pretty good.     She is asking if Dr Jeff Moreno ca please call her

## 2022-02-24 ENCOUNTER — APPOINTMENT (OUTPATIENT)
Dept: PHYSICAL THERAPY | Age: 77
End: 2022-02-24
Attending: INTERNAL MEDICINE
Payer: MEDICARE

## 2022-02-24 LAB
ALBUMIN SERPL-MCNC: 2.4 G/DL (ref 3.4–5)
ANION GAP SERPL CALC-SCNC: 7 MMOL/L (ref 0–18)
BUN BLD-MCNC: 34 MG/DL (ref 7–18)
BUN/CREAT SERPL: 17.7 (ref 10–20)
CALCIUM BLD-MCNC: 7.5 MG/DL (ref 8.5–10.1)
CHLORIDE SERPL-SCNC: 124 MMOL/L (ref 98–112)
CO2 SERPL-SCNC: 16 MMOL/L (ref 21–32)
CREAT BLD-MCNC: 1.92 MG/DL
DEPRECATED RDW RBC AUTO: 59.7 FL (ref 35.1–46.3)
ERYTHROCYTE [DISTWIDTH] IN BLOOD BY AUTOMATED COUNT: 17.8 % (ref 11–15)
EST. AVERAGE GLUCOSE BLD GHB EST-MCNC: 103 MG/DL (ref 68–126)
GLUCOSE BLD-MCNC: 135 MG/DL (ref 70–99)
HBA1C MFR BLD: 5.2 % (ref ?–5.7)
HCT VFR BLD AUTO: 25.3 %
HGB BLD-MCNC: 7.9 G/DL
IRON SATN MFR SERPL: 19 %
IRON SERPL-MCNC: 37 UG/DL
MAGNESIUM SERPL-MCNC: 1.7 MG/DL (ref 1.6–2.6)
MCH RBC QN AUTO: 28.9 PG (ref 26–34)
MCHC RBC AUTO-ENTMCNC: 31.2 G/DL (ref 31–37)
MCV RBC AUTO: 92.7 FL
OSMOLALITY SERPL CALC.SUM OF ELEC: 314 MOSM/KG (ref 275–295)
PHOSPHATE SERPL-MCNC: 2.6 MG/DL (ref 2.5–4.9)
PLATELET # BLD AUTO: 229 10(3)UL (ref 150–450)
POTASSIUM SERPL-SCNC: 3.7 MMOL/L (ref 3.5–5.1)
RBC # BLD AUTO: 2.73 X10(6)UL
SODIUM SERPL-SCNC: 147 MMOL/L (ref 136–145)
TIBC SERPL-MCNC: 192 UG/DL (ref 240–450)
TRANSFERRIN SERPL-MCNC: 129 MG/DL (ref 200–360)
WBC # BLD AUTO: 5.1 X10(3) UL (ref 4–11)

## 2022-02-24 PROCEDURE — 99233 SBSQ HOSP IP/OBS HIGH 50: CPT | Performed by: HOSPITALIST

## 2022-02-24 PROCEDURE — 99233 SBSQ HOSP IP/OBS HIGH 50: CPT | Performed by: INTERNAL MEDICINE

## 2022-02-24 RX ORDER — MAGNESIUM OXIDE 400 MG (241.3 MG MAGNESIUM) TABLET
400 TABLET ONCE
Status: COMPLETED | OUTPATIENT
Start: 2022-02-24 | End: 2022-02-24

## 2022-02-24 RX ORDER — SODIUM BICARBONATE 650 MG/1
650 TABLET ORAL 2 TIMES DAILY
Qty: 14 TABLET | Refills: 0 | Status: SHIPPED | OUTPATIENT
Start: 2022-02-24 | End: 2022-03-03

## 2022-02-24 RX ORDER — PREDNISONE 20 MG/1
20 TABLET ORAL 2 TIMES DAILY
Qty: 14 TABLET | Refills: 0 | Status: SHIPPED | OUTPATIENT
Start: 2022-02-24 | End: 2022-02-25

## 2022-02-24 RX ORDER — MELATONIN
325
Status: DISCONTINUED | OUTPATIENT
Start: 2022-02-25 | End: 2022-02-25

## 2022-02-24 NOTE — PLAN OF CARE
On 2/23/22, came from home with spouse to ER with abnormal labs. Today, up and about with standby assist with cane. Says he is planning to go home today or soon he stated? Chest port accessed with IV infusing, labs drawn from it and sent by [address] Choi RN. Right hearing aid on- connected to his phone he stated. Per Del[address] Choi RN , Dr Virgen Sanches will call the spouse this afternoon when he comes to see patient. Voiding. Up and about, in chair for now. Right hearing aid in place. Chest port accessed and fluids infusing. Spouse present when MD rounded and questions answered and aware of plan. Problem: Patient Centered Care  Goal: Patient preferences are identified and integrated in the patient's plan of care  Description: Interventions:  - What would you like us to know as we care for you?  *planning on going home soon**  - Provide timely, complete, and accurate information to patient/family  - Incorporate patient and family knowledge, values, beliefs, and cultural backgrounds into the planning and delivery of care  - Encourage patient/family to participate in care and decision-making at the level they choose  - Honor patient and family perspectives and choices  Outcome: Progressing     Problem: Patient/Family Goals  Goal: Patient/Family Long Term Goal  Description: Patient's Long Term Goal: *Recover at home**    Interventions:  - SW for dc planning if needed  - See additional Care Plan goals for specific interventions  Outcome: Progressing  Goal: Patient/Family Short Term Goal  Description: Patient's Short Term Goal: Get lab values addressed/improved    Interventions:   - labs drawn per MD orders/IV infusing  - See additional Care Plan goals for specific interventions  Outcome: Progressing     Problem: METABOLIC/FLUID AND ELECTROLYTES - ADULT  Goal: Electrolytes maintained within normal limits  Description: INTERVENTIONS:  - Monitor labs and rhythm and assess patient for signs and symptoms of electrolyte imbalances  - Administer electrolyte replacement as ordered  - Monitor response to electrolyte replacements, including rhythm and repeat lab results as appropriate  - Fluid restriction as ordered  - Instruct patient on fluid and nutrition restrictions as appropriate  Outcome: Progressing  Goal: Hemodynamic stability and optimal renal function maintained  Description: INTERVENTIONS:  - Monitor labs and assess for signs and symptoms of volume excess or deficit  - Monitor intake, output and patient weight  - Monitor urine specific gravity, serum osmolarity and serum sodium as indicated or ordered  - Monitor response to interventions for patient's volume status, including labs, urine output, blood pressure (other measures as available)  - Encourage oral intake as appropriate  - Instruct patient on fluid and nutrition restrictions as appropriate  Outcome: Progressing     Problem: SAFETY ADULT - FALL  Goal: Free from fall injury  Description: INTERVENTIONS:  - Assess pt frequently for physical needs  - Identify cognitive and physical deficits and behaviors that affect risk of falls.   - [place] fall precautions as indicated by assessment.  - Educate pt/family on patient safety including physical limitations  - Instruct pt to call for assistance with activity based on assessment  - Modify environment to reduce risk of injury  - Provide assistive devices as appropriate  - Consider OT/PT consult to assist with strengthening/mobility  - Encourage toileting schedule  Outcome: Progressing     Problem: DISCHARGE PLANNING  Goal: Discharge to home or other facility with appropriate resources  Description: INTERVENTIONS:  - Identify barriers to discharge w/pt and caregiver  - Include patient/family/discharge partner in discharge planning  - Arrange for needed discharge resources and transportation as appropriate  - Identify discharge learning needs (meds, wound care, etc)  - Arrange for interpreters to assist at discharge as needed  - Consider post-discharge preferences of patient/family/discharge partner  - Complete POLST form as appropriate  - Assess patient's ability to be responsible for managing their own health  - Refer to Case Management Department for coordinating discharge planning if the patient needs post-hospital services based on physician/LIP order or complex needs related to functional status, cognitive ability or social support system  Outcome: Progressing

## 2022-02-24 NOTE — CONSULTS
[facility]    PATIENT'S NAME: Frank Pressley   ATTENDING PHYSICIAN: Kali Ryder MD   CONSULTING PHYSICIAN: Osman Clarke. Bianca Alexander MD   PATIENT ACCOUNT#:   [id number]    LOCATION:  [address] [place] [address] RECORD #:   [id number]       YOB: 1945  ADMISSION DATE:       02/22/2022      CONSULT DATE:  02/23/2022    REPORT OF CONSULTATION    HISTORY OF PRESENT ILLNESS:  I was asked to see this patient. He is a 78-year-old, very ill, white gentleman. The patient sees Dr. Lidia Cat for oncology. The patient has been receiving chemo with pemetrexed and pembrolizumab. Last dose was on February 16, 2021, however, so it has been over a year. The patient had acute renal failure which was thought to be due to [facility] back in December. The patient's baseline creatinine is about 1.2. It went up to 4.0 back in December 2021. Dr. Mica Torres did a complete workup in the hospital of the patient. Patient's urine eosinophils showed 2% and she placed him on steroids. The patient has a history of metastatic lung cancer, malignant pericardial effusion, brain and bone METS. He is also hard of hearing. Has a history of hypothyroidism. He says he is no longer receiving any chemo through his port. The last time he saw Dr. Evie Hines appears to be on December 28, 2021. The patient also received carboplatin. The patient has had radiation to his brain for his cancer. His disease apparently has been fairly stable. 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 He is off steroids and they stopped sometime in January. He did respond to radiation therapy recently. He did have a video visit with Dr. Mica Torres recently and she felt he was doing okay. At that time, he was off his [facility] and she felt his interstitial nephritis was getting better. She stopped his prednisone completely at the end of January.   She says his blood pressure was doing pretty well at home on amlodipine and Toprol. The patient's last electrolyte lab that I saw recently was on , at which time BUN was 38, creatinine 1.15. Yesterday as an outpatient, creatinine was up to 2.23 with a K of 2.9 and a bicarb of 15. The patient was admitted. PAST MEDICAL HISTORY:  He has had a heart attack, hypertension, high cholesterol, adenocarcinoma of the lung that is metastatic. PAST SURGICAL HISTORY:  Brain surgery, carpal tunnel surgery, coronary artery bypass. MEDICATIONS:  At home included Synthroid, terazosin, finasteride, amlodipine, Lipitor, Toprol, Protonix, and he should have been on potassium also, 20 b.i.d. He is on no diuretics to my knowledge. Here in the hospital he is on all those same medications. He was given potassium replacement and he was put on sodium bicarb 4 times a day. SOCIAL HISTORY:  He is a former smoker, but not currently smoking. FAMILY HISTORY:  Both parents are . REVIEW OF SYSTEMS:  Currently feels well except hard of hearing. Wants to go home. Says he is urinating fine. No chest pain or shortness of breath. No problem with his bowels. All other review of systems is negative. PHYSICAL EXAMINATION:    GENERAL:  He feels fine, sitting in bed. VITAL SIGNS:  Currently, the patient's blood pressure is 127/51, pulse of 80. NECK:  Supple. LUNGS:  Clear. HEART:  Regular rate and rhythm, 2/6 murmur. ABDOMEN:  Soft. EXTREMITIES:  Without edema. NEUROLOGIC:  Intact except hard of hearing. SKIN:  Intact. LABORATORY DATA:  Urine specific gravity 1.008, pH of 6, 21 to 50 white cells, 3 to 5 red cells. Urine sodium is 46. Urine culture so far is negative. CBC:  White count 5, hemoglobin 7, hematocrit 24. Electrolytes:  Sodium 145, potassium 3.7, chloride 122, bicarb 16, BUN 37, creatinine 2. IMPRESSION:    1. Metabolic acidosis most likely due to acute renal failure.   His urine eosinophils are 4%. Recommend giving prednisone 20 p.o. b.i.d. again. The patient had been recently on some antibiotics. We will stop any antibiotics and we will give him prednisone 20 p.o. b.i.d. He is on a proton pump inhibitor. 2.   Metabolic acidosis. Start some IV fluids with bicarb, discontinue oral bicarb. 3.   Metastatic adenocarcinoma of the lung. Per Dr. Sary Jo. 4.   Anemia, most likely due to chronic disease. Transfuse as needed. The patient is a full code. We will discuss plans with wife tomorrow. We will follow with you. Watch potassium level,    Dictated By Melissa Mary.  Virgen Sanches MD  d: 02/23/2022 23:23:36  t: 02/23/2022 23:33:15  Job [id number]  NEEL/

## 2022-02-24 NOTE — PLAN OF CARE
Problem: Patient Centered Care  Goal: Patient preferences are identified and integrated in the patient's plan of care  Description: Interventions:  - What would you like us to know as we care for you?   - Provide timely, complete, and accurate information to patient/family  - Incorporate patient and family knowledge, values, beliefs, and cultural backgrounds into the planning and delivery of care  - Encourage patient/family to participate in care and decision-making at the level they choose  - Honor patient and family perspectives and choices  Outcome: Progressing     Problem: METABOLIC/FLUID AND ELECTROLYTES - ADULT  Goal: Electrolytes maintained within normal limits  Description: INTERVENTIONS:  - Monitor labs and rhythm and assess patient for signs and symptoms of electrolyte imbalances  - Administer electrolyte replacement as ordered  - Monitor response to electrolyte replacements, including rhythm and repeat lab results as appropriate  - Fluid restriction as ordered  - Instruct patient on fluid and nutrition restrictions as appropriate  Outcome: Progressing  Goal: Hemodynamic stability and optimal renal function maintained  Description: INTERVENTIONS:  - Monitor labs and assess for signs and symptoms of volume excess or deficit  - Monitor intake, output and patient weight  - Monitor urine specific gravity, serum osmolarity and serum sodium as indicated or ordered  - Monitor response to interventions for patient's volume status, including labs, urine output, blood pressure (other measures as available)  - Encourage oral intake as appropriate  - Instruct patient on fluid and nutrition restrictions as appropriate  Outcome: Progressing     Problem: SAFETY ADULT - FALL  Goal: Free from fall injury  Description: INTERVENTIONS:  - Assess pt frequently for physical needs  - Identify cognitive and physical deficits and behaviors that affect risk of falls. - [place] fall precautions as indicated by assessment.   - Educate pt/family on patient safety including physical limitations  - Instruct pt to call for assistance with activity based on assessment  - Modify environment to reduce risk of injury  - Provide assistive devices as appropriate  - Consider OT/PT consult to assist with strengthening/mobility  - Encourage toileting schedule  Outcome: Progressing     Problem: DISCHARGE PLANNING  Goal: Discharge to home or other facility with appropriate resources  Description: INTERVENTIONS:  - Identify barriers to discharge w/pt and caregiver  - Include patient/family/discharge partner in discharge planning  - Arrange for needed discharge resources and transportation as appropriate  - Identify discharge learning needs (meds, wound care, etc)  - Arrange for interpreters to assist at discharge as needed  - Consider post-discharge preferences of patient/family/discharge partner  - Complete POLST form as appropriate  - Assess patient's ability to be responsible for managing their own health  - Refer to Case Management Department for coordinating discharge planning if the patient needs post-hospital services based on physician/LIP order or complex needs related to functional status, cognitive ability or social support system  Outcome: Progressing     Wife, Roma Magdaleno, was called twice to update her on patients status and current interventions that were taking place. Pt alert and oriented. Very hard of hearing. Ambulates with stand by assist and the cane. R chest port accessed. Heparin for DVT prophylaxis. Denies pain/discomfort. Fluids were changed to D5 with sodium bicarb. Prednisone started per Dr. aMrlene [place]. Call light within reach. Bed in lowest and locked position. Plan for home.

## 2022-02-25 VITALS
WEIGHT: 172 LBS | SYSTOLIC BLOOD PRESSURE: 138 MMHG | DIASTOLIC BLOOD PRESSURE: 58 MMHG | OXYGEN SATURATION: 99 % | HEIGHT: 69 IN | BODY MASS INDEX: 25.48 KG/M2 | RESPIRATION RATE: 18 BRPM | HEART RATE: 74 BPM | TEMPERATURE: 98 F

## 2022-02-25 LAB
ALBUMIN SERPL-MCNC: 2.4 G/DL (ref 3.4–5)
ANION GAP SERPL CALC-SCNC: 5 MMOL/L (ref 0–18)
BUN/CREAT SERPL: 17.7 (ref 10–20)
CALCIUM BLD-MCNC: 6.9 MG/DL (ref 8.5–10.1)
CHLORIDE SERPL-SCNC: 118 MMOL/L (ref 98–112)
CO2 SERPL-SCNC: 19 MMOL/L (ref 21–32)
CREAT BLD-MCNC: 1.86 MG/DL
DEPRECATED RDW RBC AUTO: 58.7 FL (ref 35.1–46.3)
ERYTHROCYTE [DISTWIDTH] IN BLOOD BY AUTOMATED COUNT: 17.4 % (ref 11–15)
GLUCOSE BLD-MCNC: 134 MG/DL (ref 70–99)
HCT VFR BLD AUTO: 22.9 %
HCT VFR BLD AUTO: 25.7 %
HGB BLD-MCNC: 7.3 G/DL
HGB BLD-MCNC: 8.2 G/DL
MAGNESIUM SERPL-MCNC: 1.7 MG/DL (ref 1.6–2.6)
MCH RBC QN AUTO: 29.2 PG (ref 26–34)
MCHC RBC AUTO-ENTMCNC: 31.9 G/DL (ref 31–37)
MCV RBC AUTO: 91.6 FL
OSMOLALITY SERPL CALC.SUM OF ELEC: 303 MOSM/KG (ref 275–295)
PHOSPHATE SERPL-MCNC: 2.3 MG/DL (ref 2.5–4.9)
PLATELET # BLD AUTO: 212 10(3)UL (ref 150–450)
POTASSIUM SERPL-SCNC: 3.8 MMOL/L (ref 3.5–5.1)
RBC # BLD AUTO: 2.5 X10(6)UL
WBC # BLD AUTO: 6.2 X10(3) UL (ref 4–11)

## 2022-02-25 PROCEDURE — 99233 SBSQ HOSP IP/OBS HIGH 50: CPT | Performed by: INTERNAL MEDICINE

## 2022-02-25 PROCEDURE — 99239 HOSP IP/OBS DSCHRG MGMT >30: CPT | Performed by: HOSPITALIST

## 2022-02-25 RX ORDER — MAGNESIUM OXIDE 400 MG (241.3 MG MAGNESIUM) TABLET
400 TABLET ONCE
Status: COMPLETED | OUTPATIENT
Start: 2022-02-25 | End: 2022-02-25

## 2022-02-25 RX ORDER — MELATONIN
325
Qty: 30 TABLET | Refills: 0 | Status: SHIPPED | OUTPATIENT
Start: 2022-02-25 | End: 2022-03-27

## 2022-02-25 RX ORDER — PREDNISONE 20 MG/1
20 TABLET ORAL 2 TIMES DAILY
Qty: 28 TABLET | Refills: 0 | Status: SHIPPED | OUTPATIENT
Start: 2022-02-25 | End: 2022-03-11

## 2022-02-25 NOTE — PLAN OF CARE
Problem: Patient Centered Care  Goal: Patient preferences are identified and integrated in the patient's plan of care  Description: Interventions:  - What would you like us to know as we care for you?  - Provide timely, complete, and accurate information to patient/family  - Incorporate patient and family knowledge, values, beliefs, and cultural backgrounds into the planning and delivery of care  - Encourage patient/family to participate in care and decision-making at the level they choose  - Honor patient and family perspectives and choices  Outcome: Progressing     Problem: Patient/Family Goals  Goal: Patient/Family Long Term Goal  Description: Patient's Long Term Goal:     Interventions:  -   - See additional Care Plan goals for specific interventions  Outcome: Progressing  Goal: Patient/Family Short Term Goal  Description: Patient's Short Term Goal:     Interventions:   -   - See additional Care Plan goals for specific interventions  Outcome: Progressing     Problem: METABOLIC/FLUID AND ELECTROLYTES - ADULT  Goal: Electrolytes maintained within normal limits  Description: INTERVENTIONS:  - Monitor labs and rhythm and assess patient for signs and symptoms of electrolyte imbalances  - Administer electrolyte replacement as ordered  - Monitor response to electrolyte replacements, including rhythm and repeat lab results as appropriate  - Fluid restriction as ordered  - Instruct patient on fluid and nutrition restrictions as appropriate  Outcome: Progressing  Goal: Hemodynamic stability and optimal renal function maintained  Description: INTERVENTIONS:  - Monitor labs and assess for signs and symptoms of volume excess or deficit  - Monitor intake, output and patient weight  - Monitor urine specific gravity, serum osmolarity and serum sodium as indicated or ordered  - Monitor response to interventions for patient's volume status, including labs, urine output, blood pressure (other measures as available)  - Encourage oral intake as appropriate  - Instruct patient on fluid and nutrition restrictions as appropriate  Outcome: Progressing     Problem: SAFETY ADULT - FALL  Goal: Free from fall injury  Description: INTERVENTIONS:  - Assess pt frequently for physical needs  - Identify cognitive and physical deficits and behaviors that affect risk of falls. - [place] fall precautions as indicated by assessment.  - Educate pt/family on patient safety including physical limitations  - Instruct pt to call for assistance with activity based on assessment  - Modify environment to reduce risk of injury  - Provide assistive devices as appropriate  - Consider OT/PT consult to assist with strengthening/mobility  - Encourage toileting schedule  Outcome: Progressing     Problem: DISCHARGE PLANNING  Goal: Discharge to home or other facility with appropriate resources  Description: INTERVENTIONS:  - Identify barriers to discharge w/pt and caregiver  - Include patient/family/discharge partner in discharge planning  - Arrange for needed discharge resources and transportation as appropriate  - Identify discharge learning needs (meds, wound care, etc)  - Arrange for interpreters to assist at discharge as needed  - Consider post-discharge preferences of patient/family/discharge partner  - Complete POLST form as appropriate  - Assess patient's ability to be responsible for managing their own health  - Refer to Case Management Department for coordinating discharge planning if the patient needs post-hospital services based on physician/LIP order or complex needs related to functional status, cognitive ability or social support system  Outcome: Progressing    Pt is alert and oriented. Pt has a port. Pt is voiding in the bathroom. Pt has IV fluids with a port.

## 2022-02-25 NOTE — PLAN OF CARE
Problem: Patient Centered Care  Goal: Patient preferences are identified and integrated in the patient's plan of care  Description: Interventions:  - What would you like us to know as we care for you?   - Provide timely, complete, and accurate information to patient/family  - Incorporate patient and family knowledge, values, beliefs, and cultural backgrounds into the planning and delivery of care  - Encourage patient/family to participate in care and decision-making at the level they choose  - Honor patient and family perspectives and choices  Outcome: Adequate for Discharge     Problem: Patient/Family Goals  Goal: Patient/Family Long Term Goal  Description: Patient's Long Term Goal:     Interventions:  -   - See additional Care Plan goals for specific interventions  Outcome: Adequate for Discharge  Goal: Patient/Family Short Term Goal  Description: Patient's Short Term Goal:     Interventions:   -   - See additional Care Plan goals for specific interventions  Outcome: Adequate for Discharge     Problem: METABOLIC/FLUID AND ELECTROLYTES - ADULT  Goal: Electrolytes maintained within normal limits  Description: INTERVENTIONS:  - Monitor labs and rhythm and assess patient for signs and symptoms of electrolyte imbalances  - Administer electrolyte replacement as ordered  - Monitor response to electrolyte replacements, including rhythm and repeat lab results as appropriate  - Fluid restriction as ordered  - Instruct patient on fluid and nutrition restrictions as appropriate  Outcome: Adequate for Discharge  Goal: Hemodynamic stability and optimal renal function maintained  Description: INTERVENTIONS:  - Monitor labs and assess for signs and symptoms of volume excess or deficit  - Monitor intake, output and patient weight  - Monitor urine specific gravity, serum osmolarity and serum sodium as indicated or ordered  - Monitor response to interventions for patient's volume status, including labs, urine output, blood pressure (other measures as available)  - Encourage oral intake as appropriate  - Instruct patient on fluid and nutrition restrictions as appropriate  Outcome: Adequate for Discharge     Problem: SAFETY ADULT - FALL  Goal: Free from fall injury  Description: INTERVENTIONS:  - Assess pt frequently for physical needs  - Identify cognitive and physical deficits and behaviors that affect risk of falls. - [place] fall precautions as indicated by assessment.  - Educate pt/family on patient safety including physical limitations  - Instruct pt to call for assistance with activity based on assessment  - Modify environment to reduce risk of injury  - Provide assistive devices as appropriate  - Consider OT/PT consult to assist with strengthening/mobility  - Encourage toileting schedule  Outcome: Adequate for Discharge     Problem: DISCHARGE PLANNING  Goal: Discharge to home or other facility with appropriate resources  Description: INTERVENTIONS:  - Identify barriers to discharge w/pt and caregiver  - Include patient/family/discharge partner in discharge planning  - Arrange for needed discharge resources and transportation as appropriate  - Identify discharge learning needs (meds, wound care, etc)  - Arrange for interpreters to assist at discharge as needed  - Consider post-discharge preferences of patient/family/discharge partner  - Complete POLST form as appropriate  - Assess patient's ability to be responsible for managing their own health  - Refer to Case Management Department for coordinating discharge planning if the patient needs post-hospital services based on physician/LIP order or complex needs related to functional status, cognitive ability or social support system  Outcome: Adequate for Discharge    Pt is alert and oriented x4. [place], left ear- implant, right ear-hearing aid. Pt is x1 assist with cane. Heparin for DVT prophylaxis. Pt denied pain. Cleared to go home from medical perspective.  Discharge instruction given, all questions answered. All belongings return.

## 2022-02-28 ENCOUNTER — TELEPHONE (OUTPATIENT)
Dept: INTERNAL MEDICINE CLINIC | Facility: CLINIC | Age: 77
End: 2022-02-28

## 2022-02-28 ENCOUNTER — PATIENT OUTREACH (OUTPATIENT)
Dept: CASE MANAGEMENT | Age: 77
End: 2022-02-28

## 2022-02-28 ENCOUNTER — OFFICE VISIT (OUTPATIENT)
Dept: PHYSICAL THERAPY | Age: 77
End: 2022-02-28
Attending: INTERNAL MEDICINE
Payer: MEDICARE

## 2022-02-28 PROCEDURE — 1111F DSCHRG MED/CURRENT MED MERGE: CPT

## 2022-02-28 PROCEDURE — 97110 THERAPEUTIC EXERCISES: CPT

## 2022-02-28 NOTE — TELEPHONE ENCOUNTER
Spoke to pt's wife Polly for TCM today. Pt does not have HFU appt scheduled at this time. TCM/HFU appt recommended by 3/4/2022 as pt is at High risk for readmission. HAROON attempted to schedule a TCM HFU, patients wife Polly declined at this time, she states Jackeline Yates is following up with his specialists. Message sent to MD's office. BOOK BY DATE (last date for TCM): 3/11/2022    TRIAGE:  Please call patient and try to get him to schedule a TCM HFU, as pt would greatly benefit from the appt. Thank you!

## 2022-02-28 NOTE — PROGRESS NOTES
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 Zan Snellen Next MD visit:   Fall Risk: standard         Precautions: lung cancer, brain and bone mets, [place]    Subjective: Pt states that left ankle is feeling better. He was hospitalized last week Tuesday until Friday due to low creatin levels. Pt reports he has been doing exercises at home but feeling tired. Pain level: 6/10 L ankle  Objective: see flow sheet below for treatment   2/21/22:  Strength/MMT: (* denotes performed with pain)  Hip Knee   Flexion: R 4-/5; L 4/5  Extension: R NT/5; L NT/5  Abduction: R 3-/5; L 3-/5 Flexion: R 4-/5; L 4-/5  Extension: R 3+/5; L 4-/5        Functional mobility:  Rolling: mod I  Supine to sit: mod I  Sit to stand: mod I   5 times sit to stand test: 27 sec  6 min walk test: 372 ft w/RW, 1 seated rest break      Assessment: Pt with decreased activity tolerance noted today, more frequent rest breaks needed today. Goals:   1. Pt will demonstrate knee flex/ext 3+/5 for ease with sit to stand transfer mod I from regular chair.- MET  2. Pt will demonstrate supine to sit transfer min Ax1 to mod I. - MET,pt able to transfer mod I.   3.   Pt will be able to ambulate household distances with least restrictive AD safely.- MET  4. Pt will be independent and compliant with comprehensive HEP to maintain progress achieved in PT.- MET  New goal 2/21/22:  5. Pt will demonstrate 15 sec or less on 5 times sit to stand test for overall improved functional LE strength. 6. Pt will demonstrate improved ambulation >500 ft during 6 min walk test for improved endurance and activity tolerance in preparation for walking outside.        Plan: assess response to treatment    Date: 2/21/22  TX#: 8/8 Date: 2/28/22  Tx: 9/16       There ex:  nustep L4 x 10 min  Re-assessment   sidelying hip abd 10x2 R/L  Seated PF green TB 20x R/L There ex:  nustep L3 x 10 min  Standing hip abd/ext yellow TB 10x2 R/L  4\"step w/hip drive 19P R, attempted L but knee painful  Mini squats 15x  Standing hip drive 77D R/L  Standing hip drive 32U R/L  Sidestepping at bar 5 laps               HEP: 1/26/22- sit to stand w/pad, hip add ball squeeze           1/31/22- sit to stand w/o UE support, seated hip flex/LAQ without back support            2/7/22- bridges, leg press green TB; hold LAQ/hip flex           2/17/22- yellow TB for hip abd/ext            2/21/22- seated PF green TB            2/28/22- sidestepping at counter    Charges: TEx3   Total Timed Treatment: 40 min  Total Treatment Time: 40 min

## 2022-03-01 ENCOUNTER — HOSPITAL ENCOUNTER (OUTPATIENT)
Dept: NUCLEAR MEDICINE | Facility: HOSPITAL | Age: 77
Discharge: HOME | End: 2022-03-01
Attending: INTERNAL MEDICINE
Payer: MEDICARE

## 2022-03-01 DIAGNOSIS — C34.12 PRIMARY CANCER OF LEFT UPPER LOBE OF LUNG (HCC): ICD-10-CM

## 2022-03-01 DIAGNOSIS — C78.00 MALIGNANT NEOPLASM METASTATIC TO LUNG, UNSPECIFIED LATERALITY (HCC): ICD-10-CM

## 2022-03-01 DIAGNOSIS — C79.51 BONE METASTASIS (HCC): ICD-10-CM

## 2022-03-01 LAB — GLUCOSE BLDC GLUCOMTR-MCNC: 106 MG/DL (ref 70–99)

## 2022-03-01 PROCEDURE — 82962 GLUCOSE BLOOD TEST: CPT

## 2022-03-01 PROCEDURE — 78815 PET IMAGE W/CT SKULL-THIGH: CPT | Performed by: INTERNAL MEDICINE

## 2022-03-02 ENCOUNTER — PATIENT MESSAGE (OUTPATIENT)
Dept: NEPHROLOGY | Facility: CLINIC | Age: 77
End: 2022-03-02

## 2022-03-02 ENCOUNTER — NURSE ONLY (OUTPATIENT)
Dept: HEMATOLOGY/ONCOLOGY | Facility: HOSPITAL | Age: 77
End: 2022-03-02
Attending: INTERNAL MEDICINE
Payer: MEDICARE

## 2022-03-02 DIAGNOSIS — N17.9 AKI (ACUTE KIDNEY INJURY) (HCC): Primary | ICD-10-CM

## 2022-03-02 DIAGNOSIS — Z45.2 ENCOUNTER FOR CENTRAL LINE CARE: ICD-10-CM

## 2022-03-02 DIAGNOSIS — D64.9 ANEMIA, UNSPECIFIED TYPE: ICD-10-CM

## 2022-03-02 LAB
ANION GAP SERPL CALC-SCNC: 7 MMOL/L (ref 0–18)
BUN BLD-MCNC: 40 MG/DL (ref 7–18)
BUN/CREAT SERPL: 28.8 (ref 10–20)
CALCIUM BLD-MCNC: 7.2 MG/DL (ref 8.5–10.1)
CHLORIDE SERPL-SCNC: 117 MMOL/L (ref 98–112)
CO2 SERPL-SCNC: 19 MMOL/L (ref 21–32)
CREAT BLD-MCNC: 1.39 MG/DL
DEPRECATED RDW RBC AUTO: 62.4 FL (ref 35.1–46.3)
ERYTHROCYTE [DISTWIDTH] IN BLOOD BY AUTOMATED COUNT: 17.9 % (ref 11–15)
GLUCOSE BLD-MCNC: 111 MG/DL (ref 70–99)
HCT VFR BLD AUTO: 30.7 %
MCH RBC QN AUTO: 29.4 PG (ref 26–34)
MCHC RBC AUTO-ENTMCNC: 30.6 G/DL (ref 31–37)
MCV RBC AUTO: 95.9 FL
OSMOLALITY SERPL CALC.SUM OF ELEC: 306 MOSM/KG (ref 275–295)
PLATELET # BLD AUTO: 244 10(3)UL (ref 150–450)
POTASSIUM SERPL-SCNC: 3.4 MMOL/L (ref 3.5–5.1)
RBC # BLD AUTO: 3.2 X10(6)UL
SODIUM SERPL-SCNC: 143 MMOL/L (ref 136–145)
WBC # BLD AUTO: 9.6 X10(3) UL (ref 4–11)

## 2022-03-02 PROCEDURE — 85027 COMPLETE CBC AUTOMATED: CPT

## 2022-03-02 PROCEDURE — 36591 DRAW BLOOD OFF VENOUS DEVICE: CPT

## 2022-03-02 PROCEDURE — 80048 BASIC METABOLIC PNL TOTAL CA: CPT

## 2022-03-02 RX ORDER — SODIUM CHLORIDE 9 MG/ML
10 INJECTION INTRAVENOUS ONCE
Status: CANCELLED | OUTPATIENT
Start: 2022-03-15

## 2022-03-02 RX ORDER — HEPARIN SODIUM (PORCINE) LOCK FLUSH IV SOLN 100 UNIT/ML 100 UNIT/ML
5 SOLUTION INTRAVENOUS ONCE
Status: COMPLETED | OUTPATIENT
Start: 2022-03-02 | End: 2022-03-02

## 2022-03-02 RX ORDER — HEPARIN SODIUM (PORCINE) LOCK FLUSH IV SOLN 100 UNIT/ML 100 UNIT/ML
5 SOLUTION INTRAVENOUS ONCE
Status: CANCELLED | OUTPATIENT
Start: 2022-03-15

## 2022-03-02 RX ADMIN — HEPARIN SODIUM (PORCINE) LOCK FLUSH IV SOLN 100 UNIT/ML 500 UNITS: 100 SOLUTION INTRAVENOUS at 11:20:00

## 2022-03-02 NOTE — PROGRESS NOTES
Pt here for labs, placed on CMA schedule. This RN saw pt instead d/t pt has a port for flushing/ labs d/t poor venous access otherwise  Pt tolerated labs via port very well, 2x2 gauze/ paper tape to site  We did flush with 10ml NS prior to draw, 20ml NS then 5ml/ 500 units heparin post draw. Discharged stable to home with future appts - he is aware  Gait slow, steady with cane.

## 2022-03-03 ENCOUNTER — OFFICE VISIT (OUTPATIENT)
Dept: PHYSICAL THERAPY | Age: 77
End: 2022-03-03
Attending: INTERNAL MEDICINE
Payer: MEDICARE

## 2022-03-03 PROCEDURE — 97110 THERAPEUTIC EXERCISES: CPT

## 2022-03-03 NOTE — TELEPHONE ENCOUNTER
From: Sheldon Pinto  To: Rosa Maria Peterson MD  Sent: 3/2/2022 9:49 PM CST  Subject: creatinine level and predisone    Moises Heath took a blood test today and his creatinine level was 1.39. Should I cut his predisone down? He is currently taking 20mg, 2X's per day.

## 2022-03-03 NOTE — PROGRESS NOTES
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 Chintan Frausto MD visit:   Fall Risk: standard         Precautions: lung cancer, brain and bone mets, [place]    Subjective: Pt states doing ok, just tired because he has had a lot of dr moses this week. Pain level: 6/10 L ankle  Objective: see flow sheet below for treatment   2/21/22:  Strength/MMT: (* denotes performed with pain)  Hip Knee   Flexion: R 4-/5; L 4/5  Extension: R NT/5; L NT/5  Abduction: R 3-/5; L 3-/5 Flexion: R 4-/5; L 4-/5  Extension: R 3+/5; L 4-/5        Functional mobility:  Rolling: mod I  Supine to sit: mod I  Sit to stand: mod I   5 times sit to stand test: 27 sec  6 min walk test: 372 ft w/RW, 1 seated rest break    Assessment: Added shuttle for further quad strengthening. Goals:   1. Pt will demonstrate knee flex/ext 3+/5 for ease with sit to stand transfer mod I from regular chair.- MET  2. Pt will demonstrate supine to sit transfer min Ax1 to mod I. - MET,pt able to transfer mod I.   3.   Pt will be able to ambulate household distances with least restrictive AD safely.- MET  4. Pt will be independent and compliant with comprehensive HEP to maintain progress achieved in PT.- MET  New goal 2/21/22:  5. Pt will demonstrate 15 sec or less on 5 times sit to stand test for overall improved functional LE strength. 6. Pt will demonstrate improved ambulation >500 ft during 6 min walk test for improved endurance and activity tolerance in preparation for walking outside.        Plan: assess response to treatment    Date: 2/21/22  TX#: 8/8 Date: 2/28/22  Tx: 9/16 Date: 3/3/22  Tx: 10/16      There ex:  nustep L4 x 10 min  Re-assessment   sidelying hip abd 10x2 R/L  Seated PF green TB 20x R/L There ex:  nustep L3 x 10 min  Standing hip abd/ext yellow TB 10x2 R/L  4\"step w/hip drive 89X R, attempted L but knee painful  Mini squats 15x  Standing hip drive 04V R/L  Standing hip drive 75R R/L  Sidestepping at bar 5 laps There ex:  nustep L4 x 10 min  Shuttle 5B 15x2 DL  Shuttle 3B 15x R/L  SLS w/hip flex/abd/ext [id number] ea R/L  Sidestepping at bar yellow TB 5 laps  Hip drive [id number] R/L  Standing heel raises 15x2                HEP: 1/26/22- sit to stand w/pad, hip add ball squeeze           1/31/22- sit to stand w/o UE support, seated hip flex/LAQ without back support            2/7/22- bridges, leg press green TB; hold LAQ/hip flex           2/17/22- yellow TB for hip abd/ext            2/21/22- seated PF green TB            2/28/22- sidestepping at counter    Charges: TEx3   Total Timed Treatment: 40 min  Total Treatment Time: 40 min

## 2022-03-04 ENCOUNTER — APPOINTMENT (OUTPATIENT)
Dept: HEMATOLOGY/ONCOLOGY | Facility: HOSPITAL | Age: 77
End: 2022-03-04
Attending: INTERNAL MEDICINE
Payer: MEDICARE

## 2022-03-04 ENCOUNTER — OFFICE VISIT (OUTPATIENT)
Dept: NEPHROLOGY | Facility: CLINIC | Age: 77
End: 2022-03-04
Payer: MEDICARE

## 2022-03-04 ENCOUNTER — OFFICE VISIT (OUTPATIENT)
Dept: INTERNAL MEDICINE CLINIC | Facility: CLINIC | Age: 77
End: 2022-03-04
Payer: MEDICARE

## 2022-03-04 ENCOUNTER — PATIENT MESSAGE (OUTPATIENT)
Dept: NEPHROLOGY | Facility: CLINIC | Age: 77
End: 2022-03-04

## 2022-03-04 VITALS
DIASTOLIC BLOOD PRESSURE: 61 MMHG | SYSTOLIC BLOOD PRESSURE: 129 MMHG | HEART RATE: 73 BPM | WEIGHT: 176 LBS | HEIGHT: 65 IN | BODY MASS INDEX: 29.32 KG/M2

## 2022-03-04 VITALS
SYSTOLIC BLOOD PRESSURE: 132 MMHG | HEART RATE: 65 BPM | DIASTOLIC BLOOD PRESSURE: 71 MMHG | HEIGHT: 65 IN | BODY MASS INDEX: 29.32 KG/M2 | WEIGHT: 176 LBS

## 2022-03-04 DIAGNOSIS — N17.9 AKI (ACUTE KIDNEY INJURY) (HCC): Primary | ICD-10-CM

## 2022-03-04 DIAGNOSIS — N18.30 STAGE 3 CHRONIC KIDNEY DISEASE, UNSPECIFIED WHETHER STAGE 3A OR 3B CKD (HCC): ICD-10-CM

## 2022-03-04 DIAGNOSIS — C78.00 MALIGNANT NEOPLASM METASTATIC TO LUNG, UNSPECIFIED LATERALITY (HCC): ICD-10-CM

## 2022-03-04 DIAGNOSIS — N10 AIN (ACUTE INTERSTITIAL NEPHRITIS): ICD-10-CM

## 2022-03-04 PROCEDURE — 99495 TRANSJ CARE MGMT MOD F2F 14D: CPT | Performed by: INTERNAL MEDICINE

## 2022-03-04 PROCEDURE — 99215 OFFICE O/P EST HI 40 MIN: CPT | Performed by: INTERNAL MEDICINE

## 2022-03-04 RX ORDER — SODIUM BICARBONATE 650 MG/1
650 TABLET ORAL 2 TIMES DAILY
COMMUNITY

## 2022-03-04 RX ORDER — PREDNISONE 10 MG/1
30 TABLET ORAL DAILY
Qty: 60 TABLET | Refills: 0 | Status: SHIPPED | OUTPATIENT
Start: 2022-03-04 | End: 2022-03-24 | Stop reason: ALTCHOICE

## 2022-03-04 NOTE — PATIENT INSTRUCTIONS
Prednisone 30 mg daily dose for next 5 days   Prednisone 20 mg daily for 5 days   Prednisone 10 mg daily for 5 days   Prednisone 5 mg daily for 5 days   Stop prednisone after that   Repeat blood test next week - ordered   Follow up in 8 weeks   Please send the dose of bicitra patient is taking

## 2022-03-04 NOTE — TELEPHONE ENCOUNTER
From: Yehuda Evans  To: Halie Brown MD  Sent: 3/4/2022 1:19 PM CST  Subject: Medication     Dr. Gregg Gomez stated 1 of Gaston's meds was not on his chart so to let her know exactly what it is that was prescribed at hospital discharge. The med is sodium bicarb 650mg tab RIS, 2x per day for 7 days. His last one is tonight.

## 2022-03-04 NOTE — PATIENT INSTRUCTIONS
It is unclear based upon the notes if your kidney damage was due to the [facility] or the cephalosporin called Keflex i.e. cephalexin. Cephalosporins are a very large group of antibiotics. The most important factor here is if you ever are given antibiotics please let them know that you had \"presumed acute interstitial nephritis due to cephalosporins \". This will alert any provider to look for an alternative antibiotic.

## 2022-03-05 PROBLEM — N10 AIN (ACUTE INTERSTITIAL NEPHRITIS): Status: ACTIVE | Noted: 2022-03-05

## 2022-03-07 ENCOUNTER — APPOINTMENT (OUTPATIENT)
Dept: HEMATOLOGY/ONCOLOGY | Facility: HOSPITAL | Age: 77
End: 2022-03-07
Attending: INTERNAL MEDICINE
Payer: MEDICARE

## 2022-03-07 ENCOUNTER — OFFICE VISIT (OUTPATIENT)
Dept: PHYSICAL THERAPY | Age: 77
End: 2022-03-07
Attending: INTERNAL MEDICINE
Payer: MEDICARE

## 2022-03-07 ENCOUNTER — TELEPHONE (OUTPATIENT)
Dept: HEMATOLOGY/ONCOLOGY | Facility: HOSPITAL | Age: 77
End: 2022-03-07

## 2022-03-07 VITALS
HEART RATE: 72 BPM | DIASTOLIC BLOOD PRESSURE: 110 MMHG | HEIGHT: 69 IN | RESPIRATION RATE: 16 BRPM | SYSTOLIC BLOOD PRESSURE: 138 MMHG | BODY MASS INDEX: 25.77 KG/M2 | OXYGEN SATURATION: 98 % | TEMPERATURE: 98 F | WEIGHT: 174 LBS

## 2022-03-07 DIAGNOSIS — C34.12 PRIMARY CANCER OF LEFT UPPER LOBE OF LUNG (HCC): Primary | ICD-10-CM

## 2022-03-07 DIAGNOSIS — C79.51 BONE METASTASIS (HCC): ICD-10-CM

## 2022-03-07 DIAGNOSIS — C78.00 MALIGNANT NEOPLASM METASTATIC TO LUNG, UNSPECIFIED LATERALITY (HCC): ICD-10-CM

## 2022-03-07 DIAGNOSIS — C79.31 BRAIN METASTASIS (HCC): ICD-10-CM

## 2022-03-07 PROCEDURE — 99214 OFFICE O/P EST MOD 30 MIN: CPT | Performed by: INTERNAL MEDICINE

## 2022-03-07 PROCEDURE — 97110 THERAPEUTIC EXERCISES: CPT

## 2022-03-07 RX ORDER — SODIUM BICARBONATE 650 MG/1
650 TABLET ORAL DAILY
Qty: 90 TABLET | Refills: 0 | Status: SHIPPED | OUTPATIENT
Start: 2022-03-07

## 2022-03-07 NOTE — TELEPHONE ENCOUNTER
Faxed letter signed by Dr. Leonidas Florian to [facility] Life support registry.

## 2022-03-07 NOTE — PROGRESS NOTES
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 Ayleen Frausto MD visit:   Fall Risk: standard         Precautions: lung cancer, brain and bone mets, [place]    Subjective: Pt states doing well. He reports using [facility] prn. Pain level: min L ankle  Objective: see flow sheet below for treatment   2/21/22:  Strength/MMT: (* denotes performed with pain)  Hip Knee   Flexion: R 4-/5; L 4/5  Extension: R NT/5; L NT/5  Abduction: R 3-/5; L 3-/5 Flexion: R 4-/5; L 4-/5  Extension: R 3+/5; L 4-/5        Functional mobility:  Rolling: mod I  Supine to sit: mod I  Sit to stand: mod I   5 times sit to stand test: 27 sec  6 min walk test: 372 ft w/RW, 1 seated rest break    Assessment: Noted pt more fatigued today requiring frequent rest breaks. Goals:   1. Pt will demonstrate knee flex/ext 3+/5 for ease with sit to stand transfer mod I from regular chair.- MET  2. Pt will demonstrate supine to sit transfer min Ax1 to mod I. - MET,pt able to transfer mod I.   3.   Pt will be able to ambulate household distances with least restrictive AD safely.- MET  4. Pt will be independent and compliant with comprehensive HEP to maintain progress achieved in PT.- MET  New goal 2/21/22:  5. Pt will demonstrate 15 sec or less on 5 times sit to stand test for overall improved functional LE strength. 6. Pt will demonstrate improved ambulation >500 ft during 6 min walk test for improved endurance and activity tolerance in preparation for walking outside.        Plan: assess response to treatment    Date: 2/21/22  TX#: 8/8 Date: 2/28/22  Tx: 9/16 Date: 3/3/22  Tx: 10/16 Date: 3/7/22  Tx: 11/16     There ex:  nustep L4 x 10 min  Re-assessment   sidelying hip abd 10x2 R/L  Seated PF green TB 20x R/L There ex:  nustep L3 x 10 min  Standing hip abd/ext yellow TB 10x2 R/L  4\"step w/hip drive 68N R, attempted L but knee painful  Mini squats 15x  Standing hip drive 16S R/L  Standing hip drive 01N R/L  Sidestepping at bar 5 laps There ex:  nustep L4 x 10 min  Shuttle 5B 15x2 DL  Shuttle 3B 15x R/L  SLS w/hip flex/abd/ext [id number] ea R/L  Sidestepping at bar yellow TB 5 laps  Hip drive [id number] R/L  Standing heel raises 15x2   There ex:  nustep L5 x 10 min  Shuttle 5B 15x2 DL  Shuttle 3B 15x R/L  SLS w/hip flex/abd/ext 10xea R/L  Standing heel raises 20x                HEP: 1/26/22- sit to stand w/pad, hip add ball squeeze           1/31/22- sit to stand w/o UE support, seated hip flex/LAQ without back support            2/7/22- bridges, leg press green TB; hold LAQ/hip flex           2/17/22- yellow TB for hip abd/ext            2/21/22- seated PF green TB            2/28/22- sidestepping at counter    Charges: TEx3   Total Timed Treatment: 40 min  Total Treatment Time: 40 min

## 2022-03-09 ENCOUNTER — OFFICE VISIT (OUTPATIENT)
Dept: PHYSICAL THERAPY | Age: 77
End: 2022-03-09
Attending: INTERNAL MEDICINE
Payer: MEDICARE

## 2022-03-09 PROCEDURE — 97116 GAIT TRAINING THERAPY: CPT

## 2022-03-09 PROCEDURE — 97110 THERAPEUTIC EXERCISES: CPT

## 2022-03-09 NOTE — PROGRESS NOTES
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 Yanick Ruano  Next MD visit:   Fall Risk: standard         Precautions: lung cancer, brain and bone mets, [place]kmaq    Subjective: Pt states doing well. He reports using WBQC prn. Pain level: min L ankle  Objective: see flow sheet below for treatment   2/21/22:  Strength/MMT: (* denotes performed with pain)  Hip Knee   Flexion: R 4-/5; L 4/5  Extension: R NT/5; L NT/5  Abduction: R 3-/5; L 3-/5 Flexion: R 4-/5; L 4-/5  Extension: R 3+/5; L 4-/5        Functional mobility:  Rolling: mod I  Supine to sit: mod I  Sit to stand: mod I   5 times sit to stand test: 27 sec  6 min walk test: 372 ft w/RW, 1 seated rest break    Assessment: Worked on gait tolerance today to improve endurance and activity tolerance. Goals:   1. Pt will demonstrate knee flex/ext 3+/5 for ease with sit to stand transfer mod I from regular chair.- MET  2. Pt will demonstrate supine to sit transfer min Ax1 to mod I. - MET,pt able to transfer mod I.   3.   Pt will be able to ambulate household distances with least restrictive AD safely.- MET  4. Pt will be independent and compliant with comprehensive HEP to maintain progress achieved in PT.- MET  New goal 2/21/22:  5. Pt will demonstrate 15 sec or less on 5 times sit to stand test for overall improved functional LE strength. 6. Pt will demonstrate improved ambulation >500 ft during 6 min walk test for improved endurance and activity tolerance in preparation for walking outside.        Plan: assess response to treatment    Date: 2/21/22  TX#: 8/8 Date: 2/28/22  Tx: 9/16 Date: 3/3/22  Tx: 10/16 Date: 3/7/22  Tx: 11/16 Date: 3/9/22  Tx: 12/16    There ex:  nustep L4 x 10 min  Re-assessment   sidelying hip abd 10x2 R/L  Seated PF green TB 20x R/L There ex:  nustep L3 x 10 min  Standing hip abd/ext yellow TB 10x2 R/L  4\"step w/hip drive 29X R, attempted L but knee painful  Mini squats 15x  Standing hip drive 21X R/L  Standing hip drive 05I R/L  Sidestepping at bar 5 laps There ex:  nustep L4 x 10 min  Shuttle 5B 15x2 DL  Shuttle 3B 15x R/L  SLS w/hip flex/abd/ext [id number] ea R/L  Sidestepping at bar yellow TB 5 laps  Hip drive [id number] R/L  Standing heel raises 15x2   There ex:  nustep L5 x 10 min  Shuttle 5B 15x2 DL  Shuttle 3B 15x R/L  SLS w/hip flex/abd/ext 10xea R/L  Standing heel raises 20x    There ex:  nustep L5 x 10 min  Shuttle 5B 15x2 DL  Shuttle 3B 15x R/L          Gait:   In hallway w/RW w/ 1 seated rest break    HEP: 1/26/22- sit to stand w/pad, hip add ball squeeze           1/31/22- sit to stand w/o UE support, seated hip flex/LAQ without back support            2/7/22- bridges, leg press green TB; hold LAQ/hip flex           2/17/22- yellow TB for hip abd/ext            2/21/22- seated PF green TB            2/28/22- sidestepping at counter    Charges: TEx2, GTx1   Total Timed Treatment: 40 min  Total Treatment Time: 40 min

## 2022-03-10 ENCOUNTER — TELEPHONE (OUTPATIENT)
Dept: HEMATOLOGY/ONCOLOGY | Facility: HOSPITAL | Age: 77
End: 2022-03-10

## 2022-03-10 NOTE — TELEPHONE ENCOUNTER
Wife called to confirm lab appointment. Per wife labs appointment is for Dr. Jo Ann denis. Per wife left ankle wound having some drainage and does not want to go back to ortho. Patient's PCP looked at last week. Wife patient having fevers. Instructed to call PCP to follow up with ankle. Wife verbalizes understanding.

## 2022-03-11 ENCOUNTER — TELEPHONE (OUTPATIENT)
Dept: INTERNAL MEDICINE CLINIC | Facility: CLINIC | Age: 77
End: 2022-03-11

## 2022-03-11 ENCOUNTER — PATIENT MESSAGE (OUTPATIENT)
Dept: INTERNAL MEDICINE CLINIC | Facility: CLINIC | Age: 77
End: 2022-03-11

## 2022-03-11 ENCOUNTER — PATIENT MESSAGE (OUTPATIENT)
Dept: NEPHROLOGY | Facility: CLINIC | Age: 77
End: 2022-03-11

## 2022-03-11 ENCOUNTER — NURSE ONLY (OUTPATIENT)
Dept: HEMATOLOGY/ONCOLOGY | Facility: HOSPITAL | Age: 77
End: 2022-03-11
Attending: INTERNAL MEDICINE
Payer: MEDICARE

## 2022-03-11 DIAGNOSIS — Z45.2 ENCOUNTER FOR CENTRAL LINE CARE: Primary | ICD-10-CM

## 2022-03-11 DIAGNOSIS — N18.30 STAGE 3 CHRONIC KIDNEY DISEASE, UNSPECIFIED WHETHER STAGE 3A OR 3B CKD (HCC): ICD-10-CM

## 2022-03-11 DIAGNOSIS — N17.9 AKI (ACUTE KIDNEY INJURY) (HCC): ICD-10-CM

## 2022-03-11 LAB
BUN BLD-MCNC: 49 MG/DL (ref 7–18)
BUN/CREAT SERPL: 29.9 (ref 10–20)
CALCIUM BLD-MCNC: 8.6 MG/DL (ref 8.5–10.1)
CHLORIDE SERPL-SCNC: 117 MMOL/L (ref 98–112)
CO2 SERPL-SCNC: 18 MMOL/L (ref 21–32)
CREAT BLD-MCNC: 1.64 MG/DL
OSMOLALITY SERPL CALC.SUM OF ELEC: 302 MOSM/KG (ref 275–295)
POTASSIUM SERPL-SCNC: 4.7 MMOL/L (ref 3.5–5.1)
SODIUM SERPL-SCNC: 140 MMOL/L (ref 136–145)

## 2022-03-11 PROCEDURE — 36591 DRAW BLOOD OFF VENOUS DEVICE: CPT

## 2022-03-11 PROCEDURE — 80048 BASIC METABOLIC PNL TOTAL CA: CPT

## 2022-03-11 RX ORDER — HEPARIN SODIUM (PORCINE) LOCK FLUSH IV SOLN 100 UNIT/ML 100 UNIT/ML
5 SOLUTION INTRAVENOUS ONCE
Status: COMPLETED | OUTPATIENT
Start: 2022-03-11 | End: 2022-03-11

## 2022-03-11 RX ORDER — SODIUM CHLORIDE 9 MG/ML
10 INJECTION INTRAVENOUS ONCE
Status: CANCELLED | OUTPATIENT
Start: 2022-03-15

## 2022-03-11 RX ORDER — HEPARIN SODIUM (PORCINE) LOCK FLUSH IV SOLN 100 UNIT/ML 100 UNIT/ML
5 SOLUTION INTRAVENOUS ONCE
Status: CANCELLED | OUTPATIENT
Start: 2022-03-15

## 2022-03-11 RX ADMIN — HEPARIN SODIUM (PORCINE) LOCK FLUSH IV SOLN 100 UNIT/ML 5 ML: 100 SOLUTION INTRAVENOUS at 11:00:00

## 2022-03-11 NOTE — TELEPHONE ENCOUNTER
Left message to call back. Please transfer to triage. 1st attempt. I have also sent patient a Parabel message to call us back. Regarding: FW: sore on left ankle      ----- Message -----  From: Annabellaalton Vega  Sent: 3/11/2022   1:06 PM CST  To: Em Rn Triage  Subject: sore on left ankle                               Good afternoon, I have been advised from the cancer center that I should contact you about Gaston's ankle that you looked at the other day it now has some drainage coming out of it and this morning had a spot of blood. . They are saying that maybe you should send Kandy Awad to either the wound clinic or possibly an infectious disease doctor. Personally I think we might be able to start out with the wound clinic I doubt he has an infectious disease. Can you please suggest a possible solution to this problem? The question I have is the started draining again and his creatin levels are up again and I don't know if this has anything to do with the kidney or not.

## 2022-03-11 NOTE — TELEPHONE ENCOUNTER
Noted. Keep prednisone at 20 mg daily dose and repeat lab test next week on Wednesday - pls order BMP

## 2022-03-11 NOTE — TELEPHONE ENCOUNTER
Patient's wife Polly (SANDRA, name and  verified) returning call regarding mychart copied below. Pt saw cancer center yesterday which suggested to contact Dr Sumaya Rice. States Dr Sumaya Rice looked at it last week and thought it was a pressure sore. Wife reports wound is worse and is draining some blood (states \"dot of blood) but yesterday there was some drainage. States the hole is the size of a \"pin hole\" per wife. Polly denies a fever for the patient, red streak leaving the wound or no swelling. Polly is asking if  should see wound clinic or infectious disease per recommendation of cancer center and wonders if this is related to creatinine level.     Please reply to pool: CARLOS Chong

## 2022-03-11 NOTE — TELEPHONE ENCOUNTER
Marylee Hackney, RN 3/11/2022 1:25 PM CST        ----- Message -----  From: Tracy Johnson  Sent: 3/11/2022 1:06 PM CST  To: Denise Rn Triage  Subject: sore on left ankle     Good afternoon, I have been advised from the cancer center that I should contact you about Gaston's ankle that you looked at the other day it now has some drainage coming out of it and this morning had a spot of blood. . They are saying that maybe you should send Yuni Brooks to either the wound clinic or possibly an infectious disease doctor. Personally I think we might be able to start out with the wound clinic I doubt he has an infectious disease. Can you please suggest a possible solution to this problem? The question I have is the started draining again and his creatin levels are up again and I don't know if this has anything to do with the kidney or not.

## 2022-03-12 NOTE — TELEPHONE ENCOUNTER
Did not look infected previously but things change so its possible things worsened. 1. Home wound care RN ordered  2. Topical mupriocin sent  3. Send me some photos if able  4. Infectious disease consult ordered, can see me first if they desire.   5. Chronic kidney disease can cause skin ulcerations but this is more likely a non healing skin ulcer caused by shoes/rubbing against material.

## 2022-03-12 NOTE — TELEPHONE ENCOUNTER
Spouse advised of recommendations, agreed with plan. She will forward photos for review. Per follow up with Dr Odessia Burkitt ID referral updated for Dr Sebastián Wilson, Home wound care nurse order updated with new diagnosis code.  Info sent via [employer] per patient request.

## 2022-03-14 ENCOUNTER — APPOINTMENT (OUTPATIENT)
Dept: PHYSICAL THERAPY | Age: 77
End: 2022-03-14
Attending: INTERNAL MEDICINE
Payer: MEDICARE

## 2022-03-14 ENCOUNTER — TELEPHONE (OUTPATIENT)
Dept: INTERNAL MEDICINE CLINIC | Facility: CLINIC | Age: 77
End: 2022-03-14

## 2022-03-14 ENCOUNTER — TELEPHONE (OUTPATIENT)
Dept: PHYSICAL THERAPY | Facility: HOSPITAL | Age: 77
End: 2022-03-14

## 2022-03-14 RX ORDER — HYDROCODONE BITARTRATE AND ACETAMINOPHEN 5; 325 MG/1; MG/1
1-2 TABLET ORAL EVERY 8 HOURS PRN
Qty: 15 TABLET | Refills: 0 | Status: SHIPPED | OUTPATIENT
Start: 2022-03-14 | End: 2022-03-28

## 2022-03-14 RX ORDER — SULFAMETHOXAZOLE AND TRIMETHOPRIM 800; 160 MG/1; MG/1
1 TABLET ORAL 2 TIMES DAILY
Qty: 14 TABLET | Refills: 0 | Status: SHIPPED | OUTPATIENT
Start: 2022-03-14

## 2022-03-14 NOTE — TELEPHONE ENCOUNTER
Dr. Ivania Goins - 2 questions  Please advise if okay to send Norco 5-325mg. To help with the severe pain. Wife states he should be okay with 5mg only (instead of 10-325mg). Also --- Is a separate order needed for [address] [place] [address]? Rn - Please send [employer] message once Norco is placed. RN [facility] with Dr. Ivania Goins [facility] Per Dr. Chris Singer to send Bactrim 800-160mg Oral, Twice Daily for 7 days, and if no improvement in 3 days go to ER for IV antibiotics. Patient called office. Patient's date of birth and full name both confirmed. Informed prescription was sent for Bactrim 800-160mg Oral, Twice Daily for 7 days, and if no improvement in 3 days go to ER for IV antibiotics. Whichever ER she is most comfortable with. Wife verbalizes understanding and is agreeable to instructions. But hesitant to go to ERs because Dr. Ivania Goins would not be able to treat patient at the hospital. RN encouraged her to adhere to this advice and take patient to ER if no improvement for evaluation and that care should be provided at ER. She verbalizes understanding and is agreeable to instructions. RN advised wife to contact our office if issues with setting up [address]. She verbalizes understanding. Wife states she will call [address] after this call.

## 2022-03-14 NOTE — TELEPHONE ENCOUNTER
I believe the referrals tab already has the home health order. If they need something different let me know and I will sign. Agree with Bactrim, discussed with RN. Agree with ER if no improvement within 72 hours. Rodrigo signed.

## 2022-03-14 NOTE — TELEPHONE ENCOUNTER
It appears there is some purulent drainage which was not present before. He was already treated with Keflex without much improvement. Ideally I would like him to see ID for antibiotic therapy as soon as possible, likely MRSA component, last time he was given cephalosporins by ortho he had acute kidney failure and ended up in the hospital so I dont think they are too keen on starting medications unless absolutely necessary. Continue with mupirocin. If there is no availability Friday this week let me know and I may need to consider a few days of Bactrim and to get a wound culture. Any updates from the wound nurse?

## 2022-03-14 NOTE — TELEPHONE ENCOUNTER
RN called patient's wife. Patient's date of birth and full name both confirmed. Wife is upset with the Hospitalist at [facility].   When previously hospitalized at [facility].   She feels like they only addressed the kidney. Did not address left foot issues. States this left foot has been \"festering\" since December 2021. Reports patient can barely walk now on left foot, sleeping in his chair. Foot is very red, tender to the touch. RN advised ER. She adamantly declines [facility] ER due to experience in the past.   RN advised another ER and she still declines due to their previous experience. Advised to continue mupirocin per Dr. Huy Oreilly. Infectious Disease Consult scheduled: 3/22/22  Wound Care Clinic scheduled 3/24/22    RN advised her to contact [facility] health, in order to schedule [facility] visits. RN also provided wife with phone number for Patient Experience and encouraged her to contact them with her feedback about [facility]. She wrote down number and verbalizes understanding.

## 2022-03-14 NOTE — TELEPHONE ENCOUNTER
Patient wife  adria, identified name and , states has kidney disease and has anaemia , takes folic acid     States Bactrim DS has those conditions listed  as contraindications on info sheet from pharmacy  )  medication is for his foot , cannot see ID until      Wife is concerned and should patient take this  med or not ? Wife Roma Magdaleno has the medication but patient has not yet began taking it. Allergies reviewed and pharmacy confirmed  Please advise and thank you.   Please reply to pool: EM RN Parley N[place]otte

## 2022-03-14 NOTE — TELEPHONE ENCOUNTER
Wife of patient (ok per SANDRA) calling to request referral for ID is faxed to:    [phone]  [facility]    Appointment scheduled for next week    Referral faxed. Wife to call to confirm receipt in one hour.

## 2022-03-15 ENCOUNTER — HOSPITAL ENCOUNTER (OUTPATIENT)
Dept: CT IMAGING | Facility: HOSPITAL | Age: 77
Discharge: HOME | End: 2022-03-15
Attending: RADIOLOGY
Payer: MEDICARE

## 2022-03-15 DIAGNOSIS — C79.31 BRAIN METASTASIS (HCC): ICD-10-CM

## 2022-03-15 PROCEDURE — 70470 CT HEAD/BRAIN W/O & W/DYE: CPT | Performed by: RADIOLOGY

## 2022-03-15 NOTE — TELEPHONE ENCOUNTER
Spouse advised of recommendation, agreed with plan. Patient will begin medication. Dr. Jama:  I have personally performed a face to face bedside history and physical examination of this patient. I have discussed the history, examination, review of systems, assessment and plan of management with the resident. I have reviewed the electronic medical record and amended it to reflect my history, review of systems, physical exam, assessment and plan.    88F h/o HTN, DM, s/p endometrial biopsy today for post-menopausal bleeding, brought in for vaginal bleeding since biopsy.  ROS otherwise negative.    Exam:  - nad  - rrr   -ctab   -abd soft ntnd  - vaginal exam deferred    A/p  - post-biopsy bleeding  - basic labs, coags  - gyn-onc team at bedside evaluating patient

## 2022-03-16 ENCOUNTER — PATIENT MESSAGE (OUTPATIENT)
Dept: NEPHROLOGY | Facility: CLINIC | Age: 77
End: 2022-03-16

## 2022-03-16 ENCOUNTER — NURSE ONLY (OUTPATIENT)
Dept: HEMATOLOGY/ONCOLOGY | Facility: HOSPITAL | Age: 77
End: 2022-03-16
Attending: INTERNAL MEDICINE
Payer: MEDICARE

## 2022-03-16 DIAGNOSIS — Z45.2 ENCOUNTER FOR CENTRAL LINE CARE: ICD-10-CM

## 2022-03-16 DIAGNOSIS — C34.90 PRIMARY MALIGNANT NEOPLASM OF LUNG METASTATIC TO OTHER SITE, UNSPECIFIED LATERALITY (HCC): Primary | ICD-10-CM

## 2022-03-16 DIAGNOSIS — N18.30 STAGE 3 CHRONIC KIDNEY DISEASE, UNSPECIFIED WHETHER STAGE 3A OR 3B CKD (HCC): ICD-10-CM

## 2022-03-16 DIAGNOSIS — N17.9 AKI (ACUTE KIDNEY INJURY) (HCC): ICD-10-CM

## 2022-03-16 LAB
ANION GAP SERPL CALC-SCNC: 6 MMOL/L (ref 0–18)
BUN BLD-MCNC: 59 MG/DL (ref 7–18)
BUN/CREAT SERPL: 32.1 (ref 10–20)
CALCIUM BLD-MCNC: 9.7 MG/DL (ref 8.5–10.1)
CHLORIDE SERPL-SCNC: 116 MMOL/L (ref 98–112)
CO2 SERPL-SCNC: 17 MMOL/L (ref 21–32)
CREAT BLD-MCNC: 1.84 MG/DL
GLUCOSE BLD-MCNC: 107 MG/DL (ref 70–99)
OSMOLALITY SERPL CALC.SUM OF ELEC: 305 MOSM/KG (ref 275–295)
POTASSIUM SERPL-SCNC: 4 MMOL/L (ref 3.5–5.1)
SODIUM SERPL-SCNC: 139 MMOL/L (ref 136–145)

## 2022-03-16 PROCEDURE — 36591 DRAW BLOOD OFF VENOUS DEVICE: CPT

## 2022-03-16 PROCEDURE — 80048 BASIC METABOLIC PNL TOTAL CA: CPT

## 2022-03-16 RX ORDER — SODIUM CHLORIDE 9 MG/ML
10 INJECTION INTRAVENOUS ONCE
Status: CANCELLED | OUTPATIENT
Start: 2022-04-13

## 2022-03-16 RX ORDER — HEPARIN SODIUM (PORCINE) LOCK FLUSH IV SOLN 100 UNIT/ML 100 UNIT/ML
5 SOLUTION INTRAVENOUS ONCE
Status: CANCELLED | OUTPATIENT
Start: 2022-04-13

## 2022-03-16 RX ORDER — HEPARIN SODIUM (PORCINE) LOCK FLUSH IV SOLN 100 UNIT/ML 100 UNIT/ML
5 SOLUTION INTRAVENOUS ONCE
Status: COMPLETED | OUTPATIENT
Start: 2022-03-16 | End: 2022-03-16

## 2022-03-16 RX ADMIN — HEPARIN SODIUM (PORCINE) LOCK FLUSH IV SOLN 100 UNIT/ML 500 UNITS: 100 SOLUTION INTRAVENOUS at 12:11:00

## 2022-03-16 NOTE — TELEPHONE ENCOUNTER
From: Minesh Black  To: Rohit Hendricks MD  Sent: 3/16/2022 1:08 PM CDT  Subject: Arielle Goode creatine level    It looks like Gaston's creating level has gone up again and it is now 1.84. Main Reveal He is currently taking an antibiotic of SMZ/TMP for the ankle infection and also a cream of mupirocin 2%. I need to get this under control do I need to change his medicine? I don't want to go to an emergency room because if I have to it will not be [facility].. He did not get good care there and nobody has addressed this ankle situation since he went in twice since December.

## 2022-03-17 ENCOUNTER — APPOINTMENT (OUTPATIENT)
Dept: PHYSICAL THERAPY | Age: 77
End: 2022-03-17
Attending: INTERNAL MEDICINE
Payer: MEDICARE

## 2022-03-21 ENCOUNTER — APPOINTMENT (OUTPATIENT)
Dept: PHYSICAL THERAPY | Age: 77
End: 2022-03-21
Attending: INTERNAL MEDICINE
Payer: MEDICARE

## 2022-03-21 RX ORDER — HEPARIN SODIUM (PORCINE) LOCK FLUSH IV SOLN 100 UNIT/ML 100 UNIT/ML
5 SOLUTION INTRAVENOUS ONCE
Status: CANCELLED | OUTPATIENT
Start: 2022-04-13

## 2022-03-21 RX ORDER — SODIUM CHLORIDE 9 MG/ML
10 INJECTION INTRAVENOUS ONCE
Status: CANCELLED | OUTPATIENT
Start: 2022-04-13

## 2022-03-22 ENCOUNTER — TELEPHONE (OUTPATIENT)
Dept: INTERNAL MEDICINE CLINIC | Facility: CLINIC | Age: 77
End: 2022-03-22

## 2022-03-22 ENCOUNTER — NURSE ONLY (OUTPATIENT)
Dept: HEMATOLOGY/ONCOLOGY | Facility: HOSPITAL | Age: 77
End: 2022-03-22
Attending: INTERNAL MEDICINE
Payer: MEDICARE

## 2022-03-22 DIAGNOSIS — Z51.81 MEDICATION MONITORING ENCOUNTER: ICD-10-CM

## 2022-03-22 DIAGNOSIS — Z45.2 ENCOUNTER FOR CENTRAL LINE CARE: Primary | ICD-10-CM

## 2022-03-22 DIAGNOSIS — C79.51 BONE METASTASIS (HCC): ICD-10-CM

## 2022-03-22 DIAGNOSIS — E03.2 HYPOTHYROIDISM DUE TO MEDICATION: ICD-10-CM

## 2022-03-22 LAB
ALBUMIN SERPL-MCNC: 3 G/DL (ref 3.4–5)
CALCIUM BLD-MCNC: 8.5 MG/DL (ref 8.5–10.1)
CREAT BLD-MCNC: 1.54 MG/DL
MAGNESIUM SERPL-MCNC: 2.1 MG/DL (ref 1.6–2.6)
PHOSPHATE SERPL-MCNC: 2.4 MG/DL (ref 2.5–4.9)
T4 FREE SERPL-MCNC: 0.8 NG/DL (ref 0.8–1.7)
TSI SER-ACNC: 3.83 MIU/ML (ref 0.36–3.74)

## 2022-03-22 PROCEDURE — 83735 ASSAY OF MAGNESIUM: CPT

## 2022-03-22 PROCEDURE — 84443 ASSAY THYROID STIM HORMONE: CPT

## 2022-03-22 PROCEDURE — 84439 ASSAY OF FREE THYROXINE: CPT

## 2022-03-22 PROCEDURE — 82565 ASSAY OF CREATININE: CPT

## 2022-03-22 PROCEDURE — 82040 ASSAY OF SERUM ALBUMIN: CPT

## 2022-03-22 PROCEDURE — 82310 ASSAY OF CALCIUM: CPT

## 2022-03-22 PROCEDURE — 96372 THER/PROPH/DIAG INJ SC/IM: CPT

## 2022-03-22 PROCEDURE — 84100 ASSAY OF PHOSPHORUS: CPT

## 2022-03-22 PROCEDURE — 36591 DRAW BLOOD OFF VENOUS DEVICE: CPT

## 2022-03-22 RX ORDER — HEPARIN SODIUM (PORCINE) LOCK FLUSH IV SOLN 100 UNIT/ML 100 UNIT/ML
5 SOLUTION INTRAVENOUS ONCE
Status: CANCELLED | OUTPATIENT
Start: 2022-04-12

## 2022-03-22 RX ORDER — SODIUM CHLORIDE 9 MG/ML
10 INJECTION INTRAVENOUS ONCE
OUTPATIENT
Start: 2022-04-12

## 2022-03-22 RX ORDER — HEPARIN SODIUM (PORCINE) LOCK FLUSH IV SOLN 100 UNIT/ML 100 UNIT/ML
5 SOLUTION INTRAVENOUS ONCE
Status: COMPLETED | OUTPATIENT
Start: 2022-03-22 | End: 2022-03-22

## 2022-03-22 RX ADMIN — HEPARIN SODIUM (PORCINE) LOCK FLUSH IV SOLN 100 UNIT/ML 500 UNITS: 100 SOLUTION INTRAVENOUS at 11:15:00

## 2022-03-22 NOTE — TELEPHONE ENCOUNTER
[facility] stated they received a referral for home health but referral states patient has improved and resolved. They need information supporting the need for home care for insurance to cover. Fax # [phone].

## 2022-03-22 NOTE — PROGRESS NOTES
xgeva given, most current calcium level appropriate. No dental concerns. Administered to left lower abdomen. Labs drawn and sent as directed via port.   Discharged stable with avs

## 2022-03-22 NOTE — TELEPHONE ENCOUNTER
Spoke with Amy with Residential, informed of Dr Memo Sandoval message, Amy verbalized understanding.

## 2022-03-23 NOTE — TELEPHONE ENCOUNTER
Patient wife calling, on SANDRA ,  identified name and , spoke to home health and was told needs a referral      Patient has appt for wound clinic on 3/24  but needs the referral and appt with Home health to change the bandages on Saturday and Monday  ( after being seen at Wound care clinic )     Referrals noted on the chart     Called [facility] left message for [facility] ,spoke with Warden Gillis , [facility], call transferred to [facility] , she asked  to have referral   Fax to [address]  understand the importance of having this referral completed  ( as not to miss Wound care clinic appt )     Referral faxed as requested     Called patient wife Dottie Koch and informed of above   Patient wife  verbalizes understanding

## 2022-03-24 ENCOUNTER — HOSPITAL ENCOUNTER (OUTPATIENT)
Dept: GENERAL RADIOLOGY | Facility: HOSPITAL | Age: 77
Discharge: HOME | End: 2022-03-24
Attending: CLINICAL NURSE SPECIALIST
Payer: MEDICARE

## 2022-03-24 ENCOUNTER — TELEPHONE (OUTPATIENT)
Dept: HEMATOLOGY/ONCOLOGY | Facility: HOSPITAL | Age: 77
End: 2022-03-24

## 2022-03-24 ENCOUNTER — OFFICE VISIT (OUTPATIENT)
Dept: WOUND CARE | Facility: HOSPITAL | Age: 77
End: 2022-03-24
Attending: CLINICAL NURSE SPECIALIST
Payer: MEDICARE

## 2022-03-24 VITALS
TEMPERATURE: 97 F | RESPIRATION RATE: 18 BRPM | SYSTOLIC BLOOD PRESSURE: 137 MMHG | DIASTOLIC BLOOD PRESSURE: 59 MMHG | HEART RATE: 64 BPM | BODY MASS INDEX: 25.77 KG/M2 | OXYGEN SATURATION: 94 % | WEIGHT: 174 LBS | HEIGHT: 69 IN

## 2022-03-24 DIAGNOSIS — S91.002A ANKLE WOUND, LEFT, INITIAL ENCOUNTER: ICD-10-CM

## 2022-03-24 DIAGNOSIS — L89.524 PRESSURE INJURY OF LEFT ANKLE, STAGE 4 (HCC): ICD-10-CM

## 2022-03-24 DIAGNOSIS — L03.116 CELLULITIS OF LEFT ANKLE: ICD-10-CM

## 2022-03-24 DIAGNOSIS — S91.002A ANKLE WOUND, LEFT, INITIAL ENCOUNTER: Primary | ICD-10-CM

## 2022-03-24 PROCEDURE — 73610 X-RAY EXAM OF ANKLE: CPT | Performed by: CLINICAL NURSE SPECIALIST

## 2022-03-24 PROCEDURE — 99215 OFFICE O/P EST HI 40 MIN: CPT | Performed by: CLINICAL NURSE SPECIALIST

## 2022-03-24 NOTE — TELEPHONE ENCOUNTER
[facility] to schedule a lab appointment, patient has a port and needs SED RATE,WESTERGREN (AUTOMATED) (ESR), CBC W/DIFFERENTIAL WITH PLATELET (CBC), C-REACTIVE PROTEIN (CRP).  Labs need to be drawn by 3/28/22

## 2022-03-28 ENCOUNTER — TELEPHONE (OUTPATIENT)
Dept: WOUND CARE | Facility: HOSPITAL | Age: 77
End: 2022-03-28

## 2022-03-28 ENCOUNTER — HOSPITAL ENCOUNTER (OUTPATIENT)
Dept: ULTRASOUND IMAGING | Facility: HOSPITAL | Age: 77
Discharge: HOME | End: 2022-03-28
Attending: INTERNAL MEDICINE
Payer: MEDICARE

## 2022-03-28 ENCOUNTER — PATIENT MESSAGE (OUTPATIENT)
Dept: NEPHROLOGY | Facility: CLINIC | Age: 77
End: 2022-03-28

## 2022-03-28 ENCOUNTER — TELEPHONE (OUTPATIENT)
Dept: INTERNAL MEDICINE CLINIC | Facility: CLINIC | Age: 77
End: 2022-03-28

## 2022-03-28 ENCOUNTER — NURSE ONLY (OUTPATIENT)
Dept: HEMATOLOGY/ONCOLOGY | Facility: HOSPITAL | Age: 77
End: 2022-03-28
Attending: INTERNAL MEDICINE
Payer: MEDICARE

## 2022-03-28 DIAGNOSIS — S91.002A ANKLE WOUND, LEFT, INITIAL ENCOUNTER: ICD-10-CM

## 2022-03-28 DIAGNOSIS — L03.116 CELLULITIS OF LEFT ANKLE: ICD-10-CM

## 2022-03-28 DIAGNOSIS — S91.002A OPEN WOUND OF LEFT ANKLE: ICD-10-CM

## 2022-03-28 DIAGNOSIS — Z45.2 ENCOUNTER FOR CENTRAL LINE CARE: Primary | ICD-10-CM

## 2022-03-28 DIAGNOSIS — M79.662 PAIN AND SWELLING OF LEFT LOWER LEG: ICD-10-CM

## 2022-03-28 DIAGNOSIS — N18.30 STAGE 3 CHRONIC KIDNEY DISEASE, UNSPECIFIED WHETHER STAGE 3A OR 3B CKD (HCC): ICD-10-CM

## 2022-03-28 DIAGNOSIS — M79.89 PAIN AND SWELLING OF LEFT LOWER LEG: ICD-10-CM

## 2022-03-28 LAB
ANION GAP SERPL CALC-SCNC: 6 MMOL/L (ref 0–18)
BASOPHILS # BLD AUTO: 0.02 X10(3) UL (ref 0–0.2)
BASOPHILS NFR BLD AUTO: 0.2 %
BUN BLD-MCNC: 38 MG/DL (ref 7–18)
BUN/CREAT SERPL: 27.3 (ref 10–20)
CALCIUM BLD-MCNC: 8.1 MG/DL (ref 8.5–10.1)
CHLORIDE SERPL-SCNC: 118 MMOL/L (ref 98–112)
CO2 SERPL-SCNC: 20 MMOL/L (ref 21–32)
CREAT BLD-MCNC: 1.39 MG/DL
CRP SERPL-MCNC: 1.06 MG/DL (ref ?–0.3)
DEPRECATED RDW RBC AUTO: 60.1 FL (ref 35.1–46.3)
EOSINOPHIL # BLD AUTO: 0.05 X10(3) UL (ref 0–0.7)
EOSINOPHIL NFR BLD AUTO: 0.6 %
ERYTHROCYTE [DISTWIDTH] IN BLOOD BY AUTOMATED COUNT: 16.3 % (ref 11–15)
ERYTHROCYTE [SEDIMENTATION RATE] IN BLOOD: 22 MM/HR
GLUCOSE BLD-MCNC: 81 MG/DL (ref 70–99)
HCT VFR BLD AUTO: 33 %
HGB BLD-MCNC: 10.1 G/DL
IMM GRANULOCYTES # BLD AUTO: 0.05 X10(3) UL (ref 0–1)
IMM GRANULOCYTES NFR BLD: 0.6 %
LYMPHOCYTES # BLD AUTO: 0.33 X10(3) UL (ref 1–4)
LYMPHOCYTES NFR BLD AUTO: 3.8 %
MCH RBC QN AUTO: 30.6 PG (ref 26–34)
MCHC RBC AUTO-ENTMCNC: 30.6 G/DL (ref 31–37)
MCV RBC AUTO: 100 FL
MONOCYTES # BLD AUTO: 0.38 X10(3) UL (ref 0.1–1)
MONOCYTES NFR BLD AUTO: 4.3 %
NEUTROPHILS # BLD AUTO: 7.92 X10 (3) UL (ref 1.5–7.7)
NEUTROPHILS # BLD AUTO: 7.92 X10(3) UL (ref 1.5–7.7)
NEUTROPHILS NFR BLD AUTO: 90.5 %
OSMOLALITY SERPL CALC.SUM OF ELEC: 306 MOSM/KG (ref 275–295)
PLATELET # BLD AUTO: 148 10(3)UL (ref 150–450)
POTASSIUM SERPL-SCNC: 4.8 MMOL/L (ref 3.5–5.1)
RBC # BLD AUTO: 3.3 X10(6)UL
SODIUM SERPL-SCNC: 144 MMOL/L (ref 136–145)
WBC # BLD AUTO: 8.8 X10(3) UL (ref 4–11)

## 2022-03-28 PROCEDURE — 93923 UPR/LXTR ART STDY 3+ LVLS: CPT | Performed by: INTERNAL MEDICINE

## 2022-03-28 PROCEDURE — 80048 BASIC METABOLIC PNL TOTAL CA: CPT

## 2022-03-28 PROCEDURE — 86140 C-REACTIVE PROTEIN: CPT

## 2022-03-28 PROCEDURE — 36591 DRAW BLOOD OFF VENOUS DEVICE: CPT

## 2022-03-28 PROCEDURE — 85652 RBC SED RATE AUTOMATED: CPT

## 2022-03-28 PROCEDURE — 85025 COMPLETE CBC W/AUTO DIFF WBC: CPT

## 2022-03-28 RX ORDER — HEPARIN SODIUM (PORCINE) LOCK FLUSH IV SOLN 100 UNIT/ML 100 UNIT/ML
5 SOLUTION INTRAVENOUS ONCE
OUTPATIENT
Start: 2022-04-20

## 2022-03-28 RX ORDER — PREDNISONE 20 MG/1
20 TABLET ORAL DAILY
Qty: 30 TABLET | Refills: 0 | Status: SHIPPED | OUTPATIENT
Start: 2022-03-28

## 2022-03-28 RX ORDER — HEPARIN SODIUM (PORCINE) LOCK FLUSH IV SOLN 100 UNIT/ML 100 UNIT/ML
5 SOLUTION INTRAVENOUS ONCE
Status: COMPLETED | OUTPATIENT
Start: 2022-03-28 | End: 2022-03-28

## 2022-03-28 RX ORDER — SODIUM CHLORIDE 9 MG/ML
10 INJECTION INTRAVENOUS ONCE
OUTPATIENT
Start: 2022-04-20

## 2022-03-28 RX ADMIN — HEPARIN SODIUM (PORCINE) LOCK FLUSH IV SOLN 100 UNIT/ML 500 UNITS: 100 SOLUTION INTRAVENOUS at 11:55:00

## 2022-03-28 NOTE — TELEPHONE ENCOUNTER
Discussed with Dr. Edwin Caballero. Order BMP and refill prednisone 20mg daily #30. Orders entered and MyChart sent to patient.

## 2022-03-28 NOTE — TELEPHONE ENCOUNTER
YASMINE Gooden calling from [facility] wound clinic, about Patients left ankle. Requesting that Dr Ivania Goins review xray of left ankle, lower extremity ultrasound and recent labs; sed rate, CRP and CBC. Dr Melody Palacios is requesting recommendations or next step for patient care plan. Can call Anamaria Evans at [phone]. Please advise, thanks.

## 2022-03-28 NOTE — TELEPHONE ENCOUNTER
From: Sheldon Pinto  To: Rosa Maria Peterson MD  Sent: 3/28/2022 7:24 AM CDT  Subject: Medication    I need a refill for Charlene Amadorison Prednisone and it is not appearing in the medication list. He is still taking the 20mg. He is taking a blood test this morning at the cancer center at 11 am if you want him to take another please order. I have never received a reply from Dr Nasim Sy from the last message.

## 2022-03-28 NOTE — PROGRESS NOTES
Notified Dr. Antonia Mcmanus office regarding patient's Sed Rate-abnormal, CRP-abnormal, CBC-anemia and xray results shows inflammation, cellulitis. Patient complains of throbbing and may benefit from further testing.

## 2022-03-29 RX ORDER — HYDROCODONE BITARTRATE AND ACETAMINOPHEN 5; 325 MG/1; MG/1
1-2 TABLET ORAL EVERY 8 HOURS PRN
Qty: 30 TABLET | Refills: 0 | Status: ON HOLD | OUTPATIENT
Start: 2022-03-29 | End: 2022-06-15

## 2022-03-29 NOTE — TELEPHONE ENCOUNTER
Wife of patient is calling regarding patient's recent lab results. Per wife, Wound care APN voiced concern about CRP,  sed rate, etc.  Per wife would like to know what next steps are, has questions about recent imaging. Is wound infected or healing impacted by a blockage. RN advises patient that YASMINE Carvalho has reached out to Dr. Aubree Brand regarding her concerns and to coordinate plan of care. Wife voices frustration as has been hoping for resolution for some time. Wife says\"i just want to know what to do\"  Offered video visit with provider, declines. Per wife \" Dr. Aubree Brand told me a office visit wasn't necessary, that he would take care of things\". Wife would like return call with further direction/clarity.      Routing to provider

## 2022-03-29 NOTE — TELEPHONE ENCOUNTER
1. Arterial ultrasound consistent with peripheral arterial disease, ie \"heart disease\" but of the blood vessels in the legs. - FINDINGS: There is mild, diffuse dampening of pulse volume waveforms throughout the left lower extremity. 2. X-ray consistent with cellulitis + hardware and failed  Oral bactrim. 3. CRP elevated which could be due to chemo/cancer or early bone infection- unable to differentiate. Given he has already had bactrim + PAD + worsening pain + on prednisone (delayed healing and no immune system), he will need IV antibiotics. Recommend going to ER with ID and podiatry consults (Dr. Chris Jaimes and Dr. Reggie Chavez or whoever is on call if either is unavailable).  May need admit but will defer to ER/hospitalist.

## 2022-03-29 NOTE — TELEPHONE ENCOUNTER
Spoke to patient's spouse, Clyde Rodgers. Verified patient's full name and . Polly is not happy with this recommendation of going to ER. She does not feel that they do anything for her , in fact, that is where he got cellulitis to begin with. She would really like to speak to Dr. Masha Harris directly so she can ask her questions directly to him. Relayed that he is technically out of the office today. Dr. Masha Harris, please advise if you are able to speak to Clyde Rodgers directly.

## 2022-03-30 ENCOUNTER — HOSPITAL ENCOUNTER (EMERGENCY)
Facility: HOSPITAL | Age: 77
Discharge: HOME | End: 2022-03-30
Attending: EMERGENCY MEDICINE
Payer: MEDICARE

## 2022-03-30 ENCOUNTER — APPOINTMENT (OUTPATIENT)
Dept: MRI IMAGING | Facility: HOSPITAL | Age: 77
End: 2022-03-30
Attending: EMERGENCY MEDICINE
Payer: MEDICARE

## 2022-03-30 ENCOUNTER — TELEPHONE (OUTPATIENT)
Dept: ENDOCRINOLOGY CLINIC | Facility: CLINIC | Age: 77
End: 2022-03-30

## 2022-03-30 VITALS
HEART RATE: 70 BPM | OXYGEN SATURATION: 96 % | RESPIRATION RATE: 18 BRPM | TEMPERATURE: 98 F | SYSTOLIC BLOOD PRESSURE: 115 MMHG | DIASTOLIC BLOOD PRESSURE: 63 MMHG

## 2022-03-30 DIAGNOSIS — S91.002A OPEN WOUND OF LEFT ANKLE, INITIAL ENCOUNTER: Primary | ICD-10-CM

## 2022-03-30 LAB
ANION GAP SERPL CALC-SCNC: 4 MMOL/L (ref 0–18)
BASOPHILS # BLD AUTO: 0.02 X10(3) UL (ref 0–0.2)
BASOPHILS NFR BLD AUTO: 0.2 %
BUN BLD-MCNC: 37 MG/DL (ref 7–18)
BUN/CREAT SERPL: 28.2 (ref 10–20)
CALCIUM BLD-MCNC: 8.1 MG/DL (ref 8.5–10.1)
CHLORIDE SERPL-SCNC: 117 MMOL/L (ref 98–112)
CO2 SERPL-SCNC: 21 MMOL/L (ref 21–32)
CREAT BLD-MCNC: 1.31 MG/DL
DEPRECATED RDW RBC AUTO: 59.5 FL (ref 35.1–46.3)
EOSINOPHIL # BLD AUTO: 0.06 X10(3) UL (ref 0–0.7)
EOSINOPHIL NFR BLD AUTO: 0.7 %
ERYTHROCYTE [DISTWIDTH] IN BLOOD BY AUTOMATED COUNT: 16 % (ref 11–15)
GLUCOSE BLD-MCNC: 89 MG/DL (ref 70–99)
HCT VFR BLD AUTO: 33.7 %
HGB BLD-MCNC: 10.2 G/DL
IMM GRANULOCYTES # BLD AUTO: 0.05 X10(3) UL (ref 0–1)
IMM GRANULOCYTES NFR BLD: 0.6 %
LYMPHOCYTES # BLD AUTO: 0.25 X10(3) UL (ref 1–4)
LYMPHOCYTES NFR BLD AUTO: 2.8 %
MCH RBC QN AUTO: 30.6 PG (ref 26–34)
MCHC RBC AUTO-ENTMCNC: 30.3 G/DL (ref 31–37)
MCV RBC AUTO: 101.2 FL
MONOCYTES # BLD AUTO: 0.47 X10(3) UL (ref 0.1–1)
MONOCYTES NFR BLD AUTO: 5.3 %
NEUTROPHILS # BLD AUTO: 7.96 X10 (3) UL (ref 1.5–7.7)
NEUTROPHILS # BLD AUTO: 7.96 X10(3) UL (ref 1.5–7.7)
NEUTROPHILS NFR BLD AUTO: 90.4 %
OSMOLALITY SERPL CALC.SUM OF ELEC: 302 MOSM/KG (ref 275–295)
PLATELET # BLD AUTO: 146 10(3)UL (ref 150–450)
POTASSIUM SERPL-SCNC: 4.5 MMOL/L (ref 3.5–5.1)
RBC # BLD AUTO: 3.33 X10(6)UL
SARS-COV-2 RNA RESP QL NAA+PROBE: NOT DETECTED
SODIUM SERPL-SCNC: 142 MMOL/L (ref 136–145)
WBC # BLD AUTO: 8.8 X10(3) UL (ref 4–11)

## 2022-03-30 PROCEDURE — 36415 COLL VENOUS BLD VENIPUNCTURE: CPT

## 2022-03-30 PROCEDURE — 80048 BASIC METABOLIC PNL TOTAL CA: CPT | Performed by: EMERGENCY MEDICINE

## 2022-03-30 PROCEDURE — 85025 COMPLETE CBC W/AUTO DIFF WBC: CPT | Performed by: EMERGENCY MEDICINE

## 2022-03-30 PROCEDURE — 99285 EMERGENCY DEPT VISIT HI MDM: CPT

## 2022-03-30 PROCEDURE — 73721 MRI JNT OF LWR EXTRE W/O DYE: CPT | Performed by: EMERGENCY MEDICINE

## 2022-03-30 RX ORDER — METOCLOPRAMIDE HYDROCHLORIDE 5 MG/ML
10 INJECTION INTRAMUSCULAR; INTRAVENOUS EVERY 8 HOURS PRN
Status: CANCELLED | OUTPATIENT
Start: 2022-03-30

## 2022-03-30 RX ORDER — ACETAMINOPHEN 325 MG/1
650 TABLET ORAL EVERY 4 HOURS PRN
Status: CANCELLED | OUTPATIENT
Start: 2022-03-30

## 2022-03-30 RX ORDER — VANCOMYCIN 2 GRAM/500 ML IN 0.9 % SODIUM CHLORIDE INTRAVENOUS
25 ONCE
Status: DISCONTINUED | OUTPATIENT
Start: 2022-03-30 | End: 2022-03-30

## 2022-03-30 RX ORDER — HEPARIN SODIUM (PORCINE) LOCK FLUSH IV SOLN 100 UNIT/ML 100 UNIT/ML
5 SOLUTION INTRAVENOUS ONCE
Status: COMPLETED | OUTPATIENT
Start: 2022-03-30 | End: 2022-03-30

## 2022-03-30 RX ORDER — HEPARIN SODIUM 5000 [USP'U]/ML
5000 INJECTION, SOLUTION INTRAVENOUS; SUBCUTANEOUS EVERY 12 HOURS SCHEDULED
Status: CANCELLED | OUTPATIENT
Start: 2022-03-30

## 2022-03-30 RX ORDER — HYDROCODONE BITARTRATE AND ACETAMINOPHEN 5; 325 MG/1; MG/1
2 TABLET ORAL EVERY 4 HOURS PRN
Status: CANCELLED | OUTPATIENT
Start: 2022-03-30

## 2022-03-30 RX ORDER — HYDROCODONE BITARTRATE AND ACETAMINOPHEN 5; 325 MG/1; MG/1
1 TABLET ORAL EVERY 4 HOURS PRN
Status: CANCELLED | OUTPATIENT
Start: 2022-03-30

## 2022-03-30 RX ORDER — ACETAMINOPHEN 325 MG/1
650 TABLET ORAL EVERY 6 HOURS PRN
Status: CANCELLED | OUTPATIENT
Start: 2022-03-30

## 2022-03-30 RX ORDER — ONDANSETRON 2 MG/ML
4 INJECTION INTRAMUSCULAR; INTRAVENOUS EVERY 6 HOURS PRN
Status: CANCELLED | OUTPATIENT
Start: 2022-03-30

## 2022-03-30 NOTE — TELEPHONE ENCOUNTER
Spoke to Alisa, patient's spouse,  (on SANDRA, verified patient's name and ). Polly has decided to take patient to ER. She wanted to know what to tell them when she goes. Relayed what patient should tell ER. Writer also called [facility] ER Triage and spoke to [place], and gave him information on why Dr. Farhana Orlando is recommending ER. Ana Lozano verbalized understanding and will await patient's arrival.     Dr. Farhana Orlando, [employer].

## 2022-03-30 NOTE — TELEPHONE ENCOUNTER
Hi!    Can we contact patient and make follow up appointment for him if possible? Thank you I would liek to go over recent lab work.

## 2022-03-30 NOTE — PROGRESS NOTES
3/30/2022: Received call from Spouse that she received call from Dr. James Round office her  (patient) needs to be seen in ER. Dicussed with spouse the importance of ER visit due to Patient's CRP/Sed rate are elevated, patient's CBC anemia. Xray results abnormal. and  US Art lower leg abnormal.  Spouse requesting wound care nurse to follow up with  when admitted Hospital. Discussed that Mihai Nieves and Ortiz should be on consult for wound care of left ankle wound. Spouse verbalized understanding of instructions.

## 2022-03-30 NOTE — ED QUICK NOTES
Orders for admission, patient is aware of plan and ready to go upstairs.  Any questions, please call ED RN Kenneth Cesar at extension [phone]    Patient Covid vaccination status: Fully vaccinated and immunocompromised     COVID Test Ordered in ED: Rapid SARS-CoV-2 by PCR    COVID Suspicion at Admission: Low/ Neg rapid covid tst    Running Infusions:      Mental Status/LOC at time of transport: A&OX4    Other pertinent information:   CIWA score: N/A   NIH score:  N/A     Dorsal pedal pulse obtained via doppler    Pt has old ankle hardware but is ok'd for MRI if ordered per pt's PMD.

## 2022-03-30 NOTE — H&P
Formerly [facility]    PATIENT'S NAME: Frank Pressley   ATTENDING PHYSICIAN: Michael Delarosa MD   PATIENT ACCOUNT#:   [id number]    LOCATION:  [address]  MEDICAL RECORD #:   [id number]       YOB: 1945  ADMISSION DATE:       03/30/2022    HISTORY AND PHYSICAL EXAMINATION    CHIEF COMPLAINT:  Nonhealing left lateral malleolar ulcer. HISTORY OF PRESENT ILLNESS:  The patient is a 58-year-old  male with complex past medical history including adenocarcinoma of the lung. Immunotherapy was discontinued for the possibility of immune-mediated interstitial nephritis, which responded to steroids. He has been having a small ulcer on the left lateral malleolus nonhealing for the last 3 months. Has been seen by wound service. Recently had ESR and CRP which were slightly elevated at ESR 20 and CRP 1.6. He had arterial Doppler study done as an outpatient 2 days ago which showed mild to moderate diffuse dampening of pulse volume waveform in the left iliac inflow or common femoral artery occlusive disease, but no evidence of hemodynamically significant occlusive disease within the right lower extremity. On the left side, iliac inflow and common femoral artery mild to moderate occlusive disease. The patient had an x-ray as an outpatient which showed no bone abnormality. He had hardware in the medial aspect of his left ankle. His ulcer is on the right side. Today he was sent to the emergency department for evaluation. 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 Was on maintenance Keytruda when he developed acute renal insufficiency which was thought to be immune-mediated either secondary to [facility] or cefazolin. Responded to steroids. He was taken off immunotherapy and has been followed with periodic PET scans.   He had chronic left pleural effusion and malignant pericardial effusion requiring pericardiocentesis; history of COPD; coronary artery disease, multiple PCI stents; chronic left ventricular diastolic dysfunction; abdominal aortic aneurysm; chronic kidney disease stage 2 to 3; obstructive sleep apnea; osteoarthritis; hypertension; hyperlipidemia; benign prostatic hypertrophy. PAST SURGICAL HISTORY:  Left ankle open reduction and internal fixation, hernia repair, pericardiocentesis, right cochlear implant. MEDICATIONS:  Please see medication reconciliation list.    ALLERGIES:  No known drug allergies. SOCIAL HISTORY:  Ex-tobacco user. No current tobacco, alcohol, or drug use. Lives with his wife. At baseline independent in his basic activities of daily living. REVIEW OF SYSTEMS:  Ulcer nonhealing at the lateral malleolus left ankle for the last 3 months. Occasional drainage, serosanguineous in nature. No significant cellulitic changes. Has been placed on cefazolin; and during the time, developed acute renal failure. 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 Responded to steroids. Recently was placed on Bactrim. Ulcer is nonhealing. No fever or chills. Other 12-point review of systems negative. PHYSICAL EXAMINATION:    GENERAL:  Alert. Oriented to time, place, and person. No acute distress. VITAL SIGNS:  Temperature 97.7, pulse 67, respiratory rate 18, blood pressure 147/99, pulse ox 99% on room air. HEENT:  Atraumatic. Oropharynx clear, moist mucous membranes. Ears, nose normal.  Eyes:  Anicteric sclerae. NECK:  Supple. No lymphadenopathy. Trachea midline. Full range of motion. LUNGS:  Clear to auscultation bilaterally. Diminished breathing sounds, left lung base. HEART:  Regular rate and rhythm. S1, S2 auscultated. No murmur. ABDOMEN:  Soft, nondistended, no tenderness. Positive bowel sounds. EXTREMITIES:  No peripheral edema, clubbing, or cyanosis.   Dorsalis pedis pulses could not be palpated bilaterally which is chronic for the patient. No ischemic changes noted on bilateral feet. Left lateral ankle malleolar area with 2 mm greater ulcer at the center of the malleolar area with slight erythema around the area. Minimal serosanguineous drainage. Tenderness to palpation in the area. NEUROLOGIC:  Motor and sensory intact. Cranial nerves II through XII are intact. ASSESSMENT:    1. Left lateral malleolar ulcer nonhealing. Could be neuropathic. Doubt ischemic in nature. Rule out underlying osteomyelitis. 2.   Lung cancer as outlined above. 3.   Chronic kidney disease stage 3.  4.   Hypertension. PLAN:  The patient will be admitted to general medical floor. Start him on IV vancomycin, obtain MRI scan of the left ankle if it is permissible considering he had cochlear implant, obtain infectious disease consult and wound consult. Further recommendations to follow.     Dictated By Kaykay Vallejo MD  d: 03/30/2022 12:52:30  t: 03/30/2022 13:02:38  Job [id number]  FB/

## 2022-03-30 NOTE — ED INITIAL ASSESSMENT (HPI)
Pt sent by Dr. [facility] for left ankle infection, pt \"needs IV antibiotics:\" for cellulitis and possible bone infection.

## 2022-03-31 ENCOUNTER — TELEPHONE (OUTPATIENT)
Dept: INTERNAL MEDICINE CLINIC | Facility: CLINIC | Age: 77
End: 2022-03-31

## 2022-03-31 NOTE — TELEPHONE ENCOUNTER
Advised patient's wife, Francesca Hu (on SANDRA)  of Dr Doreen Lipscomb  note. Patient's Polly verbalized understanding and had no further questions.

## 2022-03-31 NOTE — TELEPHONE ENCOUNTER
Spoke with pt's wife per SANDRA, ZEYNEP verified  She stated pt was seen in ER yesterday and had MRI of the left ankle done. She is looking for test result and MD recommendations. pls advise, thanks in advance.

## 2022-03-31 NOTE — ED QUICK NOTES
Patient and his wife provided with discharge instructions. Verbalized understanding for plan of care at home and follow up. All questions/ concerns addressed prior to discharge.

## 2022-04-05 ENCOUNTER — TELEPHONE (OUTPATIENT)
Dept: PHYSICAL THERAPY | Facility: HOSPITAL | Age: 77
End: 2022-04-05

## 2022-04-06 ENCOUNTER — APPOINTMENT (OUTPATIENT)
Dept: PHYSICAL THERAPY | Age: 77
End: 2022-04-06
Attending: INTERNAL MEDICINE
Payer: MEDICARE

## 2022-04-07 ENCOUNTER — TELEPHONE (OUTPATIENT)
Dept: PHYSICAL THERAPY | Age: 77
End: 2022-04-07

## 2022-04-07 NOTE — TELEPHONE ENCOUNTER
Called pt to help schedule an appt. Spoke to pt's wife, HIPAA verified. Polly stated that they are currently [employer]. Raiza was leaving home to go to Physical Therapy and they were advised that because they are [employer] they need to be home bond and had to cancel Nnamdi's Physical Therapy appointments. Polly stated that she will be contacting Medicare to see if they are able to continue Physical Therapy. Polly also advised me that she's not sure if they will be able to come to Dr. hook. Informed pt I would ask Dr. Raphael Loud if it is okay to make an appt for a Video Visit.       Please Advise

## 2022-04-08 ENCOUNTER — OFFICE VISIT (OUTPATIENT)
Dept: WOUND CARE | Facility: HOSPITAL | Age: 77
End: 2022-04-08
Attending: CLINICAL NURSE SPECIALIST
Payer: MEDICARE

## 2022-04-08 VITALS
RESPIRATION RATE: 17 BRPM | TEMPERATURE: 97 F | OXYGEN SATURATION: 98 % | SYSTOLIC BLOOD PRESSURE: 126 MMHG | DIASTOLIC BLOOD PRESSURE: 62 MMHG

## 2022-04-08 DIAGNOSIS — L89.524 PRESSURE INJURY OF LEFT ANKLE, STAGE 4 (HCC): ICD-10-CM

## 2022-04-08 DIAGNOSIS — S91.002D OPEN WOUND OF LEFT ANKLE, SUBSEQUENT ENCOUNTER: ICD-10-CM

## 2022-04-08 DIAGNOSIS — S91.002A ANKLE WOUND, LEFT, INITIAL ENCOUNTER: ICD-10-CM

## 2022-04-08 PROCEDURE — 99213 OFFICE O/P EST LOW 20 MIN: CPT

## 2022-04-11 ENCOUNTER — APPOINTMENT (OUTPATIENT)
Dept: PHYSICAL THERAPY | Age: 77
End: 2022-04-11
Attending: INTERNAL MEDICINE
Payer: MEDICARE

## 2022-04-13 ENCOUNTER — APPOINTMENT (OUTPATIENT)
Dept: PHYSICAL THERAPY | Age: 77
End: 2022-04-13
Attending: INTERNAL MEDICINE
Payer: MEDICARE

## 2022-04-18 ENCOUNTER — TELEPHONE (OUTPATIENT)
Dept: ENDOCRINOLOGY CLINIC | Facility: CLINIC | Age: 77
End: 2022-04-18

## 2022-04-18 ENCOUNTER — MED REC SCAN ONLY (OUTPATIENT)
Dept: INTERNAL MEDICINE CLINIC | Facility: CLINIC | Age: 77
End: 2022-04-18

## 2022-04-18 ENCOUNTER — TELEPHONE (OUTPATIENT)
Dept: INTERNAL MEDICINE CLINIC | Facility: CLINIC | Age: 77
End: 2022-04-18

## 2022-04-18 NOTE — TELEPHONE ENCOUNTER
Hi!  I called this patient to discuss thyroid function tests. Patient's Euthyrox dose was increased in May 2021 from 112 to 125mcg. His TSH improved in December 2021 and at that time chemotherapy was stopped. His most recent TSH has increased slightly and so I would like to recheck in 6 weeks to follow trend. For now, patient will stay on same dose of Euthyrox 125mcg PO qday. Thank you!

## 2022-04-18 NOTE — TELEPHONE ENCOUNTER
Order #[id number] signed and fax to [facility] at [phone].  Fax confirmation received and sent to scan

## 2022-04-19 ENCOUNTER — NURSE ONLY (OUTPATIENT)
Dept: HEMATOLOGY/ONCOLOGY | Facility: HOSPITAL | Age: 77
End: 2022-04-19
Attending: INTERNAL MEDICINE
Payer: MEDICARE

## 2022-04-19 DIAGNOSIS — N18.30 STAGE 3 CHRONIC KIDNEY DISEASE, UNSPECIFIED WHETHER STAGE 3A OR 3B CKD (HCC): ICD-10-CM

## 2022-04-19 DIAGNOSIS — Z45.2 ENCOUNTER FOR CENTRAL LINE CARE: Primary | ICD-10-CM

## 2022-04-19 DIAGNOSIS — C79.51 BONE METASTASIS (HCC): ICD-10-CM

## 2022-04-19 DIAGNOSIS — Z51.81 MEDICATION MONITORING ENCOUNTER: ICD-10-CM

## 2022-04-19 LAB
ANION GAP SERPL CALC-SCNC: 7 MMOL/L (ref 0–18)
BUN BLD-MCNC: 33 MG/DL (ref 7–18)
BUN/CREAT SERPL: 25 (ref 10–20)
CALCIUM BLD-MCNC: 7.6 MG/DL (ref 8.5–10.1)
CHLORIDE SERPL-SCNC: 119 MMOL/L (ref 98–112)
CO2 SERPL-SCNC: 21 MMOL/L (ref 21–32)
CREAT BLD-MCNC: 1.32 MG/DL
GLUCOSE BLD-MCNC: 86 MG/DL (ref 70–99)
OSMOLALITY SERPL CALC.SUM OF ELEC: 311 MOSM/KG (ref 275–295)
POTASSIUM SERPL-SCNC: 3.7 MMOL/L (ref 3.5–5.1)
SODIUM SERPL-SCNC: 147 MMOL/L (ref 136–145)

## 2022-04-19 PROCEDURE — 36591 DRAW BLOOD OFF VENOUS DEVICE: CPT

## 2022-04-19 PROCEDURE — 96372 THER/PROPH/DIAG INJ SC/IM: CPT

## 2022-04-19 PROCEDURE — 80048 BASIC METABOLIC PNL TOTAL CA: CPT

## 2022-04-19 RX ORDER — SODIUM CHLORIDE 9 MG/ML
10 INJECTION INTRAVENOUS ONCE
OUTPATIENT
Start: 2022-05-10

## 2022-04-19 RX ORDER — HEPARIN SODIUM (PORCINE) LOCK FLUSH IV SOLN 100 UNIT/ML 100 UNIT/ML
5 SOLUTION INTRAVENOUS ONCE
Status: COMPLETED | OUTPATIENT
Start: 2022-04-19 | End: 2022-04-19

## 2022-04-19 RX ORDER — HEPARIN SODIUM (PORCINE) LOCK FLUSH IV SOLN 100 UNIT/ML 100 UNIT/ML
5 SOLUTION INTRAVENOUS ONCE
OUTPATIENT
Start: 2022-05-10

## 2022-04-19 RX ADMIN — HEPARIN SODIUM (PORCINE) LOCK FLUSH IV SOLN 100 UNIT/ML 500 UNITS: 100 SOLUTION INTRAVENOUS at 11:00:00

## 2022-04-21 ENCOUNTER — OFFICE VISIT (OUTPATIENT)
Dept: WOUND CARE | Facility: HOSPITAL | Age: 77
End: 2022-04-21
Attending: CLINICAL NURSE SPECIALIST
Payer: MEDICARE

## 2022-04-21 VITALS
TEMPERATURE: 97 F | DIASTOLIC BLOOD PRESSURE: 63 MMHG | OXYGEN SATURATION: 99 % | SYSTOLIC BLOOD PRESSURE: 115 MMHG | RESPIRATION RATE: 17 BRPM | HEART RATE: 66 BPM

## 2022-04-21 DIAGNOSIS — S91.002S OPEN WOUND OF LEFT ANKLE, SEQUELA: ICD-10-CM

## 2022-04-21 DIAGNOSIS — L89.524 PRESSURE INJURY OF LEFT ANKLE, STAGE 4 (HCC): ICD-10-CM

## 2022-04-21 PROCEDURE — 99214 OFFICE O/P EST MOD 30 MIN: CPT | Performed by: CLINICAL NURSE SPECIALIST

## 2022-04-22 ENCOUNTER — TELEPHONE (OUTPATIENT)
Dept: NEPHROLOGY | Facility: CLINIC | Age: 77
End: 2022-04-22

## 2022-04-22 RX ORDER — PREDNISONE 1 MG/1
5 TABLET ORAL DAILY
Qty: 30 TABLET | Refills: 0 | Status: SHIPPED | OUTPATIENT
Start: 2022-04-22

## 2022-04-22 NOTE — TELEPHONE ENCOUNTER
Kidney function stable .  Increase water intake to 10-15 ounces a day     Reduce prednisone to 5 mg daily dose     Repeat BMP prior to next visit in May - please order lab test

## 2022-04-28 ENCOUNTER — OFFICE VISIT (OUTPATIENT)
Dept: WOUND CARE | Facility: HOSPITAL | Age: 77
End: 2022-04-28
Attending: CLINICAL NURSE SPECIALIST
Payer: MEDICARE

## 2022-04-28 VITALS
OXYGEN SATURATION: 100 % | HEART RATE: 70 BPM | DIASTOLIC BLOOD PRESSURE: 68 MMHG | SYSTOLIC BLOOD PRESSURE: 130 MMHG | TEMPERATURE: 97 F

## 2022-04-28 DIAGNOSIS — L89.524 PRESSURE INJURY OF LEFT ANKLE, STAGE 4 (HCC): ICD-10-CM

## 2022-04-28 DIAGNOSIS — S91.002S OPEN WOUND OF LEFT ANKLE, SEQUELA: ICD-10-CM

## 2022-04-28 PROCEDURE — 99214 OFFICE O/P EST MOD 30 MIN: CPT | Performed by: CLINICAL NURSE SPECIALIST

## 2022-05-02 ENCOUNTER — TELEPHONE (OUTPATIENT)
Dept: WOUND CARE | Facility: HOSPITAL | Age: 77
End: 2022-05-02

## 2022-05-03 ENCOUNTER — MED REC SCAN ONLY (OUTPATIENT)
Dept: INTERNAL MEDICINE CLINIC | Facility: CLINIC | Age: 77
End: 2022-05-03

## 2022-05-05 ENCOUNTER — APPOINTMENT (OUTPATIENT)
Dept: WOUND CARE | Facility: HOSPITAL | Age: 77
End: 2022-05-05
Payer: MEDICARE

## 2022-05-05 DIAGNOSIS — N40.0 BENIGN PROSTATIC HYPERPLASIA, UNSPECIFIED WHETHER LOWER URINARY TRACT SYMPTOMS PRESENT: ICD-10-CM

## 2022-05-05 RX ORDER — TERAZOSIN 5 MG/1
5 CAPSULE ORAL DAILY
Qty: 90 CAPSULE | Refills: 1 | Status: SHIPPED | OUTPATIENT
Start: 2022-05-05 | End: 2022-01-01

## 2022-05-05 NOTE — TELEPHONE ENCOUNTER
Refill passed per [facility], [facility] protocol. Requested Prescriptions   Pending Prescriptions Disp Refills    FINASTERIDE 5 MG Oral Tab [Pharmacy Med Name: FINASTERIDE 5 MG Tablet] 90 tablet 3     Sig: TAKE 1 TABLET EVERY DAY        There is no refill protocol information for this order        TERAZOSIN 5 MG Oral Cap [Pharmacy Med Name: TERAZOSIN HCL 5 MG Capsule] 90 capsule 3     Sig: TAKE 1 CAPSULE EVERY DAY AT 4:00PM        Hypertensive Medications Protocol Passed - 5/5/2022  4:11 AM        Passed - CMP or BMP in past 12 months        Passed - Appointment in past 6 or next 3 months        Passed - GFR Non- > 50     Lab Results   Component Value Date    GFRNAA 51 (L) 04/19/2022                     Future Appointments         Provider Department Appt Notes    Tomorrow [address]     In 4 days EM CC [address] [facility]    In 1 week Loretta Perez MD [facility], [facility], [address] [place] [place] [address] 8 weeks    In 1 week Zi [address]     In 1 week EM CC [address] - Infusion CMP-XGEVA-SQ/PORT-SL    In 1 month [facility]  [facility] [place] PET Scan     In 1 month [address] PET Scan     In 1 month George Jesus 19 Hematology Oncology FOLLOW UP VISIT. CL  date per Spouse          Recent Outpatient Visits              1 week ago Pressure injury of left ankle, stage 4 [facility])    [address], APRN    Office Visit    2 weeks ago Pressure injury of left ankle, stage 4 [facility])    [address], APRN    Office Visit    2 weeks ago Encounter for central line care    [address] - Infusion    Nurse Only    3 weeks ago Ankle wound, left, initial encounter    [address], APRN    Office Visit    1 month ago Encounter for central line care    [address] - Infusion    Nurse Only

## 2022-05-05 NOTE — TELEPHONE ENCOUNTER
Please review refill failed/no protocol     Requested Prescriptions     Pending Prescriptions Disp Refills    FINASTERIDE 5 MG Oral Tab [Pharmacy Med Name: FINASTERIDE 5 MG Tablet] 90 tablet 3     Sig: TAKE 1 TABLET EVERY DAY     Signed Prescriptions Disp Refills    terazosin 5 MG Oral Cap 90 capsule 1     Sig: Take 1 capsule (5 mg total) by mouth daily. Authorizing Provider: Judd Govealing     Ordering User: Sourav Garcia         Recent Visits  Date Type Provider Dept   03/04/22 Office Visit Jamilah Pearson MD [facility]-Internal Med   11/01/21 Office Visit Jamilah Pearson MD [facility] Med   07/06/21 Office Visit Jamilah Pearson MD [facility]-Internal Med   Showing recent visits within past 540 days with a meds authorizing provider and meeting all other requirements  Future Appointments  No visits were found meeting these conditions. Showing future appointments within next 150 days with a meds authorizing provider and meeting all other requirements    Requested Prescriptions   Pending Prescriptions Disp Refills    FINASTERIDE 5 MG Oral Tab [Pharmacy Med Name: FINASTERIDE 5 MG Tablet] 90 tablet 3     Sig: TAKE 1 TABLET EVERY DAY        There is no refill protocol information for this order       Signed Prescriptions Disp Refills    terazosin 5 MG Oral Cap 90 capsule 1     Sig: Take 1 capsule (5 mg total) by mouth daily.         Hypertensive Medications Protocol Passed - 5/5/2022  4:11 AM        Passed - CMP or BMP in past 12 months        Passed - Appointment in past 6 or next 3 months        Passed - GFR Non- > 50     Lab Results   Component Value Date    GFRNAA 51 (L) 04/19/2022                     Future Appointments         Provider Department Appt Notes    Tomorrow Zi [address]     In 4 days EM CC [address] [facility]    In 1 week Roger Henderson MD [facility], [facility], [address] [place] [address] 8 weeks    In 1 week Roberts, Zi [address]     In 1 week EM CC [address] - Infusion CMP-XGEVA-SQ/PORT-SL    In 1 month [facility] PET DOSE  [facility] PET Scan     In 1 month [address] PET Scan     In 1 month George Snowden 19 Hematology Oncology FOLLOW UP VISIT. CL  date per Spouse          Recent Outpatient Visits              1 week ago Pressure injury of left ankle, stage 4 [facility])    [address], APRN    Office Visit    2 weeks ago Pressure injury of left ankle, stage 4 [facility])    [address], APRN    Office Visit    2 weeks ago Encounter for central line care    [facility] - Infusion    Nurse Only    3 weeks ago Ankle wound, left, initial encounter    [address], APRN    Office Visit    1 month ago Encounter for central line care    [facility] - Infusion    Nurse Only

## 2022-05-06 ENCOUNTER — OFFICE VISIT (OUTPATIENT)
Dept: WOUND CARE | Facility: HOSPITAL | Age: 77
End: 2022-05-06
Attending: CLINICAL NURSE SPECIALIST
Payer: MEDICARE

## 2022-05-06 VITALS
TEMPERATURE: 96 F | SYSTOLIC BLOOD PRESSURE: 124 MMHG | DIASTOLIC BLOOD PRESSURE: 59 MMHG | HEART RATE: 70 BPM | RESPIRATION RATE: 18 BRPM | OXYGEN SATURATION: 98 %

## 2022-05-06 DIAGNOSIS — S91.002D OPEN WOUND OF LEFT ANKLE, SUBSEQUENT ENCOUNTER: ICD-10-CM

## 2022-05-06 PROCEDURE — 99213 OFFICE O/P EST LOW 20 MIN: CPT | Performed by: NURSE PRACTITIONER

## 2022-05-06 RX ORDER — FINASTERIDE 5 MG/1
5 TABLET, FILM COATED ORAL DAILY
Qty: 90 TABLET | Refills: 3 | Status: SHIPPED | OUTPATIENT
Start: 2022-05-06 | End: 2023-01-01

## 2022-05-09 ENCOUNTER — NURSE ONLY (OUTPATIENT)
Dept: HEMATOLOGY/ONCOLOGY | Facility: HOSPITAL | Age: 77
End: 2022-05-09
Attending: INTERNAL MEDICINE
Payer: MEDICARE

## 2022-05-09 DIAGNOSIS — Z51.81 MEDICATION MONITORING ENCOUNTER: ICD-10-CM

## 2022-05-09 DIAGNOSIS — Z45.2 ENCOUNTER FOR CENTRAL LINE CARE: Primary | ICD-10-CM

## 2022-05-09 DIAGNOSIS — C79.51 BONE METASTASIS (HCC): ICD-10-CM

## 2022-05-09 DIAGNOSIS — N18.30 STAGE 3 CHRONIC KIDNEY DISEASE, UNSPECIFIED WHETHER STAGE 3A OR 3B CKD (HCC): ICD-10-CM

## 2022-05-09 LAB
ANION GAP SERPL CALC-SCNC: 7 MMOL/L (ref 0–18)
BUN BLD-MCNC: 38 MG/DL (ref 7–18)
BUN/CREAT SERPL: 23.2 (ref 10–20)
CALCIUM BLD-MCNC: 8.8 MG/DL (ref 8.5–10.1)
CHLORIDE SERPL-SCNC: 114 MMOL/L (ref 98–112)
CO2 SERPL-SCNC: 20 MMOL/L (ref 21–32)
CREAT BLD-MCNC: 1.64 MG/DL
GLUCOSE BLD-MCNC: 116 MG/DL (ref 70–99)
OSMOLALITY SERPL CALC.SUM OF ELEC: 302 MOSM/KG (ref 275–295)
POTASSIUM SERPL-SCNC: 4 MMOL/L (ref 3.5–5.1)
SODIUM SERPL-SCNC: 141 MMOL/L (ref 136–145)

## 2022-05-09 PROCEDURE — 80048 BASIC METABOLIC PNL TOTAL CA: CPT

## 2022-05-09 PROCEDURE — 36591 DRAW BLOOD OFF VENOUS DEVICE: CPT

## 2022-05-09 RX ORDER — SODIUM CHLORIDE 9 MG/ML
10 INJECTION INTRAVENOUS ONCE
OUTPATIENT
Start: 2022-05-16

## 2022-05-09 RX ORDER — HEPARIN SODIUM (PORCINE) LOCK FLUSH IV SOLN 100 UNIT/ML 100 UNIT/ML
5 SOLUTION INTRAVENOUS ONCE
Status: COMPLETED | OUTPATIENT
Start: 2022-05-09 | End: 2022-05-09

## 2022-05-09 RX ORDER — HEPARIN SODIUM (PORCINE) LOCK FLUSH IV SOLN 100 UNIT/ML 100 UNIT/ML
5 SOLUTION INTRAVENOUS ONCE
OUTPATIENT
Start: 2022-05-16

## 2022-05-09 RX ADMIN — HEPARIN SODIUM (PORCINE) LOCK FLUSH IV SOLN 100 UNIT/ML 500 UNITS: 100 SOLUTION INTRAVENOUS at 11:34:00

## 2022-05-12 ENCOUNTER — OFFICE VISIT (OUTPATIENT)
Dept: WOUND CARE | Facility: HOSPITAL | Age: 77
End: 2022-05-12
Attending: CLINICAL NURSE SPECIALIST
Payer: MEDICARE

## 2022-05-12 ENCOUNTER — OFFICE VISIT (OUTPATIENT)
Dept: NEPHROLOGY | Facility: CLINIC | Age: 77
End: 2022-05-12
Payer: MEDICARE

## 2022-05-12 VITALS
BODY MASS INDEX: 25.33 KG/M2 | SYSTOLIC BLOOD PRESSURE: 99 MMHG | HEIGHT: 69 IN | WEIGHT: 171 LBS | DIASTOLIC BLOOD PRESSURE: 53 MMHG | HEART RATE: 65 BPM

## 2022-05-12 VITALS
RESPIRATION RATE: 18 BRPM | SYSTOLIC BLOOD PRESSURE: 131 MMHG | DIASTOLIC BLOOD PRESSURE: 67 MMHG | HEART RATE: 76 BPM | TEMPERATURE: 99 F | OXYGEN SATURATION: 95 %

## 2022-05-12 DIAGNOSIS — C34.90 PRIMARY MALIGNANT NEOPLASM OF LUNG METASTATIC TO OTHER SITE, UNSPECIFIED LATERALITY (HCC): ICD-10-CM

## 2022-05-12 DIAGNOSIS — L89.524 PRESSURE INJURY OF LEFT ANKLE, STAGE 4 (HCC): ICD-10-CM

## 2022-05-12 DIAGNOSIS — N18.30 STAGE 3 CHRONIC KIDNEY DISEASE, UNSPECIFIED WHETHER STAGE 3A OR 3B CKD (HCC): Primary | ICD-10-CM

## 2022-05-12 DIAGNOSIS — N17.9 AKI (ACUTE KIDNEY INJURY) (HCC): ICD-10-CM

## 2022-05-12 DIAGNOSIS — S91.002S OPEN WOUND OF LEFT ANKLE, SEQUELA: ICD-10-CM

## 2022-05-12 PROCEDURE — 99215 OFFICE O/P EST HI 40 MIN: CPT | Performed by: INTERNAL MEDICINE

## 2022-05-12 PROCEDURE — 99214 OFFICE O/P EST MOD 30 MIN: CPT | Performed by: CLINICAL NURSE SPECIALIST

## 2022-05-12 RX ORDER — PREDNISONE 1 MG/1
5 TABLET ORAL DAILY
Qty: 30 TABLET | Refills: 0 | Status: SHIPPED | OUTPATIENT
Start: 2022-05-12

## 2022-05-12 RX ORDER — POTASSIUM CHLORIDE 1500 MG/1
20 TABLET, FILM COATED, EXTENDED RELEASE ORAL 2 TIMES DAILY
COMMUNITY
End: 2022-05-16

## 2022-05-14 DIAGNOSIS — C34.12 PRIMARY CANCER OF LEFT UPPER LOBE OF LUNG (HCC): ICD-10-CM

## 2022-05-16 RX ORDER — ALPRAZOLAM 0.5 MG/1
0.5 TABLET ORAL 2 TIMES DAILY PRN
Qty: 60 TABLET | Refills: 1 | Status: SHIPPED | OUTPATIENT
Start: 2022-05-16 | End: 2022-01-01

## 2022-05-16 RX ORDER — PANTOPRAZOLE SODIUM 40 MG/1
40 TABLET, DELAYED RELEASE ORAL
Qty: 28 TABLET | Refills: 2 | Status: SHIPPED | OUTPATIENT
Start: 2022-05-16 | End: 2022-06-25

## 2022-05-16 RX ORDER — POTASSIUM CHLORIDE 1500 MG/1
TABLET, EXTENDED RELEASE ORAL
Qty: 60 TABLET | Refills: 0 | Status: CANCELLED | OUTPATIENT
Start: 2022-05-16

## 2022-05-16 RX ORDER — POTASSIUM CHLORIDE 1500 MG/1
20 TABLET, FILM COATED, EXTENDED RELEASE ORAL 2 TIMES DAILY
Qty: 60 TABLET | Refills: 3 | Status: SHIPPED | OUTPATIENT
Start: 2022-05-16

## 2022-05-17 ENCOUNTER — NURSE ONLY (OUTPATIENT)
Dept: HEMATOLOGY/ONCOLOGY | Facility: HOSPITAL | Age: 77
End: 2022-05-17
Attending: INTERNAL MEDICINE
Payer: MEDICARE

## 2022-05-17 VITALS — BODY MASS INDEX: 25 KG/M2 | WEIGHT: 169 LBS

## 2022-05-17 DIAGNOSIS — Z45.2 ENCOUNTER FOR CENTRAL LINE CARE: Primary | ICD-10-CM

## 2022-05-17 DIAGNOSIS — Z51.81 MEDICATION MONITORING ENCOUNTER: ICD-10-CM

## 2022-05-17 DIAGNOSIS — C79.31 BRAIN METASTASIS (HCC): ICD-10-CM

## 2022-05-17 DIAGNOSIS — C34.12 PRIMARY CANCER OF LEFT UPPER LOBE OF LUNG (HCC): ICD-10-CM

## 2022-05-17 DIAGNOSIS — Z29.8 ENCOUNTER FOR IMMUNOTHERAPY: ICD-10-CM

## 2022-05-17 DIAGNOSIS — C79.51 BONE METASTASIS (HCC): ICD-10-CM

## 2022-05-17 DIAGNOSIS — N17.9 ACUTE RENAL FAILURE (ARF) (HCC): ICD-10-CM

## 2022-05-17 LAB
ALBUMIN SERPL-MCNC: 2.9 G/DL (ref 3.4–5)
ALBUMIN/GLOB SERPL: 0.8 {RATIO} (ref 1–2)
ALP LIVER SERPL-CCNC: 61 U/L
ALT SERPL-CCNC: 31 U/L
ANION GAP SERPL CALC-SCNC: 8 MMOL/L (ref 0–18)
AST SERPL-CCNC: 22 U/L (ref 15–37)
BILIRUB SERPL-MCNC: 0.3 MG/DL (ref 0.1–2)
BUN BLD-MCNC: 40 MG/DL (ref 7–18)
BUN/CREAT SERPL: 24 (ref 10–20)
CALCIUM BLD-MCNC: 9.5 MG/DL (ref 8.5–10.1)
CHLORIDE SERPL-SCNC: 116 MMOL/L (ref 98–112)
CO2 SERPL-SCNC: 18 MMOL/L (ref 21–32)
CREAT BLD-MCNC: 1.67 MG/DL
GLOBULIN PLAS-MCNC: 3.6 G/DL (ref 2.8–4.4)
GLUCOSE BLD-MCNC: 132 MG/DL (ref 70–99)
OSMOLALITY SERPL CALC.SUM OF ELEC: 306 MOSM/KG (ref 275–295)
POTASSIUM SERPL-SCNC: 4.1 MMOL/L (ref 3.5–5.1)
PROT SERPL-MCNC: 6.5 G/DL (ref 6.4–8.2)
SODIUM SERPL-SCNC: 142 MMOL/L (ref 136–145)

## 2022-05-17 PROCEDURE — 36591 DRAW BLOOD OFF VENOUS DEVICE: CPT

## 2022-05-17 PROCEDURE — 96372 THER/PROPH/DIAG INJ SC/IM: CPT

## 2022-05-17 PROCEDURE — 80053 COMPREHEN METABOLIC PANEL: CPT

## 2022-05-17 RX ORDER — HEPARIN SODIUM (PORCINE) LOCK FLUSH IV SOLN 100 UNIT/ML 100 UNIT/ML
5 SOLUTION INTRAVENOUS ONCE
OUTPATIENT
Start: 2022-06-07

## 2022-05-17 RX ORDER — HEPARIN SODIUM (PORCINE) LOCK FLUSH IV SOLN 100 UNIT/ML 100 UNIT/ML
5 SOLUTION INTRAVENOUS ONCE
Status: COMPLETED | OUTPATIENT
Start: 2022-05-17 | End: 2022-05-17

## 2022-05-17 RX ORDER — SODIUM CHLORIDE 9 MG/ML
10 INJECTION INTRAVENOUS ONCE
OUTPATIENT
Start: 2022-06-07

## 2022-05-17 RX ADMIN — HEPARIN SODIUM (PORCINE) LOCK FLUSH IV SOLN 100 UNIT/ML 500 UNITS: 100 SOLUTION INTRAVENOUS at 10:09:00

## 2022-05-17 NOTE — PROGRESS NOTES
Pt to lab for Q4week xgeva injection. 5/9/22 labs WNL for injection today. No recent/planned dental work. Xgeva administered to left upper abdomen. CMP drawn via port as ordered. Tolerated well. Discharged stable and ambulating with cane. Given printed AVS with future appointments scheduled.

## 2022-05-19 ENCOUNTER — OFFICE VISIT (OUTPATIENT)
Dept: WOUND CARE | Facility: HOSPITAL | Age: 77
End: 2022-05-19
Attending: CLINICAL NURSE SPECIALIST
Payer: MEDICARE

## 2022-05-19 VITALS
SYSTOLIC BLOOD PRESSURE: 121 MMHG | RESPIRATION RATE: 18 BRPM | HEART RATE: 67 BPM | DIASTOLIC BLOOD PRESSURE: 67 MMHG | OXYGEN SATURATION: 100 % | TEMPERATURE: 98 F

## 2022-05-19 DIAGNOSIS — S91.002S ANKLE WOUND, LEFT, SEQUELA: Primary | ICD-10-CM

## 2022-05-19 DIAGNOSIS — Z87.828 HEALED WOUND: ICD-10-CM

## 2022-05-19 PROCEDURE — 99212 OFFICE O/P EST SF 10 MIN: CPT

## 2022-05-20 ENCOUNTER — APPOINTMENT (OUTPATIENT)
Dept: WOUND CARE | Facility: HOSPITAL | Age: 77
End: 2022-05-20
Attending: CLINICAL NURSE SPECIALIST
Payer: MEDICARE

## 2022-05-23 ENCOUNTER — TELEPHONE (OUTPATIENT)
Dept: INTERNAL MEDICINE CLINIC | Facility: CLINIC | Age: 77
End: 2022-05-23

## 2022-05-23 ENCOUNTER — TELEPHONE (OUTPATIENT)
Dept: PULMONOLOGY | Facility: CLINIC | Age: 77
End: 2022-05-23

## 2022-05-23 NOTE — TELEPHONE ENCOUNTER
Lucas Ramos from [facility] called in stating pt is having ear surgery on June 16th and needs surgical clearance.  Please follow up fax # [phone]

## 2022-05-23 NOTE — TELEPHONE ENCOUNTER
[facility]. Calling to inform that patient is having right ear surgery on 6/16/2022. Please reach out to make pre-op clearance appointment or fax appropriate paperwork.      Fax# [phone]

## 2022-05-23 NOTE — TELEPHONE ENCOUNTER
Marya Duane from [facility] surgery is for R cochlear implant explant, under general anesthesia. Does not have any pre op requirements right now. Marya Duane has reached out to specialties pulomary, hem/onc, and cardio for clearance and any pre op orders needed. Called and spoke with wife eboni, ok john  She has an appt with vipin on Wednesday 5/25 and would like katherine to have appt same day with wilner.  Pre-op appt made for 5/25

## 2022-05-23 NOTE — TELEPHONE ENCOUNTER
Please get more information from [place] re: surgery, pre-op requirements. He will need clearaznce from heme/onc and cardiology as well as any other specialists. Thanks.

## 2022-05-23 NOTE — TELEPHONE ENCOUNTER
LOV 11/22/21  Pls see TE for PCP dated today. Dr. Greg Corbin- do you want to add pt to your schedule for preop clearance?

## 2022-05-24 ENCOUNTER — TELEPHONE (OUTPATIENT)
Dept: CARDIOLOGY | Age: 77
End: 2022-05-24

## 2022-05-24 DIAGNOSIS — I65.23 ASYMPTOMATIC CAROTID ARTERY STENOSIS, BILATERAL: ICD-10-CM

## 2022-05-24 DIAGNOSIS — I25.10 CORONARY ARTERY DISEASE WITHOUT ANGINA PECTORIS, UNSPECIFIED VESSEL OR LESION TYPE, UNSPECIFIED WHETHER NATIVE OR TRANSPLANTED HEART: Primary | ICD-10-CM

## 2022-05-24 DIAGNOSIS — I31.31 MALIGNANT PERICARDIAL EFFUSION: ICD-10-CM

## 2022-05-24 DIAGNOSIS — I35.0 AORTIC VALVE STENOSIS, ETIOLOGY OF CARDIAC VALVE DISEASE UNSPECIFIED: ICD-10-CM

## 2022-05-24 DIAGNOSIS — N18.9 CHRONIC RENAL IMPAIRMENT, UNSPECIFIED CKD STAGE: ICD-10-CM

## 2022-05-25 ENCOUNTER — LAB ENCOUNTER (OUTPATIENT)
Dept: LAB | Age: 77
End: 2022-05-25
Attending: INTERNAL MEDICINE
Payer: MEDICARE

## 2022-05-25 ENCOUNTER — HOSPITAL ENCOUNTER (OUTPATIENT)
Dept: GENERAL RADIOLOGY | Age: 77
Discharge: HOME | End: 2022-05-25
Attending: INTERNAL MEDICINE
Payer: MEDICARE

## 2022-05-25 ENCOUNTER — PATIENT MESSAGE (OUTPATIENT)
Dept: INTERNAL MEDICINE CLINIC | Facility: CLINIC | Age: 77
End: 2022-05-25

## 2022-05-25 ENCOUNTER — OFFICE VISIT (OUTPATIENT)
Dept: INTERNAL MEDICINE CLINIC | Facility: CLINIC | Age: 77
End: 2022-05-25
Payer: MEDICARE

## 2022-05-25 VITALS
SYSTOLIC BLOOD PRESSURE: 105 MMHG | WEIGHT: 171 LBS | DIASTOLIC BLOOD PRESSURE: 66 MMHG | HEART RATE: 80 BPM | BODY MASS INDEX: 25.91 KG/M2 | HEIGHT: 68 IN

## 2022-05-25 DIAGNOSIS — C79.31 BRAIN METASTASIS (HCC): ICD-10-CM

## 2022-05-25 DIAGNOSIS — Z99.89 OSA ON CPAP: ICD-10-CM

## 2022-05-25 DIAGNOSIS — Z01.818 PREOPERATIVE EXAMINATION: ICD-10-CM

## 2022-05-25 DIAGNOSIS — Z01.818 PREOPERATIVE EXAMINATION: Primary | ICD-10-CM

## 2022-05-25 DIAGNOSIS — I50.32 CHRONIC DIASTOLIC CONGESTIVE HEART FAILURE (HCC): ICD-10-CM

## 2022-05-25 DIAGNOSIS — C78.00 MALIGNANT NEOPLASM METASTATIC TO LUNG, UNSPECIFIED LATERALITY (HCC): ICD-10-CM

## 2022-05-25 DIAGNOSIS — R09.81 NASAL CONGESTION: ICD-10-CM

## 2022-05-25 DIAGNOSIS — H91.91 HEARING LOSS OF RIGHT EAR, UNSPECIFIED HEARING LOSS TYPE: ICD-10-CM

## 2022-05-25 DIAGNOSIS — J42 CHRONIC BRONCHITIS, UNSPECIFIED CHRONIC BRONCHITIS TYPE (HCC): ICD-10-CM

## 2022-05-25 DIAGNOSIS — C34.12 PRIMARY CANCER OF LEFT UPPER LOBE OF LUNG (HCC): ICD-10-CM

## 2022-05-25 DIAGNOSIS — G47.33 OSA ON CPAP: ICD-10-CM

## 2022-05-25 LAB
ALBUMIN SERPL-MCNC: 2.6 G/DL (ref 3.4–5)
ALBUMIN/GLOB SERPL: 0.7 {RATIO} (ref 1–2)
ALP LIVER SERPL-CCNC: 61 U/L
ALT SERPL-CCNC: 42 U/L
ANION GAP SERPL CALC-SCNC: 3 MMOL/L (ref 0–18)
AST SERPL-CCNC: 29 U/L (ref 15–37)
BASOPHILS # BLD AUTO: 0.01 X10(3) UL (ref 0–0.2)
BASOPHILS NFR BLD AUTO: 0.2 %
BILIRUB SERPL-MCNC: 0.2 MG/DL (ref 0.1–2)
BILIRUB UR QL: NEGATIVE
BUN BLD-MCNC: 35 MG/DL (ref 7–18)
BUN/CREAT SERPL: 25 (ref 10–20)
CALCIUM BLD-MCNC: 8.6 MG/DL (ref 8.5–10.1)
CHLORIDE SERPL-SCNC: 116 MMOL/L (ref 98–112)
CLARITY UR: CLEAR
CO2 SERPL-SCNC: 23 MMOL/L (ref 21–32)
COLOR UR: YELLOW
CREAT BLD-MCNC: 1.4 MG/DL
DEPRECATED RDW RBC AUTO: 52.5 FL (ref 35.1–46.3)
EOSINOPHIL # BLD AUTO: 0.11 X10(3) UL (ref 0–0.7)
EOSINOPHIL NFR BLD AUTO: 2.2 %
ERYTHROCYTE [DISTWIDTH] IN BLOOD BY AUTOMATED COUNT: 14.7 % (ref 11–15)
FASTING STATUS PATIENT QL REPORTED: NO
GLOBULIN PLAS-MCNC: 3.7 G/DL (ref 2.8–4.4)
GLUCOSE BLD-MCNC: 88 MG/DL (ref 70–99)
GLUCOSE UR-MCNC: NEGATIVE MG/DL
HCT VFR BLD AUTO: 34.4 %
HGB BLD-MCNC: 10.6 G/DL
HGB UR QL STRIP.AUTO: NEGATIVE
IMM GRANULOCYTES # BLD AUTO: 0.03 X10(3) UL (ref 0–1)
IMM GRANULOCYTES NFR BLD: 0.6 %
KETONES UR-MCNC: NEGATIVE MG/DL
LEUKOCYTE ESTERASE UR QL STRIP.AUTO: NEGATIVE
LYMPHOCYTES # BLD AUTO: 0.43 X10(3) UL (ref 1–4)
LYMPHOCYTES NFR BLD AUTO: 8.5 %
MCH RBC QN AUTO: 29.9 PG (ref 26–34)
MCHC RBC AUTO-ENTMCNC: 30.8 G/DL (ref 31–37)
MCV RBC AUTO: 96.9 FL
MONOCYTES # BLD AUTO: 0.43 X10(3) UL (ref 0.1–1)
MONOCYTES NFR BLD AUTO: 8.5 %
NEUTROPHILS # BLD AUTO: 4.03 X10 (3) UL (ref 1.5–7.7)
NEUTROPHILS # BLD AUTO: 4.03 X10(3) UL (ref 1.5–7.7)
NEUTROPHILS NFR BLD AUTO: 80 %
NITRITE UR QL STRIP.AUTO: NEGATIVE
OSMOLALITY SERPL CALC.SUM OF ELEC: 301 MOSM/KG (ref 275–295)
PH UR: 5 [PH] (ref 5–8)
PLATELET # BLD AUTO: 183 10(3)UL (ref 150–450)
POTASSIUM SERPL-SCNC: 4.5 MMOL/L (ref 3.5–5.1)
PROT SERPL-MCNC: 6.3 G/DL (ref 6.4–8.2)
PROT UR-MCNC: NEGATIVE MG/DL
RBC # BLD AUTO: 3.55 X10(6)UL
SODIUM SERPL-SCNC: 142 MMOL/L (ref 136–145)
SP GR UR STRIP: 1.02 (ref 1–1.03)
UROBILINOGEN UR STRIP-ACNC: <2
VIT C UR-MCNC: NEGATIVE MG/DL
WBC # BLD AUTO: 5 X10(3) UL (ref 4–11)

## 2022-05-25 PROCEDURE — 93010 ELECTROCARDIOGRAM REPORT: CPT | Performed by: INTERNAL MEDICINE

## 2022-05-25 PROCEDURE — 36415 COLL VENOUS BLD VENIPUNCTURE: CPT

## 2022-05-25 PROCEDURE — 81003 URINALYSIS AUTO W/O SCOPE: CPT | Performed by: INTERNAL MEDICINE

## 2022-05-25 PROCEDURE — 80053 COMPREHEN METABOLIC PANEL: CPT

## 2022-05-25 PROCEDURE — 85025 COMPLETE CBC W/AUTO DIFF WBC: CPT

## 2022-05-25 PROCEDURE — 99214 OFFICE O/P EST MOD 30 MIN: CPT | Performed by: INTERNAL MEDICINE

## 2022-05-25 PROCEDURE — 71046 X-RAY EXAM CHEST 2 VIEWS: CPT | Performed by: INTERNAL MEDICINE

## 2022-05-25 PROCEDURE — 93005 ELECTROCARDIOGRAM TRACING: CPT

## 2022-05-25 RX ORDER — FLUTICASONE PROPIONATE 50 MCG
2 SPRAY, SUSPENSION (ML) NASAL DAILY
Qty: 3 EACH | Refills: 3 | Status: SHIPPED | OUTPATIENT
Start: 2022-05-25 | End: 2023-05-20

## 2022-05-25 NOTE — TELEPHONE ENCOUNTER
Per Polly (wife) cochlear implant was recalled, so pt needs a new cochlear implant. Stts pt had recent appt. Explained last appt 11/22/21 & will f/u s/p d/w MD. She voiced understanding. Dr. Odilia Meyer- surgical clearance letter pending if agreeable.

## 2022-05-26 ENCOUNTER — TELEPHONE (OUTPATIENT)
Dept: INTERNAL MEDICINE CLINIC | Facility: CLINIC | Age: 77
End: 2022-05-26

## 2022-05-26 DIAGNOSIS — J18.9 COMMUNITY ACQUIRED PNEUMONIA, UNSPECIFIED LATERALITY: Primary | ICD-10-CM

## 2022-05-26 RX ORDER — DOXYCYCLINE HYCLATE 100 MG
100 TABLET ORAL 2 TIMES DAILY
Qty: 14 TABLET | Refills: 0 | Status: SHIPPED | OUTPATIENT
Start: 2022-05-26 | End: 2022-06-02

## 2022-05-26 RX ORDER — AMOXICILLIN AND CLAVULANATE POTASSIUM 875; 125 MG/1; MG/1
1 TABLET, FILM COATED ORAL 2 TIMES DAILY
Qty: 14 TABLET | Refills: 0 | Status: SHIPPED | OUTPATIENT
Start: 2022-05-26 | End: 2022-06-02

## 2022-05-26 NOTE — TELEPHONE ENCOUNTER
From: Lyubov Franz  To: Domingo Santos MD  Sent: 5/25/2022 7:30 PM CDT  Subject: Permission for surgery    Can you please advise me if you are going to approve Gaston's surgery or not so I have some idea of which way I'm going here.

## 2022-05-26 NOTE — TELEPHONE ENCOUNTER
Spoke with Sabrina at [address] clarified patient is to be taking both abx (see today's medication question encounter--will get both ready for  by 8 p.m. today--sent [employer] message of same

## 2022-05-26 NOTE — TELEPHONE ENCOUNTER
Pt's wife informed that letter clearing pt for surgery was faxed to Erika Easton @ [facility]. Requests letter be faxed to pt's PCP, Dr. Nik Mccoy, at #[phone] per his request. Explained will fax surgical clearance letter to PCP now & to let us know if we may be of further assistance. She voiced understanding. Surgical clearance letter was faxed to Dr. Nik Mccoy as discussed. Confirmation rcvd.

## 2022-05-26 NOTE — TELEPHONE ENCOUNTER
Surgical clearance letter was faxed to attn: Carli Hatch @ [facility] at #[phone]. Confirmation rcvd.

## 2022-05-26 NOTE — TELEPHONE ENCOUNTER
RN, the patient is okay to go to the operating room from my perspective. Can give pulmonary clearance.

## 2022-06-01 PROBLEM — I31.39 PERICARDIAL EFFUSION WITH CARDIAC TAMPONADE: Status: RESOLVED | Noted: 2020-09-11 | Resolved: 2022-06-01

## 2022-06-01 PROBLEM — I31.4 PERICARDIAL EFFUSION WITH CARDIAC TAMPONADE: Status: RESOLVED | Noted: 2020-09-11 | Resolved: 2022-06-01

## 2022-06-01 PROBLEM — I31.3 PERICARDIAL EFFUSION WITH CARDIAC TAMPONADE: Status: RESOLVED | Noted: 2020-09-11 | Resolved: 2022-06-01

## 2022-06-02 ENCOUNTER — LAB ENCOUNTER (OUTPATIENT)
Dept: LAB | Facility: HOSPITAL | Age: 77
End: 2022-06-02
Attending: INTERNAL MEDICINE
Payer: MEDICARE

## 2022-06-02 ENCOUNTER — NURSE ONLY (OUTPATIENT)
Dept: HEMATOLOGY/ONCOLOGY | Facility: HOSPITAL | Age: 77
End: 2022-06-02
Attending: INTERNAL MEDICINE
Payer: MEDICARE

## 2022-06-02 DIAGNOSIS — C34.90 PRIMARY MALIGNANT NEOPLASM OF LUNG METASTATIC TO OTHER SITE, UNSPECIFIED LATERALITY (HCC): ICD-10-CM

## 2022-06-02 DIAGNOSIS — N18.30 STAGE 3 CHRONIC KIDNEY DISEASE, UNSPECIFIED WHETHER STAGE 3A OR 3B CKD (HCC): ICD-10-CM

## 2022-06-02 DIAGNOSIS — N17.9 AKI (ACUTE KIDNEY INJURY) (HCC): ICD-10-CM

## 2022-06-02 DIAGNOSIS — Z45.2 ENCOUNTER FOR CENTRAL LINE CARE: Primary | ICD-10-CM

## 2022-06-02 LAB
BILIRUB UR QL: NEGATIVE
CLARITY UR: CLEAR
COLOR UR: YELLOW
GLUCOSE UR-MCNC: NEGATIVE MG/DL
HGB UR QL STRIP.AUTO: NEGATIVE
KETONES UR-MCNC: NEGATIVE MG/DL
LEUKOCYTE ESTERASE UR QL STRIP.AUTO: NEGATIVE
NITRITE UR QL STRIP.AUTO: NEGATIVE
PH UR: 5.5 [PH] (ref 5–8)
SP GR UR STRIP: 1.02 (ref 1–1.03)
UROBILINOGEN UR STRIP-ACNC: 0.2

## 2022-06-02 RX ORDER — SODIUM CHLORIDE 9 MG/ML
10 INJECTION INTRAVENOUS ONCE
OUTPATIENT
Start: 2022-06-06

## 2022-06-02 RX ORDER — HEPARIN SODIUM (PORCINE) LOCK FLUSH IV SOLN 100 UNIT/ML 100 UNIT/ML
5 SOLUTION INTRAVENOUS ONCE
OUTPATIENT
Start: 2022-06-06

## 2022-06-02 RX ORDER — HEPARIN SODIUM (PORCINE) LOCK FLUSH IV SOLN 100 UNIT/ML 100 UNIT/ML
5 SOLUTION INTRAVENOUS ONCE
Status: DISCONTINUED | OUTPATIENT
Start: 2022-06-02 | End: 2022-06-02

## 2022-06-02 NOTE — PROGRESS NOTES
Spoke with pt and spouse, Polly re: which labs to be done today. Appt note states\" labs - port\"  They each told me labs from Dr Dayana Wong. fyi - Dr Burnetta Schwab office did not need labs today upon clarification, too. Clarified with lab dept - eosinophil smear test = urine in sterile cup &  UA also ordered by Dr Dayana Wong. No blood tests in order inq. I let pt and spouse know. Pt unable to void, will drop off  Urine cup at [facility] when able.

## 2022-06-03 ENCOUNTER — ANCILLARY PROCEDURE (OUTPATIENT)
Dept: CARDIOLOGY | Age: 77
End: 2022-06-03
Attending: INTERNAL MEDICINE

## 2022-06-03 DIAGNOSIS — N18.9 CHRONIC RENAL IMPAIRMENT, UNSPECIFIED CKD STAGE: ICD-10-CM

## 2022-06-03 DIAGNOSIS — I65.23 ASYMPTOMATIC CAROTID ARTERY STENOSIS, BILATERAL: ICD-10-CM

## 2022-06-03 DIAGNOSIS — I25.10 CORONARY ARTERY DISEASE WITHOUT ANGINA PECTORIS, UNSPECIFIED VESSEL OR LESION TYPE, UNSPECIFIED WHETHER NATIVE OR TRANSPLANTED HEART: ICD-10-CM

## 2022-06-03 DIAGNOSIS — I35.0 AORTIC VALVE STENOSIS, ETIOLOGY OF CARDIAC VALVE DISEASE UNSPECIFIED: ICD-10-CM

## 2022-06-03 DIAGNOSIS — I31.31 MALIGNANT PERICARDIAL EFFUSION: ICD-10-CM

## 2022-06-03 LAB
AORTIC VALVE AREA: 1.12
ASCENDING AORTA (AAD): 4.1
AV MEAN GRADIENT (AVMG): 37
AV MEAN VELOCITY (AVMV): 2.73
AV PEAK GRADIENT (AVPG): 61
AV PEAK VELOCITY (AVPV): 3.86
AV STENOSIS SEVERITY TEXT: NORMAL
AVI LVOT PEAK GRADIENT (LVOTMG): 2
DOP CALC LVOT PEAK VEL (LVOTPV): 1.13
E WAVE DECELARATION TIME (MDT): 331
EST RIGHT VENT SYSTOLIC PRESSURE BY TRICUSPID REGURGITATION JET (RVSP): 29.83
LEFT INTERNAL DIMENSION IN SYSTOLE (LVSD): 4
LEFT VENTRICULAR INTERNAL DIMENSION IN DIASTOLE (LVDD): 5.7
LV EF: NORMAL %
LVOT 2D (LVOTD): 2.3
LVOT VTI (LVOTVTI): 26.5
MV E TISSUE VEL LAT (MELV): 9.49
MV E TISSUE VEL MED (MESV): 5.45
MV E WAVE VEL/E TISSUE VEL MED(MSR): 11.8
TRICUSPID ANNULAR PLANE SYSTOLIC EXCURSION (TAPSE): 2
TRICUSPID VALVE ANNULAR PEAK VELOCITY (TVAPV): 9.11

## 2022-06-03 PROCEDURE — 93306 TTE W/DOPPLER COMPLETE: CPT | Performed by: INTERNAL MEDICINE

## 2022-06-03 PROCEDURE — 93356 MYOCRD STRAIN IMG SPCKL TRCK: CPT | Performed by: INTERNAL MEDICINE

## 2022-06-03 PROCEDURE — 76376 3D RENDER W/INTRP POSTPROCES: CPT | Performed by: INTERNAL MEDICINE

## 2022-06-06 ENCOUNTER — HOSPITAL ENCOUNTER (OUTPATIENT)
Dept: NUCLEAR MEDICINE | Facility: HOSPITAL | Age: 77
Discharge: HOME | End: 2022-06-06
Attending: INTERNAL MEDICINE
Payer: MEDICARE

## 2022-06-06 ENCOUNTER — MED REC SCAN ONLY (OUTPATIENT)
Dept: INTERNAL MEDICINE CLINIC | Facility: CLINIC | Age: 77
End: 2022-06-06

## 2022-06-06 ENCOUNTER — OFFICE VISIT (OUTPATIENT)
Dept: CARDIOLOGY | Age: 77
End: 2022-06-06

## 2022-06-06 VITALS
HEART RATE: 68 BPM | SYSTOLIC BLOOD PRESSURE: 130 MMHG | BODY MASS INDEX: 26.51 KG/M2 | HEIGHT: 69 IN | DIASTOLIC BLOOD PRESSURE: 70 MMHG

## 2022-06-06 DIAGNOSIS — I35.0 AORTIC VALVE STENOSIS, ETIOLOGY OF CARDIAC VALVE DISEASE UNSPECIFIED: ICD-10-CM

## 2022-06-06 DIAGNOSIS — C79.51 BONE METASTASIS (HCC): ICD-10-CM

## 2022-06-06 DIAGNOSIS — I25.10 CORONARY ARTERY DISEASE WITHOUT ANGINA PECTORIS, UNSPECIFIED VESSEL OR LESION TYPE, UNSPECIFIED WHETHER NATIVE OR TRANSPLANTED HEART: Primary | ICD-10-CM

## 2022-06-06 DIAGNOSIS — N18.9 CHRONIC RENAL IMPAIRMENT, UNSPECIFIED CKD STAGE: ICD-10-CM

## 2022-06-06 DIAGNOSIS — C78.00 MALIGNANT NEOPLASM METASTATIC TO LUNG, UNSPECIFIED LATERALITY (HCC): ICD-10-CM

## 2022-06-06 DIAGNOSIS — I65.23 ASYMPTOMATIC CAROTID ARTERY STENOSIS, BILATERAL: ICD-10-CM

## 2022-06-06 DIAGNOSIS — I10 BENIGN ESSENTIAL HYPERTENSION: ICD-10-CM

## 2022-06-06 DIAGNOSIS — C34.12 PRIMARY CANCER OF LEFT UPPER LOBE OF LUNG (HCC): ICD-10-CM

## 2022-06-06 DIAGNOSIS — E78.2 MIXED HYPERLIPIDEMIA: ICD-10-CM

## 2022-06-06 DIAGNOSIS — C79.31 BRAIN METASTASIS (HCC): ICD-10-CM

## 2022-06-06 LAB — GLUCOSE BLDC GLUCOMTR-MCNC: 74 MG/DL (ref 70–99)

## 2022-06-06 PROCEDURE — 82962 GLUCOSE BLOOD TEST: CPT

## 2022-06-06 PROCEDURE — 99215 OFFICE O/P EST HI 40 MIN: CPT | Performed by: INTERNAL MEDICINE

## 2022-06-06 PROCEDURE — 78815 PET IMAGE W/CT SKULL-THIGH: CPT | Performed by: INTERNAL MEDICINE

## 2022-06-06 RX ORDER — IPRATROPIUM BROMIDE AND ALBUTEROL SULFATE 2.5; .5 MG/3ML; MG/3ML
3 SOLUTION RESPIRATORY (INHALATION) 4 TIMES DAILY
Qty: 360 ML | Refills: 5 | OUTPATIENT
Start: 2022-06-06

## 2022-06-06 RX ORDER — POTASSIUM CHLORIDE 1500 MG/1
TABLET, EXTENDED RELEASE ORAL
Qty: 60 TABLET | Refills: 0 | Status: SHIPPED | OUTPATIENT
Start: 2022-06-06 | End: 2022-06-23

## 2022-06-06 RX ORDER — IPRATROPIUM BROMIDE AND ALBUTEROL SULFATE 2.5; .5 MG/3ML; MG/3ML
SOLUTION RESPIRATORY (INHALATION)
Qty: 360 ML | Refills: 5 | Status: SHIPPED | OUTPATIENT
Start: 2022-06-06

## 2022-06-06 SDOH — HEALTH STABILITY: PHYSICAL HEALTH: ON AVERAGE, HOW MANY DAYS PER WEEK DO YOU ENGAGE IN MODERATE TO STRENUOUS EXERCISE (LIKE A BRISK WALK)?: 0 DAYS

## 2022-06-06 SDOH — HEALTH STABILITY: MENTAL HEALTH: DEPRESSION SCREENING SCORE: 0

## 2022-06-06 SDOH — HEALTH STABILITY: MENTAL HEALTH: FEELING DOWN, DEPRESSED OR HOPELESS?: NOT AT ALL

## 2022-06-06 SDOH — HEALTH STABILITY: MENTAL HEALTH: LITTLE INTEREST OR PLEASURE IN ACTIVITY?: NOT AT ALL

## 2022-06-06 SDOH — HEALTH STABILITY: PHYSICAL HEALTH: ON AVERAGE, HOW MANY MINUTES DO YOU ENGAGE IN EXERCISE AT THIS LEVEL?: 0 MIN

## 2022-06-06 SDOH — HEALTH STABILITY: MENTAL HEALTH: PHQ2 INTERPRETATION: NO FURTHER SCREENING NEEDED

## 2022-06-06 ASSESSMENT — PATIENT HEALTH QUESTIONNAIRE - PHQ9: SUM OF ALL RESPONSES TO PHQ9 QUESTIONS 1 AND 2: 0

## 2022-06-06 NOTE — TELEPHONE ENCOUNTER
LOV: 11/22/21  Last refill: 8/16/21    Dr. Kg Garcia -please review and sign pended order if agreeable.

## 2022-06-07 ENCOUNTER — TELEPHONE (OUTPATIENT)
Dept: CARDIOLOGY | Age: 77
End: 2022-06-07

## 2022-06-07 ENCOUNTER — TELEPHONE (OUTPATIENT)
Dept: INTERNAL MEDICINE CLINIC | Facility: CLINIC | Age: 77
End: 2022-06-07

## 2022-06-07 NOTE — TELEPHONE ENCOUNTER
We have received clearance from the following specialties: pulmonary and cardiac  We are still waiting on hemo/oncology clearance before clearance can be given by PCP

## 2022-06-07 NOTE — TELEPHONE ENCOUNTER
Porfirio Cutler from [place] is calling to ask PCP if patient is cleared for surgery scheduled on 6/16/22. Requesting any labs or imaging completed recently, please FAX.     Please advise    Any questions, please call # [phone]  [phone]    FAX information to:  Direct FAX #[phone]

## 2022-06-08 ENCOUNTER — LAB ENCOUNTER (OUTPATIENT)
Dept: LAB | Facility: HOSPITAL | Age: 77
End: 2022-06-08
Attending: INTERNAL MEDICINE
Payer: MEDICARE

## 2022-06-08 ENCOUNTER — OFFICE VISIT (OUTPATIENT)
Dept: HEMATOLOGY/ONCOLOGY | Facility: HOSPITAL | Age: 77
End: 2022-06-08
Attending: INTERNAL MEDICINE
Payer: MEDICARE

## 2022-06-08 VITALS
HEIGHT: 69 IN | SYSTOLIC BLOOD PRESSURE: 120 MMHG | DIASTOLIC BLOOD PRESSURE: 54 MMHG | RESPIRATION RATE: 16 BRPM | WEIGHT: 174 LBS | BODY MASS INDEX: 25.77 KG/M2 | OXYGEN SATURATION: 99 % | HEART RATE: 84 BPM | TEMPERATURE: 98 F

## 2022-06-08 DIAGNOSIS — R19.7 DIARRHEA, UNSPECIFIED TYPE: ICD-10-CM

## 2022-06-08 DIAGNOSIS — C79.31 BRAIN METASTASIS (HCC): ICD-10-CM

## 2022-06-08 DIAGNOSIS — C78.00 MALIGNANT NEOPLASM METASTATIC TO LUNG, UNSPECIFIED LATERALITY (HCC): ICD-10-CM

## 2022-06-08 DIAGNOSIS — Z51.81 ENCOUNTER FOR MONITORING DENOSUMAB THERAPY: ICD-10-CM

## 2022-06-08 DIAGNOSIS — C79.51 BONE METASTASIS (HCC): ICD-10-CM

## 2022-06-08 DIAGNOSIS — C80.1 MALIGNANT PERICARDIAL EFFUSION (HCC): ICD-10-CM

## 2022-06-08 DIAGNOSIS — Z92.89 HISTORY OF IMMUNOTHERAPY: ICD-10-CM

## 2022-06-08 DIAGNOSIS — Z79.899 ENCOUNTER FOR MONITORING DENOSUMAB THERAPY: ICD-10-CM

## 2022-06-08 DIAGNOSIS — I31.3 MALIGNANT PERICARDIAL EFFUSION (HCC): ICD-10-CM

## 2022-06-08 DIAGNOSIS — C34.12 PRIMARY CANCER OF LEFT UPPER LOBE OF LUNG (HCC): Primary | ICD-10-CM

## 2022-06-08 PROBLEM — N18.31 STAGE 3A CHRONIC KIDNEY DISEASE (HCC): Status: ACTIVE | Noted: 2022-06-08

## 2022-06-08 PROCEDURE — 87493 C DIFF AMPLIFIED PROBE: CPT

## 2022-06-08 PROCEDURE — 99214 OFFICE O/P EST MOD 30 MIN: CPT | Performed by: INTERNAL MEDICINE

## 2022-06-09 ENCOUNTER — PATIENT MESSAGE (OUTPATIENT)
Dept: HEMATOLOGY/ONCOLOGY | Facility: HOSPITAL | Age: 77
End: 2022-06-09

## 2022-06-09 LAB — C DIFF TOX B STL QL: NEGATIVE

## 2022-06-09 NOTE — TELEPHONE ENCOUNTER
[address] [phone] states that she has not received clearance yet, but, is also requesting a copy of the EKG to be faxed to her at fax: [phone]. Pt is having surgery on 6-16-22.

## 2022-06-09 NOTE — TELEPHONE ENCOUNTER
From: Idris Cordova  To: Geovanna Valiente MD  Sent: 6/9/2022 4:20 PM CDT  Subject: [facility]    I asked a question about the bones and the doctor said that they go in remove scar tissue and then put the new cocklear in and hook it up. There shouldn't be much movement of the bones barring no problems which makes me a little nervous. Do you think we should postpone the Xgeva for a week or so?

## 2022-06-09 NOTE — TELEPHONE ENCOUNTER
refaxed pre-op clearance progress notes along with EKG report/tracing to [phone].  Fax confirmation received

## 2022-06-13 ENCOUNTER — PATIENT MESSAGE (OUTPATIENT)
Dept: HEMATOLOGY/ONCOLOGY | Facility: HOSPITAL | Age: 77
End: 2022-06-13

## 2022-06-13 ENCOUNTER — TELEPHONE (OUTPATIENT)
Dept: HEMATOLOGY/ONCOLOGY | Facility: HOSPITAL | Age: 77
End: 2022-06-13

## 2022-06-13 NOTE — TELEPHONE ENCOUNTER
Called spouse to contact prescribing Md and or present to ER for further evaluation and tx. She verbalizes understanding.

## 2022-06-13 NOTE — TELEPHONE ENCOUNTER
Called Polly for update of diarrhea, patient had started diarrhea a few days after starting abx, he completed abx 9 days ago and still has diarrhea at least 5 times per day, liquid no form, sudden. Has taken imodium 2 x per day, BRAT diet since Friday, denies fevers, had a day with some sob and chest pain felt related to anxiety none currently, Thursday had an episode of blacking out on way to BR but no further times, not sure lightheaded but weak, eating decreased, had been pushing fluids, Pedialyte but stopped that thinking that was contributing to diarrhea. Given Dr Megan Lozano number but unclear if she should call him, when in Dr Liseth Preciado had mentions that a GI and Colonscopy is not needed, but she is not sure why not? The Cancer? Other? Please advise.

## 2022-06-13 NOTE — TELEPHONE ENCOUNTER
Regarding: Gastroenterologist  ----- Message from Kike Saleem RN sent at 6/13/2022 11:00 AM CDT -----       ----- Message from Chaya Dejesus to Jose Guadalupe Fox MD sent at 6/13/2022 10:16 AM -----   Can you please suggest a good gastroenterology? Edy Francois still has diarrhea at least 5 times a day and none of it is formed. I have had him on the BRAT diet since Friday and also I have tried Imodium.

## 2022-06-13 NOTE — TELEPHONE ENCOUNTER
----- Message from Kylee Rosenberg, RN sent at 6/13/2022 10:34 AM CDT -----  Regarding: FW: Gastroenterologist  Please call and assess How long diarrhea since Friday? Need C diff check? He has seen Dr. Gallo Lopez in the past [phone]. KVS  ----- Message -----  From: Royal Santos  Sent: 6/13/2022  10:16 AM CDT  To: Denise Abreu April Rns  Subject: Gastroenterologist                               Can you please suggest a good gastroenterology? Srinivasa Grewal still has diarrhea at least 5 times a day and none of it is formed. I have had him on the BRAT diet since Friday and also I have tried Imodium.

## 2022-06-14 ENCOUNTER — TELEPHONE (OUTPATIENT)
Dept: INTERNAL MEDICINE CLINIC | Facility: CLINIC | Age: 77
End: 2022-06-14

## 2022-06-14 ENCOUNTER — APPOINTMENT (OUTPATIENT)
Dept: HEMATOLOGY/ONCOLOGY | Facility: HOSPITAL | Age: 77
End: 2022-06-14
Attending: INTERNAL MEDICINE
Payer: MEDICARE

## 2022-06-14 ENCOUNTER — PATIENT MESSAGE (OUTPATIENT)
Dept: INTERNAL MEDICINE CLINIC | Facility: CLINIC | Age: 77
End: 2022-06-14

## 2022-06-14 NOTE — TELEPHONE ENCOUNTER
From: Killian Stearns  To: Amanda Garcia MD  Sent: 6/14/2022 9:58 AM CDT  Subject: Appointment    Does Dr. Eddie Weeks have any open appointments this afternoon? My  Janes James) has a lot of pain on his sides and he won't go to an emergency room.

## 2022-06-14 NOTE — TELEPHONE ENCOUNTER
----- Message from Aundra Baumgarten. Clemencia Burrell sent at 6/14/2022  9:58 AM CDT -----  Regarding: Appointment  Does Dr. Tiffanie Luna have any open appointments this afternoon? My  Loyd Jimenez) has a lot of pain on his sides and he won't go to an emergency room.

## 2022-06-14 NOTE — TELEPHONE ENCOUNTER
Patient is having surgery on Thursday    Currently having under the ribs and cross back pain, with bilateral lower hip area muscular pain, had 3 days of diarhea    Pet scan was completed on 6/8/22, in remission for Cancer    Has tried Icy/Hot, Biofreeze and alternating heat with no improvement    Was advise it is muscular pain    Asking for Rx for pain relief, see message below    Advised appointment    Future Appointments   Date Time Provider Oswaldo Tara   6/15/2022  1:20 PM Shazia Perez MD [facility]   6/24/2022 10:30 AM EM CC LAB1 [facility] CHEMO EMO   7/14/2022  1:40 PM Thien Lazo MD [facility]   7/19/2022 11:00 AM [facility] CT RM1 [facility] CT SCAN EM [facility]   9/1/2022 12:30  [facility] CT RM1 CARD [address] [facility] CT EM [address] [place] [address]   9/8/2022 11:30 AM Allegra Avila MD [address] [place] [address] HEM ONC EMO

## 2022-06-15 ENCOUNTER — APPOINTMENT (OUTPATIENT)
Dept: GENERAL RADIOLOGY | Facility: HOSPITAL | Age: 77
End: 2022-06-15
Attending: STUDENT IN AN ORGANIZED HEALTH CARE EDUCATION/TRAINING PROGRAM
Payer: MEDICARE

## 2022-06-15 ENCOUNTER — APPOINTMENT (OUTPATIENT)
Dept: CT IMAGING | Facility: HOSPITAL | Age: 77
End: 2022-06-15
Attending: EMERGENCY MEDICINE
Payer: MEDICARE

## 2022-06-15 ENCOUNTER — HOSPITAL ENCOUNTER (INPATIENT)
Facility: HOSPITAL | Age: 77
LOS: 8 days | Discharge: HOME | End: 2022-06-23
Attending: EMERGENCY MEDICINE | Admitting: HOSPITALIST
Payer: MEDICARE

## 2022-06-15 ENCOUNTER — APPOINTMENT (OUTPATIENT)
Dept: GENERAL RADIOLOGY | Facility: HOSPITAL | Age: 77
End: 2022-06-15
Attending: EMERGENCY MEDICINE
Payer: MEDICARE

## 2022-06-15 ENCOUNTER — APPOINTMENT (OUTPATIENT)
Dept: CV DIAGNOSTICS | Facility: HOSPITAL | Age: 77
End: 2022-06-15
Attending: STUDENT IN AN ORGANIZED HEALTH CARE EDUCATION/TRAINING PROGRAM
Payer: MEDICARE

## 2022-06-15 ENCOUNTER — APPOINTMENT (OUTPATIENT)
Dept: NUCLEAR MEDICINE | Facility: HOSPITAL | Age: 77
End: 2022-06-15
Attending: HOSPITALIST
Payer: MEDICARE

## 2022-06-15 DIAGNOSIS — E87.5 HYPERKALEMIA: ICD-10-CM

## 2022-06-15 DIAGNOSIS — N17.9 ACUTE RENAL INJURY (HCC): Primary | ICD-10-CM

## 2022-06-15 LAB
ALBUMIN SERPL-MCNC: 2.1 G/DL (ref 3.4–5)
ALBUMIN SERPL-MCNC: 2.4 G/DL (ref 3.4–5)
ALBUMIN SERPL-MCNC: 2.5 G/DL (ref 3.4–5)
ALP LIVER SERPL-CCNC: 69 U/L
ALT SERPL-CCNC: 23 U/L
ANION GAP SERPL CALC-SCNC: 11 MMOL/L (ref 0–18)
ANION GAP SERPL CALC-SCNC: 14 MMOL/L (ref 0–18)
ANION GAP SERPL CALC-SCNC: 8 MMOL/L (ref 0–18)
AST SERPL-CCNC: 19 U/L (ref 15–37)
BASOPHILS # BLD AUTO: 0.02 X10(3) UL (ref 0–0.2)
BASOPHILS NFR BLD AUTO: 0.4 %
BILIRUB DIRECT SERPL-MCNC: <0.1 MG/DL (ref 0–0.2)
BILIRUB SERPL-MCNC: 0.3 MG/DL (ref 0.1–2)
BILIRUB UR QL: NEGATIVE
BUN BLD-MCNC: 92 MG/DL (ref 7–18)
BUN BLD-MCNC: 96 MG/DL (ref 7–18)
BUN BLD-MCNC: 97 MG/DL (ref 7–18)
BUN/CREAT SERPL: 10.1 (ref 10–20)
BUN/CREAT SERPL: 9.5 (ref 10–20)
BUN/CREAT SERPL: 9.6 (ref 10–20)
CALCIUM BLD-MCNC: 6.8 MG/DL (ref 8.5–10.1)
CALCIUM BLD-MCNC: 7.1 MG/DL (ref 8.5–10.1)
CALCIUM BLD-MCNC: 7.1 MG/DL (ref 8.5–10.1)
CHLORIDE SERPL-SCNC: 120 MMOL/L (ref 98–112)
CHLORIDE SERPL-SCNC: 121 MMOL/L (ref 98–112)
CHLORIDE SERPL-SCNC: 122 MMOL/L (ref 98–112)
CLARITY UR: CLEAR
CO2 SERPL-SCNC: 10 MMOL/L (ref 21–32)
CO2 SERPL-SCNC: 13 MMOL/L (ref 21–32)
CO2 SERPL-SCNC: 9 MMOL/L (ref 21–32)
COLOR UR: YELLOW
CREAT BLD-MCNC: 10.2 MG/DL
CREAT BLD-MCNC: 9.49 MG/DL
CREAT BLD-MCNC: 9.57 MG/DL
CREAT UR-SCNC: 48.3 MG/DL
CREAT UR-SCNC: 48.3 MG/DL
DEPRECATED RDW RBC AUTO: 53.9 FL (ref 35.1–46.3)
EOSINOPHIL # BLD AUTO: 0.07 X10(3) UL (ref 0–0.7)
EOSINOPHIL NFR BLD AUTO: 1.3 %
ERYTHROCYTE [DISTWIDTH] IN BLOOD BY AUTOMATED COUNT: 14.8 % (ref 11–15)
GLUCOSE BLD-MCNC: 101 MG/DL (ref 70–99)
GLUCOSE BLD-MCNC: 63 MG/DL (ref 70–99)
GLUCOSE BLD-MCNC: 96 MG/DL (ref 70–99)
GLUCOSE UR-MCNC: NEGATIVE MG/DL
HCT VFR BLD AUTO: 31.4 %
HGB BLD-MCNC: 9.4 G/DL
IMM GRANULOCYTES # BLD AUTO: 0.04 X10(3) UL (ref 0–1)
IMM GRANULOCYTES NFR BLD: 0.7 %
IRON SATN MFR SERPL: 33 %
IRON SERPL-MCNC: 71 UG/DL
KETONES UR-MCNC: NEGATIVE MG/DL
LACTATE SERPL-SCNC: 0.7 MMOL/L (ref 0.4–2)
LIPASE SERPL-CCNC: 107 U/L (ref 73–393)
LYMPHOCYTES # BLD AUTO: 0.4 X10(3) UL (ref 1–4)
LYMPHOCYTES NFR BLD AUTO: 7.4 %
MAGNESIUM SERPL-MCNC: 2.4 MG/DL (ref 1.6–2.6)
MCH RBC QN AUTO: 29.3 PG (ref 26–34)
MCHC RBC AUTO-ENTMCNC: 29.9 G/DL (ref 31–37)
MCV RBC AUTO: 97.8 FL
MICROALBUMIN UR-MCNC: 10.6 MG/DL
MICROALBUMIN/CREAT 24H UR-RTO: 219.5 UG/MG (ref ?–30)
MODIFIED ALLEN TEST: POSITIVE
MONOCYTES # BLD AUTO: 0.36 X10(3) UL (ref 0.1–1)
MONOCYTES NFR BLD AUTO: 6.7 %
NEUTROPHILS # BLD AUTO: 4.51 X10 (3) UL (ref 1.5–7.7)
NEUTROPHILS # BLD AUTO: 4.51 X10(3) UL (ref 1.5–7.7)
NEUTROPHILS NFR BLD AUTO: 83.5 %
NITRITE UR QL STRIP.AUTO: NEGATIVE
NT-PROBNP SERPL-MCNC: 9337 PG/ML (ref ?–450)
O2 CT BLD-SCNC: 12.5 VOL% (ref 15–23)
OSMOLALITY SERPL CALC.SUM OF ELEC: 316 MOSM/KG (ref 275–295)
OSMOLALITY SERPL CALC.SUM OF ELEC: 328 MOSM/KG (ref 275–295)
OSMOLALITY SERPL CALC.SUM OF ELEC: 328 MOSM/KG (ref 275–295)
OXYGEN LITERS/MINUTE: 9 L/MIN
PCO2 BLDA: 21 MM HG (ref 35–45)
PH BLDA: 7.14 [PH] (ref 7.35–7.45)
PH UR: 5 [PH] (ref 5–8)
PHOSPHATE SERPL-MCNC: 4.2 MG/DL (ref 2.5–4.9)
PHOSPHATE SERPL-MCNC: 4.6 MG/DL (ref 2.5–4.9)
PHOSPHATE SERPL-MCNC: 5.1 MG/DL (ref 2.5–4.9)
PLATELET # BLD AUTO: 243 10(3)UL (ref 150–450)
PO2 BLDA: 81 MM HG (ref 80–100)
POTASSIUM SERPL-SCNC: 6.1 MMOL/L (ref 3.5–5.1)
POTASSIUM SERPL-SCNC: 6.7 MMOL/L (ref 3.5–5.1)
POTASSIUM SERPL-SCNC: 7.8 MMOL/L (ref 3.5–5.1)
PROCALCITONIN SERPL-MCNC: 0.33 NG/ML (ref ?–0.16)
PROCALCITONIN SERPL-MCNC: 0.43 NG/ML (ref ?–0.16)
PROT SERPL-MCNC: 6.5 G/DL (ref 6.4–8.2)
PUNCTURE CHARGE: YES
RBC # BLD AUTO: 3.21 X10(6)UL
SAO2 % BLDA: 96 % (ref 94–100)
SARS-COV-2 RNA RESP QL NAA+PROBE: NOT DETECTED
SODIUM SERPL-SCNC: 138 MMOL/L (ref 136–145)
SODIUM SERPL-SCNC: 144 MMOL/L (ref 136–145)
SODIUM SERPL-SCNC: 146 MMOL/L (ref 136–145)
SODIUM SERPL-SCNC: 54 MMOL/L
SP GR UR STRIP: 1.01 (ref 1–1.03)
TIBC SERPL-MCNC: 212 UG/DL (ref 240–450)
TRANSFERRIN SERPL-MCNC: 142 MG/DL (ref 200–360)
TROPONIN I HIGH SENSITIVITY: 19 NG/L
UROBILINOGEN UR STRIP-ACNC: 0.2
WBC # BLD AUTO: 5.4 X10(3) UL (ref 4–11)

## 2022-06-15 PROCEDURE — 74176 CT ABD & PELVIS W/O CONTRAST: CPT | Performed by: EMERGENCY MEDICINE

## 2022-06-15 PROCEDURE — 78582 LUNG VENTILAT&PERFUS IMAGING: CPT | Performed by: HOSPITALIST

## 2022-06-15 PROCEDURE — 70450 CT HEAD/BRAIN W/O DYE: CPT | Performed by: EMERGENCY MEDICINE

## 2022-06-15 PROCEDURE — 99223 1ST HOSP IP/OBS HIGH 75: CPT | Performed by: INTERNAL MEDICINE

## 2022-06-15 PROCEDURE — 72125 CT NECK SPINE W/O DYE: CPT | Performed by: EMERGENCY MEDICINE

## 2022-06-15 PROCEDURE — 93306 TTE W/DOPPLER COMPLETE: CPT | Performed by: STUDENT IN AN ORGANIZED HEALTH CARE EDUCATION/TRAINING PROGRAM

## 2022-06-15 PROCEDURE — 71045 X-RAY EXAM CHEST 1 VIEW: CPT | Performed by: EMERGENCY MEDICINE

## 2022-06-15 PROCEDURE — 99223 1ST HOSP IP/OBS HIGH 75: CPT | Performed by: HOSPITALIST

## 2022-06-15 PROCEDURE — 71045 X-RAY EXAM CHEST 1 VIEW: CPT | Performed by: STUDENT IN AN ORGANIZED HEALTH CARE EDUCATION/TRAINING PROGRAM

## 2022-06-15 RX ORDER — HEPARIN SODIUM 5000 [USP'U]/ML
5000 INJECTION, SOLUTION INTRAVENOUS; SUBCUTANEOUS EVERY 12 HOURS SCHEDULED
Status: DISCONTINUED | OUTPATIENT
Start: 2022-06-15 | End: 2022-06-23

## 2022-06-15 RX ORDER — PANTOPRAZOLE SODIUM 40 MG/1
40 TABLET, DELAYED RELEASE ORAL
Status: DISCONTINUED | OUTPATIENT
Start: 2022-06-15 | End: 2022-06-23

## 2022-06-15 RX ORDER — NITROGLYCERIN 0.4 MG/1
TABLET SUBLINGUAL
Status: COMPLETED
Start: 2022-06-15 | End: 2022-06-15

## 2022-06-15 RX ORDER — BISACODYL 10 MG
10 SUPPOSITORY, RECTAL RECTAL
Status: DISCONTINUED | OUTPATIENT
Start: 2022-06-15 | End: 2022-06-23

## 2022-06-15 RX ORDER — SODIUM POLYSTYRENE SULFONATE 4.1 MEQ/G
45 POWDER, FOR SUSPENSION ORAL; RECTAL ONCE
Status: COMPLETED | OUTPATIENT
Start: 2022-06-15 | End: 2022-06-15

## 2022-06-15 RX ORDER — HYDROMORPHONE HYDROCHLORIDE 1 MG/ML
0.5 INJECTION, SOLUTION INTRAMUSCULAR; INTRAVENOUS; SUBCUTANEOUS EVERY 4 HOURS PRN
Status: DISCONTINUED | OUTPATIENT
Start: 2022-06-15 | End: 2022-06-23

## 2022-06-15 RX ORDER — ACETAMINOPHEN 500 MG
500 TABLET ORAL EVERY 4 HOURS PRN
Status: DISCONTINUED | OUTPATIENT
Start: 2022-06-15 | End: 2022-06-23

## 2022-06-15 RX ORDER — MAGNESIUM OXIDE 400 MG (241.3 MG MAGNESIUM) TABLET
800 TABLET DAILY
COMMUNITY

## 2022-06-15 RX ORDER — SODIUM POLYSTYRENE SULFONATE 4.1 MEQ/G
60 POWDER, FOR SUSPENSION ORAL; RECTAL ONCE
Status: DISCONTINUED | OUTPATIENT
Start: 2022-06-15 | End: 2022-06-15

## 2022-06-15 RX ORDER — SODIUM POLYSTYRENE SULFONATE 4.1 MEQ/G
30 POWDER, FOR SUSPENSION ORAL; RECTAL ONCE
Status: COMPLETED | OUTPATIENT
Start: 2022-06-15 | End: 2022-06-15

## 2022-06-15 RX ORDER — MELATONIN
325
COMMUNITY

## 2022-06-15 RX ORDER — MORPHINE SULFATE 2 MG/ML
0.5 INJECTION, SOLUTION INTRAMUSCULAR; INTRAVENOUS EVERY 4 HOURS PRN
Status: DISCONTINUED | OUTPATIENT
Start: 2022-06-15 | End: 2022-06-15

## 2022-06-15 RX ORDER — CALCIUM GLUCONATE 10 MG/ML
1 INJECTION, SOLUTION INTRAVENOUS ONCE
Status: DISCONTINUED | OUTPATIENT
Start: 2022-06-15 | End: 2022-06-15

## 2022-06-15 RX ORDER — ECHINACEA PURPUREA EXTRACT 125 MG
1 TABLET ORAL
Status: DISCONTINUED | OUTPATIENT
Start: 2022-06-15 | End: 2022-06-23

## 2022-06-15 RX ORDER — CALCIUM GLUCONATE 94 MG/ML
INJECTION, SOLUTION INTRAVENOUS
Status: COMPLETED
Start: 2022-06-15 | End: 2022-06-15

## 2022-06-15 RX ORDER — ONDANSETRON 2 MG/ML
4 INJECTION INTRAMUSCULAR; INTRAVENOUS EVERY 6 HOURS PRN
Status: DISCONTINUED | OUTPATIENT
Start: 2022-06-15 | End: 2022-06-23

## 2022-06-15 RX ORDER — DEXTROSE MONOHYDRATE 25 G/50ML
50 INJECTION, SOLUTION INTRAVENOUS ONCE
Status: COMPLETED | OUTPATIENT
Start: 2022-06-15 | End: 2022-06-15

## 2022-06-15 RX ORDER — LEVOTHYROXINE SODIUM 20 UG/ML
50 INJECTION, SOLUTION INTRAVENOUS DAILY
Status: DISCONTINUED | OUTPATIENT
Start: 2022-06-15 | End: 2022-06-16

## 2022-06-15 RX ORDER — POLYETHYLENE GLYCOL 3350 17 G/17G
17 POWDER, FOR SOLUTION ORAL DAILY PRN
Status: DISCONTINUED | OUTPATIENT
Start: 2022-06-15 | End: 2022-06-23

## 2022-06-15 RX ORDER — NITROGLYCERIN 0.4 MG/1
0.4 TABLET SUBLINGUAL EVERY 5 MIN PRN
Status: DISCONTINUED | OUTPATIENT
Start: 2022-06-15 | End: 2022-06-23

## 2022-06-15 RX ORDER — METOCLOPRAMIDE HYDROCHLORIDE 5 MG/ML
5 INJECTION INTRAMUSCULAR; INTRAVENOUS EVERY 8 HOURS PRN
Status: DISCONTINUED | OUTPATIENT
Start: 2022-06-15 | End: 2022-06-23

## 2022-06-15 RX ORDER — CALCIUM GLUCONATE 94 MG/ML
1 INJECTION, SOLUTION INTRAVENOUS ONCE
Status: COMPLETED | OUTPATIENT
Start: 2022-06-15 | End: 2022-06-15

## 2022-06-15 RX ORDER — MORPHINE SULFATE 4 MG/ML
4 INJECTION, SOLUTION INTRAMUSCULAR; INTRAVENOUS ONCE
Status: COMPLETED | OUTPATIENT
Start: 2022-06-15 | End: 2022-06-15

## 2022-06-15 RX ORDER — IPRATROPIUM BROMIDE AND ALBUTEROL SULFATE 2.5; .5 MG/3ML; MG/3ML
3 SOLUTION RESPIRATORY (INHALATION) EVERY 6 HOURS PRN
Status: DISCONTINUED | OUTPATIENT
Start: 2022-06-15 | End: 2022-06-16

## 2022-06-15 RX ORDER — MELATONIN
1000 DAILY
COMMUNITY

## 2022-06-15 RX ORDER — SENNOSIDES 8.6 MG
17.2 TABLET ORAL NIGHTLY PRN
Status: DISCONTINUED | OUTPATIENT
Start: 2022-06-15 | End: 2022-06-23

## 2022-06-15 NOTE — ED INITIAL ASSESSMENT (HPI)
Patient is here for the fall on his face 25 min prior to arrival. Patient denied loss of consciousness. Patient is ax3 but is lethargic. Bg 117. BP 90/40.

## 2022-06-15 NOTE — TELEPHONE ENCOUNTER
Patient is currently in the ER--Dr. Jones Burgos aware    Routed to TRENT Flor as Tia Chan, as patient is scheduled for in office appt this afternoon.

## 2022-06-15 NOTE — PROGRESS NOTES
Received patient from Ed to PCCU 227. Report received from [facility]. Medications, labs, plan of care reviewed. Skin assessment on arrival to unit performed with Arjun Cintron. Wounds are as follows: healed wound to sacrum with redness, small split in skin. mepilex applied. Healed wound to left lateral ankle. No open area-small mepilex applied for protection. Abrasion noted to left face from fall. Generalized bruising. Additional notifications/statements include: patient complaining of chest pain and burning. Dr Sal Kent at bedside and aware. EKG ordered.

## 2022-06-15 NOTE — TELEPHONE ENCOUNTER
From: Killian Stearns  To: Amanda Garcia MD  Sent: 6/14/2022 2:51 PM CDT  Subject: Appointment canceled    I just received a call that your building is out of power and out of air conditioning and my appointment was canceled. As a result of that I called to see if my appointment was still scheduled as I never received a call and was advised that you do have power and air conditioning in the building and my appointment is still valid. I would like to know why my  is not able to see the doctor and has to go see an APR N? Do do we need to find another doctor?

## 2022-06-15 NOTE — PLAN OF CARE
Patient A&Ox4. Increasingly SOB with increasing O2 needs throughout the day. Currently on bipap 60%. Lungs coarse. Congested cough noted. Patient very [address] with right cochlear implant. BP stable, afebrile. Patient c/o intermittent chest \"burning\". Dilaudid available prn for pain. Patient treated for high K levels. Labs monitored. Wife at bedside and aware of plan of care. See assessments. Will monitor. Problem: Patient Centered Care  Goal: Patient preferences are identified and integrated in the patient's plan of care  Description: Interventions:  - What would you like us to know as we care for you? Hx of lung CA- currently stable per last PET scan. Lives at home with wife.   - Provide timely, complete, and accurate information to patient/family  - Incorporate patient and family knowledge, values, beliefs, and cultural backgrounds into the planning and delivery of care  - Encourage patient/family to participate in care and decision-making at the level they choose  - Honor patient and family perspectives and choices  Outcome: Progressing     Problem: Patient/Family Goals  Goal: Patient/Family Long Term Goal  Description: Patient's Long Term Goal: to return home    Interventions:  - treat OVI,- IV fluids, monitor labs, treat hyperkalemia  -supplemental O2/ pulm eval  -monitor CXR    - See additional Care Plan goals for specific interventions  Outcome: Progressing  Goal: Patient/Family Short Term Goal  Description: Patient's Short Term Goal: improved breathing    Interventions:   - supplemental O2/bipap  -monitor CXR  -monitor labs    - See additional Care Plan goals for specific interventions  Outcome: Progressing     Problem: CARDIOVASCULAR - ADULT  Goal: Maintains optimal cardiac output and hemodynamic stability  Description: INTERVENTIONS:  - Monitor vital signs, rhythm, and trends  - Monitor for bleeding, hypotension and signs of decreased cardiac output  - Evaluate effectiveness of vasoactive medications to optimize hemodynamic stability  - Monitor arterial and/or venous puncture sites for bleeding and/or hematoma  - Assess quality of pulses, skin color and temperature  - Assess for signs of decreased coronary artery perfusion - ex.  Angina  - Evaluate fluid balance, assess for edema, trend weights  Outcome: Progressing     Problem: RESPIRATORY - ADULT  Goal: Achieves optimal ventilation and oxygenation  Description: INTERVENTIONS:  - Assess for changes in respiratory status  - Assess for changes in mentation and behavior  - Position to facilitate oxygenation and minimize respiratory effort  - Oxygen supplementation based on oxygen saturation or ABGs  - Provide Smoking Cessation handout, if applicable  - Encourage broncho-pulmonary hygiene including cough, deep breathe, Incentive Spirometry  - Assess the need for suctioning and perform as needed  - Assess and instruct to report SOB or any respiratory difficulty  - Respiratory Therapy support as indicated  - Manage/alleviate anxiety  - Monitor for signs/symptoms of CO2 retention  Outcome: Progressing     Problem: METABOLIC/FLUID AND ELECTROLYTES - ADULT  Goal: Electrolytes maintained within normal limits  Description: INTERVENTIONS:  - Monitor labs and rhythm and assess patient for signs and symptoms of electrolyte imbalances  - Administer electrolyte replacement as ordered  - Monitor response to electrolyte replacements, including rhythm and repeat lab results as appropriate  - Fluid restriction as ordered  - Instruct patient on fluid and nutrition restrictions as appropriate  Outcome: Progressing  Goal: Hemodynamic stability and optimal renal function maintained  Description: INTERVENTIONS:  - Monitor labs and assess for signs and symptoms of volume excess or deficit  - Monitor intake, output and patient weight  - Monitor urine specific gravity, serum osmolarity and serum sodium as indicated or ordered  - Monitor response to interventions for patient's volume status, including labs, urine output, blood pressure (other measures as available)  - Encourage oral intake as appropriate  - Instruct patient on fluid and nutrition restrictions as appropriate  Outcome: Progressing     Problem: SKIN/TISSUE INTEGRITY - ADULT  Goal: Skin integrity remains intact  Description: INTERVENTIONS  - Assess and document risk factors for pressure ulcer development  - Assess and document skin integrity  - Monitor for areas of redness and/or skin breakdown  - Initiate interventions, skin care algorithm/standards of care as needed  Outcome: Progressing

## 2022-06-15 NOTE — CONSULTS
[facility]    PATIENT'S NAME: Dmitry Munoz   ATTENDING PHYSICIAN: Adore Walker DO   CONSULTING PHYSICIAN: Katerine Badillo MD   PATIENT ACCOUNT#:   [de-identified]    LOCATION:  [address] [place] [address],[address] [place] [address] #:   [id number]       YOB: 1945  ADMISSION DATE:       06/15/2022      CONSULT DATE:  06/15/2022    REPORT OF CONSULTATION      HISTORY OF PRESENT ILLNESS:  The patient is a 68-year-old male with a history of chronic kidney disease stage 3, history of metastatic lung cancer, coronary artery disease, hypertension, BPH, and hypothyroidism who was brought in by paramedics with increasing generalized weakness and a fall. The patient is extremely hard of hearing and is lethargic; therefore, history is somewhat difficult to obtain. Much of it is gleaned through the chart. According to the ER doctors, paramedics found him hypotensive. His blood pressures, though, are better now in the emergency room. He has been receiving IV fluids. The patient has had a history of metastatic lung cancer. He has been on a number of chemotherapeutic agents and immunotherapy. Apparently, he has failed and currently is not receiving any specific treatment. He did have acute renal failure back in December 2021 and it was thought to be secondary to interstitial nephritis from [facility]. His urine was positive for eosinophils. It was treated with prednisone. He again had a bout of acute renal failure in February 2022. Again, urine for eosinophils was positive and it was thought that this might be secondary to interstitial nephritis from [facility]. When the patient was discharged from the hospital on February 25, 2022, his creatinine was 1.86. On March 11, 2022, his creatinine was 1.64. On April 19, 2022, his creatinine was 1.32 and his most recent lab prior to admission was on May 25, 2022, when his creatinine was 1.40 with an estimated GFR of 47 mL/minute.   Of note is that the patient had recently received Augmentin and doxycycline for a possible pneumonia. He has been having ongoing problems with diarrhea. He was working with GI. They did do a stool for C difficile on June 8, 2022, which was negative. Again, it is not clear how much diarrhea he has been having in recent days. PAST MEDICAL HISTORY:  Otherwise, as stated above. He denies any obvious use of nonsteroidals. MEDICATIONS:  Medications at home include potassium, DuoNeb, doxycycline, Augmentin, Flonase, Xanax, Protonix, finasteride, terazosin, Norco, ferrous sulfate, levothyroxine, vitamin D, Trelegy Ellipta, albuterol, cyclosporin ophthalmic solution, aspirin 81 mg daily, amlodipine, metoprolol, and Lipitor. ALLERGIES:  He is allergic to cephalosporins and Demerol. SOCIAL HISTORY:  Former cigarette smoker. Lives with his wife. FAMILY HISTORY:  Negative for renal pathology. REVIEW OF SYSTEMS:  Again, somewhat difficult to obtain secondary to him being lethargic and extremely hard of hearing, but apparently has been describing some chest discomfort. PHYSICAL EXAMINATION:    GENERAL:  The patient is weak and lethargic. VITAL SIGNS:  Blood pressure 107/65 with a respiratory rate of 21, temperature 97.5, pulse 98. O2 saturation 95% on room air. He weighs 173 pounds. HEENT:  Mucous membranes are slightly dry. Skin turgor slightly diminished. NECK:  Supple without JVD or lymphadenopathy. LUNGS:  Grossly clear to auscultation and percussion. HEART:  Regular rate and rhythm with an S4 and a 2/6 systolic ejection murmur. ABDOMEN:  Soft, flat, nontender without organomegaly, masses, or bruits. EXTREMITIES:  No clubbing, cyanosis, or edema. LABORATORY DATA:  Laboratory studies in the emergency room today show a potassium of 7.8, CO2 of 10, anion gap of 8, BUN and creatinine of 97 and 10.20 respectively. Liver function tests were normal.  Albumin 2.5. White count 5.4, hemoglobin 9.4. Lactic acid 0.7. Urine studies show a urinalysis with 30 mg/dL protein, 6 to 10 WBCs, rare bacteria. Rapid COVID was negative. Urine culture was negative. The patient had a CT scan of the abdomen, kidneys, and bladder. Appeared to be grossly normal.  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 Overall stable. IMPRESSION:  The patient is a 68-year-old male now with:    1. Acute-on-chronic renal insufficiency. The patient has baseline chronic kidney disease stage 3. History is somewhat difficult to obtain, but he may have been having ongoing problems with diarrhea. He was on potassium supplements too as an outpatient. He also received recent courses of Augmentin and doxycycline. Rule out interstitial nephritis. 2.   Metastatic lung carcinoma, currently not receiving any chemotherapy or immunotherapy. 3.   History of coronary artery disease with ischemic cardiomyopathy with a left ventricular ejection fraction of 45%, moderate to severe aortic stenosis and moderate aortic regurgitation. .  4. BPH.  5.   Hypertension. 6.   Hypothyroidism. 7.   Anemia. PLAN:  A random urine sodium and creatinine have been ordered along with random urine for eosinophils. Will continue IV hydration, but switch to a bicarbonate drip. He has received the hyperkalemic cocktail in the emergency room. Will do followup laboratory studies at 1 p.m. today. Will follow with I's and O's, daily weights, and serial chemistries. Discussed the above with Dr. Shirley Hunter and the patient's nurse.     Dictated By Ernestine Ramirez MD  d: 06/15/2022 10:45:59  t: 06/15/2022 12:23:17  Job [id number]  HCW/

## 2022-06-16 ENCOUNTER — APPOINTMENT (OUTPATIENT)
Dept: GENERAL RADIOLOGY | Facility: HOSPITAL | Age: 77
End: 2022-06-16
Attending: INTERNAL MEDICINE
Payer: MEDICARE

## 2022-06-16 LAB
ALBUMIN SERPL-MCNC: 2.1 G/DL (ref 3.4–5)
ALBUMIN/GLOB SERPL: 0.7 {RATIO} (ref 1–2)
ALP LIVER SERPL-CCNC: 62 U/L
ALT SERPL-CCNC: 20 U/L
ANION GAP SERPL CALC-SCNC: 14 MMOL/L (ref 0–18)
AST SERPL-CCNC: 42 U/L (ref 15–37)
BASOPHILS # BLD AUTO: 0.03 X10(3) UL (ref 0–0.2)
BASOPHILS NFR BLD AUTO: 0.5 %
BILIRUB SERPL-MCNC: 0.5 MG/DL (ref 0.1–2)
BUN BLD-MCNC: 85 MG/DL (ref 7–18)
BUN/CREAT SERPL: 9.1 (ref 10–20)
CALCIUM BLD-MCNC: 6.5 MG/DL (ref 8.5–10.1)
CHLORIDE SERPL-SCNC: 116 MMOL/L (ref 98–112)
CO2 SERPL-SCNC: 14 MMOL/L (ref 21–32)
CREAT BLD-MCNC: 9.29 MG/DL
DEPRECATED RDW RBC AUTO: 52.3 FL (ref 35.1–46.3)
EOSINOPHIL # BLD AUTO: 0.07 X10(3) UL (ref 0–0.7)
EOSINOPHIL NFR BLD AUTO: 1.1 %
ERYTHROCYTE [DISTWIDTH] IN BLOOD BY AUTOMATED COUNT: 14.9 % (ref 11–15)
EST. AVERAGE GLUCOSE BLD GHB EST-MCNC: 111 MG/DL (ref 68–126)
GLOBULIN PLAS-MCNC: 3.2 G/DL (ref 2.8–4.4)
GLUCOSE BLD-MCNC: 62 MG/DL (ref 70–99)
GLUCOSE BLDC GLUCOMTR-MCNC: 163 MG/DL (ref 70–99)
GLUCOSE BLDC GLUCOMTR-MCNC: 57 MG/DL (ref 70–99)
GLUCOSE BLDC GLUCOMTR-MCNC: 71 MG/DL (ref 70–99)
HBA1C MFR BLD: 5.5 % (ref ?–5.7)
HCT VFR BLD AUTO: 25.8 %
HGB BLD-MCNC: 8 G/DL
IMM GRANULOCYTES # BLD AUTO: 0.04 X10(3) UL (ref 0–1)
IMM GRANULOCYTES NFR BLD: 0.6 %
LYMPHOCYTES # BLD AUTO: 0.2 X10(3) UL (ref 1–4)
LYMPHOCYTES NFR BLD AUTO: 3.1 %
MAGNESIUM SERPL-MCNC: 2 MG/DL (ref 1.6–2.6)
MCH RBC QN AUTO: 29.7 PG (ref 26–34)
MCHC RBC AUTO-ENTMCNC: 31 G/DL (ref 31–37)
MCV RBC AUTO: 95.9 FL
MONOCYTES # BLD AUTO: 0.3 X10(3) UL (ref 0.1–1)
MONOCYTES NFR BLD AUTO: 4.7 %
NEUTROPHILS # BLD AUTO: 5.81 X10 (3) UL (ref 1.5–7.7)
NEUTROPHILS # BLD AUTO: 5.81 X10(3) UL (ref 1.5–7.7)
NEUTROPHILS NFR BLD AUTO: 90 %
OSMOLALITY SERPL CALC.SUM OF ELEC: 322 MOSM/KG (ref 275–295)
PHOSPHATE SERPL-MCNC: 4.8 MG/DL (ref 2.5–4.9)
PLATELET # BLD AUTO: 234 10(3)UL (ref 150–450)
POTASSIUM SERPL-SCNC: 4.3 MMOL/L (ref 3.5–5.1)
PROT SERPL-MCNC: 5.3 G/DL (ref 6.4–8.2)
RBC # BLD AUTO: 2.69 X10(6)UL
SODIUM SERPL-SCNC: 144 MMOL/L (ref 136–145)
WBC # BLD AUTO: 6.5 X10(3) UL (ref 4–11)

## 2022-06-16 PROCEDURE — 99233 SBSQ HOSP IP/OBS HIGH 50: CPT | Performed by: INTERNAL MEDICINE

## 2022-06-16 PROCEDURE — 71045 X-RAY EXAM CHEST 1 VIEW: CPT | Performed by: INTERNAL MEDICINE

## 2022-06-16 PROCEDURE — 99233 SBSQ HOSP IP/OBS HIGH 50: CPT | Performed by: HOSPITALIST

## 2022-06-16 RX ORDER — ATORVASTATIN CALCIUM 20 MG/1
20 TABLET, FILM COATED ORAL NIGHTLY
Status: DISCONTINUED | OUTPATIENT
Start: 2022-06-16 | End: 2022-06-23

## 2022-06-16 RX ORDER — CYCLOSPORINE 0.5 MG/ML
1 EMULSION OPHTHALMIC 2 TIMES DAILY
Status: DISCONTINUED | OUTPATIENT
Start: 2022-06-16 | End: 2022-06-16

## 2022-06-16 RX ORDER — TERAZOSIN 5 MG/1
5 CAPSULE ORAL DAILY
Status: DISCONTINUED | OUTPATIENT
Start: 2022-06-16 | End: 2022-06-23

## 2022-06-16 RX ORDER — SODIUM BICARBONATE 650 MG/1
650 TABLET ORAL 3 TIMES DAILY
Status: DISCONTINUED | OUTPATIENT
Start: 2022-06-16 | End: 2022-06-23

## 2022-06-16 RX ORDER — FUROSEMIDE 10 MG/ML
40 INJECTION INTRAMUSCULAR; INTRAVENOUS ONCE
Status: COMPLETED | OUTPATIENT
Start: 2022-06-16 | End: 2022-06-16

## 2022-06-16 RX ORDER — IPRATROPIUM BROMIDE AND ALBUTEROL SULFATE 2.5; .5 MG/3ML; MG/3ML
3 SOLUTION RESPIRATORY (INHALATION)
Status: DISCONTINUED | OUTPATIENT
Start: 2022-06-16 | End: 2022-06-20

## 2022-06-16 NOTE — PROGRESS NOTES
06/16/22 1454   [REMOVED] Wound 06/15/22 1 Ankle Left;Lateral   Final Assessment Date: 06/16/22  Date First Assessed/Time First Assessed: 06/15/22 1000   Present on Hospital Admission: Yes   Wound Number (Wound Clinic Only): 1  Location: Ankle  Wound Location Orientation: Left;Lateral  Wound Outcome: Healed   Wound Image    Site Assessment Clean;Dry; Intact;Fragile;Painful;Pink   Closure Approximated   Drainage Amount None   Treatments Site Care   Dressing Aquacel Foam   Dressing Changed Reinforced   Dressing Status Clean;Dry; Intact   Non-staged Wound Description Not applicable   Wound Follow Up   Follow up needed No   Pt seen per wife's request. Pt was discharged from the outpatient wound clinic on 5/12/22. See left lateral ankle photo above. Skin is red and blanchable, tiny dry scab intact. Area covered with bordered foam for protection. Spoke to bedside RN, wound care signing off.

## 2022-06-16 NOTE — PLAN OF CARE
Problem: Patient Centered Care  Goal: Patient preferences are identified and integrated in the patient's plan of care  Description: Interventions:  - What would you like us to know as we care for you? Hx of lung CA- currently stable per last PET scan. Lives at home with wife. - Provide timely, complete, and accurate information to patient/family  - Incorporate patient and family knowledge, values, beliefs, and cultural backgrounds into the planning and delivery of care  - Encourage patient/family to participate in care and decision-making at the level they choose  - Honor patient and family perspectives and choices  Outcome: Progressing     Problem: CARDIOVASCULAR - ADULT  Goal: Maintains optimal cardiac output and hemodynamic stability  Description: INTERVENTIONS:  - Monitor vital signs, rhythm, and trends  - Monitor for bleeding, hypotension and signs of decreased cardiac output  - Evaluate effectiveness of vasoactive medications to optimize hemodynamic stability  - Monitor arterial and/or venous puncture sites for bleeding and/or hematoma  - Assess quality of pulses, skin color and temperature  - Assess for signs of decreased coronary artery perfusion - ex.  Angina  - Evaluate fluid balance, assess for edema, trend weights  Outcome: Progressing     Problem: RESPIRATORY - ADULT  Goal: Achieves optimal ventilation and oxygenation  Description: INTERVENTIONS:  - Assess for changes in respiratory status  - Assess for changes in mentation and behavior  - Position to facilitate oxygenation and minimize respiratory effort  - Oxygen supplementation based on oxygen saturation or ABGs  - Provide Smoking Cessation handout, if applicable  - Encourage broncho-pulmonary hygiene including cough, deep breathe, Incentive Spirometry  - Assess the need for suctioning and perform as needed  - Assess and instruct to report SOB or any respiratory difficulty  - Respiratory Therapy support as indicated  - Manage/alleviate anxiety  - Monitor for signs/symptoms of CO2 retention  Outcome: Progressing     Problem: METABOLIC/FLUID AND ELECTROLYTES - ADULT  Goal: Electrolytes maintained within normal limits  Description: INTERVENTIONS:  - Monitor labs and rhythm and assess patient for signs and symptoms of electrolyte imbalances  - Administer electrolyte replacement as ordered  - Monitor response to electrolyte replacements, including rhythm and repeat lab results as appropriate  - Fluid restriction as ordered  - Instruct patient on fluid and nutrition restrictions as appropriate  Outcome: Progressing  Goal: Hemodynamic stability and optimal renal function maintained  Description: INTERVENTIONS:  - Monitor labs and assess for signs and symptoms of volume excess or deficit  - Monitor intake, output and patient weight  - Monitor urine specific gravity, serum osmolarity and serum sodium as indicated or ordered  - Monitor response to interventions for patient's volume status, including labs, urine output, blood pressure (other measures as available)  - Encourage oral intake as appropriate  - Instruct patient on fluid and nutrition restrictions as appropriate  Outcome: Progressing     Problem: SKIN/TISSUE INTEGRITY - ADULT  Goal: Skin integrity remains intact  Description: INTERVENTIONS  - Assess and document risk factors for pressure ulcer development  - Assess and document skin integrity  - Monitor for areas of redness and/or skin breakdown  - Initiate interventions, skin care algorithm/standards of care as needed  Outcome: Progressing

## 2022-06-16 NOTE — CM/SW NOTE
BPCI Bundled Advanced Medicare Program    Plan of care reviewed for care coordination and discharge planning. Noted pt falls under  BPCI/Medicare program, with DRG Renal Failure ([address])      [address] Proposal:  Home  pending progress    / to remain available for support and/or discharge planning.      Macey Uribe MBA MSN, RN CTL/  [id number]

## 2022-06-17 LAB
ALBUMIN SERPL-MCNC: 1.9 G/DL (ref 3.4–5)
ANION GAP SERPL CALC-SCNC: 16 MMOL/L (ref 0–18)
BASOPHILS # BLD AUTO: 0.02 X10(3) UL (ref 0–0.2)
BASOPHILS NFR BLD AUTO: 0.3 %
BUN BLD-MCNC: 90 MG/DL (ref 7–18)
BUN/CREAT SERPL: 9.6 (ref 10–20)
CALCIUM BLD-MCNC: 6.1 MG/DL (ref 8.5–10.1)
CHLORIDE SERPL-SCNC: 112 MMOL/L (ref 98–112)
CO2 SERPL-SCNC: 17 MMOL/L (ref 21–32)
CREAT BLD-MCNC: 9.42 MG/DL
DEPRECATED RDW RBC AUTO: 49.8 FL (ref 35.1–46.3)
EOSINOPHIL # BLD AUTO: 0.14 X10(3) UL (ref 0–0.7)
EOSINOPHIL NFR BLD AUTO: 2.4 %
ERYTHROCYTE [DISTWIDTH] IN BLOOD BY AUTOMATED COUNT: 14.7 % (ref 11–15)
GLUCOSE BLD-MCNC: 84 MG/DL (ref 70–99)
GLUCOSE BLDC GLUCOMTR-MCNC: 181 MG/DL (ref 70–99)
HCT VFR BLD AUTO: 24 %
HGB BLD-MCNC: 7.7 G/DL
IMM GRANULOCYTES # BLD AUTO: 0.02 X10(3) UL (ref 0–1)
IMM GRANULOCYTES NFR BLD: 0.3 %
LYMPHOCYTES # BLD AUTO: 0.35 X10(3) UL (ref 1–4)
LYMPHOCYTES NFR BLD AUTO: 6 %
MAGNESIUM SERPL-MCNC: 1.7 MG/DL (ref 1.6–2.6)
MCH RBC QN AUTO: 29.5 PG (ref 26–34)
MCHC RBC AUTO-ENTMCNC: 32.1 G/DL (ref 31–37)
MCV RBC AUTO: 92 FL
MONOCYTES # BLD AUTO: 0.37 X10(3) UL (ref 0.1–1)
MONOCYTES NFR BLD AUTO: 6.3 %
NEUTROPHILS # BLD AUTO: 4.93 X10 (3) UL (ref 1.5–7.7)
NEUTROPHILS # BLD AUTO: 4.93 X10(3) UL (ref 1.5–7.7)
NEUTROPHILS NFR BLD AUTO: 84.7 %
OSMOLALITY SERPL CALC.SUM OF ELEC: 327 MOSM/KG (ref 275–295)
PHOSPHATE SERPL-MCNC: 6.9 MG/DL (ref 2.5–4.9)
PLATELET # BLD AUTO: 229 10(3)UL (ref 150–450)
POTASSIUM SERPL-SCNC: 3.4 MMOL/L (ref 3.5–5.1)
RBC # BLD AUTO: 2.61 X10(6)UL
SODIUM SERPL-SCNC: 145 MMOL/L (ref 136–145)
WBC # BLD AUTO: 5.8 X10(3) UL (ref 4–11)

## 2022-06-17 PROCEDURE — 99233 SBSQ HOSP IP/OBS HIGH 50: CPT | Performed by: HOSPITALIST

## 2022-06-17 PROCEDURE — 99233 SBSQ HOSP IP/OBS HIGH 50: CPT | Performed by: INTERNAL MEDICINE

## 2022-06-17 RX ORDER — SEVELAMER CARBONATE 800 MG/1
800 TABLET, FILM COATED ORAL
Status: DISCONTINUED | OUTPATIENT
Start: 2022-06-17 | End: 2022-06-23

## 2022-06-17 RX ORDER — POTASSIUM CHLORIDE 20 MEQ/1
40 TABLET, EXTENDED RELEASE ORAL ONCE
Status: COMPLETED | OUTPATIENT
Start: 2022-06-17 | End: 2022-06-17

## 2022-06-17 RX ORDER — FUROSEMIDE 10 MG/ML
40 INJECTION INTRAMUSCULAR; INTRAVENOUS ONCE
Status: COMPLETED | OUTPATIENT
Start: 2022-06-17 | End: 2022-06-17

## 2022-06-17 RX ORDER — PREDNISONE 20 MG/1
20 TABLET ORAL 2 TIMES DAILY WITH MEALS
Status: DISCONTINUED | OUTPATIENT
Start: 2022-06-17 | End: 2022-06-23

## 2022-06-17 NOTE — PLAN OF CARE
No significant changes over night. Weaning down O2 as tolerated. Attempted BiPap fpr 10 minutes. Will call wife to bring in home CPAP machine. BP stable over night. Good urine output. Pt comfortable with call light in reach and frequent nursing rounding. Problem: Patient Centered Care  Goal: Patient preferences are identified and integrated in the patient's plan of care  Description: Interventions:  - What would you like us to know as we care for you? Hx of lung CA- currently stable per last PET scan. Lives at home with wife.   - Provide timely, complete, and accurate information to patient/family  - Incorporate patient and family knowledge, values, beliefs, and cultural backgrounds into the planning and delivery of care  - Encourage patient/family to participate in care and decision-making at the level they choose  - Honor patient and family perspectives and choices  Outcome: Progressing     Problem: Patient/Family Goals  Goal: Patient/Family Long Term Goal  Description: Patient's Long Term Goal: to return home    Interventions:  - treat OVI,- IV fluids, monitor labs, treat hyperkalemia  -supplemental O2/ pulm eval  -monitor CXR    - See additional Care Plan goals for specific interventions  Outcome: Progressing  Goal: Patient/Family Short Term Goal  Description: Patient's Short Term Goal: improved breathing    Interventions:   - supplemental O2/bipap  -monitor CXR  -monitor labs    - See additional Care Plan goals for specific interventions  Outcome: Progressing     Problem: CARDIOVASCULAR - ADULT  Goal: Maintains optimal cardiac output and hemodynamic stability  Description: INTERVENTIONS:  - Monitor vital signs, rhythm, and trends  - Monitor for bleeding, hypotension and signs of decreased cardiac output  - Evaluate effectiveness of vasoactive medications to optimize hemodynamic stability  - Monitor arterial and/or venous puncture sites for bleeding and/or hematoma  - Assess quality of pulses, skin color and temperature  - Assess for signs of decreased coronary artery perfusion - ex.  Angina  - Evaluate fluid balance, assess for edema, trend weights  Outcome: Progressing     Problem: RESPIRATORY - ADULT  Goal: Achieves optimal ventilation and oxygenation  Description: INTERVENTIONS:  - Assess for changes in respiratory status  - Assess for changes in mentation and behavior  - Position to facilitate oxygenation and minimize respiratory effort  - Oxygen supplementation based on oxygen saturation or ABGs  - Provide Smoking Cessation handout, if applicable  - Encourage broncho-pulmonary hygiene including cough, deep breathe, Incentive Spirometry  - Assess the need for suctioning and perform as needed  - Assess and instruct to report SOB or any respiratory difficulty  - Respiratory Therapy support as indicated  - Manage/alleviate anxiety  - Monitor for signs/symptoms of CO2 retention  Outcome: Progressing     Problem: METABOLIC/FLUID AND ELECTROLYTES - ADULT  Goal: Electrolytes maintained within normal limits  Description: INTERVENTIONS:  - Monitor labs and rhythm and assess patient for signs and symptoms of electrolyte imbalances  - Administer electrolyte replacement as ordered  - Monitor response to electrolyte replacements, including rhythm and repeat lab results as appropriate  - Fluid restriction as ordered  - Instruct patient on fluid and nutrition restrictions as appropriate  Outcome: Progressing  Goal: Hemodynamic stability and optimal renal function maintained  Description: INTERVENTIONS:  - Monitor labs and assess for signs and symptoms of volume excess or deficit  - Monitor intake, output and patient weight  - Monitor urine specific gravity, serum osmolarity and serum sodium as indicated or ordered  - Monitor response to interventions for patient's volume status, including labs, urine output, blood pressure (other measures as available)  - Encourage oral intake as appropriate  - Instruct patient on fluid and nutrition restrictions as appropriate  Outcome: Progressing     Problem: SKIN/TISSUE INTEGRITY - ADULT  Goal: Skin integrity remains intact  Description: INTERVENTIONS  - Assess and document risk factors for pressure ulcer development  - Assess and document skin integrity  - Monitor for areas of redness and/or skin breakdown  - Initiate interventions, skin care algorithm/standards of care as needed  Outcome: Progressing

## 2022-06-18 ENCOUNTER — APPOINTMENT (OUTPATIENT)
Dept: GENERAL RADIOLOGY | Facility: HOSPITAL | Age: 77
End: 2022-06-18
Attending: INTERNAL MEDICINE
Payer: MEDICARE

## 2022-06-18 LAB
ANION GAP SERPL CALC-SCNC: 13 MMOL/L (ref 0–18)
BASOPHILS # BLD AUTO: 0 X10(3) UL (ref 0–0.2)
BASOPHILS NFR BLD AUTO: 0 %
BUN BLD-MCNC: 92 MG/DL (ref 7–18)
BUN/CREAT SERPL: 9.5 (ref 10–20)
CALCIUM BLD-MCNC: 5.9 MG/DL (ref 8.5–10.1)
CHLORIDE SERPL-SCNC: 109 MMOL/L (ref 98–112)
CO2 SERPL-SCNC: 18 MMOL/L (ref 21–32)
CREAT BLD-MCNC: 9.65 MG/DL
DEPRECATED RDW RBC AUTO: 49.4 FL (ref 35.1–46.3)
EOSINOPHIL # BLD AUTO: 0 X10(3) UL (ref 0–0.7)
EOSINOPHIL NFR BLD AUTO: 0 %
ERYTHROCYTE [DISTWIDTH] IN BLOOD BY AUTOMATED COUNT: 14.6 % (ref 11–15)
GLUCOSE BLD-MCNC: 142 MG/DL (ref 70–99)
HCT VFR BLD AUTO: 24.8 %
HGB BLD-MCNC: 8 G/DL
IMM GRANULOCYTES # BLD AUTO: 0.03 X10(3) UL (ref 0–1)
IMM GRANULOCYTES NFR BLD: 0.5 %
LYMPHOCYTES # BLD AUTO: 0.14 X10(3) UL (ref 1–4)
LYMPHOCYTES NFR BLD AUTO: 2.1 %
MCH RBC QN AUTO: 29.5 PG (ref 26–34)
MCHC RBC AUTO-ENTMCNC: 32.3 G/DL (ref 31–37)
MCV RBC AUTO: 91.5 FL
MONOCYTES # BLD AUTO: 0.09 X10(3) UL (ref 0.1–1)
MONOCYTES NFR BLD AUTO: 1.4 %
NEUTROPHILS # BLD AUTO: 6.31 X10 (3) UL (ref 1.5–7.7)
NEUTROPHILS # BLD AUTO: 6.31 X10(3) UL (ref 1.5–7.7)
NEUTROPHILS NFR BLD AUTO: 96 %
OSMOLALITY SERPL CALC.SUM OF ELEC: 321 MOSM/KG (ref 275–295)
PLATELET # BLD AUTO: 227 10(3)UL (ref 150–450)
POTASSIUM SERPL-SCNC: 3.9 MMOL/L (ref 3.5–5.1)
RBC # BLD AUTO: 2.71 X10(6)UL
SODIUM SERPL-SCNC: 140 MMOL/L (ref 136–145)
WBC # BLD AUTO: 6.6 X10(3) UL (ref 4–11)

## 2022-06-18 PROCEDURE — 99233 SBSQ HOSP IP/OBS HIGH 50: CPT | Performed by: INTERNAL MEDICINE

## 2022-06-18 PROCEDURE — 99233 SBSQ HOSP IP/OBS HIGH 50: CPT | Performed by: HOSPITALIST

## 2022-06-18 PROCEDURE — 71045 X-RAY EXAM CHEST 1 VIEW: CPT | Performed by: INTERNAL MEDICINE

## 2022-06-18 NOTE — PLAN OF CARE
Pt able to ambulate with walker overnight to bathroom x1 assist. Steady gait. Tolerating CPAP overnight. Pt VERY hard of hearing but is able to understand by reading lips. Hearing aids placed bedside. Wife notified of plan of care overnight. Pt sleeping comfortably in bed. Bed locked, in lowest position. Problem: Patient Centered Care  Goal: Patient preferences are identified and integrated in the patient's plan of care  Description: Interventions:  - What would you like us to know as we care for you? Hx of lung CA- currently stable per last PET scan. Lives at home with wife.   - Provide timely, complete, and accurate information to patient/family  - Incorporate patient and family knowledge, values, beliefs, and cultural backgrounds into the planning and delivery of care  - Encourage patient/family to participate in care and decision-making at the level they choose  - Honor patient and family perspectives and choices  Outcome: Progressing     Problem: Patient/Family Goals  Goal: Patient/Family Long Term Goal  Description: Patient's Long Term Goal: to return home    Interventions:  - treat OVI,- IV fluids, monitor labs, treat hyperkalemia  -supplemental O2/ pulm eval  -monitor CXR    - See additional Care Plan goals for specific interventions  Outcome: Progressing  Goal: Patient/Family Short Term Goal  Description: Patient's Short Term Goal: improved breathing    Interventions:   - supplemental O2/bipap  -monitor CXR  -monitor labs    - See additional Care Plan goals for specific interventions  Outcome: Progressing     Problem: CARDIOVASCULAR - ADULT  Goal: Maintains optimal cardiac output and hemodynamic stability  Description: INTERVENTIONS:  - Monitor vital signs, rhythm, and trends  - Monitor for bleeding, hypotension and signs of decreased cardiac output  - Evaluate effectiveness of vasoactive medications to optimize hemodynamic stability  - Monitor arterial and/or venous puncture sites for bleeding and/or hematoma  - Assess quality of pulses, skin color and temperature  - Assess for signs of decreased coronary artery perfusion - ex.  Angina  - Evaluate fluid balance, assess for edema, trend weights  Outcome: Progressing     Problem: RESPIRATORY - ADULT  Goal: Achieves optimal ventilation and oxygenation  Description: INTERVENTIONS:  - Assess for changes in respiratory status  - Assess for changes in mentation and behavior  - Position to facilitate oxygenation and minimize respiratory effort  - Oxygen supplementation based on oxygen saturation or ABGs  - Provide Smoking Cessation handout, if applicable  - Encourage broncho-pulmonary hygiene including cough, deep breathe, Incentive Spirometry  - Assess the need for suctioning and perform as needed  - Assess and instruct to report SOB or any respiratory difficulty  - Respiratory Therapy support as indicated  - Manage/alleviate anxiety  - Monitor for signs/symptoms of CO2 retention  Outcome: Progressing     Problem: METABOLIC/FLUID AND ELECTROLYTES - ADULT  Goal: Electrolytes maintained within normal limits  Description: INTERVENTIONS:  - Monitor labs and rhythm and assess patient for signs and symptoms of electrolyte imbalances  - Administer electrolyte replacement as ordered  - Monitor response to electrolyte replacements, including rhythm and repeat lab results as appropriate  - Fluid restriction as ordered  - Instruct patient on fluid and nutrition restrictions as appropriate  Outcome: Progressing  Goal: Hemodynamic stability and optimal renal function maintained  Description: INTERVENTIONS:  - Monitor labs and assess for signs and symptoms of volume excess or deficit  - Monitor intake, output and patient weight  - Monitor urine specific gravity, serum osmolarity and serum sodium as indicated or ordered  - Monitor response to interventions for patient's volume status, including labs, urine output, blood pressure (other measures as available)  - Encourage oral intake as appropriate  - Instruct patient on fluid and nutrition restrictions as appropriate  Outcome: Progressing     Problem: SKIN/TISSUE INTEGRITY - ADULT  Goal: Skin integrity remains intact  Description: INTERVENTIONS  - Assess and document risk factors for pressure ulcer development  - Assess and document skin integrity  - Monitor for areas of redness and/or skin breakdown  - Initiate interventions, skin care algorithm/standards of care as needed  Outcome: Progressing

## 2022-06-18 NOTE — PLAN OF CARE
Patient A&O x4. Hard of hearing. B/l hearing aids at bedside. On HFNC 4L, saturating well. All scheduled meds given per [facility]. Good appetite, no nausea/ vomiting. Velazquez Cathter maintained and Velazquez Cath care provided. Up to toilet w/ stand by assist and walker. Patient reports having 1 BM this shift. Up in a chair most of the day. Call light within reach. Family present during rounds. Will continue to monitor. Problem: Patient Centered Care  Goal: Patient preferences are identified and integrated in the patient's plan of care  Description: Interventions:  - What would you like us to know as we care for you? Lives at home with wife. - Provide timely, complete, and accurate information to patient/family  - Incorporate patient and family knowledge, values, beliefs, and cultural backgrounds into the planning and delivery of care  - Encourage patient/family to participate in care and decision-making at the level they choose  - Honor patient and family perspectives and choices  Outcome: Progressing     Problem: CARDIOVASCULAR - ADULT  Goal: Maintains optimal cardiac output and hemodynamic stability  Description: INTERVENTIONS:  - Monitor vital signs, rhythm, and trends  - Monitor for bleeding, hypotension and signs of decreased cardiac output  - Evaluate effectiveness of vasoactive medications to optimize hemodynamic stability  - Monitor arterial and/or venous puncture sites for bleeding and/or hematoma  - Assess quality of pulses, skin color and temperature  - Assess for signs of decreased coronary artery perfusion - ex.  Angina  - Evaluate fluid balance, assess for edema, trend weights  Outcome: Progressing     Problem: RESPIRATORY - ADULT  Goal: Achieves optimal ventilation and oxygenation  Description: INTERVENTIONS:  - Assess for changes in respiratory status  - Assess for changes in mentation and behavior  - Position to facilitate oxygenation and minimize respiratory effort  - Oxygen supplementation based on oxygen saturation or ABGs  - Provide Smoking Cessation handout, if applicable  - Encourage broncho-pulmonary hygiene including cough, deep breathe, Incentive Spirometry  - Assess the need for suctioning and perform as needed  - Assess and instruct to report SOB or any respiratory difficulty  - Respiratory Therapy support as indicated  - Manage/alleviate anxiety  - Monitor for signs/symptoms of CO2 retention  Outcome: Progressing     Problem: METABOLIC/FLUID AND ELECTROLYTES - ADULT  Goal: Electrolytes maintained within normal limits  Description: INTERVENTIONS:  - Monitor labs and rhythm and assess patient for signs and symptoms of electrolyte imbalances  - Administer electrolyte replacement as ordered  - Monitor response to electrolyte replacements, including rhythm and repeat lab results as appropriate  - Fluid restriction as ordered  - Instruct patient on fluid and nutrition restrictions as appropriate  Outcome: Progressing

## 2022-06-19 LAB
ALBUMIN SERPL-MCNC: 1.9 G/DL (ref 3.4–5)
ANION GAP SERPL CALC-SCNC: 12 MMOL/L (ref 0–18)
BASOPHILS # BLD AUTO: 0.01 X10(3) UL (ref 0–0.2)
BASOPHILS NFR BLD AUTO: 0.1 %
BUN BLD-MCNC: 94 MG/DL (ref 7–18)
BUN/CREAT SERPL: 11.2 (ref 10–20)
CALCIUM BLD-MCNC: 5.2 MG/DL (ref 8.5–10.1)
CHLORIDE SERPL-SCNC: 114 MMOL/L (ref 98–112)
CO2 SERPL-SCNC: 17 MMOL/L (ref 21–32)
CREAT BLD-MCNC: 8.38 MG/DL
DEPRECATED RDW RBC AUTO: 49.1 FL (ref 35.1–46.3)
EOSINOPHIL # BLD AUTO: 0 X10(3) UL (ref 0–0.7)
EOSINOPHIL NFR BLD AUTO: 0 %
ERYTHROCYTE [DISTWIDTH] IN BLOOD BY AUTOMATED COUNT: 14.6 % (ref 11–15)
GLUCOSE BLD-MCNC: 125 MG/DL (ref 70–99)
HCT VFR BLD AUTO: 23.5 %
HGB BLD-MCNC: 7.5 G/DL
IMM GRANULOCYTES # BLD AUTO: 0.04 X10(3) UL (ref 0–1)
IMM GRANULOCYTES NFR BLD: 0.5 %
LYMPHOCYTES # BLD AUTO: 0.2 X10(3) UL (ref 1–4)
LYMPHOCYTES NFR BLD AUTO: 2.6 %
MAGNESIUM SERPL-MCNC: 1.6 MG/DL (ref 1.6–2.6)
MCH RBC QN AUTO: 29.8 PG (ref 26–34)
MCHC RBC AUTO-ENTMCNC: 31.9 G/DL (ref 31–37)
MCV RBC AUTO: 93.3 FL
MONOCYTES # BLD AUTO: 0.17 X10(3) UL (ref 0.1–1)
MONOCYTES NFR BLD AUTO: 2.2 %
NEUTROPHILS # BLD AUTO: 7.18 X10 (3) UL (ref 1.5–7.7)
NEUTROPHILS # BLD AUTO: 7.18 X10(3) UL (ref 1.5–7.7)
NEUTROPHILS NFR BLD AUTO: 94.6 %
OSMOLALITY SERPL CALC.SUM OF ELEC: 327 MOSM/KG (ref 275–295)
PHOSPHATE SERPL-MCNC: 5 MG/DL (ref 2.5–4.9)
PLATELET # BLD AUTO: 202 10(3)UL (ref 150–450)
POTASSIUM SERPL-SCNC: 3.8 MMOL/L (ref 3.5–5.1)
RBC # BLD AUTO: 2.52 X10(6)UL
SODIUM SERPL-SCNC: 143 MMOL/L (ref 136–145)
WBC # BLD AUTO: 7.6 X10(3) UL (ref 4–11)

## 2022-06-19 PROCEDURE — 99233 SBSQ HOSP IP/OBS HIGH 50: CPT | Performed by: INTERNAL MEDICINE

## 2022-06-19 PROCEDURE — 99233 SBSQ HOSP IP/OBS HIGH 50: CPT | Performed by: HOSPITALIST

## 2022-06-19 RX ORDER — CALCIUM GLUCONATE 20 MG/ML
2 INJECTION, SOLUTION INTRAVENOUS ONCE
Status: COMPLETED | OUTPATIENT
Start: 2022-06-19 | End: 2022-06-19

## 2022-06-19 RX ORDER — POTASSIUM CHLORIDE 20 MEQ/1
20 TABLET, EXTENDED RELEASE ORAL ONCE
Status: COMPLETED | OUTPATIENT
Start: 2022-06-19 | End: 2022-06-19

## 2022-06-19 RX ORDER — MAGNESIUM SULFATE HEPTAHYDRATE 40 MG/ML
2 INJECTION, SOLUTION INTRAVENOUS ONCE
Status: COMPLETED | OUTPATIENT
Start: 2022-06-19 | End: 2022-06-19

## 2022-06-19 RX ORDER — CALCIUM GLUCONATE 94 MG/ML
3 INJECTION, SOLUTION INTRAVENOUS ONCE
Status: DISCONTINUED | OUTPATIENT
Start: 2022-06-19 | End: 2022-06-19

## 2022-06-19 RX ORDER — CALCIUM GLUCONATE 10 MG/ML
1 INJECTION, SOLUTION INTRAVENOUS ONCE
Status: COMPLETED | OUTPATIENT
Start: 2022-06-19 | End: 2022-06-19

## 2022-06-19 NOTE — PLAN OF CARE
Patient A&O X4 throughout shift. Patient wore CPAP from 2300 - 0600. Patient able to ambulate in room with walker and standby assist. AM labs resulted in calcium of 5.2, Dr. Erlin Srivastava notified, no new orders received. Safety measures maintained, frequent nursing rounds preformed. Problem: Patient Centered Care  Goal: Patient preferences are identified and integrated in the patient's plan of care  Description: Interventions:  - What would you like us to know as we care for you? Hx of lung CA- currently stable per last PET scan. Lives at home with wife.   - Provide timely, complete, and accurate information to patient/family  - Incorporate patient and family knowledge, values, beliefs, and cultural backgrounds into the planning and delivery of care  - Encourage patient/family to participate in care and decision-making at the level they choose  - Honor patient and family perspectives and choices  Outcome: Progressing     Problem: Patient/Family Goals  Goal: Patient/Family Long Term Goal  Description: Patient's Long Term Goal: to return home    Interventions:  - treat OVI,- IV fluids, monitor labs, treat hyperkalemia  -supplemental O2/ pulm eval  -monitor CXR    - See additional Care Plan goals for specific interventions  Outcome: Progressing  Goal: Patient/Family Short Term Goal  Description: Patient's Short Term Goal: improved breathing    Interventions:   - supplemental O2/bipap  -monitor CXR  -monitor labs    - See additional Care Plan goals for specific interventions  Outcome: Progressing     Problem: CARDIOVASCULAR - ADULT  Goal: Maintains optimal cardiac output and hemodynamic stability  Description: INTERVENTIONS:  - Monitor vital signs, rhythm, and trends  - Monitor for bleeding, hypotension and signs of decreased cardiac output  - Evaluate effectiveness of vasoactive medications to optimize hemodynamic stability  - Monitor arterial and/or venous puncture sites for bleeding and/or hematoma  - Assess quality of pulses, skin color and temperature  - Assess for signs of decreased coronary artery perfusion - ex.  Angina  - Evaluate fluid balance, assess for edema, trend weights  Outcome: Progressing     Problem: RESPIRATORY - ADULT  Goal: Achieves optimal ventilation and oxygenation  Description: INTERVENTIONS:  - Assess for changes in respiratory status  - Assess for changes in mentation and behavior  - Position to facilitate oxygenation and minimize respiratory effort  - Oxygen supplementation based on oxygen saturation or ABGs  - Provide Smoking Cessation handout, if applicable  - Encourage broncho-pulmonary hygiene including cough, deep breathe, Incentive Spirometry  - Assess the need for suctioning and perform as needed  - Assess and instruct to report SOB or any respiratory difficulty  - Respiratory Therapy support as indicated  - Manage/alleviate anxiety  - Monitor for signs/symptoms of CO2 retention  Outcome: Progressing     Problem: METABOLIC/FLUID AND ELECTROLYTES - ADULT  Goal: Electrolytes maintained within normal limits  Description: INTERVENTIONS:  - Monitor labs and rhythm and assess patient for signs and symptoms of electrolyte imbalances  - Administer electrolyte replacement as ordered  - Monitor response to electrolyte replacements, including rhythm and repeat lab results as appropriate  - Fluid restriction as ordered  - Instruct patient on fluid and nutrition restrictions as appropriate  Outcome: Progressing  Goal: Hemodynamic stability and optimal renal function maintained  Description: INTERVENTIONS:  - Monitor labs and assess for signs and symptoms of volume excess or deficit  - Monitor intake, output and patient weight  - Monitor urine specific gravity, serum osmolarity and serum sodium as indicated or ordered  - Monitor response to interventions for patient's volume status, including labs, urine output, blood pressure (other measures as available)  - Encourage oral intake as appropriate  - Instruct patient on fluid and nutrition restrictions as appropriate  Outcome: Progressing     Problem: SKIN/TISSUE INTEGRITY - ADULT  Goal: Skin integrity remains intact  Description: INTERVENTIONS  - Assess and document risk factors for pressure ulcer development  - Assess and document skin integrity  - Monitor for areas of redness and/or skin breakdown  - Initiate interventions, skin care algorithm/standards of care as needed  Outcome: Progressing

## 2022-06-20 PROBLEM — N18.30 STAGE 3 CHRONIC KIDNEY DISEASE (HCC): Status: ACTIVE | Noted: 2022-06-08

## 2022-06-20 LAB
ALBUMIN SERPL-MCNC: 2.2 G/DL (ref 3.4–5)
ALBUMIN SERPL-MCNC: 2.2 G/DL (ref 3.4–5)
ALP LIVER SERPL-CCNC: 53 U/L
ALT SERPL-CCNC: 24 U/L
ANION GAP SERPL CALC-SCNC: 12 MMOL/L (ref 0–18)
AST SERPL-CCNC: 21 U/L (ref 15–37)
BASOPHILS # BLD AUTO: 0 X10(3) UL (ref 0–0.2)
BASOPHILS NFR BLD AUTO: 0 %
BILIRUB DIRECT SERPL-MCNC: <0.1 MG/DL (ref 0–0.2)
BILIRUB SERPL-MCNC: 0.3 MG/DL (ref 0.1–2)
BUN BLD-MCNC: 108 MG/DL (ref 7–18)
BUN/CREAT SERPL: 12.4 (ref 10–20)
CALCIUM BLD-MCNC: 6.4 MG/DL (ref 8.5–10.1)
CHLORIDE SERPL-SCNC: 109 MMOL/L (ref 98–112)
CO2 SERPL-SCNC: 19 MMOL/L (ref 21–32)
CREAT BLD-MCNC: 8.73 MG/DL
DEPRECATED RDW RBC AUTO: 50.4 FL (ref 35.1–46.3)
EOSINOPHIL # BLD AUTO: 0 X10(3) UL (ref 0–0.7)
EOSINOPHIL NFR BLD AUTO: 0 %
ERYTHROCYTE [DISTWIDTH] IN BLOOD BY AUTOMATED COUNT: 14.7 % (ref 11–15)
GLUCOSE BLD-MCNC: 117 MG/DL (ref 70–99)
HCT VFR BLD AUTO: 25.8 %
HGB BLD-MCNC: 8.1 G/DL
IMM GRANULOCYTES # BLD AUTO: 0.04 X10(3) UL (ref 0–1)
IMM GRANULOCYTES NFR BLD: 0.5 %
LYMPHOCYTES # BLD AUTO: 0.25 X10(3) UL (ref 1–4)
LYMPHOCYTES NFR BLD AUTO: 3.1 %
MAGNESIUM SERPL-MCNC: 2.1 MG/DL (ref 1.6–2.6)
MCH RBC QN AUTO: 29.6 PG (ref 26–34)
MCHC RBC AUTO-ENTMCNC: 31.4 G/DL (ref 31–37)
MCV RBC AUTO: 94.2 FL
MONOCYTES # BLD AUTO: 0.28 X10(3) UL (ref 0.1–1)
MONOCYTES NFR BLD AUTO: 3.4 %
NEUTROPHILS # BLD AUTO: 7.58 X10 (3) UL (ref 1.5–7.7)
NEUTROPHILS # BLD AUTO: 7.58 X10(3) UL (ref 1.5–7.7)
NEUTROPHILS NFR BLD AUTO: 93 %
OSMOLALITY SERPL CALC.SUM OF ELEC: 325 MOSM/KG (ref 275–295)
PHOSPHATE SERPL-MCNC: 5.3 MG/DL (ref 2.5–4.9)
PLATELET # BLD AUTO: 246 10(3)UL (ref 150–450)
POTASSIUM SERPL-SCNC: 4.3 MMOL/L (ref 3.5–5.1)
PROT SERPL-MCNC: 5.7 G/DL (ref 6.4–8.2)
PTH-INTACT SERPL-MCNC: 642.1 PG/ML (ref 18.5–88)
RBC # BLD AUTO: 2.74 X10(6)UL
SODIUM SERPL-SCNC: 140 MMOL/L (ref 136–145)
VIT D+METAB SERPL-MCNC: 25.9 NG/ML (ref 30–100)
WBC # BLD AUTO: 8.2 X10(3) UL (ref 4–11)

## 2022-06-20 PROCEDURE — 99233 SBSQ HOSP IP/OBS HIGH 50: CPT | Performed by: INTERNAL MEDICINE

## 2022-06-20 PROCEDURE — 99233 SBSQ HOSP IP/OBS HIGH 50: CPT | Performed by: HOSPITALIST

## 2022-06-20 RX ORDER — IPRATROPIUM BROMIDE AND ALBUTEROL SULFATE 2.5; .5 MG/3ML; MG/3ML
3 SOLUTION RESPIRATORY (INHALATION)
Status: DISCONTINUED | OUTPATIENT
Start: 2022-06-21 | End: 2022-06-23

## 2022-06-20 RX ORDER — AMLODIPINE BESYLATE 5 MG/1
5 TABLET ORAL DAILY
Status: DISCONTINUED | OUTPATIENT
Start: 2022-06-20 | End: 2022-06-23

## 2022-06-20 NOTE — PLAN OF CARE
Patient wore CPAP overnight from 5235-8190. Patient now on HFNC at 3L. Patient up with walker and standby assist. Frequent nursing rounds preformed, safety measures maintained. Patient remains in bed, locked, in the lowest position with the call light within reach and bed alarm on. Problem: Patient Centered Care  Goal: Patient preferences are identified and integrated in the patient's plan of care  Description: Interventions:  - What would you like us to know as we care for you? Hx of lung CA- currently stable per last PET scan. Lives at home with wife.   - Provide timely, complete, and accurate information to patient/family  - Incorporate patient and family knowledge, values, beliefs, and cultural backgrounds into the planning and delivery of care  - Encourage patient/family to participate in care and decision-making at the level they choose  - Honor patient and family perspectives and choices  Outcome: Progressing     Problem: Patient/Family Goals  Goal: Patient/Family Long Term Goal  Description: Patient's Long Term Goal: to return home    Interventions:  - treat OVI,- IV fluids, monitor labs, treat hyperkalemia  -supplemental O2/ pulm eval  -monitor CXR    - See additional Care Plan goals for specific interventions  Outcome: Progressing  Goal: Patient/Family Short Term Goal  Description: Patient's Short Term Goal: improved breathing    Interventions:   - supplemental O2/bipap  -monitor CXR  -monitor labs    - See additional Care Plan goals for specific interventions  Outcome: Progressing     Problem: CARDIOVASCULAR - ADULT  Goal: Maintains optimal cardiac output and hemodynamic stability  Description: INTERVENTIONS:  - Monitor vital signs, rhythm, and trends  - Monitor for bleeding, hypotension and signs of decreased cardiac output  - Evaluate effectiveness of vasoactive medications to optimize hemodynamic stability  - Monitor arterial and/or venous puncture sites for bleeding and/or hematoma  - Assess quality of pulses, skin color and temperature  - Assess for signs of decreased coronary artery perfusion - ex.  Angina  - Evaluate fluid balance, assess for edema, trend weights  Outcome: Progressing     Problem: RESPIRATORY - ADULT  Goal: Achieves optimal ventilation and oxygenation  Description: INTERVENTIONS:  - Assess for changes in respiratory status  - Assess for changes in mentation and behavior  - Position to facilitate oxygenation and minimize respiratory effort  - Oxygen supplementation based on oxygen saturation or ABGs  - Provide Smoking Cessation handout, if applicable  - Encourage broncho-pulmonary hygiene including cough, deep breathe, Incentive Spirometry  - Assess the need for suctioning and perform as needed  - Assess and instruct to report SOB or any respiratory difficulty  - Respiratory Therapy support as indicated  - Manage/alleviate anxiety  - Monitor for signs/symptoms of CO2 retention  Outcome: Progressing     Problem: METABOLIC/FLUID AND ELECTROLYTES - ADULT  Goal: Electrolytes maintained within normal limits  Description: INTERVENTIONS:  - Monitor labs and rhythm and assess patient for signs and symptoms of electrolyte imbalances  - Administer electrolyte replacement as ordered  - Monitor response to electrolyte replacements, including rhythm and repeat lab results as appropriate  - Fluid restriction as ordered  - Instruct patient on fluid and nutrition restrictions as appropriate  Outcome: Progressing  Goal: Hemodynamic stability and optimal renal function maintained  Description: INTERVENTIONS:  - Monitor labs and assess for signs and symptoms of volume excess or deficit  - Monitor intake, output and patient weight  - Monitor urine specific gravity, serum osmolarity and serum sodium as indicated or ordered  - Monitor response to interventions for patient's volume status, including labs, urine output, blood pressure (other measures as available)  - Encourage oral intake as appropriate  - Instruct patient on fluid and nutrition restrictions as appropriate  Outcome: Progressing     Problem: SKIN/TISSUE INTEGRITY - ADULT  Goal: Skin integrity remains intact  Description: INTERVENTIONS  - Assess and document risk factors for pressure ulcer development  - Assess and document skin integrity  - Monitor for areas of redness and/or skin breakdown  - Initiate interventions, skin care algorithm/standards of care as needed  Outcome: Progressing

## 2022-06-20 NOTE — PROGRESS NOTES
[facility] Note: Route Optimization for Pantoprazole (PROTONIX)    Patient is currently on Pantoprazole (PROTONIX) 40 mg IV every 24 hours. The patient meets the criteria to convert to the oral equivalent as established by the IV to Oral conversion protocol approved by the P&T committee. Medication was changed from IV formulation to Pantoprazole (PROTONIX) 40 mg PO every 24 hours per protocol.       Leighton Gonzalez PharmD  6/20/2022,  8:29 AM

## 2022-06-20 NOTE — PLAN OF CARE
Patient in chair most of the da. Continues to intermittently cough up pink tinged sputum, MD aware. Possible kidney biopsy in the am. Denies pain. Restarted cardiac meds. Vital signs remain stable. Will continue to monitor. Problem: Patient Centered Care  Goal: Patient preferences are identified and integrated in the patient's plan of care  Description: Interventions:  - What would you like us to know as we care for you? Hx of lung CA- currently stable per last PET scan. Lives at home with wife.   - Provide timely, complete, and accurate information to patient/family  - Incorporate patient and family knowledge, values, beliefs, and cultural backgrounds into the planning and delivery of care  - Encourage patient/family to participate in care and decision-making at the level they choose  - Honor patient and family perspectives and choices  Outcome: Progressing     Problem: Patient/Family Goals  Goal: Patient/Family Long Term Goal  Description: Patient's Long Term Goal: to return home    Interventions:  - treat OVI,- IV fluids, monitor labs, treat hyperkalemia  -supplemental O2/ pulm eval  -monitor CXR    - See additional Care Plan goals for specific interventions  Outcome: Progressing  Goal: Patient/Family Short Term Goal  Description: Patient's Short Term Goal: improved breathing    Interventions:   - supplemental O2/bipap  -monitor CXR  -monitor labs    - See additional Care Plan goals for specific interventions  Outcome: Progressing     Problem: CARDIOVASCULAR - ADULT  Goal: Maintains optimal cardiac output and hemodynamic stability  Description: INTERVENTIONS:  - Monitor vital signs, rhythm, and trends  - Monitor for bleeding, hypotension and signs of decreased cardiac output  - Evaluate effectiveness of vasoactive medications to optimize hemodynamic stability  - Monitor arterial and/or venous puncture sites for bleeding and/or hematoma  - Assess quality of pulses, skin color and temperature  - Assess for signs of decreased coronary artery perfusion - ex.  Angina  - Evaluate fluid balance, assess for edema, trend weights  Outcome: Progressing     Problem: RESPIRATORY - ADULT  Goal: Achieves optimal ventilation and oxygenation  Description: INTERVENTIONS:  - Assess for changes in respiratory status  - Assess for changes in mentation and behavior  - Position to facilitate oxygenation and minimize respiratory effort  - Oxygen supplementation based on oxygen saturation or ABGs  - Provide Smoking Cessation handout, if applicable  - Encourage broncho-pulmonary hygiene including cough, deep breathe, Incentive Spirometry  - Assess the need for suctioning and perform as needed  - Assess and instruct to report SOB or any respiratory difficulty  - Respiratory Therapy support as indicated  - Manage/alleviate anxiety  - Monitor for signs/symptoms of CO2 retention  Outcome: Progressing     Problem: METABOLIC/FLUID AND ELECTROLYTES - ADULT  Goal: Electrolytes maintained within normal limits  Description: INTERVENTIONS:  - Monitor labs and rhythm and assess patient for signs and symptoms of electrolyte imbalances  - Administer electrolyte replacement as ordered  - Monitor response to electrolyte replacements, including rhythm and repeat lab results as appropriate  - Fluid restriction as ordered  - Instruct patient on fluid and nutrition restrictions as appropriate  Outcome: Progressing  Goal: Hemodynamic stability and optimal renal function maintained  Description: INTERVENTIONS:  - Monitor labs and assess for signs and symptoms of volume excess or deficit  - Monitor intake, output and patient weight  - Monitor urine specific gravity, serum osmolarity and serum sodium as indicated or ordered  - Monitor response to interventions for patient's volume status, including labs, urine output, blood pressure (other measures as available)  - Encourage oral intake as appropriate  - Instruct patient on fluid and nutrition restrictions as appropriate  Outcome: Progressing     Problem: SKIN/TISSUE INTEGRITY - ADULT  Goal: Skin integrity remains intact  Description: INTERVENTIONS  - Assess and document risk factors for pressure ulcer development  - Assess and document skin integrity  - Monitor for areas of redness and/or skin breakdown  - Initiate interventions, skin care algorithm/standards of care as needed  Outcome: Progressing

## 2022-06-21 LAB
ANION GAP SERPL CALC-SCNC: 15 MMOL/L (ref 0–18)
BASOPHILS # BLD AUTO: 0.01 X10(3) UL (ref 0–0.2)
BASOPHILS NFR BLD AUTO: 0.1 %
BUN BLD-MCNC: 128 MG/DL (ref 7–18)
BUN/CREAT SERPL: 15.9 (ref 10–20)
CALCIUM BLD-MCNC: 5.9 MG/DL (ref 8.5–10.1)
CHLORIDE SERPL-SCNC: 108 MMOL/L (ref 98–112)
CO2 SERPL-SCNC: 17 MMOL/L (ref 21–32)
CREAT BLD-MCNC: 8.07 MG/DL
DEPRECATED RDW RBC AUTO: 48.5 FL (ref 35.1–46.3)
EOSINOPHIL # BLD AUTO: 0 X10(3) UL (ref 0–0.7)
EOSINOPHIL NFR BLD AUTO: 0 %
ERYTHROCYTE [DISTWIDTH] IN BLOOD BY AUTOMATED COUNT: 14.6 % (ref 11–15)
GLUCOSE BLD-MCNC: 120 MG/DL (ref 70–99)
HCT VFR BLD AUTO: 24.7 %
HGB BLD-MCNC: 8.1 G/DL
IMM GRANULOCYTES # BLD AUTO: 0.04 X10(3) UL (ref 0–1)
IMM GRANULOCYTES NFR BLD: 0.5 %
LYMPHOCYTES # BLD AUTO: 0.23 X10(3) UL (ref 1–4)
LYMPHOCYTES NFR BLD AUTO: 2.8 %
MCH RBC QN AUTO: 30 PG (ref 26–34)
MCHC RBC AUTO-ENTMCNC: 32.8 G/DL (ref 31–37)
MCV RBC AUTO: 91.5 FL
MONOCYTES # BLD AUTO: 0.29 X10(3) UL (ref 0.1–1)
MONOCYTES NFR BLD AUTO: 3.6 %
NEUTROPHILS # BLD AUTO: 7.52 X10 (3) UL (ref 1.5–7.7)
NEUTROPHILS # BLD AUTO: 7.52 X10(3) UL (ref 1.5–7.7)
NEUTROPHILS NFR BLD AUTO: 93 %
OSMOLALITY SERPL CALC.SUM OF ELEC: 332 MOSM/KG (ref 275–295)
PLATELET # BLD AUTO: 234 10(3)UL (ref 150–450)
POTASSIUM SERPL-SCNC: 4.6 MMOL/L (ref 3.5–5.1)
RBC # BLD AUTO: 2.7 X10(6)UL
SODIUM SERPL-SCNC: 140 MMOL/L (ref 136–145)
WBC # BLD AUTO: 8.1 X10(3) UL (ref 4–11)

## 2022-06-21 PROCEDURE — 99232 SBSQ HOSP IP/OBS MODERATE 35: CPT | Performed by: INTERNAL MEDICINE

## 2022-06-21 PROCEDURE — 99233 SBSQ HOSP IP/OBS HIGH 50: CPT | Performed by: HOSPITALIST

## 2022-06-21 PROCEDURE — 99233 SBSQ HOSP IP/OBS HIGH 50: CPT | Performed by: INTERNAL MEDICINE

## 2022-06-21 NOTE — PLAN OF CARE
Pt alert resting in chair on 3L HFNC, other VSS. Pt had 2 Bm's. Velazquez in place with adequate output. Pt tolerating diet. Ambulating in room and hallways as tolerated. Transfer orders obtained to move out of CCU. Family visited and updated. Problem: Patient Centered Care  Goal: Patient preferences are identified and integrated in the patient's plan of care  Description: Interventions:  - What would you like us to know as we care for you? Hx of lung CA- currently stable per last PET scan. Lives at home with wife.   - Provide timely, complete, and accurate information to patient/family  - Incorporate patient and family knowledge, values, beliefs, and cultural backgrounds into the planning and delivery of care  - Encourage patient/family to participate in care and decision-making at the level they choose  - Honor patient and family perspectives and choices  Outcome: Progressing     Problem: Patient/Family Goals  Goal: Patient/Family Long Term Goal  Description: Patient's Long Term Goal: to return home    Interventions:  - treat OVI,- IV fluids, monitor labs, treat hyperkalemia  -supplemental O2/ pulm eval  -monitor CXR    - See additional Care Plan goals for specific interventions  Outcome: Progressing  Goal: Patient/Family Short Term Goal  Description: Patient's Short Term Goal: improved breathing    Interventions:   - supplemental O2/bipap  -monitor CXR  -monitor labs    - See additional Care Plan goals for specific interventions  Outcome: Progressing     Problem: CARDIOVASCULAR - ADULT  Goal: Maintains optimal cardiac output and hemodynamic stability  Description: INTERVENTIONS:  - Monitor vital signs, rhythm, and trends  - Monitor for bleeding, hypotension and signs of decreased cardiac output  - Evaluate effectiveness of vasoactive medications to optimize hemodynamic stability  - Monitor arterial and/or venous puncture sites for bleeding and/or hematoma  - Assess quality of pulses, skin color and temperature  - Assess for signs of decreased coronary artery perfusion - ex.  Angina  - Evaluate fluid balance, assess for edema, trend weights  Outcome: Progressing     Problem: RESPIRATORY - ADULT  Goal: Achieves optimal ventilation and oxygenation  Description: INTERVENTIONS:  - Assess for changes in respiratory status  - Assess for changes in mentation and behavior  - Position to facilitate oxygenation and minimize respiratory effort  - Oxygen supplementation based on oxygen saturation or ABGs  - Provide Smoking Cessation handout, if applicable  - Encourage broncho-pulmonary hygiene including cough, deep breathe, Incentive Spirometry  - Assess the need for suctioning and perform as needed  - Assess and instruct to report SOB or any respiratory difficulty  - Respiratory Therapy support as indicated  - Manage/alleviate anxiety  - Monitor for signs/symptoms of CO2 retention  Outcome: Progressing     Problem: METABOLIC/FLUID AND ELECTROLYTES - ADULT  Goal: Electrolytes maintained within normal limits  Description: INTERVENTIONS:  - Monitor labs and rhythm and assess patient for signs and symptoms of electrolyte imbalances  - Administer electrolyte replacement as ordered  - Monitor response to electrolyte replacements, including rhythm and repeat lab results as appropriate  - Fluid restriction as ordered  - Instruct patient on fluid and nutrition restrictions as appropriate  Outcome: Progressing  Goal: Hemodynamic stability and optimal renal function maintained  Description: INTERVENTIONS:  - Monitor labs and assess for signs and symptoms of volume excess or deficit  - Monitor intake, output and patient weight  - Monitor urine specific gravity, serum osmolarity and serum sodium as indicated or ordered  - Monitor response to interventions for patient's volume status, including labs, urine output, blood pressure (other measures as available)  - Encourage oral intake as appropriate  - Instruct patient on fluid and nutrition restrictions as appropriate  Outcome: Progressing     Problem: SKIN/TISSUE INTEGRITY - ADULT  Goal: Skin integrity remains intact  Description: INTERVENTIONS  - Assess and document risk factors for pressure ulcer development  - Assess and document skin integrity  - Monitor for areas of redness and/or skin breakdown  - Initiate interventions, skin care algorithm/standards of care as needed  Outcome: Progressing

## 2022-06-22 LAB
ALBUMIN SERPL-MCNC: 2.4 G/DL (ref 3.4–5)
ANION GAP SERPL CALC-SCNC: 11 MMOL/L (ref 0–18)
BASOPHILS # BLD AUTO: 0 X10(3) UL (ref 0–0.2)
BASOPHILS NFR BLD AUTO: 0 %
BUN BLD-MCNC: 122 MG/DL (ref 7–18)
BUN/CREAT SERPL: 16.6 (ref 10–20)
CALCIUM BLD-MCNC: 5.7 MG/DL (ref 8.5–10.1)
CHLORIDE SERPL-SCNC: 109 MMOL/L (ref 98–112)
CO2 SERPL-SCNC: 18 MMOL/L (ref 21–32)
CREAT BLD-MCNC: 7.36 MG/DL
DEPRECATED RDW RBC AUTO: 48.9 FL (ref 35.1–46.3)
EOSINOPHIL # BLD AUTO: 0 X10(3) UL (ref 0–0.7)
EOSINOPHIL NFR BLD AUTO: 0 %
ERYTHROCYTE [DISTWIDTH] IN BLOOD BY AUTOMATED COUNT: 14.5 % (ref 11–15)
GLUCOSE BLD-MCNC: 102 MG/DL (ref 70–99)
GLUCOSE BLDC GLUCOMTR-MCNC: 125 MG/DL (ref 70–99)
GLUCOSE BLDC GLUCOMTR-MCNC: 173 MG/DL (ref 70–99)
GLUCOSE BLDC GLUCOMTR-MCNC: 98 MG/DL (ref 70–99)
HBV SURFACE AG SER-ACNC: <0.1 [IU]/L
HBV SURFACE AG SERPL QL IA: NONREACTIVE
HCT VFR BLD AUTO: 26.5 %
HCV AB SERPL QL IA: NONREACTIVE
HGB BLD-MCNC: 8.4 G/DL
HIV 1+2 AB+HIV1 P24 AG SERPL QL IA: NONREACTIVE
IMM GRANULOCYTES # BLD AUTO: 0.04 X10(3) UL (ref 0–1)
IMM GRANULOCYTES NFR BLD: 0.5 %
LYMPHOCYTES # BLD AUTO: 0.22 X10(3) UL (ref 1–4)
LYMPHOCYTES NFR BLD AUTO: 2.7 %
MAGNESIUM SERPL-MCNC: 1.7 MG/DL (ref 1.6–2.6)
MCH RBC QN AUTO: 29.5 PG (ref 26–34)
MCHC RBC AUTO-ENTMCNC: 31.7 G/DL (ref 31–37)
MCV RBC AUTO: 93 FL
MONOCYTES # BLD AUTO: 0.4 X10(3) UL (ref 0.1–1)
MONOCYTES NFR BLD AUTO: 4.8 %
NEUTROPHILS # BLD AUTO: 7.63 X10 (3) UL (ref 1.5–7.7)
NEUTROPHILS # BLD AUTO: 7.63 X10(3) UL (ref 1.5–7.7)
NEUTROPHILS NFR BLD AUTO: 92 %
OSMOLALITY SERPL CALC.SUM OF ELEC: 325 MOSM/KG (ref 275–295)
PHOSPHATE SERPL-MCNC: 4.5 MG/DL (ref 2.5–4.9)
PLATELET # BLD AUTO: 221 10(3)UL (ref 150–450)
POTASSIUM SERPL-SCNC: 4.5 MMOL/L (ref 3.5–5.1)
RBC # BLD AUTO: 2.85 X10(6)UL
SODIUM SERPL-SCNC: 138 MMOL/L (ref 136–145)
WBC # BLD AUTO: 8.3 X10(3) UL (ref 4–11)

## 2022-06-22 PROCEDURE — 99233 SBSQ HOSP IP/OBS HIGH 50: CPT | Performed by: HOSPITALIST

## 2022-06-22 PROCEDURE — 99233 SBSQ HOSP IP/OBS HIGH 50: CPT | Performed by: INTERNAL MEDICINE

## 2022-06-22 PROCEDURE — 99232 SBSQ HOSP IP/OBS MODERATE 35: CPT | Performed by: INTERNAL MEDICINE

## 2022-06-22 RX ORDER — CALCITRIOL 0.25 UG/1
0.5 CAPSULE, LIQUID FILLED ORAL DAILY
Status: DISCONTINUED | OUTPATIENT
Start: 2022-06-22 | End: 2022-06-23

## 2022-06-22 RX ORDER — MAGNESIUM OXIDE 400 MG/1
800 TABLET ORAL ONCE
Status: COMPLETED | OUTPATIENT
Start: 2022-06-22 | End: 2022-06-22

## 2022-06-22 RX ORDER — SODIUM CHLORIDE 9 MG/ML
INJECTION INTRAVENOUS
Status: COMPLETED
Start: 2022-06-22 | End: 2022-06-22

## 2022-06-22 NOTE — DIETARY NOTE
Brief Nutrition Note:    Pt admitted for OVI. Pt screened for LOS. Pt is [place]. Fair to good po intake (averaging %) during admission. Tolerating diet. Weight relatively stable x 1 year. Non-pitting BLE and +1 bilateral pedal edema noted. No nutritional risk at this time. F/u per protocol. Please consult nutrition services if earlier intervention is indicated.      Lacie Ram [address], [address], [address] [place] [address]  [id number]

## 2022-06-22 NOTE — CM/SW NOTE
Care Progression Note:  Length of stay: 7  GMLOS: 4.3  Avoidable Delays: Lab/pathology  Code Status: FULL    Acute Medical Issue/Factors:   Acute renal injury ([facility] [address]   Acute hypoxemic resp failure/COPD  Non-small cell lung CA    *Cr 7, slowly improving  *Possible kidney biopsy  *1.5L O2 this AM but now on RA (no home O2)  *On Keytruda for CA    Discharge Barriers: Physical/emotional and/or cognitive functioning   Expected discharge date: TBD   Expected next site of care: Home. Plan: Home w/spouse pending medical clearance. / available for discharge planning.      MICHELLE Freeman    [phone]

## 2022-06-22 NOTE — PLAN OF CARE
Discussed plan of care for day, including all given medications and their indications. Creatinine decreased to 7.36 today. Calcium level critical at 5.7 -- Calcium gluconate IVPB x1 given and Calcitriol started. Velazquez catheter maintained for accurate urine output. Plan to trend blood levels closely. Encouraging increased activity but with periods of rest in between. Tolerating room air since this morning. Comfort measures encouraged to promote restful environment. Problem: Patient Centered Care  Goal: Patient preferences are identified and integrated in the patient's plan of care  Description: Interventions:  - What would you like us to know as we care for you? Hx of lung CA- currently stable per last PET scan. Lives at home with wife.   - Provide timely, complete, and accurate information to patient/family  - Incorporate patient and family knowledge, values, beliefs, and cultural backgrounds into the planning and delivery of care  - Encourage patient/family to participate in care and decision-making at the level they choose  - Honor patient and family perspectives and choices  Outcome: Progressing     Problem: Patient/Family Goals  Goal: Patient/Family Long Term Goal  Description: Patient's Long Term Goal: to return home    Interventions:  - treat OVI,- IV fluids, monitor labs, treat hyperkalemia  -supplemental O2/ pulm eval  -monitor CXR    - See additional Care Plan goals for specific interventions  Outcome: Progressing  Goal: Patient/Family Short Term Goal  Description: Patient's Short Term Goal: improved breathing    Interventions:   - supplemental O2/bipap  -monitor CXR  -monitor labs    - See additional Care Plan goals for specific interventions  Outcome: Progressing     Problem: CARDIOVASCULAR - ADULT  Goal: Maintains optimal cardiac output and hemodynamic stability  Description: INTERVENTIONS:  - Monitor vital signs, rhythm, and trends  - Monitor for bleeding, hypotension and signs of decreased cardiac output  - Evaluate effectiveness of vasoactive medications to optimize hemodynamic stability  - Monitor arterial and/or venous puncture sites for bleeding and/or hematoma  - Assess quality of pulses, skin color and temperature  - Assess for signs of decreased coronary artery perfusion - ex.  Angina  - Evaluate fluid balance, assess for edema, trend weights  Outcome: Progressing     Problem: RESPIRATORY - ADULT  Goal: Achieves optimal ventilation and oxygenation  Description: INTERVENTIONS:  - Assess for changes in respiratory status  - Assess for changes in mentation and behavior  - Position to facilitate oxygenation and minimize respiratory effort  - Oxygen supplementation based on oxygen saturation or ABGs  - Provide Smoking Cessation handout, if applicable  - Encourage broncho-pulmonary hygiene including cough, deep breathe, Incentive Spirometry  - Assess the need for suctioning and perform as needed  - Assess and instruct to report SOB or any respiratory difficulty  - Respiratory Therapy support as indicated  - Manage/alleviate anxiety  - Monitor for signs/symptoms of CO2 retention  Outcome: Progressing     Problem: METABOLIC/FLUID AND ELECTROLYTES - ADULT  Goal: Electrolytes maintained within normal limits  Description: INTERVENTIONS:  - Monitor labs and rhythm and assess patient for signs and symptoms of electrolyte imbalances  - Administer electrolyte replacement as ordered  - Monitor response to electrolyte replacements, including rhythm and repeat lab results as appropriate  - Fluid restriction as ordered  - Instruct patient on fluid and nutrition restrictions as appropriate  Outcome: Progressing  Goal: Hemodynamic stability and optimal renal function maintained  Description: INTERVENTIONS:  - Monitor labs and assess for signs and symptoms of volume excess or deficit  - Monitor intake, output and patient weight  - Monitor urine specific gravity, serum osmolarity and serum sodium as indicated or ordered  - Monitor response to interventions for patient's volume status, including labs, urine output, blood pressure (other measures as available)  - Encourage oral intake as appropriate  - Instruct patient on fluid and nutrition restrictions as appropriate  Outcome: Progressing     Problem: PAIN - ADULT  Goal: Verbalizes/displays adequate comfort level or patient's stated pain goal  Description: INTERVENTIONS:  - Encourage pt to monitor pain and request assistance  - Assess pain using appropriate pain scale  - Administer analgesics based on type and severity of pain and evaluate response  - Implement non-pharmacological measures as appropriate and evaluate response  - Consider cultural and social influences on pain and pain management  - Manage/alleviate anxiety  - Utilize distraction and/or relaxation techniques  - Monitor for opioid side effects  - Notify MD/LIP if interventions unsuccessful or patient reports new pain  - Anticipate increased pain with activity and pre-medicate as appropriate  Outcome: Progressing     Problem: SKIN/TISSUE INTEGRITY - ADULT  Goal: Skin integrity remains intact  Description: INTERVENTIONS  - Assess and document risk factors for pressure ulcer development  - Assess and document skin integrity  - Monitor for areas of redness and/or skin breakdown  - Initiate interventions, skin care algorithm/standards of care as needed  Outcome: Progressing     Problem: RISK FOR INFECTION - ADULT  Goal: Absence of fever/infection during anticipated neutropenic period  Description: INTERVENTIONS  - Monitor WBC  - Administer growth factors as ordered  - Implement neutropenic guidelines  Outcome: Progressing     Problem: SAFETY ADULT - FALL  Goal: Free from fall injury  Description: INTERVENTIONS:  - Assess pt frequently for physical needs  - Identify cognitive and physical deficits and behaviors that affect risk of falls.   - [place] fall precautions as indicated by assessment.  - Educate pt/family on patient safety including physical limitations  - Instruct pt to call for assistance with activity based on assessment  - Modify environment to reduce risk of injury  - Provide assistive devices as appropriate  - Consider OT/PT consult to assist with strengthening/mobility  - Encourage toileting schedule  Outcome: Progressing     Problem: DISCHARGE PLANNING  Goal: Discharge to home or other facility with appropriate resources  Description: INTERVENTIONS:  - Identify barriers to discharge w/pt and caregiver  - Include patient/family/discharge partner in discharge planning  - Arrange for needed discharge resources and transportation as appropriate  - Identify discharge learning needs (meds, wound care, etc)  - Arrange for interpreters to assist at discharge as needed  - Consider post-discharge preferences of patient/family/discharge partner  - Complete POLST form as appropriate  - Assess patient's ability to be responsible for managing their own health  - Refer to Case Management Department for coordinating discharge planning if the patient needs post-hospital services based on physician/LIP order or complex needs related to functional status, cognitive ability or social support system  Outcome: Progressing     All efforts maintained to promote infection prevention during my assessment and care for this patient. Stethoscope cleansed and hand hygiene strictly maintained.

## 2022-06-23 ENCOUNTER — TELEPHONE (OUTPATIENT)
Dept: HEMATOLOGY/ONCOLOGY | Facility: HOSPITAL | Age: 77
End: 2022-06-23

## 2022-06-23 ENCOUNTER — TELEPHONE (OUTPATIENT)
Dept: NEPHROLOGY | Facility: CLINIC | Age: 77
End: 2022-06-23

## 2022-06-23 VITALS
RESPIRATION RATE: 18 BRPM | HEIGHT: 69 IN | WEIGHT: 175.31 LBS | OXYGEN SATURATION: 99 % | BODY MASS INDEX: 25.97 KG/M2 | DIASTOLIC BLOOD PRESSURE: 57 MMHG | TEMPERATURE: 98 F | HEART RATE: 77 BPM | SYSTOLIC BLOOD PRESSURE: 131 MMHG

## 2022-06-23 LAB
ALBUMIN SERPL-MCNC: 2.2 G/DL (ref 3.4–5)
ANION GAP SERPL CALC-SCNC: 13 MMOL/L (ref 0–18)
BASOPHILS # BLD AUTO: 0 X10(3) UL (ref 0–0.2)
BASOPHILS NFR BLD AUTO: 0 %
BUN BLD-MCNC: 132 MG/DL (ref 7–18)
BUN/CREAT SERPL: 19.3 (ref 10–20)
CALCIUM BLD-MCNC: 6.1 MG/DL (ref 8.5–10.1)
CHLORIDE SERPL-SCNC: 109 MMOL/L (ref 98–112)
CO2 SERPL-SCNC: 18 MMOL/L (ref 21–32)
CREAT BLD-MCNC: 6.85 MG/DL
DEPRECATED RDW RBC AUTO: 47.6 FL (ref 35.1–46.3)
EOSINOPHIL # BLD AUTO: 0 X10(3) UL (ref 0–0.7)
EOSINOPHIL NFR BLD AUTO: 0 %
ERYTHROCYTE [DISTWIDTH] IN BLOOD BY AUTOMATED COUNT: 14.5 % (ref 11–15)
GLUCOSE BLD-MCNC: 103 MG/DL (ref 70–99)
HCT VFR BLD AUTO: 24.8 %
HGB BLD-MCNC: 8 G/DL
IMM GRANULOCYTES # BLD AUTO: 0.03 X10(3) UL (ref 0–1)
IMM GRANULOCYTES NFR BLD: 0.4 %
LYMPHOCYTES # BLD AUTO: 0.27 X10(3) UL (ref 1–4)
LYMPHOCYTES NFR BLD AUTO: 3.2 %
MAGNESIUM SERPL-MCNC: 1.8 MG/DL (ref 1.6–2.6)
MCH RBC QN AUTO: 29.3 PG (ref 26–34)
MCHC RBC AUTO-ENTMCNC: 32.3 G/DL (ref 31–37)
MCV RBC AUTO: 90.8 FL
MONOCYTES # BLD AUTO: 0.43 X10(3) UL (ref 0.1–1)
MONOCYTES NFR BLD AUTO: 5.2 %
NEUTROPHILS # BLD AUTO: 7.6 X10 (3) UL (ref 1.5–7.7)
NEUTROPHILS # BLD AUTO: 7.6 X10(3) UL (ref 1.5–7.7)
NEUTROPHILS NFR BLD AUTO: 91.2 %
OSMOLALITY SERPL CALC.SUM OF ELEC: 333 MOSM/KG (ref 275–295)
PHOSPHATE SERPL-MCNC: 4.7 MG/DL (ref 2.5–4.9)
PLATELET # BLD AUTO: 234 10(3)UL (ref 150–450)
POTASSIUM SERPL-SCNC: 5.2 MMOL/L (ref 3.5–5.1)
RBC # BLD AUTO: 2.73 X10(6)UL
SODIUM SERPL-SCNC: 140 MMOL/L (ref 136–145)
WBC # BLD AUTO: 8.3 X10(3) UL (ref 4–11)

## 2022-06-23 PROCEDURE — 99239 HOSP IP/OBS DSCHRG MGMT >30: CPT | Performed by: HOSPITALIST

## 2022-06-23 PROCEDURE — 99233 SBSQ HOSP IP/OBS HIGH 50: CPT | Performed by: INTERNAL MEDICINE

## 2022-06-23 PROCEDURE — 99232 SBSQ HOSP IP/OBS MODERATE 35: CPT | Performed by: INTERNAL MEDICINE

## 2022-06-23 RX ORDER — SODIUM BICARBONATE 650 MG/1
650 TABLET ORAL 2 TIMES DAILY
Qty: 90 TABLET | Refills: 0 | Status: SHIPPED | OUTPATIENT
Start: 2022-06-23

## 2022-06-23 RX ORDER — PREDNISONE 10 MG/1
TABLET ORAL
Qty: 42 TABLET | Refills: 0 | Status: SHIPPED | OUTPATIENT
Start: 2022-06-24 | End: 2022-07-15

## 2022-06-23 RX ORDER — ERGOCALCIFEROL 1.25 MG/1
50000 CAPSULE ORAL ONCE
Status: COMPLETED | OUTPATIENT
Start: 2022-06-23 | End: 2022-06-23

## 2022-06-23 NOTE — TELEPHONE ENCOUNTER
Reviewed labs patient Creatinine elevated- no [facility] tomorrow per Dr. Abebe Garcia - called and spoke with wife will cancel appointment- she is having labs weekly and will add Dr. Abebe Garcia to lab results when Creatinine decreases we will schedule for [facility]. Wife stated understanding.

## 2022-06-23 NOTE — PLAN OF CARE
Problem: Patient Centered Care  Goal: Patient preferences are identified and integrated in the patient's plan of care  Description: Interventions:  - What would you like us to know as we care for you? Hx of lung CA- currently stable per last PET scan. Lives at home with wife. - Provide timely, complete, and accurate information to patient/family  - Incorporate patient and family knowledge, values, beliefs, and cultural backgrounds into the planning and delivery of care  - Encourage patient/family to participate in care and decision-making at the level they choose  - Honor patient and family perspectives and choices  Outcome: Progressing     Problem: Patient/Family Goals  Goal: Patient/Family Long Term Goal  Description: Patient's Long Term Goal: to return home    Interventions:  - treat OVI,- IV fluids, monitor labs, treat hyperkalemia  -supplemental O2/ pulm eval  -monitor CXR    - See additional Care Plan goals for specific interventions  Outcome: Progressing  Goal: Patient/Family Short Term Goal  Description: Patient's Short Term Goal: improved breathing    Interventions:   - supplemental O2/bipap  -monitor CXR  -monitor labs    - See additional Care Plan goals for specific interventions  Outcome: Progressing     Problem: CARDIOVASCULAR - ADULT  Goal: Maintains optimal cardiac output and hemodynamic stability  Description: INTERVENTIONS:  - Monitor vital signs, rhythm, and trends  - Monitor for bleeding, hypotension and signs of decreased cardiac output  - Evaluate effectiveness of vasoactive medications to optimize hemodynamic stability  - Monitor arterial and/or venous puncture sites for bleeding and/or hematoma  - Assess quality of pulses, skin color and temperature  - Assess for signs of decreased coronary artery perfusion - ex.  Angina  - Evaluate fluid balance, assess for edema, trend weights  Outcome: Progressing     Problem: RESPIRATORY - ADULT  Goal: Achieves optimal ventilation and oxygenation  Description: INTERVENTIONS:  - Assess for changes in respiratory status  - Assess for changes in mentation and behavior  - Position to facilitate oxygenation and minimize respiratory effort  - Oxygen supplementation based on oxygen saturation or ABGs  - Provide Smoking Cessation handout, if applicable  - Encourage broncho-pulmonary hygiene including cough, deep breathe, Incentive Spirometry  - Assess the need for suctioning and perform as needed  - Assess and instruct to report SOB or any respiratory difficulty  - Respiratory Therapy support as indicated  - Manage/alleviate anxiety  - Monitor for signs/symptoms of CO2 retention  Outcome: Progressing     Problem: METABOLIC/FLUID AND ELECTROLYTES - ADULT  Goal: Electrolytes maintained within normal limits  Description: INTERVENTIONS:  - Monitor labs and rhythm and assess patient for signs and symptoms of electrolyte imbalances  - Administer electrolyte replacement as ordered  - Monitor response to electrolyte replacements, including rhythm and repeat lab results as appropriate  - Fluid restriction as ordered  - Instruct patient on fluid and nutrition restrictions as appropriate  Outcome: Progressing  Goal: Hemodynamic stability and optimal renal function maintained  Description: INTERVENTIONS:  - Monitor labs and assess for signs and symptoms of volume excess or deficit  - Monitor intake, output and patient weight  - Monitor urine specific gravity, serum osmolarity and serum sodium as indicated or ordered  - Monitor response to interventions for patient's volume status, including labs, urine output, blood pressure (other measures as available)  - Encourage oral intake as appropriate  - Instruct patient on fluid and nutrition restrictions as appropriate  Outcome: Progressing     Problem: SKIN/TISSUE INTEGRITY - ADULT  Goal: Skin integrity remains intact  Description: INTERVENTIONS  - Assess and document risk factors for pressure ulcer development  - Assess and document skin integrity  - Monitor for areas of redness and/or skin breakdown  - Initiate interventions, skin care algorithm/standards of care as needed  Outcome: Progressing     Problem: PAIN - ADULT  Goal: Verbalizes/displays adequate comfort level or patient's stated pain goal  Description: INTERVENTIONS:  - Encourage pt to monitor pain and request assistance  - Assess pain using appropriate pain scale  - Administer analgesics based on type and severity of pain and evaluate response  - Implement non-pharmacological measures as appropriate and evaluate response  - Consider cultural and social influences on pain and pain management  - Manage/alleviate anxiety  - Utilize distraction and/or relaxation techniques  - Monitor for opioid side effects  - Notify MD/LIP if interventions unsuccessful or patient reports new pain  - Anticipate increased pain with activity and pre-medicate as appropriate  Outcome: Progressing     Problem: RISK FOR INFECTION - ADULT  Goal: Absence of fever/infection during anticipated neutropenic period  Description: INTERVENTIONS  - Monitor WBC  - Administer growth factors as ordered  - Implement neutropenic guidelines  Outcome: Progressing     Problem: SAFETY ADULT - FALL  Goal: Free from fall injury  Description: INTERVENTIONS:  - Assess pt frequently for physical needs  - Identify cognitive and physical deficits and behaviors that affect risk of falls.   - [place] fall precautions as indicated by assessment.  - Educate pt/family on patient safety including physical limitations  - Instruct pt to call for assistance with activity based on assessment  - Modify environment to reduce risk of injury  - Provide assistive devices as appropriate  - Consider OT/PT consult to assist with strengthening/mobility  - Encourage toileting schedule  Outcome: Progressing     Problem: DISCHARGE PLANNING  Goal: Discharge to home or other facility with appropriate resources  Description: INTERVENTIONS:  - Identify barriers to discharge w/pt and caregiver  - Include patient/family/discharge partner in discharge planning  - Arrange for needed discharge resources and transportation as appropriate  - Identify discharge learning needs (meds, wound care, etc)  - Arrange for interpreters to assist at discharge as needed  - Consider post-discharge preferences of patient/family/discharge partner  - Complete POLST form as appropriate  - Assess patient's ability to be responsible for managing their own health  - Refer to Case Management Department for coordinating discharge planning if the patient needs post-hospital services based on physician/LIP order or complex needs related to functional status, cognitive ability or social support system  Outcome: Progressing     Patient is alert and oriented x 4. On room air. 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 On heparin. Tolerating renal diet. Had a BM last night. Coude landry in place. Denies nausea and pain. Right chest port - saline locked. Up with standby assist with walker. Fall precautions in place. Call light within reach. Plan for discharge home once medically cleared.

## 2022-06-23 NOTE — TELEPHONE ENCOUNTER
Please schedule patient for [facility] hospital visit on Tuesday 6/28 at 12:40 pm    Patient is aware of the appointment

## 2022-06-23 NOTE — PLAN OF CARE
Toribio Zhou is alert/oriented. Vitals stable. Tolerating diet. Ambulating with walker. Denies pain. Heparin for DVT prophylaxis. Voiding adequately since landry removal. Port deaccessed. Discharge summary given and explained. All medication changes and follow up information reviewed with pt and wife at bedside. All questions answered at this time. Problem: Patient Centered Care  Goal: Patient preferences are identified and integrated in the patient's plan of care  Description: Interventions:  - What would you like us to know as we care for you? Hx of lung CA- currently stable per last PET scan. Lives at home with wife.   - Provide timely, complete, and accurate information to patient/family  - Incorporate patient and family knowledge, values, beliefs, and cultural backgrounds into the planning and delivery of care  - Encourage patient/family to participate in care and decision-making at the level they choose  - Honor patient and family perspectives and choices  Outcome: Adequate for Discharge     Problem: Patient/Family Goals  Goal: Patient/Family Long Term Goal  Description: Patient's Long Term Goal: to return home    Interventions:  - treat OVI,- IV fluids, monitor labs, treat hyperkalemia  -supplemental O2/ pulm eval  -monitor CXR    - See additional Care Plan goals for specific interventions  Outcome: Adequate for Discharge  Goal: Patient/Family Short Term Goal  Description: Patient's Short Term Goal: improved breathing    Interventions:   - supplemental O2/bipap  -monitor CXR  -monitor labs    - See additional Care Plan goals for specific interventions  Outcome: Adequate for Discharge     Problem: CARDIOVASCULAR - ADULT  Goal: Maintains optimal cardiac output and hemodynamic stability  Description: INTERVENTIONS:  - Monitor vital signs, rhythm, and trends  - Monitor for bleeding, hypotension and signs of decreased cardiac output  - Evaluate effectiveness of vasoactive medications to optimize hemodynamic stability  - Monitor arterial and/or venous puncture sites for bleeding and/or hematoma  - Assess quality of pulses, skin color and temperature  - Assess for signs of decreased coronary artery perfusion - ex.  Angina  - Evaluate fluid balance, assess for edema, trend weights  Outcome: Adequate for Discharge     Problem: RESPIRATORY - ADULT  Goal: Achieves optimal ventilation and oxygenation  Description: INTERVENTIONS:  - Assess for changes in respiratory status  - Assess for changes in mentation and behavior  - Position to facilitate oxygenation and minimize respiratory effort  - Oxygen supplementation based on oxygen saturation or ABGs  - Provide Smoking Cessation handout, if applicable  - Encourage broncho-pulmonary hygiene including cough, deep breathe, Incentive Spirometry  - Assess the need for suctioning and perform as needed  - Assess and instruct to report SOB or any respiratory difficulty  - Respiratory Therapy support as indicated  - Manage/alleviate anxiety  - Monitor for signs/symptoms of CO2 retention  Outcome: Adequate for Discharge     Problem: METABOLIC/FLUID AND ELECTROLYTES - ADULT  Goal: Electrolytes maintained within normal limits  Description: INTERVENTIONS:  - Monitor labs and rhythm and assess patient for signs and symptoms of electrolyte imbalances  - Administer electrolyte replacement as ordered  - Monitor response to electrolyte replacements, including rhythm and repeat lab results as appropriate  - Fluid restriction as ordered  - Instruct patient on fluid and nutrition restrictions as appropriate  Outcome: Adequate for Discharge  Goal: Hemodynamic stability and optimal renal function maintained  Description: INTERVENTIONS:  - Monitor labs and assess for signs and symptoms of volume excess or deficit  - Monitor intake, output and patient weight  - Monitor urine specific gravity, serum osmolarity and serum sodium as indicated or ordered  - Monitor response to interventions for patient's volume status, including labs, urine output, blood pressure (other measures as available)  - Encourage oral intake as appropriate  - Instruct patient on fluid and nutrition restrictions as appropriate  Outcome: Adequate for Discharge     Problem: SKIN/TISSUE INTEGRITY - ADULT  Goal: Skin integrity remains intact  Description: INTERVENTIONS  - Assess and document risk factors for pressure ulcer development  - Assess and document skin integrity  - Monitor for areas of redness and/or skin breakdown  - Initiate interventions, skin care algorithm/standards of care as needed  Outcome: Adequate for Discharge     Problem: PAIN - ADULT  Goal: Verbalizes/displays adequate comfort level or patient's stated pain goal  Description: INTERVENTIONS:  - Encourage pt to monitor pain and request assistance  - Assess pain using appropriate pain scale  - Administer analgesics based on type and severity of pain and evaluate response  - Implement non-pharmacological measures as appropriate and evaluate response  - Consider cultural and social influences on pain and pain management  - Manage/alleviate anxiety  - Utilize distraction and/or relaxation techniques  - Monitor for opioid side effects  - Notify MD/LIP if interventions unsuccessful or patient reports new pain  - Anticipate increased pain with activity and pre-medicate as appropriate  Outcome: Adequate for Discharge     Problem: RISK FOR INFECTION - ADULT  Goal: Absence of fever/infection during anticipated neutropenic period  Description: INTERVENTIONS  - Monitor WBC  - Administer growth factors as ordered  - Implement neutropenic guidelines  Outcome: Adequate for Discharge     Problem: SAFETY ADULT - FALL  Goal: Free from fall injury  Description: INTERVENTIONS:  - Assess pt frequently for physical needs  - Identify cognitive and physical deficits and behaviors that affect risk of falls.   - [place] fall precautions as indicated by assessment.  - Educate pt/family on patient safety including physical limitations  - Instruct pt to call for assistance with activity based on assessment  - Modify environment to reduce risk of injury  - Provide assistive devices as appropriate  - Consider OT/PT consult to assist with strengthening/mobility  - Encourage toileting schedule  Outcome: Adequate for Discharge     Problem: DISCHARGE PLANNING  Goal: Discharge to home or other facility with appropriate resources  Description: INTERVENTIONS:  - Identify barriers to discharge w/pt and caregiver  - Include patient/family/discharge partner in discharge planning  - Arrange for needed discharge resources and transportation as appropriate  - Identify discharge learning needs (meds, wound care, etc)  - Arrange for interpreters to assist at discharge as needed  - Consider post-discharge preferences of patient/family/discharge partner  - Complete POLST form as appropriate  - Assess patient's ability to be responsible for managing their own health  - Refer to Case Management Department for coordinating discharge planning if the patient needs post-hospital services based on physician/LIP order or complex needs related to functional status, cognitive ability or social support system  Outcome: Adequate for Discharge

## 2022-06-24 ENCOUNTER — APPOINTMENT (OUTPATIENT)
Dept: HEMATOLOGY/ONCOLOGY | Facility: HOSPITAL | Age: 77
End: 2022-06-24
Attending: INTERNAL MEDICINE
Payer: MEDICARE

## 2022-06-24 ENCOUNTER — PATIENT OUTREACH (OUTPATIENT)
Dept: CASE MANAGEMENT | Age: 77
End: 2022-06-24

## 2022-06-26 RX ORDER — PANTOPRAZOLE SODIUM 40 MG/1
40 TABLET, DELAYED RELEASE ORAL
Qty: 28 TABLET | Refills: 2 | Status: SHIPPED | OUTPATIENT
Start: 2022-06-26 | End: 2022-01-01

## 2022-06-27 ENCOUNTER — NURSE ONLY (OUTPATIENT)
Dept: HEMATOLOGY/ONCOLOGY | Facility: HOSPITAL | Age: 77
End: 2022-06-27
Attending: INTERNAL MEDICINE
Payer: MEDICARE

## 2022-06-27 ENCOUNTER — TELEPHONE (OUTPATIENT)
Dept: INTERNAL MEDICINE CLINIC | Facility: CLINIC | Age: 77
End: 2022-06-27

## 2022-06-27 DIAGNOSIS — Z45.2 ENCOUNTER FOR CENTRAL LINE CARE: Primary | ICD-10-CM

## 2022-06-27 DIAGNOSIS — C79.51 BONE METASTASIS (HCC): ICD-10-CM

## 2022-06-27 DIAGNOSIS — Z51.81 MEDICATION MONITORING ENCOUNTER: ICD-10-CM

## 2022-06-27 LAB
ALBUMIN SERPL-MCNC: 2.5 G/DL (ref 3.4–5)
ALBUMIN/GLOB SERPL: 0.8 {RATIO} (ref 1–2)
ALP LIVER SERPL-CCNC: 46 U/L
ALT SERPL-CCNC: 74 U/L
ANION GAP SERPL CALC-SCNC: 14 MMOL/L (ref 0–18)
AST SERPL-CCNC: 32 U/L (ref 15–37)
BILIRUB SERPL-MCNC: 0.3 MG/DL (ref 0.1–2)
BUN BLD-MCNC: 143 MG/DL (ref 7–18)
BUN/CREAT SERPL: 26 (ref 10–20)
CALCIUM BLD-MCNC: 5.4 MG/DL (ref 8.5–10.1)
CHLORIDE SERPL-SCNC: 115 MMOL/L (ref 98–112)
CO2 SERPL-SCNC: 15 MMOL/L (ref 21–32)
CREAT BLD-MCNC: 5.5 MG/DL
GLOBULIN PLAS-MCNC: 3.2 G/DL (ref 2.8–4.4)
GLUCOSE BLD-MCNC: 120 MG/DL (ref 70–99)
MAGNESIUM SERPL-MCNC: 1.7 MG/DL (ref 1.6–2.6)
OSMOLALITY SERPL CALC.SUM OF ELEC: 346 MOSM/KG (ref 275–295)
PHOSPHATE SERPL-MCNC: 5.1 MG/DL (ref 2.5–4.9)
POTASSIUM SERPL-SCNC: 4.4 MMOL/L (ref 3.5–5.1)
PROT SERPL-MCNC: 5.7 G/DL (ref 6.4–8.2)
SODIUM SERPL-SCNC: 144 MMOL/L (ref 136–145)

## 2022-06-27 PROCEDURE — 36591 DRAW BLOOD OFF VENOUS DEVICE: CPT

## 2022-06-27 PROCEDURE — 84100 ASSAY OF PHOSPHORUS: CPT

## 2022-06-27 PROCEDURE — 83735 ASSAY OF MAGNESIUM: CPT

## 2022-06-27 RX ORDER — HEPARIN SODIUM (PORCINE) LOCK FLUSH IV SOLN 100 UNIT/ML 100 UNIT/ML
5 SOLUTION INTRAVENOUS ONCE
OUTPATIENT
Start: 2022-07-04

## 2022-06-27 RX ORDER — HEPARIN SODIUM (PORCINE) LOCK FLUSH IV SOLN 100 UNIT/ML 100 UNIT/ML
5 SOLUTION INTRAVENOUS ONCE
Status: COMPLETED | OUTPATIENT
Start: 2022-06-27 | End: 2022-06-27

## 2022-06-27 RX ORDER — SODIUM CHLORIDE 9 MG/ML
10 INJECTION INTRAVENOUS ONCE
OUTPATIENT
Start: 2022-07-04

## 2022-06-27 RX ADMIN — HEPARIN SODIUM (PORCINE) LOCK FLUSH IV SOLN 100 UNIT/ML 500 UNITS: 100 SOLUTION INTRAVENOUS at 14:05:00

## 2022-06-27 NOTE — TELEPHONE ENCOUNTER
Multiple attempts to reach the patient and spouse for TCM that were unsuccessful. The patient was recently hospitalized for acute renal failure. The pt does not have a HFU appt scheduled at this time. A TCM/HFU appt is recommended by 6/30/2022 as the pt is a high risk for readmission. Please advise. BOOK BY DATE (last date for TCM): 7/7/2022    TRIAGE:  Please f/u with the pt/spouse and try to get them to schedule as the pt would greatly benefit from TCM/HFU appt. Thank you!

## 2022-06-28 ENCOUNTER — TELEPHONE (OUTPATIENT)
Dept: HEMATOLOGY/ONCOLOGY | Facility: HOSPITAL | Age: 77
End: 2022-06-28

## 2022-06-28 ENCOUNTER — OFFICE VISIT (OUTPATIENT)
Dept: NEPHROLOGY | Facility: CLINIC | Age: 77
End: 2022-06-28
Payer: MEDICARE

## 2022-06-28 VITALS
DIASTOLIC BLOOD PRESSURE: 58 MMHG | BODY MASS INDEX: 25 KG/M2 | WEIGHT: 169.13 LBS | SYSTOLIC BLOOD PRESSURE: 122 MMHG | HEART RATE: 64 BPM

## 2022-06-28 DIAGNOSIS — E83.51 HYPOCALCEMIA: ICD-10-CM

## 2022-06-28 DIAGNOSIS — N17.9 AKI (ACUTE KIDNEY INJURY) (HCC): ICD-10-CM

## 2022-06-28 DIAGNOSIS — E87.2 METABOLIC ACIDOSIS: ICD-10-CM

## 2022-06-28 DIAGNOSIS — N18.30 STAGE 3 CHRONIC KIDNEY DISEASE, UNSPECIFIED WHETHER STAGE 3A OR 3B CKD (HCC): Primary | ICD-10-CM

## 2022-06-28 PROCEDURE — 99215 OFFICE O/P EST HI 40 MIN: CPT | Performed by: INTERNAL MEDICINE

## 2022-06-28 PROCEDURE — 1126F AMNT PAIN NOTED NONE PRSNT: CPT | Performed by: INTERNAL MEDICINE

## 2022-06-28 PROCEDURE — 1111F DSCHRG MED/CURRENT MED MERGE: CPT | Performed by: INTERNAL MEDICINE

## 2022-06-28 RX ORDER — CALCITRIOL 0.25 UG/1
0.25 CAPSULE, LIQUID FILLED ORAL DAILY
Qty: 30 CAPSULE | Refills: 0 | Status: SHIPPED | OUTPATIENT
Start: 2022-06-28

## 2022-06-28 RX ORDER — SODIUM BICARBONATE 650 MG/1
650 TABLET ORAL 3 TIMES DAILY
Qty: 90 TABLET | Refills: 1 | Status: SHIPPED | OUTPATIENT
Start: 2022-06-28

## 2022-06-28 NOTE — PATIENT INSTRUCTIONS
Take 25 mg daily dose for one week and reduce to 20 mg daily for next one week   Start calcitriol to 0.25 mcg daily dose   Increase sodium bicarb to three times a day   Follow up in 2 weeks - July 11th at 9:10 am   Lab tests prior to next visit - July 8th

## 2022-07-08 ENCOUNTER — HOSPITAL ENCOUNTER (OUTPATIENT)
Dept: CT IMAGING | Facility: HOSPITAL | Age: 77
Discharge: HOME | End: 2022-07-08
Attending: RADIOLOGY
Payer: MEDICARE

## 2022-07-08 ENCOUNTER — PATIENT MESSAGE (OUTPATIENT)
Dept: NEPHROLOGY | Facility: CLINIC | Age: 77
End: 2022-07-08

## 2022-07-08 ENCOUNTER — TELEPHONE (OUTPATIENT)
Dept: NEPHROLOGY | Facility: CLINIC | Age: 77
End: 2022-07-08

## 2022-07-08 ENCOUNTER — NURSE ONLY (OUTPATIENT)
Dept: HEMATOLOGY/ONCOLOGY | Facility: HOSPITAL | Age: 77
End: 2022-07-08
Attending: INTERNAL MEDICINE
Payer: MEDICARE

## 2022-07-08 ENCOUNTER — TELEPHONE (OUTPATIENT)
Dept: RADIATION ONCOLOGY | Facility: HOSPITAL | Age: 77
End: 2022-07-08

## 2022-07-08 DIAGNOSIS — C79.31 BRAIN METASTASIS (HCC): ICD-10-CM

## 2022-07-08 DIAGNOSIS — E87.6 HYPOKALEMIA: ICD-10-CM

## 2022-07-08 DIAGNOSIS — N18.30 STAGE 3 CHRONIC KIDNEY DISEASE, UNSPECIFIED WHETHER STAGE 3A OR 3B CKD (HCC): ICD-10-CM

## 2022-07-08 DIAGNOSIS — N17.9 AKI (ACUTE KIDNEY INJURY) (HCC): ICD-10-CM

## 2022-07-08 DIAGNOSIS — E87.6 HYPOKALEMIA: Primary | ICD-10-CM

## 2022-07-08 LAB
ALBUMIN SERPL-MCNC: 2.4 G/DL (ref 3.4–5)
ANION GAP SERPL CALC-SCNC: 14 MMOL/L (ref 0–18)
BUN BLD-MCNC: 112 MG/DL (ref 7–18)
BUN/CREAT SERPL: 20.4 (ref 10–20)
CALCIUM BLD-MCNC: 6.5 MG/DL (ref 8.5–10.1)
CHLORIDE SERPL-SCNC: 117 MMOL/L (ref 98–112)
CO2 SERPL-SCNC: 13 MMOL/L (ref 21–32)
CREAT BLD-MCNC: 5.49 MG/DL
CREAT BLD-MCNC: 5.8 MG/DL
GLUCOSE BLD-MCNC: 83 MG/DL (ref 70–99)
MAGNESIUM SERPL-MCNC: 1.5 MG/DL (ref 1.6–2.6)
OSMOLALITY SERPL CALC.SUM OF ELEC: 333 MOSM/KG (ref 275–295)
PHOSPHATE SERPL-MCNC: 4.7 MG/DL (ref 2.5–4.9)
POTASSIUM SERPL-SCNC: 2.8 MMOL/L (ref 3.5–5.1)
SODIUM SERPL-SCNC: 144 MMOL/L (ref 136–145)

## 2022-07-08 PROCEDURE — 83735 ASSAY OF MAGNESIUM: CPT

## 2022-07-08 PROCEDURE — 80069 RENAL FUNCTION PANEL: CPT

## 2022-07-08 PROCEDURE — 36591 DRAW BLOOD OFF VENOUS DEVICE: CPT

## 2022-07-08 PROCEDURE — 82565 ASSAY OF CREATININE: CPT

## 2022-07-08 RX ORDER — POTASSIUM CHLORIDE 1500 MG/1
40 TABLET, FILM COATED, EXTENDED RELEASE ORAL DAILY
Qty: 10 TABLET | Refills: 0 | Status: SHIPPED | OUTPATIENT
Start: 2022-07-08 | End: 2022-08-07

## 2022-07-08 NOTE — TELEPHONE ENCOUNTER
From: Willie Wyatt  To: Robert Jo MD  Sent: 7/8/2022 11:43 AM CDT  Subject: blood work    This message is being sent by Tatiana Cloud on behalf of Willie Wyatt. When I left the hospital they told me to put Shaq Florez on a low potassium diet. This morning we went for a blood test his potassium is down to 2.8 it was 4.4 before and his creative level didn't really move. What do I do here?

## 2022-07-08 NOTE — TELEPHONE ENCOUNTER
Potassium low at 2.8 and more acidotic. Called and left VM to return the call . Kidney function stable with no improvement     Staff reach out to the patient in next couple of hrs . Find out if any diarrhea or NV.      Take potassium supplement 40 meq in afternoon and tonite and tomm morning     Take sodium bicarbonate supplement x QID today and tomm     Repeat BMP on Monday - ordered

## 2022-07-08 NOTE — TELEPHONE ENCOUNTER
Spoke with pt, discussed below message. Pt verbalizes understanding. Confirmed potassium and bicarb dose with patient's wife. States pt is not experiencing any nausea/vomiting/diarrhea. Dr. Jennifer Noel, pt also requesting refill on prednisone 20mg daily. Informed pt that Dr. Scott Sanchez filled last time but states Dr. Jennifer Noel asked her if refill for prednisone was needed at [address] [place] [address]? Is it ok to send prednisone 20mg daily prescription or have pt reach out to Internal Medicine? LOV 6/28/22. F/U 7/11/22. Thank you!

## 2022-07-08 NOTE — TELEPHONE ENCOUNTER
Pharmacy called in to clarify the directions on medicine Potassium Chloride ER 20 MEQ Oral Tab CR please follow up

## 2022-07-11 ENCOUNTER — NURSE ONLY (OUTPATIENT)
Dept: HEMATOLOGY/ONCOLOGY | Facility: HOSPITAL | Age: 77
End: 2022-07-11
Attending: INTERNAL MEDICINE
Payer: MEDICARE

## 2022-07-11 ENCOUNTER — PATIENT MESSAGE (OUTPATIENT)
Dept: NEPHROLOGY | Facility: CLINIC | Age: 77
End: 2022-07-11

## 2022-07-11 ENCOUNTER — OFFICE VISIT (OUTPATIENT)
Dept: NEPHROLOGY | Facility: CLINIC | Age: 77
End: 2022-07-11
Payer: MEDICARE

## 2022-07-11 VITALS
TEMPERATURE: 97 F | SYSTOLIC BLOOD PRESSURE: 122 MMHG | OXYGEN SATURATION: 100 % | DIASTOLIC BLOOD PRESSURE: 49 MMHG | HEART RATE: 80 BPM | RESPIRATION RATE: 16 BRPM

## 2022-07-11 VITALS
DIASTOLIC BLOOD PRESSURE: 60 MMHG | HEART RATE: 68 BPM | SYSTOLIC BLOOD PRESSURE: 108 MMHG | WEIGHT: 157.38 LBS | BODY MASS INDEX: 23 KG/M2

## 2022-07-11 DIAGNOSIS — E83.51 HYPOCALCEMIA: ICD-10-CM

## 2022-07-11 DIAGNOSIS — N17.9 AKI (ACUTE KIDNEY INJURY) (HCC): ICD-10-CM

## 2022-07-11 DIAGNOSIS — N17.9 AKI (ACUTE KIDNEY INJURY) (HCC): Primary | ICD-10-CM

## 2022-07-11 DIAGNOSIS — C79.51 BONE METASTASIS (HCC): ICD-10-CM

## 2022-07-11 DIAGNOSIS — E87.2 METABOLIC ACIDOSIS: ICD-10-CM

## 2022-07-11 DIAGNOSIS — M54.50 ACUTE RIGHT-SIDED LOW BACK PAIN WITHOUT SCIATICA: ICD-10-CM

## 2022-07-11 DIAGNOSIS — E87.6 HYPOKALEMIA: ICD-10-CM

## 2022-07-11 DIAGNOSIS — Z51.81 MEDICATION MONITORING ENCOUNTER: ICD-10-CM

## 2022-07-11 DIAGNOSIS — Z45.2 ENCOUNTER FOR CENTRAL LINE CARE: Primary | ICD-10-CM

## 2022-07-11 LAB
ANION GAP SERPL CALC-SCNC: 12 MMOL/L (ref 0–18)
BUN BLD-MCNC: 122 MG/DL (ref 7–18)
BUN/CREAT SERPL: 22.9 (ref 10–20)
CALCIUM BLD-MCNC: 6.6 MG/DL (ref 8.5–10.1)
CHLORIDE SERPL-SCNC: 120 MMOL/L (ref 98–112)
CO2 SERPL-SCNC: 11 MMOL/L (ref 21–32)
CREAT BLD-MCNC: 5.32 MG/DL
GLUCOSE BLD-MCNC: 80 MG/DL (ref 70–99)
OSMOLALITY SERPL CALC.SUM OF ELEC: 334 MOSM/KG (ref 275–295)
POTASSIUM SERPL-SCNC: 3.9 MMOL/L (ref 3.5–5.1)
SODIUM SERPL-SCNC: 143 MMOL/L (ref 136–145)

## 2022-07-11 PROCEDURE — 96368 THER/DIAG CONCURRENT INF: CPT

## 2022-07-11 PROCEDURE — 36591 DRAW BLOOD OFF VENOUS DEVICE: CPT

## 2022-07-11 PROCEDURE — 99215 OFFICE O/P EST HI 40 MIN: CPT | Performed by: INTERNAL MEDICINE

## 2022-07-11 PROCEDURE — 80048 BASIC METABOLIC PNL TOTAL CA: CPT

## 2022-07-11 PROCEDURE — 96366 THER/PROPH/DIAG IV INF ADDON: CPT

## 2022-07-11 PROCEDURE — 1111F DSCHRG MED/CURRENT MED MERGE: CPT | Performed by: INTERNAL MEDICINE

## 2022-07-11 PROCEDURE — 96365 THER/PROPH/DIAG IV INF INIT: CPT

## 2022-07-11 RX ORDER — HEPARIN SODIUM (PORCINE) LOCK FLUSH IV SOLN 100 UNIT/ML 100 UNIT/ML
SOLUTION INTRAVENOUS
Status: COMPLETED
Start: 2022-07-11 | End: 2022-07-11

## 2022-07-11 RX ORDER — MAGNESIUM SULFATE HEPTAHYDRATE 40 MG/ML
INJECTION, SOLUTION INTRAVENOUS
Status: COMPLETED
Start: 2022-07-11 | End: 2022-07-11

## 2022-07-11 RX ORDER — SODIUM CHLORIDE 9 MG/ML
10 INJECTION INTRAVENOUS ONCE
OUTPATIENT
Start: 2022-08-01

## 2022-07-11 RX ORDER — HEPARIN SODIUM (PORCINE) LOCK FLUSH IV SOLN 100 UNIT/ML 100 UNIT/ML
5 SOLUTION INTRAVENOUS ONCE
Status: COMPLETED | OUTPATIENT
Start: 2022-07-11 | End: 2022-07-11

## 2022-07-11 RX ORDER — SODIUM BICARBONATE 650 MG/1
650 TABLET ORAL 4 TIMES DAILY
Qty: 90 TABLET | Refills: 1 | Status: SHIPPED | OUTPATIENT
Start: 2022-07-11

## 2022-07-11 RX ORDER — HEPARIN SODIUM (PORCINE) LOCK FLUSH IV SOLN 100 UNIT/ML 100 UNIT/ML
5 SOLUTION INTRAVENOUS ONCE
OUTPATIENT
Start: 2022-08-01

## 2022-07-11 RX ORDER — PREDNISONE 10 MG/1
TABLET ORAL
Qty: 49 TABLET | Refills: 0 | OUTPATIENT
Start: 2022-07-11 | End: 2022-08-01

## 2022-07-11 RX ORDER — PREDNISONE 10 MG/1
15 TABLET ORAL DAILY
Qty: 45 TABLET | Refills: 0 | Status: SHIPPED | OUTPATIENT
Start: 2022-07-11

## 2022-07-11 RX ORDER — MAGNESIUM SULFATE HEPTAHYDRATE 40 MG/ML
2 INJECTION, SOLUTION INTRAVENOUS ONCE
Status: CANCELLED
Start: 2022-08-01 | End: 2022-08-01

## 2022-07-11 RX ORDER — MAGNESIUM SULFATE HEPTAHYDRATE 40 MG/ML
2 INJECTION, SOLUTION INTRAVENOUS ONCE
Status: COMPLETED | OUTPATIENT
Start: 2022-07-11 | End: 2022-07-11

## 2022-07-11 RX ADMIN — MAGNESIUM SULFATE HEPTAHYDRATE 2 G: 40 INJECTION, SOLUTION INTRAVENOUS at 13:20:00

## 2022-07-11 RX ADMIN — HEPARIN SODIUM (PORCINE) LOCK FLUSH IV SOLN 100 UNIT/ML 500 UNITS: 100 SOLUTION INTRAVENOUS at 17:32:00

## 2022-07-11 NOTE — PROGRESS NOTES
Spoke with Dr. Anali Mckeon regarding running bicarb infusion over 4 hours instead of 5; verbal okay received over the telephone from MD. Infusion RN and pharmacist made aware.

## 2022-07-11 NOTE — PROGRESS NOTES
Pt to infusion for sodium bicarb and magnesium per MD Arechiga. Arrives ambulating with cane accompanied by wife. Port accessed per protocol - present blood return noted. Mg given over 1 hour. Sodium bicarb given over 4 hours, okayed per MD. Pt appeared to tolerate infusions well. Port flushed and heparin locked - site covered with gauze and paper tape. Discharged to home ambulating with cane accompanied by wife.

## 2022-07-11 NOTE — PATIENT INSTRUCTIONS
Reduce prednisone 15 mg daily dose from Friday July 15th   Reduce prednisone 10 mg on July 22nd   Lab test next week   Follow up in 2 weeks - 7/25 at 11:50 am   Go to infusion centre for bicarb infusion   Increase sodium bicarb to four times a day starting tomm

## 2022-07-11 NOTE — TELEPHONE ENCOUNTER
From: Óscar Jennings  To: Wil Gamez MD  Sent: 7/11/2022 2:52 PM CDT  Subject: medicine    This message is being sent by Nataliya Han on behalf of Óscar Jennings. The doctor told me this morning not to follow the directions on the bottle but to take it the way she told me to I'm not sure if she met both medicines or just the prednisone. I got the Prednisone and don't have a question on that but on the sodium bicarb he was taking 3 doses a day and the new script says 4 doses. Do I continue with 3 pills or are we up it to 4 pills? I know she wanted me to have extra pills but I'm not sure if she up the times on the sodium bicarb.

## 2022-07-11 NOTE — TELEPHONE ENCOUNTER
Dr. Anderson Nieto, pt had question regarding sodium bicarb dose. \"Increase sodium bicarb to four times a day starting tomm\" per OV note today 7/11. Sent above instructions to patient via [email or web address]. Please let me know if you have any additional instructions. Thanks!

## 2022-07-12 ENCOUNTER — PATIENT OUTREACH (OUTPATIENT)
Dept: CASE MANAGEMENT | Age: 77
End: 2022-07-12

## 2022-07-12 ENCOUNTER — TELEPHONE (OUTPATIENT)
Dept: CASE MANAGEMENT | Age: 77
End: 2022-07-12

## 2022-07-12 NOTE — TELEPHONE ENCOUNTER
Pt spouse Polly called wants to know if Chetna Rush needs a new Pre-Op exam or the one from 05/25/2022 is acceptable. Please advise.

## 2022-07-12 NOTE — TELEPHONE ENCOUNTER
Noted, as long as specialists have given approval then that is fine for me. No further testing needed, can use prior pre-op.

## 2022-07-12 NOTE — TELEPHONE ENCOUNTER
DR Huy Matias wanted to make sure you would sign off on medical clearance for cochlear implant. She states his surgery is scheduled August 4th. (surgery kept getting cancelled and rescheduled 3 times)    Patient was seen for preop clearance in May 25th 2022. Patient also was in hospital 6/15-6/26th and had several testing and labs; she doesn't believe he needs any further labs. Patient will be seeing Dr Carmen Hyman for clearance 7/25/22. Once she clears patient, she would want to make sure you can clear patient as well.

## 2022-07-13 ENCOUNTER — PATIENT OUTREACH (OUTPATIENT)
Dept: CASE MANAGEMENT | Age: 77
End: 2022-07-13

## 2022-07-13 NOTE — PROGRESS NOTES
Called patient regarding ccm assessment and left a message for patient to call back when they can. Reviewed patient chart. Left contact my contact number [phone].

## 2022-07-14 ENCOUNTER — TELEPHONE (OUTPATIENT)
Dept: INTERNAL MEDICINE CLINIC | Facility: CLINIC | Age: 77
End: 2022-07-14

## 2022-07-14 NOTE — TELEPHONE ENCOUNTER
Dr. Rudy VICENTE     Pt's wife Polly called and want's to schedule a wound clinic apt for Casandra Berrios. Polly stated left ankle is little red and has a scab over it pt wanted to remove the scab, but Polly was requesting a wound clinic appt. I called [phone] wound clinic and spoke with Amy Manley and appt was made for 07/28/22 at 2pm first available appt. Polly verbal understanding.

## 2022-07-15 ENCOUNTER — PATIENT MESSAGE (OUTPATIENT)
Dept: NEPHROLOGY | Facility: CLINIC | Age: 77
End: 2022-07-15

## 2022-07-15 NOTE — TELEPHONE ENCOUNTER
From: Elizabeth Sosa  To: Haether Arboleda MD  Sent: 7/15/2022 4:08 PM CDT  Subject: sodium bicarb    This message is being sent by Ana Laura Saucedo on behalf of Elizabeth Sosa. Hi, sorry to bother you. Dr Erum Reich Gaston's sodium bicarb to 4 tabs a day and now he is getting a stomach ache everyday. Can we go back to 3 tabs?

## 2022-07-18 ENCOUNTER — NURSE ONLY (OUTPATIENT)
Dept: HEMATOLOGY/ONCOLOGY | Facility: HOSPITAL | Age: 77
End: 2022-07-18
Attending: INTERNAL MEDICINE
Payer: MEDICARE

## 2022-07-18 ENCOUNTER — PATIENT MESSAGE (OUTPATIENT)
Dept: NEPHROLOGY | Facility: CLINIC | Age: 77
End: 2022-07-18

## 2022-07-18 VITALS — WEIGHT: 161.63 LBS | BODY MASS INDEX: 24 KG/M2

## 2022-07-18 DIAGNOSIS — E87.2 METABOLIC ACIDOSIS: ICD-10-CM

## 2022-07-18 DIAGNOSIS — N17.9 AKI (ACUTE KIDNEY INJURY) (HCC): ICD-10-CM

## 2022-07-18 DIAGNOSIS — Z45.2 ENCOUNTER FOR CENTRAL LINE CARE: Primary | ICD-10-CM

## 2022-07-18 DIAGNOSIS — E83.51 HYPOCALCEMIA: ICD-10-CM

## 2022-07-18 LAB
ALBUMIN SERPL-MCNC: 2.3 G/DL (ref 3.4–5)
ANION GAP SERPL CALC-SCNC: 14 MMOL/L (ref 0–18)
BUN BLD-MCNC: 110 MG/DL (ref 7–18)
BUN/CREAT SERPL: 22.7 (ref 10–20)
CALCIUM BLD-MCNC: 7.2 MG/DL (ref 8.5–10.1)
CHLORIDE SERPL-SCNC: 117 MMOL/L (ref 98–112)
CO2 SERPL-SCNC: 14 MMOL/L (ref 21–32)
CREAT BLD-MCNC: 4.85 MG/DL
GLUCOSE BLD-MCNC: 98 MG/DL (ref 70–99)
OSMOLALITY SERPL CALC.SUM OF ELEC: 335 MOSM/KG (ref 275–295)
PHOSPHATE SERPL-MCNC: 3.8 MG/DL (ref 2.5–4.9)
POTASSIUM SERPL-SCNC: 3.5 MMOL/L (ref 3.5–5.1)
SODIUM SERPL-SCNC: 145 MMOL/L (ref 136–145)

## 2022-07-18 PROCEDURE — 36591 DRAW BLOOD OFF VENOUS DEVICE: CPT

## 2022-07-18 PROCEDURE — 80069 RENAL FUNCTION PANEL: CPT

## 2022-07-18 RX ORDER — HEPARIN SODIUM (PORCINE) LOCK FLUSH IV SOLN 100 UNIT/ML 100 UNIT/ML
5 SOLUTION INTRAVENOUS ONCE
Status: COMPLETED | OUTPATIENT
Start: 2022-07-18 | End: 2022-07-18

## 2022-07-18 RX ORDER — SODIUM CHLORIDE 9 MG/ML
10 INJECTION INTRAVENOUS ONCE
OUTPATIENT
Start: 2022-08-01

## 2022-07-18 RX ORDER — HEPARIN SODIUM (PORCINE) LOCK FLUSH IV SOLN 100 UNIT/ML 100 UNIT/ML
5 SOLUTION INTRAVENOUS ONCE
OUTPATIENT
Start: 2022-08-01

## 2022-07-18 RX ADMIN — HEPARIN SODIUM (PORCINE) LOCK FLUSH IV SOLN 100 UNIT/ML 500 UNITS: 100 SOLUTION INTRAVENOUS at 13:05:00

## 2022-07-18 NOTE — TELEPHONE ENCOUNTER
From: Odessa Viramontes  To: Mary Rogers MD  Sent: 7/15/2022 4:08 PM CDT  Subject: sodium bicarb    This message is being sent by Linnea Flor on behalf of Odessa Viramontes. Hi, sorry to bother you. Dr Leif Feldman's sodium bicarb to 4 tabs a day and now he is getting a stomach ache everyday. Can we go back to 3 tabs?

## 2022-07-18 NOTE — TELEPHONE ENCOUNTER
Please have the lab test done today or tomm and will review     Can reduce to TID dose.  If bicarb is low on repeat lab test, we might have to do the infusion again

## 2022-07-19 ENCOUNTER — PATIENT MESSAGE (OUTPATIENT)
Dept: NEPHROLOGY | Facility: CLINIC | Age: 77
End: 2022-07-19

## 2022-07-19 NOTE — TELEPHONE ENCOUNTER
From: Annabella Vega  To: Froy Mckeon MD  Sent: 7/15/2022 4:08 PM CDT  Subject: sodium bicarb    This message is being sent by Korey Rivera on behalf of Annabella [employer]. Hi, sorry to bother you. Dr Max Oven Gaston's sodium bicarb to 4 tabs a day and now he is getting a stomach ache everyday. Can we go back to 3 tabs?

## 2022-07-19 NOTE — TELEPHONE ENCOUNTER
Dr. Rhiannon Boland, please see my chart message. Pt requesting if pt should be on potassium-chloride pills again? Per last encounter, pt on sodium bicarb TID.

## 2022-07-25 ENCOUNTER — NURSE ONLY (OUTPATIENT)
Dept: HEMATOLOGY/ONCOLOGY | Facility: HOSPITAL | Age: 77
End: 2022-07-25
Attending: INTERNAL MEDICINE
Payer: MEDICARE

## 2022-07-25 ENCOUNTER — TELEPHONE (OUTPATIENT)
Dept: NEPHROLOGY | Facility: CLINIC | Age: 77
End: 2022-07-25

## 2022-07-25 ENCOUNTER — OFFICE VISIT (OUTPATIENT)
Dept: NEPHROLOGY | Facility: CLINIC | Age: 77
End: 2022-07-25
Payer: MEDICARE

## 2022-07-25 VITALS — BODY MASS INDEX: 24 KG/M2 | WEIGHT: 161.31 LBS

## 2022-07-25 VITALS
SYSTOLIC BLOOD PRESSURE: 111 MMHG | WEIGHT: 161 LBS | BODY MASS INDEX: 24 KG/M2 | DIASTOLIC BLOOD PRESSURE: 57 MMHG | HEART RATE: 79 BPM

## 2022-07-25 DIAGNOSIS — E03.2 HYPOTHYROIDISM DUE TO MEDICATION: ICD-10-CM

## 2022-07-25 DIAGNOSIS — N18.30 STAGE 3 CHRONIC KIDNEY DISEASE, UNSPECIFIED WHETHER STAGE 3A OR 3B CKD (HCC): ICD-10-CM

## 2022-07-25 DIAGNOSIS — Z45.2 ENCOUNTER FOR CENTRAL LINE CARE: Primary | ICD-10-CM

## 2022-07-25 DIAGNOSIS — E87.2 METABOLIC ACIDOSIS: ICD-10-CM

## 2022-07-25 DIAGNOSIS — N17.9 AKI (ACUTE KIDNEY INJURY) (HCC): Primary | ICD-10-CM

## 2022-07-25 DIAGNOSIS — E87.6 HYPOKALEMIA: ICD-10-CM

## 2022-07-25 LAB
ANION GAP SERPL CALC-SCNC: 13 MMOL/L (ref 0–18)
BUN BLD-MCNC: 115 MG/DL (ref 7–18)
BUN/CREAT SERPL: 24.9 (ref 10–20)
CALCIUM BLD-MCNC: 7.6 MG/DL (ref 8.5–10.1)
CHLORIDE SERPL-SCNC: 123 MMOL/L (ref 98–112)
CO2 SERPL-SCNC: 13 MMOL/L (ref 21–32)
CREAT BLD-MCNC: 4.62 MG/DL
GLUCOSE BLD-MCNC: 96 MG/DL (ref 70–99)
OSMOLALITY SERPL CALC.SUM OF ELEC: 344 MOSM/KG (ref 275–295)
POTASSIUM SERPL-SCNC: 3.4 MMOL/L (ref 3.5–5.1)
SODIUM SERPL-SCNC: 149 MMOL/L (ref 136–145)
T4 FREE SERPL-MCNC: 1 NG/DL (ref 0.8–1.7)
TSI SER-ACNC: 3.43 MIU/ML (ref 0.36–3.74)

## 2022-07-25 PROCEDURE — 80048 BASIC METABOLIC PNL TOTAL CA: CPT

## 2022-07-25 PROCEDURE — 84439 ASSAY OF FREE THYROXINE: CPT

## 2022-07-25 PROCEDURE — 36591 DRAW BLOOD OFF VENOUS DEVICE: CPT

## 2022-07-25 PROCEDURE — 84443 ASSAY THYROID STIM HORMONE: CPT

## 2022-07-25 PROCEDURE — 99215 OFFICE O/P EST HI 40 MIN: CPT | Performed by: INTERNAL MEDICINE

## 2022-07-25 RX ORDER — HEPARIN SODIUM (PORCINE) LOCK FLUSH IV SOLN 100 UNIT/ML 100 UNIT/ML
5 SOLUTION INTRAVENOUS ONCE
OUTPATIENT
Start: 2022-08-01

## 2022-07-25 RX ORDER — SODIUM CHLORIDE 9 MG/ML
10 INJECTION INTRAVENOUS ONCE
OUTPATIENT
Start: 2022-08-01

## 2022-07-25 RX ORDER — CALCITRIOL 0.25 UG/1
0.25 CAPSULE, LIQUID FILLED ORAL EVERY OTHER DAY
Qty: 30 CAPSULE | Refills: 0 | Status: SHIPPED | OUTPATIENT
Start: 2022-07-25

## 2022-07-25 RX ORDER — HEPARIN SODIUM (PORCINE) LOCK FLUSH IV SOLN 100 UNIT/ML 100 UNIT/ML
5 SOLUTION INTRAVENOUS ONCE
Status: COMPLETED | OUTPATIENT
Start: 2022-07-25 | End: 2022-07-25

## 2022-07-25 RX ADMIN — HEPARIN SODIUM (PORCINE) LOCK FLUSH IV SOLN 100 UNIT/ML 500 UNITS: 100 SOLUTION INTRAVENOUS at 08:51:00

## 2022-07-25 NOTE — PATIENT INSTRUCTIONS
Increase potassium intake  Increase fluid intake 65-70 ounces   Continue to take sodium bicarb to three times a day   Continue prednisone to 10 mg daily dose   Repeat blood test next Monday   Change calcitriol to every other day

## 2022-07-25 NOTE — TELEPHONE ENCOUNTER
Rn called infusion center to have pt schedule  For Sodium bicarb the same order from the last time pt was given on 7/11/22 per Dr Zane Montilla. Rn called hospital pharmacy spoke to [employer] and will enter the order ,order confirmed Sodium Bicarbonate 150 meq in Dextrose  5% 850 ml infusion,frequency once.

## 2022-07-27 ENCOUNTER — OFFICE VISIT (OUTPATIENT)
Dept: HEMATOLOGY/ONCOLOGY | Facility: HOSPITAL | Age: 77
End: 2022-07-27
Attending: INTERNAL MEDICINE
Payer: MEDICARE

## 2022-07-27 VITALS
SYSTOLIC BLOOD PRESSURE: 106 MMHG | HEART RATE: 71 BPM | OXYGEN SATURATION: 94 % | DIASTOLIC BLOOD PRESSURE: 50 MMHG | TEMPERATURE: 98 F | RESPIRATION RATE: 18 BRPM

## 2022-07-27 DIAGNOSIS — C79.51 BONE METASTASIS (HCC): ICD-10-CM

## 2022-07-27 DIAGNOSIS — Z51.81 MEDICATION MONITORING ENCOUNTER: ICD-10-CM

## 2022-07-27 DIAGNOSIS — Z45.2 ENCOUNTER FOR CENTRAL LINE CARE: Primary | ICD-10-CM

## 2022-07-27 PROCEDURE — 96366 THER/PROPH/DIAG IV INF ADDON: CPT

## 2022-07-27 PROCEDURE — 96365 THER/PROPH/DIAG IV INF INIT: CPT

## 2022-07-27 RX ORDER — HEPARIN SODIUM (PORCINE) LOCK FLUSH IV SOLN 100 UNIT/ML 100 UNIT/ML
5 SOLUTION INTRAVENOUS ONCE
Status: COMPLETED | OUTPATIENT
Start: 2022-07-27 | End: 2022-07-27

## 2022-07-27 RX ORDER — HEPARIN SODIUM (PORCINE) LOCK FLUSH IV SOLN 100 UNIT/ML 100 UNIT/ML
5 SOLUTION INTRAVENOUS ONCE
Status: CANCELLED | OUTPATIENT
Start: 2022-08-01

## 2022-07-27 RX ORDER — SODIUM CHLORIDE 9 MG/ML
10 INJECTION INTRAVENOUS ONCE
OUTPATIENT
Start: 2022-08-01

## 2022-07-27 RX ORDER — HEPARIN SODIUM (PORCINE) LOCK FLUSH IV SOLN 100 UNIT/ML 100 UNIT/ML
SOLUTION INTRAVENOUS
Status: COMPLETED
Start: 2022-07-27 | End: 2022-07-27

## 2022-07-27 RX ADMIN — HEPARIN SODIUM (PORCINE) LOCK FLUSH IV SOLN 100 UNIT/ML 500 UNITS: 100 SOLUTION INTRAVENOUS at 15:59:00

## 2022-07-27 NOTE — PROGRESS NOTES
Pt here for sodium bicarb infusion. Appeared to tolerate infusion, no signs and symptoms of adverse reaction noted. Discharged home w/ family via [facility].

## 2022-07-28 ENCOUNTER — OFFICE VISIT (OUTPATIENT)
Dept: WOUND CARE | Facility: HOSPITAL | Age: 77
End: 2022-07-28
Attending: NURSE PRACTITIONER
Payer: MEDICARE

## 2022-07-28 ENCOUNTER — TELEPHONE (OUTPATIENT)
Dept: HEMATOLOGY/ONCOLOGY | Facility: HOSPITAL | Age: 77
End: 2022-07-28

## 2022-07-28 VITALS
RESPIRATION RATE: 17 BRPM | WEIGHT: 174 LBS | TEMPERATURE: 97 F | HEART RATE: 68 BPM | OXYGEN SATURATION: 100 % | HEIGHT: 69 IN | DIASTOLIC BLOOD PRESSURE: 51 MMHG | SYSTOLIC BLOOD PRESSURE: 94 MMHG | BODY MASS INDEX: 25.77 KG/M2

## 2022-07-28 DIAGNOSIS — S91.002S ANKLE WOUND, LEFT, SEQUELA: ICD-10-CM

## 2022-07-28 DIAGNOSIS — L89.524 PRESSURE INJURY OF LEFT ANKLE, STAGE 4 (HCC): Primary | ICD-10-CM

## 2022-07-28 PROCEDURE — 99215 OFFICE O/P EST HI 40 MIN: CPT | Performed by: NURSE PRACTITIONER

## 2022-07-29 PROBLEM — L03.116 CELLULITIS OF LEFT ANKLE: Status: RESOLVED | Noted: 2022-03-24 | Resolved: 2022-07-29

## 2022-07-29 PROBLEM — S91.002A ANKLE WOUND, LEFT, INITIAL ENCOUNTER: Status: RESOLVED | Noted: 2022-03-24 | Resolved: 2022-07-29

## 2022-08-01 ENCOUNTER — NURSE ONLY (OUTPATIENT)
Dept: HEMATOLOGY/ONCOLOGY | Facility: HOSPITAL | Age: 77
End: 2022-08-01
Attending: INTERNAL MEDICINE
Payer: MEDICARE

## 2022-08-01 DIAGNOSIS — Z45.2 ENCOUNTER FOR CENTRAL LINE CARE: Primary | ICD-10-CM

## 2022-08-01 DIAGNOSIS — N18.30 STAGE 3 CHRONIC KIDNEY DISEASE, UNSPECIFIED WHETHER STAGE 3A OR 3B CKD (HCC): ICD-10-CM

## 2022-08-01 DIAGNOSIS — N17.9 AKI (ACUTE KIDNEY INJURY) (HCC): ICD-10-CM

## 2022-08-01 DIAGNOSIS — E87.2 METABOLIC ACIDOSIS: ICD-10-CM

## 2022-08-01 LAB
ALBUMIN SERPL-MCNC: 2.3 G/DL (ref 3.4–5)
ANION GAP SERPL CALC-SCNC: 11 MMOL/L (ref 0–18)
BUN BLD-MCNC: 91 MG/DL (ref 7–18)
BUN/CREAT SERPL: 20.3 (ref 10–20)
CALCIUM BLD-MCNC: 7.4 MG/DL (ref 8.5–10.1)
CHLORIDE SERPL-SCNC: 117 MMOL/L (ref 98–112)
CO2 SERPL-SCNC: 17 MMOL/L (ref 21–32)
CREAT BLD-MCNC: 4.49 MG/DL
GLUCOSE BLD-MCNC: 112 MG/DL (ref 70–99)
OSMOLALITY SERPL CALC.SUM OF ELEC: 329 MOSM/KG (ref 275–295)
PHOSPHATE SERPL-MCNC: 2.8 MG/DL (ref 2.5–4.9)
POTASSIUM SERPL-SCNC: 3.3 MMOL/L (ref 3.5–5.1)
SODIUM SERPL-SCNC: 145 MMOL/L (ref 136–145)

## 2022-08-01 PROCEDURE — 80069 RENAL FUNCTION PANEL: CPT

## 2022-08-01 PROCEDURE — 36591 DRAW BLOOD OFF VENOUS DEVICE: CPT

## 2022-08-01 RX ORDER — HEPARIN SODIUM (PORCINE) LOCK FLUSH IV SOLN 100 UNIT/ML 100 UNIT/ML
5 SOLUTION INTRAVENOUS ONCE
OUTPATIENT
Start: 2022-08-29

## 2022-08-01 RX ORDER — SODIUM CHLORIDE 9 MG/ML
10 INJECTION INTRAVENOUS ONCE
OUTPATIENT
Start: 2022-08-29

## 2022-08-01 RX ORDER — HEPARIN SODIUM (PORCINE) LOCK FLUSH IV SOLN 100 UNIT/ML 100 UNIT/ML
5 SOLUTION INTRAVENOUS ONCE
Status: COMPLETED | OUTPATIENT
Start: 2022-08-01 | End: 2022-08-01

## 2022-08-01 RX ADMIN — HEPARIN SODIUM (PORCINE) LOCK FLUSH IV SOLN 100 UNIT/ML 500 UNITS: 100 SOLUTION INTRAVENOUS at 10:57:00

## 2022-08-01 NOTE — TELEPHONE ENCOUNTER
Patient's wife is calling to know if the patient is cleared to have surgery. She needs to know if he is cleared if not she only has today to cancel without having any charges. Please call patient with an update.

## 2022-08-01 NOTE — TELEPHONE ENCOUNTER
Kidney function improved very little but still not back to baseline - pls defer surgery for another few weeks    Acidosis improved - cont. oral bicarb tablet TID      Low potassium -  Increase oral intake of potassium     Increase prednisone to 15 mg daily dose till next visit     BMP next week - please order

## 2022-08-01 NOTE — TELEPHONE ENCOUNTER
Spoke with pt's wife, discussed below message. Pt verbalizes understanding. [employer] message sent to patient as requested.

## 2022-08-02 NOTE — TELEPHONE ENCOUNTER
Dr. Dayana Wong, pt's wife requesting if he should be taking calcitriol daily or every other day. Per LOV note 7/27 \" Change calcitriol to every other day \" [employer] message sent to patient instructing to take med every other day. Please let me know if any additional instructions, thank you!

## 2022-08-03 ENCOUNTER — PATIENT OUTREACH (OUTPATIENT)
Dept: CASE MANAGEMENT | Age: 77
End: 2022-08-03

## 2022-08-03 NOTE — PROGRESS NOTES
Called patient regarding CCM monthly outreach and left a message for patient to call back when they can. Reviewed patient chart. Left contact my contact number [phone].        Time Spent This Encounter Total: 3  min medical record review  Monthly Minute Total including today: 3 minutes

## 2022-08-04 ENCOUNTER — TELEPHONE (OUTPATIENT)
Dept: CARDIOLOGY CLINIC | Facility: HOSPITAL | Age: 77
End: 2022-08-04

## 2022-08-04 ENCOUNTER — OFFICE VISIT (OUTPATIENT)
Dept: WOUND CARE | Facility: HOSPITAL | Age: 77
End: 2022-08-04
Attending: NURSE PRACTITIONER
Payer: MEDICARE

## 2022-08-04 VITALS
TEMPERATURE: 98 F | SYSTOLIC BLOOD PRESSURE: 106 MMHG | HEART RATE: 85 BPM | OXYGEN SATURATION: 97 % | DIASTOLIC BLOOD PRESSURE: 68 MMHG | RESPIRATION RATE: 17 BRPM

## 2022-08-04 DIAGNOSIS — S91.002D OPEN WOUND OF LEFT ANKLE, SUBSEQUENT ENCOUNTER: ICD-10-CM

## 2022-08-04 DIAGNOSIS — S91.002S ANKLE WOUND, LEFT, SEQUELA: Primary | ICD-10-CM

## 2022-08-04 PROBLEM — D64.9 ANEMIA, UNSPECIFIED TYPE: Status: RESOLVED | Noted: 2021-02-22 | Resolved: 2022-01-01

## 2022-08-04 PROBLEM — D64.9 ANEMIA, UNSPECIFIED TYPE: Status: RESOLVED | Noted: 2021-02-22 | Resolved: 2022-08-04

## 2022-08-04 PROCEDURE — 99213 OFFICE O/P EST LOW 20 MIN: CPT | Performed by: NURSE PRACTITIONER

## 2022-08-07 DIAGNOSIS — E03.2 HYPOTHYROIDISM DUE TO MEDICATION: ICD-10-CM

## 2022-08-08 ENCOUNTER — NURSE ONLY (OUTPATIENT)
Dept: HEMATOLOGY/ONCOLOGY | Facility: HOSPITAL | Age: 77
End: 2022-08-08
Attending: INTERNAL MEDICINE
Payer: MEDICARE

## 2022-08-08 ENCOUNTER — PATIENT MESSAGE (OUTPATIENT)
Dept: NEPHROLOGY | Facility: CLINIC | Age: 77
End: 2022-08-08

## 2022-08-08 DIAGNOSIS — E87.2 METABOLIC ACIDOSIS: ICD-10-CM

## 2022-08-08 DIAGNOSIS — Z45.2 ENCOUNTER FOR CENTRAL LINE CARE: Primary | ICD-10-CM

## 2022-08-08 DIAGNOSIS — N17.9 AKI (ACUTE KIDNEY INJURY) (HCC): Primary | ICD-10-CM

## 2022-08-08 DIAGNOSIS — E83.51 HYPOCALCEMIA: ICD-10-CM

## 2022-08-08 DIAGNOSIS — N17.9 AKI (ACUTE KIDNEY INJURY) (HCC): ICD-10-CM

## 2022-08-08 DIAGNOSIS — N18.30 STAGE 3 CHRONIC KIDNEY DISEASE, UNSPECIFIED WHETHER STAGE 3A OR 3B CKD (HCC): ICD-10-CM

## 2022-08-08 LAB
ANION GAP SERPL CALC-SCNC: 13 MMOL/L (ref 0–18)
BUN BLD-MCNC: 82 MG/DL (ref 7–18)
BUN/CREAT SERPL: 17.7 (ref 10–20)
CALCIUM BLD-MCNC: 6.6 MG/DL (ref 8.5–10.1)
CHLORIDE SERPL-SCNC: 117 MMOL/L (ref 98–112)
CO2 SERPL-SCNC: 15 MMOL/L (ref 21–32)
CREAT BLD-MCNC: 4.64 MG/DL
GFR SERPLBLD BASED ON 1.73 SQ M-ARVRAT: 12 ML/MIN/1.73M2 (ref 60–?)
GLUCOSE BLD-MCNC: 84 MG/DL (ref 70–99)
OSMOLALITY SERPL CALC.SUM OF ELEC: 324 MOSM/KG (ref 275–295)
POTASSIUM SERPL-SCNC: 3.3 MMOL/L (ref 3.5–5.1)
SODIUM SERPL-SCNC: 145 MMOL/L (ref 136–145)

## 2022-08-08 PROCEDURE — 82040 ASSAY OF SERUM ALBUMIN: CPT

## 2022-08-08 PROCEDURE — 80048 BASIC METABOLIC PNL TOTAL CA: CPT

## 2022-08-08 PROCEDURE — 36591 DRAW BLOOD OFF VENOUS DEVICE: CPT

## 2022-08-08 RX ORDER — SODIUM CHLORIDE 9 MG/ML
10 INJECTION INTRAVENOUS ONCE
OUTPATIENT
Start: 2022-08-29

## 2022-08-08 RX ORDER — HEPARIN SODIUM (PORCINE) LOCK FLUSH IV SOLN 100 UNIT/ML 100 UNIT/ML
5 SOLUTION INTRAVENOUS ONCE
OUTPATIENT
Start: 2022-08-29

## 2022-08-08 RX ORDER — HEPARIN SODIUM (PORCINE) LOCK FLUSH IV SOLN 100 UNIT/ML 100 UNIT/ML
5 SOLUTION INTRAVENOUS ONCE
Status: COMPLETED | OUTPATIENT
Start: 2022-08-08 | End: 2022-08-08

## 2022-08-08 RX ADMIN — HEPARIN SODIUM (PORCINE) LOCK FLUSH IV SOLN 100 UNIT/ML 500 UNITS: 100 SOLUTION INTRAVENOUS at 11:45:00

## 2022-08-08 NOTE — TELEPHONE ENCOUNTER
From: Odessa Viramontes  To: Mary Rogers MD  Sent: 8/8/2022 12:42 PM CDT  Subject: creatine    This message is being sent by Linnea Flor on behalf of Odessa Viramontes. Gaston's test results are in my chart please let me know how you want me to proceed.

## 2022-08-09 LAB — ALBUMIN SERPL-MCNC: 2.4 G/DL (ref 3.4–5)

## 2022-08-09 RX ORDER — LEVOTHYROXINE SODIUM 0.12 MG/1
125 TABLET ORAL
Qty: 90 TABLET | Refills: 3 | Status: SHIPPED | OUTPATIENT
Start: 2022-08-09

## 2022-08-09 NOTE — TELEPHONE ENCOUNTER
LOV: 5/26/21    RTC:  Months    FU: No FU Appt Scheduled    Last Refill: 12/23/21    3 Month Supply Pending     Please advise when you'd like pt rtc

## 2022-08-10 NOTE — TELEPHONE ENCOUNTER
Hi!    Please contact patient and let him know that I have given him a 1 year's supply and check TSH and FT4 in 1 year. Thank you!

## 2022-08-10 NOTE — TELEPHONE ENCOUNTER
Called pt, pt's wife answered HIPAA verified.  Informed her of MD's message below, verbalized understanding with no further questions

## 2022-08-11 ENCOUNTER — NURSE ONLY (OUTPATIENT)
Dept: WOUND CARE | Facility: HOSPITAL | Age: 77
End: 2022-08-11
Attending: NURSE PRACTITIONER
Payer: MEDICARE

## 2022-08-11 VITALS
RESPIRATION RATE: 17 BRPM | HEART RATE: 77 BPM | OXYGEN SATURATION: 100 % | TEMPERATURE: 97 F | DIASTOLIC BLOOD PRESSURE: 55 MMHG | SYSTOLIC BLOOD PRESSURE: 105 MMHG

## 2022-08-11 DIAGNOSIS — S91.002D OPEN WOUND OF LEFT ANKLE, SUBSEQUENT ENCOUNTER: ICD-10-CM

## 2022-08-11 DIAGNOSIS — S91.002S ANKLE WOUND, LEFT, SEQUELA: ICD-10-CM

## 2022-08-11 PROCEDURE — 97607 NEG PRS WND THR NDME<=50SQCM: CPT

## 2022-08-12 NOTE — TELEPHONE ENCOUNTER
Spoke with wife - has diarrhea since last night     Instructed to hydrate patient     Has follow up appointment on Monday - will do the lab test prior to next visit

## 2022-08-15 ENCOUNTER — OFFICE VISIT (OUTPATIENT)
Dept: NEPHROLOGY | Facility: CLINIC | Age: 77
End: 2022-08-15
Payer: MEDICARE

## 2022-08-15 ENCOUNTER — NURSE ONLY (OUTPATIENT)
Dept: HEMATOLOGY/ONCOLOGY | Facility: HOSPITAL | Age: 77
End: 2022-08-15
Attending: INTERNAL MEDICINE
Payer: MEDICARE

## 2022-08-15 VITALS
HEART RATE: 71 BPM | BODY MASS INDEX: 24 KG/M2 | WEIGHT: 162 LBS | SYSTOLIC BLOOD PRESSURE: 102 MMHG | DIASTOLIC BLOOD PRESSURE: 55 MMHG

## 2022-08-15 VITALS — BODY MASS INDEX: 24 KG/M2 | WEIGHT: 162 LBS

## 2022-08-15 DIAGNOSIS — E83.51 HYPOCALCEMIA: ICD-10-CM

## 2022-08-15 DIAGNOSIS — E87.2 METABOLIC ACIDOSIS: ICD-10-CM

## 2022-08-15 DIAGNOSIS — N17.9 AKI (ACUTE KIDNEY INJURY) (HCC): Primary | ICD-10-CM

## 2022-08-15 DIAGNOSIS — N17.9 AKI (ACUTE KIDNEY INJURY) (HCC): ICD-10-CM

## 2022-08-15 DIAGNOSIS — N18.30 STAGE 3 CHRONIC KIDNEY DISEASE, UNSPECIFIED WHETHER STAGE 3A OR 3B CKD (HCC): ICD-10-CM

## 2022-08-15 DIAGNOSIS — E87.6 HYPOKALEMIA: ICD-10-CM

## 2022-08-15 DIAGNOSIS — Z45.2 ENCOUNTER FOR CENTRAL LINE CARE: Primary | ICD-10-CM

## 2022-08-15 LAB
ANION GAP SERPL CALC-SCNC: 10 MMOL/L (ref 0–18)
BUN BLD-MCNC: 87 MG/DL (ref 7–18)
BUN/CREAT SERPL: 19.9 (ref 10–20)
CALCIUM BLD-MCNC: 7.2 MG/DL (ref 8.5–10.1)
CHLORIDE SERPL-SCNC: 122 MMOL/L (ref 98–112)
CO2 SERPL-SCNC: 15 MMOL/L (ref 21–32)
CREAT BLD-MCNC: 4.37 MG/DL
GFR SERPLBLD BASED ON 1.73 SQ M-ARVRAT: 13 ML/MIN/1.73M2 (ref 60–?)
GLUCOSE BLD-MCNC: 75 MG/DL (ref 70–99)
OSMOLALITY SERPL CALC.SUM OF ELEC: 329 MOSM/KG (ref 275–295)
POTASSIUM SERPL-SCNC: 3.2 MMOL/L (ref 3.5–5.1)
SODIUM SERPL-SCNC: 147 MMOL/L (ref 136–145)

## 2022-08-15 PROCEDURE — 36591 DRAW BLOOD OFF VENOUS DEVICE: CPT

## 2022-08-15 PROCEDURE — 80048 BASIC METABOLIC PNL TOTAL CA: CPT

## 2022-08-15 PROCEDURE — 99215 OFFICE O/P EST HI 40 MIN: CPT | Performed by: INTERNAL MEDICINE

## 2022-08-15 RX ORDER — PREDNISONE 1 MG/1
5 TABLET ORAL DAILY
Qty: 30 TABLET | Refills: 1 | Status: SHIPPED | OUTPATIENT
Start: 2022-08-15

## 2022-08-15 RX ORDER — SODIUM CHLORIDE 9 MG/ML
10 INJECTION INTRAVENOUS ONCE
OUTPATIENT
Start: 2022-08-29

## 2022-08-15 RX ORDER — HEPARIN SODIUM (PORCINE) LOCK FLUSH IV SOLN 100 UNIT/ML 100 UNIT/ML
5 SOLUTION INTRAVENOUS ONCE
OUTPATIENT
Start: 2022-08-29

## 2022-08-15 RX ORDER — HEPARIN SODIUM (PORCINE) LOCK FLUSH IV SOLN 100 UNIT/ML 100 UNIT/ML
5 SOLUTION INTRAVENOUS ONCE
Status: COMPLETED | OUTPATIENT
Start: 2022-08-15 | End: 2022-08-15

## 2022-08-15 RX ADMIN — HEPARIN SODIUM (PORCINE) LOCK FLUSH IV SOLN 100 UNIT/ML 500 UNITS: 100 SOLUTION INTRAVENOUS at 08:17:00

## 2022-08-15 NOTE — PATIENT INSTRUCTIONS
Reduce prednisone to 10 mg for one week and then 5 mg after one week   Lab test next week   Follow up on september 12th at 11:50 am

## 2022-08-18 ENCOUNTER — NURSE ONLY (OUTPATIENT)
Dept: WOUND CARE | Facility: HOSPITAL | Age: 77
End: 2022-08-18
Attending: NURSE PRACTITIONER
Payer: MEDICARE

## 2022-08-18 ENCOUNTER — TELEPHONE (OUTPATIENT)
Dept: CARDIOLOGY CLINIC | Facility: HOSPITAL | Age: 77
End: 2022-08-18

## 2022-08-18 ENCOUNTER — PATIENT MESSAGE (OUTPATIENT)
Dept: NEPHROLOGY | Facility: CLINIC | Age: 77
End: 2022-08-18

## 2022-08-18 VITALS
HEART RATE: 99 BPM | RESPIRATION RATE: 17 BRPM | OXYGEN SATURATION: 99 % | TEMPERATURE: 97 F | SYSTOLIC BLOOD PRESSURE: 109 MMHG | DIASTOLIC BLOOD PRESSURE: 65 MMHG

## 2022-08-18 DIAGNOSIS — S91.002D OPEN WOUND OF LEFT ANKLE, SUBSEQUENT ENCOUNTER: ICD-10-CM

## 2022-08-18 DIAGNOSIS — S91.002S ANKLE WOUND, LEFT, SEQUELA: ICD-10-CM

## 2022-08-18 PROCEDURE — 97607 NEG PRS WND THR NDME<=50SQCM: CPT

## 2022-08-18 NOTE — TELEPHONE ENCOUNTER
From: Killian Stearns  To: Jose Munoz MD  Sent: 8/18/2022 1:28 PM CDT  Subject: Form med record [facility]    This message is being sent by Thomas Jerez on behalf of Killian Stearns. I have been informed by the [facility] that they sent you a letter requesting private medical record from your office for a claim I have in for my kidney disease and they have not received anything back. Can you please advise if you'll fill out these forms and send it back to them? They say I only have 10 days to get this back to them. They're fax # is [phone] for Brenda العراقي.

## 2022-08-18 NOTE — TELEPHONE ENCOUNTER
From: Tomasa Vance  Sent: 8/18/2022 2:45 PM CDT  To: [facility] Staff  Subject: Form med record [facility]    This message is being sent by Eduarda Crowder on behalf of Tomasa Vance. Can you just fax it to [phone] with Rafal Buckner on the papers.

## 2022-08-22 ENCOUNTER — NURSE ONLY (OUTPATIENT)
Dept: HEMATOLOGY/ONCOLOGY | Facility: HOSPITAL | Age: 77
End: 2022-08-22
Attending: INTERNAL MEDICINE
Payer: MEDICARE

## 2022-08-22 DIAGNOSIS — N18.30 STAGE 3 CHRONIC KIDNEY DISEASE, UNSPECIFIED WHETHER STAGE 3A OR 3B CKD (HCC): ICD-10-CM

## 2022-08-22 DIAGNOSIS — N17.9 AKI (ACUTE KIDNEY INJURY) (HCC): ICD-10-CM

## 2022-08-22 DIAGNOSIS — Z45.2 ENCOUNTER FOR CENTRAL LINE CARE: Primary | ICD-10-CM

## 2022-08-22 LAB
ALBUMIN SERPL-MCNC: 2.4 G/DL (ref 3.4–5)
ANION GAP SERPL CALC-SCNC: 10 MMOL/L (ref 0–18)
BILIRUB UR QL: NEGATIVE
BUN BLD-MCNC: 88 MG/DL (ref 7–18)
BUN/CREAT SERPL: 22.6 (ref 10–20)
CALCIUM BLD-MCNC: 7.7 MG/DL (ref 8.5–10.1)
CHLORIDE SERPL-SCNC: 123 MMOL/L (ref 98–112)
CO2 SERPL-SCNC: 14 MMOL/L (ref 21–32)
COLOR UR: YELLOW
CREAT BLD-MCNC: 3.89 MG/DL
GFR SERPLBLD BASED ON 1.73 SQ M-ARVRAT: 15 ML/MIN/1.73M2 (ref 60–?)
GLUCOSE BLD-MCNC: 85 MG/DL (ref 70–99)
GLUCOSE UR-MCNC: NEGATIVE MG/DL
KETONES UR-MCNC: NEGATIVE MG/DL
NITRITE UR QL STRIP.AUTO: NEGATIVE
OSMOLALITY SERPL CALC.SUM OF ELEC: 330 MOSM/KG (ref 275–295)
PH UR: 6 [PH] (ref 5–8)
PHOSPHATE SERPL-MCNC: 3.8 MG/DL (ref 2.5–4.9)
POTASSIUM SERPL-SCNC: 3.1 MMOL/L (ref 3.5–5.1)
PROT UR-MCNC: 30 MG/DL
SODIUM SERPL-SCNC: 147 MMOL/L (ref 136–145)
SP GR UR STRIP: 1.01 (ref 1–1.03)
UROBILINOGEN UR STRIP-ACNC: <2
VIT C UR-MCNC: NEGATIVE MG/DL
WBC #/AREA URNS AUTO: >50 /HPF
WBC CLUMPS UR QL AUTO: PRESENT /HPF

## 2022-08-22 PROCEDURE — 36591 DRAW BLOOD OFF VENOUS DEVICE: CPT

## 2022-08-22 PROCEDURE — 81001 URINALYSIS AUTO W/SCOPE: CPT

## 2022-08-22 PROCEDURE — 80069 RENAL FUNCTION PANEL: CPT

## 2022-08-22 RX ORDER — SODIUM CHLORIDE 9 MG/ML
10 INJECTION INTRAVENOUS ONCE
OUTPATIENT
Start: 2022-08-29

## 2022-08-22 RX ORDER — HEPARIN SODIUM (PORCINE) LOCK FLUSH IV SOLN 100 UNIT/ML 100 UNIT/ML
5 SOLUTION INTRAVENOUS ONCE
OUTPATIENT
Start: 2022-08-29

## 2022-08-22 RX ORDER — HEPARIN SODIUM (PORCINE) LOCK FLUSH IV SOLN 100 UNIT/ML 100 UNIT/ML
5 SOLUTION INTRAVENOUS ONCE
Status: COMPLETED | OUTPATIENT
Start: 2022-08-22 | End: 2022-08-22

## 2022-08-22 RX ADMIN — HEPARIN SODIUM (PORCINE) LOCK FLUSH IV SOLN 100 UNIT/ML 500 UNITS: 100 SOLUTION INTRAVENOUS at 13:43:00

## 2022-08-23 ENCOUNTER — PATIENT MESSAGE (OUTPATIENT)
Dept: NEPHROLOGY | Facility: CLINIC | Age: 77
End: 2022-08-23

## 2022-08-23 ENCOUNTER — PATIENT OUTREACH (OUTPATIENT)
Dept: CASE MANAGEMENT | Age: 77
End: 2022-08-23

## 2022-08-23 DIAGNOSIS — E83.51 HYPOCALCEMIA: ICD-10-CM

## 2022-08-23 DIAGNOSIS — E87.6 HYPOKALEMIA: ICD-10-CM

## 2022-08-23 DIAGNOSIS — N17.9 AKI (ACUTE KIDNEY INJURY) (HCC): Primary | ICD-10-CM

## 2022-08-23 DIAGNOSIS — E87.2 METABOLIC ACIDOSIS: ICD-10-CM

## 2022-08-23 RX ORDER — CALCITRIOL 0.25 UG/1
0.25 CAPSULE, LIQUID FILLED ORAL EVERY OTHER DAY
Qty: 30 CAPSULE | Refills: 0 | Status: SHIPPED | OUTPATIENT
Start: 2022-08-23

## 2022-08-24 NOTE — TELEPHONE ENCOUNTER
From: Talon Green  To: Kaye Wyatt MD  Sent: 8/23/2022 9:14 AM CDT  Subject: Blood test    This message is being sent by Zeb Nuñez on behalf of Talon Green. We took a blood test yesterday and we seemed to be heading in the right direction I think. Can you put an order in for him to take another blood test on Monday so we can continue to monitor this?

## 2022-08-24 NOTE — TELEPHONE ENCOUNTER
From: Yehuda Evans  To: Halie Brown MD  Sent: 8/23/2022 10:25 AM CDT  Subject: Medication refill    This message is being sent by Annie Vazquez on behalf of Yehuda Evans.     Sorry, I need a refill on Calcitriol 0.25MCG CAP STR

## 2022-08-25 ENCOUNTER — APPOINTMENT (OUTPATIENT)
Dept: WOUND CARE | Facility: HOSPITAL | Age: 77
End: 2022-08-25
Attending: NURSE PRACTITIONER
Payer: MEDICARE

## 2022-08-25 ENCOUNTER — HOSPITAL ENCOUNTER (OUTPATIENT)
Dept: CARDIOLOGY CLINIC | Facility: HOSPITAL | Age: 77
Discharge: HOME | End: 2022-08-25
Attending: INTERNAL MEDICINE
Payer: MEDICARE

## 2022-08-25 ENCOUNTER — TELEPHONE (OUTPATIENT)
Dept: NEPHROLOGY | Facility: CLINIC | Age: 77
End: 2022-08-25

## 2022-08-25 ENCOUNTER — HOSPITAL ENCOUNTER (OUTPATIENT)
Dept: LAB | Facility: HOSPITAL | Age: 77
Discharge: HOME | End: 2022-08-25
Attending: INTERNAL MEDICINE
Payer: MEDICARE

## 2022-08-25 DIAGNOSIS — I35.0 AORTIC STENOSIS: ICD-10-CM

## 2022-08-25 LAB
ANION GAP SERPL CALC-SCNC: 6 MMOL/L (ref 0–18)
BUN BLD-MCNC: 96 MG/DL (ref 7–18)
CALCIUM BLD-MCNC: 8.1 MG/DL (ref 8.5–10.1)
CHLORIDE SERPL-SCNC: 122 MMOL/L (ref 98–112)
CO2 SERPL-SCNC: 17 MMOL/L (ref 21–32)
CREAT BLD-MCNC: 3.8 MG/DL
FASTING STATUS PATIENT QL REPORTED: NO
GFR SERPLBLD BASED ON 1.73 SQ M-ARVRAT: 15 ML/MIN/1.73M2 (ref 60–?)
GLUCOSE BLD-MCNC: 94 MG/DL (ref 70–99)
MAGNESIUM SERPL-MCNC: 1.7 MG/DL (ref 1.6–2.6)
OSMOLALITY SERPL CALC.SUM OF ELEC: 330 MOSM/KG (ref 275–295)
PHOSPHATE SERPL-MCNC: 4 MG/DL (ref 2.5–4.9)
POTASSIUM SERPL-SCNC: 3.2 MMOL/L (ref 3.5–5.1)
SODIUM SERPL-SCNC: 145 MMOL/L (ref 136–145)

## 2022-08-25 PROCEDURE — 36415 COLL VENOUS BLD VENIPUNCTURE: CPT | Performed by: NURSE PRACTITIONER

## 2022-08-25 PROCEDURE — 80048 BASIC METABOLIC PNL TOTAL CA: CPT | Performed by: NURSE PRACTITIONER

## 2022-08-25 PROCEDURE — 99212 OFFICE O/P EST SF 10 MIN: CPT

## 2022-08-25 RX ORDER — POTASSIUM CHLORIDE 1.5 G/1.77G
20 POWDER, FOR SOLUTION ORAL DAILY
Qty: 30 EACH | Refills: 1 | Status: SHIPPED | OUTPATIENT
Start: 2022-08-25

## 2022-08-25 RX ORDER — POTASSIUM CHLORIDE 1500 MG/1
20 TABLET, FILM COATED, EXTENDED RELEASE ORAL DAILY
Qty: 30 TABLET | Refills: 1 | Status: SHIPPED | OUTPATIENT
Start: 2022-08-25 | End: 2022-09-24

## 2022-08-25 NOTE — TELEPHONE ENCOUNTER
Dr Gregg Gomez made aware of note below and advised pt wife to if any  test like contrast or dye  It is a risk for the pt kidney or is not good for the patient,pt wife informed ,verbalzied understanding ,asking if can do labs every week per Dr Gregg Gomez to keep the appt on 9/12/22 and no need to do labs  Every week.

## 2022-08-25 NOTE — TELEPHONE ENCOUNTER
Jluis Lobo  Informed pt wife (HIPPA verified) of note below,stated that pt seen a cardiologist about the plan for valve placement but need to see a nephrologist first ,pt wife is aware that you will be out of town,asking if it's fine to wait or was advised to see Dr Errol Delong.

## 2022-08-25 NOTE — TELEPHONE ENCOUNTER
His potassium is consistently low - pls start him on daily potassium 20 meq - ordered     Na remain high - increase water intake as have discussed     Kidney function are better    I am not in town for another 2 weeks - do lab test after 7-10 days - ordered

## 2022-08-25 NOTE — CONSULTS
[facility]    PATIENT'S NAME: Jodi Bray   ATTENDING PHYSICIAN: Mahendra Ho M.D.   CONSULTING PHYSICIAN: Eil King M.D. PATIENT ACCOUNT#:   [de-identified]    LOCATION:  [facility]  MEDICAL RECORD #:   [id number]       YOB: 1945  ADMISSION DATE:       08/25/2022      CONSULT DATE:  08/25/2022    REPORT OF CONSULTATION    CHIEF COMPLAINT:  Aortic stenosis. HISTORY OF PRESENT ILLNESS:  We were asked to assess this patient by Dr. Adam Drew for consideration of TAVR. The patient is a 75-year-old gentleman with a history of coronary artery disease with last PCI in 2004, metastatic lung cancer with both metastasis of his bones and apparently brain, chronic renal insufficiency, peripheral arterial disease with recurrent lower extremity ulcer, hypertension, hyperlipidemia, who was seen by Dr. Adam Drew recently. The patient has been complaining of exertional discomfort in his chest which he describes as a burning. This has been going on for the last 3 to 4 months or so. He has not had it at rest.  He also feels somewhat fatigued. An echocardiogram with Dopplers was done. This suggested the presence of severe aortic stenosis in the setting of low normal to mild LV dysfunction. Gradient across the aortic valve was about 40 mmHg. The patient was also noted to have moderate mitral insufficiency with at least mild aortic insufficiency. 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 This was felt to be perhaps related to his antibiotics. The patient's creatinine has slowly now decreased into the 4 range. His metastatic lung cancer apparently has been stable for the last 6 months with no progression of his initial lesion and his metastatic lesion based on assessment by Oncology.     Given his severe aortic stenosis, his fatigue, and his chest discomfort, he was referred for consideration of TAVR. According to his wife, the patient is able to take care of his activities of daily living. He has apparently slowed down over the last 3 months, yet can go up 1 flight of stairs and do most activities. He is limited by arthritis and edema in his right lower extremity. PAST MEDICAL HISTORY:    1. History of metastatic lung cancer with a solitary brain metastasis and multiple metastases to his bones, including ribs. He has had chemotherapy with 4 cycles of carboplatin, pemetrexed, and pembrolizumab. His cancer apparently has not been progressive over the last few months. 2.   History of coronary artery disease, status post PCI. Patient apparently had a PCI in 2004. I see no records of this that I can evaluate. 3.   History of hypertension, hyperlipidemia. Status post drainage of a malignant pericardial effusion here in September 2021 when his diagnosis was made of lung cancer. Renal insufficiency with details as described above. Cochlear implant. MEDICATIONS:  Medications currently include levothyroxine, pantoprazole, prednisone, sodium bicarbonate, iron, alprazolam, finasteride, terazosin, aspirin, cyclosporine (this is an ophthalmic emulsion), amlodipine, atorvastatin, and metoprolol. ALLERGIES:  The patient is allergic to Demerol and cephalosporin. SOCIAL HISTORY:  The patient currently is not abusing ethanol or tobacco.    FAMILY HISTORY:  Not significant for premature coronary artery disease. REVIEW OF SYSTEMS:  Negative other than as described above. PHYSICAL EXAMINATION:    GENERAL:  This is a pleasant, pleasant elderly man who is hard of hearing, but in no distress. VITAL SIGNS:  He is afebrile. Heart rate is approximately 70 and regular. Respiratory rate is 14. HEENT:  Normocephalic and atraumatic. NECK:  Supple. LUNGS:  Diminished breath sounds at the bases bilaterally. Respirations not labored.   HEART:  Regular rhythm with a 2 to 3 out of 6 systolic murmur. ABDOMEN:  Soft. EXTREMITIES:  Exam does not reveal evidence of cyanosis or clubbing. He has bilateral edema. NEUROLOGIC:  The patient is alert, oriented, but very hard of hearing. LABORATORY DATA:  The patient's echo available was reviewed. This reveals low-normal LV function with a mean gradient of close to 40 mmHg. DECISION-MAKING:  The patient is a 68-year-old gentleman with multiple complex medical issues, including metastatic brain cancer which apparently has been stable over the last 6 months, acute on chronic renal insufficiency which is slowly getting better, a small abdominal aortic aneurysm, and coronary artery disease. The patient clearly complains of exertional chest discomfort. Today, I had a long discussion with the patient and his wife regarding his current situation. I have explained the pathophysiology of aortic stenosis and potential treatment options, including TAVR, SAVR, and medical therapy. I explained to the patient and his wife that ideally to perform TAVR, we would like to have a life expectancy greater than 1 year and a relatively good functional status. The patient apparently also has advanced renal insufficiency with a creatinine at baseline now around 4. It.  I have explained to them that there is a high chance with testing that we could make his renal function worse, increasing the chance that he would require long-term dialysis. The patient apparently in the past has refused dialysis. I have explained to the patient that either the testing or the TAVR procedure itself could worsen his kidney function and we would be reluctant to proceed forward with TAVR if the patient will not consider dialysis. Also, we would need some assessment in terms of prognosis by his oncologist to assess his life expectancy. I have discussed in detail the risks, benefits, and alternatives of TAVR.   They understand that these include but are not limited to death, MI, CVA, vascular complications, pacemaker placement, and renal insufficiency leading to renal failure and long-term dialysis. As the patient has expressed a desire not to have dialysis, I have urged him to have this discussion again with his nephrologist.  If ultimately, the decision is to proceed forward with testing, I think the first thing the patient needs is a coronary angiogram.  I suspect he may have coronary disease leading to some of his symptoms. I have also advised him to meet with their oncologist to get an idea of the long-term prognosis and his life expectancy. I will speak to Dr. Carmina Garcia regarding the above. The patient and his wife will let us know which way they want to proceed. All their questions were answered.     Dictated By Lory Lacey M.D.  d: 08/25/2022 10:14:26  t: 08/25/2022 11:12:58  Job [id number]  AR/

## 2022-08-26 ENCOUNTER — OFFICE VISIT (OUTPATIENT)
Dept: WOUND CARE | Facility: HOSPITAL | Age: 77
End: 2022-08-26
Attending: NURSE PRACTITIONER
Payer: MEDICARE

## 2022-08-26 VITALS
HEART RATE: 80 BPM | RESPIRATION RATE: 20 BRPM | DIASTOLIC BLOOD PRESSURE: 62 MMHG | SYSTOLIC BLOOD PRESSURE: 114 MMHG | TEMPERATURE: 98 F | OXYGEN SATURATION: 98 %

## 2022-08-26 DIAGNOSIS — S91.002D OPEN WOUND OF LEFT ANKLE, SUBSEQUENT ENCOUNTER: Primary | ICD-10-CM

## 2022-08-26 PROCEDURE — 99213 OFFICE O/P EST LOW 20 MIN: CPT | Performed by: NURSE PRACTITIONER

## 2022-08-29 ENCOUNTER — PATIENT MESSAGE (OUTPATIENT)
Dept: PULMONOLOGY | Facility: CLINIC | Age: 77
End: 2022-08-29

## 2022-08-29 DIAGNOSIS — J44.9 CHRONIC OBSTRUCTIVE PULMONARY DISEASE, UNSPECIFIED COPD TYPE (HCC): Primary | ICD-10-CM

## 2022-08-29 NOTE — TELEPHONE ENCOUNTER
From: Odessa Viramontes  To: Michael Sherman MD  Sent: 8/29/2022 1:05 PM CDT  Subject: Prescription for Nebulizer    This message is being sent by Linnea Flor on behalf of Odessa Viramontes. I have been advised by [employer] that I need a prescription and a demographic sheet in order to place an order to get supplies for my nebulizer. If you could please fax that to them @ [phone] I would appreciate it.

## 2022-08-30 ENCOUNTER — TELEPHONE (OUTPATIENT)
Dept: HEMATOLOGY/ONCOLOGY | Facility: HOSPITAL | Age: 77
End: 2022-08-30

## 2022-08-30 ENCOUNTER — NURSE ONLY (OUTPATIENT)
Dept: HEMATOLOGY/ONCOLOGY | Facility: HOSPITAL | Age: 77
End: 2022-08-30
Attending: INTERNAL MEDICINE
Payer: MEDICARE

## 2022-08-30 DIAGNOSIS — N18.30 STAGE 3 CHRONIC KIDNEY DISEASE, UNSPECIFIED WHETHER STAGE 3A OR 3B CKD (HCC): ICD-10-CM

## 2022-08-30 DIAGNOSIS — C78.00 MALIGNANT NEOPLASM METASTATIC TO LUNG, UNSPECIFIED LATERALITY (HCC): ICD-10-CM

## 2022-08-30 DIAGNOSIS — R53.1 WEAKNESS: ICD-10-CM

## 2022-08-30 DIAGNOSIS — D64.9 ANEMIA, UNSPECIFIED TYPE: Primary | ICD-10-CM

## 2022-08-30 DIAGNOSIS — Z45.2 ENCOUNTER FOR CENTRAL LINE CARE: ICD-10-CM

## 2022-08-30 LAB
ANTIBODY SCREEN: NEGATIVE
BASOPHILS # BLD AUTO: 0.03 X10(3) UL (ref 0–0.2)
BASOPHILS NFR BLD AUTO: 0.4 %
DEPRECATED HBV CORE AB SER IA-ACNC: 671.9 NG/ML
DEPRECATED RDW RBC AUTO: 62.7 FL (ref 35.1–46.3)
EOSINOPHIL # BLD AUTO: 0.04 X10(3) UL (ref 0–0.7)
EOSINOPHIL NFR BLD AUTO: 0.5 %
ERYTHROCYTE [DISTWIDTH] IN BLOOD BY AUTOMATED COUNT: 17.6 % (ref 11–15)
HCT VFR BLD AUTO: 23.6 %
HGB BLD-MCNC: 7.2 G/DL
HGB RETIC QN AUTO: 35.7 PG (ref 28.2–36.6)
IMM GRANULOCYTES # BLD AUTO: 0.04 X10(3) UL (ref 0–1)
IMM GRANULOCYTES NFR BLD: 0.5 %
IMM RETICS NFR: 0.15 RATIO (ref 0.1–0.3)
IRON SATN MFR SERPL: 35 %
IRON SERPL-MCNC: 87 UG/DL
LYMPHOCYTES # BLD AUTO: 0.24 X10(3) UL (ref 1–4)
LYMPHOCYTES NFR BLD AUTO: 3 %
MCH RBC QN AUTO: 29.8 PG (ref 26–34)
MCHC RBC AUTO-ENTMCNC: 30.5 G/DL (ref 31–37)
MCV RBC AUTO: 97.5 FL
MONOCYTES # BLD AUTO: 0.23 X10(3) UL (ref 0.1–1)
MONOCYTES NFR BLD AUTO: 2.9 %
NEUTROPHILS # BLD AUTO: 7.37 X10 (3) UL (ref 1.5–7.7)
NEUTROPHILS # BLD AUTO: 7.37 X10(3) UL (ref 1.5–7.7)
NEUTROPHILS NFR BLD AUTO: 92.7 %
PLATELET # BLD AUTO: 138 10(3)UL (ref 150–450)
RBC # BLD AUTO: 2.42 X10(6)UL
RETICS # AUTO: 36.5 X10(3) UL (ref 22.5–147.5)
RETICS/RBC NFR AUTO: 1.5 %
RH BLOOD TYPE: POSITIVE
TIBC SERPL-MCNC: 249 UG/DL (ref 240–450)
TRANSFERRIN SERPL-MCNC: 167 MG/DL (ref 200–360)
WBC # BLD AUTO: 8 X10(3) UL (ref 4–11)

## 2022-08-30 PROCEDURE — 84466 ASSAY OF TRANSFERRIN: CPT

## 2022-08-30 PROCEDURE — 85045 AUTOMATED RETICULOCYTE COUNT: CPT

## 2022-08-30 PROCEDURE — 86901 BLOOD TYPING SEROLOGIC RH(D): CPT

## 2022-08-30 PROCEDURE — 86850 RBC ANTIBODY SCREEN: CPT

## 2022-08-30 PROCEDURE — 82728 ASSAY OF FERRITIN: CPT

## 2022-08-30 PROCEDURE — 85025 COMPLETE CBC W/AUTO DIFF WBC: CPT

## 2022-08-30 PROCEDURE — 86900 BLOOD TYPING SEROLOGIC ABO: CPT

## 2022-08-30 PROCEDURE — 82668 ASSAY OF ERYTHROPOIETIN: CPT

## 2022-08-30 PROCEDURE — 36591 DRAW BLOOD OFF VENOUS DEVICE: CPT

## 2022-08-30 PROCEDURE — 83540 ASSAY OF IRON: CPT

## 2022-08-30 RX ORDER — HEPARIN SODIUM (PORCINE) LOCK FLUSH IV SOLN 100 UNIT/ML 100 UNIT/ML
5 SOLUTION INTRAVENOUS ONCE
Status: COMPLETED | OUTPATIENT
Start: 2022-08-30 | End: 2022-08-30

## 2022-08-30 RX ORDER — SODIUM CHLORIDE 9 MG/ML
10 INJECTION INTRAVENOUS ONCE
OUTPATIENT
Start: 2022-09-26

## 2022-08-30 RX ORDER — HEPARIN SODIUM (PORCINE) LOCK FLUSH IV SOLN 100 UNIT/ML 100 UNIT/ML
5 SOLUTION INTRAVENOUS ONCE
Status: CANCELLED | OUTPATIENT
Start: 2022-09-26

## 2022-08-30 RX ADMIN — HEPARIN SODIUM (PORCINE) LOCK FLUSH IV SOLN 100 UNIT/ML 500 UNITS: 100 SOLUTION INTRAVENOUS at 14:04:00

## 2022-08-30 NOTE — TELEPHONE ENCOUNTER
Called wife. Hgb 7.3 yesterday and referred to oncology by cardiology. Per wife patient has been feeling more tired and SOB. Per wife patient has been having periodic diarrhea and having blood when wipes. Wife not sure about how much blood when wiping. Patient was referred to GI and has scheduled appointment on 9/2. Patient has been having gastric reflux also. Wife informed Dr. Noemi Ordonez reviewed labs. Patient to come in today for further labs and T+C. Patient scheduled for a transfusion on 9/1/22 at 7:30am. Wife verbalizes understanding.

## 2022-08-31 DIAGNOSIS — D64.9 ANEMIA, UNSPECIFIED TYPE: Primary | ICD-10-CM

## 2022-09-01 ENCOUNTER — HOSPITAL ENCOUNTER (OUTPATIENT)
Dept: CT IMAGING | Facility: HOSPITAL | Age: 77
Discharge: HOME | End: 2022-09-01
Attending: INTERNAL MEDICINE
Payer: MEDICARE

## 2022-09-01 ENCOUNTER — OFFICE VISIT (OUTPATIENT)
Dept: HEMATOLOGY/ONCOLOGY | Facility: HOSPITAL | Age: 77
End: 2022-09-01
Attending: INTERNAL MEDICINE
Payer: MEDICARE

## 2022-09-01 VITALS
RESPIRATION RATE: 16 BRPM | OXYGEN SATURATION: 96 % | DIASTOLIC BLOOD PRESSURE: 51 MMHG | HEART RATE: 65 BPM | SYSTOLIC BLOOD PRESSURE: 121 MMHG | TEMPERATURE: 98 F

## 2022-09-01 DIAGNOSIS — I31.31 MALIGNANT PERICARDIAL EFFUSION: ICD-10-CM

## 2022-09-01 DIAGNOSIS — D64.9 ANEMIA, UNSPECIFIED TYPE: Primary | ICD-10-CM

## 2022-09-01 DIAGNOSIS — Z45.2 ENCOUNTER FOR CENTRAL LINE CARE: ICD-10-CM

## 2022-09-01 DIAGNOSIS — C34.12 PRIMARY CANCER OF LEFT UPPER LOBE OF LUNG (HCC): ICD-10-CM

## 2022-09-01 DIAGNOSIS — C78.00 MALIGNANT NEOPLASM METASTATIC TO LUNG, UNSPECIFIED LATERALITY (HCC): ICD-10-CM

## 2022-09-01 DIAGNOSIS — C79.51 BONE METASTASIS (HCC): ICD-10-CM

## 2022-09-01 PROCEDURE — 71250 CT THORAX DX C-: CPT | Performed by: INTERNAL MEDICINE

## 2022-09-01 PROCEDURE — 36430 TRANSFUSION BLD/BLD COMPNT: CPT

## 2022-09-01 PROCEDURE — 74176 CT ABD & PELVIS W/O CONTRAST: CPT | Performed by: INTERNAL MEDICINE

## 2022-09-01 RX ORDER — HEPARIN SODIUM (PORCINE) LOCK FLUSH IV SOLN 100 UNIT/ML 100 UNIT/ML
5 SOLUTION INTRAVENOUS ONCE
OUTPATIENT
Start: 2022-09-26

## 2022-09-01 RX ORDER — HEPARIN SODIUM (PORCINE) LOCK FLUSH IV SOLN 100 UNIT/ML 100 UNIT/ML
SOLUTION INTRAVENOUS
Status: COMPLETED
Start: 2022-09-01 | End: 2022-09-01

## 2022-09-01 RX ORDER — SODIUM CHLORIDE 9 MG/ML
10 INJECTION INTRAVENOUS ONCE
OUTPATIENT
Start: 2022-09-26

## 2022-09-01 RX ORDER — HEPARIN SODIUM (PORCINE) LOCK FLUSH IV SOLN 100 UNIT/ML 100 UNIT/ML
5 SOLUTION INTRAVENOUS ONCE
Status: COMPLETED | OUTPATIENT
Start: 2022-09-01 | End: 2022-09-01

## 2022-09-01 RX ADMIN — HEPARIN SODIUM (PORCINE) LOCK FLUSH IV SOLN 100 UNIT/ML 500 UNITS: 100 SOLUTION INTRAVENOUS at 10:26:00

## 2022-09-01 NOTE — PROGRESS NOTES
Pt to infusion for 1 unit PRBCs ordered by Dr. Denette Hodgkins for Hgb of 7.2. Arrives ambulating with cane accompanied by wife. Per pt, he has received blood transfusion before. E-consent signed. Port accessed per protocol - present blood return noted. 1 unit PRBCs given, vitals monitored throughout infusion. Pt appeared to tolerate well. Port deaccessed and heparin locked - site covered with gauze and paper tape. Discharged from infusion ambulating with cane accompanied by wife. Copy of AVS provided.

## 2022-09-02 ENCOUNTER — TELEPHONE (OUTPATIENT)
Dept: SURGERY | Facility: HOSPITAL | Age: 77
End: 2022-09-02

## 2022-09-02 LAB — ERYTHROPOIETIN (EPO): 6 MU/ML

## 2022-09-02 NOTE — TELEPHONE ENCOUNTER
ASSESSMENT AND PLAN:   Tomasa Vance is a 76year old male with diarrhea. Patient with symptoms for 2 months. Noted no rectal bleeding, no abdominal pain. Patient iStage 4 lung cancer had Abx. C dif neg. CT non specific enteritis. Diarrhea better but with urgency incont. Colon 2016 polyps. Likely post infectious or Abx related. Clinically improving. Needs probiotics fiber. If not better mas need colonoscopy. 1. Start daily Metamucil and BID probiotic  2. Avoid fatty meals  3. If not better may need to return for outpatient colonoscopy with Bx.     Total time spent on patient care:  40 minutes    cc: Dr. Nannette Shea

## 2022-09-07 RX ORDER — SODIUM BICARBONATE 650 MG/1
650 TABLET ORAL 3 TIMES DAILY
Qty: 90 TABLET | Refills: 1 | Status: SHIPPED | OUTPATIENT
Start: 2022-09-07

## 2022-09-08 ENCOUNTER — NURSE ONLY (OUTPATIENT)
Dept: HEMATOLOGY/ONCOLOGY | Facility: HOSPITAL | Age: 77
End: 2022-09-08
Attending: INTERNAL MEDICINE
Payer: MEDICARE

## 2022-09-08 ENCOUNTER — OFFICE VISIT (OUTPATIENT)
Dept: HEMATOLOGY/ONCOLOGY | Facility: HOSPITAL | Age: 77
End: 2022-09-08
Attending: INTERNAL MEDICINE

## 2022-09-08 VITALS
BODY MASS INDEX: 22.36 KG/M2 | SYSTOLIC BLOOD PRESSURE: 119 MMHG | OXYGEN SATURATION: 100 % | RESPIRATION RATE: 16 BRPM | DIASTOLIC BLOOD PRESSURE: 51 MMHG | HEIGHT: 69 IN | HEART RATE: 69 BPM | WEIGHT: 151 LBS | TEMPERATURE: 98 F

## 2022-09-08 DIAGNOSIS — Z45.2 ENCOUNTER FOR CENTRAL LINE CARE: Primary | ICD-10-CM

## 2022-09-08 DIAGNOSIS — Z29.8 ENCOUNTER FOR IMMUNOTHERAPY: ICD-10-CM

## 2022-09-08 DIAGNOSIS — Z01.812 PRE-PROCEDURAL LABORATORY EXAMINATION: ICD-10-CM

## 2022-09-08 DIAGNOSIS — C80.1 MALIGNANT PERICARDIAL EFFUSION (HCC): ICD-10-CM

## 2022-09-08 DIAGNOSIS — D53.9 MACROCYTIC ANEMIA: ICD-10-CM

## 2022-09-08 DIAGNOSIS — C79.31 BRAIN METASTASIS (HCC): ICD-10-CM

## 2022-09-08 DIAGNOSIS — E87.2 METABOLIC ACIDOSIS: ICD-10-CM

## 2022-09-08 DIAGNOSIS — K63.89 MESENTERIC MASS: Primary | ICD-10-CM

## 2022-09-08 DIAGNOSIS — D64.9 ANEMIA, UNSPECIFIED TYPE: ICD-10-CM

## 2022-09-08 DIAGNOSIS — N17.9 ACUTE RENAL FAILURE (ARF) (HCC): ICD-10-CM

## 2022-09-08 DIAGNOSIS — Z92.89 HISTORY OF IMMUNOTHERAPY: ICD-10-CM

## 2022-09-08 DIAGNOSIS — E87.6 HYPOKALEMIA: ICD-10-CM

## 2022-09-08 DIAGNOSIS — C34.12 PRIMARY CANCER OF LEFT UPPER LOBE OF LUNG (HCC): Primary | ICD-10-CM

## 2022-09-08 DIAGNOSIS — C78.02 MALIGNANT NEOPLASM METASTATIC TO BOTH LUNGS (HCC): ICD-10-CM

## 2022-09-08 DIAGNOSIS — R19.7 DIARRHEA, UNSPECIFIED TYPE: ICD-10-CM

## 2022-09-08 DIAGNOSIS — C78.01 MALIGNANT NEOPLASM METASTATIC TO BOTH LUNGS (HCC): ICD-10-CM

## 2022-09-08 DIAGNOSIS — C34.12 PRIMARY CANCER OF LEFT UPPER LOBE OF LUNG (HCC): ICD-10-CM

## 2022-09-08 DIAGNOSIS — I31.3 MALIGNANT PERICARDIAL EFFUSION (HCC): ICD-10-CM

## 2022-09-08 DIAGNOSIS — N17.9 AKI (ACUTE KIDNEY INJURY) (HCC): ICD-10-CM

## 2022-09-08 DIAGNOSIS — C79.51 BONE METASTASIS (HCC): ICD-10-CM

## 2022-09-08 DIAGNOSIS — N18.9 ANEMIA DUE TO CHRONIC KIDNEY DISEASE, UNSPECIFIED CKD STAGE: ICD-10-CM

## 2022-09-08 DIAGNOSIS — D63.1 ANEMIA DUE TO CHRONIC KIDNEY DISEASE, UNSPECIFIED CKD STAGE: ICD-10-CM

## 2022-09-08 LAB
ANION GAP SERPL CALC-SCNC: 10 MMOL/L (ref 0–18)
ANTIBODY SCREEN: NEGATIVE
BASOPHILS # BLD AUTO: 0.02 X10(3) UL (ref 0–0.2)
BASOPHILS NFR BLD AUTO: 0.4 %
BUN BLD-MCNC: 87 MG/DL (ref 7–18)
BUN/CREAT SERPL: 21.7 (ref 10–20)
CALCIUM BLD-MCNC: 7.3 MG/DL (ref 8.5–10.1)
CHLORIDE SERPL-SCNC: 124 MMOL/L (ref 98–112)
CO2 SERPL-SCNC: 12 MMOL/L (ref 21–32)
CREAT BLD-MCNC: 4.01 MG/DL
DEPRECATED RDW RBC AUTO: 61.2 FL (ref 35.1–46.3)
EOSINOPHIL # BLD AUTO: 0.09 X10(3) UL (ref 0–0.7)
EOSINOPHIL NFR BLD AUTO: 1.7 %
ERYTHROCYTE [DISTWIDTH] IN BLOOD BY AUTOMATED COUNT: 16.8 % (ref 11–15)
FOLATE SERPL-MCNC: >20 NG/ML (ref 8.7–?)
GFR SERPLBLD BASED ON 1.73 SQ M-ARVRAT: 14 ML/MIN/1.73M2 (ref 60–?)
GLUCOSE BLD-MCNC: 79 MG/DL (ref 70–99)
HCT VFR BLD AUTO: 26.8 %
HGB BLD-MCNC: 8.1 G/DL
IMM GRANULOCYTES # BLD AUTO: 0.01 X10(3) UL (ref 0–1)
IMM GRANULOCYTES NFR BLD: 0.2 %
LYMPHOCYTES # BLD AUTO: 0.35 X10(3) UL (ref 1–4)
LYMPHOCYTES NFR BLD AUTO: 6.5 %
MCH RBC QN AUTO: 30.5 PG (ref 26–34)
MCHC RBC AUTO-ENTMCNC: 30.2 G/DL (ref 31–37)
MCV RBC AUTO: 100.8 FL
MONOCYTES # BLD AUTO: 0.34 X10(3) UL (ref 0.1–1)
MONOCYTES NFR BLD AUTO: 6.3 %
NEUTROPHILS # BLD AUTO: 4.59 X10 (3) UL (ref 1.5–7.7)
NEUTROPHILS # BLD AUTO: 4.59 X10(3) UL (ref 1.5–7.7)
NEUTROPHILS NFR BLD AUTO: 84.9 %
OSMOLALITY SERPL CALC.SUM OF ELEC: 327 MOSM/KG (ref 275–295)
PLATELET # BLD AUTO: 134 10(3)UL (ref 150–450)
POTASSIUM SERPL-SCNC: 4.3 MMOL/L (ref 3.5–5.1)
RBC # BLD AUTO: 2.66 X10(6)UL
RH BLOOD TYPE: POSITIVE
SODIUM SERPL-SCNC: 146 MMOL/L (ref 136–145)
VIT B12 SERPL-MCNC: 1039 PG/ML (ref 193–986)
WBC # BLD AUTO: 5.4 X10(3) UL (ref 4–11)

## 2022-09-08 PROCEDURE — 82607 VITAMIN B-12: CPT

## 2022-09-08 PROCEDURE — 82746 ASSAY OF FOLIC ACID SERUM: CPT

## 2022-09-08 PROCEDURE — 86900 BLOOD TYPING SEROLOGIC ABO: CPT

## 2022-09-08 PROCEDURE — 36591 DRAW BLOOD OFF VENOUS DEVICE: CPT

## 2022-09-08 PROCEDURE — 86850 RBC ANTIBODY SCREEN: CPT

## 2022-09-08 PROCEDURE — 80048 BASIC METABOLIC PNL TOTAL CA: CPT

## 2022-09-08 PROCEDURE — 86901 BLOOD TYPING SEROLOGIC RH(D): CPT

## 2022-09-08 PROCEDURE — 99215 OFFICE O/P EST HI 40 MIN: CPT | Performed by: INTERNAL MEDICINE

## 2022-09-08 PROCEDURE — 85025 COMPLETE CBC W/AUTO DIFF WBC: CPT

## 2022-09-08 RX ORDER — HEPARIN SODIUM (PORCINE) LOCK FLUSH IV SOLN 100 UNIT/ML 100 UNIT/ML
5 SOLUTION INTRAVENOUS ONCE
OUTPATIENT
Start: 2022-09-26

## 2022-09-08 RX ORDER — DIPHENOXYLATE HYDROCHLORIDE AND ATROPINE SULFATE 2.5; .025 MG/1; MG/1
1 TABLET ORAL 4 TIMES DAILY PRN
Qty: 30 TABLET | Refills: 0 | Status: SHIPPED | OUTPATIENT
Start: 2022-09-08

## 2022-09-08 RX ORDER — SODIUM CHLORIDE 9 MG/ML
10 INJECTION INTRAVENOUS ONCE
OUTPATIENT
Start: 2022-09-26

## 2022-09-08 RX ORDER — FOLIC ACID 1 MG/1
1 TABLET ORAL DAILY
Qty: 90 TABLET | Refills: 3 | Status: SHIPPED | OUTPATIENT
Start: 2022-09-08

## 2022-09-08 RX ORDER — HEPARIN SODIUM (PORCINE) LOCK FLUSH IV SOLN 100 UNIT/ML 100 UNIT/ML
5 SOLUTION INTRAVENOUS ONCE
Status: COMPLETED | OUTPATIENT
Start: 2022-09-08 | End: 2022-09-08

## 2022-09-08 RX ADMIN — HEPARIN SODIUM (PORCINE) LOCK FLUSH IV SOLN 100 UNIT/ML 500 UNITS: 100 SOLUTION INTRAVENOUS at 10:25:00

## 2022-09-09 ENCOUNTER — LAB ENCOUNTER (OUTPATIENT)
Dept: LAB | Facility: HOSPITAL | Age: 77
End: 2022-09-09
Attending: INTERNAL MEDICINE
Payer: MEDICARE

## 2022-09-09 ENCOUNTER — TELEPHONE (OUTPATIENT)
Dept: HEMATOLOGY/ONCOLOGY | Facility: HOSPITAL | Age: 77
End: 2022-09-09

## 2022-09-09 DIAGNOSIS — R19.7 DIARRHEA, UNSPECIFIED TYPE: ICD-10-CM

## 2022-09-09 PROCEDURE — 83497 ASSAY OF 5-HIAA: CPT

## 2022-09-09 NOTE — TELEPHONE ENCOUNTER
Pt. Was prescribed vancomycin by GI for Cdiff. Wanted to know if safe for him to take? States has had severe diarrhea for months now.

## 2022-09-09 NOTE — TELEPHONE ENCOUNTER
Please call Polly regarding a test Dr Noemi Ordonez recommended 9/8/22. She doesn't know if she should schedule the test/biopsy for the patient or if Marcia Anders does? Please call to clarify. Thank you.

## 2022-09-09 NOTE — TELEPHONE ENCOUNTER
Wife called and stated patient is positive for C diff that was done at with GI/ Dr. Nomi Lane. Wanted to let Dr. Vivienne Sharma know GI MD started patient on antibiotics. Informed would let Dr. Vivienne Sharma and should start antibiotics. Wife verbalizes understanding.

## 2022-09-09 NOTE — TELEPHONE ENCOUNTER
Returned phone call and notified IR will be calling to schedule biopsy. Wife verbalizes understanding.

## 2022-09-09 NOTE — TELEPHONE ENCOUNTER
Patient's wife is calling because patient was prescribed vancomycin and would like to know if it is safe for patient to start.

## 2022-09-11 ENCOUNTER — LAB ENCOUNTER (OUTPATIENT)
Dept: LAB | Facility: HOSPITAL | Age: 77
End: 2022-09-11
Attending: INTERNAL MEDICINE
Payer: MEDICARE

## 2022-09-11 DIAGNOSIS — Z01.812 PRE-PROCEDURAL LABORATORY EXAMINATION: ICD-10-CM

## 2022-09-12 ENCOUNTER — OFFICE VISIT (OUTPATIENT)
Dept: NEPHROLOGY | Facility: CLINIC | Age: 77
End: 2022-09-12
Payer: MEDICARE

## 2022-09-12 ENCOUNTER — APPOINTMENT (OUTPATIENT)
Dept: WOUND CARE | Facility: HOSPITAL | Age: 77
End: 2022-09-12
Attending: NURSE PRACTITIONER
Payer: MEDICARE

## 2022-09-12 ENCOUNTER — PATIENT OUTREACH (OUTPATIENT)
Dept: CASE MANAGEMENT | Age: 77
End: 2022-09-12

## 2022-09-12 VITALS
WEIGHT: 152 LBS | SYSTOLIC BLOOD PRESSURE: 124 MMHG | HEART RATE: 68 BPM | HEIGHT: 69 IN | DIASTOLIC BLOOD PRESSURE: 74 MMHG | BODY MASS INDEX: 22.51 KG/M2

## 2022-09-12 DIAGNOSIS — N18.30 STAGE 3 CHRONIC KIDNEY DISEASE, UNSPECIFIED WHETHER STAGE 3A OR 3B CKD (HCC): ICD-10-CM

## 2022-09-12 DIAGNOSIS — J42 CHRONIC BRONCHITIS, UNSPECIFIED CHRONIC BRONCHITIS TYPE (HCC): ICD-10-CM

## 2022-09-12 DIAGNOSIS — Z51.11 CHEMOTHERAPY MANAGEMENT, ENCOUNTER FOR: ICD-10-CM

## 2022-09-12 DIAGNOSIS — Z92.89 HISTORY OF IMMUNOTHERAPY: ICD-10-CM

## 2022-09-12 DIAGNOSIS — L89.524 PRESSURE INJURY OF LEFT ANKLE, STAGE 4 (HCC): ICD-10-CM

## 2022-09-12 DIAGNOSIS — N17.9 ACUTE RENAL INJURY (HCC): ICD-10-CM

## 2022-09-12 DIAGNOSIS — N05.9 NEPHRITIS: ICD-10-CM

## 2022-09-12 DIAGNOSIS — N17.9 AKI (ACUTE KIDNEY INJURY) (HCC): ICD-10-CM

## 2022-09-12 DIAGNOSIS — E87.2 METABOLIC ACIDOSIS: ICD-10-CM

## 2022-09-12 DIAGNOSIS — R09.02 HYPOXIA: ICD-10-CM

## 2022-09-12 DIAGNOSIS — N17.9 AKI (ACUTE KIDNEY INJURY) (HCC): Primary | ICD-10-CM

## 2022-09-12 DIAGNOSIS — Z29.8 ENCOUNTER FOR IMMUNOTHERAPY: ICD-10-CM

## 2022-09-12 DIAGNOSIS — C79.51 BONE METASTASIS (HCC): ICD-10-CM

## 2022-09-12 DIAGNOSIS — C34.12 PRIMARY CANCER OF LEFT UPPER LOBE OF LUNG (HCC): ICD-10-CM

## 2022-09-12 DIAGNOSIS — D64.9 ANEMIA, UNSPECIFIED TYPE: ICD-10-CM

## 2022-09-12 DIAGNOSIS — N10 AIN (ACUTE INTERSTITIAL NEPHRITIS): ICD-10-CM

## 2022-09-12 DIAGNOSIS — C79.31 BRAIN METASTASIS (HCC): ICD-10-CM

## 2022-09-12 DIAGNOSIS — E87.6 HYPOKALEMIA: ICD-10-CM

## 2022-09-12 DIAGNOSIS — N14.2: ICD-10-CM

## 2022-09-12 DIAGNOSIS — E03.2 HYPOTHYROIDISM DUE TO MEDICATION: ICD-10-CM

## 2022-09-12 DIAGNOSIS — E87.5 HYPERKALEMIA: ICD-10-CM

## 2022-09-12 LAB
5-HIAA URINE - PER 24H: 12 MG/D
5-HIAA URINE - PER VOLUME: 15.4 MG/L
5-HIAA URINE - RATIO TO CRT: 27 MG/GCR
CREATININE, URINE - PER 24H: 456 MG/D
CREATININE, URINE - PER VOLUME: 57 MG/DL
HOURS COLLECTED: 24 HR
SEROTONIN, SERUM: 781 NG/ML
TOTAL VOLUME: 800 ML

## 2022-09-12 PROCEDURE — 1126F AMNT PAIN NOTED NONE PRSNT: CPT | Performed by: INTERNAL MEDICINE

## 2022-09-12 PROCEDURE — 99215 OFFICE O/P EST HI 40 MIN: CPT | Performed by: INTERNAL MEDICINE

## 2022-09-12 RX ORDER — VANCOMYCIN HYDROCHLORIDE 250 MG/1
CAPSULE ORAL
COMMUNITY
Start: 2022-09-09

## 2022-09-12 RX ORDER — CALCITRIOL 0.25 UG/1
0.25 CAPSULE, LIQUID FILLED ORAL EVERY OTHER DAY
Qty: 30 CAPSULE | Refills: 0 | Status: SHIPPED | OUTPATIENT
Start: 2022-09-12

## 2022-09-13 ENCOUNTER — OFFICE VISIT (OUTPATIENT)
Dept: WOUND CARE | Facility: HOSPITAL | Age: 77
End: 2022-09-13
Attending: NURSE PRACTITIONER
Payer: MEDICARE

## 2022-09-13 VITALS
HEART RATE: 63 BPM | RESPIRATION RATE: 16 BRPM | TEMPERATURE: 98 F | OXYGEN SATURATION: 100 % | DIASTOLIC BLOOD PRESSURE: 54 MMHG | SYSTOLIC BLOOD PRESSURE: 104 MMHG

## 2022-09-13 DIAGNOSIS — S91.002D OPEN WOUND OF LEFT ANKLE, SUBSEQUENT ENCOUNTER: ICD-10-CM

## 2022-09-13 DIAGNOSIS — L89.524 PRESSURE INJURY OF LEFT ANKLE, STAGE 4 (HCC): Primary | ICD-10-CM

## 2022-09-13 LAB — SARS-COV-2 RNA RESP QL NAA+PROBE: DETECTED

## 2022-09-13 PROCEDURE — 99213 OFFICE O/P EST LOW 20 MIN: CPT | Performed by: NURSE PRACTITIONER

## 2022-09-14 ENCOUNTER — HOSPITAL ENCOUNTER (OUTPATIENT)
Dept: CT IMAGING | Facility: HOSPITAL | Age: 77
Discharge: HOME | End: 2022-09-14
Attending: INTERNAL MEDICINE
Payer: MEDICARE

## 2022-09-14 VITALS
OXYGEN SATURATION: 99 % | WEIGHT: 152 LBS | BODY MASS INDEX: 22.51 KG/M2 | HEART RATE: 70 BPM | DIASTOLIC BLOOD PRESSURE: 44 MMHG | SYSTOLIC BLOOD PRESSURE: 120 MMHG | HEIGHT: 69 IN | RESPIRATION RATE: 20 BRPM

## 2022-09-14 DIAGNOSIS — K63.89 MESENTERIC MASS: ICD-10-CM

## 2022-09-14 PROCEDURE — 77012 CT SCAN FOR NEEDLE BIOPSY: CPT | Performed by: INTERNAL MEDICINE

## 2022-09-14 PROCEDURE — 88333 PATH CONSLTJ SURG CYTO XM 1: CPT | Performed by: INTERNAL MEDICINE

## 2022-09-14 PROCEDURE — 88334 PATH CONSLTJ SURG CYTO XM EA: CPT | Performed by: INTERNAL MEDICINE

## 2022-09-14 PROCEDURE — 49180 BIOPSY ABDOMINAL MASS: CPT | Performed by: INTERNAL MEDICINE

## 2022-09-14 PROCEDURE — 99152 MOD SED SAME PHYS/QHP 5/>YRS: CPT | Performed by: INTERNAL MEDICINE

## 2022-09-14 PROCEDURE — 88342 IMHCHEM/IMCYTCHM 1ST ANTB: CPT | Performed by: INTERNAL MEDICINE

## 2022-09-14 PROCEDURE — 88305 TISSUE EXAM BY PATHOLOGIST: CPT | Performed by: INTERNAL MEDICINE

## 2022-09-14 PROCEDURE — 88360 TUMOR IMMUNOHISTOCHEM/MANUAL: CPT | Performed by: INTERNAL MEDICINE

## 2022-09-14 PROCEDURE — 88341 IMHCHEM/IMCYTCHM EA ADD ANTB: CPT | Performed by: INTERNAL MEDICINE

## 2022-09-14 RX ORDER — HEPARIN SODIUM (PORCINE) LOCK FLUSH IV SOLN 100 UNIT/ML 100 UNIT/ML
500 SOLUTION INTRAVENOUS ONCE
Status: DISCONTINUED | OUTPATIENT
Start: 2022-09-14 | End: 2022-09-16

## 2022-09-14 RX ORDER — SODIUM CHLORIDE 9 MG/ML
INJECTION, SOLUTION INTRAVENOUS CONTINUOUS
Status: DISCONTINUED | OUTPATIENT
Start: 2022-09-14 | End: 2022-09-16

## 2022-09-14 RX ORDER — HEPARIN SODIUM (PORCINE) LOCK FLUSH IV SOLN 100 UNIT/ML 100 UNIT/ML
SOLUTION INTRAVENOUS
Status: DISCONTINUED
Start: 2022-09-14 | End: 2022-09-15

## 2022-09-14 RX ORDER — FLUMAZENIL 0.1 MG/ML
0.2 INJECTION, SOLUTION INTRAVENOUS AS NEEDED
Status: DISCONTINUED | OUTPATIENT
Start: 2022-09-14 | End: 2022-09-16

## 2022-09-14 RX ORDER — MIDAZOLAM HYDROCHLORIDE 1 MG/ML
1 INJECTION INTRAMUSCULAR; INTRAVENOUS EVERY 5 MIN PRN
Status: DISCONTINUED | OUTPATIENT
Start: 2022-09-14 | End: 2022-09-16

## 2022-09-14 RX ORDER — MIDAZOLAM HYDROCHLORIDE 1 MG/ML
INJECTION INTRAMUSCULAR; INTRAVENOUS
Status: COMPLETED
Start: 2022-09-14 | End: 2022-09-14

## 2022-09-14 RX ORDER — NALOXONE HYDROCHLORIDE 0.4 MG/ML
80 INJECTION, SOLUTION INTRAMUSCULAR; INTRAVENOUS; SUBCUTANEOUS AS NEEDED
Status: DISCONTINUED | OUTPATIENT
Start: 2022-09-14 | End: 2022-09-16

## 2022-09-14 RX ADMIN — MIDAZOLAM HYDROCHLORIDE 1 MG: 1 INJECTION INTRAMUSCULAR; INTRAVENOUS at 13:09:00

## 2022-09-14 NOTE — IMAGING NOTE
1510  Discharge instructions given; AVS provided. VSS. Bx site CDI. Patient denies pain at this time. Patient dressed and wheeled to car. Patient discharged comfortably and ambulatory with wife as .

## 2022-09-15 ENCOUNTER — TELEPHONE (OUTPATIENT)
Dept: RADIATION ONCOLOGY | Facility: HOSPITAL | Age: 77
End: 2022-09-15

## 2022-09-15 ENCOUNTER — HOSPITAL ENCOUNTER (OUTPATIENT)
Dept: CT IMAGING | Facility: HOSPITAL | Age: 77
Discharge: HOME | End: 2022-09-15
Attending: RADIOLOGY
Payer: MEDICARE

## 2022-09-15 DIAGNOSIS — C79.31 BRAIN METASTASIS (HCC): ICD-10-CM

## 2022-09-15 DIAGNOSIS — C79.31 BRAIN METASTASIS (HCC): Primary | ICD-10-CM

## 2022-09-15 PROCEDURE — 70450 CT HEAD/BRAIN W/O DYE: CPT | Performed by: RADIOLOGY

## 2022-09-15 NOTE — TELEPHONE ENCOUNTER
RN spoke to patient wife. Instructed on the below from Dr. Oralia Calderon states her understanding. No questions at this time. Can you let pt know head CT is fine- and next head CT can be in 9 months, or sooner if symptoms . Carley Soto  I will put in an order , thx

## 2022-09-16 ENCOUNTER — OFFICE VISIT (OUTPATIENT)
Dept: HEMATOLOGY/ONCOLOGY | Facility: HOSPITAL | Age: 77
End: 2022-09-16
Attending: INTERNAL MEDICINE
Payer: MEDICARE

## 2022-09-16 ENCOUNTER — OFFICE VISIT (OUTPATIENT)
Dept: HEMATOLOGY/ONCOLOGY | Facility: HOSPITAL | Age: 77
End: 2022-09-16
Attending: INTERNAL MEDICINE

## 2022-09-16 VITALS
SYSTOLIC BLOOD PRESSURE: 96 MMHG | TEMPERATURE: 98 F | OXYGEN SATURATION: 99 % | DIASTOLIC BLOOD PRESSURE: 55 MMHG | RESPIRATION RATE: 16 BRPM | HEART RATE: 65 BPM

## 2022-09-16 VITALS
OXYGEN SATURATION: 99 % | TEMPERATURE: 98 F | HEART RATE: 65 BPM | RESPIRATION RATE: 16 BRPM | SYSTOLIC BLOOD PRESSURE: 96 MMHG | DIASTOLIC BLOOD PRESSURE: 55 MMHG

## 2022-09-16 DIAGNOSIS — C7A.8 NEUROENDOCRINE CARCINOMA METASTATIC TO INTRA-ABDOMINAL LYMPH NODE (HCC): Primary | ICD-10-CM

## 2022-09-16 DIAGNOSIS — Z45.2 ENCOUNTER FOR CENTRAL LINE CARE: ICD-10-CM

## 2022-09-16 DIAGNOSIS — C7B.8 NEUROENDOCRINE CARCINOMA METASTATIC TO INTRA-ABDOMINAL LYMPH NODE (HCC): Primary | ICD-10-CM

## 2022-09-16 DIAGNOSIS — E87.2 METABOLIC ACIDOSIS: Primary | ICD-10-CM

## 2022-09-16 PROCEDURE — 96366 THER/PROPH/DIAG IV INF ADDON: CPT

## 2022-09-16 PROCEDURE — 99214 OFFICE O/P EST MOD 30 MIN: CPT | Performed by: INTERNAL MEDICINE

## 2022-09-16 PROCEDURE — 96365 THER/PROPH/DIAG IV INF INIT: CPT

## 2022-09-16 RX ORDER — SODIUM CHLORIDE 9 MG/ML
10 INJECTION INTRAVENOUS ONCE
OUTPATIENT
Start: 2022-09-29

## 2022-09-16 RX ORDER — HEPARIN SODIUM (PORCINE) LOCK FLUSH IV SOLN 100 UNIT/ML 100 UNIT/ML
SOLUTION INTRAVENOUS
Status: COMPLETED
Start: 2022-09-16 | End: 2022-09-16

## 2022-09-16 RX ORDER — HEPARIN SODIUM (PORCINE) LOCK FLUSH IV SOLN 100 UNIT/ML 100 UNIT/ML
5 SOLUTION INTRAVENOUS ONCE
Status: COMPLETED | OUTPATIENT
Start: 2022-09-16 | End: 2022-09-16

## 2022-09-16 RX ORDER — HEPARIN SODIUM (PORCINE) LOCK FLUSH IV SOLN 100 UNIT/ML 100 UNIT/ML
5 SOLUTION INTRAVENOUS ONCE
OUTPATIENT
Start: 2022-09-29

## 2022-09-16 RX ADMIN — HEPARIN SODIUM (PORCINE) LOCK FLUSH IV SOLN 100 UNIT/ML 500 UNITS: 100 SOLUTION INTRAVENOUS at 16:11:00

## 2022-09-16 NOTE — PROGRESS NOTES
Pt to infusion for 500 mL sodium bicarb. Arrives ambulating with rolling walker accompanied by spouse. Pt spouse brought paper from 8/18 showing pt's initial positive COVID test on that date from [facility]. Pt currently asymptomatic, had positive test on 9/11 prior to biopsy. Port accessed per protocol - present blood return. Sodium bicarb infused per order. Pt appeared to tolerate well. Pt had Dr. Princess Matthews visit during infusion. Port deaccessed and heparin locked - site covered with gauze and paper tape. Discharged to home ambulating with rolling walker.

## 2022-09-19 ENCOUNTER — TELEPHONE (OUTPATIENT)
Dept: HEMATOLOGY/ONCOLOGY | Facility: HOSPITAL | Age: 77
End: 2022-09-19

## 2022-09-19 NOTE — TELEPHONE ENCOUNTER
Returned call and notified follow up appointment scheduled 9/28/22 at [address] [place] [address].

## 2022-09-19 NOTE — TELEPHONE ENCOUNTER
Please call patient's wife. She wanted to schedule a follow up with Dr. Ivania Hickey for after the scan.

## 2022-09-20 ENCOUNTER — NURSE ONLY (OUTPATIENT)
Dept: HEMATOLOGY/ONCOLOGY | Facility: HOSPITAL | Age: 77
End: 2022-09-20
Attending: INTERNAL MEDICINE
Payer: MEDICARE

## 2022-09-20 DIAGNOSIS — N18.30 STAGE 3 CHRONIC KIDNEY DISEASE, UNSPECIFIED WHETHER STAGE 3A OR 3B CKD (HCC): ICD-10-CM

## 2022-09-20 DIAGNOSIS — N17.9 AKI (ACUTE KIDNEY INJURY) (HCC): ICD-10-CM

## 2022-09-20 DIAGNOSIS — E87.2 METABOLIC ACIDOSIS: ICD-10-CM

## 2022-09-20 DIAGNOSIS — Z45.2 ENCOUNTER FOR CENTRAL LINE CARE: Primary | ICD-10-CM

## 2022-09-20 LAB
ALBUMIN SERPL-MCNC: 2.7 G/DL (ref 3.4–5)
ANION GAP SERPL CALC-SCNC: 9 MMOL/L (ref 0–18)
BUN BLD-MCNC: 85 MG/DL (ref 7–18)
BUN/CREAT SERPL: 18.7 (ref 10–20)
CALCIUM BLD-MCNC: 7.1 MG/DL (ref 8.5–10.1)
CHLORIDE SERPL-SCNC: 121 MMOL/L (ref 98–112)
CO2 SERPL-SCNC: 15 MMOL/L (ref 21–32)
CREAT BLD-MCNC: 4.54 MG/DL
GFR SERPLBLD BASED ON 1.73 SQ M-ARVRAT: 12 ML/MIN/1.73M2 (ref 60–?)
GLUCOSE BLD-MCNC: 77 MG/DL (ref 70–99)
OSMOLALITY SERPL CALC.SUM OF ELEC: 325 MOSM/KG (ref 275–295)
PHOSPHATE SERPL-MCNC: 4.7 MG/DL (ref 2.5–4.9)
POTASSIUM SERPL-SCNC: 4.1 MMOL/L (ref 3.5–5.1)
SODIUM SERPL-SCNC: 145 MMOL/L (ref 136–145)

## 2022-09-20 PROCEDURE — 36415 COLL VENOUS BLD VENIPUNCTURE: CPT

## 2022-09-20 PROCEDURE — 80069 RENAL FUNCTION PANEL: CPT

## 2022-09-20 RX ORDER — HEPARIN SODIUM (PORCINE) LOCK FLUSH IV SOLN 100 UNIT/ML 100 UNIT/ML
5 SOLUTION INTRAVENOUS ONCE
Status: COMPLETED | OUTPATIENT
Start: 2022-09-20 | End: 2022-09-20

## 2022-09-20 RX ORDER — SODIUM CHLORIDE 9 MG/ML
10 INJECTION INTRAVENOUS ONCE
OUTPATIENT
Start: 2022-09-27

## 2022-09-20 RX ORDER — HEPARIN SODIUM (PORCINE) LOCK FLUSH IV SOLN 100 UNIT/ML 100 UNIT/ML
5 SOLUTION INTRAVENOUS ONCE
OUTPATIENT
Start: 2022-09-27

## 2022-09-20 RX ADMIN — HEPARIN SODIUM (PORCINE) LOCK FLUSH IV SOLN 100 UNIT/ML 500 UNITS: 100 SOLUTION INTRAVENOUS at 10:43:00

## 2022-09-26 ENCOUNTER — PATIENT MESSAGE (OUTPATIENT)
Dept: NEPHROLOGY | Facility: CLINIC | Age: 77
End: 2022-09-26

## 2022-09-27 ENCOUNTER — HOSPITAL ENCOUNTER (OUTPATIENT)
Dept: NUCLEAR MEDICINE | Facility: HOSPITAL | Age: 77
Discharge: HOME | End: 2022-09-27
Attending: INTERNAL MEDICINE

## 2022-09-27 DIAGNOSIS — C7B.8 NEUROENDOCRINE CARCINOMA METASTATIC TO INTRA-ABDOMINAL LYMPH NODE (HCC): ICD-10-CM

## 2022-09-27 DIAGNOSIS — C7A.8 NEUROENDOCRINE CARCINOMA METASTATIC TO INTRA-ABDOMINAL LYMPH NODE (HCC): ICD-10-CM

## 2022-09-27 PROCEDURE — 78815 PET IMAGE W/CT SKULL-THIGH: CPT | Performed by: INTERNAL MEDICINE

## 2022-09-28 ENCOUNTER — OFFICE VISIT (OUTPATIENT)
Dept: HEMATOLOGY/ONCOLOGY | Facility: HOSPITAL | Age: 77
End: 2022-09-28
Attending: INTERNAL MEDICINE
Payer: MEDICARE

## 2022-09-28 VITALS
WEIGHT: 156.38 LBS | TEMPERATURE: 97 F | OXYGEN SATURATION: 98 % | HEART RATE: 71 BPM | BODY MASS INDEX: 23.16 KG/M2 | RESPIRATION RATE: 18 BRPM | SYSTOLIC BLOOD PRESSURE: 113 MMHG | HEIGHT: 69 IN | DIASTOLIC BLOOD PRESSURE: 41 MMHG

## 2022-09-28 DIAGNOSIS — C79.51 BONE METASTASIS (HCC): ICD-10-CM

## 2022-09-28 DIAGNOSIS — C78.7 LIVER METASTASIS (HCC): ICD-10-CM

## 2022-09-28 DIAGNOSIS — C7A.8 NEUROENDOCRINE CARCINOMA METASTATIC TO INTRA-ABDOMINAL LYMPH NODE (HCC): Primary | ICD-10-CM

## 2022-09-28 DIAGNOSIS — C7B.8 NEUROENDOCRINE CARCINOMA METASTATIC TO INTRA-ABDOMINAL LYMPH NODE (HCC): Primary | ICD-10-CM

## 2022-09-28 PROCEDURE — 99215 OFFICE O/P EST HI 40 MIN: CPT | Performed by: INTERNAL MEDICINE

## 2022-09-28 PROCEDURE — 36591 DRAW BLOOD OFF VENOUS DEVICE: CPT

## 2022-09-29 NOTE — TELEPHONE ENCOUNTER
Worsening renal function - see if patient can come on Monday 10/3 at 9:10 am     gail cancel currently schedule patient at 9:10 am as she is in hospital

## 2022-09-29 NOTE — TELEPHONE ENCOUNTER
From: Branden Pepe  To: Sy Caballero MD  Sent: 9/26/2022 10:27 AM CDT  Subject: blood test    This message is being sent by Km Carpenter on behalf of Branden Pepe. Since we appear to have a handle on the diarrhea now that we found out what it is, is there any way we can get a blood test for [facility] for the creatine? We are scheduled for Wednesday for a blood test for doctor Sofia Hernandez I would like to try to add it to that.

## 2022-10-03 ENCOUNTER — OFFICE VISIT (OUTPATIENT)
Dept: NEPHROLOGY | Facility: CLINIC | Age: 77
End: 2022-10-03
Payer: MEDICARE

## 2022-10-03 ENCOUNTER — TELEPHONE (OUTPATIENT)
Dept: NEPHROLOGY | Facility: CLINIC | Age: 77
End: 2022-10-03

## 2022-10-03 VITALS
WEIGHT: 156 LBS | HEIGHT: 69 IN | HEART RATE: 62 BPM | DIASTOLIC BLOOD PRESSURE: 54 MMHG | SYSTOLIC BLOOD PRESSURE: 101 MMHG | BODY MASS INDEX: 23.11 KG/M2

## 2022-10-03 DIAGNOSIS — N18.5 CHRONIC KIDNEY DISEASE (CKD), STAGE V (HCC): ICD-10-CM

## 2022-10-03 DIAGNOSIS — E87.20 METABOLIC ACIDOSIS: ICD-10-CM

## 2022-10-03 DIAGNOSIS — N17.9 AKI (ACUTE KIDNEY INJURY) (HCC): Primary | ICD-10-CM

## 2022-10-03 PROCEDURE — 99215 OFFICE O/P EST HI 40 MIN: CPT | Performed by: INTERNAL MEDICINE

## 2022-10-03 RX ORDER — PREDNISONE 10 MG/1
10 TABLET ORAL DAILY
Qty: 30 TABLET | Refills: 1 | Status: SHIPPED | OUTPATIENT
Start: 2022-10-03

## 2022-10-03 RX ORDER — PREDNISONE 1 MG/1
5 TABLET ORAL DAILY
Qty: 30 TABLET | Refills: 1 | Status: SHIPPED | OUTPATIENT
Start: 2022-10-03

## 2022-10-03 NOTE — TELEPHONE ENCOUNTER
Patient saw Dr. Arcelia Null today and was advised to schedule a 4 week follow-up. There is no availability on Dr. Keiko Fields's schedule in that time frame. Please follow-up with patient.

## 2022-10-03 NOTE — PATIENT INSTRUCTIONS
Increase prednisone 15 mg daily till next visit   Lab test in 2 weeks   Follow up in 4 weeks   Hold pantoprazole

## 2022-10-04 NOTE — TELEPHONE ENCOUNTER
Dr Cassidy Flash please see note below scheduled pt on 11/10/22 ,pt wife asking if if this ok and if needed a labs prior to visit.

## 2022-10-06 ENCOUNTER — APPOINTMENT (OUTPATIENT)
Dept: HEMATOLOGY/ONCOLOGY | Facility: HOSPITAL | Age: 77
End: 2022-10-06
Attending: INTERNAL MEDICINE
Payer: MEDICARE

## 2022-10-06 ENCOUNTER — TELEPHONE (OUTPATIENT)
Dept: NEPHROLOGY | Facility: CLINIC | Age: 77
End: 2022-10-06

## 2022-10-06 ENCOUNTER — OFFICE VISIT (OUTPATIENT)
Dept: HEMATOLOGY/ONCOLOGY | Facility: HOSPITAL | Age: 77
End: 2022-10-06
Attending: NURSE PRACTITIONER
Payer: MEDICARE

## 2022-10-06 VITALS
RESPIRATION RATE: 18 BRPM | TEMPERATURE: 98 F | SYSTOLIC BLOOD PRESSURE: 101 MMHG | HEART RATE: 67 BPM | OXYGEN SATURATION: 98 % | DIASTOLIC BLOOD PRESSURE: 40 MMHG

## 2022-10-06 VITALS — BODY MASS INDEX: 23 KG/M2 | WEIGHT: 154.19 LBS

## 2022-10-06 DIAGNOSIS — N18.5 ANEMIA DUE TO STAGE 5 CHRONIC KIDNEY DISEASE, NOT ON CHRONIC DIALYSIS (HCC): ICD-10-CM

## 2022-10-06 DIAGNOSIS — C7A.8 NEUROENDOCRINE CARCINOMA METASTATIC TO INTRA-ABDOMINAL LYMPH NODE (HCC): Primary | ICD-10-CM

## 2022-10-06 DIAGNOSIS — C79.51 BONE METASTASIS (HCC): ICD-10-CM

## 2022-10-06 DIAGNOSIS — D63.1 ANEMIA DUE TO STAGE 5 CHRONIC KIDNEY DISEASE, NOT ON CHRONIC DIALYSIS (HCC): ICD-10-CM

## 2022-10-06 DIAGNOSIS — Z45.2 ENCOUNTER FOR CENTRAL LINE CARE: ICD-10-CM

## 2022-10-06 DIAGNOSIS — C78.7 LIVER METASTASIS (HCC): ICD-10-CM

## 2022-10-06 DIAGNOSIS — N17.9 AKI (ACUTE KIDNEY INJURY) (HCC): Primary | ICD-10-CM

## 2022-10-06 DIAGNOSIS — C7B.8 NEUROENDOCRINE CARCINOMA METASTATIC TO INTRA-ABDOMINAL LYMPH NODE (HCC): Primary | ICD-10-CM

## 2022-10-06 DIAGNOSIS — N18.30 STAGE 3 CHRONIC KIDNEY DISEASE, UNSPECIFIED WHETHER STAGE 3A OR 3B CKD (HCC): ICD-10-CM

## 2022-10-06 DIAGNOSIS — C34.12 PRIMARY CANCER OF LEFT UPPER LOBE OF LUNG (HCC): ICD-10-CM

## 2022-10-06 DIAGNOSIS — Z51.81 MEDICATION MONITORING ENCOUNTER: ICD-10-CM

## 2022-10-06 LAB
ALBUMIN SERPL-MCNC: 2.9 G/DL (ref 3.4–5)
ALBUMIN/GLOB SERPL: 0.9 {RATIO} (ref 1–2)
ALP LIVER SERPL-CCNC: 43 U/L
ALT SERPL-CCNC: 39 U/L
ANION GAP SERPL CALC-SCNC: 11 MMOL/L (ref 0–18)
AST SERPL-CCNC: 16 U/L (ref 15–37)
BASOPHILS # BLD AUTO: 0.02 X10(3) UL (ref 0–0.2)
BASOPHILS NFR BLD AUTO: 0.5 %
BILIRUB SERPL-MCNC: 0.3 MG/DL (ref 0.1–2)
BUN BLD-MCNC: 111 MG/DL (ref 7–18)
BUN/CREAT SERPL: 21.9 (ref 10–20)
CALCIUM BLD-MCNC: 7 MG/DL (ref 8.5–10.1)
CHLORIDE SERPL-SCNC: 120 MMOL/L (ref 98–112)
CO2 SERPL-SCNC: 13 MMOL/L (ref 21–32)
CREAT BLD-MCNC: 5.07 MG/DL
DEPRECATED RDW RBC AUTO: 53.6 FL (ref 35.1–46.3)
EOSINOPHIL # BLD AUTO: 0.07 X10(3) UL (ref 0–0.7)
EOSINOPHIL NFR BLD AUTO: 1.9 %
ERYTHROCYTE [DISTWIDTH] IN BLOOD BY AUTOMATED COUNT: 14.6 % (ref 11–15)
GFR SERPLBLD BASED ON 1.73 SQ M-ARVRAT: 11 ML/MIN/1.73M2 (ref 60–?)
GLOBULIN PLAS-MCNC: 3.2 G/DL (ref 2.8–4.4)
GLUCOSE BLD-MCNC: 100 MG/DL (ref 70–99)
HCT VFR BLD AUTO: 23.6 %
HGB BLD-MCNC: 7.4 G/DL
IMM GRANULOCYTES # BLD AUTO: 0.02 X10(3) UL (ref 0–1)
IMM GRANULOCYTES NFR BLD: 0.5 %
LYMPHOCYTES # BLD AUTO: 0.33 X10(3) UL (ref 1–4)
LYMPHOCYTES NFR BLD AUTO: 8.9 %
MCH RBC QN AUTO: 31.8 PG (ref 26–34)
MCHC RBC AUTO-ENTMCNC: 31.4 G/DL (ref 31–37)
MCV RBC AUTO: 101.3 FL
MONOCYTES # BLD AUTO: 0.36 X10(3) UL (ref 0.1–1)
MONOCYTES NFR BLD AUTO: 9.8 %
NEUTROPHILS # BLD AUTO: 2.89 X10 (3) UL (ref 1.5–7.7)
NEUTROPHILS # BLD AUTO: 2.89 X10(3) UL (ref 1.5–7.7)
NEUTROPHILS NFR BLD AUTO: 78.4 %
OSMOLALITY SERPL CALC.SUM OF ELEC: 333 MOSM/KG (ref 275–295)
PLATELET # BLD AUTO: 148 10(3)UL (ref 150–450)
POTASSIUM SERPL-SCNC: 3.8 MMOL/L (ref 3.5–5.1)
PROT SERPL-MCNC: 6.1 G/DL (ref 6.4–8.2)
RBC # BLD AUTO: 2.33 X10(6)UL
SODIUM SERPL-SCNC: 144 MMOL/L (ref 136–145)
WBC # BLD AUTO: 3.7 X10(3) UL (ref 4–11)

## 2022-10-06 PROCEDURE — 86316 IMMUNOASSAY TUMOR OTHER: CPT

## 2022-10-06 PROCEDURE — 36591 DRAW BLOOD OFF VENOUS DEVICE: CPT

## 2022-10-06 PROCEDURE — 96372 THER/PROPH/DIAG INJ SC/IM: CPT

## 2022-10-06 PROCEDURE — 85025 COMPLETE CBC W/AUTO DIFF WBC: CPT

## 2022-10-06 PROCEDURE — 80053 COMPREHEN METABOLIC PANEL: CPT

## 2022-10-06 RX ORDER — SODIUM CHLORIDE 9 MG/ML
10 INJECTION INTRAVENOUS ONCE
OUTPATIENT
Start: 2022-10-27

## 2022-10-06 RX ORDER — HEPARIN SODIUM (PORCINE) LOCK FLUSH IV SOLN 100 UNIT/ML 100 UNIT/ML
5 SOLUTION INTRAVENOUS ONCE
OUTPATIENT
Start: 2022-10-27

## 2022-10-06 RX ORDER — HEPARIN SODIUM (PORCINE) LOCK FLUSH IV SOLN 100 UNIT/ML 100 UNIT/ML
5 SOLUTION INTRAVENOUS ONCE
Status: COMPLETED | OUTPATIENT
Start: 2022-10-06 | End: 2022-10-06

## 2022-10-06 RX ADMIN — HEPARIN SODIUM (PORCINE) LOCK FLUSH IV SOLN 100 UNIT/ML 500 UNITS: 100 SOLUTION INTRAVENOUS at 12:18:00

## 2022-10-06 NOTE — PROGRESS NOTES
Pt here for his first octreotide dose - 20mg Q4 weeks, cycle 1. Had labs then education with Magdalena Garrett APN    Octreotide 20mg IM given right upper outer glut (sleeps on left hip, prefers right)  bandaid to site  Tolerated well    Discharged stable to home with future appts, clarifying 10/27 and 11/3 with office. 11/3 = octreotide due date  Per Magdalena Garrett - please leave both appts as they are presently. Gait slow, steady with quad cane.

## 2022-10-06 NOTE — TELEPHONE ENCOUNTER
Worsening renal function and bicarbonate level     Did he repeat the C-diff test ? How is diarrhea    See if infusion centre can arrange for IVF infusion tomm ?     Repeat BMP next week on Monday  - pls order

## 2022-10-06 NOTE — TELEPHONE ENCOUNTER
Informed patient wife of note below and verbalized understanding. stated had diarrhea Sunday took Imodium and pt is fine with soft stool,did not do a repeat Cdiff ,asking if lab can be done on Wed. Rn generated order for lab.

## 2022-10-06 NOTE — TELEPHONE ENCOUNTER
Dr Costa Raya made aware of note below and agreed for Wed blood test . Rn called infusion center Spoke to [facility] to schedule pt infusion on  Monday and she will call pt for the time and  informed pt wife. Rn faxed infusion order with confirmation.

## 2022-10-07 ENCOUNTER — PATIENT OUTREACH (OUTPATIENT)
Dept: CASE MANAGEMENT | Age: 77
End: 2022-10-07

## 2022-10-07 NOTE — PROGRESS NOTES
Called patient regarding ccm monthly and left a message for patient to call back when they can. Reviewed patient chart. Left contact my contact number [phone].        Time Spent This Encounter Total: 3  min medical record review  Monthly Minute Total including today: 3 minutes

## 2022-10-08 LAB — CHROMOGRANIN A: 436 NG/ML

## 2022-10-10 ENCOUNTER — OFFICE VISIT (OUTPATIENT)
Dept: HEMATOLOGY/ONCOLOGY | Facility: HOSPITAL | Age: 77
End: 2022-10-10
Attending: INTERNAL MEDICINE
Payer: MEDICARE

## 2022-10-10 ENCOUNTER — PATIENT MESSAGE (OUTPATIENT)
Dept: NEPHROLOGY | Facility: CLINIC | Age: 77
End: 2022-10-10

## 2022-10-10 VITALS
OXYGEN SATURATION: 98 % | TEMPERATURE: 98 F | SYSTOLIC BLOOD PRESSURE: 123 MMHG | RESPIRATION RATE: 18 BRPM | HEART RATE: 68 BPM | DIASTOLIC BLOOD PRESSURE: 54 MMHG

## 2022-10-10 DIAGNOSIS — N18.30 STAGE 3 CHRONIC KIDNEY DISEASE, UNSPECIFIED WHETHER STAGE 3A OR 3B CKD (HCC): Primary | ICD-10-CM

## 2022-10-10 DIAGNOSIS — E87.20 METABOLIC ACIDOSIS: Primary | ICD-10-CM

## 2022-10-10 PROCEDURE — 96365 THER/PROPH/DIAG IV INF INIT: CPT

## 2022-10-10 PROCEDURE — 96366 THER/PROPH/DIAG IV INF ADDON: CPT

## 2022-10-10 RX ORDER — HEPARIN SODIUM (PORCINE) LOCK FLUSH IV SOLN 100 UNIT/ML 100 UNIT/ML
SOLUTION INTRAVENOUS
Status: COMPLETED
Start: 2022-10-10 | End: 2022-10-10

## 2022-10-10 RX ORDER — SODIUM CHLORIDE 9 MG/ML
10 INJECTION INTRAVENOUS ONCE
OUTPATIENT
Start: 2022-10-24

## 2022-10-10 RX ORDER — HEPARIN SODIUM (PORCINE) LOCK FLUSH IV SOLN 100 UNIT/ML 100 UNIT/ML
5 SOLUTION INTRAVENOUS ONCE
OUTPATIENT
Start: 2022-10-24

## 2022-10-10 RX ADMIN — HEPARIN SODIUM (PORCINE) LOCK FLUSH IV SOLN 100 UNIT/ML: 100 SOLUTION INTRAVENOUS at 14:50:00

## 2022-10-10 NOTE — PROGRESS NOTES
Pt tolerated sodium bicarb infusion well today without incident or complaint.      Education Record    Learner:  Patient, spouse    Disease / Diagnosis: metabolic acidosis    Barriers / Limitations:  None   Comments:    Method:  Discussion   Comments:    General Topics:  Plan of care reviewed   Comments:    Outcome:  Shows understanding   Comments:

## 2022-10-11 NOTE — TELEPHONE ENCOUNTER
From: Dayana Garcia  To: Troy Holcomb MD  Sent: 10/10/2022 7:28 PM CDT  Subject: medication Pota Chloride    This message is being sent by Kurtis Coleman on behalf of Dayana Garcia. I have a question about the potassium chloride I thought that doctor Virgen Oats to take 2 A-day I picked up the prescription and it says one A-day can you please verify how much hes supposed to be taking per day per day. I also am not seeing that drug in my medication list to clarify.

## 2022-10-11 NOTE — TELEPHONE ENCOUNTER
Per OV note 10/3/22: - potassium improved to 4.1 - on kcl 20 meq . Cont. MyChart sent to clarify what he has been taking.

## 2022-10-12 ENCOUNTER — NURSE ONLY (OUTPATIENT)
Dept: HEMATOLOGY/ONCOLOGY | Facility: HOSPITAL | Age: 77
End: 2022-10-12
Attending: INTERNAL MEDICINE
Payer: MEDICARE

## 2022-10-12 DIAGNOSIS — N18.5 ANEMIA DUE TO STAGE 5 CHRONIC KIDNEY DISEASE, NOT ON CHRONIC DIALYSIS (HCC): ICD-10-CM

## 2022-10-12 DIAGNOSIS — N17.9 ACUTE RENAL FAILURE (ARF) (HCC): ICD-10-CM

## 2022-10-12 DIAGNOSIS — Z45.2 ENCOUNTER FOR CENTRAL LINE CARE: Primary | ICD-10-CM

## 2022-10-12 DIAGNOSIS — E87.20 METABOLIC ACIDOSIS: ICD-10-CM

## 2022-10-12 DIAGNOSIS — N17.9 AKI (ACUTE KIDNEY INJURY) (HCC): ICD-10-CM

## 2022-10-12 DIAGNOSIS — C34.12 PRIMARY CANCER OF LEFT UPPER LOBE OF LUNG (HCC): ICD-10-CM

## 2022-10-12 DIAGNOSIS — Z29.8 ENCOUNTER FOR IMMUNOTHERAPY: ICD-10-CM

## 2022-10-12 DIAGNOSIS — C79.31 BRAIN METASTASIS (HCC): ICD-10-CM

## 2022-10-12 DIAGNOSIS — D64.9 ANEMIA, UNSPECIFIED TYPE: ICD-10-CM

## 2022-10-12 DIAGNOSIS — N18.30 STAGE 3 CHRONIC KIDNEY DISEASE, UNSPECIFIED WHETHER STAGE 3A OR 3B CKD (HCC): ICD-10-CM

## 2022-10-12 DIAGNOSIS — D63.1 ANEMIA DUE TO STAGE 5 CHRONIC KIDNEY DISEASE, NOT ON CHRONIC DIALYSIS (HCC): ICD-10-CM

## 2022-10-12 LAB
ALBUMIN SERPL-MCNC: 2.7 G/DL (ref 3.4–5)
ANION GAP SERPL CALC-SCNC: 12 MMOL/L (ref 0–18)
ANION GAP SERPL CALC-SCNC: 12 MMOL/L (ref 0–18)
ANTIBODY SCREEN: NEGATIVE
BASOPHILS # BLD AUTO: 0.02 X10(3) UL (ref 0–0.2)
BASOPHILS NFR BLD AUTO: 0.3 %
BUN BLD-MCNC: 124 MG/DL (ref 7–18)
BUN BLD-MCNC: 124 MG/DL (ref 7–18)
BUN/CREAT SERPL: 25.4 (ref 10–20)
BUN/CREAT SERPL: 25.4 (ref 10–20)
CALCIUM BLD-MCNC: 6.3 MG/DL (ref 8.5–10.1)
CALCIUM BLD-MCNC: 6.3 MG/DL (ref 8.5–10.1)
CHLORIDE SERPL-SCNC: 117 MMOL/L (ref 98–112)
CHLORIDE SERPL-SCNC: 117 MMOL/L (ref 98–112)
CO2 SERPL-SCNC: 12 MMOL/L (ref 21–32)
CO2 SERPL-SCNC: 12 MMOL/L (ref 21–32)
CREAT BLD-MCNC: 4.88 MG/DL
CREAT BLD-MCNC: 4.88 MG/DL
DEPRECATED RDW RBC AUTO: 52.8 FL (ref 35.1–46.3)
EOSINOPHIL # BLD AUTO: 0.05 X10(3) UL (ref 0–0.7)
EOSINOPHIL NFR BLD AUTO: 0.8 %
ERYTHROCYTE [DISTWIDTH] IN BLOOD BY AUTOMATED COUNT: 14.6 % (ref 11–15)
GFR SERPLBLD BASED ON 1.73 SQ M-ARVRAT: 11 ML/MIN/1.73M2 (ref 60–?)
GFR SERPLBLD BASED ON 1.73 SQ M-ARVRAT: 11 ML/MIN/1.73M2 (ref 60–?)
GLUCOSE BLD-MCNC: 129 MG/DL (ref 70–99)
GLUCOSE BLD-MCNC: 129 MG/DL (ref 70–99)
HCT VFR BLD AUTO: 24 %
HGB BLD-MCNC: 7.5 G/DL
IMM GRANULOCYTES # BLD AUTO: 0.05 X10(3) UL (ref 0–1)
IMM GRANULOCYTES NFR BLD: 0.8 %
LYMPHOCYTES # BLD AUTO: 0.3 X10(3) UL (ref 1–4)
LYMPHOCYTES NFR BLD AUTO: 4.5 %
MCH RBC QN AUTO: 31.3 PG (ref 26–34)
MCHC RBC AUTO-ENTMCNC: 31.3 G/DL (ref 31–37)
MCV RBC AUTO: 100 FL
MONOCYTES # BLD AUTO: 0.55 X10(3) UL (ref 0.1–1)
MONOCYTES NFR BLD AUTO: 8.3 %
NEUTROPHILS # BLD AUTO: 5.69 X10 (3) UL (ref 1.5–7.7)
NEUTROPHILS # BLD AUTO: 5.69 X10(3) UL (ref 1.5–7.7)
NEUTROPHILS NFR BLD AUTO: 85.3 %
OSMOLALITY SERPL CALC.SUM OF ELEC: 333 MOSM/KG (ref 275–295)
OSMOLALITY SERPL CALC.SUM OF ELEC: 333 MOSM/KG (ref 275–295)
PHOSPHATE SERPL-MCNC: 6.5 MG/DL (ref 2.5–4.9)
PLATELET # BLD AUTO: 141 10(3)UL (ref 150–450)
POTASSIUM SERPL-SCNC: 3.6 MMOL/L (ref 3.5–5.1)
POTASSIUM SERPL-SCNC: 3.6 MMOL/L (ref 3.5–5.1)
RBC # BLD AUTO: 2.4 X10(6)UL
RH BLOOD TYPE: POSITIVE
SODIUM SERPL-SCNC: 141 MMOL/L (ref 136–145)
SODIUM SERPL-SCNC: 141 MMOL/L (ref 136–145)
WBC # BLD AUTO: 6.7 X10(3) UL (ref 4–11)

## 2022-10-12 PROCEDURE — 80069 RENAL FUNCTION PANEL: CPT

## 2022-10-12 PROCEDURE — 36591 DRAW BLOOD OFF VENOUS DEVICE: CPT

## 2022-10-12 PROCEDURE — 86900 BLOOD TYPING SEROLOGIC ABO: CPT

## 2022-10-12 PROCEDURE — 85025 COMPLETE CBC W/AUTO DIFF WBC: CPT

## 2022-10-12 PROCEDURE — 86901 BLOOD TYPING SEROLOGIC RH(D): CPT

## 2022-10-12 PROCEDURE — 86850 RBC ANTIBODY SCREEN: CPT

## 2022-10-12 RX ORDER — SODIUM CHLORIDE 9 MG/ML
10 INJECTION INTRAVENOUS ONCE
OUTPATIENT
Start: 2022-10-24

## 2022-10-12 RX ORDER — HEPARIN SODIUM (PORCINE) LOCK FLUSH IV SOLN 100 UNIT/ML 100 UNIT/ML
5 SOLUTION INTRAVENOUS ONCE
Status: COMPLETED | OUTPATIENT
Start: 2022-10-12 | End: 2022-10-12

## 2022-10-12 RX ORDER — HEPARIN SODIUM (PORCINE) LOCK FLUSH IV SOLN 100 UNIT/ML 100 UNIT/ML
5 SOLUTION INTRAVENOUS ONCE
OUTPATIENT
Start: 2022-10-24

## 2022-10-12 RX ADMIN — HEPARIN SODIUM (PORCINE) LOCK FLUSH IV SOLN 100 UNIT/ML 500 UNITS: 100 SOLUTION INTRAVENOUS at 10:57:00

## 2022-10-18 ENCOUNTER — PATIENT OUTREACH (OUTPATIENT)
Dept: CASE MANAGEMENT | Age: 77
End: 2022-10-18

## 2022-10-18 NOTE — PROGRESS NOTES
Called patient CCM monthly and left a message for patient to call back when they can. Reviewed patient chart. Left contact my contact number [phone].        Time Spent This Encounter Total: 3  min medical record review  Monthly Minute Total including today: 3 minutes

## 2022-10-20 DIAGNOSIS — C79.31 BRAIN METASTASIS (HCC): Primary | ICD-10-CM

## 2022-10-20 DIAGNOSIS — D64.9 ANEMIA, UNSPECIFIED TYPE: ICD-10-CM

## 2022-10-20 DIAGNOSIS — C34.12 PRIMARY CANCER OF LEFT UPPER LOBE OF LUNG (HCC): ICD-10-CM

## 2022-10-21 ENCOUNTER — TELEPHONE (OUTPATIENT)
Dept: NEPHROLOGY | Facility: CLINIC | Age: 77
End: 2022-10-21

## 2022-10-21 ENCOUNTER — NURSE ONLY (OUTPATIENT)
Dept: HEMATOLOGY/ONCOLOGY | Facility: HOSPITAL | Age: 77
End: 2022-10-21
Attending: INTERNAL MEDICINE
Payer: MEDICARE

## 2022-10-21 ENCOUNTER — HOSPITAL ENCOUNTER (INPATIENT)
Facility: HOSPITAL | Age: 77
LOS: 5 days | Discharge: HOME | End: 2022-10-26
Attending: EMERGENCY MEDICINE | Admitting: HOSPITALIST
Payer: MEDICARE

## 2022-10-21 DIAGNOSIS — N18.5 CHRONIC KIDNEY DISEASE (CKD), STAGE V (HCC): ICD-10-CM

## 2022-10-21 DIAGNOSIS — D64.9 ANEMIA, UNSPECIFIED TYPE: ICD-10-CM

## 2022-10-21 DIAGNOSIS — C34.12 PRIMARY CANCER OF LEFT UPPER LOBE OF LUNG (HCC): ICD-10-CM

## 2022-10-21 DIAGNOSIS — Z45.2 ENCOUNTER FOR CENTRAL LINE CARE: Primary | ICD-10-CM

## 2022-10-21 DIAGNOSIS — N17.9 ACUTE RENAL INJURY (HCC): ICD-10-CM

## 2022-10-21 DIAGNOSIS — N18.30 STAGE 3 CHRONIC KIDNEY DISEASE, UNSPECIFIED WHETHER STAGE 3A OR 3B CKD (HCC): ICD-10-CM

## 2022-10-21 DIAGNOSIS — E87.21 ACUTE METABOLIC ACIDOSIS: Primary | ICD-10-CM

## 2022-10-21 DIAGNOSIS — R19.7 DIARRHEA, UNSPECIFIED TYPE: ICD-10-CM

## 2022-10-21 DIAGNOSIS — N18.5 ANEMIA DUE TO STAGE 5 CHRONIC KIDNEY DISEASE, NOT ON CHRONIC DIALYSIS (HCC): ICD-10-CM

## 2022-10-21 DIAGNOSIS — D63.1 ANEMIA DUE TO STAGE 5 CHRONIC KIDNEY DISEASE, NOT ON CHRONIC DIALYSIS (HCC): ICD-10-CM

## 2022-10-21 PROBLEM — N18.9 ACUTE ON CHRONIC RENAL FAILURE (HCC): Status: ACTIVE | Noted: 2022-10-21

## 2022-10-21 PROBLEM — N18.9 ACUTE ON CHRONIC RENAL FAILURE: Status: ACTIVE | Noted: 2022-10-21

## 2022-10-21 PROBLEM — N18.9 ACUTE ON CHRONIC RENAL FAILURE: Status: ACTIVE | Noted: 2022-01-01

## 2022-10-21 LAB
ANION GAP SERPL CALC-SCNC: 15 MMOL/L (ref 0–18)
ANTIBODY SCREEN: NEGATIVE
BASOPHILS # BLD AUTO: 0.01 X10(3) UL (ref 0–0.2)
BASOPHILS NFR BLD AUTO: 0.1 %
BUN BLD-MCNC: 123 MG/DL (ref 7–18)
BUN/CREAT SERPL: 23.1 (ref 10–20)
CALCIUM BLD-MCNC: 6.2 MG/DL (ref 8.5–10.1)
CHLORIDE SERPL-SCNC: 119 MMOL/L (ref 98–112)
CO2 SERPL-SCNC: 7 MMOL/L (ref 21–32)
CREAT BLD-MCNC: 5.32 MG/DL
DEPRECATED RDW RBC AUTO: 53.1 FL (ref 35.1–46.3)
EOSINOPHIL # BLD AUTO: 0.01 X10(3) UL (ref 0–0.7)
EOSINOPHIL NFR BLD AUTO: 0.1 %
ERYTHROCYTE [DISTWIDTH] IN BLOOD BY AUTOMATED COUNT: 14.6 % (ref 11–15)
GFR SERPLBLD BASED ON 1.73 SQ M-ARVRAT: 10 ML/MIN/1.73M2 (ref 60–?)
GLUCOSE BLD-MCNC: 135 MG/DL (ref 70–99)
HCT VFR BLD AUTO: 24.7 %
HGB BLD-MCNC: 7.8 G/DL
IMM GRANULOCYTES # BLD AUTO: 0.06 X10(3) UL (ref 0–1)
IMM GRANULOCYTES NFR BLD: 0.5 %
LYMPHOCYTES # BLD AUTO: 0.19 X10(3) UL (ref 1–4)
LYMPHOCYTES NFR BLD AUTO: 1.7 %
MCH RBC QN AUTO: 31.3 PG (ref 26–34)
MCHC RBC AUTO-ENTMCNC: 31.6 G/DL (ref 31–37)
MCV RBC AUTO: 99.2 FL
MONOCYTES # BLD AUTO: 0.36 X10(3) UL (ref 0.1–1)
MONOCYTES NFR BLD AUTO: 3.1 %
NEUTROPHILS # BLD AUTO: 10.85 X10 (3) UL (ref 1.5–7.7)
NEUTROPHILS # BLD AUTO: 10.85 X10(3) UL (ref 1.5–7.7)
NEUTROPHILS NFR BLD AUTO: 94.5 %
OSMOLALITY SERPL CALC.SUM OF ELEC: 333 MOSM/KG (ref 275–295)
PLATELET # BLD AUTO: 180 10(3)UL (ref 150–450)
POTASSIUM SERPL-SCNC: 5.3 MMOL/L (ref 3.5–5.1)
RBC # BLD AUTO: 2.49 X10(6)UL
RH BLOOD TYPE: POSITIVE
SARS-COV-2 RNA RESP QL NAA+PROBE: NOT DETECTED
SODIUM SERPL-SCNC: 141 MMOL/L (ref 136–145)
WBC # BLD AUTO: 11.5 X10(3) UL (ref 4–11)

## 2022-10-21 PROCEDURE — 86900 BLOOD TYPING SEROLOGIC ABO: CPT

## 2022-10-21 PROCEDURE — 86850 RBC ANTIBODY SCREEN: CPT

## 2022-10-21 PROCEDURE — 99223 1ST HOSP IP/OBS HIGH 75: CPT | Performed by: HOSPITALIST

## 2022-10-21 PROCEDURE — 80048 BASIC METABOLIC PNL TOTAL CA: CPT

## 2022-10-21 PROCEDURE — 85025 COMPLETE CBC W/AUTO DIFF WBC: CPT

## 2022-10-21 PROCEDURE — 36591 DRAW BLOOD OFF VENOUS DEVICE: CPT

## 2022-10-21 PROCEDURE — 86901 BLOOD TYPING SEROLOGIC RH(D): CPT

## 2022-10-21 RX ORDER — HEPARIN SODIUM (PORCINE) LOCK FLUSH IV SOLN 100 UNIT/ML 100 UNIT/ML
5 SOLUTION INTRAVENOUS ONCE
Status: COMPLETED | OUTPATIENT
Start: 2022-10-21 | End: 2022-10-21

## 2022-10-21 RX ORDER — SODIUM BICARBONATE 150 MEQ/1,000 ML IN DEXTROSE 5 % INTRAVENOUS
100 SOLUTION CONTINUOUS
Status: DISCONTINUED | OUTPATIENT
Start: 2022-10-21 | End: 2022-10-21

## 2022-10-21 RX ORDER — ACETAMINOPHEN 500 MG
500 TABLET ORAL EVERY 4 HOURS PRN
Status: DISCONTINUED | OUTPATIENT
Start: 2022-10-21 | End: 2022-10-26

## 2022-10-21 RX ORDER — METOCLOPRAMIDE HYDROCHLORIDE 5 MG/ML
5 INJECTION INTRAMUSCULAR; INTRAVENOUS EVERY 8 HOURS PRN
Status: DISCONTINUED | OUTPATIENT
Start: 2022-10-21 | End: 2022-10-26

## 2022-10-21 RX ORDER — ONDANSETRON 2 MG/ML
4 INJECTION INTRAMUSCULAR; INTRAVENOUS EVERY 6 HOURS PRN
Status: DISCONTINUED | OUTPATIENT
Start: 2022-10-21 | End: 2022-10-26

## 2022-10-21 RX ORDER — HEPARIN SODIUM (PORCINE) LOCK FLUSH IV SOLN 100 UNIT/ML 100 UNIT/ML
5 SOLUTION INTRAVENOUS ONCE
OUTPATIENT
Start: 2022-10-24

## 2022-10-21 RX ORDER — HEPARIN SODIUM 5000 [USP'U]/ML
5000 INJECTION, SOLUTION INTRAVENOUS; SUBCUTANEOUS EVERY 12 HOURS SCHEDULED
Status: DISCONTINUED | OUTPATIENT
Start: 2022-10-21 | End: 2022-10-26

## 2022-10-21 RX ORDER — SODIUM BICARBONATE 150 MEQ/1,000 ML IN DEXTROSE 5 % INTRAVENOUS
42 SOLUTION CONTINUOUS
Status: DISCONTINUED | OUTPATIENT
Start: 2022-10-22 | End: 2022-10-23

## 2022-10-21 RX ORDER — SODIUM CHLORIDE 9 MG/ML
10 INJECTION INTRAVENOUS ONCE
OUTPATIENT
Start: 2022-10-24

## 2022-10-21 RX ADMIN — HEPARIN SODIUM (PORCINE) LOCK FLUSH IV SOLN 100 UNIT/ML 500 UNITS: 100 SOLUTION INTRAVENOUS at 13:50:00

## 2022-10-21 NOTE — ED INITIAL ASSESSMENT (HPI)
Patient brought in after abnormal blood work done that was done this morning. Patient sent in for critical CO2 of 7 and elevated creatinine. Patient also has hx of lung CA. Family states patient was sent to ED for admission.

## 2022-10-21 NOTE — TELEPHONE ENCOUNTER
Informed pt wife per Dr Karen El pt need to go to emergency due to abnormal blood test CO2 7.0,pt is acidotic,pt wife verbalized understanding.

## 2022-10-21 NOTE — H&P
[facility]    PATIENT'S NAME: Betty Adame   ATTENDING PHYSICIAN: Xavier Lee DO   PATIENT ACCOUNT#:   [de-identified]    LOCATION:  [place] [address]  MEDICAL RECORD #:   [id number]       YOB: 1945  ADMISSION DATE:       10/21/2022    HISTORY AND PHYSICAL EXAMINATION    CHIEF COMPLAINT:  Progressive renal failure and metabolic acidosis. 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 Prior to that, he was also found to have C difficile per his wife and was started on oral vancomycin. His diarrhea improved, and he has been receiving periodic sodium bicarb infusions when his bicarb level drops. Today, he had followup blood tests which showed BUN and creatinine of 123 and 5.3. GFR dropped to 10. Bicarb level is 7. CBC showed white blood cell count of 11.5 with left shift. Patient was sent to the emergency room department. 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 Recently, he was found to have abdominal retroperitoneal lymphadenopathy with liver metastases, biopsy proven, well-differentiated neuroendocrine tumor, and because of his chronic diarrhea, it was thought to be secondary to carcinoid syndrome. His urine HIAA was not elevated. Anyhow, he was started on octreotide infusions. Also, history of chronic left pleural effusion and malignant cardiac effusion requiring pericardiocentesis.   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 PAST SURGICAL HISTORY:  Left ankle open reduction and internal fixation, hernia repair, pericardiocentesis, right cochlear implant and liver biopsy. MEDICATIONS:  Please see medication reconciliation list.     ALLERGIES:  No known drug allergies. SOCIAL HISTORY:  Ex-tobacco user. No current tobacco, alcohol, or drug use. Lives with his wife. At baseline, independent in his basic activities of daily living. REVIEW OF SYSTEMS:  Patient said he is having 1 to 2 bowel movements a day. No diarrhea anymore. He feels lightheaded and dizzy sometimes. He feels his mouth is dry with metallic taste. He continues to produce a reasonable amount of urine every day. No abdominal pain. Other 12-point review of systems is negative. PHYSICAL EXAMINATION:    GENERAL:  Alert and oriented to time, place and person. Slightly cachectic. VITAL SIGNS:  Temperature 97.3, pulse 68, respiratory rate 24, blood pressure 106/47, pulse ox 99% on room air. HEENT:  Atraumatic. Oropharynx clear. Dry mucous membranes. Normal hard and soft palate. Eyes:  Anicteric sclerae. NECK:  Supple. No lymphadenopathy. Trachea midline. Full range of motion. LUNGS:  Clear to auscultation bilaterally. Normal respiratory effort. HEART:  Regular rate and rhythm. S1 and S2 auscultated. No murmur. ABDOMEN:  Soft, nondistended. No tenderness. Positive bowel sounds. EXTREMITIES:  No peripheral edema, clubbing or cyanosis. NEUROLOGIC:  Motor and sensory intact. ASSESSMENT:    1. Acute on chronic renal failure with progressive metabolic acidosis. 2.   Intravascular hypovolemia. 3.   Well-differentiated neuroendocrine tumor. 4.   History of stage IV non-small cell lung cancer adenocarcinoma.       PLAN:  Patient will be admitted to telemetry floor. Start him on sodium bicarb/D5 infusion. Obtain nephrology consult. Monitor his hemodynamic status. Hold his blood pressure medications. Fall precautions. Further recommendations to follow.      Dictated By John Nicole MD  d: 10/21/2022 18:05:14  t: 10/21/2022 18:23:01  [facility] [id number]  WO/

## 2022-10-22 LAB
ALBUMIN SERPL-MCNC: 2.5 G/DL (ref 3.4–5)
ANION GAP SERPL CALC-SCNC: 15 MMOL/L (ref 0–18)
BASOPHILS # BLD AUTO: 0 X10(3) UL (ref 0–0.2)
BASOPHILS NFR BLD AUTO: 0 %
BUN BLD-MCNC: 132 MG/DL (ref 7–18)
BUN/CREAT SERPL: 25.2 (ref 10–20)
C DIFF TOX B STL QL: POSITIVE
CALCIUM BLD-MCNC: 5.6 MG/DL (ref 8.5–10.1)
CHLORIDE SERPL-SCNC: 117 MMOL/L (ref 98–112)
CO2 SERPL-SCNC: 12 MMOL/L (ref 21–32)
CREAT BLD-MCNC: 5.24 MG/DL
DEPRECATED HBV CORE AB SER IA-ACNC: 957.1 NG/ML
DEPRECATED RDW RBC AUTO: 50.7 FL (ref 35.1–46.3)
EOSINOPHIL # BLD AUTO: 0.02 X10(3) UL (ref 0–0.7)
EOSINOPHIL NFR BLD AUTO: 0.2 %
ERYTHROCYTE [DISTWIDTH] IN BLOOD BY AUTOMATED COUNT: 14.4 % (ref 11–15)
GFR SERPLBLD BASED ON 1.73 SQ M-ARVRAT: 10 ML/MIN/1.73M2 (ref 60–?)
GLUCOSE BLD-MCNC: 180 MG/DL (ref 70–99)
HCT VFR BLD AUTO: 20.6 %
HGB BLD-MCNC: 6.7 G/DL
HGB BLD-MCNC: 7.4 G/DL
IMM GRANULOCYTES # BLD AUTO: 0.04 X10(3) UL (ref 0–1)
IMM GRANULOCYTES NFR BLD: 0.5 %
IRON SATN MFR SERPL: 23 %
IRON SERPL-MCNC: 40 UG/DL
LYMPHOCYTES # BLD AUTO: 0.19 X10(3) UL (ref 1–4)
LYMPHOCYTES NFR BLD AUTO: 2.3 %
MAGNESIUM SERPL-MCNC: 1.1 MG/DL (ref 1.6–2.6)
MCH RBC QN AUTO: 31.5 PG (ref 26–34)
MCHC RBC AUTO-ENTMCNC: 32.5 G/DL (ref 31–37)
MCV RBC AUTO: 96.7 FL
MONOCYTES # BLD AUTO: 0.36 X10(3) UL (ref 0.1–1)
MONOCYTES NFR BLD AUTO: 4.4 %
NEUTROPHILS # BLD AUTO: 7.63 X10 (3) UL (ref 1.5–7.7)
NEUTROPHILS # BLD AUTO: 7.63 X10(3) UL (ref 1.5–7.7)
NEUTROPHILS NFR BLD AUTO: 92.6 %
OSMOLALITY SERPL CALC.SUM OF ELEC: 345 MOSM/KG (ref 275–295)
PHOSPHATE SERPL-MCNC: 5.8 MG/DL (ref 2.5–4.9)
PLATELET # BLD AUTO: 173 10(3)UL (ref 150–450)
POTASSIUM SERPL-SCNC: 4.1 MMOL/L (ref 3.5–5.1)
RBC # BLD AUTO: 2.13 X10(6)UL
SODIUM SERPL-SCNC: 144 MMOL/L (ref 136–145)
TIBC SERPL-MCNC: 171 UG/DL (ref 240–450)
TRANSFERRIN SERPL-MCNC: 115 MG/DL (ref 200–360)
WBC # BLD AUTO: 8.2 X10(3) UL (ref 4–11)

## 2022-10-22 PROCEDURE — 30233N1 TRANSFUSION OF NONAUTOLOGOUS RED BLOOD CELLS INTO PERIPHERAL VEIN, PERCUTANEOUS APPROACH: ICD-10-PCS | Performed by: HOSPITALIST

## 2022-10-22 PROCEDURE — 99233 SBSQ HOSP IP/OBS HIGH 50: CPT | Performed by: HOSPITALIST

## 2022-10-22 PROCEDURE — 99223 1ST HOSP IP/OBS HIGH 75: CPT | Performed by: INTERNAL MEDICINE

## 2022-10-22 RX ORDER — ASPIRIN 81 MG/1
81 TABLET, CHEWABLE ORAL DAILY
Status: DISCONTINUED | OUTPATIENT
Start: 2022-10-22 | End: 2022-10-26

## 2022-10-22 RX ORDER — ALBUTEROL SULFATE 90 UG/1
2 AEROSOL, METERED RESPIRATORY (INHALATION) EVERY 6 HOURS PRN
Status: DISCONTINUED | OUTPATIENT
Start: 2022-10-22 | End: 2022-10-26

## 2022-10-22 RX ORDER — CALCITRIOL 0.25 UG/1
0.25 CAPSULE, LIQUID FILLED ORAL EVERY OTHER DAY
Status: DISCONTINUED | OUTPATIENT
Start: 2022-10-22 | End: 2022-10-22

## 2022-10-22 RX ORDER — IPRATROPIUM BROMIDE AND ALBUTEROL SULFATE 2.5; .5 MG/3ML; MG/3ML
3 SOLUTION RESPIRATORY (INHALATION)
Status: DISCONTINUED | OUTPATIENT
Start: 2022-10-23 | End: 2022-10-26

## 2022-10-22 RX ORDER — SODIUM CHLORIDE 9 MG/ML
INJECTION, SOLUTION INTRAVENOUS ONCE
Status: DISCONTINUED | OUTPATIENT
Start: 2022-10-22 | End: 2022-10-26

## 2022-10-22 RX ORDER — VANCOMYCIN HYDROCHLORIDE 125 MG/1
125 CAPSULE ORAL EVERY 6 HOURS
Status: DISCONTINUED | OUTPATIENT
Start: 2022-10-22 | End: 2022-10-26

## 2022-10-22 RX ORDER — ATORVASTATIN CALCIUM 20 MG/1
20 TABLET, FILM COATED ORAL NIGHTLY
Status: DISCONTINUED | OUTPATIENT
Start: 2022-10-22 | End: 2022-10-26

## 2022-10-22 RX ORDER — SODIUM BICARBONATE 650 MG/1
650 TABLET ORAL 3 TIMES DAILY
Status: DISCONTINUED | OUTPATIENT
Start: 2022-10-22 | End: 2022-10-26

## 2022-10-22 RX ORDER — ALPRAZOLAM 0.5 MG/1
0.5 TABLET ORAL 2 TIMES DAILY PRN
Status: DISCONTINUED | OUTPATIENT
Start: 2022-10-22 | End: 2022-10-26

## 2022-10-22 RX ORDER — FOLIC ACID 1 MG/1
1 TABLET ORAL DAILY
Status: DISCONTINUED | OUTPATIENT
Start: 2022-10-22 | End: 2022-10-26

## 2022-10-22 RX ORDER — IPRATROPIUM BROMIDE AND ALBUTEROL SULFATE 2.5; .5 MG/3ML; MG/3ML
3 SOLUTION RESPIRATORY (INHALATION)
Status: DISCONTINUED | OUTPATIENT
Start: 2022-10-22 | End: 2022-10-22

## 2022-10-22 RX ORDER — CHOLECALCIFEROL (VITAMIN D3) 125 MCG
1000 CAPSULE ORAL DAILY
Status: DISCONTINUED | OUTPATIENT
Start: 2022-10-22 | End: 2022-10-26

## 2022-10-22 RX ORDER — MELATONIN
2000 DAILY
Status: DISCONTINUED | OUTPATIENT
Start: 2022-10-22 | End: 2022-10-26

## 2022-10-22 RX ORDER — MAGNESIUM SULFATE 1 G/100ML
1 INJECTION INTRAVENOUS ONCE
Status: DISCONTINUED | OUTPATIENT
Start: 2022-10-22 | End: 2022-10-22

## 2022-10-22 RX ORDER — FINASTERIDE 5 MG/1
5 TABLET, FILM COATED ORAL DAILY
Status: DISCONTINUED | OUTPATIENT
Start: 2022-10-22 | End: 2022-10-26

## 2022-10-22 RX ORDER — IPRATROPIUM BROMIDE AND ALBUTEROL SULFATE 2.5; .5 MG/3ML; MG/3ML
3 SOLUTION RESPIRATORY (INHALATION) 2 TIMES DAILY
Status: DISCONTINUED | OUTPATIENT
Start: 2022-10-22 | End: 2022-10-22

## 2022-10-22 RX ORDER — LEVOTHYROXINE SODIUM 0.12 MG/1
125 TABLET ORAL
Status: DISCONTINUED | OUTPATIENT
Start: 2022-10-22 | End: 2022-10-26

## 2022-10-22 RX ORDER — MELATONIN
325
Status: DISCONTINUED | OUTPATIENT
Start: 2022-10-22 | End: 2022-10-26

## 2022-10-22 RX ORDER — TERAZOSIN 5 MG/1
5 CAPSULE ORAL DAILY
Status: DISCONTINUED | OUTPATIENT
Start: 2022-10-22 | End: 2022-10-26

## 2022-10-22 RX ORDER — PREDNISONE 10 MG/1
10 TABLET ORAL DAILY
Status: DISCONTINUED | OUTPATIENT
Start: 2022-10-23 | End: 2022-10-26

## 2022-10-22 RX ORDER — CALCITRIOL 0.25 UG/1
0.5 CAPSULE, LIQUID FILLED ORAL DAILY
Status: DISCONTINUED | OUTPATIENT
Start: 2022-10-22 | End: 2022-10-26

## 2022-10-22 NOTE — PLAN OF CARE
Problem: Patient Centered Care  Goal: Patient preferences are identified and integrated in the patient's plan of care  Description: Interventions:  - What would you like us to know as we care for you? From home with wife. - Provide timely, complete, and accurate information to patient/family  - Incorporate patient and family knowledge, values, beliefs, and cultural backgrounds into the planning and delivery of care  - Encourage patient/family to participate in care and decision-making at the level they choose  - Honor patient and family perspectives and choices  10/22/2022 1223 by Sylvia Kim RN  Outcome: Progressing  10/22/2022 1213 by Sylvia Kim RN  Outcome: Progressing     Problem: Patient/Family Goals  Goal: Patient/Family Long Term Goal  Description: Patient's Long Term Goal: To go home     Interventions:  - Follow MD orders  - PRN medications   - Fluids  - See additional Care Plan goals for specific interventions  10/22/2022 1223 by Sylvia Kim RN  Outcome: Progressing  10/22/2022 1213 by Sylvia Kim RN  Outcome: Progressing  Goal: Patient/Family Short Term Goal  Description: Patient's Short Term Goal: Management abnormal results    Interventions:   - Follow MD orders  - See additional Care Plan goals for specific interventions  10/22/2022 1223 by Sylvia Kim RN  Outcome: Progressing  10/22/2022 1213 by Sylvia Kim RN  Outcome: Progressing     Alert and oriented x3-4, forgetful. Very hard of hearing. Can be impulsive at times and get out of bed without using call light. Safety/fall precautions in place. Hgb 6.7, 1 unit of prbc transfused. Patient still experiencing loose frequent bm's, c. Diff +. Ambulatory 1 assist with rolling walker. Tolerating renal diet. Voiding freely, bladder scanned postvoid residual 30 ml. IV fluids. Will cover electrolytes per renal. Will continue to monitor.

## 2022-10-22 NOTE — PLAN OF CARE
Patient is alert and oriented to self, place, and situation; disoriented to time. Patient is hard of hearing; had to call wife to complete admission. Per wife patient has been having poor appetite; no loose stools. Patient ambulates to bathroom with cane just fine has been voiding. Planning on nephrology consult. Patient made comfortable; currently sleeping with call light in reach. Problem: Patient Centered Care  Goal: Patient preferences are identified and integrated in the patient's plan of care  Description: Interventions:  - What would you like us to know as we care for you? From home with wife.    - Provide timely, complete, and accurate information to patient/family  - Incorporate patient and family knowledge, values, beliefs, and cultural backgrounds into the planning and delivery of care  - Encourage patient/family to participate in care and decision-making at the level they choose  - Honor patient and family perspectives and choices  Outcome: Progressing     Problem: Patient/Family Goals  Goal: Patient/Family Long Term Goal  Description: Patient's Long Term Goal: To go home     Interventions:  - Follow MD orders  - PRN medications   - Fluids  - See additional Care Plan goals for specific interventions  Outcome: Progressing  Goal: Patient/Family Short Term Goal  Description: Patient's Short Term Goal: Management abnormal results    Interventions:   - Follow MD orders  - See additional Care Plan goals for specific interventions  Outcome: Progressing

## 2022-10-23 LAB
ANION GAP SERPL CALC-SCNC: 12 MMOL/L (ref 0–18)
BASOPHILS # BLD AUTO: 0 X10(3) UL (ref 0–0.2)
BASOPHILS NFR BLD AUTO: 0 %
BLOOD TYPE BARCODE: 5100
BLOOD TYPE BARCODE: 5100
BUN BLD-MCNC: 112 MG/DL (ref 7–18)
BUN/CREAT SERPL: 23.4 (ref 10–20)
CALCIUM BLD-MCNC: 5.5 MG/DL (ref 8.5–10.1)
CHLORIDE SERPL-SCNC: 112 MMOL/L (ref 98–112)
CO2 SERPL-SCNC: 19 MMOL/L (ref 21–32)
CREAT BLD-MCNC: 4.78 MG/DL
DEPRECATED RDW RBC AUTO: 49.8 FL (ref 35.1–46.3)
EOSINOPHIL # BLD AUTO: 0.02 X10(3) UL (ref 0–0.7)
EOSINOPHIL NFR BLD AUTO: 0.3 %
ERYTHROCYTE [DISTWIDTH] IN BLOOD BY AUTOMATED COUNT: 14.7 % (ref 11–15)
GFR SERPLBLD BASED ON 1.73 SQ M-ARVRAT: 12 ML/MIN/1.73M2 (ref 60–?)
GLUCOSE BLD-MCNC: 174 MG/DL (ref 70–99)
HCT VFR BLD AUTO: 20.6 %
HCT VFR BLD AUTO: 25.6 %
HGB BLD-MCNC: 6.9 G/DL
HGB BLD-MCNC: 8.3 G/DL
IMM GRANULOCYTES # BLD AUTO: 0.04 X10(3) UL (ref 0–1)
IMM GRANULOCYTES NFR BLD: 0.6 %
LYMPHOCYTES # BLD AUTO: 0.13 X10(3) UL (ref 1–4)
LYMPHOCYTES NFR BLD AUTO: 1.9 %
MAGNESIUM SERPL-MCNC: 1.6 MG/DL (ref 1.6–2.6)
MCH RBC QN AUTO: 31.4 PG (ref 26–34)
MCHC RBC AUTO-ENTMCNC: 33.5 G/DL (ref 31–37)
MCV RBC AUTO: 93.6 FL
MONOCYTES # BLD AUTO: 0.24 X10(3) UL (ref 0.1–1)
MONOCYTES NFR BLD AUTO: 3.6 %
NEUTROPHILS # BLD AUTO: 6.3 X10 (3) UL (ref 1.5–7.7)
NEUTROPHILS # BLD AUTO: 6.3 X10(3) UL (ref 1.5–7.7)
NEUTROPHILS NFR BLD AUTO: 93.6 %
OSMOLALITY SERPL CALC.SUM OF ELEC: 336 MOSM/KG (ref 275–295)
PLATELET # BLD AUTO: 151 10(3)UL (ref 150–450)
POTASSIUM SERPL-SCNC: 3.6 MMOL/L (ref 3.5–5.1)
PTH-INTACT SERPL-MCNC: 591.9 PG/ML (ref 18.5–88)
RBC # BLD AUTO: 2.2 X10(6)UL
SODIUM SERPL-SCNC: 143 MMOL/L (ref 136–145)
WBC # BLD AUTO: 6.7 X10(3) UL (ref 4–11)

## 2022-10-23 PROCEDURE — 99233 SBSQ HOSP IP/OBS HIGH 50: CPT | Performed by: HOSPITALIST

## 2022-10-23 PROCEDURE — 99233 SBSQ HOSP IP/OBS HIGH 50: CPT | Performed by: INTERNAL MEDICINE

## 2022-10-23 RX ORDER — CALCIUM GLUCONATE 10 MG/ML
1 INJECTION, SOLUTION INTRAVENOUS ONCE
Status: COMPLETED | OUTPATIENT
Start: 2022-10-23 | End: 2022-10-23

## 2022-10-23 RX ORDER — POTASSIUM CHLORIDE 1.5 G/1.77G
40 POWDER, FOR SOLUTION ORAL ONCE
Status: COMPLETED | OUTPATIENT
Start: 2022-10-23 | End: 2022-10-23

## 2022-10-23 NOTE — PLAN OF CARE
No acute medical changes during the shift. Patient is A&O x4 - forgetful at times. Very [place]. CPAP at night. PO vanco for (+) c.diff. Ambulating 1x with cane. Sodium Bicarb infusing. Right chest port. Plan of care discussed with patient and wife; all questions answered. Safety precautions in place. Call light and belongings at bedside and within reach. frequent monitoring. Morning hemoglobin 6.9. MD paged regarding critical lab; told to page day shift hospitalist of critical value. Paged Dr. Jose Dominguez of hemoglobin and calcium.

## 2022-10-23 NOTE — PLAN OF CARE
Problem: Patient Centered Care  Goal: Patient preferences are identified and integrated in the patient's plan of care  Description: Interventions:  - What would you like us to know as we care for you? From home with wife. - Provide timely, complete, and accurate information to patient/family  - Incorporate patient and family knowledge, values, beliefs, and cultural backgrounds into the planning and delivery of care  - Encourage patient/family to participate in care and decision-making at the level they choose  - Honor patient and family perspectives and choices  Outcome: Progressing     Problem: Patient/Family Goals  Goal: Patient/Family Long Term Goal  Description: Patient's Long Term Goal: To go home     Interventions:  - Follow MD orders  - PRN medications   - Fluids  - See additional Care Plan goals for specific interventions  Outcome: Progressing  Goal: Patient/Family Short Term Goal  Description: Patient's Short Term Goal: Management abnormal results    Interventions:   - Follow MD orders  - See additional Care Plan goals for specific interventions  Outcome: Progressing     Alert and oriented x4, impulsive at times. Fall and safety precautions in place. Denies reports of pain. Tolerating renal diet, voiding freely. Patient reports less frequent bms. Electrolytes covered per nephrology. Will discontinue to IV fluids. Low hgb will continue to monitor.

## 2022-10-24 LAB
ALBUMIN SERPL-MCNC: 2.4 G/DL (ref 3.4–5)
ANION GAP SERPL CALC-SCNC: 11 MMOL/L (ref 0–18)
BUN BLD-MCNC: 106 MG/DL (ref 7–18)
BUN/CREAT SERPL: 22.9 (ref 10–20)
CA-I BLD-SCNC: 0.72 MMOL/L (ref 0.95–1.32)
CALCIUM BLD-MCNC: 5.7 MG/DL (ref 8.5–10.1)
CHLORIDE SERPL-SCNC: 111 MMOL/L (ref 98–112)
CO2 SERPL-SCNC: 19 MMOL/L (ref 21–32)
CREAT BLD-MCNC: 4.62 MG/DL
DEPRECATED RDW RBC AUTO: 51.1 FL (ref 35.1–46.3)
ERYTHROCYTE [DISTWIDTH] IN BLOOD BY AUTOMATED COUNT: 14.7 % (ref 11–15)
GFR SERPLBLD BASED ON 1.73 SQ M-ARVRAT: 12 ML/MIN/1.73M2 (ref 60–?)
GLUCOSE BLD-MCNC: 123 MG/DL (ref 70–99)
HCT VFR BLD AUTO: 22.2 %
HGB BLD-MCNC: 7.1 G/DL
MAGNESIUM SERPL-MCNC: 1.6 MG/DL (ref 1.6–2.6)
MCH RBC QN AUTO: 30.7 PG (ref 26–34)
MCHC RBC AUTO-ENTMCNC: 32 G/DL (ref 31–37)
MCV RBC AUTO: 96.1 FL
OSMOLALITY SERPL CALC.SUM OF ELEC: 327 MOSM/KG (ref 275–295)
PHOSPHATE SERPL-MCNC: 5.8 MG/DL (ref 2.5–4.9)
PLATELET # BLD AUTO: 148 10(3)UL (ref 150–450)
POTASSIUM SERPL-SCNC: 3.9 MMOL/L (ref 3.5–5.1)
PUNCTURE CHARGE: NO
RBC # BLD AUTO: 2.31 X10(6)UL
SODIUM SERPL-SCNC: 141 MMOL/L (ref 136–145)
WBC # BLD AUTO: 6.2 X10(3) UL (ref 4–11)

## 2022-10-24 PROCEDURE — 99233 SBSQ HOSP IP/OBS HIGH 50: CPT | Performed by: HOSPITALIST

## 2022-10-24 PROCEDURE — 99233 SBSQ HOSP IP/OBS HIGH 50: CPT | Performed by: INTERNAL MEDICINE

## 2022-10-24 RX ORDER — SEVELAMER CARBONATE 800 MG/1
800 TABLET, FILM COATED ORAL
Status: DISCONTINUED | OUTPATIENT
Start: 2022-10-24 | End: 2022-10-26

## 2022-10-24 RX ORDER — CALCIUM GLUCONATE 20 MG/ML
2 INJECTION, SOLUTION INTRAVENOUS ONCE
Status: COMPLETED | OUTPATIENT
Start: 2022-10-24 | End: 2022-10-24

## 2022-10-24 NOTE — PLAN OF CARE
Patient alert, oriented, up to bathroom with stand by assistance and a walker, calls appropriately, had soft formed BM today, tolerates renal diet well, IV calcium gluconate and IV iron given today, continue PO vanco for positive c-diff,   Problem: Patient Centered Care  Goal: Patient preferences are identified and integrated in the patient's plan of care  Description: Interventions:  - What would you like us to know as we care for you? From home with wife.    - Provide timely, complete, and accurate information to patient/family  - Incorporate patient and family knowledge, values, beliefs, and cultural backgrounds into the planning and delivery of care  - Encourage patient/family to participate in care and decision-making at the level they choose  - Honor patient and family perspectives and choices  Outcome: Progressing

## 2022-10-25 ENCOUNTER — TELEPHONE (OUTPATIENT)
Dept: NEPHROLOGY | Facility: CLINIC | Age: 77
End: 2022-10-25

## 2022-10-25 DIAGNOSIS — N17.9 AKI (ACUTE KIDNEY INJURY) (HCC): Primary | ICD-10-CM

## 2022-10-25 DIAGNOSIS — N18.5 CHRONIC KIDNEY DISEASE (CKD), STAGE V (HCC): ICD-10-CM

## 2022-10-25 LAB
ALBUMIN SERPL-MCNC: 2.2 G/DL (ref 3.4–5)
ANION GAP SERPL CALC-SCNC: 14 MMOL/L (ref 0–18)
BASOPHILS # BLD AUTO: 0.01 X10(3) UL (ref 0–0.2)
BASOPHILS NFR BLD AUTO: 0.2 %
BUN BLD-MCNC: 102 MG/DL (ref 7–18)
BUN/CREAT SERPL: 23 (ref 10–20)
CALCIUM BLD-MCNC: 5.9 MG/DL (ref 8.5–10.1)
CHLORIDE SERPL-SCNC: 113 MMOL/L (ref 98–112)
CO2 SERPL-SCNC: 16 MMOL/L (ref 21–32)
CREAT BLD-MCNC: 4.44 MG/DL
DEPRECATED RDW RBC AUTO: 50.1 FL (ref 35.1–46.3)
EOSINOPHIL # BLD AUTO: 0.03 X10(3) UL (ref 0–0.7)
EOSINOPHIL NFR BLD AUTO: 0.5 %
ERYTHROCYTE [DISTWIDTH] IN BLOOD BY AUTOMATED COUNT: 14.6 % (ref 11–15)
GFR SERPLBLD BASED ON 1.73 SQ M-ARVRAT: 13 ML/MIN/1.73M2 (ref 60–?)
GLUCOSE BLD-MCNC: 125 MG/DL (ref 70–99)
HCT VFR BLD AUTO: 21.9 %
HGB BLD-MCNC: 7.1 G/DL
IMM GRANULOCYTES # BLD AUTO: 0.02 X10(3) UL (ref 0–1)
IMM GRANULOCYTES NFR BLD: 0.3 %
LYMPHOCYTES # BLD AUTO: 0.19 X10(3) UL (ref 1–4)
LYMPHOCYTES NFR BLD AUTO: 3 %
MAGNESIUM SERPL-MCNC: 1.5 MG/DL (ref 1.6–2.6)
MCH RBC QN AUTO: 30.9 PG (ref 26–34)
MCHC RBC AUTO-ENTMCNC: 32.4 G/DL (ref 31–37)
MCV RBC AUTO: 95.2 FL
MONOCYTES # BLD AUTO: 0.41 X10(3) UL (ref 0.1–1)
MONOCYTES NFR BLD AUTO: 6.4 %
NEUTROPHILS # BLD AUTO: 5.71 X10 (3) UL (ref 1.5–7.7)
NEUTROPHILS # BLD AUTO: 5.71 X10(3) UL (ref 1.5–7.7)
NEUTROPHILS NFR BLD AUTO: 89.6 %
OSMOLALITY SERPL CALC.SUM OF ELEC: 329 MOSM/KG (ref 275–295)
PHOSPHATE SERPL-MCNC: 5.6 MG/DL (ref 2.5–4.9)
PLATELET # BLD AUTO: 135 10(3)UL (ref 150–450)
POTASSIUM SERPL-SCNC: 3.8 MMOL/L (ref 3.5–5.1)
RBC # BLD AUTO: 2.3 X10(6)UL
SODIUM SERPL-SCNC: 143 MMOL/L (ref 136–145)
WBC # BLD AUTO: 6.4 X10(3) UL (ref 4–11)

## 2022-10-25 PROCEDURE — 99233 SBSQ HOSP IP/OBS HIGH 50: CPT | Performed by: HOSPITALIST

## 2022-10-25 PROCEDURE — 99233 SBSQ HOSP IP/OBS HIGH 50: CPT | Performed by: INTERNAL MEDICINE

## 2022-10-25 RX ORDER — CALCIUM GLUCONATE 94 MG/ML
2 INJECTION, SOLUTION INTRAVENOUS ONCE
Status: COMPLETED | OUTPATIENT
Start: 2022-10-25 | End: 2022-10-25

## 2022-10-25 RX ORDER — MAGNESIUM SULFATE 1 G/100ML
1 INJECTION INTRAVENOUS ONCE
Status: COMPLETED | OUTPATIENT
Start: 2022-10-25 | End: 2022-10-25

## 2022-10-25 NOTE — PLAN OF CARE
No acute medical changes during the shift. Patient is A&O x4 - forgetful at times. Very [place]; wears hearing aids. CPAP at night. PO vanco for (+) c.diff. Ambulating 1x with cane. PO Sodium Bicarb. Right chest port. 1x calcium gluconate. Plan of care discussed with patient; all questions answered. Safety precautions in place. Call light and belongings at bedside and within reach. frequent monitoring.

## 2022-10-25 NOTE — PLAN OF CARE
Patient alert,oriented, up walking with cane and assistance, hgb 7.1, magnesium rider given as ordered, family at bedside, pt on renal diet, has good appetite, plan for discharge home with wife and HH. Problem: Patient Centered Care  Goal: Patient preferences are identified and integrated in the patient's plan of care  Description: Interventions:  - What would you like us to know as we care for you? From home with wife.    - Provide timely, complete, and accurate information to patient/family  - Incorporate patient and family knowledge, values, beliefs, and cultural backgrounds into the planning and delivery of care  - Encourage patient/family to participate in care and decision-making at the level they choose  - Honor patient and family perspectives and choices  Outcome: Progressing

## 2022-10-26 ENCOUNTER — TELEPHONE (OUTPATIENT)
Dept: NEPHROLOGY | Facility: CLINIC | Age: 77
End: 2022-10-26

## 2022-10-26 VITALS
HEIGHT: 69 IN | HEART RATE: 80 BPM | BODY MASS INDEX: 21.31 KG/M2 | SYSTOLIC BLOOD PRESSURE: 111 MMHG | TEMPERATURE: 98 F | OXYGEN SATURATION: 97 % | DIASTOLIC BLOOD PRESSURE: 73 MMHG | WEIGHT: 143.88 LBS | RESPIRATION RATE: 18 BRPM

## 2022-10-26 LAB
ALBUMIN SERPL-MCNC: 2.2 G/DL (ref 3.4–5)
ANION GAP SERPL CALC-SCNC: 13 MMOL/L (ref 0–18)
BUN BLD-MCNC: 100 MG/DL (ref 7–18)
BUN/CREAT SERPL: 20.8 (ref 10–20)
CALCIUM BLD-MCNC: 6.3 MG/DL (ref 8.5–10.1)
CHLORIDE SERPL-SCNC: 109 MMOL/L (ref 98–112)
CO2 SERPL-SCNC: 17 MMOL/L (ref 21–32)
CREAT BLD-MCNC: 4.8 MG/DL
GFR SERPLBLD BASED ON 1.73 SQ M-ARVRAT: 12 ML/MIN/1.73M2 (ref 60–?)
GLUCOSE BLD-MCNC: 130 MG/DL (ref 70–99)
OSMOLALITY SERPL CALC.SUM OF ELEC: 321 MOSM/KG (ref 275–295)
PHOSPHATE SERPL-MCNC: 5.5 MG/DL (ref 2.5–4.9)
POTASSIUM SERPL-SCNC: 4 MMOL/L (ref 3.5–5.1)
SODIUM SERPL-SCNC: 139 MMOL/L (ref 136–145)

## 2022-10-26 PROCEDURE — 99233 SBSQ HOSP IP/OBS HIGH 50: CPT | Performed by: INTERNAL MEDICINE

## 2022-10-26 RX ORDER — CALCITRIOL 0.5 UG/1
0.5 CAPSULE, LIQUID FILLED ORAL DAILY
Qty: 30 CAPSULE | Refills: 1 | Status: ON HOLD | OUTPATIENT
Start: 2022-10-27

## 2022-10-26 RX ORDER — PREDNISONE 1 MG/1
15 TABLET ORAL DAILY
Qty: 90 TABLET | Refills: 0 | Status: ON HOLD | OUTPATIENT
Start: 2022-10-26

## 2022-10-26 RX ORDER — SEVELAMER CARBONATE 800 MG/1
800 TABLET, FILM COATED ORAL
Qty: 90 TABLET | Refills: 0 | Status: ON HOLD | OUTPATIENT
Start: 2022-10-26

## 2022-10-26 RX ORDER — SODIUM BICARBONATE 650 MG/1
650 TABLET ORAL 3 TIMES DAILY
Qty: 90 TABLET | Refills: 0 | Status: ON HOLD | OUTPATIENT
Start: 2022-10-26

## 2022-10-26 RX ORDER — VANCOMYCIN HYDROCHLORIDE 125 MG/1
125 CAPSULE ORAL 4 TIMES DAILY
Qty: 36 CAPSULE | Refills: 0 | Status: ON HOLD | OUTPATIENT
Start: 2022-10-26 | End: 2022-11-04

## 2022-10-26 NOTE — PLAN OF CARE
Patient [place]. CPAP worn at night. He denies of pain. Oral antibiotics given as ordered. Ambulates to the bathroom with 1 assist and a cane. Denies of nausea or vomiting. Safety and contact precautions in place. Problem: Patient Centered Care  Goal: Patient preferences are identified and integrated in the patient's plan of care  Description: Interventions:  - What would you like us to know as we care for you? From home with wife.    - Provide timely, complete, and accurate information to patient/family  - Incorporate patient and family knowledge, values, beliefs, and cultural backgrounds into the planning and delivery of care  - Encourage patient/family to participate in care and decision-making at the level they choose  - Honor patient and family perspectives and choices  Outcome: Progressing     Problem: Patient/Family Goals  Goal: Patient/Family Long Term Goal  Description: Patient's Long Term Goal: To go home     Interventions:  - Follow MD orders  - PRN medications   - Fluids  - See additional Care Plan goals for specific interventions  Outcome: Progressing  Goal: Patient/Family Short Term Goal  Description: Patient's Short Term Goal: Management abnormal results    Interventions:   - Follow MD orders  - See additional Care Plan goals for specific interventions  Outcome: Progressing

## 2022-10-26 NOTE — DISCHARGE INSTRUCTIONS
Have blood draw on Monday for CBC, renal panel and magnesium  THEN follow up with Dr. Neville Jackman    DO NOT take any medications from NSAIDs group, like ibuprofen, naproxen, aleve atc  If needed for pain or fever, tylenol is preferred     CHECK YOUR BLOOD PRESSURE DAILY AND WHEN NOT FEELING WELL,   WRITE IT DOWN TO CREATE A LOG AND BRING TO YOUR PCP FOR REVIEW    IF ANY QUESTIONS OR CONCERNS, CALL YOUR PCP FOR ADVICE

## 2022-10-26 NOTE — PLAN OF CARE
Lupillo Mendieta is alert/oriented. Vitals stable. Tolerating diet. Ambulating well with set up help and cane. Denies pain. Vanco for antibiotic coverage, loose stools improving. Cleared to discharge and follow up outpatient with nephrology. Port deaccessed. All medications and follow up information reviewed with pt and wife. Prescriptions sent to pharmacy. All questions answered at this time. Problem: Patient Centered Care  Goal: Patient preferences are identified and integrated in the patient's plan of care  Description: Interventions:  - What would you like us to know as we care for you? From home with wife.    - Provide timely, complete, and accurate information to patient/family  - Incorporate patient and family knowledge, values, beliefs, and cultural backgrounds into the planning and delivery of care  - Encourage patient/family to participate in care and decision-making at the level they choose  - Honor patient and family perspectives and choices  Outcome: Adequate for Discharge     Problem: Patient/Family Goals  Goal: Patient/Family Long Term Goal  Description: Patient's Long Term Goal: To go home     Interventions:  - Follow MD orders  - PRN medications   - Fluids  - See additional Care Plan goals for specific interventions  Outcome: Adequate for Discharge  Goal: Patient/Family Short Term Goal  Description: Patient's Short Term Goal: Management abnormal results    Interventions:   - Follow MD orders  - See additional Care Plan goals for specific interventions  Outcome: Adequate for Discharge

## 2022-10-26 NOTE — TELEPHONE ENCOUNTER
Please see note below per patient wife if Dr Clari Balderas will order for pt to keep on Trelegy ,then [place] will get it for them,pt wife prefers pt to stay on this medication.

## 2022-10-26 NOTE — TELEPHONE ENCOUNTER
Patient's wife, Lonnie Patel, is requesting call back from nurse regarding Trelegy medication. Wife states the [place] wants to change patient's Trelegy medication with a different alternative. Wife is requesting that patient remain on Trelegy.

## 2022-10-26 NOTE — CM/SW NOTE
CARISSA to check coverage for Vanco.     Called and spoke with pharmacy, rx is covered, however will have copay of $82.      Notified RN and MD.     142pm  Met w patient and spouse at bedside, provided good rx coupon. SW inquired if there were any further questions or concerns, patient declined.      April Mohamud MSW, [facility]    [id number]

## 2022-10-27 ENCOUNTER — APPOINTMENT (OUTPATIENT)
Dept: HEMATOLOGY/ONCOLOGY | Facility: HOSPITAL | Age: 77
End: 2022-10-27
Attending: INTERNAL MEDICINE
Payer: MEDICARE

## 2022-10-27 ENCOUNTER — PATIENT OUTREACH (OUTPATIENT)
Dept: CASE MANAGEMENT | Age: 77
End: 2022-10-27

## 2022-10-27 DIAGNOSIS — Z02.9 ENCOUNTERS FOR UNSPECIFIED ADMINISTRATIVE PURPOSE: ICD-10-CM

## 2022-10-27 DIAGNOSIS — E87.21 ACUTE METABOLIC ACIDOSIS: ICD-10-CM

## 2022-10-27 PROCEDURE — 1111F DSCHRG MED/CURRENT MED MERGE: CPT

## 2022-10-27 NOTE — PROGRESS NOTES
NCM placed call to patient for TCM, LM requesting a call back to [phone] with a condition update.

## 2022-10-27 NOTE — TELEPHONE ENCOUNTER
Patient last seen 11/22. Note does not mention trelegy and is listed as patient reported medication. If patient is doing well on medication and [facility] will cover it, it is unlikely that it would be changed. Does patient need refill? Sent to [place] or printed rx? Left message to call back.

## 2022-10-30 RX ORDER — FOLIC ACID 1 MG/1
1 TABLET ORAL DAILY
Qty: 90 TABLET | Refills: 3 | Status: SHIPPED | OUTPATIENT
Start: 2022-10-30

## 2022-10-31 ENCOUNTER — NURSE ONLY (OUTPATIENT)
Dept: HEMATOLOGY/ONCOLOGY | Facility: HOSPITAL | Age: 77
End: 2022-10-31
Attending: INTERNAL MEDICINE
Payer: MEDICARE

## 2022-10-31 ENCOUNTER — TELEPHONE (OUTPATIENT)
Dept: HEMATOLOGY/ONCOLOGY | Facility: HOSPITAL | Age: 77
End: 2022-10-31

## 2022-10-31 DIAGNOSIS — C79.51 BONE METASTASIS (HCC): ICD-10-CM

## 2022-10-31 DIAGNOSIS — N18.5 CHRONIC KIDNEY DISEASE (CKD), STAGE V (HCC): ICD-10-CM

## 2022-10-31 DIAGNOSIS — N17.9 AKI (ACUTE KIDNEY INJURY) (HCC): ICD-10-CM

## 2022-10-31 DIAGNOSIS — C78.7 LIVER METASTASIS (HCC): ICD-10-CM

## 2022-10-31 DIAGNOSIS — Z45.2 ENCOUNTER FOR CENTRAL LINE CARE: Primary | ICD-10-CM

## 2022-10-31 DIAGNOSIS — Z51.81 MEDICATION MONITORING ENCOUNTER: ICD-10-CM

## 2022-10-31 DIAGNOSIS — C7B.8 NEUROENDOCRINE CARCINOMA METASTATIC TO INTRA-ABDOMINAL LYMPH NODE (HCC): ICD-10-CM

## 2022-10-31 DIAGNOSIS — C7A.8 NEUROENDOCRINE CARCINOMA METASTATIC TO INTRA-ABDOMINAL LYMPH NODE (HCC): ICD-10-CM

## 2022-10-31 LAB
ALBUMIN SERPL-MCNC: 2.3 G/DL (ref 3.4–5)
ALBUMIN SERPL-MCNC: 2.3 G/DL (ref 3.4–5)
ALBUMIN/GLOB SERPL: 0.6 {RATIO} (ref 1–2)
ALP LIVER SERPL-CCNC: 46 U/L
ALT SERPL-CCNC: 18 U/L
ANION GAP SERPL CALC-SCNC: 13 MMOL/L (ref 0–18)
ANION GAP SERPL CALC-SCNC: 13 MMOL/L (ref 0–18)
AST SERPL-CCNC: 9 U/L (ref 15–37)
BASOPHILS # BLD AUTO: 0.01 X10(3) UL (ref 0–0.2)
BASOPHILS NFR BLD AUTO: 0.2 %
BILIRUB SERPL-MCNC: 0.3 MG/DL (ref 0.1–2)
BUN BLD-MCNC: 110 MG/DL (ref 7–18)
BUN BLD-MCNC: 110 MG/DL (ref 7–18)
BUN/CREAT SERPL: 22.7 (ref 10–20)
BUN/CREAT SERPL: 22.7 (ref 10–20)
CALCIUM BLD-MCNC: 5.9 MG/DL (ref 8.5–10.1)
CALCIUM BLD-MCNC: 5.9 MG/DL (ref 8.5–10.1)
CHLORIDE SERPL-SCNC: 117 MMOL/L (ref 98–112)
CHLORIDE SERPL-SCNC: 117 MMOL/L (ref 98–112)
CO2 SERPL-SCNC: 13 MMOL/L (ref 21–32)
CO2 SERPL-SCNC: 13 MMOL/L (ref 21–32)
CREAT BLD-MCNC: 4.84 MG/DL
CREAT BLD-MCNC: 4.84 MG/DL
DEPRECATED RDW RBC AUTO: 53.5 FL (ref 35.1–46.3)
EOSINOPHIL # BLD AUTO: 0.02 X10(3) UL (ref 0–0.7)
EOSINOPHIL NFR BLD AUTO: 0.5 %
ERYTHROCYTE [DISTWIDTH] IN BLOOD BY AUTOMATED COUNT: 14.7 % (ref 11–15)
GFR SERPLBLD BASED ON 1.73 SQ M-ARVRAT: 12 ML/MIN/1.73M2 (ref 60–?)
GFR SERPLBLD BASED ON 1.73 SQ M-ARVRAT: 12 ML/MIN/1.73M2 (ref 60–?)
GLOBULIN PLAS-MCNC: 3.8 G/DL (ref 2.8–4.4)
GLUCOSE BLD-MCNC: 202 MG/DL (ref 70–99)
GLUCOSE BLD-MCNC: 202 MG/DL (ref 70–99)
HCT VFR BLD AUTO: 23.8 %
HGB BLD-MCNC: 7.3 G/DL
IMM GRANULOCYTES # BLD AUTO: 0.02 X10(3) UL (ref 0–1)
IMM GRANULOCYTES NFR BLD: 0.5 %
LYMPHOCYTES # BLD AUTO: 0.17 X10(3) UL (ref 1–4)
LYMPHOCYTES NFR BLD AUTO: 4 %
MAGNESIUM SERPL-MCNC: 1.2 MG/DL (ref 1.6–2.6)
MCH RBC QN AUTO: 30.8 PG (ref 26–34)
MCHC RBC AUTO-ENTMCNC: 30.7 G/DL (ref 31–37)
MCV RBC AUTO: 100.4 FL
MONOCYTES # BLD AUTO: 0.23 X10(3) UL (ref 0.1–1)
MONOCYTES NFR BLD AUTO: 5.4 %
NEUTROPHILS # BLD AUTO: 3.84 X10 (3) UL (ref 1.5–7.7)
NEUTROPHILS # BLD AUTO: 3.84 X10(3) UL (ref 1.5–7.7)
NEUTROPHILS NFR BLD AUTO: 89.4 %
OSMOLALITY SERPL CALC.SUM OF ELEC: 337 MOSM/KG (ref 275–295)
OSMOLALITY SERPL CALC.SUM OF ELEC: 337 MOSM/KG (ref 275–295)
PHOSPHATE SERPL-MCNC: 6.5 MG/DL (ref 2.5–4.9)
PLATELET # BLD AUTO: 147 10(3)UL (ref 150–450)
POTASSIUM SERPL-SCNC: 3.9 MMOL/L (ref 3.5–5.1)
POTASSIUM SERPL-SCNC: 3.9 MMOL/L (ref 3.5–5.1)
PROT SERPL-MCNC: 6.1 G/DL (ref 6.4–8.2)
RBC # BLD AUTO: 2.37 X10(6)UL
SODIUM SERPL-SCNC: 143 MMOL/L (ref 136–145)
SODIUM SERPL-SCNC: 143 MMOL/L (ref 136–145)
WBC # BLD AUTO: 4.3 X10(3) UL (ref 4–11)

## 2022-10-31 PROCEDURE — 86316 IMMUNOASSAY TUMOR OTHER: CPT

## 2022-10-31 PROCEDURE — 36591 DRAW BLOOD OFF VENOUS DEVICE: CPT

## 2022-10-31 PROCEDURE — 80053 COMPREHEN METABOLIC PANEL: CPT

## 2022-10-31 PROCEDURE — 85025 COMPLETE CBC W/AUTO DIFF WBC: CPT

## 2022-10-31 PROCEDURE — 83735 ASSAY OF MAGNESIUM: CPT

## 2022-10-31 PROCEDURE — 84100 ASSAY OF PHOSPHORUS: CPT

## 2022-10-31 RX ORDER — SODIUM CHLORIDE 9 MG/ML
10 INJECTION INTRAVENOUS ONCE
OUTPATIENT
Start: 2022-11-21

## 2022-10-31 RX ORDER — HEPARIN SODIUM (PORCINE) LOCK FLUSH IV SOLN 100 UNIT/ML 100 UNIT/ML
5 SOLUTION INTRAVENOUS ONCE
Status: COMPLETED | OUTPATIENT
Start: 2022-10-31 | End: 2022-10-31

## 2022-10-31 RX ORDER — HEPARIN SODIUM (PORCINE) LOCK FLUSH IV SOLN 100 UNIT/ML 100 UNIT/ML
5 SOLUTION INTRAVENOUS ONCE
OUTPATIENT
Start: 2022-11-21

## 2022-10-31 RX ADMIN — HEPARIN SODIUM (PORCINE) LOCK FLUSH IV SOLN 100 UNIT/ML 500 UNITS: 100 SOLUTION INTRAVENOUS at 11:29:00

## 2022-11-01 ENCOUNTER — TELEPHONE (OUTPATIENT)
Dept: NEPHROLOGY | Facility: CLINIC | Age: 77
End: 2022-11-01

## 2022-11-01 ENCOUNTER — OFFICE VISIT (OUTPATIENT)
Dept: NEPHROLOGY | Facility: CLINIC | Age: 77
End: 2022-11-01
Payer: MEDICARE

## 2022-11-01 VITALS
BODY MASS INDEX: 22.66 KG/M2 | HEART RATE: 70 BPM | HEIGHT: 69 IN | SYSTOLIC BLOOD PRESSURE: 102 MMHG | WEIGHT: 153 LBS | DIASTOLIC BLOOD PRESSURE: 58 MMHG

## 2022-11-01 DIAGNOSIS — E55.9 VITAMIN D DEFICIENCY: ICD-10-CM

## 2022-11-01 DIAGNOSIS — N18.5 CHRONIC KIDNEY DISEASE (CKD), STAGE V (HCC): ICD-10-CM

## 2022-11-01 DIAGNOSIS — N17.9 AKI (ACUTE KIDNEY INJURY) (HCC): Primary | ICD-10-CM

## 2022-11-01 DIAGNOSIS — E83.51 HYPOCALCEMIA: ICD-10-CM

## 2022-11-01 DIAGNOSIS — E87.20 METABOLIC ACIDOSIS: ICD-10-CM

## 2022-11-01 PROCEDURE — 1111F DSCHRG MED/CURRENT MED MERGE: CPT | Performed by: INTERNAL MEDICINE

## 2022-11-01 PROCEDURE — 99215 OFFICE O/P EST HI 40 MIN: CPT | Performed by: INTERNAL MEDICINE

## 2022-11-01 RX ORDER — SACCHAROMYCES BOULARDII 250 MG
250 CAPSULE ORAL 2 TIMES DAILY
Status: ON HOLD | COMMUNITY

## 2022-11-01 RX ORDER — AMLODIPINE BESYLATE 5 MG/1
5 TABLET ORAL DAILY
Status: ON HOLD | COMMUNITY

## 2022-11-01 NOTE — PATIENT INSTRUCTIONS
Take magnesium 400 mg today   IV bicarbonate and magnesium infusion   Low phosphorus diet   Follow up in 2 weeks   Lab test in 7-10 days

## 2022-11-01 NOTE — TELEPHONE ENCOUNTER
Rn called infusion center spoke to Olivier to schedule pt infusion tomorrow,pt is scheduled at 36 am,patient wife informed . Rn faxed order for Sodium Bicarbonate 150meq at 200ml /hr for 5 hours and Mg Sulfate 3 gm IV x 1 dose to #[phone] . Demar Schultz

## 2022-11-02 ENCOUNTER — TELEPHONE (OUTPATIENT)
Dept: HEMATOLOGY/ONCOLOGY | Facility: HOSPITAL | Age: 77
End: 2022-11-02

## 2022-11-02 ENCOUNTER — OFFICE VISIT (OUTPATIENT)
Dept: HEMATOLOGY/ONCOLOGY | Facility: HOSPITAL | Age: 77
End: 2022-11-02
Attending: NURSE PRACTITIONER
Payer: MEDICARE

## 2022-11-02 VITALS
RESPIRATION RATE: 18 BRPM | SYSTOLIC BLOOD PRESSURE: 113 MMHG | TEMPERATURE: 98 F | OXYGEN SATURATION: 97 % | DIASTOLIC BLOOD PRESSURE: 59 MMHG | HEART RATE: 80 BPM

## 2022-11-02 DIAGNOSIS — Z45.2 ENCOUNTER FOR CENTRAL LINE CARE: Primary | ICD-10-CM

## 2022-11-02 DIAGNOSIS — Z51.81 MEDICATION MONITORING ENCOUNTER: ICD-10-CM

## 2022-11-02 DIAGNOSIS — N18.5 ANEMIA IN STAGE 5 CHRONIC KIDNEY DISEASE, NOT ON CHRONIC DIALYSIS (HCC): ICD-10-CM

## 2022-11-02 DIAGNOSIS — C7B.8 NEUROENDOCRINE CARCINOMA METASTATIC TO INTRA-ABDOMINAL LYMPH NODE (HCC): ICD-10-CM

## 2022-11-02 DIAGNOSIS — C34.12 PRIMARY CANCER OF LEFT UPPER LOBE OF LUNG (HCC): ICD-10-CM

## 2022-11-02 DIAGNOSIS — N18.5 CHRONIC KIDNEY DISEASE (CKD), STAGE V (HCC): ICD-10-CM

## 2022-11-02 DIAGNOSIS — C7A.8 NEUROENDOCRINE CARCINOMA METASTATIC TO INTRA-ABDOMINAL LYMPH NODE (HCC): ICD-10-CM

## 2022-11-02 DIAGNOSIS — N18.5 CHRONIC KIDNEY DISEASE (CKD), STAGE V (HCC): Primary | ICD-10-CM

## 2022-11-02 DIAGNOSIS — N18.30 STAGE 3 CHRONIC KIDNEY DISEASE, UNSPECIFIED WHETHER STAGE 3A OR 3B CKD (HCC): Primary | ICD-10-CM

## 2022-11-02 DIAGNOSIS — D63.1 ANEMIA IN STAGE 5 CHRONIC KIDNEY DISEASE, NOT ON CHRONIC DIALYSIS (HCC): ICD-10-CM

## 2022-11-02 DIAGNOSIS — E87.20 METABOLIC ACIDOSIS: ICD-10-CM

## 2022-11-02 DIAGNOSIS — C79.51 BONE METASTASIS (HCC): ICD-10-CM

## 2022-11-02 LAB — CHROMOGRANIN A: 236 NG/ML

## 2022-11-02 PROCEDURE — 96368 THER/DIAG CONCURRENT INF: CPT

## 2022-11-02 PROCEDURE — 96366 THER/PROPH/DIAG IV INF ADDON: CPT

## 2022-11-02 PROCEDURE — 96365 THER/PROPH/DIAG IV INF INIT: CPT

## 2022-11-02 RX ORDER — SODIUM CHLORIDE 9 MG/ML
10 INJECTION INTRAVENOUS ONCE
OUTPATIENT
Start: 2022-11-21

## 2022-11-02 RX ORDER — HEPARIN SODIUM (PORCINE) LOCK FLUSH IV SOLN 100 UNIT/ML 100 UNIT/ML
SOLUTION INTRAVENOUS
Status: COMPLETED
Start: 2022-11-02 | End: 2022-11-02

## 2022-11-02 RX ORDER — HEPARIN SODIUM (PORCINE) LOCK FLUSH IV SOLN 100 UNIT/ML 100 UNIT/ML
5 SOLUTION INTRAVENOUS ONCE
Status: COMPLETED | OUTPATIENT
Start: 2022-11-02 | End: 2022-11-02

## 2022-11-02 RX ORDER — HEPARIN SODIUM (PORCINE) LOCK FLUSH IV SOLN 100 UNIT/ML 100 UNIT/ML
5 SOLUTION INTRAVENOUS ONCE
Status: CANCELLED | OUTPATIENT
Start: 2022-11-21

## 2022-11-02 RX ADMIN — HEPARIN SODIUM (PORCINE) LOCK FLUSH IV SOLN 100 UNIT/ML 500 UNITS: 100 SOLUTION INTRAVENOUS at 13:15:00

## 2022-11-02 NOTE — PROGRESS NOTES
Pt to infusion for sodium bicarb and magnesium per MD Arechiga. Arrives ambulating with walker accompanied by wife. Port accessed per protocol - present blood return noted. Pt appeared to tolerate infusions well. Port flushed and heparin locked - site covered with gauze and paper tape. Discharged to home ambulating with walker accompanied by wife. He will have labs drawn next week and will potentially have MAg sulfate and Sodium Bicarb treatment again. Appointments scheduled and Nnamdi aware.

## 2022-11-02 NOTE — TELEPHONE ENCOUNTER
Wife called and notified will repeat CBC and draw a Type and Screen for possible transfusion. Informed Dr. Ayleen Purcell ordered a medication called Aranesp which is given to treat anemia with CKD. Informed will start once authorized. Wife verbalizes understanding. Wife stated Dr. Criss Cintron discussed starting peritoneal dialysis at home. Wife stated \" I am not sure we are ready to give up yet but, it may all be too much. \" Discussed they should meet with Dialysis  and then discuss with Annell Pac if they wish to continue to pursue with dialysis or proceed with comfort measures. Emotional support given.

## 2022-11-03 ENCOUNTER — OFFICE VISIT (OUTPATIENT)
Dept: HEMATOLOGY/ONCOLOGY | Facility: HOSPITAL | Age: 77
End: 2022-11-03
Attending: NURSE PRACTITIONER
Payer: MEDICARE

## 2022-11-03 ENCOUNTER — PATIENT OUTREACH (OUTPATIENT)
Dept: CASE MANAGEMENT | Age: 77
End: 2022-11-03

## 2022-11-03 VITALS
WEIGHT: 155.38 LBS | TEMPERATURE: 98 F | BODY MASS INDEX: 23.01 KG/M2 | SYSTOLIC BLOOD PRESSURE: 106 MMHG | OXYGEN SATURATION: 98 % | RESPIRATION RATE: 18 BRPM | HEART RATE: 71 BPM | DIASTOLIC BLOOD PRESSURE: 55 MMHG | HEIGHT: 69 IN

## 2022-11-03 DIAGNOSIS — N18.5 CHRONIC KIDNEY DISEASE (CKD), STAGE V (HCC): ICD-10-CM

## 2022-11-03 DIAGNOSIS — Z51.81 MEDICATION MONITORING ENCOUNTER: ICD-10-CM

## 2022-11-03 DIAGNOSIS — C79.51 BONE METASTASIS (HCC): ICD-10-CM

## 2022-11-03 DIAGNOSIS — C78.7 LIVER METASTASIS (HCC): ICD-10-CM

## 2022-11-03 DIAGNOSIS — C34.12 PRIMARY CANCER OF LEFT UPPER LOBE OF LUNG (HCC): Primary | ICD-10-CM

## 2022-11-03 DIAGNOSIS — C7B.8 NEUROENDOCRINE CARCINOMA METASTATIC TO INTRA-ABDOMINAL LYMPH NODE (HCC): ICD-10-CM

## 2022-11-03 DIAGNOSIS — D53.9 MACROCYTIC ANEMIA: ICD-10-CM

## 2022-11-03 DIAGNOSIS — Z45.2 ENCOUNTER FOR CENTRAL LINE CARE: ICD-10-CM

## 2022-11-03 DIAGNOSIS — C34.12 PRIMARY CANCER OF LEFT UPPER LOBE OF LUNG (HCC): ICD-10-CM

## 2022-11-03 DIAGNOSIS — C7A.8 NEUROENDOCRINE CARCINOMA METASTATIC TO INTRA-ABDOMINAL LYMPH NODE (HCC): Primary | ICD-10-CM

## 2022-11-03 DIAGNOSIS — C7B.8 NEUROENDOCRINE CARCINOMA METASTATIC TO INTRA-ABDOMINAL LYMPH NODE (HCC): Primary | ICD-10-CM

## 2022-11-03 DIAGNOSIS — C7A.8 NEUROENDOCRINE CARCINOMA METASTATIC TO INTRA-ABDOMINAL LYMPH NODE (HCC): ICD-10-CM

## 2022-11-03 LAB
ANTIBODY SCREEN: NEGATIVE
BASOPHILS # BLD AUTO: 0.01 X10(3) UL (ref 0–0.2)
BASOPHILS NFR BLD AUTO: 0.2 %
DEPRECATED RDW RBC AUTO: 53.8 FL (ref 35.1–46.3)
EOSINOPHIL # BLD AUTO: 0.09 X10(3) UL (ref 0–0.7)
EOSINOPHIL NFR BLD AUTO: 1.9 %
ERYTHROCYTE [DISTWIDTH] IN BLOOD BY AUTOMATED COUNT: 15.1 % (ref 11–15)
HCT VFR BLD AUTO: 23.3 %
HGB BLD-MCNC: 7.4 G/DL
IMM GRANULOCYTES # BLD AUTO: 0.02 X10(3) UL (ref 0–1)
IMM GRANULOCYTES NFR BLD: 0.4 %
LYMPHOCYTES # BLD AUTO: 0.25 X10(3) UL (ref 1–4)
LYMPHOCYTES NFR BLD AUTO: 5.3 %
MCH RBC QN AUTO: 31.2 PG (ref 26–34)
MCHC RBC AUTO-ENTMCNC: 31.8 G/DL (ref 31–37)
MCV RBC AUTO: 98.3 FL
MONOCYTES # BLD AUTO: 0.21 X10(3) UL (ref 0.1–1)
MONOCYTES NFR BLD AUTO: 4.4 %
NEUTROPHILS # BLD AUTO: 4.16 X10 (3) UL (ref 1.5–7.7)
NEUTROPHILS # BLD AUTO: 4.16 X10(3) UL (ref 1.5–7.7)
NEUTROPHILS NFR BLD AUTO: 87.8 %
PLATELET # BLD AUTO: 137 10(3)UL (ref 150–450)
RBC # BLD AUTO: 2.37 X10(6)UL
RH BLOOD TYPE: POSITIVE
WBC # BLD AUTO: 4.7 X10(3) UL (ref 4–11)

## 2022-11-03 PROCEDURE — 99215 OFFICE O/P EST HI 40 MIN: CPT | Performed by: NURSE PRACTITIONER

## 2022-11-03 PROCEDURE — 86901 BLOOD TYPING SEROLOGIC RH(D): CPT

## 2022-11-03 PROCEDURE — 85025 COMPLETE CBC W/AUTO DIFF WBC: CPT

## 2022-11-03 PROCEDURE — 86900 BLOOD TYPING SEROLOGIC ABO: CPT

## 2022-11-03 PROCEDURE — 96372 THER/PROPH/DIAG INJ SC/IM: CPT

## 2022-11-03 PROCEDURE — 36591 DRAW BLOOD OFF VENOUS DEVICE: CPT

## 2022-11-03 PROCEDURE — 86850 RBC ANTIBODY SCREEN: CPT

## 2022-11-03 RX ORDER — FOLIC ACID 1 MG/1
1 TABLET ORAL DAILY
Qty: 90 TABLET | Refills: 3 | Status: SHIPPED | OUTPATIENT
Start: 2022-11-03

## 2022-11-03 RX ORDER — SODIUM CHLORIDE 9 MG/ML
10 INJECTION INTRAVENOUS ONCE
OUTPATIENT
Start: 2022-11-21

## 2022-11-03 RX ORDER — HEPARIN SODIUM (PORCINE) LOCK FLUSH IV SOLN 100 UNIT/ML 100 UNIT/ML
5 SOLUTION INTRAVENOUS ONCE
OUTPATIENT
Start: 2022-11-21

## 2022-11-03 RX ORDER — HEPARIN SODIUM (PORCINE) LOCK FLUSH IV SOLN 100 UNIT/ML 100 UNIT/ML
5 SOLUTION INTRAVENOUS ONCE
Status: COMPLETED | OUTPATIENT
Start: 2022-11-03 | End: 2022-11-03

## 2022-11-03 RX ADMIN — HEPARIN SODIUM (PORCINE) LOCK FLUSH IV SOLN 100 UNIT/ML 500 UNITS: 100 SOLUTION INTRAVENOUS at 09:05:00

## 2022-11-03 NOTE — PROGRESS NOTES
Called patient regarding CCM monthly and left a message for patient to call back when they can. Reviewed patient chart. Left contact my contact number [phone].        Time Spent This Encounter Total: 3  min medical record review  Monthly Minute Total including today: 3 minutes

## 2022-11-04 ENCOUNTER — TELEPHONE (OUTPATIENT)
Dept: INTERNAL MEDICINE CLINIC | Facility: CLINIC | Age: 77
End: 2022-11-04

## 2022-11-04 NOTE — TELEPHONE ENCOUNTER
Spoke to spouse Polly (SANDRA verified) for TCM today--offered TCM/HFU with Dr. Sharri rodriguez at this time--she reports she spoke with Dr. Tiffanie Luna and was told pt did not need to see him after hospitalization. Patient did see Campbell Cabrera at [facility] yesterday. BOOK BY DATE (last date for TCM): 11/09/2022    Please discuss with PCP and f/u with spouse, accordingly. If no TCM/HFU appt required, no need to contact spouse. Thank you! Follow up appointments:     Follow up With Specialties Details Why Contact Info   Charly Blackman MD NEPHROLOGY Schedule an appointment as soon as possible for a visit in 1 week(s)  [address] [place] [address]    [phone]    Walter Marks MD Internal Medicine Call As needed [address] [place] [address] [facility]   [phone]        Your appointments     Date & Time Appointment Department [facility])    Nov 10, 2022 10:30 AM CST Lab Visit with EM [address] [place] [address])        Nov 11, 2022  8:00 AM CST Infusion Visit with EM [facility] [address] [place] [address] Dr [facility])        Nov 17, 2022  1:40 PM CST Follow Up Visit with Charly Blackman MD [facility], [address] [place] [address] ([address])        Nov 29, 2022  1:00 PM CST Consult with Katt Zuniga MD [facility], [facility] ([address])        Dec 01, 2022 10:30 AM CST Lab&Injection with EM [address] [place] [address])        Dec 01, 2022 11:30 AM CST HEMATOLOGY ONCOLOGY FOLLOW UP with Saray Jamil, [facility] Hematology Oncology [facility])            [place] [facility] [place], [address] [place] [address]  [address]  [address] [facility] [address] [facility] [address]  [address], [place], [address]  [address] [address]  [address] [facility] Hematology [address] [place] [address] Dr  [facility] Nedra Velez [address]  [address]  [place]  [phone]

## 2022-11-06 NOTE — TELEPHONE ENCOUNTER
If patient refuses nothing more than we can do. I did not tell them they don't need to see me, I said if they don't want to see me after every hospital visit that's fine as he has many specialists follow ups and frequent hospital stays. I encouraged her to always follow up after any hospital visit especially for major events or if they have questions/concerns/issues. It's entirely up to them.      Fyi.

## 2022-11-07 ENCOUNTER — PATIENT MESSAGE (OUTPATIENT)
Dept: NEPHROLOGY | Facility: CLINIC | Age: 77
End: 2022-11-07

## 2022-11-07 ENCOUNTER — HOSPITAL ENCOUNTER (OUTPATIENT)
Age: 77
Discharge: HOME | End: 2022-11-07
Attending: EMERGENCY MEDICINE
Payer: MEDICARE

## 2022-11-07 ENCOUNTER — PATIENT OUTREACH (OUTPATIENT)
Dept: CASE MANAGEMENT | Age: 77
End: 2022-11-07

## 2022-11-07 VITALS
HEART RATE: 79 BPM | OXYGEN SATURATION: 98 % | DIASTOLIC BLOOD PRESSURE: 54 MMHG | RESPIRATION RATE: 18 BRPM | TEMPERATURE: 98 F | SYSTOLIC BLOOD PRESSURE: 143 MMHG

## 2022-11-07 DIAGNOSIS — L08.9 INFECTED WOUND: Primary | ICD-10-CM

## 2022-11-07 DIAGNOSIS — L01.00 IMPETIGO: ICD-10-CM

## 2022-11-07 DIAGNOSIS — T14.8XXA INFECTED WOUND: Primary | ICD-10-CM

## 2022-11-07 PROCEDURE — 99213 OFFICE O/P EST LOW 20 MIN: CPT

## 2022-11-07 RX ORDER — CLINDAMYCIN HYDROCHLORIDE 300 MG/1
300 CAPSULE ORAL 3 TIMES DAILY
Qty: 30 CAPSULE | Refills: 0 | Status: ON HOLD | OUTPATIENT
Start: 2022-11-07 | End: 2022-11-17

## 2022-11-07 NOTE — PROGRESS NOTES
Called patient regarding CCM monthly and left a message for patient to call back when they can. Reviewed patient chart. Left contact my contact number [phone].        Time Spent This Encounter Total: 3  min medical record review  Monthly Minute Total including today: 3 minutes

## 2022-11-07 NOTE — TELEPHONE ENCOUNTER
OTC meds should be fine on dialysis, take as directed, if there are questions recommend discussing product with pharmacist.

## 2022-11-07 NOTE — TELEPHONE ENCOUNTER
Spoke with eboni pts wife (ok john), patient has appt with kidney doctor and other appts scheduled this week. Will not be scheduling TCM fu . Wife states patient is having sore thoat, and is sick has not given him anything yet because of his kidney. is asking if he can take any OTC medication while he is on dialysis?

## 2022-11-08 ENCOUNTER — TELEPHONE (OUTPATIENT)
Dept: NEPHROLOGY | Facility: CLINIC | Age: 77
End: 2022-11-08

## 2022-11-08 ENCOUNTER — TELEPHONE (OUTPATIENT)
Dept: PULMONOLOGY | Facility: CLINIC | Age: 77
End: 2022-11-08

## 2022-11-08 NOTE — TELEPHONE ENCOUNTER
Dr Carmen Hyman please see note below was prescribed with Clindamycin 300mg tid for 10 days and Mupirocin ointment for infected wound under pt chin and need your approval if ok to take the medication prescribed.

## 2022-11-08 NOTE — TELEPHONE ENCOUNTER
From: Eugneio Metzger  To: Kashmir Patel MD  Sent: 11/7/2022 9:02 PM CST  Subject: Urgent Care Visit    This message is being sent by Dagmar Garcia on behalf of Eugenio Metzger. Hi,   I had Atif Nolen and the urgent care and they say he has an infected wound and impetigo. They prescribe some medication for him but I don't want to give it to him unless it's approved by Doctor Gayleen Nageotte because of the kidney. The medication that they prescribed is clindamycin and mupirocin. Can Atif Nolen take this? I will wait for your reply before I pick this up at the drugstore.     Thank you

## 2022-11-08 NOTE — TELEPHONE ENCOUNTER
Pts wife states pt was in ER and was given RX for antibiotic and she is concerned due to kidney disease. Please also see Patient Message. Please call or send My Chart message.

## 2022-11-08 NOTE — TELEPHONE ENCOUNTER
Received fax from [facility] for cpap supply orders. Placed on Dr. Bains Gentle folder for review.

## 2022-11-10 ENCOUNTER — NURSE ONLY (OUTPATIENT)
Dept: HEMATOLOGY/ONCOLOGY | Facility: HOSPITAL | Age: 77
End: 2022-11-10
Attending: INTERNAL MEDICINE
Payer: MEDICARE

## 2022-11-10 ENCOUNTER — TELEPHONE (OUTPATIENT)
Dept: NEPHROLOGY | Facility: CLINIC | Age: 77
End: 2022-11-10

## 2022-11-10 VITALS
HEART RATE: 79 BPM | RESPIRATION RATE: 18 BRPM | BODY MASS INDEX: 23 KG/M2 | DIASTOLIC BLOOD PRESSURE: 59 MMHG | OXYGEN SATURATION: 96 % | WEIGHT: 157.63 LBS | TEMPERATURE: 98 F | SYSTOLIC BLOOD PRESSURE: 110 MMHG

## 2022-11-10 DIAGNOSIS — Z45.2 ENCOUNTER FOR CENTRAL LINE CARE: Primary | ICD-10-CM

## 2022-11-10 DIAGNOSIS — C7A.8 NEUROENDOCRINE CARCINOMA METASTATIC TO INTRA-ABDOMINAL LYMPH NODE (HCC): ICD-10-CM

## 2022-11-10 DIAGNOSIS — N18.5 CHRONIC KIDNEY DISEASE (CKD), STAGE V (HCC): Primary | ICD-10-CM

## 2022-11-10 DIAGNOSIS — E87.20 METABOLIC ACIDOSIS: ICD-10-CM

## 2022-11-10 DIAGNOSIS — N17.9 AKI (ACUTE KIDNEY INJURY) (HCC): ICD-10-CM

## 2022-11-10 DIAGNOSIS — E83.51 HYPOCALCEMIA: ICD-10-CM

## 2022-11-10 DIAGNOSIS — C34.12 PRIMARY CANCER OF LEFT UPPER LOBE OF LUNG (HCC): ICD-10-CM

## 2022-11-10 DIAGNOSIS — C7B.8 NEUROENDOCRINE CARCINOMA METASTATIC TO INTRA-ABDOMINAL LYMPH NODE (HCC): ICD-10-CM

## 2022-11-10 DIAGNOSIS — N18.5 CHRONIC KIDNEY DISEASE (CKD), STAGE V (HCC): ICD-10-CM

## 2022-11-10 LAB
ALBUMIN SERPL-MCNC: 2.3 G/DL (ref 3.4–5)
ANION GAP SERPL CALC-SCNC: 14 MMOL/L (ref 0–18)
ANTIBODY SCREEN: NEGATIVE
BASOPHILS # BLD AUTO: 0.01 X10(3) UL (ref 0–0.2)
BASOPHILS NFR BLD AUTO: 0.1 %
BUN BLD-MCNC: 88 MG/DL (ref 7–18)
BUN/CREAT SERPL: 19.6 (ref 10–20)
CALCIUM BLD-MCNC: 6.2 MG/DL (ref 8.5–10.1)
CHLORIDE SERPL-SCNC: 117 MMOL/L (ref 98–112)
CO2 SERPL-SCNC: 15 MMOL/L (ref 21–32)
CREAT BLD-MCNC: 4.48 MG/DL
DEPRECATED RDW RBC AUTO: 57.9 FL (ref 35.1–46.3)
EOSINOPHIL # BLD AUTO: 0.03 X10(3) UL (ref 0–0.7)
EOSINOPHIL NFR BLD AUTO: 0.4 %
ERYTHROCYTE [DISTWIDTH] IN BLOOD BY AUTOMATED COUNT: 15.5 % (ref 11–15)
GFR SERPLBLD BASED ON 1.73 SQ M-ARVRAT: 13 ML/MIN/1.73M2 (ref 60–?)
GLUCOSE BLD-MCNC: 115 MG/DL (ref 70–99)
HCT VFR BLD AUTO: 24.8 %
HGB BLD-MCNC: 7.4 G/DL
IMM GRANULOCYTES # BLD AUTO: 0.04 X10(3) UL (ref 0–1)
IMM GRANULOCYTES NFR BLD: 0.6 %
LYMPHOCYTES # BLD AUTO: 0.13 X10(3) UL (ref 1–4)
LYMPHOCYTES NFR BLD AUTO: 1.9 %
MAGNESIUM SERPL-MCNC: 1.1 MG/DL (ref 1.6–2.6)
MCH RBC QN AUTO: 30.5 PG (ref 26–34)
MCHC RBC AUTO-ENTMCNC: 29.8 G/DL (ref 31–37)
MCV RBC AUTO: 102.1 FL
MONOCYTES # BLD AUTO: 0.15 X10(3) UL (ref 0.1–1)
MONOCYTES NFR BLD AUTO: 2.2 %
NEUTROPHILS # BLD AUTO: 6.31 X10 (3) UL (ref 1.5–7.7)
NEUTROPHILS # BLD AUTO: 6.31 X10(3) UL (ref 1.5–7.7)
NEUTROPHILS NFR BLD AUTO: 94.8 %
OSMOLALITY SERPL CALC.SUM OF ELEC: 330 MOSM/KG (ref 275–295)
PHOSPHATE SERPL-MCNC: 5.1 MG/DL (ref 2.5–4.9)
PLATELET # BLD AUTO: 155 10(3)UL (ref 150–450)
POTASSIUM SERPL-SCNC: 4.4 MMOL/L (ref 3.5–5.1)
RBC # BLD AUTO: 2.43 X10(6)UL
RH BLOOD TYPE: POSITIVE
SODIUM SERPL-SCNC: 146 MMOL/L (ref 136–145)
WBC # BLD AUTO: 6.7 X10(3) UL (ref 4–11)

## 2022-11-10 PROCEDURE — 36591 DRAW BLOOD OFF VENOUS DEVICE: CPT

## 2022-11-10 PROCEDURE — 85025 COMPLETE CBC W/AUTO DIFF WBC: CPT

## 2022-11-10 PROCEDURE — 86900 BLOOD TYPING SEROLOGIC ABO: CPT

## 2022-11-10 PROCEDURE — 86901 BLOOD TYPING SEROLOGIC RH(D): CPT

## 2022-11-10 PROCEDURE — 83735 ASSAY OF MAGNESIUM: CPT

## 2022-11-10 PROCEDURE — 80069 RENAL FUNCTION PANEL: CPT

## 2022-11-10 PROCEDURE — 96372 THER/PROPH/DIAG INJ SC/IM: CPT

## 2022-11-10 PROCEDURE — 86850 RBC ANTIBODY SCREEN: CPT

## 2022-11-10 RX ORDER — SODIUM CHLORIDE 9 MG/ML
10 INJECTION INTRAVENOUS ONCE
OUTPATIENT
Start: 2022-11-24

## 2022-11-10 RX ORDER — HEPARIN SODIUM (PORCINE) LOCK FLUSH IV SOLN 100 UNIT/ML 100 UNIT/ML
5 SOLUTION INTRAVENOUS ONCE
Status: COMPLETED | OUTPATIENT
Start: 2022-11-10 | End: 2022-11-10

## 2022-11-10 RX ORDER — HEPARIN SODIUM (PORCINE) LOCK FLUSH IV SOLN 100 UNIT/ML 100 UNIT/ML
5 SOLUTION INTRAVENOUS ONCE
OUTPATIENT
Start: 2022-11-24

## 2022-11-10 RX ADMIN — HEPARIN SODIUM (PORCINE) LOCK FLUSH IV SOLN 100 UNIT/ML 500 UNITS: 100 SOLUTION INTRAVENOUS at 10:37:00

## 2022-11-10 NOTE — TELEPHONE ENCOUNTER
Patients wife Polly calling.    Had blood work this morning-  Which should determine if infusion is needed for tomorrow  Friday 11/11  Please return call to [facility]

## 2022-11-10 NOTE — PROGRESS NOTES
Pt here for sodium bicarb/mag rider. Arrives ambulating with walker accompanied by wife. Appeared to tolerate infusion, no signs and symptoms of adverse reaction noted.  Discharged home ambulating with walker

## 2022-11-10 NOTE — PROGRESS NOTES
Pt here for labs. Clarified plan, clinical leader helped place calls  Cbc, type/screen per Dr Rohith Lan office  Renal panel, Mag level per Dr Leatha Nagel office    appt to return tomorrow = 0730  They prefer later - adjusted 1030    Labs drawn via port, tolerated well    Spouse shared that pt to start aranesp today  Dose ordered = 30 mcg but only 25 or 40 mcg in [address] clarified with Dr Chintan Antoine office - give 40mcg, orders adjusted  Provided and reviewed [facility] handout for aranesp - both voiced understanding    Aranesp given for hgl 7.4 on 11/3  Pt preferred abd subcutaneous - given LLQ, tolerated well    Discharged stable to home with future appts, given AVS and reviewed  Gait steady, slow with walker    Addendum - parameters to be entered = hold if hgl is 10 or greater per Eleno KAMINSKI  Today = Hgl = 7.4, Mg 1.1 of note.

## 2022-11-10 NOTE — TELEPHONE ENCOUNTER
Bo López is calling to verify labs being drawn right now. Would like to know if mag needs to be added.  Patient is at center currently

## 2022-11-10 NOTE — TELEPHONE ENCOUNTER
Patient informed of note below and verbalized understanding asking if still need to take potassium 20 meq daily,per Jluis Herrera to continue.

## 2022-11-10 NOTE — PROGRESS NOTES
Order received from Dr. Colten Owens, RN to draw Mag level along with RFP today. Office also aware that additional orders will be needed in the event that pt requires another bicarb or mag infusion.

## 2022-11-10 NOTE — TELEPHONE ENCOUNTER
Dr Oma Potts please see note below ,had advised infusion center to include MG level, pt had Mag infusion last week but no repeat level after that. Per Nurse Raissa Quick pt is scheduled for infusion tomorrow if needed any we can just call .

## 2022-11-10 NOTE — TELEPHONE ENCOUNTER
Dr Heydi Swanson Friends made aware of note below and order for pt to take Magnesium 400 mg daily and to give Magnesium sulfate 4 grams IV  x1 dose and Sodium Bicarbonate 3 amp(150 meq),and  ,Rn faxed order to infusion center. Rn spoke to ORLÉLOUISE at the infusion center about the order.

## 2022-11-11 ENCOUNTER — OFFICE VISIT (OUTPATIENT)
Dept: HEMATOLOGY/ONCOLOGY | Facility: HOSPITAL | Age: 77
End: 2022-11-11
Attending: NURSE PRACTITIONER
Payer: MEDICARE

## 2022-11-11 ENCOUNTER — PATIENT MESSAGE (OUTPATIENT)
Dept: NEPHROLOGY | Facility: CLINIC | Age: 77
End: 2022-11-11

## 2022-11-11 VITALS
DIASTOLIC BLOOD PRESSURE: 56 MMHG | RESPIRATION RATE: 18 BRPM | SYSTOLIC BLOOD PRESSURE: 102 MMHG | TEMPERATURE: 98 F | OXYGEN SATURATION: 91 % | HEART RATE: 87 BPM

## 2022-11-11 DIAGNOSIS — N17.9 ACUTE RENAL INJURY (HCC): Primary | ICD-10-CM

## 2022-11-11 DIAGNOSIS — E87.20 METABOLIC ACIDOSIS: ICD-10-CM

## 2022-11-11 PROCEDURE — 96368 THER/DIAG CONCURRENT INF: CPT

## 2022-11-11 PROCEDURE — 96365 THER/PROPH/DIAG IV INF INIT: CPT

## 2022-11-11 RX ORDER — HEPARIN SODIUM (PORCINE) LOCK FLUSH IV SOLN 100 UNIT/ML 100 UNIT/ML
5 SOLUTION INTRAVENOUS ONCE
OUTPATIENT
Start: 2022-11-24

## 2022-11-11 RX ORDER — SODIUM CHLORIDE 9 MG/ML
10 INJECTION INTRAVENOUS ONCE
OUTPATIENT
Start: 2022-11-24

## 2022-11-11 RX ORDER — POTASSIUM CHLORIDE 1500 MG/1
TABLET, FILM COATED, EXTENDED RELEASE ORAL
Qty: 30 TABLET | Refills: 2 | Status: ON HOLD | OUTPATIENT
Start: 2022-11-11

## 2022-11-11 RX ORDER — HEPARIN SODIUM (PORCINE) LOCK FLUSH IV SOLN 100 UNIT/ML 100 UNIT/ML
SOLUTION INTRAVENOUS
Status: COMPLETED
Start: 2022-11-11 | End: 2022-11-11

## 2022-11-11 RX ADMIN — HEPARIN SODIUM (PORCINE) LOCK FLUSH IV SOLN 100 UNIT/ML 500 UNITS: 100 SOLUTION INTRAVENOUS at 15:40:00

## 2022-11-11 NOTE — TELEPHONE ENCOUNTER
From: Talon Green  To: Kaye Wyatt MD  Sent: 11/11/2022 8:58 AM CST  Subject: blood test    This message is being sent by Zeb Nuñez on behalf of Talon Green. When does the doctor want him to have another blood test? We have an appointment there on November the 17th if she would like it before that I need to make an appointment at the cancer center because Cristiano Espinoza has a port. I didn't see an order in the system.

## 2022-11-13 ENCOUNTER — PATIENT MESSAGE (OUTPATIENT)
Dept: NEPHROLOGY | Facility: CLINIC | Age: 77
End: 2022-11-13

## 2022-11-14 ENCOUNTER — APPOINTMENT (OUTPATIENT)
Dept: CT IMAGING | Facility: HOSPITAL | Age: 77
End: 2022-11-14
Attending: EMERGENCY MEDICINE
Payer: MEDICARE

## 2022-11-14 ENCOUNTER — HOSPITAL ENCOUNTER (INPATIENT)
Facility: HOSPITAL | Age: 77
LOS: 14 days | Discharge: NURSING, REHAB OR LONG-TERM CARE | End: 2022-11-28
Attending: EMERGENCY MEDICINE | Admitting: INTERNAL MEDICINE
Payer: MEDICARE

## 2022-11-14 ENCOUNTER — APPOINTMENT (OUTPATIENT)
Dept: INTERVENTIONAL RADIOLOGY/VASCULAR | Facility: HOSPITAL | Age: 77
End: 2022-11-14
Attending: NURSE PRACTITIONER
Payer: MEDICARE

## 2022-11-14 ENCOUNTER — APPOINTMENT (OUTPATIENT)
Dept: GENERAL RADIOLOGY | Facility: HOSPITAL | Age: 77
End: 2022-11-14
Attending: EMERGENCY MEDICINE
Payer: MEDICARE

## 2022-11-14 DIAGNOSIS — N18.9 CHRONIC KIDNEY DISEASE, UNSPECIFIED CKD STAGE: ICD-10-CM

## 2022-11-14 DIAGNOSIS — R09.02 HYPOXIA: ICD-10-CM

## 2022-11-14 DIAGNOSIS — J94.8 HYDROPNEUMOTHORAX: ICD-10-CM

## 2022-11-14 DIAGNOSIS — R06.03 RESPIRATORY DISTRESS: Primary | ICD-10-CM

## 2022-11-14 DIAGNOSIS — D70.9 NEUTROPENIA, UNSPECIFIED TYPE (HCC): ICD-10-CM

## 2022-11-14 DIAGNOSIS — J18.9 MULTIFOCAL PNEUMONIA: ICD-10-CM

## 2022-11-14 DIAGNOSIS — R77.8 ELEVATED TROPONIN: ICD-10-CM

## 2022-11-14 DIAGNOSIS — C34.12 PRIMARY CANCER OF LEFT UPPER LOBE OF LUNG (HCC): ICD-10-CM

## 2022-11-14 PROBLEM — R79.89 ELEVATED TROPONIN: Status: ACTIVE | Noted: 2022-01-01

## 2022-11-14 LAB
ANION GAP SERPL CALC-SCNC: 13 MMOL/L (ref 0–18)
ATRIAL RATE: 136 BPM
BASE EXCESS BLD CALC-SCNC: -7.7 MMOL/L (ref ?–2)
BASOPHILS # BLD AUTO: 0.01 X10(3) UL (ref 0–0.2)
BASOPHILS NFR BLD AUTO: 0.5 %
BUN BLD-MCNC: 83 MG/DL (ref 7–18)
BUN/CREAT SERPL: 17 (ref 10–20)
CA-I BLD-SCNC: 0.94 MMOL/L (ref 0.95–1.32)
CALCIUM BLD-MCNC: 7.1 MG/DL (ref 8.5–10.1)
CHLORIDE SERPL-SCNC: 113 MMOL/L (ref 98–112)
CHOLEST SERPL-MCNC: 111 MG/DL (ref ?–200)
CO2 SERPL-SCNC: 19 MMOL/L (ref 21–32)
COHGB MFR BLD: 3.4 % (ref 0–3)
CREAT BLD-MCNC: 4.88 MG/DL
D DIMER PPP FEU-MCNC: 8.79 UG/ML FEU (ref ?–0.79)
DEPRECATED RDW RBC AUTO: 58.6 FL (ref 35.1–46.3)
EOSINOPHIL # BLD AUTO: 0.01 X10(3) UL (ref 0–0.7)
EOSINOPHIL NFR BLD AUTO: 0.5 %
ERYTHROCYTE [DISTWIDTH] IN BLOOD BY AUTOMATED COUNT: 15.4 % (ref 11–15)
FLUAV + FLUBV RNA SPEC NAA+PROBE: NEGATIVE
FLUAV + FLUBV RNA SPEC NAA+PROBE: NEGATIVE
GFR SERPLBLD BASED ON 1.73 SQ M-ARVRAT: 11 ML/MIN/1.73M2 (ref 60–?)
GLUCOSE BLD-MCNC: 113 MG/DL (ref 70–99)
GLUCOSE BLDC GLUCOMTR-MCNC: 108 MG/DL (ref 70–99)
GLUCOSE BLDC GLUCOMTR-MCNC: 98 MG/DL (ref 70–99)
HCO3 BLDA-SCNC: 18.9 MEQ/L (ref 21–27)
HCT VFR BLD AUTO: 29.9 %
HDLC SERPL-MCNC: 56 MG/DL (ref 40–59)
HGB BLD-MCNC: 7.1 G/DL
HGB BLD-MCNC: 9 G/DL
IMM GRANULOCYTES # BLD AUTO: 0.01 X10(3) UL (ref 0–1)
IMM GRANULOCYTES NFR BLD: 0.5 %
LACTATE BLD-SCNC: 1.7 MMOL/L (ref 0.5–2)
LACTATE SERPL-SCNC: 1.2 MMOL/L (ref 0.4–2)
LACTATE SERPL-SCNC: 2 MMOL/L (ref 0.4–2)
LDLC SERPL CALC-MCNC: 39 MG/DL (ref ?–100)
LYMPHOCYTES # BLD AUTO: 0.18 X10(3) UL (ref 1–4)
LYMPHOCYTES NFR BLD AUTO: 8.1 %
MAGNESIUM SERPL-MCNC: 2.1 MG/DL (ref 1.6–2.6)
MCH RBC QN AUTO: 31 PG (ref 26–34)
MCHC RBC AUTO-ENTMCNC: 30.1 G/DL (ref 31–37)
MCV RBC AUTO: 103.1 FL
METHGB MFR BLD: 2.1 % SAT (ref 0.4–1.5)
MODIFIED ALLEN TEST: POSITIVE
MONOCYTES # BLD AUTO: 0.08 X10(3) UL (ref 0.1–1)
MONOCYTES NFR BLD AUTO: 3.6 %
MRSA DNA SPEC QL NAA+PROBE: NEGATIVE
NEUTROPHILS # BLD AUTO: 1.93 X10 (3) UL (ref 1.5–7.7)
NEUTROPHILS # BLD AUTO: 1.93 X10(3) UL (ref 1.5–7.7)
NEUTROPHILS NFR BLD AUTO: 86.8 %
NONHDLC SERPL-MCNC: 55 MG/DL (ref ?–130)
NT-PROBNP SERPL-MCNC: ABNORMAL PG/ML (ref ?–450)
O2 CT BLD-SCNC: 9.9 VOL% (ref 15–23)
OSMOLALITY SERPL CALC.SUM OF ELEC: 326 MOSM/KG (ref 275–295)
OXYGEN LITERS/MINUTE: 15 L/MIN
P AXIS: 101 DEGREES
P-R INTERVAL: 168 MS
PCO2 BLDA: 28 MM HG (ref 35–45)
PH BLDA: 7.38 [PH] (ref 7.35–7.45)
PLATELET # BLD AUTO: 204 10(3)UL (ref 150–450)
PO2 BLDA: 208 MM HG (ref 80–100)
POTASSIUM BLD-SCNC: 4 MMOL/L (ref 3.6–5.1)
POTASSIUM SERPL-SCNC: 4.7 MMOL/L (ref 3.5–5.1)
PUNCTURE CHARGE: YES
Q-T INTERVAL: 268 MS
QRS DURATION: 96 MS
QTC CALCULATION (BEZET): 397 MS
R AXIS: 104 DEGREES
RBC # BLD AUTO: 2.9 X10(6)UL
RSV RNA SPEC NAA+PROBE: NEGATIVE
SAO2 % BLDA: 98.8 % (ref 94–100)
SARS-COV-2 RNA RESP QL NAA+PROBE: NOT DETECTED
SODIUM BLD-SCNC: 141 MMOL/L (ref 135–145)
SODIUM SERPL-SCNC: 145 MMOL/L (ref 136–145)
T AXIS: -45 DEGREES
TRIGL SERPL-MCNC: 82 MG/DL (ref 30–149)
TROPONIN I HIGH SENSITIVITY: 1350 NG/L
VENTRICULAR RATE: 132 BPM
VLDLC SERPL CALC-MCNC: 11 MG/DL (ref 0–30)
WBC # BLD AUTO: 2.2 X10(3) UL (ref 4–11)

## 2022-11-14 PROCEDURE — 99291 CRITICAL CARE FIRST HOUR: CPT | Performed by: INTERNAL MEDICINE

## 2022-11-14 PROCEDURE — 71250 CT THORAX DX C-: CPT | Performed by: EMERGENCY MEDICINE

## 2022-11-14 PROCEDURE — 5A09357 ASSISTANCE WITH RESPIRATORY VENTILATION, LESS THAN 24 CONSECUTIVE HOURS, CONTINUOUS POSITIVE AIRWAY PRESSURE: ICD-10-PCS | Performed by: HOSPITALIST

## 2022-11-14 PROCEDURE — 71045 X-RAY EXAM CHEST 1 VIEW: CPT | Performed by: EMERGENCY MEDICINE

## 2022-11-14 PROCEDURE — 99223 1ST HOSP IP/OBS HIGH 75: CPT | Performed by: INTERNAL MEDICINE

## 2022-11-14 PROCEDURE — 0W9B30Z DRAINAGE OF LEFT PLEURAL CAVITY WITH DRAINAGE DEVICE, PERCUTANEOUS APPROACH: ICD-10-PCS | Performed by: RADIOLOGY

## 2022-11-14 RX ORDER — MORPHINE SULFATE 2 MG/ML
2 INJECTION, SOLUTION INTRAMUSCULAR; INTRAVENOUS EVERY 4 HOURS PRN
Status: DISCONTINUED | OUTPATIENT
Start: 2022-11-14 | End: 2022-11-28

## 2022-11-14 RX ORDER — VANCOMYCIN/0.9 % SOD CHLORIDE 1.75 G/5
25 PLASTIC BAG, INJECTION (ML) INTRAVENOUS ONCE
Status: COMPLETED | OUTPATIENT
Start: 2022-11-14 | End: 2022-11-14

## 2022-11-14 RX ORDER — LIDOCAINE HYDROCHLORIDE 20 MG/ML
INJECTION, SOLUTION EPIDURAL; INFILTRATION; INTRACAUDAL; PERINEURAL
Status: COMPLETED
Start: 2022-11-14 | End: 2022-11-14

## 2022-11-14 RX ORDER — MORPHINE SULFATE 4 MG/ML
4 INJECTION, SOLUTION INTRAMUSCULAR; INTRAVENOUS ONCE
Status: DISCONTINUED | OUTPATIENT
Start: 2022-11-14 | End: 2022-11-14

## 2022-11-14 RX ORDER — MORPHINE SULFATE 4 MG/ML
INJECTION, SOLUTION INTRAMUSCULAR; INTRAVENOUS
Status: COMPLETED
Start: 2022-11-14 | End: 2022-11-14

## 2022-11-14 RX ORDER — SODIUM CHLORIDE 9 MG/ML
INJECTION, SOLUTION INTRAVENOUS CONTINUOUS
Status: DISCONTINUED | OUTPATIENT
Start: 2022-11-14 | End: 2022-11-15

## 2022-11-14 RX ORDER — IPRATROPIUM BROMIDE AND ALBUTEROL SULFATE 2.5; .5 MG/3ML; MG/3ML
3 SOLUTION RESPIRATORY (INHALATION) 4 TIMES DAILY
Status: DISCONTINUED | OUTPATIENT
Start: 2022-11-14 | End: 2022-11-14

## 2022-11-14 RX ORDER — MORPHINE SULFATE 4 MG/ML
4 INJECTION, SOLUTION INTRAMUSCULAR; INTRAVENOUS ONCE
Status: COMPLETED | OUTPATIENT
Start: 2022-11-14 | End: 2022-11-14

## 2022-11-14 RX ORDER — IPRATROPIUM BROMIDE AND ALBUTEROL SULFATE 2.5; .5 MG/3ML; MG/3ML
3 SOLUTION RESPIRATORY (INHALATION)
Status: DISCONTINUED | OUTPATIENT
Start: 2022-11-14 | End: 2022-11-16

## 2022-11-14 RX ORDER — HEPARIN SODIUM 5000 [USP'U]/ML
5000 INJECTION, SOLUTION INTRAVENOUS; SUBCUTANEOUS EVERY 12 HOURS SCHEDULED
Status: DISCONTINUED | OUTPATIENT
Start: 2022-11-14 | End: 2022-11-19

## 2022-11-14 RX ORDER — MIDAZOLAM HYDROCHLORIDE 1 MG/ML
INJECTION INTRAMUSCULAR; INTRAVENOUS
Status: COMPLETED
Start: 2022-11-14 | End: 2022-11-14

## 2022-11-14 RX ORDER — ACETAMINOPHEN 500 MG
1000 TABLET ORAL ONCE
Status: COMPLETED | OUTPATIENT
Start: 2022-11-14 | End: 2022-11-14

## 2022-11-14 NOTE — ED QUICK NOTES
Dr. Ian Llanes at bedside to attempt repositioning of chest tube. Edmd unable to advance. Recommending CV surgery or IR to reassess for repositioning chest tube under fluroscopy.

## 2022-11-14 NOTE — ED QUICK NOTES
Patient out of department for additional testing. TO CT accompanied by RN and RT- cardiac monitor in place. Tolerated well.

## 2022-11-14 NOTE — TELEPHONE ENCOUNTER
From: Willie Wyatt  Sent: 11/13/2022 12:55 PM CST  To: Em Nephro Clinical Staff  Subject: medication    This message is being sent by Tatiana Cloud on behalf of Willie Wyatt. I'm sorry please disregard this message I found a bottle of medicine. patient

## 2022-11-14 NOTE — IMAGING NOTE
CXR reviewed. Chest tube not working, IR will replace tube later today. Spoke with pt and wife who state understanding and agree to proceed.

## 2022-11-14 NOTE — PRE-SEDATION ASSESSMENT
[facility]  IR Pre-Procedure Sedation Assessment    History of snoring or sleep or apnea? Yes    History of previous problems with anesthesia or sedation  No    Physical Findings:  Neck: nl ROM  CV: RRR  PULM: diminished left side breath sounds    Mallampati Score:  II (hard and soft palate, upper portion of tonsils anduvula visible)    ASA Classification:   3.  Patient with severe systemic disease    Plan:   -IV moderate sedation    Katy Andrade MD

## 2022-11-14 NOTE — ED QUICK NOTES
Patient prepped for left mid-axillary chest tube insertion. 24fr chest tube inserted by . Medicated with 25 mcg of fentanyl and 4mg of Morphine as directed. Patient tolerated well. Chest tube connected to drainage system and wall suction at 80mmHg. [facility]

## 2022-11-14 NOTE — CM/SW NOTE
Readmission:  Patient is a 24-year-old male with coronary artery disease, lung cancer with metastases currently on 3 L O2 as needed, COPD on albuterol inhaler as needed and chronic kidney disease who arrives by EMS from home with shortness of breath x2 days. Pt DC'd from [facility] on 10/26 home. Per chart review, pt presents from home with his wife in a tri-level home. Pt is primarily indep. Pt requires assistance with transportation and medication reminders. Wife assists. DME at home: CPAP, cane, walker, shower chair    Prior services: [facility] & [employer]    PLAN: pending medical course & O2 plan    SW remains available for support and/or discharge planning. Please do not hesitate to call/chat SW if further DC needs arise.      Km BARAJAS, [place], [place]   Ext [phone]

## 2022-11-14 NOTE — PROCEDURES
[facility]  Procedure Note    Susy Stanford Patient Status:  Inpatient    1945 MRN [id number]   Location [facility] 2W/SW Attending Chandan Collins MD   Hosp Day # 0 PCP Ad Vail MD     Procedure: Left chest tube insertion    Pre-Procedure Diagnosis: Hypoxia [R09.02]  Respiratory distress [R06.03]  Elevated troponin [R77.8]  Hydropneumothorax [J94.8]  Neutropenia, unspecified type ([address] [D70.9]  Multifocal pneumonia [J18.9]  Chronic kidney disease, unspecified CKD stage [N18.9]      Post-Procedure Diagnosis: Hypoxia [R09.02]  Respiratory distress [R06.03]  Elevated troponin [R77.8]  Hydropneumothorax [J94.8]  Neutropenia, unspecified type ([address] [D70.9]  Multifocal pneumonia [J18.9]  Chronic kidney disease, unspecified CKD stage [N18.9]    Anesthesia:  Local and Sedation    Findings:  Under ultrasound guidance, 5Fr yueh catheter advanced into left pleural space. Over wire, 14Fr pigtail drain placed. Pre-existing surgical chest tube removed. Specimens: Purulent fluid for culture    Blood Loss:  5mL      Complications:  None    Plan:  Chest tube to suction -20 cm H2O.     Norris Valencia MD  2022

## 2022-11-14 NOTE — ED QUICK NOTES
Continuity of care endorsed to serenity Starr rn. Endorsed to rn dr. Emperatriz Smith recommendation of having CV surgery or IR to reassess pt's chest tube. All questions and concerns addressed.

## 2022-11-15 ENCOUNTER — APPOINTMENT (OUTPATIENT)
Dept: GENERAL RADIOLOGY | Facility: HOSPITAL | Age: 77
End: 2022-11-15
Attending: INTERNAL MEDICINE
Payer: MEDICARE

## 2022-11-15 ENCOUNTER — PATIENT OUTREACH (OUTPATIENT)
Dept: CASE MANAGEMENT | Age: 77
End: 2022-11-15

## 2022-11-15 LAB
ALBUMIN SERPL-MCNC: 1.7 G/DL (ref 3.4–5)
ALBUMIN/GLOB SERPL: 0.5 {RATIO} (ref 1–2)
ALP LIVER SERPL-CCNC: 45 U/L
ALT SERPL-CCNC: 15 U/L
ANION GAP SERPL CALC-SCNC: 10 MMOL/L (ref 0–18)
AST SERPL-CCNC: 21 U/L (ref 15–37)
BASOPHILS # BLD AUTO: 0.03 X10(3) UL (ref 0–0.2)
BASOPHILS NFR BLD AUTO: 0.4 %
BASOPHILS NFR BRONCH: 0 %
BILIRUB SERPL-MCNC: 0.4 MG/DL (ref 0.1–2)
BUN BLD-MCNC: 76 MG/DL (ref 7–18)
BUN/CREAT SERPL: 17 (ref 10–20)
CALCIUM BLD-MCNC: 5.8 MG/DL (ref 8.5–10.1)
CHLORIDE SERPL-SCNC: 113 MMOL/L (ref 98–112)
CO2 SERPL-SCNC: 18 MMOL/L (ref 21–32)
COLOR FLD: COLORLESS
CREAT BLD-MCNC: 4.46 MG/DL
DEPRECATED RDW RBC AUTO: 56.7 FL (ref 35.1–46.3)
EOSINOPHIL # BLD AUTO: 0.04 X10(3) UL (ref 0–0.7)
EOSINOPHIL NFR BLD AUTO: 0.6 %
EOSINOPHIL NFR BRONCH: 0 %
ERYTHROCYTE [DISTWIDTH] IN BLOOD BY AUTOMATED COUNT: 15.4 % (ref 11–15)
GFR SERPLBLD BASED ON 1.73 SQ M-ARVRAT: 13 ML/MIN/1.73M2 (ref 60–?)
GLOBULIN PLAS-MCNC: 3.3 G/DL (ref 2.8–4.4)
GLUCOSE BLD-MCNC: 83 MG/DL (ref 70–99)
HCT VFR BLD AUTO: 23.3 %
HGB BLD-MCNC: 7.2 G/DL
IMM GRANULOCYTES # BLD AUTO: 0.09 X10(3) UL (ref 0–1)
IMM GRANULOCYTES NFR BLD: 1.3 %
LYMPHOCYTES # BLD AUTO: 0.21 X10(3) UL (ref 1–4)
LYMPHOCYTES NFR BLD AUTO: 3.1 %
LYMPHOCYTES NFR BRONCH: 5 %
MAGNESIUM SERPL-MCNC: 1.8 MG/DL (ref 1.6–2.6)
MCH RBC QN AUTO: 31.7 PG (ref 26–34)
MCHC RBC AUTO-ENTMCNC: 30.9 G/DL (ref 31–37)
MCV RBC AUTO: 102.6 FL
MONOCYTES # BLD AUTO: 0.17 X10(3) UL (ref 0.1–1)
MONOCYTES NFR BLD AUTO: 2.5 %
MONOS+MACROS NFR BRONCH: 15 %
NEUTROPHILS # BLD AUTO: 6.27 X10 (3) UL (ref 1.5–7.7)
NEUTROPHILS # BLD AUTO: 6.27 X10(3) UL (ref 1.5–7.7)
NEUTROPHILS NFR BLD AUTO: 92.1 %
NEUTROPHILS NFR BRONCH: 80 %
OSMOLALITY SERPL CALC.SUM OF ELEC: 314 MOSM/KG (ref 275–295)
PHOSPHATE SERPL-MCNC: 4 MG/DL (ref 2.5–4.9)
PLATELET # BLD AUTO: 148 10(3)UL (ref 150–450)
PLATELET MORPHOLOGY: NORMAL
POTASSIUM SERPL-SCNC: 4.5 MMOL/L (ref 3.5–5.1)
PROT SERPL-MCNC: 5 G/DL (ref 6.4–8.2)
RBC # BLD AUTO: 2.27 X10(6)UL
RBC # FLD: 783 /CUMM (ref ?–1)
SODIUM SERPL-SCNC: 141 MMOL/L (ref 136–145)
TOTAL CELLS COUNTED BRONCH: 451 /CUMM (ref ?–1)
TOTAL CELLS COUNTED FLD: 100
WBC # BLD AUTO: 6.8 X10(3) UL (ref 4–11)
WBC CALC (IRIS) BRW: 451 /CUMM

## 2022-11-15 PROCEDURE — 0B9J8ZX DRAINAGE OF LEFT LOWER LUNG LOBE, VIA NATURAL OR ARTIFICIAL OPENING ENDOSCOPIC, DIAGNOSTIC: ICD-10-PCS | Performed by: INTERNAL MEDICINE

## 2022-11-15 PROCEDURE — 71045 X-RAY EXAM CHEST 1 VIEW: CPT | Performed by: INTERNAL MEDICINE

## 2022-11-15 PROCEDURE — 99291 CRITICAL CARE FIRST HOUR: CPT | Performed by: INTERNAL MEDICINE

## 2022-11-15 PROCEDURE — 99233 SBSQ HOSP IP/OBS HIGH 50: CPT | Performed by: INTERNAL MEDICINE

## 2022-11-15 PROCEDURE — 31624 DX BRONCHOSCOPE/LAVAGE: CPT | Performed by: INTERNAL MEDICINE

## 2022-11-15 RX ORDER — PREDNISONE 1 MG/1
10 TABLET ORAL
Status: COMPLETED | OUTPATIENT
Start: 2022-11-15 | End: 2022-11-19

## 2022-11-15 RX ORDER — MIDAZOLAM HYDROCHLORIDE 1 MG/ML
INJECTION INTRAMUSCULAR; INTRAVENOUS
Status: DISCONTINUED | OUTPATIENT
Start: 2022-11-15 | End: 2022-11-20

## 2022-11-15 RX ORDER — CALCITRIOL 0.25 UG/1
0.5 CAPSULE, LIQUID FILLED ORAL DAILY
Status: DISCONTINUED | OUTPATIENT
Start: 2022-11-15 | End: 2022-11-28

## 2022-11-15 NOTE — PHYSICAL THERAPY NOTE
PT order received, chart reviewed. Patient just finished bronchoscopy and is very tired, RN requesting we check back later today.

## 2022-11-15 NOTE — PROGRESS NOTES
Attempted to reach pt for CCM monthly and to check up on him. I think pt is still in hospital.  I left a message on answering machine for patient's wife Polly to return my call when available.

## 2022-11-15 NOTE — PROCEDURES
PT REPORTS GENERALIZED WEAKNESS AND HICCUPS X 3 DAYS [facility]     Procedure Note  11/15/22    Katja Pino Patient Status:  Inpatient    1945 MRN [id number]   Location [facility] 2W/SW Attending Juan Brown MD   Hosp Day # 1 PCP Ling Neal MD     Procedure: Bronchoscopy with left lung lavage. Pre-Procedure Diagnosis: Left lung atelectasis    Post-Procedure Diagnosis: Mucous plugging    Anesthesia:  Sedation    Finding and procedure: An oropharyngeal bite block was placed. The small lump was bronchoscope was inserted through the bite block. The vocal cords move symmetrically. The trachea was unremarkable except for clear mucus stranding. The right lung airway was completely imaged and was free of lesion. The left mainstem bronchus had mucous plugging which was aspirated free and each segmental bronchi were completely imaged and had clear to white mucus plugging. Both the left lower lobe and the left upper lobe were lavaged with 60 cc of saline. The left lower lobe lavage was captured for culture and cytology. Complications none.     Specimens: Sent    Blood Loss: None    Tourniquet Time: None  Complications:  None  Drains: None    Secondary Diagnosis: None    Norma Singh MD  Medical Director, Critical Care, [address] [place] [address], [address] [place] [address] [address]  Pager: [phone]

## 2022-11-15 NOTE — PROGRESS NOTES
11/15/22 1131   Wound 11/15/22 Other (comment) Jaw Right   Date First Assessed/Time First Assessed: 11/15/22 1100   Present on Hospital Admission: Yes  Primary Wound Type: Other (comment)  Location: Jaw  Wound Location Orientation: Right  Wound Description (Comments): ULCER   Wound Image    Site Assessment Brown;Fragile; Moist;Painful;Tan;Red   Closure Not approximated   Drainage Amount Scant   Drainage Description Marrufo;Serosanguineous   Treatments Cleansed; Saline/Wound    Dressing 2x2s; Aquacel Foam   Dressing Changed Changed   Dressing Status Dressing Changed;Removed; Old drainage   Wound Length (cm) 2.2 cm   Wound Width (cm) 2.9 cm   Wound Surface Area (cm^2) 6.38 cm^2   Wound Depth (cm) 0.2 cm   Wound Volume (cm^3) 1.276 cm^3   Non-staged Wound Description Full thickness   Sherice-wound Assessment Clean;Dry; Intact;Fragile   Wound Bed Slough (%) 30 %   Wound Odor None   State of Healing Non-healing   Wound Follow Up   Follow up needed Yes     WOUND CARE NOTE    History:  Past Medical History:   Diagnosis Date    Anesthesia complication     blood pressure drops    Aneurysm of abdominal vessel     Ankle wound, left, initial encounter 3/24/2022    probes to bone.  rule out ostemyelitis    Ankle wound, left, sequela 5/19/2022    Aortic valve defect     Back problem     Bone cancer ([address]     Brain cancer ([address]     Cellulitis of left ankle 3/24/2022    COPD (chronic obstructive pulmonary disease) (HCC)     Disorder of thyroid     Essential hypertension     Hearing impairment     Cocher implant- right    Heart attack ([address]     High blood pressure     High cholesterol     History of blood transfusion     9/2022    Hyperlipidemia     Lung cancer ([address]     metastatic adenocarcinoma of the lung    Muscle weakness     Pericardial effusion     Renal disorder     CKD    Shortness of breath     Sleep apnea     Visual impairment     glasses     Past Surgical History:   Procedure Laterality Date    BRAIN SURGERY      cyber knife    CARPAL TUNNEL RELEASE      CATH BARE METAL STENT (BMS)      CATH PERICARDIOCENTESIS  2020    urgent pericardiocentesis     HERNIA SURGERY      OTHER      foot sx    OTHER SURGICAL HISTORY      cardiac stent     OTHER SURGICAL HISTORY Left     leg surgery      Social History     Socioeconomic History    Marital status:    Tobacco Use    Smoking status: Former     Types: Cigarettes     Quit date:      Years since quittin.8    Smokeless tobacco: Never    Tobacco comments:     quit in    Vaping Use    Vaping Use: Never used   Substance and Sexual Activity    Alcohol use: No    Drug use: No   Social Determinants of Health  Financial Resource Strain: Low Risk       Difficulty of Paying Living Expenses: Not very hard  Transportation Needs: No Transportation Needs      Lack of Transportation (Medical): No      Lack of Transportation (Non-Medical): No    PLAN   Recommendations:  Dr. Nikia Killian currently on consult  Dietary consult for recommendations for nutrition to optimize wound healing  Heels elevated using pillows, heel wedge or heel boots to offload heels    Wound(s)  Location: RIGHT JAW  Cleansing  Saline   Topical SANTYL  Dressings SALINE MOIST 2X2  Secure with BORDERED FOAM  Frequency DAILY     SUBJECTIVE   HELLO    OBJECTIVE   RN Consult new  Reason for Consult      ASSESSMENT   Palu Score:  Paul Scale Score: 14    Chart Reviewed: yes    Wound(s):  Pt seen laying in bed, wife present during assessment. Wife reports pt was told he had impetigo at the urgent care and have been using murperocin with no results. See above for ulcer (fungating tumor?) pt has hx of ca w mets. Pt reports pain when the area is touched. There is adherent slough in the wound bed, recommend application of Santyl for enzymatic debridement of non viable tissue. Spoke w bedside RN re wound dressing rec.        Allergies: Cephalosporins and Demerol Hcl [Meperidine]    Labs:   Lab Results   Component Value Date    WBC 6.8 11/15/2022    HGB 7.2 (L) 11/15/2022    HCT 23.3 (L) 11/15/2022    .0 (L) 11/15/2022    CREATSERUM 4.46 (H) 11/15/2022    BUN 76 (H) 11/15/2022     11/15/2022    K 4.5 11/15/2022     (H) 11/15/2022    CO2 18.0 (L) 11/15/2022    GLU 83 11/15/2022    CA 5.8 (LL) 11/15/2022    ALB 1.7 (L) 11/15/2022    ALKPHO 45 11/15/2022    BILT 0.4 11/15/2022    TP 5.0 (L) 11/15/2022    AST 21 11/15/2022    ALT 15 (L) 11/15/2022     06/15/2022    MG 1.8 11/15/2022    PHOS 4.0 11/15/2022     No results found for: PREALBUMIN      Time Spent 30 Minutes.     Germán Perez, RN, BSN, [address] [facility],[address]  [phone]

## 2022-11-15 NOTE — PROGRESS NOTES
[facility] Dosing Service    Initial Pharmacokinetic Consult for Vancomycin Dosing     Narendra Haider is a 76year old patient who is being treated for pneumonia. Weights:  Ideal body weight: 61.5 kg (135 lb 9.3 oz)  Adjusted ideal body weight: 65.9 kg (145 lb 5.9 oz)  Actual weight:  72.6 kg (160 lb 0.9 oz)    Labs:  Lab Results   Component Value Date    CREATSERUM 4.46 11/15/2022      CrCl:  Estimated Creatinine Clearance: 11.7 mL/min (A) (based on SCr of 4.46 mg/dL (H)). Based on the above:    1. This patient has received a loading dose of Vancomycin  1750 mg IVPB (25mg/kg, capped at 2000 mg) x 1 dose. This will be followed by 1000 mg Q 48 hours based upon actual body weight of 72.6 kg and renal function. 2. Vancomycin level will be obtained prior to the 3rd dose. Goal trough is 10-15 mcg/mL unless otherwise noted by ordering provider. 3. Pharmacy will order SCr as clinically indicated while on vancomycin to assess renal function. 4. Pharmacy will follow and monitor renal function, toxicity and efficacy. We appreciate the opportunity to assist in the care of this patient.     Alexis Bazzi, PharmD, [address] [facility] Extension: [phone]

## 2022-11-16 ENCOUNTER — APPOINTMENT (OUTPATIENT)
Dept: GENERAL RADIOLOGY | Facility: HOSPITAL | Age: 77
End: 2022-11-16
Attending: INTERNAL MEDICINE
Payer: MEDICARE

## 2022-11-16 ENCOUNTER — APPOINTMENT (OUTPATIENT)
Dept: PICC SERVICES | Facility: HOSPITAL | Age: 77
End: 2022-11-16
Attending: NURSE PRACTITIONER
Payer: MEDICARE

## 2022-11-16 LAB
ALBUMIN SERPL-MCNC: 1.6 G/DL (ref 3.4–5)
ANION GAP SERPL CALC-SCNC: 11 MMOL/L (ref 0–18)
ANTIBODY SCREEN: NEGATIVE
BASOPHILS # BLD AUTO: 0.01 X10(3) UL (ref 0–0.2)
BASOPHILS NFR BLD AUTO: 0.1 %
BUN BLD-MCNC: 80 MG/DL (ref 7–18)
BUN/CREAT SERPL: 17.5 (ref 10–20)
CALCIUM BLD-MCNC: 5.8 MG/DL (ref 8.5–10.1)
CHLORIDE SERPL-SCNC: 112 MMOL/L (ref 98–112)
CO2 SERPL-SCNC: 17 MMOL/L (ref 21–32)
CREAT BLD-MCNC: 4.56 MG/DL
DEPRECATED RDW RBC AUTO: 58.8 FL (ref 35.1–46.3)
EOSINOPHIL # BLD AUTO: 0.02 X10(3) UL (ref 0–0.7)
EOSINOPHIL NFR BLD AUTO: 0.2 %
ERYTHROCYTE [DISTWIDTH] IN BLOOD BY AUTOMATED COUNT: 15.3 % (ref 11–15)
GFR SERPLBLD BASED ON 1.73 SQ M-ARVRAT: 12 ML/MIN/1.73M2 (ref 60–?)
GLUCOSE BLD-MCNC: 164 MG/DL (ref 70–99)
HCT VFR BLD AUTO: 22.9 %
HGB BLD-MCNC: 6.7 G/DL
HGB BLD-MCNC: 8.5 G/DL
IMM GRANULOCYTES # BLD AUTO: 0.18 X10(3) UL (ref 0–1)
IMM GRANULOCYTES NFR BLD: 1.8 %
IRON SATN MFR SERPL: 12 %
IRON SERPL-MCNC: 16 UG/DL
LYMPHOCYTES # BLD AUTO: 0.19 X10(3) UL (ref 1–4)
LYMPHOCYTES NFR BLD AUTO: 1.9 %
MAGNESIUM SERPL-MCNC: 1.8 MG/DL (ref 1.6–2.6)
MCH RBC QN AUTO: 30.5 PG (ref 26–34)
MCHC RBC AUTO-ENTMCNC: 29.3 G/DL (ref 31–37)
MCV RBC AUTO: 104.1 FL
MONOCYTES # BLD AUTO: 0.23 X10(3) UL (ref 0.1–1)
MONOCYTES NFR BLD AUTO: 2.4 %
NEUTROPHILS # BLD AUTO: 9.13 X10 (3) UL (ref 1.5–7.7)
NEUTROPHILS # BLD AUTO: 9.13 X10(3) UL (ref 1.5–7.7)
NEUTROPHILS NFR BLD AUTO: 93.6 %
OSMOLALITY SERPL CALC.SUM OF ELEC: 318 MOSM/KG (ref 275–295)
PHOSPHATE SERPL-MCNC: 5.8 MG/DL (ref 2.5–4.9)
PLATELET # BLD AUTO: 150 10(3)UL (ref 150–450)
POTASSIUM SERPL-SCNC: 5 MMOL/L (ref 3.5–5.1)
RBC # BLD AUTO: 2.2 X10(6)UL
RH BLOOD TYPE: POSITIVE
SODIUM SERPL-SCNC: 140 MMOL/L (ref 136–145)
TIBC SERPL-MCNC: 137 UG/DL (ref 240–450)
TRANSFERRIN SERPL-MCNC: 92 MG/DL (ref 200–360)
WBC # BLD AUTO: 9.8 X10(3) UL (ref 4–11)

## 2022-11-16 PROCEDURE — 30233H1 TRANSFUSION OF NONAUTOLOGOUS WHOLE BLOOD INTO PERIPHERAL VEIN, PERCUTANEOUS APPROACH: ICD-10-PCS | Performed by: HOSPITALIST

## 2022-11-16 PROCEDURE — 30233N1 TRANSFUSION OF NONAUTOLOGOUS RED BLOOD CELLS INTO PERIPHERAL VEIN, PERCUTANEOUS APPROACH: ICD-10-PCS | Performed by: HOSPITALIST

## 2022-11-16 PROCEDURE — 99233 SBSQ HOSP IP/OBS HIGH 50: CPT | Performed by: INTERNAL MEDICINE

## 2022-11-16 PROCEDURE — 71045 X-RAY EXAM CHEST 1 VIEW: CPT | Performed by: INTERNAL MEDICINE

## 2022-11-16 RX ORDER — SEVELAMER CARBONATE 800 MG/1
800 TABLET, FILM COATED ORAL
Status: DISCONTINUED | OUTPATIENT
Start: 2022-11-16 | End: 2022-11-21

## 2022-11-16 RX ORDER — SODIUM BICARBONATE 650 MG/1
1300 TABLET ORAL 3 TIMES DAILY
Status: DISCONTINUED | OUTPATIENT
Start: 2022-11-16 | End: 2022-11-17

## 2022-11-16 RX ORDER — SODIUM CHLORIDE 9 MG/ML
INJECTION, SOLUTION INTRAVENOUS ONCE
Status: COMPLETED | OUTPATIENT
Start: 2022-11-16 | End: 2022-11-16

## 2022-11-16 RX ORDER — IPRATROPIUM BROMIDE AND ALBUTEROL SULFATE 2.5; .5 MG/3ML; MG/3ML
3 SOLUTION RESPIRATORY (INHALATION) EVERY 4 HOURS PRN
Status: DISCONTINUED | OUTPATIENT
Start: 2022-11-16 | End: 2022-11-18

## 2022-11-16 NOTE — PLAN OF CARE
Pt alert. Pain to chest tube site controlled with prn morphine. Chest tube to suction draining cloudy pink fluid. Pt urinating freely per primofit. Call light within easy reach. BP supported with levophed. Weaning appropriately. No acute distress noted.

## 2022-11-16 NOTE — PLAN OF CARE
Pt alert. Reports minimal pain to chest tube site. Prn morphine given as ordered prn. Blood pressure support continues with levophed. Weaning appropriately. Tolerating present O2 settings. Dressing to right jaw dry and intact. Pt urinating freely per primofit. Call light within easy reach. No acute distress noted. Problem: RESPIRATORY - ADULT  Goal: Achieves optimal ventilation and oxygenation  Description: INTERVENTIONS:  - Assess for changes in respiratory status  - Assess for changes in mentation and behavior  - Position to facilitate oxygenation and minimize respiratory effort  - Oxygen supplementation based on oxygen saturation or ABGs  - Provide Smoking Cessation handout, if applicable  - Encourage broncho-pulmonary hygiene including cough, deep breathe, Incentive Spirometry  - Assess the need for suctioning and perform as needed  - Assess and instruct to report SOB or any respiratory difficulty  - Respiratory Therapy support as indicated  - Manage/alleviate anxiety  - Monitor for signs/symptoms of CO2 retention  11/16/2022 0543 by Illa Meter, RN  Outcome: Progressing  11/16/2022 0540 by Illa Meter, RN  Outcome: Progressing     Problem: CARDIOVASCULAR - ADULT  Goal: Maintains optimal cardiac output and hemodynamic stability  Description: INTERVENTIONS:  - Monitor vital signs, rhythm, and trends  - Monitor for bleeding, hypotension and signs of decreased cardiac output  - Evaluate effectiveness of vasoactive medications to optimize hemodynamic stability  - Monitor arterial and/or venous puncture sites for bleeding and/or hematoma  - Assess quality of pulses, skin color and temperature  - Assess for signs of decreased coronary artery perfusion - ex.  Angina  - Evaluate fluid balance, assess for edema, trend weights  11/16/2022 0543 by Illa Meter, RN  Outcome: Progressing  11/16/2022 0540 by Illa Meter, RN  Outcome: Progressing  Goal: Absence of cardiac arrhythmias or at baseline  Description: INTERVENTIONS:  - Continuous cardiac monitoring, monitor vital signs, obtain 12 lead EKG if indicated  - Evaluate effectiveness of antiarrhythmic and heart rate control medications as ordered  - Initiate emergency measures for life threatening arrhythmias  - Monitor electrolytes and administer replacement therapy as ordered  11/16/2022 0543 by Angel Machuca RN  Outcome: Progressing  11/16/2022 0540 by Angel Machuca RN  Outcome: Progressing

## 2022-11-16 NOTE — PLAN OF CARE
Patient A+Ox4, c/o of chest pain related to chest tube. Bronchoscopy with lavage completed bedside. Diet & IVF modified per Nephrology. Continue Levo gtt and chest tube. Maintained patient safety.    Problem: Patient Centered Care  Goal: Patient preferences are identified and integrated in the patient's plan of care  Description: Interventions:  - What would you like us to know as we care for you?   - Provide timely, complete, and accurate information to patient/family  - Incorporate patient and family knowledge, values, beliefs, and cultural backgrounds into the planning and delivery of care  - Encourage patient/family to participate in care and decision-making at the level they choose  - Honor patient and family perspectives and choices  Outcome: Progressing     Problem: Patient/Family Goals  Goal: Patient/Family Long Term Goal  Description: Patient's Long Term Goal:     Interventions:  -   - See additional Care Plan goals for specific interventions  Outcome: Progressing  Goal: Patient/Family Short Term Goal  Description: Patient's Short Term Goal:     Interventions:   -   - See additional Care Plan goals for specific interventions  Outcome: Progressing     Problem: RESPIRATORY - ADULT  Goal: Achieves optimal ventilation and oxygenation  Description: INTERVENTIONS:  - Assess for changes in respiratory status  - Assess for changes in mentation and behavior  - Position to facilitate oxygenation and minimize respiratory effort  - Oxygen supplementation based on oxygen saturation or ABGs  - Provide Smoking Cessation handout, if applicable  - Encourage broncho-pulmonary hygiene including cough, deep breathe, Incentive Spirometry  - Assess the need for suctioning and perform as needed  - Assess and instruct to report SOB or any respiratory difficulty  - Respiratory Therapy support as indicated  - Manage/alleviate anxiety  - Monitor for signs/symptoms of CO2 retention  Outcome: Progressing     Problem: CARDIOVASCULAR - ADULT  Goal: Maintains optimal cardiac output and hemodynamic stability  Description: INTERVENTIONS:  - Monitor vital signs, rhythm, and trends  - Monitor for bleeding, hypotension and signs of decreased cardiac output  - Evaluate effectiveness of vasoactive medications to optimize hemodynamic stability  - Monitor arterial and/or venous puncture sites for bleeding and/or hematoma  - Assess quality of pulses, skin color and temperature  - Assess for signs of decreased coronary artery perfusion - ex.  Angina  - Evaluate fluid balance, assess for edema, trend weights  Outcome: Progressing  Goal: Absence of cardiac arrhythmias or at baseline  Description: INTERVENTIONS:  - Continuous cardiac monitoring, monitor vital signs, obtain 12 lead EKG if indicated  - Evaluate effectiveness of antiarrhythmic and heart rate control medications as ordered  - Initiate emergency measures for life threatening arrhythmias  - Monitor electrolytes and administer replacement therapy as ordered  Outcome: Progressing

## 2022-11-17 ENCOUNTER — APPOINTMENT (OUTPATIENT)
Dept: GENERAL RADIOLOGY | Facility: HOSPITAL | Age: 77
End: 2022-11-17
Attending: INTERNAL MEDICINE
Payer: MEDICARE

## 2022-11-17 LAB
ALBUMIN SERPL-MCNC: 1.6 G/DL (ref 3.4–5)
ANION GAP SERPL CALC-SCNC: 9 MMOL/L (ref 0–18)
BASOPHILS # BLD AUTO: 0.01 X10(3) UL (ref 0–0.2)
BASOPHILS NFR BLD AUTO: 0.1 %
BLOOD TYPE BARCODE: 5100
BUN BLD-MCNC: 75 MG/DL (ref 7–18)
BUN/CREAT SERPL: 18 (ref 10–20)
CALCIUM BLD-MCNC: 6.6 MG/DL (ref 8.5–10.1)
CALCOFLUOR WHITE STAIN RESULT: POSITIVE
CHLORIDE SERPL-SCNC: 112 MMOL/L (ref 98–112)
CO2 SERPL-SCNC: 19 MMOL/L (ref 21–32)
CREAT BLD-MCNC: 4.16 MG/DL
DEPRECATED RDW RBC AUTO: 54.9 FL (ref 35.1–46.3)
EOSINOPHIL # BLD AUTO: 0.03 X10(3) UL (ref 0–0.7)
EOSINOPHIL NFR BLD AUTO: 0.3 %
ERYTHROCYTE [DISTWIDTH] IN BLOOD BY AUTOMATED COUNT: 15.3 % (ref 11–15)
GFR SERPLBLD BASED ON 1.73 SQ M-ARVRAT: 14 ML/MIN/1.73M2 (ref 60–?)
GLUCOSE BLD-MCNC: 103 MG/DL (ref 70–99)
HCT VFR BLD AUTO: 24.5 %
HGB BLD-MCNC: 7.7 G/DL
IMM GRANULOCYTES # BLD AUTO: 0.07 X10(3) UL (ref 0–1)
IMM GRANULOCYTES NFR BLD: 0.7 %
LYMPHOCYTES # BLD AUTO: 0.23 X10(3) UL (ref 1–4)
LYMPHOCYTES NFR BLD AUTO: 2.3 %
MAGNESIUM SERPL-MCNC: 1.8 MG/DL (ref 1.6–2.6)
MCH RBC QN AUTO: 30.9 PG (ref 26–34)
MCHC RBC AUTO-ENTMCNC: 31.4 G/DL (ref 31–37)
MCV RBC AUTO: 98.4 FL
MONOCYTES # BLD AUTO: 0.24 X10(3) UL (ref 0.1–1)
MONOCYTES NFR BLD AUTO: 2.5 %
NEUTROPHILS # BLD AUTO: 9.21 X10 (3) UL (ref 1.5–7.7)
NEUTROPHILS # BLD AUTO: 9.21 X10(3) UL (ref 1.5–7.7)
NEUTROPHILS NFR BLD AUTO: 94.1 %
OSMOLALITY SERPL CALC.SUM OF ELEC: 313 MOSM/KG (ref 275–295)
PHOSPHATE SERPL-MCNC: 5.9 MG/DL (ref 2.5–4.9)
PLATELET # BLD AUTO: 133 10(3)UL (ref 150–450)
POTASSIUM SERPL-SCNC: 4.9 MMOL/L (ref 3.5–5.1)
RBC # BLD AUTO: 2.49 X10(6)UL
SODIUM SERPL-SCNC: 140 MMOL/L (ref 136–145)
WBC # BLD AUTO: 9.8 X10(3) UL (ref 4–11)

## 2022-11-17 PROCEDURE — 99233 SBSQ HOSP IP/OBS HIGH 50: CPT | Performed by: HOSPITALIST

## 2022-11-17 PROCEDURE — 99233 SBSQ HOSP IP/OBS HIGH 50: CPT | Performed by: INTERNAL MEDICINE

## 2022-11-17 PROCEDURE — 71045 X-RAY EXAM CHEST 1 VIEW: CPT | Performed by: INTERNAL MEDICINE

## 2022-11-17 RX ORDER — SODIUM BICARBONATE 650 MG/1
650 TABLET ORAL 2 TIMES DAILY
Status: DISCONTINUED | OUTPATIENT
Start: 2022-11-18 | End: 2022-11-28

## 2022-11-17 RX ORDER — SODIUM BICARBONATE 650 MG/1
650 TABLET ORAL 2 TIMES DAILY
Status: DISCONTINUED | OUTPATIENT
Start: 2022-11-17 | End: 2022-11-17

## 2022-11-17 RX ORDER — FUROSEMIDE 10 MG/ML
40 INJECTION INTRAMUSCULAR; INTRAVENOUS ONCE
Status: COMPLETED | OUTPATIENT
Start: 2022-11-17 | End: 2022-11-17

## 2022-11-17 RX ORDER — SODIUM BICARBONATE 650 MG/1
650 TABLET ORAL 3 TIMES DAILY
Status: DISCONTINUED | OUTPATIENT
Start: 2022-11-17 | End: 2022-11-17

## 2022-11-17 NOTE — CM/SW NOTE
Per RN rounds, plan for pt to trx to floor. SW discussed with RN plan to have PT/OT see pt when therapy team is available. SW met with pt at bedside for supportive visit, prior to transfer. Pt was up in chair. Pt reports he is looking forward to discharging home. SW inquired if pt would be interested in any home health services. Pt declined and states his wife will be at home to assist. SW informed pt to let team know if he changes his mind. PLAN: pending medical course & therapy eval - home (goal is home)    ** SW to follow up pending updated therapy eval    SW remains available for support and/or discharge planning. Please do not hesitate to call/chat SW if further DC needs arise.      Kalie Heads [facility] [place], [place], [place]   Ext [phone]

## 2022-11-17 NOTE — PLAN OF CARE
Problem: Patient Centered Care  Goal: Patient preferences are identified and integrated in the patient's plan of care  Description: Interventions:  - What would you like us to know as we care for you?   - Provide timely, complete, and accurate information to patient/family  - Incorporate patient and family knowledge, values, beliefs, and cultural backgrounds into the planning and delivery of care  - Encourage patient/family to participate in care and decision-making at the level they choose  - Honor patient and family perspectives and choices  Outcome: Progressing     Problem: RESPIRATORY - ADULT  Goal: Achieves optimal ventilation and oxygenation  Description: INTERVENTIONS:  - Assess for changes in respiratory status  - Assess for changes in mentation and behavior  - Position to facilitate oxygenation and minimize respiratory effort  - Oxygen supplementation based on oxygen saturation or ABGs  - Provide Smoking Cessation handout, if applicable  - Encourage broncho-pulmonary hygiene including cough, deep breathe, Incentive Spirometry  - Assess the need for suctioning and perform as needed  - Assess and instruct to report SOB or any respiratory difficulty  - Respiratory Therapy support as indicated  - Manage/alleviate anxiety  - Monitor for signs/symptoms of CO2 retention  Outcome: Progressing     Problem: CARDIOVASCULAR - ADULT  Goal: Maintains optimal cardiac output and hemodynamic stability  Description: INTERVENTIONS:  - Monitor vital signs, rhythm, and trends  - Monitor for bleeding, hypotension and signs of decreased cardiac output  - Evaluate effectiveness of vasoactive medications to optimize hemodynamic stability  - Monitor arterial and/or venous puncture sites for bleeding and/or hematoma  - Assess quality of pulses, skin color and temperature  - Assess for signs of decreased coronary artery perfusion - ex.  Angina  - Evaluate fluid balance, assess for edema, trend weights  Outcome: Progressing  Goal: Absence of cardiac arrhythmias or at baseline  Description: INTERVENTIONS:  - Continuous cardiac monitoring, monitor vital signs, obtain 12 lead EKG if indicated  - Evaluate effectiveness of antiarrhythmic and heart rate control medications as ordered  - Initiate emergency measures for life threatening arrhythmias  - Monitor electrolytes and administer replacement therapy as ordered  Outcome: Progressing   Patient alert, oriented x4. Following commands. Hard of hearing. Intermittent pain to left chest tube site. On 3L HF. SR. -140s. Weaned off levophed. Chest tube to suction. Tolerating diet. BMx1. Right jaw wound dressing changed. PrimoFit in place.

## 2022-11-17 NOTE — PROGRESS NOTES
[facility] Pharmacy Note:  Renal Dose Adjustment for Meropenem (Merrem)    Tomasa Vance is a 76year old patient who has been prescribed meropenem 1000 mg every 12 hrs for treatment of pneumonia. The patient is not requiring vasopressor support. The patient has an unknown history of infection or colonization with P. aeruginosa or A. baumannii. P. aeruginosa and/or A. baumannii have not been isolated on culture. The estimated creatinine clearance is 12.5 mL/min (A) (based on SCr of 4.16 mg/dL (H)). .    The dose has been adjusted to meropenem 500 every 12 hrs per hospital renal dose adjustment protocol. Pharmacy will follow and adjust dose as warranted for additional indication and/or renal function changes.     Thank you,    Will Garcia, PharmD  11/17/2022  1:48 PM

## 2022-11-17 NOTE — PLAN OF CARE
A+Ox4, c/o pain at chest tube. Decreased O2 to baseline. Continue to titrate Levo as able. 1 Unit PRBC given. Maintained patient safety.    Problem: Patient Centered Care  Goal: Patient preferences are identified and integrated in the patient's plan of care  Description: Interventions:  - What would you like us to know as we care for you?   - Provide timely, complete, and accurate information to patient/family  - Incorporate patient and family knowledge, values, beliefs, and cultural backgrounds into the planning and delivery of care  - Encourage patient/family to participate in care and decision-making at the level they choose  - Honor patient and family perspectives and choices  Outcome: Progressing     Problem: Patient/Family Goals  Goal: Patient/Family Long Term Goal  Description: Patient's Long Term Goal:     Interventions:  -   - See additional Care Plan goals for specific interventions  Outcome: Progressing  Goal: Patient/Family Short Term Goal  Description: Patient's Short Term Goal:     Interventions:   -   - See additional Care Plan goals for specific interventions  Outcome: Progressing     Problem: RESPIRATORY - ADULT  Goal: Achieves optimal ventilation and oxygenation  Description: INTERVENTIONS:  - Assess for changes in respiratory status  - Assess for changes in mentation and behavior  - Position to facilitate oxygenation and minimize respiratory effort  - Oxygen supplementation based on oxygen saturation or ABGs  - Provide Smoking Cessation handout, if applicable  - Encourage broncho-pulmonary hygiene including cough, deep breathe, Incentive Spirometry  - Assess the need for suctioning and perform as needed  - Assess and instruct to report SOB or any respiratory difficulty  - Respiratory Therapy support as indicated  - Manage/alleviate anxiety  - Monitor for signs/symptoms of CO2 retention  Outcome: Progressing     Problem: CARDIOVASCULAR - ADULT  Goal: Maintains optimal cardiac output and hemodynamic stability  Description: INTERVENTIONS:  - Monitor vital signs, rhythm, and trends  - Monitor for bleeding, hypotension and signs of decreased cardiac output  - Evaluate effectiveness of vasoactive medications to optimize hemodynamic stability  - Monitor arterial and/or venous puncture sites for bleeding and/or hematoma  - Assess quality of pulses, skin color and temperature  - Assess for signs of decreased coronary artery perfusion - ex.  Angina  - Evaluate fluid balance, assess for edema, trend weights  Outcome: Progressing  Goal: Absence of cardiac arrhythmias or at baseline  Description: INTERVENTIONS:  - Continuous cardiac monitoring, monitor vital signs, obtain 12 lead EKG if indicated  - Evaluate effectiveness of antiarrhythmic and heart rate control medications as ordered  - Initiate emergency measures for life threatening arrhythmias  - Monitor electrolytes and administer replacement therapy as ordered  Outcome: Progressing

## 2022-11-18 ENCOUNTER — PATIENT MESSAGE (OUTPATIENT)
Dept: NEPHROLOGY | Facility: CLINIC | Age: 77
End: 2022-11-18

## 2022-11-18 LAB
ALBUMIN SERPL-MCNC: 2 G/DL (ref 3.4–5)
ANION GAP SERPL CALC-SCNC: 12 MMOL/L (ref 0–18)
APTT PPP: 36.2 SECONDS (ref 23.3–35.6)
BASOPHILS # BLD AUTO: 0.01 X10(3) UL (ref 0–0.2)
BASOPHILS NFR BLD AUTO: 0.1 %
BUN BLD-MCNC: 73 MG/DL (ref 7–18)
BUN/CREAT SERPL: 16.7 (ref 10–20)
CALCIUM BLD-MCNC: 7 MG/DL (ref 8.5–10.1)
CHLORIDE SERPL-SCNC: 109 MMOL/L (ref 98–112)
CO2 SERPL-SCNC: 18 MMOL/L (ref 21–32)
CREAT BLD-MCNC: 4.37 MG/DL
DEPRECATED RDW RBC AUTO: 55.8 FL (ref 35.1–46.3)
EOSINOPHIL # BLD AUTO: 0.04 X10(3) UL (ref 0–0.7)
EOSINOPHIL NFR BLD AUTO: 0.5 %
ERYTHROCYTE [DISTWIDTH] IN BLOOD BY AUTOMATED COUNT: 15.2 % (ref 11–15)
GFR SERPLBLD BASED ON 1.73 SQ M-ARVRAT: 13 ML/MIN/1.73M2 (ref 60–?)
GLUCOSE BLD-MCNC: 110 MG/DL (ref 70–99)
HCT VFR BLD AUTO: 27.6 %
HGB BLD-MCNC: 8.3 G/DL
IMM GRANULOCYTES # BLD AUTO: 0.13 X10(3) UL (ref 0–1)
IMM GRANULOCYTES NFR BLD: 1.5 %
LYMPHOCYTES # BLD AUTO: 0.29 X10(3) UL (ref 1–4)
LYMPHOCYTES NFR BLD AUTO: 3.3 %
MCH RBC QN AUTO: 30.2 PG (ref 26–34)
MCHC RBC AUTO-ENTMCNC: 30.1 G/DL (ref 31–37)
MCV RBC AUTO: 100.4 FL
MONOCYTES # BLD AUTO: 0.4 X10(3) UL (ref 0.1–1)
MONOCYTES NFR BLD AUTO: 4.6 %
NEUTROPHILS # BLD AUTO: 7.79 X10 (3) UL (ref 1.5–7.7)
NEUTROPHILS # BLD AUTO: 7.79 X10(3) UL (ref 1.5–7.7)
NEUTROPHILS NFR BLD AUTO: 90 %
OSMOLALITY SERPL CALC.SUM OF ELEC: 310 MOSM/KG (ref 275–295)
PHOSPHATE SERPL-MCNC: 5.8 MG/DL (ref 2.5–4.9)
PLATELET # BLD AUTO: 150 10(3)UL (ref 150–450)
POTASSIUM SERPL-SCNC: 4.6 MMOL/L (ref 3.5–5.1)
RBC # BLD AUTO: 2.75 X10(6)UL
SODIUM SERPL-SCNC: 139 MMOL/L (ref 136–145)
WBC # BLD AUTO: 8.7 X10(3) UL (ref 4–11)

## 2022-11-18 PROCEDURE — 99233 SBSQ HOSP IP/OBS HIGH 50: CPT | Performed by: INTERNAL MEDICINE

## 2022-11-18 PROCEDURE — 99233 SBSQ HOSP IP/OBS HIGH 50: CPT | Performed by: HOSPITALIST

## 2022-11-18 RX ORDER — AMLODIPINE BESYLATE 5 MG/1
5 TABLET ORAL DAILY
Status: DISCONTINUED | OUTPATIENT
Start: 2022-11-18 | End: 2022-11-28

## 2022-11-18 RX ORDER — OXYMETAZOLINE HYDROCHLORIDE 0.05 G/100ML
2 SPRAY NASAL ONCE
Status: COMPLETED | OUTPATIENT
Start: 2022-11-18 | End: 2022-11-18

## 2022-11-18 RX ORDER — IPRATROPIUM BROMIDE AND ALBUTEROL SULFATE 2.5; .5 MG/3ML; MG/3ML
3 SOLUTION RESPIRATORY (INHALATION)
Status: DISCONTINUED | OUTPATIENT
Start: 2022-11-18 | End: 2022-11-20

## 2022-11-18 RX ORDER — FINASTERIDE 5 MG/1
5 TABLET, FILM COATED ORAL NIGHTLY
Status: DISCONTINUED | OUTPATIENT
Start: 2022-11-18 | End: 2022-11-28

## 2022-11-18 RX ORDER — FINASTERIDE 5 MG/1
5 TABLET, FILM COATED ORAL DAILY
Status: DISCONTINUED | OUTPATIENT
Start: 2022-11-18 | End: 2022-11-18

## 2022-11-18 RX ORDER — IPRATROPIUM BROMIDE AND ALBUTEROL SULFATE 2.5; .5 MG/3ML; MG/3ML
3 SOLUTION RESPIRATORY (INHALATION)
Status: DISCONTINUED | OUTPATIENT
Start: 2022-11-18 | End: 2022-11-18

## 2022-11-18 RX ORDER — TERAZOSIN 5 MG/1
5 CAPSULE ORAL DAILY
Status: DISCONTINUED | OUTPATIENT
Start: 2022-11-18 | End: 2022-11-28

## 2022-11-18 NOTE — PLAN OF CARE
Problem: RESPIRATORY - ADULT  Goal: Achieves optimal ventilation and oxygenation  Description: INTERVENTIONS:  - Assess for changes in respiratory status  - Assess for changes in mentation and behavior  - Position to facilitate oxygenation and minimize respiratory effort  - Oxygen supplementation based on oxygen saturation or ABGs  - Provide Smoking Cessation handout, if applicable  - Encourage broncho-pulmonary hygiene including cough, deep breathe, Incentive Spirometry  - Assess the need for suctioning and perform as needed  - Assess and instruct to report SOB or any respiratory difficulty  - Respiratory Therapy support as indicated  - Manage/alleviate anxiety  - Monitor for signs/symptoms of CO2 retention  Outcome: Progressing     Problem: CARDIOVASCULAR - ADULT  Goal: Maintains optimal cardiac output and hemodynamic stability  Description: INTERVENTIONS:  - Monitor vital signs, rhythm, and trends  - Monitor for bleeding, hypotension and signs of decreased cardiac output  - Evaluate effectiveness of vasoactive medications to optimize hemodynamic stability  - Monitor arterial and/or venous puncture sites for bleeding and/or hematoma  - Assess quality of pulses, skin color and temperature  - Assess for signs of decreased coronary artery perfusion - ex.  Angina  - Evaluate fluid balance, assess for edema, trend weights  Outcome: Progressing  Goal: Absence of cardiac arrhythmias or at baseline  Description: INTERVENTIONS:  - Continuous cardiac monitoring, monitor vital signs, obtain 12 lead EKG if indicated  - Evaluate effectiveness of antiarrhythmic and heart rate control medications as ordered  - Initiate emergency measures for life threatening arrhythmias  - Monitor electrolytes and administer replacement therapy as ordered  Outcome: Progressing

## 2022-11-18 NOTE — PLAN OF CARE
Problem: Patient/Family Goals  Goal: Patient/Family Long Term Goal  Description: Patient's Long Term Goal: to go home    Interventions:  - See additional Care Plan goals for specific interventions  Outcome: Progressing  Goal: Patient/Family Short Term Goal  Description: Patient's Short Term Goal: chest tube out    Interventions:   - See additional Care Plan goals for specific interventions  Outcome: Progressing     Problem: RESPIRATORY - ADULT  Goal: Achieves optimal ventilation and oxygenation  Description: INTERVENTIONS:  - Assess for changes in respiratory status  - Assess for changes in mentation and behavior  - Position to facilitate oxygenation and minimize respiratory effort  - Oxygen supplementation based on oxygen saturation or ABGs  - Provide Smoking Cessation handout, if applicable  - Encourage broncho-pulmonary hygiene including cough, deep breathe, Incentive Spirometry  - Assess the need for suctioning and perform as needed  - Assess and instruct to report SOB or any respiratory difficulty  - Respiratory Therapy support as indicated  - Manage/alleviate anxiety  - Monitor for signs/symptoms of CO2 retention  Outcome: Progressing     Problem: CARDIOVASCULAR - ADULT  Goal: Maintains optimal cardiac output and hemodynamic stability  Description: INTERVENTIONS:  - Monitor vital signs, rhythm, and trends  - Monitor for bleeding, hypotension and signs of decreased cardiac output  - Evaluate effectiveness of vasoactive medications to optimize hemodynamic stability  - Monitor arterial and/or venous puncture sites for bleeding and/or hematoma  - Assess quality of pulses, skin color and temperature  - Assess for signs of decreased coronary artery perfusion - ex.  Angina  - Evaluate fluid balance, assess for edema, trend weights  Outcome: Progressing     Problem: DISCHARGE PLANNING  Goal: Discharge to home or other facility with appropriate resources  Description: INTERVENTIONS:  - Identify barriers to discharge w/pt and caregiver  - Include patient/family/discharge partner in discharge planning  - Arrange for needed discharge resources and transportation as appropriate  - Identify discharge learning needs (meds, wound care, etc)  - Arrange for interpreters to assist at discharge as needed  - Consider post-discharge preferences of patient/family/discharge partner  - Complete POLST form as appropriate  - Assess patient's ability to be responsible for managing their own health  - Refer to Case Management Department for coordinating discharge planning if the patient needs post-hospital services based on physician/LIP order or complex needs related to functional status, cognitive ability or social support system  Outcome: Progressing

## 2022-11-19 ENCOUNTER — APPOINTMENT (OUTPATIENT)
Dept: GENERAL RADIOLOGY | Facility: HOSPITAL | Age: 77
End: 2022-11-19
Attending: INTERNAL MEDICINE
Payer: MEDICARE

## 2022-11-19 LAB
ALBUMIN SERPL-MCNC: 1.9 G/DL (ref 3.4–5)
ANION GAP SERPL CALC-SCNC: 12 MMOL/L (ref 0–18)
BASOPHILS # BLD AUTO: 0.02 X10(3) UL (ref 0–0.2)
BASOPHILS NFR BLD AUTO: 0.3 %
BILIRUB UR QL: NEGATIVE
BUN BLD-MCNC: 78 MG/DL (ref 7–18)
BUN/CREAT SERPL: 18.3 (ref 10–20)
CALCIUM BLD-MCNC: 7.1 MG/DL (ref 8.5–10.1)
CHLORIDE SERPL-SCNC: 111 MMOL/L (ref 98–112)
CLARITY UR: CLEAR
CO2 SERPL-SCNC: 19 MMOL/L (ref 21–32)
COLOR UR: YELLOW
CREAT BLD-MCNC: 4.27 MG/DL
DEPRECATED RDW RBC AUTO: 55.2 FL (ref 35.1–46.3)
EOSINOPHIL # BLD AUTO: 0.06 X10(3) UL (ref 0–0.7)
EOSINOPHIL NFR BLD AUTO: 0.8 %
ERYTHROCYTE [DISTWIDTH] IN BLOOD BY AUTOMATED COUNT: 14.9 % (ref 11–15)
GFR SERPLBLD BASED ON 1.73 SQ M-ARVRAT: 13 ML/MIN/1.73M2 (ref 60–?)
GLUCOSE BLD-MCNC: 80 MG/DL (ref 70–99)
GLUCOSE UR-MCNC: NEGATIVE MG/DL
HCT VFR BLD AUTO: 28.2 %
HGB BLD-MCNC: 8.4 G/DL
IMM GRANULOCYTES # BLD AUTO: 0.19 X10(3) UL (ref 0–1)
IMM GRANULOCYTES NFR BLD: 2.4 %
KETONES UR-MCNC: NEGATIVE MG/DL
LEUKOCYTE ESTERASE UR QL STRIP.AUTO: NEGATIVE
LYMPHOCYTES # BLD AUTO: 0.34 X10(3) UL (ref 1–4)
LYMPHOCYTES NFR BLD AUTO: 4.3 %
MAGNESIUM SERPL-MCNC: 1.5 MG/DL (ref 1.6–2.6)
MCH RBC QN AUTO: 30.2 PG (ref 26–34)
MCHC RBC AUTO-ENTMCNC: 29.8 G/DL (ref 31–37)
MCV RBC AUTO: 101.4 FL
MONOCYTES # BLD AUTO: 0.37 X10(3) UL (ref 0.1–1)
MONOCYTES NFR BLD AUTO: 4.7 %
NEUTROPHILS # BLD AUTO: 6.97 X10 (3) UL (ref 1.5–7.7)
NEUTROPHILS # BLD AUTO: 6.97 X10(3) UL (ref 1.5–7.7)
NEUTROPHILS NFR BLD AUTO: 87.5 %
NITRITE UR QL STRIP.AUTO: NEGATIVE
OSMOLALITY SERPL CALC.SUM OF ELEC: 316 MOSM/KG (ref 275–295)
PH UR: 6 [PH] (ref 5–8)
PHOSPHATE SERPL-MCNC: 6 MG/DL (ref 2.5–4.9)
PLATELET # BLD AUTO: 160 10(3)UL (ref 150–450)
POTASSIUM SERPL-SCNC: 4.7 MMOL/L (ref 3.5–5.1)
RBC # BLD AUTO: 2.78 X10(6)UL
SODIUM SERPL-SCNC: 142 MMOL/L (ref 136–145)
SP GR UR STRIP: 1.01 (ref 1–1.03)
UROBILINOGEN UR STRIP-ACNC: 0.2
WBC # BLD AUTO: 8 X10(3) UL (ref 4–11)

## 2022-11-19 PROCEDURE — 99233 SBSQ HOSP IP/OBS HIGH 50: CPT | Performed by: INTERNAL MEDICINE

## 2022-11-19 PROCEDURE — 71045 X-RAY EXAM CHEST 1 VIEW: CPT | Performed by: INTERNAL MEDICINE

## 2022-11-19 PROCEDURE — 99233 SBSQ HOSP IP/OBS HIGH 50: CPT | Performed by: HOSPITALIST

## 2022-11-19 RX ORDER — PANTOPRAZOLE SODIUM 40 MG/1
40 TABLET, DELAYED RELEASE ORAL
Status: DISCONTINUED | OUTPATIENT
Start: 2022-11-20 | End: 2022-11-28

## 2022-11-19 RX ORDER — MAGNESIUM SULFATE HEPTAHYDRATE 40 MG/ML
2 INJECTION, SOLUTION INTRAVENOUS ONCE
Status: COMPLETED | OUTPATIENT
Start: 2022-11-19 | End: 2022-11-19

## 2022-11-19 RX ORDER — GARLIC EXTRACT 500 MG
1 CAPSULE ORAL 2 TIMES DAILY
Status: DISCONTINUED | OUTPATIENT
Start: 2022-11-19 | End: 2022-11-28

## 2022-11-19 RX ORDER — HEPARIN SODIUM 5000 [USP'U]/ML
5000 INJECTION, SOLUTION INTRAVENOUS; SUBCUTANEOUS EVERY 12 HOURS SCHEDULED
Status: DISCONTINUED | OUTPATIENT
Start: 2022-11-19 | End: 2022-11-28
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
## 2022-11-19 NOTE — PLAN OF CARE
Pt's chest tube continues to drain yellow/purulent output overnight. Pt remains on RA, bed locked in lowest position, call light within reach. Problem: RESPIRATORY - ADULT  Goal: Achieves optimal ventilation and oxygenation  Description: INTERVENTIONS:  - Assess for changes in respiratory status  - Assess for changes in mentation and behavior  - Position to facilitate oxygenation and minimize respiratory effort  - Oxygen supplementation based on oxygen saturation or ABGs  - Provide Smoking Cessation handout, if applicable  - Encourage broncho-pulmonary hygiene including cough, deep breathe, Incentive Spirometry  - Assess the need for suctioning and perform as needed  - Assess and instruct to report SOB or any respiratory difficulty  - Respiratory Therapy support as indicated  - Manage/alleviate anxiety  - Monitor for signs/symptoms of CO2 retention  Outcome: Progressing     Problem: CARDIOVASCULAR - ADULT  Goal: Maintains optimal cardiac output and hemodynamic stability  Description: INTERVENTIONS:  - Monitor vital signs, rhythm, and trends  - Monitor for bleeding, hypotension and signs of decreased cardiac output  - Evaluate effectiveness of vasoactive medications to optimize hemodynamic stability  - Monitor arterial and/or venous puncture sites for bleeding and/or hematoma  - Assess quality of pulses, skin color and temperature  - Assess for signs of decreased coronary artery perfusion - ex.  Angina  - Evaluate fluid balance, assess for edema, trend weights  Outcome: Progressing  Goal: Absence of cardiac arrhythmias or at baseline  Description: INTERVENTIONS:  - Continuous cardiac monitoring, monitor vital signs, obtain 12 lead EKG if indicated  - Evaluate effectiveness of antiarrhythmic and heart rate control medications as ordered  - Initiate emergency measures for life threatening arrhythmias  - Monitor electrolytes and administer replacement therapy as ordered  Outcome: Progressing     Problem: PAIN - ADULT  Goal: Verbalizes/displays adequate comfort level or patient's stated pain goal  Description: INTERVENTIONS:  - Encourage pt to monitor pain and request assistance  - Assess pain using appropriate pain scale  - Administer analgesics based on type and severity of pain and evaluate response  - Implement non-pharmacological measures as appropriate and evaluate response  - Consider cultural and social influences on pain and pain management  - Manage/alleviate anxiety  - Utilize distraction and/or relaxation techniques  - Monitor for opioid side effects  - Notify MD/LIP if interventions unsuccessful or patient reports new pain  - Anticipate increased pain with activity and pre-medicate as appropriate  Outcome: Progressing

## 2022-11-20 LAB
ALBUMIN SERPL-MCNC: 1.8 G/DL (ref 3.4–5)
ANION GAP SERPL CALC-SCNC: 10 MMOL/L (ref 0–18)
BASOPHILS # BLD AUTO: 0.02 X10(3) UL (ref 0–0.2)
BASOPHILS NFR BLD AUTO: 0.3 %
BUN BLD-MCNC: 86 MG/DL (ref 7–18)
BUN/CREAT SERPL: 21.1 (ref 10–20)
CALCIUM BLD-MCNC: 6.8 MG/DL (ref 8.5–10.1)
CHLORIDE SERPL-SCNC: 110 MMOL/L (ref 98–112)
CO2 SERPL-SCNC: 19 MMOL/L (ref 21–32)
CREAT BLD-MCNC: 4.08 MG/DL
DEPRECATED RDW RBC AUTO: 53.5 FL (ref 35.1–46.3)
EOSINOPHIL # BLD AUTO: 0.06 X10(3) UL (ref 0–0.7)
EOSINOPHIL NFR BLD AUTO: 1 %
ERYTHROCYTE [DISTWIDTH] IN BLOOD BY AUTOMATED COUNT: 14.9 % (ref 11–15)
GFR SERPLBLD BASED ON 1.73 SQ M-ARVRAT: 14 ML/MIN/1.73M2 (ref 60–?)
GLUCOSE BLD-MCNC: 83 MG/DL (ref 70–99)
HCT VFR BLD AUTO: 24.3 %
HGB BLD-MCNC: 7.5 G/DL
IMM GRANULOCYTES # BLD AUTO: 0.26 X10(3) UL (ref 0–1)
IMM GRANULOCYTES NFR BLD: 4.1 %
LYMPHOCYTES # BLD AUTO: 0.33 X10(3) UL (ref 1–4)
LYMPHOCYTES NFR BLD AUTO: 5.2 %
MAGNESIUM SERPL-MCNC: 1.8 MG/DL (ref 1.6–2.6)
MCH RBC QN AUTO: 31 PG (ref 26–34)
MCHC RBC AUTO-ENTMCNC: 30.9 G/DL (ref 31–37)
MCV RBC AUTO: 100.4 FL
MONOCYTES # BLD AUTO: 0.34 X10(3) UL (ref 0.1–1)
MONOCYTES NFR BLD AUTO: 5.4 %
NEUTROPHILS # BLD AUTO: 5.28 X10 (3) UL (ref 1.5–7.7)
NEUTROPHILS # BLD AUTO: 5.28 X10(3) UL (ref 1.5–7.7)
NEUTROPHILS NFR BLD AUTO: 84 %
OSMOLALITY SERPL CALC.SUM OF ELEC: 313 MOSM/KG (ref 275–295)
PHOSPHATE SERPL-MCNC: 6.6 MG/DL (ref 2.5–4.9)
PLATELET # BLD AUTO: 131 10(3)UL (ref 150–450)
POTASSIUM SERPL-SCNC: 4.8 MMOL/L (ref 3.5–5.1)
RBC # BLD AUTO: 2.42 X10(6)UL
SODIUM SERPL-SCNC: 139 MMOL/L (ref 136–145)
WBC # BLD AUTO: 6.3 X10(3) UL (ref 4–11)

## 2022-11-20 PROCEDURE — 99232 SBSQ HOSP IP/OBS MODERATE 35: CPT | Performed by: INTERNAL MEDICINE

## 2022-11-20 PROCEDURE — 99233 SBSQ HOSP IP/OBS HIGH 50: CPT | Performed by: INTERNAL MEDICINE

## 2022-11-20 PROCEDURE — 99233 SBSQ HOSP IP/OBS HIGH 50: CPT | Performed by: HOSPITALIST

## 2022-11-20 RX ORDER — FUROSEMIDE 10 MG/ML
40 INJECTION INTRAMUSCULAR; INTRAVENOUS ONCE
Status: COMPLETED | OUTPATIENT
Start: 2022-11-20 | End: 2022-11-20

## 2022-11-20 RX ORDER — IPRATROPIUM BROMIDE AND ALBUTEROL SULFATE 2.5; .5 MG/3ML; MG/3ML
3 SOLUTION RESPIRATORY (INHALATION)
Status: DISCONTINUED | OUTPATIENT
Start: 2022-11-21 | End: 2022-11-23

## 2022-11-20 RX ORDER — VANCOMYCIN HYDROCHLORIDE 125 MG/1
125 CAPSULE ORAL 2 TIMES DAILY
Status: DISCONTINUED | OUTPATIENT
Start: 2022-11-20 | End: 2022-11-28

## 2022-11-20 NOTE — PLAN OF CARE
Problem: Patient Centered Care  Goal: Patient preferences are identified and integrated in the patient's plan of care  Description: Interventions:  - What would you like us to know as we care for you?   - Provide timely, complete, and accurate information to patient/family  - Incorporate patient and family knowledge, values, beliefs, and cultural backgrounds into the planning and delivery of care  - Encourage patient/family to participate in care and decision-making at the level they choose  - Honor patient and family perspectives and choices  Outcome: Progressing     Problem: Patient/Family Goals  Goal: Patient/Family Long Term Goal  Description: Patient's Long Term Goal:     Interventions:  -   - See additional Care Plan goals for specific interventions  Outcome: Progressing  Goal: Patient/Family Short Term Goal  Description: Patient's Short Term Goal:     Interventions:   -   - See additional Care Plan goals for specific interventions  Outcome: Progressing     Problem: RESPIRATORY - ADULT  Goal: Achieves optimal ventilation and oxygenation  Description: INTERVENTIONS:  - Assess for changes in respiratory status  - Assess for changes in mentation and behavior  - Position to facilitate oxygenation and minimize respiratory effort  - Oxygen supplementation based on oxygen saturation or ABGs  - Provide Smoking Cessation handout, if applicable  - Encourage broncho-pulmonary hygiene including cough, deep breathe, Incentive Spirometry  - Assess the need for suctioning and perform as needed  - Assess and instruct to report SOB or any respiratory difficulty  - Respiratory Therapy support as indicated  - Manage/alleviate anxiety  - Monitor for signs/symptoms of CO2 retention  Outcome: Progressing     Problem: CARDIOVASCULAR - ADULT  Goal: Maintains optimal cardiac output and hemodynamic stability  Description: INTERVENTIONS:  - Monitor vital signs, rhythm, and trends  - Monitor for bleeding, hypotension and signs of decreased cardiac output  - Evaluate effectiveness of vasoactive medications to optimize hemodynamic stability  - Monitor arterial and/or venous puncture sites for bleeding and/or hematoma  - Assess quality of pulses, skin color and temperature  - Assess for signs of decreased coronary artery perfusion - ex.  Angina  - Evaluate fluid balance, assess for edema, trend weights  Outcome: Progressing  Goal: Absence of cardiac arrhythmias or at baseline  Description: INTERVENTIONS:  - Continuous cardiac monitoring, monitor vital signs, obtain 12 lead EKG if indicated  - Evaluate effectiveness of antiarrhythmic and heart rate control medications as ordered  - Initiate emergency measures for life threatening arrhythmias  - Monitor electrolytes and administer replacement therapy as ordered  Outcome: Progressing     Problem: DISCHARGE PLANNING  Goal: Discharge to home or other facility with appropriate resources  Description: INTERVENTIONS:  - Identify barriers to discharge w/pt and caregiver  - Include patient/family/discharge partner in discharge planning  - Arrange for needed discharge resources and transportation as appropriate  - Identify discharge learning needs (meds, wound care, etc)  - Arrange for interpreters to assist at discharge as needed  - Consider post-discharge preferences of patient/family/discharge partner  - Complete POLST form as appropriate  - Assess patient's ability to be responsible for managing their own health  - Refer to Case Management Department for coordinating discharge planning if the patient needs post-hospital services based on physician/LIP order or complex needs related to functional status, cognitive ability or social support system  Outcome: Progressing     Problem: PAIN - ADULT  Goal: Verbalizes/displays adequate comfort level or patient's stated pain goal  Description: INTERVENTIONS:  - Encourage pt to monitor pain and request assistance  - Assess pain using appropriate pain scale  - Administer analgesics based on type and severity of pain and evaluate response  - Implement non-pharmacological measures as appropriate and evaluate response  - Consider cultural and social influences on pain and pain management  - Manage/alleviate anxiety  - Utilize distraction and/or relaxation techniques  - Monitor for opioid side effects  - Notify MD/LIP if interventions unsuccessful or patient reports new pain  - Anticipate increased pain with activity and pre-medicate as appropriate  Outcome: Progressing   Patient ambulated in a hallway, tolerated well, no drainage in chest tube

## 2022-11-20 NOTE — PLAN OF CARE
Chest tube draining serous fluid overnight. Pt given morphine for pain around chest tube site. Pt wore CPAP while sleeping, otherwise remains on room air. Bed locked in lowest position, call light within reach. Problem: RESPIRATORY - ADULT  Goal: Achieves optimal ventilation and oxygenation  Description: INTERVENTIONS:  - Assess for changes in respiratory status  - Assess for changes in mentation and behavior  - Position to facilitate oxygenation and minimize respiratory effort  - Oxygen supplementation based on oxygen saturation or ABGs  - Provide Smoking Cessation handout, if applicable  - Encourage broncho-pulmonary hygiene including cough, deep breathe, Incentive Spirometry  - Assess the need for suctioning and perform as needed  - Assess and instruct to report SOB or any respiratory difficulty  - Respiratory Therapy support as indicated  - Manage/alleviate anxiety  - Monitor for signs/symptoms of CO2 retention  Outcome: Progressing     Problem: CARDIOVASCULAR - ADULT  Goal: Maintains optimal cardiac output and hemodynamic stability  Description: INTERVENTIONS:  - Monitor vital signs, rhythm, and trends  - Monitor for bleeding, hypotension and signs of decreased cardiac output  - Evaluate effectiveness of vasoactive medications to optimize hemodynamic stability  - Monitor arterial and/or venous puncture sites for bleeding and/or hematoma  - Assess quality of pulses, skin color and temperature  - Assess for signs of decreased coronary artery perfusion - ex.  Angina  - Evaluate fluid balance, assess for edema, trend weights  Outcome: Progressing  Goal: Absence of cardiac arrhythmias or at baseline  Description: INTERVENTIONS:  - Continuous cardiac monitoring, monitor vital signs, obtain 12 lead EKG if indicated  - Evaluate effectiveness of antiarrhythmic and heart rate control medications as ordered  - Initiate emergency measures for life threatening arrhythmias  - Monitor electrolytes and administer replacement therapy as ordered  Outcome: Progressing     Problem: PAIN - ADULT  Goal: Verbalizes/displays adequate comfort level or patient's stated pain goal  Description: INTERVENTIONS:  - Encourage pt to monitor pain and request assistance  - Assess pain using appropriate pain scale  - Administer analgesics based on type and severity of pain and evaluate response  - Implement non-pharmacological measures as appropriate and evaluate response  - Consider cultural and social influences on pain and pain management  - Manage/alleviate anxiety  - Utilize distraction and/or relaxation techniques  - Monitor for opioid side effects  - Notify MD/LIP if interventions unsuccessful or patient reports new pain  - Anticipate increased pain with activity and pre-medicate as appropriate  Outcome: Progressing

## 2022-11-20 NOTE — PLAN OF CARE
Patient beign transferred to room 455. Report given to [facility] RN. All belongings are with patient. Patient's wife Polly notified of room transfer.

## 2022-11-20 NOTE — PLAN OF CARE
Transferred to [facility] from [facility]. Oriented to room and safety precautions. A/O x 4. Extremely hard of hearing with cochlear implant/hearing aids. Left chest tube with serous output in tubing, tan output in cannister, to -20 continuous suction. Plan for CXR tomorrow morning. Walking SBA with RW. Meropenem given via right chest port. Morphine for pain. Voiding via urinal. Bed in lowest position, call light in reach, frequent rounding, nonskid footwear, fall precautions in place.

## 2022-11-21 ENCOUNTER — APPOINTMENT (OUTPATIENT)
Dept: GENERAL RADIOLOGY | Facility: HOSPITAL | Age: 77
End: 2022-11-21
Attending: INTERNAL MEDICINE
Payer: MEDICARE

## 2022-11-21 LAB
ALBUMIN SERPL-MCNC: 1.8 G/DL (ref 3.4–5)
ANION GAP SERPL CALC-SCNC: 10 MMOL/L (ref 0–18)
BASOPHILS # BLD AUTO: 0.03 X10(3) UL (ref 0–0.2)
BASOPHILS NFR BLD AUTO: 0.5 %
BUN BLD-MCNC: 87 MG/DL (ref 7–18)
BUN/CREAT SERPL: 20.6 (ref 10–20)
CALCIUM BLD-MCNC: 7 MG/DL (ref 8.5–10.1)
CHLORIDE SERPL-SCNC: 110 MMOL/L (ref 98–112)
CO2 SERPL-SCNC: 19 MMOL/L (ref 21–32)
CREAT BLD-MCNC: 4.22 MG/DL
DEPRECATED RDW RBC AUTO: 52.4 FL (ref 35.1–46.3)
EOSINOPHIL # BLD AUTO: 0.11 X10(3) UL (ref 0–0.7)
EOSINOPHIL NFR BLD AUTO: 1.8 %
ERYTHROCYTE [DISTWIDTH] IN BLOOD BY AUTOMATED COUNT: 15.1 % (ref 11–15)
GFR SERPLBLD BASED ON 1.73 SQ M-ARVRAT: 14 ML/MIN/1.73M2 (ref 60–?)
GLUCOSE BLD-MCNC: 87 MG/DL (ref 70–99)
HCT VFR BLD AUTO: 26.1 %
HGB BLD-MCNC: 8.2 G/DL
IMM GRANULOCYTES # BLD AUTO: 0.16 X10(3) UL (ref 0–1)
IMM GRANULOCYTES NFR BLD: 2.6 %
INR BLD: 1.23 (ref 0.85–1.16)
LYMPHOCYTES # BLD AUTO: 0.23 X10(3) UL (ref 1–4)
LYMPHOCYTES NFR BLD AUTO: 3.8 %
MAGNESIUM SERPL-MCNC: 1.7 MG/DL (ref 1.6–2.6)
MCH RBC QN AUTO: 31.4 PG (ref 26–34)
MCHC RBC AUTO-ENTMCNC: 31.4 G/DL (ref 31–37)
MCV RBC AUTO: 100 FL
MONOCYTES # BLD AUTO: 0.34 X10(3) UL (ref 0.1–1)
MONOCYTES NFR BLD AUTO: 5.6 %
NEUTROPHILS # BLD AUTO: 5.23 X10 (3) UL (ref 1.5–7.7)
NEUTROPHILS # BLD AUTO: 5.23 X10(3) UL (ref 1.5–7.7)
NEUTROPHILS NFR BLD AUTO: 85.7 %
OSMOLALITY SERPL CALC.SUM OF ELEC: 314 MOSM/KG (ref 275–295)
PHOSPHATE SERPL-MCNC: 7.1 MG/DL (ref 2.5–4.9)
PLATELET # BLD AUTO: 152 10(3)UL (ref 150–450)
POTASSIUM SERPL-SCNC: 4.7 MMOL/L (ref 3.5–5.1)
PROTHROMBIN TIME: 15.4 SECONDS (ref 11.6–14.8)
RBC # BLD AUTO: 2.61 X10(6)UL
SODIUM SERPL-SCNC: 139 MMOL/L (ref 136–145)
WBC # BLD AUTO: 6.1 X10(3) UL (ref 4–11)

## 2022-11-21 PROCEDURE — 99232 SBSQ HOSP IP/OBS MODERATE 35: CPT | Performed by: INTERNAL MEDICINE

## 2022-11-21 PROCEDURE — 99233 SBSQ HOSP IP/OBS HIGH 50: CPT | Performed by: INTERNAL MEDICINE

## 2022-11-21 PROCEDURE — 99232 SBSQ HOSP IP/OBS MODERATE 35: CPT | Performed by: HOSPITALIST

## 2022-11-21 PROCEDURE — 71046 X-RAY EXAM CHEST 2 VIEWS: CPT | Performed by: INTERNAL MEDICINE

## 2022-11-21 RX ORDER — SEVELAMER CARBONATE 800 MG/1
1600 TABLET, FILM COATED ORAL
Status: DISCONTINUED | OUTPATIENT
Start: 2022-11-21 | End: 2022-11-28

## 2022-11-21 RX ORDER — PREDNISONE 1 MG/1
5 TABLET ORAL
Status: COMPLETED | OUTPATIENT
Start: 2022-11-21 | End: 2022-11-27

## 2022-11-21 NOTE — OCCUPATIONAL THERAPY NOTE
RN cleared pt to participate in OT session, reported that chest tube was removed earlier this am. This therapist attempted twice, pt declined. OT will re-attempt on a later date, when pt is able and agreeable.       Chelsea Lot, OTR/L  [address]

## 2022-11-21 NOTE — TELEPHONE ENCOUNTER
From: Sheldon Pinto  To: Rosa Maria Peterson MD  Sent: 11/18/2022 11:05 AM CST  Subject: port for dialysis    This message is being sent by Saskia Kebede on behalf of Sheldon Pinto. Dr. Nadya Ansari stated that if his Doctors approve that he could have the port done while hes in the hospital. Do you think we can do this why hes in the hospital or do you think we should wait a couple of weeks until he gained some strength back? If we do this while hes in the hospital what do we have to do to get approval for Dr Nadya Ansari?

## 2022-11-21 NOTE — PLAN OF CARE
Patient is alert and oriented x4, [place], has R cochlear implant and L hearing aid, on remote tele, on RA, getting heparin subcutaneous, getting sue neb tx, pt declined CIPAP use at night. Will attempt to obtain weight in AM. Using urinal to void. 1x-assist/stand-by with walker. Chest tube in place to low continuous suction, dressing intact, monitoring output Q8. Wound on jaw change daily or PRN, cleanse and apply Santyl, wet to dry, and aquacel foam. Pain managed with Morphine PRN. IV meropenem abx thru R port, endorsed to day shift about port care because in AM is when needle and dressing are due to be change. Plan for CXR in AM, possible removal of Chest tube, possible DC in AM, will continue to monitor. Problem: Patient Centered Care  Goal: Patient preferences are identified and integrated in the patient's plan of care  Description: Interventions:  - What would you like us to know as we care for you? From home with wife.    - Provide timely, complete, and accurate information to patient/family  - Incorporate patient and family knowledge, values, beliefs, and cultural backgrounds into the planning and delivery of care  - Encourage patient/family to participate in care and decision-making at the level they choose  - Honor patient and family perspectives and choices  Outcome: Progressing     Problem: Patient/Family Goals  Goal: Patient/Family Long Term Goal  Description: Patient's Long Term Goal: To manage condition, get better, and go home    Interventions:  -Monitor labs, results, and vitals  -Administer medication as prescribed and PRN  -Pain management  -Infection management and prevention  -Replace electrolyte per protocol  -I&Os  -Safety, diet, act mini, hygiene  -Wound care of incision  -Chest tube management and suction  -Monitor lung and kidney function  -Imaging/tests/procedures  -Consults  -Follow plan of care  -Monitor for worsening condition or new onset of symptoms  - See additional Care Plan goals for specific interventions  Outcome: Progressing  Goal: Patient/Family Short Term Goal  Description: Patient's Short Term Goal: Pain management    Interventions:   -Assess pain level  -Encourage pt to notify of increasing pain level  -Administer pain medication as prescribed and PRN  -Provide non-pharmacological intervention as needed  -Follow plan of care  - See additional Care Plan goals for specific interventions  Outcome: Progressing     Problem: RESPIRATORY - ADULT  Goal: Achieves optimal ventilation and oxygenation  Description: INTERVENTIONS:  - Assess for changes in respiratory status  - Assess for changes in mentation and behavior  - Position to facilitate oxygenation and minimize respiratory effort  - Oxygen supplementation based on oxygen saturation or ABGs  - Provide Smoking Cessation handout, if applicable  - Encourage broncho-pulmonary hygiene including cough, deep breathe, Incentive Spirometry  - Assess the need for suctioning and perform as needed  - Assess and instruct to report SOB or any respiratory difficulty  - Respiratory Therapy support as indicated  - Manage/alleviate anxiety  - Monitor for signs/symptoms of CO2 retention  Outcome: Progressing     Problem: CARDIOVASCULAR - ADULT  Goal: Maintains optimal cardiac output and hemodynamic stability  Description: INTERVENTIONS:  - Monitor vital signs, rhythm, and trends  - Monitor for bleeding, hypotension and signs of decreased cardiac output  - Evaluate effectiveness of vasoactive medications to optimize hemodynamic stability  - Monitor arterial and/or venous puncture sites for bleeding and/or hematoma  - Assess quality of pulses, skin color and temperature  - Assess for signs of decreased coronary artery perfusion - ex.  Angina  - Evaluate fluid balance, assess for edema, trend weights  Outcome: Progressing  Goal: Absence of cardiac arrhythmias or at baseline  Description: INTERVENTIONS:  - Continuous cardiac monitoring, monitor vital signs, obtain 12 lead EKG if indicated  - Evaluate effectiveness of antiarrhythmic and heart rate control medications as ordered  - Initiate emergency measures for life threatening arrhythmias  - Monitor electrolytes and administer replacement therapy as ordered  Outcome: Progressing     Problem: SAFETY ADULT - FALL  Goal: Free from fall injury  Description: INTERVENTIONS:  - Assess pt frequently for physical needs  - Identify cognitive and physical deficits and behaviors that affect risk of falls.   - [place] fall precautions as indicated by assessment.  - Educate pt/family on patient safety including physical limitations  - Instruct pt to call for assistance with activity based on assessment  - Modify environment to reduce risk of injury  - Provide assistive devices as appropriate  - Consider OT/PT consult to assist with strengthening/mobility  - Encourage toileting schedule  Outcome: Progressing     Problem: PAIN - ADULT  Goal: Verbalizes/displays adequate comfort level or patient's stated pain goal  Description: INTERVENTIONS:  - Encourage pt to monitor pain and request assistance  - Assess pain using appropriate pain scale  - Administer analgesics based on type and severity of pain and evaluate response  - Implement non-pharmacological measures as appropriate and evaluate response  - Consider cultural and social influences on pain and pain management  - Manage/alleviate anxiety  - Utilize distraction and/or relaxation techniques  - Monitor for opioid side effects  - Notify MD/LIP if interventions unsuccessful or patient reports new pain  - Anticipate increased pain with activity and pre-medicate as appropriate  Outcome: Progressing     Problem: RISK FOR INFECTION - ADULT  Goal: Absence of fever/infection during anticipated neutropenic period  Description: INTERVENTIONS  - Monitor WBC  - Administer growth factors as ordered  - Implement neutropenic guidelines  Outcome: Progressing     Problem: METABOLIC/FLUID AND ELECTROLYTES - ADULT  Goal: Electrolytes maintained within normal limits  Description: INTERVENTIONS:  - Monitor labs and rhythm and assess patient for signs and symptoms of electrolyte imbalances  - Administer electrolyte replacement as ordered  - Monitor response to electrolyte replacements, including rhythm and repeat lab results as appropriate  - Fluid restriction as ordered  - Instruct patient on fluid and nutrition restrictions as appropriate  Outcome: Progressing  Goal: Hemodynamic stability and optimal renal function maintained  Description: INTERVENTIONS:  - Monitor labs and assess for signs and symptoms of volume excess or deficit  - Monitor intake, output and patient weight  - Monitor urine specific gravity, serum osmolarity and serum sodium as indicated or ordered  - Monitor response to interventions for patient's volume status, including labs, urine output, blood pressure (other measures as available)  - Encourage oral intake as appropriate  - Instruct patient on fluid and nutrition restrictions as appropriate  Outcome: Progressing     Problem: SKIN/TISSUE INTEGRITY - ADULT  Goal: Skin integrity remains intact  Description: INTERVENTIONS  - Assess and document risk factors for pressure ulcer development  - Assess and document skin integrity  - Monitor for areas of redness and/or skin breakdown  - Initiate interventions, skin care algorithm/standards of care as needed  Outcome: Progressing  Goal: Incision(s), wounds(s) or drain site(s) healing without S/S of infection  Description: INTERVENTIONS:  - Assess and document risk factors for pressure ulcer development  - Assess and document skin integrity  - Assess and document dressing/incision, wound bed, drain sites and surrounding tissue  - Implement wound care per orders  - Initiate isolation precautions as appropriate  - Initiate Pressure Ulcer prevention bundle as indicated  Outcome: Progressing     Problem: HEMATOLOGIC - ADULT  Goal: Maintains hematologic stability  Description: INTERVENTIONS  - Assess for signs and symptoms of bleeding or hemorrhage  - Monitor labs and vital signs for trends  - Administer supportive blood products/factors, fluids and medications as ordered and appropriate  - Administer supportive blood products/factors as ordered and appropriate  Outcome: Progressing  Goal: Free from bleeding injury  Description: (Example usage: patient with low platelets)  INTERVENTIONS:  - Avoid intramuscular injections, enemas and rectal medication administration  - Ensure safe mobilization of patient  - Hold pressure on venipuncture sites to achieve adequate hemostasis  - Assess for signs and symptoms of internal bleeding  - Monitor lab trends  - Patient is to report abnormal signs of bleeding to staff  - Avoid use of toothpicks and dental floss  - Use electric shaver for shaving  - Use soft bristle tooth brush  - Limit straining and forceful nose blowing  Outcome: Progressing     Problem: MUSCULOSKELETAL - ADULT  Goal: Return mobility to safest level of function  Description: INTERVENTIONS:  - Assess patient stability and activity tolerance for standing, transferring and ambulating w/ or w/o assistive devices  - Assist with transfers and ambulation using safe patient handling equipment as needed  - Ensure adequate protection for wounds/incisions during mobilization  - Obtain PT/OT consults as needed  - Advance activity as appropriate  - Communicate ordered activity level and limitations with patient/family  Outcome: Progressing

## 2022-11-22 LAB
ANION GAP SERPL CALC-SCNC: 13 MMOL/L (ref 0–18)
BASOPHILS # BLD AUTO: 0.02 X10(3) UL (ref 0–0.2)
BASOPHILS NFR BLD AUTO: 0.4 %
BUN BLD-MCNC: 92 MG/DL (ref 7–18)
BUN/CREAT SERPL: 21.5 (ref 10–20)
CALCIUM BLD-MCNC: 7.3 MG/DL (ref 8.5–10.1)
CHLORIDE SERPL-SCNC: 111 MMOL/L (ref 98–112)
CO2 SERPL-SCNC: 17 MMOL/L (ref 21–32)
CREAT BLD-MCNC: 4.28 MG/DL
DEPRECATED RDW RBC AUTO: 52.3 FL (ref 35.1–46.3)
EOSINOPHIL # BLD AUTO: 0.06 X10(3) UL (ref 0–0.7)
EOSINOPHIL NFR BLD AUTO: 1.2 %
ERYTHROCYTE [DISTWIDTH] IN BLOOD BY AUTOMATED COUNT: 15.4 % (ref 11–15)
GFR SERPLBLD BASED ON 1.73 SQ M-ARVRAT: 13 ML/MIN/1.73M2 (ref 60–?)
GLUCOSE BLD-MCNC: 111 MG/DL (ref 70–99)
HCT VFR BLD AUTO: 24.2 %
HGB BLD-MCNC: 7.7 G/DL
IMM GRANULOCYTES # BLD AUTO: 0.11 X10(3) UL (ref 0–1)
IMM GRANULOCYTES NFR BLD: 2.2 %
LYMPHOCYTES # BLD AUTO: 0.35 X10(3) UL (ref 1–4)
LYMPHOCYTES NFR BLD AUTO: 7.1 %
MCH RBC QN AUTO: 32.2 PG (ref 26–34)
MCHC RBC AUTO-ENTMCNC: 31.8 G/DL (ref 31–37)
MCV RBC AUTO: 101.3 FL
MONOCYTES # BLD AUTO: 0.26 X10(3) UL (ref 0.1–1)
MONOCYTES NFR BLD AUTO: 5.2 %
NEUTROPHILS # BLD AUTO: 4.16 X10 (3) UL (ref 1.5–7.7)
NEUTROPHILS # BLD AUTO: 4.16 X10(3) UL (ref 1.5–7.7)
NEUTROPHILS NFR BLD AUTO: 83.9 %
OSMOLALITY SERPL CALC.SUM OF ELEC: 321 MOSM/KG (ref 275–295)
PLATELET # BLD AUTO: 154 10(3)UL (ref 150–450)
POTASSIUM SERPL-SCNC: 4.9 MMOL/L (ref 3.5–5.1)
RBC # BLD AUTO: 2.39 X10(6)UL
SODIUM SERPL-SCNC: 141 MMOL/L (ref 136–145)
WBC # BLD AUTO: 5 X10(3) UL (ref 4–11)

## 2022-11-22 PROCEDURE — 99233 SBSQ HOSP IP/OBS HIGH 50: CPT | Performed by: HOSPITALIST

## 2022-11-22 PROCEDURE — 99232 SBSQ HOSP IP/OBS MODERATE 35: CPT | Performed by: INTERNAL MEDICINE

## 2022-11-22 PROCEDURE — 99233 SBSQ HOSP IP/OBS HIGH 50: CPT | Performed by: INTERNAL MEDICINE

## 2022-11-22 RX ORDER — FUROSEMIDE 40 MG/1
40 TABLET ORAL ONCE
Status: COMPLETED | OUTPATIENT
Start: 2022-11-22 | End: 2022-11-22

## 2022-11-22 NOTE — PHYSICAL THERAPY NOTE
Chart reviewed for PT treatment. Spoke with RN Chica Drake who reports patient is walking in room and up to chair but still on chest tube and suction. She is calling MD to confirm he can be off suction for walking in halls and to do stairs. Informed RN that PT/OT has seen patient off of suction as recently at 11/18 per MDO but she wants to confirm this. Will check back later.

## 2022-11-22 NOTE — PROGRESS NOTES
11/22/22 0846   Wound 11/15/22 Other (comment) Jaw Right   Date First Assessed/Time First Assessed: 11/15/22 1100   Present on Hospital Admission: Yes  Primary Wound Type: Other (comment)  Location: Jaw  Wound Location Orientation: Right  Wound Description (Comments): ULCER   Wound Image    Site Assessment Fragile;Brown  (lower jaw crusted, lateral jaw pink, yellow moist)   Drainage Amount Small   Drainage Description [place];Serosanguineous   Treatments Cleansed;Site Care;Santyl   Dressing 2x2s; Aquacel Foam  (Santyl, damp saline gauze)   Dressing Changed Changed   Dressing Status Clean;Dry; Intact;Dressing Changed   Wound Length (cm) 2.2 cm   Wound Width (cm) 2.9 cm   Wound Surface Area (cm^2) 6.38 cm^2   Wound Depth (cm) 0 cm   Wound Volume (cm^3) 0 cm^3   Wound Healing % 100   Non-staged Wound Description Full thickness   Wound Odor None   Tunneling no   Undermining no   Size 2nd ulcer 0.8 cm x 1.2 cm x 0 cm   State of Healing Non-healing   Wound Follow Up   Follow up needed Yes    Wound Care Services  Follow up on the pt., he is resting in bed, he is hard of hearing, he is agreeable for a dressing change. See the above assessment, since last assessed by wound care, a new ulcer has developed to the right of current ulcer. Both were cleansed and dressed. Got the pt. some water. Spoke with the nurse, no family here at this time. Call light is within reach.

## 2022-11-22 NOTE — PLAN OF CARE
A/ox4, on RA, CPAP at night, tolerating diet, denies nausea, received PRN morphine for pain management, up x1 SBA with walker, voiding freely via urinal, x1 BM overnight, chest tube in place to wall suction with serous output. Vital signs stable, no acute changes overnight, call light within reach, safety measures in place, frequent rounding done, plan of care continued. Problem: Patient Centered Care  Goal: Patient preferences are identified and integrated in the patient's plan of care  Description: Interventions:  - What would you like us to know as we care for you? From home with wife.    - Provide timely, complete, and accurate information to patient/family  - Incorporate patient and family knowledge, values, beliefs, and cultural backgrounds into the planning and delivery of care  - Encourage patient/family to participate in care and decision-making at the level they choose  - Honor patient and family perspectives and choices  Outcome: Progressing     Problem: Patient/Family Goals  Goal: Patient/Family Long Term Goal  Description: Patient's Long Term Goal: To manage condition, get better, and go home    Interventions:  -Monitor labs, results, and vitals  -Administer medication as prescribed and PRN  -Pain management  -Infection management and prevention  -Replace electrolyte per protocol  -I&Os  -Safety, diet, act mini, hygiene  -Wound care of incision  -Chest tube management and suction  -Monitor lung and kidney function  -Imaging/tests/procedures  -Consults  -Follow plan of care  -Monitor for worsening condition or new onset of symptoms  - See additional Care Plan goals for specific interventions  Outcome: Progressing  Goal: Patient/Family Short Term Goal  Description: Patient's Short Term Goal: Pain management    Interventions:   -Assess pain level  -Encourage pt to notify of increasing pain level  -Administer pain medication as prescribed and PRN  -Provide non-pharmacological intervention as needed  -Follow plan of care  - See additional Care Plan goals for specific interventions  Outcome: Progressing     Problem: RESPIRATORY - ADULT  Goal: Achieves optimal ventilation and oxygenation  Description: INTERVENTIONS:  - Assess for changes in respiratory status  - Assess for changes in mentation and behavior  - Position to facilitate oxygenation and minimize respiratory effort  - Oxygen supplementation based on oxygen saturation or ABGs  - Provide Smoking Cessation handout, if applicable  - Encourage broncho-pulmonary hygiene including cough, deep breathe, Incentive Spirometry  - Assess the need for suctioning and perform as needed  - Assess and instruct to report SOB or any respiratory difficulty  - Respiratory Therapy support as indicated  - Manage/alleviate anxiety  - Monitor for signs/symptoms of CO2 retention  Outcome: Progressing     Problem: CARDIOVASCULAR - ADULT  Goal: Maintains optimal cardiac output and hemodynamic stability  Description: INTERVENTIONS:  - Monitor vital signs, rhythm, and trends  - Monitor for bleeding, hypotension and signs of decreased cardiac output  - Evaluate effectiveness of vasoactive medications to optimize hemodynamic stability  - Monitor arterial and/or venous puncture sites for bleeding and/or hematoma  - Assess quality of pulses, skin color and temperature  - Assess for signs of decreased coronary artery perfusion - ex.  Angina  - Evaluate fluid balance, assess for edema, trend weights  Outcome: Progressing  Goal: Absence of cardiac arrhythmias or at baseline  Description: INTERVENTIONS:  - Continuous cardiac monitoring, monitor vital signs, obtain 12 lead EKG if indicated  - Evaluate effectiveness of antiarrhythmic and heart rate control medications as ordered  - Initiate emergency measures for life threatening arrhythmias  - Monitor electrolytes and administer replacement therapy as ordered  Outcome: Progressing     Problem: SAFETY ADULT - FALL  Goal: Free from fall injury  Description: INTERVENTIONS:  - Assess pt frequently for physical needs  - Identify cognitive and physical deficits and behaviors that affect risk of falls.   - [place] fall precautions as indicated by assessment.  - Educate pt/family on patient safety including physical limitations  - Instruct pt to call for assistance with activity based on assessment  - Modify environment to reduce risk of injury  - Provide assistive devices as appropriate  - Consider OT/PT consult to assist with strengthening/mobility  - Encourage toileting schedule  Outcome: Progressing     Problem: DISCHARGE PLANNING  Goal: Discharge to home or other facility with appropriate resources  Description: INTERVENTIONS:  - Identify barriers to discharge w/pt and caregiver  - Include patient/family/discharge partner in discharge planning  - Arrange for needed discharge resources and transportation as appropriate  - Identify discharge learning needs (meds, wound care, etc)  - Arrange for interpreters to assist at discharge as needed  - Consider post-discharge preferences of patient/family/discharge partner  - Complete POLST form as appropriate  - Assess patient's ability to be responsible for managing their own health  - Refer to Case Management Department for coordinating discharge planning if the patient needs post-hospital services based on physician/LIP order or complex needs related to functional status, cognitive ability or social support system  Outcome: Progressing     Problem: PAIN - ADULT  Goal: Verbalizes/displays adequate comfort level or patient's stated pain goal  Description: INTERVENTIONS:  - Encourage pt to monitor pain and request assistance  - Assess pain using appropriate pain scale  - Administer analgesics based on type and severity of pain and evaluate response  - Implement non-pharmacological measures as appropriate and evaluate response  - Consider cultural and social influences on pain and pain management  - Manage/alleviate anxiety  - Utilize distraction and/or relaxation techniques  - Monitor for opioid side effects  - Notify MD/LIP if interventions unsuccessful or patient reports new pain  - Anticipate increased pain with activity and pre-medicate as appropriate  Outcome: Progressing     Problem: RISK FOR INFECTION - ADULT  Goal: Absence of fever/infection during anticipated neutropenic period  Description: INTERVENTIONS  - Monitor WBC  - Administer growth factors as ordered  - Implement neutropenic guidelines  Outcome: Progressing     Problem: METABOLIC/FLUID AND ELECTROLYTES - ADULT  Goal: Electrolytes maintained within normal limits  Description: INTERVENTIONS:  - Monitor labs and rhythm and assess patient for signs and symptoms of electrolyte imbalances  - Administer electrolyte replacement as ordered  - Monitor response to electrolyte replacements, including rhythm and repeat lab results as appropriate  - Fluid restriction as ordered  - Instruct patient on fluid and nutrition restrictions as appropriate  Outcome: Progressing  Goal: Hemodynamic stability and optimal renal function maintained  Description: INTERVENTIONS:  - Monitor labs and assess for signs and symptoms of volume excess or deficit  - Monitor intake, output and patient weight  - Monitor urine specific gravity, serum osmolarity and serum sodium as indicated or ordered  - Monitor response to interventions for patient's volume status, including labs, urine output, blood pressure (other measures as available)  - Encourage oral intake as appropriate  - Instruct patient on fluid and nutrition restrictions as appropriate  Outcome: Progressing     Problem: SKIN/TISSUE INTEGRITY - ADULT  Goal: Skin integrity remains intact  Description: INTERVENTIONS  - Assess and document risk factors for pressure ulcer development  - Assess and document skin integrity  - Monitor for areas of redness and/or skin breakdown  - Initiate interventions, skin care algorithm/standards of care as needed  Outcome: Progressing  Goal: Incision(s), wounds(s) or drain site(s) healing without S/S of infection  Description: INTERVENTIONS:  - Assess and document risk factors for pressure ulcer development  - Assess and document skin integrity  - Assess and document dressing/incision, wound bed, drain sites and surrounding tissue  - Implement wound care per orders  - Initiate isolation precautions as appropriate  - Initiate Pressure Ulcer prevention bundle as indicated  Outcome: Progressing     Problem: HEMATOLOGIC - ADULT  Goal: Maintains hematologic stability  Description: INTERVENTIONS  - Assess for signs and symptoms of bleeding or hemorrhage  - Monitor labs and vital signs for trends  - Administer supportive blood products/factors, fluids and medications as ordered and appropriate  - Administer supportive blood products/factors as ordered and appropriate  Outcome: Progressing  Goal: Free from bleeding injury  Description: (Example usage: patient with low platelets)  INTERVENTIONS:  - Avoid intramuscular injections, enemas and rectal medication administration  - Ensure safe mobilization of patient  - Hold pressure on venipuncture sites to achieve adequate hemostasis  - Assess for signs and symptoms of internal bleeding  - Monitor lab trends  - Patient is to report abnormal signs of bleeding to staff  - Avoid use of toothpicks and dental floss  - Use electric shaver for shaving  - Use soft bristle tooth brush  - Limit straining and forceful nose blowing  Outcome: Progressing     Problem: MUSCULOSKELETAL - ADULT  Goal: Return mobility to safest level of function  Description: INTERVENTIONS:  - Assess patient stability and activity tolerance for standing, transferring and ambulating w/ or w/o assistive devices  - Assist with transfers and ambulation using safe patient handling equipment as needed  - Ensure adequate protection for wounds/incisions during mobilization  - Obtain PT/OT consults as needed  - Advance activity as appropriate  - Communicate ordered activity level and limitations with patient/family  Outcome: Progressing

## 2022-11-22 NOTE — PROGRESS NOTES
Patient alert and oriented, vitals stable. Pain controlled with morphine. Continues with merrem Q12. Left chest tube in place. [facility] changed. Sent for CXR this am, okay to be off from suction for test per Dr Caryn Dempsey. Tolerating diet. Denies chest pain or shortness of breath. Up to chair throughout the day. Able to ambulate to bathroom. Bed locked and in lowest position, call light within reach, calling appropriately for assistance.

## 2022-11-22 NOTE — CM/SW NOTE
Per chart review Pt declined HH previously. SW intern followed up with pt on [place] option again to see if he changed his mind. SW intern met with pt bedside. SW intern asked pt about HH option. Pt declined HH. PLAN: PT rec HH. Pt continues to decline HH.     [facility] Work Intern

## 2022-11-23 PROCEDURE — 99232 SBSQ HOSP IP/OBS MODERATE 35: CPT | Performed by: INTERNAL MEDICINE

## 2022-11-23 PROCEDURE — 99233 SBSQ HOSP IP/OBS HIGH 50: CPT | Performed by: HOSPITALIST

## 2022-11-23 PROCEDURE — 99233 SBSQ HOSP IP/OBS HIGH 50: CPT | Performed by: INTERNAL MEDICINE

## 2022-11-23 RX ORDER — TRISODIUM CITRATE DIHYDRATE AND CITRIC ACID MONOHYDRATE 500; 334 MG/5ML; MG/5ML
30 SOLUTION ORAL 2 TIMES DAILY
Status: DISCONTINUED | OUTPATIENT
Start: 2022-11-23 | End: 2022-11-27

## 2022-11-23 RX ORDER — IPRATROPIUM BROMIDE AND ALBUTEROL SULFATE 2.5; .5 MG/3ML; MG/3ML
3 SOLUTION RESPIRATORY (INHALATION)
Status: DISCONTINUED | OUTPATIENT
Start: 2022-11-23 | End: 2022-11-23

## 2022-11-23 RX ORDER — IPRATROPIUM BROMIDE AND ALBUTEROL SULFATE 2.5; .5 MG/3ML; MG/3ML
3 SOLUTION RESPIRATORY (INHALATION) 4 TIMES DAILY
Status: DISCONTINUED | OUTPATIENT
Start: 2022-11-23 | End: 2022-11-28

## 2022-11-23 NOTE — PROGRESS NOTES
Patient alert and oriented, vitals stable. Pain controlled with morphine. Continues with merrem Q12. Left chest tube in place, still with serous output. Tolerating diet. Denies chest pain or shortness of breath. Up to chair throughout the day. Able to ambulate to bathroom. Bed locked and in lowest position, call light within reach, calling appropriately for assistance.

## 2022-11-23 NOTE — PLAN OF CARE
Problem: Patient Centered Care  Goal: Patient preferences are identified and integrated in the patient's plan of care  Description: Interventions:  - What would you like us to know as we care for you? From home with wife.    - Provide timely, complete, and accurate information to patient/family  - Incorporate patient and family knowledge, values, beliefs, and cultural backgrounds into the planning and delivery of care  - Encourage patient/family to participate in care and decision-making at the level they choose  - Honor patient and family perspectives and choices  Outcome: Progressing     Problem: Patient/Family Goals  Goal: Patient/Family Long Term Goal  Description: Patient's Long Term Goal: To manage condition, get better, and go home    Interventions:  -Monitor labs, results, and vitals  -Administer medication as prescribed and PRN  -Pain management  -Infection management and prevention  -Replace electrolyte per protocol  -I&Os  -Safety, diet, act mini, hygiene  -Wound care of incision  -Chest tube management and suction  -Monitor lung and kidney function  -Imaging/tests/procedures  -Consults  -Follow plan of care  -Monitor for worsening condition or new onset of symptoms  - See additional Care Plan goals for specific interventions  Outcome: Progressing  Goal: Patient/Family Short Term Goal  Description: Patient's Short Term Goal: Pain management    Interventions:   -Assess pain level  -Encourage pt to notify of increasing pain level  -Administer pain medication as prescribed and PRN  -Provide non-pharmacological intervention as needed  -Follow plan of care  - See additional Care Plan goals for specific interventions  Outcome: Progressing     Problem: RESPIRATORY - ADULT  Goal: Achieves optimal ventilation and oxygenation  Description: INTERVENTIONS:  - Assess for changes in respiratory status  - Assess for changes in mentation and behavior  - Position to facilitate oxygenation and minimize respiratory effort  - Oxygen supplementation based on oxygen saturation or ABGs  - Provide Smoking Cessation handout, if applicable  - Encourage broncho-pulmonary hygiene including cough, deep breathe, Incentive Spirometry  - Assess the need for suctioning and perform as needed  - Assess and instruct to report SOB or any respiratory difficulty  - Respiratory Therapy support as indicated  - Manage/alleviate anxiety  - Monitor for signs/symptoms of CO2 retention  Outcome: Progressing     Problem: CARDIOVASCULAR - ADULT  Goal: Maintains optimal cardiac output and hemodynamic stability  Description: INTERVENTIONS:  - Monitor vital signs, rhythm, and trends  - Monitor for bleeding, hypotension and signs of decreased cardiac output  - Evaluate effectiveness of vasoactive medications to optimize hemodynamic stability  - Monitor arterial and/or venous puncture sites for bleeding and/or hematoma  - Assess quality of pulses, skin color and temperature  - Assess for signs of decreased coronary artery perfusion - ex. Angina  - Evaluate fluid balance, assess for edema, trend weights  Outcome: Progressing  Goal: Absence of cardiac arrhythmias or at baseline  Description: INTERVENTIONS:  - Continuous cardiac monitoring, monitor vital signs, obtain 12 lead EKG if indicated  - Evaluate effectiveness of antiarrhythmic and heart rate control medications as ordered  - Initiate emergency measures for life threatening arrhythmias  - Monitor electrolytes and administer replacement therapy as ordered  Outcome: Progressing     Problem: SAFETY ADULT - FALL  Goal: Free from fall injury  Description: INTERVENTIONS:  - Assess pt frequently for physical needs  - Identify cognitive and physical deficits and behaviors that affect risk of falls.   - [place] fall precautions as indicated by assessment.  - Educate pt/family on patient safety including physical limitations  - Instruct pt to call for assistance with activity based on assessment  - Modify environment to reduce risk of injury  - Provide assistive devices as appropriate  - Consider OT/PT consult to assist with strengthening/mobility  - Encourage toileting schedule  Outcome: Progressing     Problem: DISCHARGE PLANNING  Goal: Discharge to home or other facility with appropriate resources  Description: INTERVENTIONS:  - Identify barriers to discharge w/pt and caregiver  - Include patient/family/discharge partner in discharge planning  - Arrange for needed discharge resources and transportation as appropriate  - Identify discharge learning needs (meds, wound care, etc)  - Arrange for interpreters to assist at discharge as needed  - Consider post-discharge preferences of patient/family/discharge partner  - Complete POLST form as appropriate  - Assess patient's ability to be responsible for managing their own health  - Refer to Case Management Department for coordinating discharge planning if the patient needs post-hospital services based on physician/LIP order or complex needs related to functional status, cognitive ability or social support system  Outcome: Progressing     Problem: PAIN - ADULT  Goal: Verbalizes/displays adequate comfort level or patient's stated pain goal  Description: INTERVENTIONS:  - Encourage pt to monitor pain and request assistance  - Assess pain using appropriate pain scale  - Administer analgesics based on type and severity of pain and evaluate response  - Implement non-pharmacological measures as appropriate and evaluate response  - Consider cultural and social influences on pain and pain management  - Manage/alleviate anxiety  - Utilize distraction and/or relaxation techniques  - Monitor for opioid side effects  - Notify MD/LIP if interventions unsuccessful or patient reports new pain  - Anticipate increased pain with activity and pre-medicate as appropriate  Outcome: Progressing     Problem: RISK FOR INFECTION - ADULT  Goal: Absence of fever/infection during anticipated neutropenic period  Description: INTERVENTIONS  - Monitor WBC  - Administer growth factors as ordered  - Implement neutropenic guidelines  Outcome: Progressing     Problem: METABOLIC/FLUID AND ELECTROLYTES - ADULT  Goal: Electrolytes maintained within normal limits  Description: INTERVENTIONS:  - Monitor labs and rhythm and assess patient for signs and symptoms of electrolyte imbalances  - Administer electrolyte replacement as ordered  - Monitor response to electrolyte replacements, including rhythm and repeat lab results as appropriate  - Fluid restriction as ordered  - Instruct patient on fluid and nutrition restrictions as appropriate  Outcome: Progressing  Goal: Hemodynamic stability and optimal renal function maintained  Description: INTERVENTIONS:  - Monitor labs and assess for signs and symptoms of volume excess or deficit  - Monitor intake, output and patient weight  - Monitor urine specific gravity, serum osmolarity and serum sodium as indicated or ordered  - Monitor response to interventions for patient's volume status, including labs, urine output, blood pressure (other measures as available)  - Encourage oral intake as appropriate  - Instruct patient on fluid and nutrition restrictions as appropriate  Outcome: Progressing     Problem: SKIN/TISSUE INTEGRITY - ADULT  Goal: Skin integrity remains intact  Description: INTERVENTIONS  - Assess and document risk factors for pressure ulcer development  - Assess and document skin integrity  - Monitor for areas of redness and/or skin breakdown  - Initiate interventions, skin care algorithm/standards of care as needed  Outcome: Progressing  Goal: Incision(s), wounds(s) or drain site(s) healing without S/S of infection  Description: INTERVENTIONS:  - Assess and document risk factors for pressure ulcer development  - Assess and document skin integrity  - Assess and document dressing/incision, wound bed, drain sites and surrounding tissue  - Implement wound care per orders  - Initiate isolation precautions as appropriate  - Initiate Pressure Ulcer prevention bundle as indicated  Outcome: Progressing     Problem: HEMATOLOGIC - ADULT  Goal: Maintains hematologic stability  Description: INTERVENTIONS  - Assess for signs and symptoms of bleeding or hemorrhage  - Monitor labs and vital signs for trends  - Administer supportive blood products/factors, fluids and medications as ordered and appropriate  - Administer supportive blood products/factors as ordered and appropriate  Outcome: Progressing  Goal: Free from bleeding injury  Description: (Example usage: patient with low platelets)  INTERVENTIONS:  - Avoid intramuscular injections, enemas and rectal medication administration  - Ensure safe mobilization of patient  - Hold pressure on venipuncture sites to achieve adequate hemostasis  - Assess for signs and symptoms of internal bleeding  - Monitor lab trends  - Patient is to report abnormal signs of bleeding to staff  - Avoid use of toothpicks and dental floss  - Use electric shaver for shaving  - Use soft bristle tooth brush  - Limit straining and forceful nose blowing  Outcome: Progressing     Problem: MUSCULOSKELETAL - ADULT  Goal: Return mobility to safest level of function  Description: INTERVENTIONS:  - Assess patient stability and activity tolerance for standing, transferring and ambulating w/ or w/o assistive devices  - Assist with transfers and ambulation using safe patient handling equipment as needed  - Ensure adequate protection for wounds/incisions during mobilization  - Obtain PT/OT consults as needed  - Advance activity as appropriate  - Communicate ordered activity level and limitations with patient/family  Outcome: Progressing   No acute changes overnight. Patient is alert and oriented X4. Vital signs stable. PRN Morphine given for pain on chest tube site. Denies shortness of breath. Patient wears CPAP at night. Chest tube to continuous suction.  Scheduled nebs continued. Voids using the urinal. Tolerating diet. Plan of care reviewed.

## 2022-11-23 NOTE — OCCUPATIONAL THERAPY NOTE
This therapist contacted RN for clearance to work with pt; however, RN reported that pt was up in chair all day and that pt worked with PT earlier in the day. RN reported that nursing staff assisted pt back to bed and pt is napping at this time. OT will re-attempt on a later day, as pt is able and schedule permits.     Bindu Phillips, OTR/L  [address]

## 2022-11-24 ENCOUNTER — APPOINTMENT (OUTPATIENT)
Dept: GENERAL RADIOLOGY | Facility: HOSPITAL | Age: 77
End: 2022-11-24
Attending: INTERNAL MEDICINE
Payer: MEDICARE

## 2022-11-24 LAB
ANION GAP SERPL CALC-SCNC: 10 MMOL/L (ref 0–18)
BASOPHILS # BLD AUTO: 0.04 X10(3) UL (ref 0–0.2)
BASOPHILS NFR BLD AUTO: 0.6 %
BUN BLD-MCNC: 92 MG/DL (ref 7–18)
BUN/CREAT SERPL: 21.7 (ref 10–20)
CALCIUM BLD-MCNC: 7.4 MG/DL (ref 8.5–10.1)
CHLORIDE SERPL-SCNC: 109 MMOL/L (ref 98–112)
CO2 SERPL-SCNC: 19 MMOL/L (ref 21–32)
CREAT BLD-MCNC: 4.24 MG/DL
DEPRECATED RDW RBC AUTO: 55.8 FL (ref 35.1–46.3)
EOSINOPHIL # BLD AUTO: 0.11 X10(3) UL (ref 0–0.7)
EOSINOPHIL NFR BLD AUTO: 1.7 %
ERYTHROCYTE [DISTWIDTH] IN BLOOD BY AUTOMATED COUNT: 17 % (ref 11–15)
GFR SERPLBLD BASED ON 1.73 SQ M-ARVRAT: 14 ML/MIN/1.73M2 (ref 60–?)
GLUCOSE BLD-MCNC: 80 MG/DL (ref 70–99)
HCT VFR BLD AUTO: 27.1 %
HGB BLD-MCNC: 8.3 G/DL
IMM GRANULOCYTES # BLD AUTO: 0.11 X10(3) UL (ref 0–1)
IMM GRANULOCYTES NFR BLD: 1.7 %
LYMPHOCYTES # BLD AUTO: 0.38 X10(3) UL (ref 1–4)
LYMPHOCYTES NFR BLD AUTO: 6 %
MCH RBC QN AUTO: 31.7 PG (ref 26–34)
MCHC RBC AUTO-ENTMCNC: 30.6 G/DL (ref 31–37)
MCV RBC AUTO: 103.4 FL
MONOCYTES # BLD AUTO: 0.35 X10(3) UL (ref 0.1–1)
MONOCYTES NFR BLD AUTO: 5.6 %
NEUTROPHILS # BLD AUTO: 5.3 X10 (3) UL (ref 1.5–7.7)
NEUTROPHILS # BLD AUTO: 5.3 X10(3) UL (ref 1.5–7.7)
NEUTROPHILS NFR BLD AUTO: 84.4 %
OSMOLALITY SERPL CALC.SUM OF ELEC: 313 MOSM/KG (ref 275–295)
PLATELET # BLD AUTO: 198 10(3)UL (ref 150–450)
POTASSIUM SERPL-SCNC: 4.9 MMOL/L (ref 3.5–5.1)
RBC # BLD AUTO: 2.62 X10(6)UL
SODIUM SERPL-SCNC: 138 MMOL/L (ref 136–145)
WBC # BLD AUTO: 6.3 X10(3) UL (ref 4–11)

## 2022-11-24 PROCEDURE — 99233 SBSQ HOSP IP/OBS HIGH 50: CPT | Performed by: INTERNAL MEDICINE

## 2022-11-24 PROCEDURE — 99232 SBSQ HOSP IP/OBS MODERATE 35: CPT | Performed by: HOSPITALIST

## 2022-11-24 PROCEDURE — 99232 SBSQ HOSP IP/OBS MODERATE 35: CPT | Performed by: INTERNAL MEDICINE

## 2022-11-24 RX ORDER — SODIUM CHLORIDE 9 MG/ML
INJECTION INTRAVENOUS
Status: COMPLETED
Start: 2022-11-24 | End: 2022-11-24

## 2022-11-24 NOTE — PLAN OF CARE
Pt is alert and oriented x4. On room air. Tele on. Vital signs stable. Left chest tube draining 170. Dressing intact. Wall to suction on continuous. No air leak noted. Pt took sponge bath today. Ambulating using a walker with 1 assist. Nebulizing treatments QID. On general diet. Tolerating well. Family at bedside. Pt encouraged to walk around the unit. Safety measures maintained. Problem: Patient Centered Care  Goal: Patient preferences are identified and integrated in the patient's plan of care  Description: Interventions:  - What would you like us to know as we care for you? From home with wife.    - Provide timely, complete, and accurate information to patient/family  - Incorporate patient and family knowledge, values, beliefs, and cultural backgrounds into the planning and delivery of care  - Encourage patient/family to participate in care and decision-making at the level they choose  - Honor patient and family perspectives and choices  Outcome: Progressing     Problem: Patient/Family Goals  Goal: Patient/Family Long Term Goal  Description: Patient's Long Term Goal: To manage condition, get better, and go home    Interventions:  -Monitor labs, results, and vitals  -Administer medication as prescribed and PRN  -Pain management  -Infection management and prevention  -Replace electrolyte per protocol  -I&Os  -Safety, diet, act mini, hygiene  -Wound care of incision  -Chest tube management and suction  -Monitor lung and kidney function  -Imaging/tests/procedures  -Consults  -Follow plan of care  -Monitor for worsening condition or new onset of symptoms  - See additional Care Plan goals for specific interventions  Outcome: Progressing  Goal: Patient/Family Short Term Goal  Description: Patient's Short Term Goal: Pain management    Interventions:   -Assess pain level  -Encourage pt to notify of increasing pain level  -Administer pain medication as prescribed and PRN  -Provide non-pharmacological intervention as needed  -Follow plan of care  - See additional Care Plan goals for specific interventions  Outcome: Progressing     Problem: RESPIRATORY - ADULT  Goal: Achieves optimal ventilation and oxygenation  Description: INTERVENTIONS:  - Assess for changes in respiratory status  - Assess for changes in mentation and behavior  - Position to facilitate oxygenation and minimize respiratory effort  - Oxygen supplementation based on oxygen saturation or ABGs  - Provide Smoking Cessation handout, if applicable  - Encourage broncho-pulmonary hygiene including cough, deep breathe, Incentive Spirometry  - Assess the need for suctioning and perform as needed  - Assess and instruct to report SOB or any respiratory difficulty  - Respiratory Therapy support as indicated  - Manage/alleviate anxiety  - Monitor for signs/symptoms of CO2 retention  Outcome: Progressing     Problem: CARDIOVASCULAR - ADULT  Goal: Maintains optimal cardiac output and hemodynamic stability  Description: INTERVENTIONS:  - Monitor vital signs, rhythm, and trends  - Monitor for bleeding, hypotension and signs of decreased cardiac output  - Evaluate effectiveness of vasoactive medications to optimize hemodynamic stability  - Monitor arterial and/or venous puncture sites for bleeding and/or hematoma  - Assess quality of pulses, skin color and temperature  - Assess for signs of decreased coronary artery perfusion - ex.  Angina  - Evaluate fluid balance, assess for edema, trend weights  Outcome: Progressing  Goal: Absence of cardiac arrhythmias or at baseline  Description: INTERVENTIONS:  - Continuous cardiac monitoring, monitor vital signs, obtain 12 lead EKG if indicated  - Evaluate effectiveness of antiarrhythmic and heart rate control medications as ordered  - Initiate emergency measures for life threatening arrhythmias  - Monitor electrolytes and administer replacement therapy as ordered  Outcome: Progressing     Problem: SAFETY ADULT - FALL  Goal: Free from fall injury  Description: INTERVENTIONS:  - Assess pt frequently for physical needs  - Identify cognitive and physical deficits and behaviors that affect risk of falls.   - [place] fall precautions as indicated by assessment.  - Educate pt/family on patient safety including physical limitations  - Instruct pt to call for assistance with activity based on assessment  - Modify environment to reduce risk of injury  - Provide assistive devices as appropriate  - Consider OT/PT consult to assist with strengthening/mobility  - Encourage toileting schedule  Outcome: Progressing     Problem: DISCHARGE PLANNING  Goal: Discharge to home or other facility with appropriate resources  Description: INTERVENTIONS:  - Identify barriers to discharge w/pt and caregiver  - Include patient/family/discharge partner in discharge planning  - Arrange for needed discharge resources and transportation as appropriate  - Identify discharge learning needs (meds, wound care, etc)  - Arrange for interpreters to assist at discharge as needed  - Consider post-discharge preferences of patient/family/discharge partner  - Complete POLST form as appropriate  - Assess patient's ability to be responsible for managing their own health  - Refer to Case Management Department for coordinating discharge planning if the patient needs post-hospital services based on physician/LIP order or complex needs related to functional status, cognitive ability or social support system  Outcome: Progressing     Problem: PAIN - ADULT  Goal: Verbalizes/displays adequate comfort level or patient's stated pain goal  Description: INTERVENTIONS:  - Encourage pt to monitor pain and request assistance  - Assess pain using appropriate pain scale  - Administer analgesics based on type and severity of pain and evaluate response  - Implement non-pharmacological measures as appropriate and evaluate response  - Consider cultural and social influences on pain and pain management  - Manage/alleviate anxiety  - Utilize distraction and/or relaxation techniques  - Monitor for opioid side effects  - Notify MD/LIP if interventions unsuccessful or patient reports new pain  - Anticipate increased pain with activity and pre-medicate as appropriate  Outcome: Progressing     Problem: RISK FOR INFECTION - ADULT  Goal: Absence of fever/infection during anticipated neutropenic period  Description: INTERVENTIONS  - Monitor WBC  - Administer growth factors as ordered  - Implement neutropenic guidelines  Outcome: Progressing     Problem: METABOLIC/FLUID AND ELECTROLYTES - ADULT  Goal: Electrolytes maintained within normal limits  Description: INTERVENTIONS:  - Monitor labs and rhythm and assess patient for signs and symptoms of electrolyte imbalances  - Administer electrolyte replacement as ordered  - Monitor response to electrolyte replacements, including rhythm and repeat lab results as appropriate  - Fluid restriction as ordered  - Instruct patient on fluid and nutrition restrictions as appropriate  Outcome: Progressing  Goal: Hemodynamic stability and optimal renal function maintained  Description: INTERVENTIONS:  - Monitor labs and assess for signs and symptoms of volume excess or deficit  - Monitor intake, output and patient weight  - Monitor urine specific gravity, serum osmolarity and serum sodium as indicated or ordered  - Monitor response to interventions for patient's volume status, including labs, urine output, blood pressure (other measures as available)  - Encourage oral intake as appropriate  - Instruct patient on fluid and nutrition restrictions as appropriate  Outcome: Progressing     Problem: SKIN/TISSUE INTEGRITY - ADULT  Goal: Skin integrity remains intact  Description: INTERVENTIONS  - Assess and document risk factors for pressure ulcer development  - Assess and document skin integrity  - Monitor for areas of redness and/or skin breakdown  - Initiate interventions, skin care algorithm/standards of care as needed  Outcome: Progressing  Goal: Incision(s), wounds(s) or drain site(s) healing without S/S of infection  Description: INTERVENTIONS:  - Assess and document risk factors for pressure ulcer development  - Assess and document skin integrity  - Assess and document dressing/incision, wound bed, drain sites and surrounding tissue  - Implement wound care per orders  - Initiate isolation precautions as appropriate  - Initiate Pressure Ulcer prevention bundle as indicated  Outcome: Progressing     Problem: HEMATOLOGIC - ADULT  Goal: Maintains hematologic stability  Description: INTERVENTIONS  - Assess for signs and symptoms of bleeding or hemorrhage  - Monitor labs and vital signs for trends  - Administer supportive blood products/factors, fluids and medications as ordered and appropriate  - Administer supportive blood products/factors as ordered and appropriate  Outcome: Progressing  Goal: Free from bleeding injury  Description: (Example usage: patient with low platelets)  INTERVENTIONS:  - Avoid intramuscular injections, enemas and rectal medication administration  - Ensure safe mobilization of patient  - Hold pressure on venipuncture sites to achieve adequate hemostasis  - Assess for signs and symptoms of internal bleeding  - Monitor lab trends  - Patient is to report abnormal signs of bleeding to staff  - Avoid use of toothpicks and dental floss  - Use electric shaver for shaving  - Use soft bristle tooth brush  - Limit straining and forceful nose blowing  Outcome: Progressing     Problem: MUSCULOSKELETAL - ADULT  Goal: Return mobility to safest level of function  Description: INTERVENTIONS:  - Assess patient stability and activity tolerance for standing, transferring and ambulating w/ or w/o assistive devices  - Assist with transfers and ambulation using safe patient handling equipment as needed  - Ensure adequate protection for wounds/incisions during mobilization  - Obtain PT/OT consults as needed  - Advance activity as appropriate  - Communicate ordered activity level and limitations with patient/family  Outcome: Progressing

## 2022-11-24 NOTE — PROGRESS NOTES
[facility]    Progress Note    Kasi Garcia Patient Status:  Inpatient    1945 MRN [id number]   Location [facility] 4W/SW/SE Attending Noa Powell, [address] [facility] Day # 8 PCP Kd Patton MD       Subjective:   Kasi Garcia is a(n) 76year old male     ROS:     Constitutional: Feels somewhat better in the bathroom washing his face  ENMT:  Negative for ear drainage, hearing loss and nasal drainage  Eyes:  Negative for eye discharge and vision loss  Cardiovascular:  Negative for chest pain, sob, irregular heartbeat/palpitations  Respiratory:  Negative for cough, dyspnea and wheezing  Gastrointestinal:  Negative for abdominal pain, constipation, decreased appetite, diarrhea and vomiting  Genitourinary:  Negative for dysuria and hematuria  Endocrine:  Negative for abnormal sleep patterns, increased activity, polydipsia and polyphagia  Hema/Lymph:  Negative for easy bleeding and easy bruising  Integumentary:  Negative for pruritus and rash  Musculoskeletal:  Negative for bone/joint symptoms  Neurological:  Negative for gait disturbance  Psychiatric:  Negative for inappropriate interaction and psychiatric symptoms       22  1201   BP: 114/79   Pulse:    Resp: 18   Temp:            PHYSICAL EXAM:   Constitutional: appears well hydrated alert and responsive no acute distress noted  Head/Face: normocephalic  Eyes/Vision: normal extraocular motion is intact  Nose/Mouth/Throat:mucous membranes are moist and no oral lesions are noted  Neck/Thyroid: neck is supple without adenopathy  Lymphatic: no abnormal cervical, supraclavicular adenopathy is noted  Respiratory: Left chest tube in place  Cardiovascular: regular rate and rhythm no murmurs, gallups, or rubs  Abdomen: soft, non-tender, non-distended, BS normal  Vascular: well perfused femoral, and pedal pulses normal  Skin/Hair: no unusual rashes present, no abnormal bruising noted  Back/Spine: no abnormalities noted  Musculoskeletal: full ROM all extremities good strength  no deformities  Extremities: no edema, cyanosis  Neurological:  Grossly normal    Results:     Laboratory Data:  Lab Results   Component Value Date    WBC 6.3 11/24/2022    HGB 8.3 (L) 11/24/2022    HCT 27.1 (L) 11/24/2022    .0 11/24/2022    CREATSERUM 4.24 (H) 11/24/2022    BUN 92 (H) 11/24/2022     11/24/2022    K 4.9 11/24/2022     11/24/2022    CO2 19.0 (L) 11/24/2022    GLU 80 11/24/2022    CA 7.4 (L) 11/24/2022    ALB 1.8 (L) 11/21/2022    ALKPHO 45 11/15/2022    BILT 0.4 11/15/2022    TP 5.0 (L) 11/15/2022    AST 21 11/15/2022    ALT 15 (L) 11/15/2022    PTT 36.2 (H) 11/18/2022    INR 1.23 (H) 11/21/2022    T4F 1.0 07/25/2022    TSH 3.430 07/25/2022     06/15/2022    DDIMER 8.79 (H) 11/14/2022    ESRML 22 (H) 03/28/2022    CRP 1.06 (H) 03/28/2022    MG 1.7 11/21/2022    PHOS 7.1 (H) 11/21/2022    TROP <0.045 07/05/2021    CK 57 12/07/2021    B12 1,039 (H) 09/08/2022       Imaging:  [unfilled]   No results found. ASSESSMENT/PLAN:   Assessment       1 empyema stable on antibiotics hopefully chest tube removal tomorrow    #2 CKD 5 needs a PD catheter restart peritoneal dialysis but cannot do it while he has the infection discussed with wife she will follow-up with Dr. Robles Lewis as an outpatient  #3 anemia on Epogen  #4 acidosis on Bicitra             11/24/2022  Ena Rodarte.  Yamil Lynch MD  '

## 2022-11-25 ENCOUNTER — APPOINTMENT (OUTPATIENT)
Dept: GENERAL RADIOLOGY | Facility: HOSPITAL | Age: 77
End: 2022-11-25
Attending: INTERNAL MEDICINE
Payer: MEDICARE

## 2022-11-25 LAB
ANION GAP SERPL CALC-SCNC: 11 MMOL/L (ref 0–18)
BASOPHILS # BLD AUTO: 0.03 X10(3) UL (ref 0–0.2)
BASOPHILS NFR BLD AUTO: 0.4 %
BUN BLD-MCNC: 93 MG/DL (ref 7–18)
BUN/CREAT SERPL: 21.5 (ref 10–20)
CALCIUM BLD-MCNC: 7.2 MG/DL (ref 8.5–10.1)
CHLORIDE SERPL-SCNC: 110 MMOL/L (ref 98–112)
CO2 SERPL-SCNC: 19 MMOL/L (ref 21–32)
CREAT BLD-MCNC: 4.32 MG/DL
DEPRECATED RDW RBC AUTO: 60 FL (ref 35.1–46.3)
EOSINOPHIL # BLD AUTO: 0.07 X10(3) UL (ref 0–0.7)
EOSINOPHIL NFR BLD AUTO: 1 %
ERYTHROCYTE [DISTWIDTH] IN BLOOD BY AUTOMATED COUNT: 17 % (ref 11–15)
GFR SERPLBLD BASED ON 1.73 SQ M-ARVRAT: 13 ML/MIN/1.73M2 (ref 60–?)
GLUCOSE BLD-MCNC: 83 MG/DL (ref 70–99)
HCT VFR BLD AUTO: 27.1 %
HGB BLD-MCNC: 8.3 G/DL
IMM GRANULOCYTES # BLD AUTO: 0.09 X10(3) UL (ref 0–1)
IMM GRANULOCYTES NFR BLD: 1.3 %
LYMPHOCYTES # BLD AUTO: 0.41 X10(3) UL (ref 1–4)
LYMPHOCYTES NFR BLD AUTO: 6.1 %
MCH RBC QN AUTO: 31.9 PG (ref 26–34)
MCHC RBC AUTO-ENTMCNC: 30.6 G/DL (ref 31–37)
MCV RBC AUTO: 104.2 FL
MONOCYTES # BLD AUTO: 0.38 X10(3) UL (ref 0.1–1)
MONOCYTES NFR BLD AUTO: 5.6 %
NEUTROPHILS # BLD AUTO: 5.77 X10 (3) UL (ref 1.5–7.7)
NEUTROPHILS # BLD AUTO: 5.77 X10(3) UL (ref 1.5–7.7)
NEUTROPHILS NFR BLD AUTO: 85.6 %
OSMOLALITY SERPL CALC.SUM OF ELEC: 318 MOSM/KG (ref 275–295)
PLATELET # BLD AUTO: 189 10(3)UL (ref 150–450)
POTASSIUM SERPL-SCNC: 5 MMOL/L (ref 3.5–5.1)
RBC # BLD AUTO: 2.6 X10(6)UL
SODIUM SERPL-SCNC: 140 MMOL/L (ref 136–145)
WBC # BLD AUTO: 6.8 X10(3) UL (ref 4–11)

## 2022-11-25 PROCEDURE — 99233 SBSQ HOSP IP/OBS HIGH 50: CPT | Performed by: HOSPITALIST

## 2022-11-25 PROCEDURE — 99233 SBSQ HOSP IP/OBS HIGH 50: CPT | Performed by: INTERNAL MEDICINE

## 2022-11-25 PROCEDURE — 71045 X-RAY EXAM CHEST 1 VIEW: CPT | Performed by: INTERNAL MEDICINE

## 2022-11-25 NOTE — PLAN OF CARE
Pt is alert and oriented x4, very hard of hearing. Chest tube to wall suction. He wore his CPAP for half of the night, otherwise on room air. Denied any shortness of breath. PRN Morphine given for pain management. Up with a walker. Voiding using the urinal. Call light within reach. Fall precautions maintained. Problem: Patient Centered Care  Goal: Patient preferences are identified and integrated in the patient's plan of care  Description: Interventions:  - What would you like us to know as we care for you? From home with wife.    - Provide timely, complete, and accurate information to patient/family  - Incorporate patient and family knowledge, values, beliefs, and cultural backgrounds into the planning and delivery of care  - Encourage patient/family to participate in care and decision-making at the level they choose  - Honor patient and family perspectives and choices  Outcome: Progressing     Problem: Patient/Family Goals  Goal: Patient/Family Long Term Goal  Description: Patient's Long Term Goal: To manage condition, get better, and go home    Interventions:  -Monitor labs, results, and vitals  -Administer medication as prescribed and PRN  -Pain management  -Infection management and prevention  -Replace electrolyte per protocol  -I&Os  -Safety, diet, act mini, hygiene  -Wound care of incision  -Chest tube management and suction  -Monitor lung and kidney function  -Imaging/tests/procedures  -Consults  -Follow plan of care  -Monitor for worsening condition or new onset of symptoms  - See additional Care Plan goals for specific interventions  Outcome: Progressing  Goal: Patient/Family Short Term Goal  Description: Patient's Short Term Goal: Pain management    Interventions:   -Assess pain level  -Encourage pt to notify of increasing pain level  -Administer pain medication as prescribed and PRN  -Provide non-pharmacological intervention as needed  -Follow plan of care  - See additional Care Plan goals for specific interventions  Outcome: Progressing     Problem: RESPIRATORY - ADULT  Goal: Achieves optimal ventilation and oxygenation  Description: INTERVENTIONS:  - Assess for changes in respiratory status  - Assess for changes in mentation and behavior  - Position to facilitate oxygenation and minimize respiratory effort  - Oxygen supplementation based on oxygen saturation or ABGs  - Provide Smoking Cessation handout, if applicable  - Encourage broncho-pulmonary hygiene including cough, deep breathe, Incentive Spirometry  - Assess the need for suctioning and perform as needed  - Assess and instruct to report SOB or any respiratory difficulty  - Respiratory Therapy support as indicated  - Manage/alleviate anxiety  - Monitor for signs/symptoms of CO2 retention  Outcome: Progressing     Problem: CARDIOVASCULAR - ADULT  Goal: Maintains optimal cardiac output and hemodynamic stability  Description: INTERVENTIONS:  - Monitor vital signs, rhythm, and trends  - Monitor for bleeding, hypotension and signs of decreased cardiac output  - Evaluate effectiveness of vasoactive medications to optimize hemodynamic stability  - Monitor arterial and/or venous puncture sites for bleeding and/or hematoma  - Assess quality of pulses, skin color and temperature  - Assess for signs of decreased coronary artery perfusion - ex.  Angina  - Evaluate fluid balance, assess for edema, trend weights  Outcome: Progressing  Goal: Absence of cardiac arrhythmias or at baseline  Description: INTERVENTIONS:  - Continuous cardiac monitoring, monitor vital signs, obtain 12 lead EKG if indicated  - Evaluate effectiveness of antiarrhythmic and heart rate control medications as ordered  - Initiate emergency measures for life threatening arrhythmias  - Monitor electrolytes and administer replacement therapy as ordered  Outcome: Progressing     Problem: SAFETY ADULT - FALL  Goal: Free from fall injury  Description: INTERVENTIONS:  - Assess pt frequently for physical needs  - Identify cognitive and physical deficits and behaviors that affect risk of falls.   - [place] fall precautions as indicated by assessment.  - Educate pt/family on patient safety including physical limitations  - Instruct pt to call for assistance with activity based on assessment  - Modify environment to reduce risk of injury  - Provide assistive devices as appropriate  - Consider OT/PT consult to assist with strengthening/mobility  - Encourage toileting schedule  Outcome: Progressing     Problem: DISCHARGE PLANNING  Goal: Discharge to home or other facility with appropriate resources  Description: INTERVENTIONS:  - Identify barriers to discharge w/pt and caregiver  - Include patient/family/discharge partner in discharge planning  - Arrange for needed discharge resources and transportation as appropriate  - Identify discharge learning needs (meds, wound care, etc)  - Arrange for interpreters to assist at discharge as needed  - Consider post-discharge preferences of patient/family/discharge partner  - Complete POLST form as appropriate  - Assess patient's ability to be responsible for managing their own health  - Refer to Case Management Department for coordinating discharge planning if the patient needs post-hospital services based on physician/LIP order or complex needs related to functional status, cognitive ability or social support system  Outcome: Progressing     Problem: PAIN - ADULT  Goal: Verbalizes/displays adequate comfort level or patient's stated pain goal  Description: INTERVENTIONS:  - Encourage pt to monitor pain and request assistance  - Assess pain using appropriate pain scale  - Administer analgesics based on type and severity of pain and evaluate response  - Implement non-pharmacological measures as appropriate and evaluate response  - Consider cultural and social influences on pain and pain management  - Manage/alleviate anxiety  - Utilize distraction and/or relaxation techniques  - Monitor for opioid side effects  - Notify MD/LIP if interventions unsuccessful or patient reports new pain  - Anticipate increased pain with activity and pre-medicate as appropriate  Outcome: Progressing     Problem: RISK FOR INFECTION - ADULT  Goal: Absence of fever/infection during anticipated neutropenic period  Description: INTERVENTIONS  - Monitor WBC  - Administer growth factors as ordered  - Implement neutropenic guidelines  Outcome: Progressing     Problem: METABOLIC/FLUID AND ELECTROLYTES - ADULT  Goal: Electrolytes maintained within normal limits  Description: INTERVENTIONS:  - Monitor labs and rhythm and assess patient for signs and symptoms of electrolyte imbalances  - Administer electrolyte replacement as ordered  - Monitor response to electrolyte replacements, including rhythm and repeat lab results as appropriate  - Fluid restriction as ordered  - Instruct patient on fluid and nutrition restrictions as appropriate  Outcome: Progressing  Goal: Hemodynamic stability and optimal renal function maintained  Description: INTERVENTIONS:  - Monitor labs and assess for signs and symptoms of volume excess or deficit  - Monitor intake, output and patient weight  - Monitor urine specific gravity, serum osmolarity and serum sodium as indicated or ordered  - Monitor response to interventions for patient's volume status, including labs, urine output, blood pressure (other measures as available)  - Encourage oral intake as appropriate  - Instruct patient on fluid and nutrition restrictions as appropriate  Outcome: Progressing     Problem: SKIN/TISSUE INTEGRITY - ADULT  Goal: Skin integrity remains intact  Description: INTERVENTIONS  - Assess and document risk factors for pressure ulcer development  - Assess and document skin integrity  - Monitor for areas of redness and/or skin breakdown  - Initiate interventions, skin care algorithm/standards of care as needed  Outcome: Progressing  Goal: Incision(s), wounds(s) or drain site(s) healing without S/S of infection  Description: INTERVENTIONS:  - Assess and document risk factors for pressure ulcer development  - Assess and document skin integrity  - Assess and document dressing/incision, wound bed, drain sites and surrounding tissue  - Implement wound care per orders  - Initiate isolation precautions as appropriate  - Initiate Pressure Ulcer prevention bundle as indicated  Outcome: Progressing     Problem: HEMATOLOGIC - ADULT  Goal: Maintains hematologic stability  Description: INTERVENTIONS  - Assess for signs and symptoms of bleeding or hemorrhage  - Monitor labs and vital signs for trends  - Administer supportive blood products/factors, fluids and medications as ordered and appropriate  - Administer supportive blood products/factors as ordered and appropriate  Outcome: Progressing  Goal: Free from bleeding injury  Description: (Example usage: patient with low platelets)  INTERVENTIONS:  - Avoid intramuscular injections, enemas and rectal medication administration  - Ensure safe mobilization of patient  - Hold pressure on venipuncture sites to achieve adequate hemostasis  - Assess for signs and symptoms of internal bleeding  - Monitor lab trends  - Patient is to report abnormal signs of bleeding to staff  - Avoid use of toothpicks and dental floss  - Use electric shaver for shaving  - Use soft bristle tooth brush  - Limit straining and forceful nose blowing  Outcome: Progressing     Problem: MUSCULOSKELETAL - ADULT  Goal: Return mobility to safest level of function  Description: INTERVENTIONS:  - Assess patient stability and activity tolerance for standing, transferring and ambulating w/ or w/o assistive devices  - Assist with transfers and ambulation using safe patient handling equipment as needed  - Ensure adequate protection for wounds/incisions during mobilization  - Obtain PT/OT consults as needed  - Advance activity as appropriate  - Communicate ordered activity level and limitations with patient/family  Outcome: Progressing

## 2022-11-25 NOTE — CM/SW NOTE
PT rec is now CHERYL. Called and spoke with patient wife, Francesca Hu to notify of recommendation. Polly is in agreement, but is not sure that patient will be. Polly expressed concern about taking patient home due to his weakness and stairs in the home. Polly will be at the hospital later today to discuss with patient. SW to provide choice list when avail and discuss further with patient. PASRR completed and attached to referral.     234pm  Met with patient and wife at bedside, provided [facility] choice list. Wife, Francesca Hu expressed interest in [address] notified Al[address] and requested her to reach out to Francesca Hu.      PLAN: CHERYL- choice list provided, need to follow up with final choice.      KARL Ortega, [facility]    [id number]

## 2022-11-26 ENCOUNTER — APPOINTMENT (OUTPATIENT)
Dept: GENERAL RADIOLOGY | Facility: HOSPITAL | Age: 77
End: 2022-11-26
Attending: INTERNAL MEDICINE
Payer: MEDICARE

## 2022-11-26 LAB
ANION GAP SERPL CALC-SCNC: 11 MMOL/L (ref 0–18)
BASOPHILS # BLD AUTO: 0.04 X10(3) UL (ref 0–0.2)
BASOPHILS NFR BLD AUTO: 0.5 %
BUN BLD-MCNC: 86 MG/DL (ref 7–18)
BUN/CREAT SERPL: 19 (ref 10–20)
CALCIUM BLD-MCNC: 7.8 MG/DL (ref 8.5–10.1)
CHLORIDE SERPL-SCNC: 110 MMOL/L (ref 98–112)
CO2 SERPL-SCNC: 21 MMOL/L (ref 21–32)
CREAT BLD-MCNC: 4.52 MG/DL
DEPRECATED RDW RBC AUTO: 60.9 FL (ref 35.1–46.3)
EOSINOPHIL # BLD AUTO: 0.07 X10(3) UL (ref 0–0.7)
EOSINOPHIL NFR BLD AUTO: 0.8 %
ERYTHROCYTE [DISTWIDTH] IN BLOOD BY AUTOMATED COUNT: 17.2 % (ref 11–15)
GFR SERPLBLD BASED ON 1.73 SQ M-ARVRAT: 13 ML/MIN/1.73M2 (ref 60–?)
GLUCOSE BLD-MCNC: 96 MG/DL (ref 70–99)
HCT VFR BLD AUTO: 27.3 %
HGB BLD-MCNC: 8.3 G/DL
IMM GRANULOCYTES # BLD AUTO: 0.07 X10(3) UL (ref 0–1)
IMM GRANULOCYTES NFR BLD: 0.8 %
LYMPHOCYTES # BLD AUTO: 0.42 X10(3) UL (ref 1–4)
LYMPHOCYTES NFR BLD AUTO: 4.8 %
MCH RBC QN AUTO: 31.6 PG (ref 26–34)
MCHC RBC AUTO-ENTMCNC: 30.4 G/DL (ref 31–37)
MCV RBC AUTO: 103.8 FL
MONOCYTES # BLD AUTO: 0.57 X10(3) UL (ref 0.1–1)
MONOCYTES NFR BLD AUTO: 6.5 %
NEUTROPHILS # BLD AUTO: 7.65 X10 (3) UL (ref 1.5–7.7)
NEUTROPHILS # BLD AUTO: 7.65 X10(3) UL (ref 1.5–7.7)
NEUTROPHILS NFR BLD AUTO: 86.6 %
OSMOLALITY SERPL CALC.SUM OF ELEC: 320 MOSM/KG (ref 275–295)
PLATELET # BLD AUTO: 182 10(3)UL (ref 150–450)
POTASSIUM SERPL-SCNC: 5.3 MMOL/L (ref 3.5–5.1)
RBC # BLD AUTO: 2.63 X10(6)UL
SODIUM SERPL-SCNC: 142 MMOL/L (ref 136–145)
WBC # BLD AUTO: 8.8 X10(3) UL (ref 4–11)

## 2022-11-26 PROCEDURE — 99233 SBSQ HOSP IP/OBS HIGH 50: CPT | Performed by: HOSPITALIST

## 2022-11-26 PROCEDURE — 99232 SBSQ HOSP IP/OBS MODERATE 35: CPT | Performed by: INTERNAL MEDICINE

## 2022-11-26 PROCEDURE — 71045 X-RAY EXAM CHEST 1 VIEW: CPT | Performed by: INTERNAL MEDICINE

## 2022-11-26 NOTE — PLAN OF CARE
Patient alert and oriented x4. Very hard of hearing. Right cochlear implant, Left hearing aid. Up with 1 assist with walker. General renal diet, tolerating well. Continuing IV antibiotics. Chest tube removed this AM. Remote tele. Voiding freely. Denies pain. Call light in reach. Frequent rounding performed. Problem: Patient Centered Care  Goal: Patient preferences are identified and integrated in the patient's plan of care  Description: Interventions:  - What would you like us to know as we care for you? From home with wife.    - Provide timely, complete, and accurate information to patient/family  - Incorporate patient and family knowledge, values, beliefs, and cultural backgrounds into the planning and delivery of care  - Encourage patient/family to participate in care and decision-making at the level they choose  - Honor patient and family perspectives and choices  Outcome: Not Progressing     Problem: Patient/Family Goals  Goal: Patient/Family Long Term Goal  Description: Patient's Long Term Goal: To manage condition, get better, and go home    Interventions:  -Monitor labs, results, and vitals  -Administer medication as prescribed and PRN  -Pain management  -Infection management and prevention  -Replace electrolyte per protocol  -I&Os  -Safety, diet, act mini, hygiene  -Wound care of incision  -Chest tube management and suction  -Monitor lung and kidney function  -Imaging/tests/procedures  -Consults  -Follow plan of care  -Monitor for worsening condition or new onset of symptoms  - See additional Care Plan goals for specific interventions  Outcome: Not Progressing  Goal: Patient/Family Short Term Goal  Description: Patient's Short Term Goal: Pain management    Interventions:   -Assess pain level  -Encourage pt to notify of increasing pain level  -Administer pain medication as prescribed and PRN  -Provide non-pharmacological intervention as needed  -Follow plan of care  - See additional Care Plan goals for specific interventions  Outcome: Not Progressing     Problem: RESPIRATORY - ADULT  Goal: Achieves optimal ventilation and oxygenation  Description: INTERVENTIONS:  - Assess for changes in respiratory status  - Assess for changes in mentation and behavior  - Position to facilitate oxygenation and minimize respiratory effort  - Oxygen supplementation based on oxygen saturation or ABGs  - Provide Smoking Cessation handout, if applicable  - Encourage broncho-pulmonary hygiene including cough, deep breathe, Incentive Spirometry  - Assess the need for suctioning and perform as needed  - Assess and instruct to report SOB or any respiratory difficulty  - Respiratory Therapy support as indicated  - Manage/alleviate anxiety  - Monitor for signs/symptoms of CO2 retention  Outcome: Not Progressing     Problem: CARDIOVASCULAR - ADULT  Goal: Maintains optimal cardiac output and hemodynamic stability  Description: INTERVENTIONS:  - Monitor vital signs, rhythm, and trends  - Monitor for bleeding, hypotension and signs of decreased cardiac output  - Evaluate effectiveness of vasoactive medications to optimize hemodynamic stability  - Monitor arterial and/or venous puncture sites for bleeding and/or hematoma  - Assess quality of pulses, skin color and temperature  - Assess for signs of decreased coronary artery perfusion - ex.  Angina  - Evaluate fluid balance, assess for edema, trend weights  Outcome: Not Progressing  Goal: Absence of cardiac arrhythmias or at baseline  Description: INTERVENTIONS:  - Continuous cardiac monitoring, monitor vital signs, obtain 12 lead EKG if indicated  - Evaluate effectiveness of antiarrhythmic and heart rate control medications as ordered  - Initiate emergency measures for life threatening arrhythmias  - Monitor electrolytes and administer replacement therapy as ordered  Outcome: Not Progressing     Problem: SAFETY ADULT - FALL  Goal: Free from fall injury  Description: INTERVENTIONS:  - Assess pt frequently for physical needs  - Identify cognitive and physical deficits and behaviors that affect risk of falls.   - [place] fall precautions as indicated by assessment.  - Educate pt/family on patient safety including physical limitations  - Instruct pt to call for assistance with activity based on assessment  - Modify environment to reduce risk of injury  - Provide assistive devices as appropriate  - Consider OT/PT consult to assist with strengthening/mobility  - Encourage toileting schedule  Outcome: Not Progressing     Problem: DISCHARGE PLANNING  Goal: Discharge to home or other facility with appropriate resources  Description: INTERVENTIONS:  - Identify barriers to discharge w/pt and caregiver  - Include patient/family/discharge partner in discharge planning  - Arrange for needed discharge resources and transportation as appropriate  - Identify discharge learning needs (meds, wound care, etc)  - Arrange for interpreters to assist at discharge as needed  - Consider post-discharge preferences of patient/family/discharge partner  - Complete POLST form as appropriate  - Assess patient's ability to be responsible for managing their own health  - Refer to Case Management Department for coordinating discharge planning if the patient needs post-hospital services based on physician/LIP order or complex needs related to functional status, cognitive ability or social support system  Outcome: Not Progressing     Problem: PAIN - ADULT  Goal: Verbalizes/displays adequate comfort level or patient's stated pain goal  Description: INTERVENTIONS:  - Encourage pt to monitor pain and request assistance  - Assess pain using appropriate pain scale  - Administer analgesics based on type and severity of pain and evaluate response  - Implement non-pharmacological measures as appropriate and evaluate response  - Consider cultural and social influences on pain and pain management  - Manage/alleviate anxiety  - Utilize distraction and/or relaxation techniques  - Monitor for opioid side effects  - Notify MD/LIP if interventions unsuccessful or patient reports new pain  - Anticipate increased pain with activity and pre-medicate as appropriate  Outcome: Not Progressing     Problem: RISK FOR INFECTION - ADULT  Goal: Absence of fever/infection during anticipated neutropenic period  Description: INTERVENTIONS  - Monitor WBC  - Administer growth factors as ordered  - Implement neutropenic guidelines  Outcome: Not Progressing     Problem: METABOLIC/FLUID AND ELECTROLYTES - ADULT  Goal: Electrolytes maintained within normal limits  Description: INTERVENTIONS:  - Monitor labs and rhythm and assess patient for signs and symptoms of electrolyte imbalances  - Administer electrolyte replacement as ordered  - Monitor response to electrolyte replacements, including rhythm and repeat lab results as appropriate  - Fluid restriction as ordered  - Instruct patient on fluid and nutrition restrictions as appropriate  Outcome: Not Progressing  Goal: Hemodynamic stability and optimal renal function maintained  Description: INTERVENTIONS:  - Monitor labs and assess for signs and symptoms of volume excess or deficit  - Monitor intake, output and patient weight  - Monitor urine specific gravity, serum osmolarity and serum sodium as indicated or ordered  - Monitor response to interventions for patient's volume status, including labs, urine output, blood pressure (other measures as available)  - Encourage oral intake as appropriate  - Instruct patient on fluid and nutrition restrictions as appropriate  Outcome: Not Progressing     Problem: SKIN/TISSUE INTEGRITY - ADULT  Goal: Skin integrity remains intact  Description: INTERVENTIONS  - Assess and document risk factors for pressure ulcer development  - Assess and document skin integrity  - Monitor for areas of redness and/or skin breakdown  - Initiate interventions, skin care algorithm/standards of care as needed  Outcome: Not Progressing  Goal: Incision(s), wounds(s) or drain site(s) healing without S/S of infection  Description: INTERVENTIONS:  - Assess and document risk factors for pressure ulcer development  - Assess and document skin integrity  - Assess and document dressing/incision, wound bed, drain sites and surrounding tissue  - Implement wound care per orders  - Initiate isolation precautions as appropriate  - Initiate Pressure Ulcer prevention bundle as indicated  Outcome: Not Progressing     Problem: HEMATOLOGIC - ADULT  Goal: Maintains hematologic stability  Description: INTERVENTIONS  - Assess for signs and symptoms of bleeding or hemorrhage  - Monitor labs and vital signs for trends  - Administer supportive blood products/factors, fluids and medications as ordered and appropriate  - Administer supportive blood products/factors as ordered and appropriate  Outcome: Not Progressing  Goal: Free from bleeding injury  Description: (Example usage: patient with low platelets)  INTERVENTIONS:  - Avoid intramuscular injections, enemas and rectal medication administration  - Ensure safe mobilization of patient  - Hold pressure on venipuncture sites to achieve adequate hemostasis  - Assess for signs and symptoms of internal bleeding  - Monitor lab trends  - Patient is to report abnormal signs of bleeding to staff  - Avoid use of toothpicks and dental floss  - Use electric shaver for shaving  - Use soft bristle tooth brush  - Limit straining and forceful nose blowing  Outcome: Not Progressing     Problem: MUSCULOSKELETAL - ADULT  Goal: Return mobility to safest level of function  Description: INTERVENTIONS:  - Assess patient stability and activity tolerance for standing, transferring and ambulating w/ or w/o assistive devices  - Assist with transfers and ambulation using safe patient handling equipment as needed  - Ensure adequate protection for wounds/incisions during mobilization  - Obtain PT/OT consults as needed  - Advance activity as appropriate  - Communicate ordered activity level and limitations with patient/family  Outcome: Not Progressing

## 2022-11-26 NOTE — PROGRESS NOTES
Patient received this am alert and oriented, drowsy. Chest tube in place. Continues with IV abx. Worked with PT. Up to chair. Incontinent of stool and urine at times but does void per urinal. Dressing to right side jaw changed. Tolerating diet. Bed locked and in lowest position, call light within reach, calling appropriately for assistance.

## 2022-11-26 NOTE — PLAN OF CARE
No acute changes overnight. Chest tube to -20 suction; serous output, 100cc total output. No complaints of pain overnight. IV abx as ordered. 1 BM overnight. Voiding freely. Problem: Patient Centered Care  Goal: Patient preferences are identified and integrated in the patient's plan of care  Description: Interventions:  - What would you like us to know as we care for you? From home with wife.    - Provide timely, complete, and accurate information to patient/family  - Incorporate patient and family knowledge, values, beliefs, and cultural backgrounds into the planning and delivery of care  - Encourage patient/family to participate in care and decision-making at the level they choose  - Honor patient and family perspectives and choices  Outcome: Progressing     Problem: Patient/Family Goals  Goal: Patient/Family Long Term Goal  Description: Patient's Long Term Goal: To manage condition, get better, and go home    Interventions:  -Monitor labs, results, and vitals  -Administer medication as prescribed and PRN  -Pain management  -Infection management and prevention  -Replace electrolyte per protocol  -I&Os  -Safety, diet, act mini, hygiene  -Wound care of incision  -Chest tube management and suction  -Monitor lung and kidney function  -Imaging/tests/procedures  -Consults  -Follow plan of care  -Monitor for worsening condition or new onset of symptoms  - See additional Care Plan goals for specific interventions  Outcome: Progressing  Goal: Patient/Family Short Term Goal  Description: Patient's Short Term Goal: Pain management    Interventions:   -Assess pain level  -Encourage pt to notify of increasing pain level  -Administer pain medication as prescribed and PRN  -Provide non-pharmacological intervention as needed  -Follow plan of care  - See additional Care Plan goals for specific interventions  Outcome: Progressing     Problem: RESPIRATORY - ADULT  Goal: Achieves optimal ventilation and oxygenation  Description: INTERVENTIONS:  - Assess for changes in respiratory status  - Assess for changes in mentation and behavior  - Position to facilitate oxygenation and minimize respiratory effort  - Oxygen supplementation based on oxygen saturation or ABGs  - Provide Smoking Cessation handout, if applicable  - Encourage broncho-pulmonary hygiene including cough, deep breathe, Incentive Spirometry  - Assess the need for suctioning and perform as needed  - Assess and instruct to report SOB or any respiratory difficulty  - Respiratory Therapy support as indicated  - Manage/alleviate anxiety  - Monitor for signs/symptoms of CO2 retention  Outcome: Progressing     Problem: CARDIOVASCULAR - ADULT  Goal: Maintains optimal cardiac output and hemodynamic stability  Description: INTERVENTIONS:  - Monitor vital signs, rhythm, and trends  - Monitor for bleeding, hypotension and signs of decreased cardiac output  - Evaluate effectiveness of vasoactive medications to optimize hemodynamic stability  - Monitor arterial and/or venous puncture sites for bleeding and/or hematoma  - Assess quality of pulses, skin color and temperature  - Assess for signs of decreased coronary artery perfusion - ex. Angina  - Evaluate fluid balance, assess for edema, trend weights  Outcome: Progressing  Goal: Absence of cardiac arrhythmias or at baseline  Description: INTERVENTIONS:  - Continuous cardiac monitoring, monitor vital signs, obtain 12 lead EKG if indicated  - Evaluate effectiveness of antiarrhythmic and heart rate control medications as ordered  - Initiate emergency measures for life threatening arrhythmias  - Monitor electrolytes and administer replacement therapy as ordered  Outcome: Progressing     Problem: SAFETY ADULT - FALL  Goal: Free from fall injury  Description: INTERVENTIONS:  - Assess pt frequently for physical needs  - Identify cognitive and physical deficits and behaviors that affect risk of falls.   - [place] fall precautions as indicated by assessment.  - Educate pt/family on patient safety including physical limitations  - Instruct pt to call for assistance with activity based on assessment  - Modify environment to reduce risk of injury  - Provide assistive devices as appropriate  - Consider OT/PT consult to assist with strengthening/mobility  - Encourage toileting schedule  Outcome: Progressing     Problem: DISCHARGE PLANNING  Goal: Discharge to home or other facility with appropriate resources  Description: INTERVENTIONS:  - Identify barriers to discharge w/pt and caregiver  - Include patient/family/discharge partner in discharge planning  - Arrange for needed discharge resources and transportation as appropriate  - Identify discharge learning needs (meds, wound care, etc)  - Arrange for interpreters to assist at discharge as needed  - Consider post-discharge preferences of patient/family/discharge partner  - Complete POLST form as appropriate  - Assess patient's ability to be responsible for managing their own health  - Refer to Case Management Department for coordinating discharge planning if the patient needs post-hospital services based on physician/LIP order or complex needs related to functional status, cognitive ability or social support system  Outcome: Progressing     Problem: PAIN - ADULT  Goal: Verbalizes/displays adequate comfort level or patient's stated pain goal  Description: INTERVENTIONS:  - Encourage pt to monitor pain and request assistance  - Assess pain using appropriate pain scale  - Administer analgesics based on type and severity of pain and evaluate response  - Implement non-pharmacological measures as appropriate and evaluate response  - Consider cultural and social influences on pain and pain management  - Manage/alleviate anxiety  - Utilize distraction and/or relaxation techniques  - Monitor for opioid side effects  - Notify MD/LIP if interventions unsuccessful or patient reports new pain  - Anticipate increased pain with activity and pre-medicate as appropriate  Outcome: Progressing     Problem: RISK FOR INFECTION - ADULT  Goal: Absence of fever/infection during anticipated neutropenic period  Description: INTERVENTIONS  - Monitor WBC  - Administer growth factors as ordered  - Implement neutropenic guidelines  Outcome: Progressing     Problem: METABOLIC/FLUID AND ELECTROLYTES - ADULT  Goal: Electrolytes maintained within normal limits  Description: INTERVENTIONS:  - Monitor labs and rhythm and assess patient for signs and symptoms of electrolyte imbalances  - Administer electrolyte replacement as ordered  - Monitor response to electrolyte replacements, including rhythm and repeat lab results as appropriate  - Fluid restriction as ordered  - Instruct patient on fluid and nutrition restrictions as appropriate  Outcome: Progressing  Goal: Hemodynamic stability and optimal renal function maintained  Description: INTERVENTIONS:  - Monitor labs and assess for signs and symptoms of volume excess or deficit  - Monitor intake, output and patient weight  - Monitor urine specific gravity, serum osmolarity and serum sodium as indicated or ordered  - Monitor response to interventions for patient's volume status, including labs, urine output, blood pressure (other measures as available)  - Encourage oral intake as appropriate  - Instruct patient on fluid and nutrition restrictions as appropriate  Outcome: Progressing     Problem: SKIN/TISSUE INTEGRITY - ADULT  Goal: Skin integrity remains intact  Description: INTERVENTIONS  - Assess and document risk factors for pressure ulcer development  - Assess and document skin integrity  - Monitor for areas of redness and/or skin breakdown  - Initiate interventions, skin care algorithm/standards of care as needed  Outcome: Progressing  Goal: Incision(s), wounds(s) or drain site(s) healing without S/S of infection  Description: INTERVENTIONS:  - Assess and document risk factors for pressure ulcer development  - Assess and document skin integrity  - Assess and document dressing/incision, wound bed, drain sites and surrounding tissue  - Implement wound care per orders  - Initiate isolation precautions as appropriate  - Initiate Pressure Ulcer prevention bundle as indicated  Outcome: Progressing     Problem: HEMATOLOGIC - ADULT  Goal: Maintains hematologic stability  Description: INTERVENTIONS  - Assess for signs and symptoms of bleeding or hemorrhage  - Monitor labs and vital signs for trends  - Administer supportive blood products/factors, fluids and medications as ordered and appropriate  - Administer supportive blood products/factors as ordered and appropriate  Outcome: Progressing  Goal: Free from bleeding injury  Description: (Example usage: patient with low platelets)  INTERVENTIONS:  - Avoid intramuscular injections, enemas and rectal medication administration  - Ensure safe mobilization of patient  - Hold pressure on venipuncture sites to achieve adequate hemostasis  - Assess for signs and symptoms of internal bleeding  - Monitor lab trends  - Patient is to report abnormal signs of bleeding to staff  - Avoid use of toothpicks and dental floss  - Use electric shaver for shaving  - Use soft bristle tooth brush  - Limit straining and forceful nose blowing  Outcome: Progressing     Problem: MUSCULOSKELETAL - ADULT  Goal: Return mobility to safest level of function  Description: INTERVENTIONS:  - Assess patient stability and activity tolerance for standing, transferring and ambulating w/ or w/o assistive devices  - Assist with transfers and ambulation using safe patient handling equipment as needed  - Ensure adequate protection for wounds/incisions during mobilization  - Obtain PT/OT consults as needed  - Advance activity as appropriate  - Communicate ordered activity level and limitations with patient/family  Outcome: Progressing

## 2022-11-27 ENCOUNTER — APPOINTMENT (OUTPATIENT)
Dept: GENERAL RADIOLOGY | Facility: HOSPITAL | Age: 77
End: 2022-11-27
Attending: INTERNAL MEDICINE
Payer: MEDICARE

## 2022-11-27 LAB
ANION GAP SERPL CALC-SCNC: 7 MMOL/L (ref 0–18)
BASOPHILS # BLD AUTO: 0.06 X10(3) UL (ref 0–0.2)
BASOPHILS NFR BLD AUTO: 0.9 %
BUN BLD-MCNC: 89 MG/DL (ref 7–18)
BUN/CREAT SERPL: 20.2 (ref 10–20)
CALCIUM BLD-MCNC: 7.4 MG/DL (ref 8.5–10.1)
CHLORIDE SERPL-SCNC: 108 MMOL/L (ref 98–112)
CO2 SERPL-SCNC: 23 MMOL/L (ref 21–32)
CREAT BLD-MCNC: 4.41 MG/DL
DEPRECATED RDW RBC AUTO: 63.1 FL (ref 35.1–46.3)
EOSINOPHIL # BLD AUTO: 0.12 X10(3) UL (ref 0–0.7)
EOSINOPHIL NFR BLD AUTO: 1.8 %
ERYTHROCYTE [DISTWIDTH] IN BLOOD BY AUTOMATED COUNT: 17.4 % (ref 11–15)
GFR SERPLBLD BASED ON 1.73 SQ M-ARVRAT: 13 ML/MIN/1.73M2 (ref 60–?)
GLUCOSE BLD-MCNC: 84 MG/DL (ref 70–99)
HCT VFR BLD AUTO: 26.6 %
HGB BLD-MCNC: 8.4 G/DL
IMM GRANULOCYTES # BLD AUTO: 0.06 X10(3) UL (ref 0–1)
IMM GRANULOCYTES NFR BLD: 0.9 %
LYMPHOCYTES # BLD AUTO: 0.39 X10(3) UL (ref 1–4)
LYMPHOCYTES NFR BLD AUTO: 6 %
MAGNESIUM SERPL-MCNC: 1.8 MG/DL (ref 1.6–2.6)
MCH RBC QN AUTO: 32.3 PG (ref 26–34)
MCHC RBC AUTO-ENTMCNC: 31.6 G/DL (ref 31–37)
MCV RBC AUTO: 102.3 FL
MONOCYTES # BLD AUTO: 0.53 X10(3) UL (ref 0.1–1)
MONOCYTES NFR BLD AUTO: 8.1 %
NEUTROPHILS # BLD AUTO: 5.38 X10 (3) UL (ref 1.5–7.7)
NEUTROPHILS # BLD AUTO: 5.38 X10(3) UL (ref 1.5–7.7)
NEUTROPHILS NFR BLD AUTO: 82.3 %
OSMOLALITY SERPL CALC.SUM OF ELEC: 312 MOSM/KG (ref 275–295)
PHOSPHATE SERPL-MCNC: 6.9 MG/DL (ref 2.5–4.9)
PLATELET # BLD AUTO: 164 10(3)UL (ref 150–450)
POTASSIUM SERPL-SCNC: 5.3 MMOL/L (ref 3.5–5.1)
RBC # BLD AUTO: 2.6 X10(6)UL
SODIUM SERPL-SCNC: 138 MMOL/L (ref 136–145)
WBC # BLD AUTO: 6.5 X10(3) UL (ref 4–11)

## 2022-11-27 PROCEDURE — 99233 SBSQ HOSP IP/OBS HIGH 50: CPT | Performed by: INTERNAL MEDICINE

## 2022-11-27 PROCEDURE — 99232 SBSQ HOSP IP/OBS MODERATE 35: CPT | Performed by: INTERNAL MEDICINE

## 2022-11-27 PROCEDURE — 71045 X-RAY EXAM CHEST 1 VIEW: CPT | Performed by: INTERNAL MEDICINE

## 2022-11-27 PROCEDURE — 99233 SBSQ HOSP IP/OBS HIGH 50: CPT | Performed by: HOSPITALIST

## 2022-11-27 RX ORDER — TRISODIUM CITRATE DIHYDRATE AND CITRIC ACID MONOHYDRATE 500; 334 MG/5ML; MG/5ML
30 SOLUTION ORAL DAILY
Status: DISCONTINUED | OUTPATIENT
Start: 2022-11-28 | End: 2022-11-28

## 2022-11-27 NOTE — CM/SW NOTE
Wife is still deciding on [facility] sites- prefers facility within the [facility] area since physicians are from [facility].  Wife would like to tour facilities before making decision. [facility] declined- CM requested both facilities reconsider as chest tube is now out. PLAN: DC planning is in process. / to remain available for support and/or discharge planning.      Deyanira SANCHEZN RN [address]  RN Case Manager  [phone]

## 2022-11-27 NOTE — PLAN OF CARE
Pt alert & oriented x4. Hard of hearing. Room air. Remote tele. Pain managed with morphine PRN. IV abx. Tolerating gen/renal diet. Voiding freely. Call light and personal belongings within reach. Safety precautions in place. Plan to discharge to sub acute rehab pending family's decision of place. Problem: Patient Centered Care  Goal: Patient preferences are identified and integrated in the patient's plan of care  Description: Interventions:  - What would you like us to know as we care for you? From home with wife.    - Provide timely, complete, and accurate information to patient/family  - Incorporate patient and family knowledge, values, beliefs, and cultural backgrounds into the planning and delivery of care  - Encourage patient/family to participate in care and decision-making at the level they choose  - Honor patient and family perspectives and choices  Outcome: Progressing     Problem: Patient/Family Goals  Goal: Patient/Family Long Term Goal  Description: Patient's Long Term Goal: To manage condition, get better, and go home    Interventions:  -Monitor labs, results, and vitals  -Administer medication as prescribed and PRN  -Pain management  -Infection management and prevention  -Replace electrolyte per protocol  -I&Os  -Safety, diet, act mini, hygiene  -Wound care of incision  -Chest tube management and suction  -Monitor lung and kidney function  -Imaging/tests/procedures  -Consults  -Follow plan of care  -Monitor for worsening condition or new onset of symptoms  - See additional Care Plan goals for specific interventions  Outcome: Progressing  Goal: Patient/Family Short Term Goal  Description: Patient's Short Term Goal: Pain management    Interventions:   -Assess pain level  -Encourage pt to notify of increasing pain level  -Administer pain medication as prescribed and PRN  -Provide non-pharmacological intervention as needed  -Follow plan of care  - See additional Care Plan goals for specific interventions  Outcome: Progressing     Problem: RESPIRATORY - ADULT  Goal: Achieves optimal ventilation and oxygenation  Description: INTERVENTIONS:  - Assess for changes in respiratory status  - Assess for changes in mentation and behavior  - Position to facilitate oxygenation and minimize respiratory effort  - Oxygen supplementation based on oxygen saturation or ABGs  - Provide Smoking Cessation handout, if applicable  - Encourage broncho-pulmonary hygiene including cough, deep breathe, Incentive Spirometry  - Assess the need for suctioning and perform as needed  - Assess and instruct to report SOB or any respiratory difficulty  - Respiratory Therapy support as indicated  - Manage/alleviate anxiety  - Monitor for signs/symptoms of CO2 retention  Outcome: Progressing     Problem: CARDIOVASCULAR - ADULT  Goal: Maintains optimal cardiac output and hemodynamic stability  Description: INTERVENTIONS:  - Monitor vital signs, rhythm, and trends  - Monitor for bleeding, hypotension and signs of decreased cardiac output  - Evaluate effectiveness of vasoactive medications to optimize hemodynamic stability  - Monitor arterial and/or venous puncture sites for bleeding and/or hematoma  - Assess quality of pulses, skin color and temperature  - Assess for signs of decreased coronary artery perfusion - ex.  Angina  - Evaluate fluid balance, assess for edema, trend weights  Outcome: Progressing  Goal: Absence of cardiac arrhythmias or at baseline  Description: INTERVENTIONS:  - Continuous cardiac monitoring, monitor vital signs, obtain 12 lead EKG if indicated  - Evaluate effectiveness of antiarrhythmic and heart rate control medications as ordered  - Initiate emergency measures for life threatening arrhythmias  - Monitor electrolytes and administer replacement therapy as ordered  Outcome: Progressing     Problem: SAFETY ADULT - FALL  Goal: Free from fall injury  Description: INTERVENTIONS:  - Assess pt frequently for physical needs  - Identify cognitive and physical deficits and behaviors that affect risk of falls.   - [place] fall precautions as indicated by assessment.  - Educate pt/family on patient safety including physical limitations  - Instruct pt to call for assistance with activity based on assessment  - Modify environment to reduce risk of injury  - Provide assistive devices as appropriate  - Consider OT/PT consult to assist with strengthening/mobility  - Encourage toileting schedule  Outcome: Progressing     Problem: DISCHARGE PLANNING  Goal: Discharge to home or other facility with appropriate resources  Description: INTERVENTIONS:  - Identify barriers to discharge w/pt and caregiver  - Include patient/family/discharge partner in discharge planning  - Arrange for needed discharge resources and transportation as appropriate  - Identify discharge learning needs (meds, wound care, etc)  - Arrange for interpreters to assist at discharge as needed  - Consider post-discharge preferences of patient/family/discharge partner  - Complete POLST form as appropriate  - Assess patient's ability to be responsible for managing their own health  - Refer to Case Management Department for coordinating discharge planning if the patient needs post-hospital services based on physician/LIP order or complex needs related to functional status, cognitive ability or social support system  Outcome: Progressing     Problem: PAIN - ADULT  Goal: Verbalizes/displays adequate comfort level or patient's stated pain goal  Description: INTERVENTIONS:  - Encourage pt to monitor pain and request assistance  - Assess pain using appropriate pain scale  - Administer analgesics based on type and severity of pain and evaluate response  - Implement non-pharmacological measures as appropriate and evaluate response  - Consider cultural and social influences on pain and pain management  - Manage/alleviate anxiety  - Utilize distraction and/or relaxation techniques  - Monitor for opioid side effects  - Notify MD/LIP if interventions unsuccessful or patient reports new pain  - Anticipate increased pain with activity and pre-medicate as appropriate  Outcome: Progressing     Problem: RISK FOR INFECTION - ADULT  Goal: Absence of fever/infection during anticipated neutropenic period  Description: INTERVENTIONS  - Monitor WBC  - Administer growth factors as ordered  - Implement neutropenic guidelines  Outcome: Progressing     Problem: METABOLIC/FLUID AND ELECTROLYTES - ADULT  Goal: Electrolytes maintained within normal limits  Description: INTERVENTIONS:  - Monitor labs and rhythm and assess patient for signs and symptoms of electrolyte imbalances  - Administer electrolyte replacement as ordered  - Monitor response to electrolyte replacements, including rhythm and repeat lab results as appropriate  - Fluid restriction as ordered  - Instruct patient on fluid and nutrition restrictions as appropriate  Outcome: Progressing  Goal: Hemodynamic stability and optimal renal function maintained  Description: INTERVENTIONS:  - Monitor labs and assess for signs and symptoms of volume excess or deficit  - Monitor intake, output and patient weight  - Monitor urine specific gravity, serum osmolarity and serum sodium as indicated or ordered  - Monitor response to interventions for patient's volume status, including labs, urine output, blood pressure (other measures as available)  - Encourage oral intake as appropriate  - Instruct patient on fluid and nutrition restrictions as appropriate  Outcome: Progressing     Problem: SKIN/TISSUE INTEGRITY - ADULT  Goal: Skin integrity remains intact  Description: INTERVENTIONS  - Assess and document risk factors for pressure ulcer development  - Assess and document skin integrity  - Monitor for areas of redness and/or skin breakdown  - Initiate interventions, skin care algorithm/standards of care as needed  Outcome: Progressing  Goal: Incision(s), wounds(s) or drain site(s) healing without S/S of infection  Description: INTERVENTIONS:  - Assess and document risk factors for pressure ulcer development  - Assess and document skin integrity  - Assess and document dressing/incision, wound bed, drain sites and surrounding tissue  - Implement wound care per orders  - Initiate isolation precautions as appropriate  - Initiate Pressure Ulcer prevention bundle as indicated  Outcome: Progressing     Problem: HEMATOLOGIC - ADULT  Goal: Maintains hematologic stability  Description: INTERVENTIONS  - Assess for signs and symptoms of bleeding or hemorrhage  - Monitor labs and vital signs for trends  - Administer supportive blood products/factors, fluids and medications as ordered and appropriate  - Administer supportive blood products/factors as ordered and appropriate  Outcome: Progressing  Goal: Free from bleeding injury  Description: (Example usage: patient with low platelets)  INTERVENTIONS:  - Avoid intramuscular injections, enemas and rectal medication administration  - Ensure safe mobilization of patient  - Hold pressure on venipuncture sites to achieve adequate hemostasis  - Assess for signs and symptoms of internal bleeding  - Monitor lab trends  - Patient is to report abnormal signs of bleeding to staff  - Avoid use of toothpicks and dental floss  - Use electric shaver for shaving  - Use soft bristle tooth brush  - Limit straining and forceful nose blowing  Outcome: Progressing     Problem: MUSCULOSKELETAL - ADULT  Goal: Return mobility to safest level of function  Description: INTERVENTIONS:  - Assess patient stability and activity tolerance for standing, transferring and ambulating w/ or w/o assistive devices  - Assist with transfers and ambulation using safe patient handling equipment as needed  - Ensure adequate protection for wounds/incisions during mobilization  - Obtain PT/OT consults as needed  - Advance activity as appropriate  - Communicate ordered activity level and limitations with patient/family  Outcome: Progressing

## 2022-11-27 NOTE — PLAN OF CARE
Patient alert and oriented x4. Very hard of hearing. Right cochlear implant, Left hearing aid. Up with 1 assist with walker. General renal diet, tolerating well. Continuing IV antibiotics. Remote tele. Voiding freely. Denies pain. Call light in reach. Frequent rounding performed. Problem: Patient Centered Care  Goal: Patient preferences are identified and integrated in the patient's plan of care  Description: Interventions:  - What would you like us to know as we care for you? From home with wife.    - Provide timely, complete, and accurate information to patient/family  - Incorporate patient and family knowledge, values, beliefs, and cultural backgrounds into the planning and delivery of care  - Encourage patient/family to participate in care and decision-making at the level they choose  - Honor patient and family perspectives and choices  Outcome: Not Progressing     Problem: Patient/Family Goals  Goal: Patient/Family Long Term Goal  Description: Patient's Long Term Goal: To manage condition, get better, and go home    Interventions:  -Monitor labs, results, and vitals  -Administer medication as prescribed and PRN  -Pain management  -Infection management and prevention  -Replace electrolyte per protocol  -I&Os  -Safety, diet, act mini, hygiene  -Wound care of incision  -Chest tube management and suction  -Monitor lung and kidney function  -Imaging/tests/procedures  -Consults  -Follow plan of care  -Monitor for worsening condition or new onset of symptoms  - See additional Care Plan goals for specific interventions  Outcome: Not Progressing  Goal: Patient/Family Short Term Goal  Description: Patient's Short Term Goal: Pain management    Interventions:   -Assess pain level  -Encourage pt to notify of increasing pain level  -Administer pain medication as prescribed and PRN  -Provide non-pharmacological intervention as needed  -Follow plan of care  - See additional Care Plan goals for specific interventions  Outcome: Not Progressing     Problem: RESPIRATORY - ADULT  Goal: Achieves optimal ventilation and oxygenation  Description: INTERVENTIONS:  - Assess for changes in respiratory status  - Assess for changes in mentation and behavior  - Position to facilitate oxygenation and minimize respiratory effort  - Oxygen supplementation based on oxygen saturation or ABGs  - Provide Smoking Cessation handout, if applicable  - Encourage broncho-pulmonary hygiene including cough, deep breathe, Incentive Spirometry  - Assess the need for suctioning and perform as needed  - Assess and instruct to report SOB or any respiratory difficulty  - Respiratory Therapy support as indicated  - Manage/alleviate anxiety  - Monitor for signs/symptoms of CO2 retention  Outcome: Not Progressing     Problem: CARDIOVASCULAR - ADULT  Goal: Maintains optimal cardiac output and hemodynamic stability  Description: INTERVENTIONS:  - Monitor vital signs, rhythm, and trends  - Monitor for bleeding, hypotension and signs of decreased cardiac output  - Evaluate effectiveness of vasoactive medications to optimize hemodynamic stability  - Monitor arterial and/or venous puncture sites for bleeding and/or hematoma  - Assess quality of pulses, skin color and temperature  - Assess for signs of decreased coronary artery perfusion - ex.  Angina  - Evaluate fluid balance, assess for edema, trend weights  Outcome: Not Progressing  Goal: Absence of cardiac arrhythmias or at baseline  Description: INTERVENTIONS:  - Continuous cardiac monitoring, monitor vital signs, obtain 12 lead EKG if indicated  - Evaluate effectiveness of antiarrhythmic and heart rate control medications as ordered  - Initiate emergency measures for life threatening arrhythmias  - Monitor electrolytes and administer replacement therapy as ordered  Outcome: Not Progressing     Problem: SAFETY ADULT - FALL  Goal: Free from fall injury  Description: INTERVENTIONS:  - Assess pt frequently for physical needs  - Identify cognitive and physical deficits and behaviors that affect risk of falls.   - [place] fall precautions as indicated by assessment.  - Educate pt/family on patient safety including physical limitations  - Instruct pt to call for assistance with activity based on assessment  - Modify environment to reduce risk of injury  - Provide assistive devices as appropriate  - Consider OT/PT consult to assist with strengthening/mobility  - Encourage toileting schedule  Outcome: Not Progressing     Problem: DISCHARGE PLANNING  Goal: Discharge to home or other facility with appropriate resources  Description: INTERVENTIONS:  - Identify barriers to discharge w/pt and caregiver  - Include patient/family/discharge partner in discharge planning  - Arrange for needed discharge resources and transportation as appropriate  - Identify discharge learning needs (meds, wound care, etc)  - Arrange for interpreters to assist at discharge as needed  - Consider post-discharge preferences of patient/family/discharge partner  - Complete POLST form as appropriate  - Assess patient's ability to be responsible for managing their own health  - Refer to Case Management Department for coordinating discharge planning if the patient needs post-hospital services based on physician/LIP order or complex needs related to functional status, cognitive ability or social support system  Outcome: Not Progressing     Problem: PAIN - ADULT  Goal: Verbalizes/displays adequate comfort level or patient's stated pain goal  Description: INTERVENTIONS:  - Encourage pt to monitor pain and request assistance  - Assess pain using appropriate pain scale  - Administer analgesics based on type and severity of pain and evaluate response  - Implement non-pharmacological measures as appropriate and evaluate response  - Consider cultural and social influences on pain and pain management  - Manage/alleviate anxiety  - Utilize distraction and/or relaxation techniques  - Monitor for opioid side effects  - Notify MD/LIP if interventions unsuccessful or patient reports new pain  - Anticipate increased pain with activity and pre-medicate as appropriate  Outcome: Not Progressing     Problem: RISK FOR INFECTION - ADULT  Goal: Absence of fever/infection during anticipated neutropenic period  Description: INTERVENTIONS  - Monitor WBC  - Administer growth factors as ordered  - Implement neutropenic guidelines  Outcome: Not Progressing     Problem: METABOLIC/FLUID AND ELECTROLYTES - ADULT  Goal: Electrolytes maintained within normal limits  Description: INTERVENTIONS:  - Monitor labs and rhythm and assess patient for signs and symptoms of electrolyte imbalances  - Administer electrolyte replacement as ordered  - Monitor response to electrolyte replacements, including rhythm and repeat lab results as appropriate  - Fluid restriction as ordered  - Instruct patient on fluid and nutrition restrictions as appropriate  Outcome: Not Progressing  Goal: Hemodynamic stability and optimal renal function maintained  Description: INTERVENTIONS:  - Monitor labs and assess for signs and symptoms of volume excess or deficit  - Monitor intake, output and patient weight  - Monitor urine specific gravity, serum osmolarity and serum sodium as indicated or ordered  - Monitor response to interventions for patient's volume status, including labs, urine output, blood pressure (other measures as available)  - Encourage oral intake as appropriate  - Instruct patient on fluid and nutrition restrictions as appropriate  Outcome: Not Progressing     Problem: SKIN/TISSUE INTEGRITY - ADULT  Goal: Skin integrity remains intact  Description: INTERVENTIONS  - Assess and document risk factors for pressure ulcer development  - Assess and document skin integrity  - Monitor for areas of redness and/or skin breakdown  - Initiate interventions, skin care algorithm/standards of care as needed  Outcome: Not Progressing  Goal: Incision(s), wounds(s) or drain site(s) healing without S/S of infection  Description: INTERVENTIONS:  - Assess and document risk factors for pressure ulcer development  - Assess and document skin integrity  - Assess and document dressing/incision, wound bed, drain sites and surrounding tissue  - Implement wound care per orders  - Initiate isolation precautions as appropriate  - Initiate Pressure Ulcer prevention bundle as indicated  Outcome: Not Progressing     Problem: HEMATOLOGIC - ADULT  Goal: Maintains hematologic stability  Description: INTERVENTIONS  - Assess for signs and symptoms of bleeding or hemorrhage  - Monitor labs and vital signs for trends  - Administer supportive blood products/factors, fluids and medications as ordered and appropriate  - Administer supportive blood products/factors as ordered and appropriate  Outcome: Not Progressing  Goal: Free from bleeding injury  Description: (Example usage: patient with low platelets)  INTERVENTIONS:  - Avoid intramuscular injections, enemas and rectal medication administration  - Ensure safe mobilization of patient  - Hold pressure on venipuncture sites to achieve adequate hemostasis  - Assess for signs and symptoms of internal bleeding  - Monitor lab trends  - Patient is to report abnormal signs of bleeding to staff  - Avoid use of toothpicks and dental floss  - Use electric shaver for shaving  - Use soft bristle tooth brush  - Limit straining and forceful nose blowing  Outcome: Not Progressing     Problem: MUSCULOSKELETAL - ADULT  Goal: Return mobility to safest level of function  Description: INTERVENTIONS:  - Assess patient stability and activity tolerance for standing, transferring and ambulating w/ or w/o assistive devices  - Assist with transfers and ambulation using safe patient handling equipment as needed  - Ensure adequate protection for wounds/incisions during mobilization  - Obtain PT/OT consults as needed  - Advance activity as appropriate  - Communicate ordered activity level and limitations with patient/family  Outcome: Not Progressing

## 2022-11-28 VITALS
BODY MASS INDEX: 24.38 KG/M2 | WEIGHT: 146.31 LBS | SYSTOLIC BLOOD PRESSURE: 125 MMHG | TEMPERATURE: 99 F | RESPIRATION RATE: 18 BRPM | OXYGEN SATURATION: 93 % | HEIGHT: 65 IN | HEART RATE: 77 BPM | DIASTOLIC BLOOD PRESSURE: 67 MMHG

## 2022-11-28 LAB
ANION GAP SERPL CALC-SCNC: 8 MMOL/L (ref 0–18)
BASOPHILS # BLD AUTO: 0.05 X10(3) UL (ref 0–0.2)
BASOPHILS NFR BLD AUTO: 0.8 %
BUN BLD-MCNC: 84 MG/DL (ref 7–18)
BUN/CREAT SERPL: 19.2 (ref 10–20)
CALCIUM BLD-MCNC: 7 MG/DL (ref 8.5–10.1)
CHLORIDE SERPL-SCNC: 106 MMOL/L (ref 98–112)
CO2 SERPL-SCNC: 24 MMOL/L (ref 21–32)
CREAT BLD-MCNC: 4.37 MG/DL
DEPRECATED RDW RBC AUTO: 64.5 FL (ref 35.1–46.3)
EOSINOPHIL # BLD AUTO: 0.12 X10(3) UL (ref 0–0.7)
EOSINOPHIL NFR BLD AUTO: 1.8 %
ERYTHROCYTE [DISTWIDTH] IN BLOOD BY AUTOMATED COUNT: 17.2 % (ref 11–15)
GFR SERPLBLD BASED ON 1.73 SQ M-ARVRAT: 13 ML/MIN/1.73M2 (ref 60–?)
GLUCOSE BLD-MCNC: 84 MG/DL (ref 70–99)
HCT VFR BLD AUTO: 28.1 %
HGB BLD-MCNC: 8.6 G/DL
IMM GRANULOCYTES # BLD AUTO: 0.06 X10(3) UL (ref 0–1)
IMM GRANULOCYTES NFR BLD: 0.9 %
LYMPHOCYTES # BLD AUTO: 0.38 X10(3) UL (ref 1–4)
LYMPHOCYTES NFR BLD AUTO: 5.8 %
MAGNESIUM SERPL-MCNC: 1.7 MG/DL (ref 1.6–2.6)
MCH RBC QN AUTO: 31.7 PG (ref 26–34)
MCHC RBC AUTO-ENTMCNC: 30.6 G/DL (ref 31–37)
MCV RBC AUTO: 103.7 FL
MONOCYTES # BLD AUTO: 0.5 X10(3) UL (ref 0.1–1)
MONOCYTES NFR BLD AUTO: 7.7 %
NEUTROPHILS # BLD AUTO: 5.4 X10 (3) UL (ref 1.5–7.7)
NEUTROPHILS # BLD AUTO: 5.4 X10(3) UL (ref 1.5–7.7)
NEUTROPHILS NFR BLD AUTO: 83 %
OSMOLALITY SERPL CALC.SUM OF ELEC: 311 MOSM/KG (ref 275–295)
PHOSPHATE SERPL-MCNC: 6.5 MG/DL (ref 2.5–4.9)
PLATELET # BLD AUTO: 189 10(3)UL (ref 150–450)
POTASSIUM SERPL-SCNC: 5.2 MMOL/L (ref 3.5–5.1)
RBC # BLD AUTO: 2.71 X10(6)UL
SARS-COV-2 RNA RESP QL NAA+PROBE: NOT DETECTED
SODIUM SERPL-SCNC: 138 MMOL/L (ref 136–145)
WBC # BLD AUTO: 6.5 X10(3) UL (ref 4–11)

## 2022-11-28 PROCEDURE — 99232 SBSQ HOSP IP/OBS MODERATE 35: CPT | Performed by: INTERNAL MEDICINE

## 2022-11-28 PROCEDURE — 99233 SBSQ HOSP IP/OBS HIGH 50: CPT | Performed by: INTERNAL MEDICINE

## 2022-11-28 PROCEDURE — 99239 HOSP IP/OBS DSCHRG MGMT >30: CPT | Performed by: HOSPITALIST

## 2022-11-28 RX ORDER — VANCOMYCIN HYDROCHLORIDE 125 MG/1
125 CAPSULE ORAL 2 TIMES DAILY
Qty: 20 CAPSULE | Refills: 0 | Status: SHIPPED | OUTPATIENT
Start: 2022-11-28

## 2022-11-28 RX ORDER — TRISODIUM CITRATE DIHYDRATE AND CITRIC ACID MONOHYDRATE 500; 334 MG/5ML; MG/5ML
30 SOLUTION ORAL DAILY
Qty: 1800 ML | Refills: 0 | Status: SHIPPED | OUTPATIENT
Start: 2022-11-29

## 2022-11-28 RX ORDER — CALCITRIOL 0.25 UG/1
0.25 CAPSULE, LIQUID FILLED ORAL DAILY
Status: DISCONTINUED | OUTPATIENT
Start: 2022-11-29 | End: 2022-11-28

## 2022-11-28 RX ORDER — PANTOPRAZOLE SODIUM 40 MG/1
40 TABLET, DELAYED RELEASE ORAL
Qty: 30 TABLET | Refills: 0 | Status: SHIPPED | OUTPATIENT
Start: 2022-11-29

## 2022-11-28 RX ORDER — FUROSEMIDE 40 MG/1
40 TABLET ORAL ONCE
Status: COMPLETED | OUTPATIENT
Start: 2022-11-28 | End: 2022-11-28

## 2022-11-28 RX ORDER — GARLIC EXTRACT 500 MG
1 CAPSULE ORAL 2 TIMES DAILY
Qty: 10 CAPSULE | Refills: 0 | Status: SHIPPED | OUTPATIENT
Start: 2022-11-28

## 2022-11-28 RX ORDER — CALCITRIOL 0.25 UG/1
0.25 CAPSULE, LIQUID FILLED ORAL DAILY
Qty: 10 CAPSULE | Refills: 0 | Status: SHIPPED | OUTPATIENT
Start: 2022-11-29

## 2022-11-28 RX ORDER — ALPRAZOLAM 0.5 MG/1
0.5 TABLET ORAL 2 TIMES DAILY PRN
Qty: 10 TABLET | Refills: 1 | Status: SHIPPED | OUTPATIENT
Start: 2022-11-28 | End: 2022-12-08

## 2022-11-28 RX ORDER — LEVOFLOXACIN 250 MG/1
TABLET ORAL
Qty: 7 TABLET | Refills: 0 | Status: SHIPPED | OUTPATIENT
Start: 2022-11-28

## 2022-11-28 RX ORDER — SEVELAMER CARBONATE 800 MG/1
1600 TABLET, FILM COATED ORAL
Qty: 10 TABLET | Refills: 0 | Status: SHIPPED | OUTPATIENT
Start: 2022-11-28

## 2022-11-28 RX ORDER — PREDNISONE 2.5 MG
2.5 TABLET ORAL
Qty: 7 TABLET | Refills: 0 | Status: SHIPPED | OUTPATIENT
Start: 2022-11-29

## 2022-11-28 RX ORDER — PREDNISONE 2.5 MG
2.5 TABLET ORAL
Status: DISCONTINUED | OUTPATIENT
Start: 2022-11-29 | End: 2022-11-28

## 2022-11-28 NOTE — CM/SW NOTE
JASMIN notified by [facility] and [facility] that they are able to accept patient. JASMIN called and LVM for wife, Nona Nose to notify. Requested a call back with chosen facility. 248pm  Met with patient and spouse, Polly at bedside. Polly prefers [facility]. Patient and wife aware that patient will DC to [facility] today if bed avail and medically cleared. SW reserved [facility], requested bed avail. 302pm   Per [facility], bed is avail. Will need rapid covid test. JASMIN notified MD and RD, pending final med clear. PLAN: [facility] [address] eta is 7pm. PCS DONE. RN # for report is Phone: [phone]    * Need rapid covid test resulted prior to DC.      KARL Mccauley, [facility]    [id number]

## 2022-11-28 NOTE — PLAN OF CARE
AxO4. Room air. Pt was groaning / expressing signs of discomfort near insertion site of where his chest tube used to be. Pt complains of itchy and sore bumps on his sacrum - aloe vesta applied. Pt had 11 beats of SVT last night - MD notified. Port in place. One incontinent episode last night- incontinence care provided. All safety precautions in place, call light within reach, and frequent rounding by nursing staff. Problem: Patient Centered Care  Goal: Patient preferences are identified and integrated in the patient's plan of care  Description: Interventions:  - What would you like us to know as we care for you? From home with wife.    - Provide timely, complete, and accurate information to patient/family  - Incorporate patient and family knowledge, values, beliefs, and cultural backgrounds into the planning and delivery of care  - Encourage patient/family to participate in care and decision-making at the level they choose  - Honor patient and family perspectives and choices  Outcome: Progressing     Problem: Patient/Family Goals  Goal: Patient/Family Long Term Goal  Description: Patient's Long Term Goal: To manage condition, get better, and go home    Interventions:  -Monitor labs, results, and vitals  -Administer medication as prescribed and PRN  -Pain management  -Infection management and prevention  -Replace electrolyte per protocol  -I&Os  -Safety, diet, act mini, hygiene  -Wound care of incision  -Chest tube management and suction  -Monitor lung and kidney function  -Imaging/tests/procedures  -Consults  -Follow plan of care  -Monitor for worsening condition or new onset of symptoms  - See additional Care Plan goals for specific interventions  Outcome: Progressing  Goal: Patient/Family Short Term Goal  Description: Patient's Short Term Goal: Pain management    Interventions:   -Assess pain level  -Encourage pt to notify of increasing pain level  -Administer pain medication as prescribed and PRN  -Provide non-pharmacological intervention as needed  -Follow plan of care  - See additional Care Plan goals for specific interventions  Outcome: Progressing     Problem: RESPIRATORY - ADULT  Goal: Achieves optimal ventilation and oxygenation  Description: INTERVENTIONS:  - Assess for changes in respiratory status  - Assess for changes in mentation and behavior  - Position to facilitate oxygenation and minimize respiratory effort  - Oxygen supplementation based on oxygen saturation or ABGs  - Provide Smoking Cessation handout, if applicable  - Encourage broncho-pulmonary hygiene including cough, deep breathe, Incentive Spirometry  - Assess the need for suctioning and perform as needed  - Assess and instruct to report SOB or any respiratory difficulty  - Respiratory Therapy support as indicated  - Manage/alleviate anxiety  - Monitor for signs/symptoms of CO2 retention  Outcome: Progressing     Problem: CARDIOVASCULAR - ADULT  Goal: Maintains optimal cardiac output and hemodynamic stability  Description: INTERVENTIONS:  - Monitor vital signs, rhythm, and trends  - Monitor for bleeding, hypotension and signs of decreased cardiac output  - Evaluate effectiveness of vasoactive medications to optimize hemodynamic stability  - Monitor arterial and/or venous puncture sites for bleeding and/or hematoma  - Assess quality of pulses, skin color and temperature  - Assess for signs of decreased coronary artery perfusion - ex.  Angina  - Evaluate fluid balance, assess for edema, trend weights  Outcome: Progressing  Goal: Absence of cardiac arrhythmias or at baseline  Description: INTERVENTIONS:  - Continuous cardiac monitoring, monitor vital signs, obtain 12 lead EKG if indicated  - Evaluate effectiveness of antiarrhythmic and heart rate control medications as ordered  - Initiate emergency measures for life threatening arrhythmias  - Monitor electrolytes and administer replacement therapy as ordered  Outcome: Progressing Problem: SAFETY ADULT - FALL  Goal: Free from fall injury  Description: INTERVENTIONS:  - Assess pt frequently for physical needs  - Identify cognitive and physical deficits and behaviors that affect risk of falls.   - [place] fall precautions as indicated by assessment.  - Educate pt/family on patient safety including physical limitations  - Instruct pt to call for assistance with activity based on assessment  - Modify environment to reduce risk of injury  - Provide assistive devices as appropriate  - Consider OT/PT consult to assist with strengthening/mobility  - Encourage toileting schedule  Outcome: Progressing     Problem: DISCHARGE PLANNING  Goal: Discharge to home or other facility with appropriate resources  Description: INTERVENTIONS:  - Identify barriers to discharge w/pt and caregiver  - Include patient/family/discharge partner in discharge planning  - Arrange for needed discharge resources and transportation as appropriate  - Identify discharge learning needs (meds, wound care, etc)  - Arrange for interpreters to assist at discharge as needed  - Consider post-discharge preferences of patient/family/discharge partner  - Complete POLST form as appropriate  - Assess patient's ability to be responsible for managing their own health  - Refer to Case Management Department for coordinating discharge planning if the patient needs post-hospital services based on physician/LIP order or complex needs related to functional status, cognitive ability or social support system  Outcome: Progressing     Problem: PAIN - ADULT  Goal: Verbalizes/displays adequate comfort level or patient's stated pain goal  Description: INTERVENTIONS:  - Encourage pt to monitor pain and request assistance  - Assess pain using appropriate pain scale  - Administer analgesics based on type and severity of pain and evaluate response  - Implement non-pharmacological measures as appropriate and evaluate response  - Consider cultural and social influences on pain and pain management  - Manage/alleviate anxiety  - Utilize distraction and/or relaxation techniques  - Monitor for opioid side effects  - Notify MD/LIP if interventions unsuccessful or patient reports new pain  - Anticipate increased pain with activity and pre-medicate as appropriate  Outcome: Progressing     Problem: RISK FOR INFECTION - ADULT  Goal: Absence of fever/infection during anticipated neutropenic period  Description: INTERVENTIONS  - Monitor WBC  - Administer growth factors as ordered  - Implement neutropenic guidelines  Outcome: Progressing     Problem: METABOLIC/FLUID AND ELECTROLYTES - ADULT  Goal: Electrolytes maintained within normal limits  Description: INTERVENTIONS:  - Monitor labs and rhythm and assess patient for signs and symptoms of electrolyte imbalances  - Administer electrolyte replacement as ordered  - Monitor response to electrolyte replacements, including rhythm and repeat lab results as appropriate  - Fluid restriction as ordered  - Instruct patient on fluid and nutrition restrictions as appropriate  Outcome: Progressing  Goal: Hemodynamic stability and optimal renal function maintained  Description: INTERVENTIONS:  - Monitor labs and assess for signs and symptoms of volume excess or deficit  - Monitor intake, output and patient weight  - Monitor urine specific gravity, serum osmolarity and serum sodium as indicated or ordered  - Monitor response to interventions for patient's volume status, including labs, urine output, blood pressure (other measures as available)  - Encourage oral intake as appropriate  - Instruct patient on fluid and nutrition restrictions as appropriate  Outcome: Progressing     Problem: SKIN/TISSUE INTEGRITY - ADULT  Goal: Skin integrity remains intact  Description: INTERVENTIONS  - Assess and document risk factors for pressure ulcer development  - Assess and document skin integrity  - Monitor for areas of redness and/or skin breakdown  - Initiate interventions, skin care algorithm/standards of care as needed  Outcome: Progressing  Goal: Incision(s), wounds(s) or drain site(s) healing without S/S of infection  Description: INTERVENTIONS:  - Assess and document risk factors for pressure ulcer development  - Assess and document skin integrity  - Assess and document dressing/incision, wound bed, drain sites and surrounding tissue  - Implement wound care per orders  - Initiate isolation precautions as appropriate  - Initiate Pressure Ulcer prevention bundle as indicated  Outcome: Progressing     Problem: HEMATOLOGIC - ADULT  Goal: Maintains hematologic stability  Description: INTERVENTIONS  - Assess for signs and symptoms of bleeding or hemorrhage  - Monitor labs and vital signs for trends  - Administer supportive blood products/factors, fluids and medications as ordered and appropriate  - Administer supportive blood products/factors as ordered and appropriate  Outcome: Progressing  Goal: Free from bleeding injury  Description: (Example usage: patient with low platelets)  INTERVENTIONS:  - Avoid intramuscular injections, enemas and rectal medication administration  - Ensure safe mobilization of patient  - Hold pressure on venipuncture sites to achieve adequate hemostasis  - Assess for signs and symptoms of internal bleeding  - Monitor lab trends  - Patient is to report abnormal signs of bleeding to staff  - Avoid use of toothpicks and dental floss  - Use electric shaver for shaving  - Use soft bristle tooth brush  - Limit straining and forceful nose blowing  Outcome: Progressing     Problem: MUSCULOSKELETAL - ADULT  Goal: Return mobility to safest level of function  Description: INTERVENTIONS:  - Assess patient stability and activity tolerance for standing, transferring and ambulating w/ or w/o assistive devices  - Assist with transfers and ambulation using safe patient handling equipment as needed  - Ensure adequate protection for wounds/incisions during mobilization  - Obtain PT/OT consults as needed  - Advance activity as appropriate  - Communicate ordered activity level and limitations with patient/family  Outcome: Progressing

## 2022-11-29 ENCOUNTER — TELEPHONE (OUTPATIENT)
Dept: NEPHROLOGY | Facility: CLINIC | Age: 77
End: 2022-11-29

## 2022-11-29 ENCOUNTER — PATIENT OUTREACH (OUTPATIENT)
Dept: CASE MANAGEMENT | Age: 77
End: 2022-11-29

## 2022-11-29 DIAGNOSIS — C79.31 BRAIN METASTASIS (HCC): ICD-10-CM

## 2022-11-29 DIAGNOSIS — N10 AIN (ACUTE INTERSTITIAL NEPHRITIS): ICD-10-CM

## 2022-11-29 DIAGNOSIS — J18.9 MULTIFOCAL PNEUMONIA: ICD-10-CM

## 2022-11-29 DIAGNOSIS — E87.0 HYPERNATREMIA: ICD-10-CM

## 2022-11-29 DIAGNOSIS — D64.9 ANEMIA, UNSPECIFIED TYPE: ICD-10-CM

## 2022-11-29 DIAGNOSIS — R06.03 RESPIRATORY DISTRESS: ICD-10-CM

## 2022-11-29 DIAGNOSIS — C34.12 PRIMARY CANCER OF LEFT UPPER LOBE OF LUNG (HCC): ICD-10-CM

## 2022-11-29 DIAGNOSIS — I31.31 MALIGNANT PERICARDIAL EFFUSION: ICD-10-CM

## 2022-11-29 DIAGNOSIS — J91.0 MALIGNANT PLEURAL EFFUSION: ICD-10-CM

## 2022-11-29 DIAGNOSIS — J96.01 ACUTE RESPIRATORY FAILURE WITH HYPOXIA (HCC): ICD-10-CM

## 2022-11-29 DIAGNOSIS — R53.1 WEAKNESS: ICD-10-CM

## 2022-11-29 DIAGNOSIS — C7B.8 NEUROENDOCRINE CARCINOMA METASTATIC TO INTRA-ABDOMINAL LYMPH NODE (HCC): ICD-10-CM

## 2022-11-29 DIAGNOSIS — G89.3 CANCER RELATED PAIN: ICD-10-CM

## 2022-11-29 DIAGNOSIS — E83.39 HYPERPHOSPHATEMIA: ICD-10-CM

## 2022-11-29 DIAGNOSIS — J86.9 EMPYEMA LUNG (HCC): ICD-10-CM

## 2022-11-29 DIAGNOSIS — C79.51 BONE METASTASIS (HCC): ICD-10-CM

## 2022-11-29 DIAGNOSIS — C78.7 LIVER METASTASIS (HCC): ICD-10-CM

## 2022-11-29 DIAGNOSIS — D70.9 NEUTROPENIA, UNSPECIFIED TYPE (HCC): ICD-10-CM

## 2022-11-29 DIAGNOSIS — L89.524 PRESSURE INJURY OF LEFT ANKLE, STAGE 4 (HCC): ICD-10-CM

## 2022-11-29 DIAGNOSIS — N18.9 CHRONIC KIDNEY DISEASE, UNSPECIFIED CKD STAGE: ICD-10-CM

## 2022-11-29 DIAGNOSIS — E87.5 HYPERKALEMIA: ICD-10-CM

## 2022-11-29 DIAGNOSIS — E87.20 METABOLIC ACIDOSIS: ICD-10-CM

## 2022-11-29 DIAGNOSIS — Z29.8 ENCOUNTER FOR IMMUNOTHERAPY: ICD-10-CM

## 2022-11-29 DIAGNOSIS — N05.9 NEPHRITIS: ICD-10-CM

## 2022-11-29 DIAGNOSIS — N18.5 CKD (CHRONIC KIDNEY DISEASE) STAGE 5, GFR LESS THAN 15 ML/MIN (HCC): ICD-10-CM

## 2022-11-29 DIAGNOSIS — J94.8 HYDROPNEUMOTHORAX: ICD-10-CM

## 2022-11-29 DIAGNOSIS — Z51.11 CHEMOTHERAPY MANAGEMENT, ENCOUNTER FOR: ICD-10-CM

## 2022-11-29 DIAGNOSIS — R19.7 DIARRHEA, UNSPECIFIED TYPE: ICD-10-CM

## 2022-11-29 DIAGNOSIS — N25.81 SECONDARY HYPERPARATHYROIDISM (HCC): ICD-10-CM

## 2022-11-29 DIAGNOSIS — C7A.8 NEUROENDOCRINE CARCINOMA METASTATIC TO INTRA-ABDOMINAL LYMPH NODE (HCC): ICD-10-CM

## 2022-11-29 DIAGNOSIS — K63.89 MESENTERIC MASS: ICD-10-CM

## 2022-11-29 DIAGNOSIS — N17.9 ACUTE RENAL INJURY (HCC): ICD-10-CM

## 2022-11-29 DIAGNOSIS — R09.02 HYPOXIA: ICD-10-CM

## 2022-11-29 NOTE — PROGRESS NOTES
Patient received A&OX3, VSS. He is [place] and has hearing aids, bilateral, with batteries provided by wife. He denies shortness of breath, chest pain. Patient left today for [facility]. Scripts sent to [facility]. Report called to [facility]. Nephrology to follow up. Dr Darvin Wilkes to follow up in office (after rehab).

## 2022-11-30 PROCEDURE — 99490 CHRNC CARE MGMT STAFF 1ST 20: CPT

## 2022-12-01 ENCOUNTER — NURSE ONLY (OUTPATIENT)
Dept: HEMATOLOGY/ONCOLOGY | Facility: HOSPITAL | Age: 77
End: 2022-12-01
Attending: INTERNAL MEDICINE
Payer: MEDICARE

## 2022-12-01 ENCOUNTER — OFFICE VISIT (OUTPATIENT)
Dept: HEMATOLOGY/ONCOLOGY | Facility: HOSPITAL | Age: 77
End: 2022-12-01
Attending: INTERNAL MEDICINE

## 2022-12-01 ENCOUNTER — PATIENT MESSAGE (OUTPATIENT)
Dept: NEPHROLOGY | Facility: CLINIC | Age: 77
End: 2022-12-01

## 2022-12-01 VITALS
WEIGHT: 151 LBS | SYSTOLIC BLOOD PRESSURE: 111 MMHG | HEIGHT: 69 IN | DIASTOLIC BLOOD PRESSURE: 48 MMHG | BODY MASS INDEX: 22.36 KG/M2 | RESPIRATION RATE: 18 BRPM | TEMPERATURE: 98 F | OXYGEN SATURATION: 96 % | HEART RATE: 62 BPM

## 2022-12-01 DIAGNOSIS — Z51.81 MEDICATION MONITORING ENCOUNTER: ICD-10-CM

## 2022-12-01 DIAGNOSIS — N18.5 CHRONIC KIDNEY DISEASE (CKD), STAGE V (HCC): ICD-10-CM

## 2022-12-01 DIAGNOSIS — C78.7 LIVER METASTASIS (HCC): ICD-10-CM

## 2022-12-01 DIAGNOSIS — C7B.8 NEUROENDOCRINE CARCINOMA METASTATIC TO INTRA-ABDOMINAL LYMPH NODE (HCC): Primary | ICD-10-CM

## 2022-12-01 DIAGNOSIS — C7A.8 NEUROENDOCRINE CARCINOMA METASTATIC TO INTRA-ABDOMINAL LYMPH NODE (HCC): Primary | ICD-10-CM

## 2022-12-01 DIAGNOSIS — C79.51 BONE METASTASIS (HCC): ICD-10-CM

## 2022-12-01 DIAGNOSIS — C34.12 PRIMARY CANCER OF LEFT UPPER LOBE OF LUNG (HCC): ICD-10-CM

## 2022-12-01 DIAGNOSIS — C79.31 BRAIN METASTASIS (HCC): ICD-10-CM

## 2022-12-01 DIAGNOSIS — E83.42 HYPOMAGNESEMIA: ICD-10-CM

## 2022-12-01 DIAGNOSIS — Z45.2 ENCOUNTER FOR CENTRAL LINE CARE: ICD-10-CM

## 2022-12-01 LAB
ALBUMIN SERPL-MCNC: 2.2 G/DL (ref 3.4–5)
ALBUMIN/GLOB SERPL: 0.6 {RATIO} (ref 1–2)
ALP LIVER SERPL-CCNC: 47 U/L
ALT SERPL-CCNC: 14 U/L
ANION GAP SERPL CALC-SCNC: 10 MMOL/L (ref 0–18)
ANTIBODY SCREEN: NEGATIVE
AST SERPL-CCNC: 11 U/L (ref 15–37)
BASOPHILS # BLD AUTO: 0.04 X10(3) UL (ref 0–0.2)
BASOPHILS NFR BLD AUTO: 0.6 %
BILIRUB SERPL-MCNC: 0.4 MG/DL (ref 0.1–2)
BUN BLD-MCNC: 84 MG/DL (ref 7–18)
BUN/CREAT SERPL: 16.1 (ref 10–20)
CALCIUM BLD-MCNC: 7.2 MG/DL (ref 8.5–10.1)
CHLORIDE SERPL-SCNC: 109 MMOL/L (ref 98–112)
CO2 SERPL-SCNC: 20 MMOL/L (ref 21–32)
CREAT BLD-MCNC: 5.22 MG/DL
DEPRECATED RDW RBC AUTO: 65 FL (ref 35.1–46.3)
EOSINOPHIL # BLD AUTO: 0.06 X10(3) UL (ref 0–0.7)
EOSINOPHIL NFR BLD AUTO: 0.9 %
ERYTHROCYTE [DISTWIDTH] IN BLOOD BY AUTOMATED COUNT: 16.7 % (ref 11–15)
GFR SERPLBLD BASED ON 1.73 SQ M-ARVRAT: 11 ML/MIN/1.73M2 (ref 60–?)
GLOBULIN PLAS-MCNC: 3.7 G/DL (ref 2.8–4.4)
GLUCOSE BLD-MCNC: 150 MG/DL (ref 70–99)
HCT VFR BLD AUTO: 30.9 %
HGB BLD-MCNC: 9.3 G/DL
IMM GRANULOCYTES # BLD AUTO: 0.04 X10(3) UL (ref 0–1)
IMM GRANULOCYTES NFR BLD: 0.6 %
LYMPHOCYTES # BLD AUTO: 0.24 X10(3) UL (ref 1–4)
LYMPHOCYTES NFR BLD AUTO: 3.7 %
MCH RBC QN AUTO: 32.1 PG (ref 26–34)
MCHC RBC AUTO-ENTMCNC: 30.1 G/DL (ref 31–37)
MCV RBC AUTO: 106.6 FL
MONOCYTES # BLD AUTO: 0.46 X10(3) UL (ref 0.1–1)
MONOCYTES NFR BLD AUTO: 7 %
NEUTROPHILS # BLD AUTO: 5.71 X10 (3) UL (ref 1.5–7.7)
NEUTROPHILS # BLD AUTO: 5.71 X10(3) UL (ref 1.5–7.7)
NEUTROPHILS NFR BLD AUTO: 87.2 %
OSMOLALITY SERPL CALC.SUM OF ELEC: 316 MOSM/KG (ref 275–295)
PLATELET # BLD AUTO: 175 10(3)UL (ref 150–450)
POTASSIUM SERPL-SCNC: 4.4 MMOL/L (ref 3.5–5.1)
PROT SERPL-MCNC: 5.9 G/DL (ref 6.4–8.2)
RBC # BLD AUTO: 2.9 X10(6)UL
RH BLOOD TYPE: POSITIVE
SODIUM SERPL-SCNC: 139 MMOL/L (ref 136–145)
WBC # BLD AUTO: 6.6 X10(3) UL (ref 4–11)

## 2022-12-01 PROCEDURE — 83735 ASSAY OF MAGNESIUM: CPT

## 2022-12-01 PROCEDURE — 86901 BLOOD TYPING SEROLOGIC RH(D): CPT

## 2022-12-01 PROCEDURE — 86316 IMMUNOASSAY TUMOR OTHER: CPT

## 2022-12-01 PROCEDURE — 99215 OFFICE O/P EST HI 40 MIN: CPT | Performed by: NURSE PRACTITIONER

## 2022-12-01 PROCEDURE — 86900 BLOOD TYPING SEROLOGIC ABO: CPT

## 2022-12-01 PROCEDURE — 96372 THER/PROPH/DIAG INJ SC/IM: CPT

## 2022-12-01 PROCEDURE — 85025 COMPLETE CBC W/AUTO DIFF WBC: CPT

## 2022-12-01 PROCEDURE — 86850 RBC ANTIBODY SCREEN: CPT

## 2022-12-01 PROCEDURE — 80053 COMPREHEN METABOLIC PANEL: CPT

## 2022-12-01 PROCEDURE — 36591 DRAW BLOOD OFF VENOUS DEVICE: CPT

## 2022-12-01 RX ORDER — HEPARIN SODIUM (PORCINE) LOCK FLUSH IV SOLN 100 UNIT/ML 100 UNIT/ML
5 SOLUTION INTRAVENOUS ONCE
OUTPATIENT
Start: 2022-12-08

## 2022-12-01 RX ORDER — SODIUM CHLORIDE 9 MG/ML
10 INJECTION INTRAVENOUS ONCE
OUTPATIENT
Start: 2022-12-08

## 2022-12-01 RX ORDER — HEPARIN SODIUM (PORCINE) LOCK FLUSH IV SOLN 100 UNIT/ML 100 UNIT/ML
5 SOLUTION INTRAVENOUS ONCE
Status: COMPLETED | OUTPATIENT
Start: 2022-12-01 | End: 2022-12-01

## 2022-12-01 RX ADMIN — HEPARIN SODIUM (PORCINE) LOCK FLUSH IV SOLN 100 UNIT/ML 500 UNITS: 100 SOLUTION INTRAVENOUS at 10:45:00

## 2022-12-01 NOTE — TELEPHONE ENCOUNTER
From: Elizabeth Sosa  To: Heather Arboleda MD  Sent: 12/1/2022 12:20 PM CST  Subject: creatinine    This message is being sent by Ana Laura Saucedo on behalf of Elizabeth Sosa. The hospitalist put Gaston's prednisone at 2.5 mg and he took a blood test today and his creatine is 5.22 so it has gone backup. What should we do about this hes currently in a rehab.

## 2022-12-01 NOTE — TELEPHONE ENCOUNTER
Will increase prednisone at 5 mg daily dose     Lab test prior to next visit - BMP, phosphorus and bring results at next visit along with BP readings     pls call the facility with instructions

## 2022-12-02 ENCOUNTER — TELEPHONE (OUTPATIENT)
Dept: PULMONOLOGY | Facility: CLINIC | Age: 77
End: 2022-12-02

## 2022-12-02 ENCOUNTER — PATIENT OUTREACH (OUTPATIENT)
Dept: CASE MANAGEMENT | Age: 77
End: 2022-12-02

## 2022-12-02 DIAGNOSIS — J90 PLEURAL EFFUSION: Primary | ICD-10-CM

## 2022-12-02 NOTE — PROGRESS NOTES
VM received; pt wife, Nona Nose requesting assistance w/scheduling apt (dc 11/28)    Dr Basil Salmon, Internal Medicine, Sleep Disorders  [facility], [facility]  [phone]  Follow up 1-2 weeks

## 2022-12-02 NOTE — TELEPHONE ENCOUNTER
Rn called [facility] care #[phone] tone was busy ,will call again later.

## 2022-12-02 NOTE — PROGRESS NOTES
Dr Pricila Sellers, Internal Medicine, Sleep Disorders  [facility]  [place] [address] [place] [address]  [phone]  Follow up 1-2 weeks  Office to call -availability    Confirmed w/ pt   Closing encounter

## 2022-12-02 NOTE — TELEPHONE ENCOUNTER
Wife states appt needs hospital follow up appt within 1 to 2 weeks the schedule is booked out please call

## 2022-12-02 NOTE — TELEPHONE ENCOUNTER
Rn called#[phone] provided by pt wife spoke with [place]  and left message for the nurse to call back #[phone].

## 2022-12-03 ENCOUNTER — TELEPHONE (OUTPATIENT)
Dept: NEPHROLOGY | Facility: CLINIC | Age: 77
End: 2022-12-03

## 2022-12-03 DIAGNOSIS — E83.42 HYPOMAGNESEMIA: Primary | ICD-10-CM

## 2022-12-03 LAB — MAGNESIUM SERPL-MCNC: 1.7 MG/DL (ref 1.6–2.6)

## 2022-12-05 ENCOUNTER — PATIENT MESSAGE (OUTPATIENT)
Dept: PULMONOLOGY | Facility: CLINIC | Age: 77
End: 2022-12-05

## 2022-12-05 NOTE — TELEPHONE ENCOUNTER
Dr. Cora Mathis saw pt in consult on 11/14/22 while he was inpt. Pt was discharged on 11/28/22 w/ instructions to make f/u appt in 1-2 wks w/ CXR to be done on same day as visit. Dr. Cora Mathis- ok to add pt to your schedule & for CXR PA/LAT on same day as office visit?

## 2022-12-05 NOTE — TELEPHONE ENCOUNTER
Pls see earlier TE from 12/2/22 for Dr. Salo Ocampo. Thanks.
Pt needs 2 week f/u appt - released from [facility] on 11/28
Dry/Warm

## 2022-12-06 LAB — CHROMOGRANIN A: 269 NG/ML

## 2022-12-06 NOTE — TELEPHONE ENCOUNTER
Dr. Maggy Yang I have pended chest xray for review. Called patient to schedule appointment for 12/15 at 2:15.

## 2022-12-06 NOTE — TELEPHONE ENCOUNTER
From: rBanden Pepe  To: Yanira Feliz MD  Sent: 12/5/2022 7:43 PM CST  Subject: follow up appointment    This message is being sent by Km Carpenter on behalf of Branden Pepe. I am a patient of doctor Bryn and I was in the hospital from November the 14th to November the 28th in the evening. Dr Maggy Yang Wanted me to have a follow up 1 to 2 weeks after being in the hospital. I had double pneumonia and a collapse lung and hydropneumothorax. I have called several times to get an appointment I was told somebody would call me back today to give me an appointment but I did not hear from anybody. I need to know when I can get an appointment because I need to get a chest X-ray the same day I see him and I will have to try to schedule that.

## 2022-12-07 ENCOUNTER — PATIENT OUTREACH (OUTPATIENT)
Dept: CASE MANAGEMENT | Age: 77
End: 2022-12-07

## 2022-12-07 NOTE — PROGRESS NOTES
Attempted to call pt to see how he is doing. I left a message for Polly pt's wife to call me back when available.

## 2022-12-07 NOTE — TELEPHONE ENCOUNTER
Dr. Magdalena Fry- do you want CXR PA/LAT or CXR AP/PA (1 view)? Rx pending for CXR PA/LAT if agreeable.

## 2022-12-08 ENCOUNTER — PATIENT MESSAGE (OUTPATIENT)
Dept: NEPHROLOGY | Facility: CLINIC | Age: 77
End: 2022-12-08

## 2022-12-08 ENCOUNTER — TELEPHONE (OUTPATIENT)
Dept: NEPHROLOGY | Facility: CLINIC | Age: 77
End: 2022-12-08

## 2022-12-08 ENCOUNTER — NURSE ONLY (OUTPATIENT)
Dept: HEMATOLOGY/ONCOLOGY | Facility: HOSPITAL | Age: 77
End: 2022-12-08
Attending: INTERNAL MEDICINE
Payer: MEDICARE

## 2022-12-08 ENCOUNTER — OFFICE VISIT (OUTPATIENT)
Dept: NEPHROLOGY | Facility: CLINIC | Age: 77
End: 2022-12-08
Payer: MEDICARE

## 2022-12-08 VITALS
HEART RATE: 70 BPM | RESPIRATION RATE: 18 BRPM | SYSTOLIC BLOOD PRESSURE: 114 MMHG | TEMPERATURE: 97 F | OXYGEN SATURATION: 97 % | DIASTOLIC BLOOD PRESSURE: 54 MMHG

## 2022-12-08 VITALS
HEART RATE: 76 BPM | HEIGHT: 69 IN | WEIGHT: 156 LBS | SYSTOLIC BLOOD PRESSURE: 107 MMHG | DIASTOLIC BLOOD PRESSURE: 51 MMHG | BODY MASS INDEX: 23.11 KG/M2

## 2022-12-08 DIAGNOSIS — E83.42 HYPOMAGNESEMIA: ICD-10-CM

## 2022-12-08 DIAGNOSIS — E55.9 VITAMIN D DEFICIENCY: ICD-10-CM

## 2022-12-08 DIAGNOSIS — E83.51 HYPOCALCEMIA: ICD-10-CM

## 2022-12-08 DIAGNOSIS — N18.5 CHRONIC KIDNEY DISEASE (CKD), STAGE V (HCC): ICD-10-CM

## 2022-12-08 DIAGNOSIS — E83.39 HYPERPHOSPHATEMIA: ICD-10-CM

## 2022-12-08 DIAGNOSIS — E87.20 METABOLIC ACIDOSIS: ICD-10-CM

## 2022-12-08 DIAGNOSIS — N18.5 CHRONIC KIDNEY DISEASE (CKD), STAGE V (HCC): Primary | ICD-10-CM

## 2022-12-08 DIAGNOSIS — Z45.2 ENCOUNTER FOR CENTRAL LINE CARE: Primary | ICD-10-CM

## 2022-12-08 LAB
ANION GAP SERPL CALC-SCNC: 13 MMOL/L (ref 0–18)
BASOPHILS # BLD AUTO: 0.05 X10(3) UL (ref 0–0.2)
BASOPHILS NFR BLD AUTO: 0.7 %
BUN BLD-MCNC: 69 MG/DL (ref 7–18)
BUN/CREAT SERPL: 13 (ref 10–20)
CALCIUM BLD-MCNC: 6.6 MG/DL (ref 8.5–10.1)
CHLORIDE SERPL-SCNC: 115 MMOL/L (ref 98–112)
CO2 SERPL-SCNC: 14 MMOL/L (ref 21–32)
CREAT BLD-MCNC: 5.3 MG/DL
DEPRECATED RDW RBC AUTO: 59.2 FL (ref 35.1–46.3)
EOSINOPHIL # BLD AUTO: 0.11 X10(3) UL (ref 0–0.7)
EOSINOPHIL NFR BLD AUTO: 1.6 %
ERYTHROCYTE [DISTWIDTH] IN BLOOD BY AUTOMATED COUNT: 15.6 % (ref 11–15)
GFR SERPLBLD BASED ON 1.73 SQ M-ARVRAT: 10 ML/MIN/1.73M2 (ref 60–?)
GLUCOSE BLD-MCNC: 117 MG/DL (ref 70–99)
HCT VFR BLD AUTO: 30.6 %
HGB BLD-MCNC: 9.3 G/DL
IMM GRANULOCYTES # BLD AUTO: 0.03 X10(3) UL (ref 0–1)
IMM GRANULOCYTES NFR BLD: 0.4 %
LYMPHOCYTES # BLD AUTO: 0.24 X10(3) UL (ref 1–4)
LYMPHOCYTES NFR BLD AUTO: 3.5 %
MAGNESIUM SERPL-MCNC: 1.5 MG/DL (ref 1.6–2.6)
MCH RBC QN AUTO: 31.5 PG (ref 26–34)
MCHC RBC AUTO-ENTMCNC: 30.4 G/DL (ref 31–37)
MCV RBC AUTO: 103.7 FL
MONOCYTES # BLD AUTO: 0.42 X10(3) UL (ref 0.1–1)
MONOCYTES NFR BLD AUTO: 6.2 %
NEUTROPHILS # BLD AUTO: 5.92 X10 (3) UL (ref 1.5–7.7)
NEUTROPHILS # BLD AUTO: 5.92 X10(3) UL (ref 1.5–7.7)
NEUTROPHILS NFR BLD AUTO: 87.6 %
OSMOLALITY SERPL CALC.SUM OF ELEC: 315 MOSM/KG (ref 275–295)
PHOSPHATE SERPL-MCNC: 7 MG/DL (ref 2.5–4.9)
PLATELET # BLD AUTO: 174 10(3)UL (ref 150–450)
POTASSIUM SERPL-SCNC: 4.3 MMOL/L (ref 3.5–5.1)
RBC # BLD AUTO: 2.95 X10(6)UL
SODIUM SERPL-SCNC: 142 MMOL/L (ref 136–145)
VIT D+METAB SERPL-MCNC: 20 NG/ML (ref 30–100)
WBC # BLD AUTO: 6.8 X10(3) UL (ref 4–11)

## 2022-12-08 PROCEDURE — 85025 COMPLETE CBC W/AUTO DIFF WBC: CPT

## 2022-12-08 PROCEDURE — 83735 ASSAY OF MAGNESIUM: CPT

## 2022-12-08 PROCEDURE — 36591 DRAW BLOOD OFF VENOUS DEVICE: CPT

## 2022-12-08 PROCEDURE — 84100 ASSAY OF PHOSPHORUS: CPT

## 2022-12-08 PROCEDURE — 96372 THER/PROPH/DIAG INJ SC/IM: CPT

## 2022-12-08 PROCEDURE — 80048 BASIC METABOLIC PNL TOTAL CA: CPT

## 2022-12-08 PROCEDURE — 1111F DSCHRG MED/CURRENT MED MERGE: CPT | Performed by: INTERNAL MEDICINE

## 2022-12-08 PROCEDURE — 1126F AMNT PAIN NOTED NONE PRSNT: CPT | Performed by: INTERNAL MEDICINE

## 2022-12-08 PROCEDURE — 99215 OFFICE O/P EST HI 40 MIN: CPT | Performed by: INTERNAL MEDICINE

## 2022-12-08 PROCEDURE — 82306 VITAMIN D 25 HYDROXY: CPT

## 2022-12-08 RX ORDER — FUROSEMIDE 40 MG/1
40 TABLET ORAL DAILY
Qty: 30 TABLET | Refills: 1 | Status: SHIPPED | OUTPATIENT
Start: 2022-12-08

## 2022-12-08 RX ORDER — PREDNISONE 2.5 MG
5 TABLET ORAL
Qty: 30 TABLET | Refills: 0 | Status: SHIPPED | OUTPATIENT
Start: 2022-12-08

## 2022-12-08 RX ORDER — HEPARIN SODIUM (PORCINE) LOCK FLUSH IV SOLN 100 UNIT/ML 100 UNIT/ML
5 SOLUTION INTRAVENOUS ONCE
Status: COMPLETED | OUTPATIENT
Start: 2022-12-08 | End: 2022-12-08

## 2022-12-08 RX ORDER — SODIUM CHLORIDE 9 MG/ML
10 INJECTION INTRAVENOUS ONCE
OUTPATIENT
Start: 2022-12-22

## 2022-12-08 RX ORDER — HEPARIN SODIUM (PORCINE) LOCK FLUSH IV SOLN 100 UNIT/ML 100 UNIT/ML
5 SOLUTION INTRAVENOUS ONCE
OUTPATIENT
Start: 2022-12-22

## 2022-12-08 RX ADMIN — HEPARIN SODIUM (PORCINE) LOCK FLUSH IV SOLN 100 UNIT/ML 500 UNITS: 100 SOLUTION INTRAVENOUS at 10:05:00

## 2022-12-08 NOTE — PATIENT INSTRUCTIONS
Stop pantoprazole   Furosemide 40 mg daily dose   Prednisone 5 mg daily dose   Low phosphorus diet   Lab test in one week prior to next visit   Follow up in one week

## 2022-12-08 NOTE — PROGRESS NOTES
Pt here for Q4 week labs, poss ben  He has been at [facility], per pt and spouse - discharge planned tomorrow  Pt reports legs sore from all of the PT.   Gait is more steady with walker  Cbc per Dr Miller Ty office  Bmp, phos, vit D per Dr Jimenez Solorio office    Labs drawn via port, tolerated well    Parameters - hold if hgl is 10.0 or greater  Aranesp given for hgl 9.3 for Hgl 9.3  Pt preferred abd subcutaneous - given RLQ, tolerated well    Discharged stable to home with future appts, given AVS and reviewed  Gait steady, slow with walker

## 2022-12-08 NOTE — TELEPHONE ENCOUNTER
Spoke with Gomez Fowler in [employer], chest xray AP/PA canceled and rescheduled for Chest xray PA/LAT.

## 2022-12-08 NOTE — TELEPHONE ENCOUNTER
Patient wife stated pt will have therapy  This afternoon not sure the time yet,advised to call our office once she get the time for pt therapy if still able to come for appt. pt wife verbalized understanding.

## 2022-12-09 ENCOUNTER — TELEPHONE (OUTPATIENT)
Dept: NEPHROLOGY | Facility: CLINIC | Age: 77
End: 2022-12-09

## 2022-12-09 NOTE — TELEPHONE ENCOUNTER
Shyanne Purchase checking status on 12/8/2022 [employer] message regarding refills. Shyanne Purchase also states pharmacy states that Sevelamer rx is not covered by insurance and is confused as patient was able to fill rx before. Please call - states patient is completely out of rx. Thank you.

## 2022-12-10 DIAGNOSIS — N40.0 BENIGN PROSTATIC HYPERPLASIA, UNSPECIFIED WHETHER LOWER URINARY TRACT SYMPTOMS PRESENT: ICD-10-CM

## 2022-12-11 ENCOUNTER — EXTERNAL FACILITY (OUTPATIENT)
Dept: INTERNAL MEDICINE CLINIC | Facility: CLINIC | Age: 77
End: 2022-12-11

## 2022-12-11 DIAGNOSIS — J42 CHRONIC BRONCHITIS, UNSPECIFIED CHRONIC BRONCHITIS TYPE (HCC): ICD-10-CM

## 2022-12-11 DIAGNOSIS — C79.31 BRAIN METASTASIS (HCC): ICD-10-CM

## 2022-12-11 DIAGNOSIS — R06.03 RESPIRATORY DISTRESS: ICD-10-CM

## 2022-12-11 DIAGNOSIS — E87.5 HYPERKALEMIA: ICD-10-CM

## 2022-12-11 DIAGNOSIS — C78.01 MALIGNANT NEOPLASM METASTATIC TO BOTH LUNGS (HCC): ICD-10-CM

## 2022-12-11 DIAGNOSIS — S91.002D OPEN WOUND OF LEFT ANKLE, SUBSEQUENT ENCOUNTER: ICD-10-CM

## 2022-12-11 DIAGNOSIS — C79.51 BONE METASTASIS (HCC): ICD-10-CM

## 2022-12-11 DIAGNOSIS — E03.2 HYPOTHYROIDISM DUE TO MEDICATION: ICD-10-CM

## 2022-12-11 DIAGNOSIS — J96.01 ACUTE RESPIRATORY FAILURE WITH HYPOXIA (HCC): ICD-10-CM

## 2022-12-11 DIAGNOSIS — D64.9 ANEMIA, UNSPECIFIED TYPE: ICD-10-CM

## 2022-12-11 DIAGNOSIS — J18.9 MULTIFOCAL PNEUMONIA: ICD-10-CM

## 2022-12-11 DIAGNOSIS — G89.3 CANCER RELATED PAIN: ICD-10-CM

## 2022-12-11 DIAGNOSIS — C78.02 MALIGNANT NEOPLASM METASTATIC TO BOTH LUNGS (HCC): ICD-10-CM

## 2022-12-11 DIAGNOSIS — N25.81 SECONDARY HYPERPARATHYROIDISM (HCC): ICD-10-CM

## 2022-12-11 DIAGNOSIS — R09.02 HYPOXIA: ICD-10-CM

## 2022-12-11 DIAGNOSIS — N17.9 ACUTE RENAL FAILURE SUPERIMPOSED ON STAGE 5 CHRONIC KIDNEY DISEASE, NOT ON CHRONIC DIALYSIS, UNSPECIFIED ACUTE RENAL FAILURE TYPE (HCC): ICD-10-CM

## 2022-12-11 DIAGNOSIS — N18.5 ACUTE RENAL FAILURE SUPERIMPOSED ON STAGE 5 CHRONIC KIDNEY DISEASE, NOT ON CHRONIC DIALYSIS, UNSPECIFIED ACUTE RENAL FAILURE TYPE (HCC): ICD-10-CM

## 2022-12-11 DIAGNOSIS — R19.7 DIARRHEA, UNSPECIFIED TYPE: ICD-10-CM

## 2022-12-11 DIAGNOSIS — R53.1 WEAKNESS: ICD-10-CM

## 2022-12-11 DIAGNOSIS — Z92.89 HISTORY OF IMMUNOTHERAPY: ICD-10-CM

## 2022-12-12 RX ORDER — TERAZOSIN 5 MG/1
CAPSULE ORAL
Qty: 90 CAPSULE | Refills: 1 | Status: SHIPPED | OUTPATIENT
Start: 2022-12-12

## 2022-12-12 NOTE — PROGRESS NOTES
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 Cesar Jacobs.    CKD (chronic kidney disease) stage 5, GFR less than 15 ml/min ([facility])  - nonoliguric  - h/o AIN on prednisone from pcn/cephalosporin. ? Keytruda.   - off ivfs        Anemia chronic - 2/2 ckd  - on iv venofer and ayleen  - sp one unit  - hemoccult positive  -GI outpt  - cont ppi    Mild hyperkalemia  -will monitor  -repeat labs in am.    Hx of Nose bleed  -on afrin prn    Submandibular lesion  - s/p abx    Anxiety  -on alprazolam    HTN/HLD  -on aspirin, atorvastatin, amlodipine,metoprolol  - monitor bp/hr      Hypothyrodism  - on synthroid

## 2022-12-12 NOTE — PROGRESS NOTES
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 Princess Matthews.    CKD (chronic kidney disease) stage 5, GFR less than 15 ml/min ([facility])  - nonoliguric  - h/o AIN on prednisone from pcn/cephalosporin. ? Keytruda.   - off ivfs        Anemia chronic - 2/2 ckd  - on iv venofer and ayleen  - sp one unit  - hemoccult positive  -GI outpt  - cont ppi    Mild hyperkalemia  -will monitor  -repeat labs in am.    Hx of Nose bleed  -on afrin prn    Submandibular lesion  - s/p abx    Anxiety  -on alprazolam    HTN/HLD  -on aspirin, atorvastatin, amlodipine,metoprolol  - monitor bp/hr      Hypothyrodism  - on synthroid

## 2022-12-12 NOTE — PROGRESS NOTES
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 Noemi Ordonez.    CKD (chronic kidney disease) stage 5, GFR less than 15 ml/min ([facility])  - nonoliguric  - h/o AIN on prednisone from pcn/cephalosporin. ? Keytruda.   - off ivfs        Anemia chronic - 2/2 ckd  - on iv venofer and ayleen  - sp one unit  - hemoccult positive  -GI outpt  - cont ppi    Mild hyperkalemia  -will monitor  -repeat labs in am.    Hx of Nose bleed  -on afrin prn    Submandibular lesion  - s/p abx    Anxiety  -on alprazolam    HTN/HLD  -on aspirin, atorvastatin, amlodipine,metoprolol  - monitor bp/hr      Hypothyrodism  - on synthroid

## 2022-12-12 NOTE — PROGRESS NOTES
pt seen 11/30/22 at [facility]    see in bed, no distress    hospital notes noted     will follow     History of Present Illness: The patient is a 76 year male smoker,smoked 1/2 pack/day for 17 years. presented to [address] [place] [address] ER due to SOB. He had hemoptysis in 2020 CT scan of the neck and chest were performed and he was found to have a lung mass. Subsequent needle biopsy with aspiration of left cervical lymph node was positive for adenocarcinoma the lung. He was on Plavix at the time of the hemoptysis. CT scan of the brain also demonstrated a lesion. He was found to have cardiac tamponade. Percutaneous aspiration of pericardial effusion was performed and the effusion was positive for metastatic carcinoma. The patient received CyberKnife therapy to the brain lesion. He had received chemotherapy and immune therapy. He was initially evaluated by Dr. Pete Villatoro at [facility]. He was subsequently evaluated by Dr. Lino Morgan. He has been on Trelegy. He has undergone thoracentesis in the past with aspiration of pleural effusion. He has obstructive sleep apnea for which he uses a CPAP device. Over the past 3 days he has had malaise with left-sided chest discomforts and worsening shortness of breath. He came to emergency room and was found to have left-sided hydropneumothorax and a chest tube was deployed and 300 cc of purulent material was drawn. Also on abx for submandibular lesion. To the facility for rehab. Under close monitoring. Past Medical History:  Respiratory distress, Elevated troponin [R77.8], Hydropneumothorax , Neutropenia, Multifocal pneumonia ,Chronic kidney disease, Hypoxia, Primary cancer of left upper lobe of lung, Brain metastasis, Malignant pericardial effusion, Bone metastasis ([address] Lung metastasis ([address] Malignant pleural effusion, Mesenteric mass    Allergies:  Allergies (As of 11/29/2022 12:25 pm)  Cephalosporins  meperidine    Social History: Patient lives with his wife, smoker    Standing Advance Directives Orders: Standing Advance Directives Orders (As of 11/29/2022 12:25 pm)  Full Code    Review of Systems: Generally well. No chest pain. No dysuria. No skin irritations. No psych concerns. No musculoskeletal pain. No vision changes. No shortness of breath. No nausea, vomiting, diarrhea, or constipation. No headache or dizziness. No chewing or swallowing difficulties. 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 Jeff Moreno.    CKD (chronic kidney disease) stage 5, GFR less than 15 ml/min ([facility])  - nonoliguric  - h/o AIN on prednisone from pcn/cephalosporin. ? Keytruda.   - off ivfs        Anemia chronic - 2/2 ckd  - on iv venofer and ayleen  - sp one unit  - hemoccult positive  -GI outpt  - cont ppi    Mild hyperkalemia  -will monitor  -repeat labs in am.    Hx of Nose bleed  -on afrin prn    Submandibular lesion  - s/p abx    Anxiety  -on alprazolam    HTN/HLD  -on aspirin, atorvastatin, amlodipine,metoprolol  - monitor bp/hr      Hypothyrodism  - on synthroid

## 2022-12-13 RX ORDER — SEVELAMER CARBONATE 800 MG/1
1600 TABLET, FILM COATED ORAL
Qty: 180 TABLET | Refills: 0 | Status: SHIPPED | OUTPATIENT
Start: 2022-12-13 | End: 2022-12-16

## 2022-12-13 RX ORDER — TRISODIUM CITRATE DIHYDRATE AND CITRIC ACID MONOHYDRATE 500; 334 MG/5ML; MG/5ML
SOLUTION ORAL
Qty: 1800 ML | Refills: 0 | Status: SHIPPED | OUTPATIENT
Start: 2022-12-13 | End: 2022-12-15 | Stop reason: ALTCHOICE

## 2022-12-13 RX ORDER — CALCITRIOL 0.25 UG/1
0.25 CAPSULE, LIQUID FILLED ORAL DAILY
Qty: 30 CAPSULE | Refills: 0 | Status: SHIPPED | OUTPATIENT
Start: 2022-12-13

## 2022-12-13 RX ORDER — SODIUM BICARBONATE 650 MG/1
650 TABLET ORAL 3 TIMES DAILY
Qty: 90 TABLET | Refills: 0
Start: 2022-12-13

## 2022-12-13 RX ORDER — CALCIUM ACETATE 667 MG/1
1 CAPSULE ORAL
Qty: 90 CAPSULE | Refills: 1 | Status: SHIPPED | OUTPATIENT
Start: 2022-12-13 | End: 2022-12-13

## 2022-12-13 RX ORDER — SEVELAMER HYDROCHLORIDE 800 MG/1
TABLET, FILM COATED ORAL
Qty: 180 TABLET | Refills: 0
Start: 2022-12-13

## 2022-12-13 NOTE — TELEPHONE ENCOUNTER
Informed pt wife of note below stated the Sevelamer plain 800 mg is covered by pt insurance ,Dr Eden Birch made aware and ok to send refill.

## 2022-12-13 NOTE — TELEPHONE ENCOUNTER
Dr Anderson Nieto please see note below ,please sign pending order if appropriate  thanks. LOV-12/8/22  RTC=3 weeks  F/u-12/16/22.

## 2022-12-14 NOTE — TELEPHONE ENCOUNTER
Patient's wife is calling because pharmacy states that a medication needs approval and it was request already via fax.  Please call

## 2022-12-14 NOTE — TELEPHONE ENCOUNTER
Dr Fiorella Tyler Patent need prior auth for med Sodium citrate -citric acid and it will take days to process that ,do you want an alternative  Per pharmacy Sodium Bicarbonate is covered ,thanks.

## 2022-12-15 ENCOUNTER — HOSPITAL ENCOUNTER (OUTPATIENT)
Dept: GENERAL RADIOLOGY | Facility: HOSPITAL | Age: 77
Discharge: HOME | End: 2022-12-15
Attending: INTERNAL MEDICINE
Payer: MEDICARE

## 2022-12-15 ENCOUNTER — NURSE ONLY (OUTPATIENT)
Dept: HEMATOLOGY/ONCOLOGY | Facility: HOSPITAL | Age: 77
End: 2022-12-15
Attending: INTERNAL MEDICINE
Payer: MEDICARE

## 2022-12-15 ENCOUNTER — OFFICE VISIT (OUTPATIENT)
Dept: PULMONOLOGY | Facility: CLINIC | Age: 77
End: 2022-12-15
Payer: MEDICARE

## 2022-12-15 ENCOUNTER — OFFICE VISIT (OUTPATIENT)
Dept: NEPHROLOGY | Facility: CLINIC | Age: 77
End: 2022-12-15
Payer: MEDICARE

## 2022-12-15 ENCOUNTER — TELEPHONE (OUTPATIENT)
Dept: PULMONOLOGY | Facility: CLINIC | Age: 77
End: 2022-12-15

## 2022-12-15 VITALS
HEART RATE: 76 BPM | DIASTOLIC BLOOD PRESSURE: 62 MMHG | RESPIRATION RATE: 14 BRPM | OXYGEN SATURATION: 96 % | BODY MASS INDEX: 22.36 KG/M2 | SYSTOLIC BLOOD PRESSURE: 113 MMHG | WEIGHT: 151 LBS | HEIGHT: 69 IN

## 2022-12-15 VITALS — HEART RATE: 71 BPM | SYSTOLIC BLOOD PRESSURE: 114 MMHG | DIASTOLIC BLOOD PRESSURE: 71 MMHG

## 2022-12-15 DIAGNOSIS — E87.20 METABOLIC ACIDOSIS: ICD-10-CM

## 2022-12-15 DIAGNOSIS — J86.9 EMPYEMA LUNG (HCC): Primary | ICD-10-CM

## 2022-12-15 DIAGNOSIS — Z45.2 ENCOUNTER FOR CENTRAL LINE CARE: Primary | ICD-10-CM

## 2022-12-15 DIAGNOSIS — N18.5 CHRONIC KIDNEY DISEASE (CKD), STAGE V (HCC): ICD-10-CM

## 2022-12-15 DIAGNOSIS — E83.39 HYPERPHOSPHATEMIA: Primary | ICD-10-CM

## 2022-12-15 DIAGNOSIS — J90 PLEURAL EFFUSION: ICD-10-CM

## 2022-12-15 DIAGNOSIS — E83.39 HYPERPHOSPHATEMIA: ICD-10-CM

## 2022-12-15 DIAGNOSIS — E83.51 HYPOCALCEMIA: ICD-10-CM

## 2022-12-15 LAB
ALBUMIN SERPL-MCNC: 2.5 G/DL (ref 3.4–5)
ANION GAP SERPL CALC-SCNC: 9 MMOL/L (ref 0–18)
BUN BLD-MCNC: 80 MG/DL (ref 7–18)
BUN/CREAT SERPL: 16.4 (ref 10–20)
CALCIUM BLD-MCNC: 7.6 MG/DL (ref 8.5–10.1)
CHLORIDE SERPL-SCNC: 108 MMOL/L (ref 98–112)
CO2 SERPL-SCNC: 22 MMOL/L (ref 21–32)
CREAT BLD-MCNC: 4.88 MG/DL
GFR SERPLBLD BASED ON 1.73 SQ M-ARVRAT: 11 ML/MIN/1.73M2 (ref 60–?)
GLUCOSE BLD-MCNC: 89 MG/DL (ref 70–99)
OSMOLALITY SERPL CALC.SUM OF ELEC: 312 MOSM/KG (ref 275–295)
PHOSPHATE SERPL-MCNC: 5.5 MG/DL (ref 2.5–4.9)
POTASSIUM SERPL-SCNC: 5 MMOL/L (ref 3.5–5.1)
SODIUM SERPL-SCNC: 139 MMOL/L (ref 136–145)

## 2022-12-15 PROCEDURE — 36591 DRAW BLOOD OFF VENOUS DEVICE: CPT

## 2022-12-15 PROCEDURE — 71046 X-RAY EXAM CHEST 2 VIEWS: CPT | Performed by: INTERNAL MEDICINE

## 2022-12-15 PROCEDURE — 1111F DSCHRG MED/CURRENT MED MERGE: CPT | Performed by: INTERNAL MEDICINE

## 2022-12-15 PROCEDURE — 80069 RENAL FUNCTION PANEL: CPT

## 2022-12-15 PROCEDURE — 99215 OFFICE O/P EST HI 40 MIN: CPT | Performed by: INTERNAL MEDICINE

## 2022-12-15 PROCEDURE — 1126F AMNT PAIN NOTED NONE PRSNT: CPT | Performed by: INTERNAL MEDICINE

## 2022-12-15 PROCEDURE — 99213 OFFICE O/P EST LOW 20 MIN: CPT | Performed by: INTERNAL MEDICINE

## 2022-12-15 RX ORDER — SODIUM CHLORIDE 9 MG/ML
10 INJECTION INTRAVENOUS ONCE
OUTPATIENT
Start: 2022-12-22

## 2022-12-15 RX ORDER — HEPARIN SODIUM (PORCINE) LOCK FLUSH IV SOLN 100 UNIT/ML 100 UNIT/ML
5 SOLUTION INTRAVENOUS ONCE
Status: COMPLETED | OUTPATIENT
Start: 2022-12-15 | End: 2022-12-15

## 2022-12-15 RX ORDER — FLUTICASONE FUROATE, UMECLIDINIUM BROMIDE AND VILANTEROL TRIFENATATE 100; 62.5; 25 UG/1; UG/1; UG/1
1 POWDER RESPIRATORY (INHALATION) DAILY
Qty: 3 EACH | Refills: 3 | Status: SHIPPED | OUTPATIENT
Start: 2022-12-15

## 2022-12-15 RX ORDER — SODIUM BICARBONATE 650 MG/1
650 TABLET ORAL 2 TIMES DAILY
Qty: 60 TABLET | Refills: 2 | Status: SHIPPED | OUTPATIENT
Start: 2022-12-15

## 2022-12-15 RX ORDER — HEPARIN SODIUM (PORCINE) LOCK FLUSH IV SOLN 100 UNIT/ML 100 UNIT/ML
5 SOLUTION INTRAVENOUS ONCE
OUTPATIENT
Start: 2022-12-22

## 2022-12-15 RX ADMIN — HEPARIN SODIUM (PORCINE) LOCK FLUSH IV SOLN 100 UNIT/ML 500 UNITS: 100 SOLUTION INTRAVENOUS at 10:04:00

## 2022-12-15 NOTE — TELEPHONE ENCOUNTER
Patient had an appointment today with Dr. Isamar Mares and was advised to schedule a 6 week follow-up with him. Dr. Isamar Mares does not have any availability in that time frame. Please contact patient's spouse Polly to schedule a follow-up. Thank you.

## 2022-12-15 NOTE — PROGRESS NOTES
The patient is a 64-year-old male who I know well from prior evaluation comes in now for follow-up after being hospitalized with critical respiratory failure with hydropneumothorax and empyema necessitating chest tube deployment. The patient did well clinically. He was discharged to rehab. His peritoneal dialysis catheter insertion was planned but ultimately canceled due to his critical illness. He had undergone bronchoscopy as well for mucous plugging. Review of Systems:  Vision normal. Ear nose and throat normal. Bowel normal. Bladder function normal. No depression. No thyroid disease. No lymphatic system concerns. No rash. Muscles and joints unremarkable. No weight loss no weight gain. Physical Examination:  Vital signs normal. HEENT examination is unremarkable with pupils equal round and reactive to light and accommodation. Neck without adenopathy, thyromegaly, JVD nor bruit. Lungs clear to auscultation and percussion. Cardiac regular rate and rhythm no murmur. Abdomen nontender, without hepatosplenomegaly and no mass appreciable. Extremities and Musculoskeletal without clubbing cyanosis nor edema, and mobility acceptable. Neurologic grossly intact with symmetric tone and strength and reflex. Assessment and plan:  1. AFB positive BAL- not sure if this is clinically significant. 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 Will await identification of organism. 2.  Empyema with hydropneumothorax- the patient tells me that he feels great and is back to his baseline and that he denies shortness of breath, chest pain, fever chills and shakes. He yet on chest x-ray has hydropneumothorax which is small to modest and loculated at the left lung base. We will manage this expectantly off antibiotic at present. Recommendations: Chest x-ray at 6-week follow-up.     3.  Pulmonary evaluation prior to peritoneal dialysis catheter insertion-would not proceed to elective peritoneal dialysis catheter insertion at this time with recent hydropneumothorax. Can reevaluate when I see him again in 6 weeks.

## 2022-12-15 NOTE — PATIENT INSTRUCTIONS
Stop bicitra and start on sodium bicarbonate 650 mg twice a day   Take sevelamer 800 mg one tablet with meal   Lab test next week   Follow up in 2 weeks - Dec 29th at 3:20 pm

## 2022-12-16 ENCOUNTER — PATIENT MESSAGE (OUTPATIENT)
Dept: NEPHROLOGY | Facility: CLINIC | Age: 77
End: 2022-12-16

## 2022-12-16 RX ORDER — SEVELAMER CARBONATE 800 MG/1
800 TABLET, FILM COATED ORAL
COMMUNITY

## 2022-12-16 NOTE — TELEPHONE ENCOUNTER
From: Deyanira Cabrera  Sent: 12/16/2022 9:53 AM CST  To: [facility] Clinical Staff  Subject: unable to fill meds in my chart    This message is being sent by Ana Lilia Singleton on behalf of Deyanira Cabrera. Laura Hedrick,  At our appointment yesterday I believe Dr. Chayito Hollingsworth told me to give Asim Porterelamer 1tab 2 times a day but on the med list it states 2 tab 3 times a day. Can you please clarify?

## 2022-12-16 NOTE — TELEPHONE ENCOUNTER
Dr Ange Cassidy made of note below ,pt wll be taking Sevelamer 800 mg 1 tablet TIC with meals,Rn sent message thru [address] [place] [address] [address]. [facility] updated.

## 2022-12-20 ENCOUNTER — TELEPHONE (OUTPATIENT)
Dept: NEPHROLOGY | Facility: CLINIC | Age: 77
End: 2022-12-20

## 2022-12-20 NOTE — TELEPHONE ENCOUNTER
SOD CITRATE/CITR ACID SOL    Per pharmacy a prior auth is required    Surescripts. [email or web address]  [facility] Code: [id number]

## 2022-12-22 ENCOUNTER — HOSPITAL ENCOUNTER (OUTPATIENT)
Dept: NUCLEAR MEDICINE | Facility: HOSPITAL | Age: 77
Discharge: HOME | End: 2022-12-22
Attending: NURSE PRACTITIONER
Payer: MEDICARE

## 2022-12-22 ENCOUNTER — TELEPHONE (OUTPATIENT)
Dept: PULMONOLOGY | Facility: CLINIC | Age: 77
End: 2022-12-22

## 2022-12-22 ENCOUNTER — NURSE ONLY (OUTPATIENT)
Dept: HEMATOLOGY/ONCOLOGY | Facility: HOSPITAL | Age: 77
End: 2022-12-22
Attending: INTERNAL MEDICINE
Payer: MEDICARE

## 2022-12-22 DIAGNOSIS — E83.39 HYPERPHOSPHATEMIA: ICD-10-CM

## 2022-12-22 DIAGNOSIS — N18.5 CHRONIC KIDNEY DISEASE (CKD), STAGE V (HCC): ICD-10-CM

## 2022-12-22 DIAGNOSIS — C7B.8 NEUROENDOCRINE CARCINOMA METASTATIC TO INTRA-ABDOMINAL LYMPH NODE (HCC): ICD-10-CM

## 2022-12-22 DIAGNOSIS — E87.20 METABOLIC ACIDOSIS: ICD-10-CM

## 2022-12-22 DIAGNOSIS — C78.7 LIVER METASTASIS (HCC): ICD-10-CM

## 2022-12-22 DIAGNOSIS — C7A.8 NEUROENDOCRINE CARCINOMA METASTATIC TO INTRA-ABDOMINAL LYMPH NODE (HCC): ICD-10-CM

## 2022-12-22 DIAGNOSIS — Z45.2 ENCOUNTER FOR CENTRAL LINE CARE: Primary | ICD-10-CM

## 2022-12-22 LAB
ALBUMIN SERPL-MCNC: 2.7 G/DL (ref 3.4–5)
ANION GAP SERPL CALC-SCNC: 7 MMOL/L (ref 0–18)
BUN BLD-MCNC: 92 MG/DL (ref 7–18)
BUN/CREAT SERPL: 19.5 (ref 10–20)
CALCIUM BLD-MCNC: 8.1 MG/DL (ref 8.5–10.1)
CHLORIDE SERPL-SCNC: 108 MMOL/L (ref 98–112)
CO2 SERPL-SCNC: 24 MMOL/L (ref 21–32)
CREAT BLD-MCNC: 4.73 MG/DL
GFR SERPLBLD BASED ON 1.73 SQ M-ARVRAT: 12 ML/MIN/1.73M2 (ref 60–?)
GLUCOSE BLD-MCNC: 99 MG/DL (ref 70–99)
MAGNESIUM SERPL-MCNC: 2 MG/DL (ref 1.6–2.6)
OSMOLALITY SERPL CALC.SUM OF ELEC: 316 MOSM/KG (ref 275–295)
PHOSPHATE SERPL-MCNC: 6 MG/DL (ref 2.5–4.9)
POTASSIUM SERPL-SCNC: 4 MMOL/L (ref 3.5–5.1)
SODIUM SERPL-SCNC: 139 MMOL/L (ref 136–145)

## 2022-12-22 PROCEDURE — 78815 PET IMAGE W/CT SKULL-THIGH: CPT | Performed by: NURSE PRACTITIONER

## 2022-12-22 PROCEDURE — 36591 DRAW BLOOD OFF VENOUS DEVICE: CPT

## 2022-12-22 PROCEDURE — 80069 RENAL FUNCTION PANEL: CPT

## 2022-12-22 PROCEDURE — 83735 ASSAY OF MAGNESIUM: CPT

## 2022-12-22 RX ORDER — HEPARIN SODIUM (PORCINE) LOCK FLUSH IV SOLN 100 UNIT/ML 100 UNIT/ML
5 SOLUTION INTRAVENOUS ONCE
OUTPATIENT
Start: 2023-01-05

## 2022-12-22 RX ORDER — SODIUM CHLORIDE 9 MG/ML
10 INJECTION INTRAVENOUS ONCE
OUTPATIENT
Start: 2023-01-05

## 2022-12-22 RX ORDER — HEPARIN SODIUM (PORCINE) LOCK FLUSH IV SOLN 100 UNIT/ML 100 UNIT/ML
5 SOLUTION INTRAVENOUS ONCE
Status: COMPLETED | OUTPATIENT
Start: 2022-12-22 | End: 2022-12-22

## 2022-12-22 RX ADMIN — HEPARIN SODIUM (PORCINE) LOCK FLUSH IV SOLN 100 UNIT/ML 500 UNITS: 100 SOLUTION INTRAVENOUS at 11:01:00

## 2022-12-22 NOTE — TELEPHONE ENCOUNTER
Tomas Cardoso MD  P  [facility] Clinical Staff  RN, please let the patient know that the AFB positive organism in the bronchoalveolar lavage was not of any clinical importance. Dr. Claudia Simon informed of AFB results.  Spouse asking if patient can be cleared for surgery (port placement for dialysis)

## 2022-12-23 ENCOUNTER — APPOINTMENT (OUTPATIENT)
Dept: HEMATOLOGY/ONCOLOGY | Facility: HOSPITAL | Age: 77
End: 2022-12-23
Attending: INTERNAL MEDICINE
Payer: MEDICARE

## 2022-12-23 ENCOUNTER — TELEPHONE (OUTPATIENT)
Dept: PULMONOLOGY | Facility: CLINIC | Age: 77
End: 2022-12-23

## 2022-12-23 NOTE — TELEPHONE ENCOUNTER
Called patient back and spoke to [facility] (spouse). Informed them of Dr. Edy Carolina message of excellent download.

## 2022-12-23 NOTE — TELEPHONE ENCOUNTER
Received a faxed compliance report for patient which was reviewed by Dr. Armando Hoyt. Called patient to give results, [facility] answer[facility] Sent download report to scanning.

## 2022-12-29 ENCOUNTER — APPOINTMENT (OUTPATIENT)
Dept: HEMATOLOGY/ONCOLOGY | Facility: HOSPITAL | Age: 77
End: 2022-12-29
Attending: INTERNAL MEDICINE
Payer: MEDICARE

## 2022-12-29 ENCOUNTER — TELEPHONE (OUTPATIENT)
Dept: NEPHROLOGY | Facility: CLINIC | Age: 77
End: 2022-12-29

## 2022-12-29 ENCOUNTER — APPOINTMENT (OUTPATIENT)
Dept: HEMATOLOGY/ONCOLOGY | Facility: HOSPITAL | Age: 77
End: 2022-12-29
Attending: NURSE PRACTITIONER
Payer: MEDICARE

## 2022-12-29 ENCOUNTER — OFFICE VISIT (OUTPATIENT)
Dept: NEPHROLOGY | Facility: CLINIC | Age: 77
End: 2022-12-29
Payer: MEDICARE

## 2022-12-29 VITALS
DIASTOLIC BLOOD PRESSURE: 49 MMHG | HEART RATE: 71 BPM | BODY MASS INDEX: 21 KG/M2 | WEIGHT: 144 LBS | SYSTOLIC BLOOD PRESSURE: 93 MMHG

## 2022-12-29 DIAGNOSIS — N18.5 CHRONIC KIDNEY DISEASE (CKD), STAGE V (HCC): ICD-10-CM

## 2022-12-29 DIAGNOSIS — E83.51 HYPOCALCEMIA: ICD-10-CM

## 2022-12-29 DIAGNOSIS — E87.20 METABOLIC ACIDOSIS: ICD-10-CM

## 2022-12-29 DIAGNOSIS — E83.39 HYPERPHOSPHATEMIA: Primary | ICD-10-CM

## 2022-12-29 PROCEDURE — 99215 OFFICE O/P EST HI 40 MIN: CPT | Performed by: INTERNAL MEDICINE

## 2022-12-29 RX ORDER — SODIUM BICARBONATE 650 MG/1
650 TABLET ORAL DAILY
Qty: 60 TABLET | Refills: 0 | COMMUNITY
Start: 2022-12-29

## 2022-12-29 RX ORDER — POTASSIUM CHLORIDE 20 MEQ/1
1 TABLET, EXTENDED RELEASE ORAL
COMMUNITY

## 2022-12-29 RX ORDER — ALPRAZOLAM 0.5 MG/1
0.5 TABLET ORAL 2 TIMES DAILY PRN
COMMUNITY
Start: 2022-12-23

## 2022-12-29 NOTE — TELEPHONE ENCOUNTER
Prior Authorization request - **To ensure timely dispensing for the patient, notify pharmacy when Prior Authorization is approved\".       Medication not listed    Medication - Bicitra SOL  Qty: 1800    SIG:  Take 30ML  by mouth once daily  Current Outpatient Medications   Medication Sig Dispense Refill

## 2022-12-29 NOTE — PATIENT INSTRUCTIONS
Hold furosemide - Daily weights   Reduce sodium bicarb to once a day   Reduce magnesium supplement to 200 mg daily   Follow up in 2 weeks - Jan 13th at 11:40 am   Lab test in 10 days   Monitor blood pressure at home

## 2022-12-30 ENCOUNTER — TELEPHONE (OUTPATIENT)
Dept: HEMATOLOGY/ONCOLOGY | Facility: HOSPITAL | Age: 77
End: 2022-12-30

## 2023-01-01 ENCOUNTER — SNF VISIT (OUTPATIENT)
Dept: INTERNAL MEDICINE CLINIC | Facility: SKILLED NURSING FACILITY | Age: 78
End: 2023-01-01

## 2023-01-01 ENCOUNTER — OFFICE VISIT (OUTPATIENT)
Dept: CARDIOLOGY | Age: 78
End: 2023-01-01

## 2023-01-01 ENCOUNTER — APPOINTMENT (OUTPATIENT)
Dept: GENERAL RADIOLOGY | Facility: HOSPITAL | Age: 78
End: 2023-01-01
Attending: EMERGENCY MEDICINE
Payer: MEDICARE

## 2023-01-01 ENCOUNTER — TELEPHONE (OUTPATIENT)
Dept: CARDIOLOGY | Age: 78
End: 2023-01-01

## 2023-01-01 ENCOUNTER — TELEPHONE (OUTPATIENT)
Dept: HEMATOLOGY/ONCOLOGY | Facility: HOSPITAL | Age: 78
End: 2023-01-01

## 2023-01-01 ENCOUNTER — HOSPITAL ENCOUNTER (INPATIENT)
Facility: HOSPITAL | Age: 78
LOS: 5 days | End: 2023-01-01
Attending: EMERGENCY MEDICINE | Admitting: HOSPITALIST
Payer: MEDICARE

## 2023-01-01 ENCOUNTER — APPOINTMENT (OUTPATIENT)
Dept: CT IMAGING | Facility: HOSPITAL | Age: 78
End: 2023-01-01
Attending: EMERGENCY MEDICINE
Payer: MEDICARE

## 2023-01-01 ENCOUNTER — INITIAL APN SNF VISIT (OUTPATIENT)
Dept: INTERNAL MEDICINE CLINIC | Facility: SKILLED NURSING FACILITY | Age: 78
End: 2023-01-01

## 2023-01-01 VITALS
HEIGHT: 67 IN | BODY MASS INDEX: 23.97 KG/M2 | DIASTOLIC BLOOD PRESSURE: 66 MMHG | SYSTOLIC BLOOD PRESSURE: 118 MMHG | OXYGEN SATURATION: 94 % | TEMPERATURE: 98 F | RESPIRATION RATE: 18 BRPM | WEIGHT: 152.69 LBS | HEART RATE: 86 BPM

## 2023-01-01 VITALS
HEART RATE: 55 BPM | DIASTOLIC BLOOD PRESSURE: 49 MMHG | SYSTOLIC BLOOD PRESSURE: 105 MMHG | HEIGHT: 69 IN | WEIGHT: 137 LBS | BODY MASS INDEX: 20.29 KG/M2

## 2023-01-01 DIAGNOSIS — N18.9 CHRONIC KIDNEY DISEASE, UNSPECIFIED CKD STAGE: ICD-10-CM

## 2023-01-01 DIAGNOSIS — R06.02 SHORTNESS OF BREATH: ICD-10-CM

## 2023-01-01 DIAGNOSIS — Z74.1 REQUIRES ASSISTANCE WITH ACTIVITIES OF DAILY LIVING (ADL): ICD-10-CM
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
## 2023-01-01 DIAGNOSIS — J90 PLEURAL EFFUSION: ICD-10-CM

## 2023-01-01 DIAGNOSIS — C34.92 LUNG CANCER, PRIMARY, WITH METASTASIS FROM LUNG TO OTHER SITE, LEFT (HCC): ICD-10-CM

## 2023-01-01 DIAGNOSIS — I10 BENIGN ESSENTIAL HYPERTENSION: ICD-10-CM

## 2023-01-01 DIAGNOSIS — I31.31 MALIGNANT PERICARDIAL EFFUSION: ICD-10-CM

## 2023-01-01 DIAGNOSIS — Z99.2 DEPENDENCE ON HEMODIALYSIS (HCC): Primary | ICD-10-CM

## 2023-01-01 DIAGNOSIS — Z79.899 MEDICATION MANAGEMENT: ICD-10-CM

## 2023-01-01 DIAGNOSIS — S09.90XA INJURY OF HEAD, INITIAL ENCOUNTER: ICD-10-CM

## 2023-01-01 DIAGNOSIS — J96.11 CHRONIC RESPIRATORY FAILURE WITH HYPOXIA (HCC): ICD-10-CM

## 2023-01-01 DIAGNOSIS — I35.0 AORTIC VALVE STENOSIS, ETIOLOGY OF CARDIAC VALVE DISEASE UNSPECIFIED: ICD-10-CM

## 2023-01-01 DIAGNOSIS — Z99.2 ESRD ON HEMODIALYSIS (HCC): Primary | ICD-10-CM

## 2023-01-01 DIAGNOSIS — J44.1 CHRONIC OBSTRUCTIVE PULMONARY DISEASE WITH ACUTE EXACERBATION (HCC): ICD-10-CM

## 2023-01-01 DIAGNOSIS — Z99.81 DEPENDENCE ON SUPPLEMENTAL OXYGEN: ICD-10-CM

## 2023-01-01 DIAGNOSIS — I50.32 CHRONIC DIASTOLIC HEART FAILURE (HCC): ICD-10-CM

## 2023-01-01 DIAGNOSIS — I25.10 CORONARY ARTERY DISEASE WITHOUT ANGINA PECTORIS, UNSPECIFIED VESSEL OR LESION TYPE, UNSPECIFIED WHETHER NATIVE OR TRANSPLANTED HEART: Primary | ICD-10-CM

## 2023-01-01 DIAGNOSIS — N18.9 CHRONIC RENAL IMPAIRMENT, UNSPECIFIED CKD STAGE: ICD-10-CM

## 2023-01-01 DIAGNOSIS — Z99.81 DEPENDENCE ON CONTINUOUS SUPPLEMENTAL OXYGEN: ICD-10-CM

## 2023-01-01 DIAGNOSIS — N18.6 ESRD ON HEMODIALYSIS (HCC): Primary | ICD-10-CM

## 2023-01-01 DIAGNOSIS — R53.1 WEAKNESS: Primary | ICD-10-CM

## 2023-01-01 DIAGNOSIS — C34.92: ICD-10-CM

## 2023-01-01 LAB
ANION GAP SERPL CALC-SCNC: 11 MMOL/L (ref 0–18)
ANION GAP SERPL CALC-SCNC: 12 MMOL/L (ref 0–18)
BASE EXCESS BLD CALC-SCNC: -0.4 MMOL/L (ref ?–2)
BASOPHILS # BLD AUTO: 0.01 X10(3) UL (ref 0–0.2)
BASOPHILS # BLD AUTO: 0.02 X10(3) UL (ref 0–0.2)
BASOPHILS NFR BLD AUTO: 0.3 %
BASOPHILS NFR BLD AUTO: 0.5 %
BUN BLD-MCNC: 41 MG/DL (ref 7–18)
BUN BLD-MCNC: 53 MG/DL (ref 7–18)
BUN BLD-MCNC: 56 MG/DL (ref 7–18)
BUN BLD-MCNC: 60 MG/DL (ref 7–18)
BUN/CREAT SERPL: 10.3 (ref 10–20)
BUN/CREAT SERPL: 8.4 (ref 10–20)
BUN/CREAT SERPL: 9.6 (ref 10–20)
BUN/CREAT SERPL: 9.9 (ref 10–20)
CALCIUM BLD-MCNC: 8.5 MG/DL (ref 8.5–10.1)
CALCIUM BLD-MCNC: 8.5 MG/DL (ref 8.5–10.1)
CALCIUM BLD-MCNC: 8.8 MG/DL (ref 8.5–10.1)
CALCIUM BLD-MCNC: 9.1 MG/DL (ref 8.5–10.1)
CHLORIDE SERPL-SCNC: 100 MMOL/L (ref 98–112)
CHLORIDE SERPL-SCNC: 96 MMOL/L (ref 98–112)
CHLORIDE SERPL-SCNC: 96 MMOL/L (ref 98–112)
CHLORIDE SERPL-SCNC: 99 MMOL/L (ref 98–112)
CO2 SERPL-SCNC: 25 MMOL/L (ref 21–32)
CO2 SERPL-SCNC: 27 MMOL/L (ref 21–32)
CREAT BLD-MCNC: 4.87 MG/DL
CREAT BLD-MCNC: 5.53 MG/DL
CREAT BLD-MCNC: 5.67 MG/DL
CREAT BLD-MCNC: 5.82 MG/DL
DEPRECATED RDW RBC AUTO: 61.1 FL (ref 35.1–46.3)
DEPRECATED RDW RBC AUTO: 61.8 FL (ref 35.1–46.3)
DEPRECATED RDW RBC AUTO: 61.8 FL (ref 35.1–46.3)
DEPRECATED RDW RBC AUTO: 63.6 FL (ref 35.1–46.3)
EGFRCR SERPLBLD CKD-EPI 2021: 10 ML/MIN/1.73M2 (ref 60–?)
EGFRCR SERPLBLD CKD-EPI 2021: 11 ML/MIN/1.73M2 (ref 60–?)
EGFRCR SERPLBLD CKD-EPI 2021: 9 ML/MIN/1.73M2 (ref 60–?)
EGFRCR SERPLBLD CKD-EPI 2021: 9 ML/MIN/1.73M2 (ref 60–?)
EOSINOPHIL # BLD AUTO: 0 X10(3) UL (ref 0–0.7)
EOSINOPHIL # BLD AUTO: 0.01 X10(3) UL (ref 0–0.7)
EOSINOPHIL # BLD AUTO: 0.08 X10(3) UL (ref 0–0.7)
EOSINOPHIL # BLD AUTO: 0.16 X10(3) UL (ref 0–0.7)
EOSINOPHIL NFR BLD AUTO: 0 %
EOSINOPHIL NFR BLD AUTO: 0.3 %
EOSINOPHIL NFR BLD AUTO: 2.3 %
EOSINOPHIL NFR BLD AUTO: 3.8 %
ERYTHROCYTE [DISTWIDTH] IN BLOOD BY AUTOMATED COUNT: 17.2 % (ref 11–15)
ERYTHROCYTE [DISTWIDTH] IN BLOOD BY AUTOMATED COUNT: 17.2 % (ref 11–15)
ERYTHROCYTE [DISTWIDTH] IN BLOOD BY AUTOMATED COUNT: 17.4 % (ref 11–15)
ERYTHROCYTE [DISTWIDTH] IN BLOOD BY AUTOMATED COUNT: 17.4 % (ref 11–15)
EST. AVERAGE GLUCOSE BLD GHB EST-MCNC: 100 MG/DL (ref 68–126)
GLUCOSE BLD-MCNC: 101 MG/DL (ref 70–99)
GLUCOSE BLD-MCNC: 114 MG/DL (ref 70–99)
GLUCOSE BLD-MCNC: 63 MG/DL (ref 70–99)
GLUCOSE BLD-MCNC: 94 MG/DL (ref 70–99)
GLUCOSE BLDC GLUCOMTR-MCNC: 100 MG/DL (ref 70–99)
GLUCOSE BLDC GLUCOMTR-MCNC: 101 MG/DL (ref 70–99)
GLUCOSE BLDC GLUCOMTR-MCNC: 102 MG/DL (ref 70–99)
GLUCOSE BLDC GLUCOMTR-MCNC: 116 MG/DL (ref 70–99)
GLUCOSE BLDC GLUCOMTR-MCNC: 143 MG/DL (ref 70–99)
GLUCOSE BLDC GLUCOMTR-MCNC: 153 MG/DL (ref 70–99)
GLUCOSE BLDC GLUCOMTR-MCNC: 156 MG/DL (ref 70–99)
GLUCOSE BLDC GLUCOMTR-MCNC: 165 MG/DL (ref 70–99)
GLUCOSE BLDC GLUCOMTR-MCNC: 174 MG/DL (ref 70–99)
GLUCOSE BLDC GLUCOMTR-MCNC: 63 MG/DL (ref 70–99)
GLUCOSE BLDC GLUCOMTR-MCNC: 75 MG/DL (ref 70–99)
GLUCOSE BLDC GLUCOMTR-MCNC: 78 MG/DL (ref 70–99)
GLUCOSE BLDC GLUCOMTR-MCNC: 80 MG/DL (ref 70–99)
GLUCOSE BLDC GLUCOMTR-MCNC: 87 MG/DL (ref 70–99)
GLUCOSE BLDC GLUCOMTR-MCNC: 91 MG/DL (ref 70–99)
GLUCOSE BLDC GLUCOMTR-MCNC: 93 MG/DL (ref 70–99)
GLUCOSE BLDC GLUCOMTR-MCNC: 93 MG/DL (ref 70–99)
GLUCOSE BLDC GLUCOMTR-MCNC: 95 MG/DL (ref 70–99)
GLUCOSE BLDC GLUCOMTR-MCNC: 99 MG/DL (ref 70–99)
HBA1C MFR BLD: 5.1 % (ref ?–5.7)
HCO3 BLDA-SCNC: 24.4 MEQ/L (ref 21–27)
HCT VFR BLD AUTO: 36.5 %
HCT VFR BLD AUTO: 37.2 %
HCT VFR BLD AUTO: 38.7 %
HCT VFR BLD AUTO: 41 %
HGB BLD-MCNC: 11.5 G/DL
HGB BLD-MCNC: 11.6 G/DL
HGB BLD-MCNC: 12.7 G/DL
HGB BLD-MCNC: 12.9 G/DL
IMM GRANULOCYTES # BLD AUTO: 0.01 X10(3) UL (ref 0–1)
IMM GRANULOCYTES # BLD AUTO: 0.01 X10(3) UL (ref 0–1)
IMM GRANULOCYTES # BLD AUTO: 0.02 X10(3) UL (ref 0–1)
IMM GRANULOCYTES # BLD AUTO: 0.02 X10(3) UL (ref 0–1)
IMM GRANULOCYTES NFR BLD: 0.3 %
IMM GRANULOCYTES NFR BLD: 0.3 %
IMM GRANULOCYTES NFR BLD: 0.5 %
IMM GRANULOCYTES NFR BLD: 0.6 %
LYMPHOCYTES # BLD AUTO: 0.12 X10(3) UL (ref 1–4)
LYMPHOCYTES # BLD AUTO: 0.17 X10(3) UL (ref 1–4)
LYMPHOCYTES # BLD AUTO: 0.21 X10(3) UL (ref 1–4)
LYMPHOCYTES # BLD AUTO: 0.24 X10(3) UL (ref 1–4)
LYMPHOCYTES NFR BLD AUTO: 3.7 %
LYMPHOCYTES NFR BLD AUTO: 4.5 %
LYMPHOCYTES NFR BLD AUTO: 5.6 %
LYMPHOCYTES NFR BLD AUTO: 6.1 %
MAGNESIUM SERPL-MCNC: 2.6 MG/DL (ref 1.6–2.6)
MCH RBC QN AUTO: 30.7 PG (ref 26–34)
MCH RBC QN AUTO: 30.9 PG (ref 26–34)
MCH RBC QN AUTO: 31.4 PG (ref 26–34)
MCH RBC QN AUTO: 31.8 PG (ref 26–34)
MCHC RBC AUTO-ENTMCNC: 31.2 G/DL (ref 31–37)
MCHC RBC AUTO-ENTMCNC: 31.5 G/DL (ref 31–37)
MCHC RBC AUTO-ENTMCNC: 31.5 G/DL (ref 31–37)
MCHC RBC AUTO-ENTMCNC: 32.8 G/DL (ref 31–37)
MCV RBC AUTO: 96.8 FL
MCV RBC AUTO: 98.3 FL
MCV RBC AUTO: 98.4 FL
MCV RBC AUTO: 99.7 FL
MODIFIED ALLEN TEST: POSITIVE
MONOCYTES # BLD AUTO: 0.33 X10(3) UL (ref 0.1–1)
MONOCYTES # BLD AUTO: 0.34 X10(3) UL (ref 0.1–1)
MONOCYTES # BLD AUTO: 0.38 X10(3) UL (ref 0.1–1)
MONOCYTES # BLD AUTO: 0.44 X10(3) UL (ref 0.1–1)
MONOCYTES NFR BLD AUTO: 10.1 %
MONOCYTES NFR BLD AUTO: 10.3 %
MONOCYTES NFR BLD AUTO: 11 %
MONOCYTES NFR BLD AUTO: 8.9 %
MRSA DNA SPEC QL NAA+PROBE: NEGATIVE
NEUTROPHILS # BLD AUTO: 2.77 X10 (3) UL (ref 1.5–7.7)
NEUTROPHILS # BLD AUTO: 2.77 X10(3) UL (ref 1.5–7.7)
NEUTROPHILS # BLD AUTO: 2.78 X10 (3) UL (ref 1.5–7.7)
NEUTROPHILS # BLD AUTO: 2.78 X10(3) UL (ref 1.5–7.7)
NEUTROPHILS # BLD AUTO: 3.29 X10 (3) UL (ref 1.5–7.7)
NEUTROPHILS # BLD AUTO: 3.29 X10(3) UL (ref 1.5–7.7)
NEUTROPHILS # BLD AUTO: 3.38 X10 (3) UL (ref 1.5–7.7)
NEUTROPHILS # BLD AUTO: 3.38 X10(3) UL (ref 1.5–7.7)
NEUTROPHILS NFR BLD AUTO: 79.3 %
NEUTROPHILS NFR BLD AUTO: 79.7 %
NEUTROPHILS NFR BLD AUTO: 85.3 %
NEUTROPHILS NFR BLD AUTO: 86 %
O2 CT BLD-SCNC: 16.8 VOL% (ref 15–23)
OSMOLALITY SERPL CALC.SUM OF ELEC: 289 MOSM/KG (ref 275–295)
OSMOLALITY SERPL CALC.SUM OF ELEC: 289 MOSM/KG (ref 275–295)
OSMOLALITY SERPL CALC.SUM OF ELEC: 294 MOSM/KG (ref 275–295)
OSMOLALITY SERPL CALC.SUM OF ELEC: 299 MOSM/KG (ref 275–295)
OXYGEN LITERS/MINUTE: 3.5 L/MIN
PCO2 BLDA: 45 MM HG (ref 35–45)
PH BLDA: 7.36 [PH] (ref 7.35–7.45)
PHOSPHATE SERPL-MCNC: 7 MG/DL (ref 2.5–4.9)
PLATELET # BLD AUTO: 111 10(3)UL (ref 150–450)
PLATELET # BLD AUTO: 117 10(3)UL (ref 150–450)
PLATELET # BLD AUTO: 124 10(3)UL (ref 150–450)
PLATELET # BLD AUTO: 128 10(3)UL (ref 150–450)
PO2 BLDA: 67 MM HG (ref 80–100)
POTASSIUM SERPL-SCNC: 3.8 MMOL/L (ref 3.5–5.1)
POTASSIUM SERPL-SCNC: 4 MMOL/L (ref 3.5–5.1)
POTASSIUM SERPL-SCNC: 4.7 MMOL/L (ref 3.5–5.1)
POTASSIUM SERPL-SCNC: 5.1 MMOL/L (ref 3.5–5.1)
PROCALCITONIN SERPL-MCNC: 0.67 NG/ML (ref ?–0.16)
PUNCTURE CHARGE: YES
RBC # BLD AUTO: 3.66 X10(6)UL
RBC # BLD AUTO: 3.78 X10(6)UL
RBC # BLD AUTO: 4 X10(6)UL
RBC # BLD AUTO: 4.17 X10(6)UL
SAO2 % BLDA: 90 % (ref 94–100)
SODIUM SERPL-SCNC: 132 MMOL/L (ref 136–145)
SODIUM SERPL-SCNC: 132 MMOL/L (ref 136–145)
SODIUM SERPL-SCNC: 137 MMOL/L (ref 136–145)
SODIUM SERPL-SCNC: 137 MMOL/L (ref 136–145)
WBC # BLD AUTO: 3.3 X10(3) UL (ref 4–11)
WBC # BLD AUTO: 3.5 X10(3) UL (ref 4–11)
WBC # BLD AUTO: 3.8 X10(3) UL (ref 4–11)
WBC # BLD AUTO: 4.3 X10(3) UL (ref 4–11)

## 2023-01-01 PROCEDURE — 99233 SBSQ HOSP IP/OBS HIGH 50: CPT | Performed by: HOSPITALIST

## 2023-01-01 PROCEDURE — 99233 SBSQ HOSP IP/OBS HIGH 50: CPT | Performed by: INTERNAL MEDICINE

## 2023-01-01 PROCEDURE — 90935 HEMODIALYSIS ONE EVALUATION: CPT | Performed by: INTERNAL MEDICINE

## 2023-01-01 PROCEDURE — 72125 CT NECK SPINE W/O DYE: CPT | Performed by: EMERGENCY MEDICINE

## 2023-01-01 PROCEDURE — 99215 OFFICE O/P EST HI 40 MIN: CPT | Performed by: INTERNAL MEDICINE

## 2023-01-01 PROCEDURE — 5A1D70Z PERFORMANCE OF URINARY FILTRATION, INTERMITTENT, LESS THAN 6 HOURS PER DAY: ICD-10-PCS | Performed by: INTERNAL MEDICINE

## 2023-01-01 PROCEDURE — 99232 SBSQ HOSP IP/OBS MODERATE 35: CPT | Performed by: INTERNAL MEDICINE

## 2023-01-01 PROCEDURE — 71045 X-RAY EXAM CHEST 1 VIEW: CPT | Performed by: EMERGENCY MEDICINE

## 2023-01-01 PROCEDURE — 99310 SBSQ NF CARE HIGH MDM 45: CPT | Performed by: NURSE PRACTITIONER

## 2023-01-01 PROCEDURE — 99223 1ST HOSP IP/OBS HIGH 75: CPT | Performed by: HOSPITALIST

## 2023-01-01 PROCEDURE — 99223 1ST HOSP IP/OBS HIGH 75: CPT | Performed by: INTERNAL MEDICINE

## 2023-01-01 PROCEDURE — 70450 CT HEAD/BRAIN W/O DYE: CPT | Performed by: EMERGENCY MEDICINE

## 2023-01-01 RX ORDER — SEVELAMER CARBONATE 800 MG/1
800 TABLET, FILM COATED ORAL
Status: DISCONTINUED | OUTPATIENT
Start: 2023-01-01 | End: 2023-01-01

## 2023-01-01 RX ORDER — LEVOTHYROXINE SODIUM 0.15 MG/1
150 TABLET ORAL
Status: DISCONTINUED | OUTPATIENT
Start: 2023-01-01 | End: 2023-01-01

## 2023-01-01 RX ORDER — ONDANSETRON 2 MG/ML
4 INJECTION INTRAMUSCULAR; INTRAVENOUS EVERY 6 HOURS PRN
Status: DISCONTINUED | OUTPATIENT
Start: 2023-01-01 | End: 2023-01-01

## 2023-01-01 RX ORDER — ACETAMINOPHEN 325 MG/1
325 TABLET ORAL EVERY 6 HOURS PRN
COMMUNITY

## 2023-01-01 RX ORDER — SCOLOPAMINE TRANSDERMAL SYSTEM 1 MG/1
1 PATCH, EXTENDED RELEASE TRANSDERMAL
Status: DISCONTINUED | OUTPATIENT
Start: 2023-01-01 | End: 2023-01-01

## 2023-01-01 RX ORDER — ACETAMINOPHEN 500 MG
500 TABLET ORAL EVERY 4 HOURS PRN
Status: DISCONTINUED | OUTPATIENT
Start: 2023-01-01 | End: 2023-01-01

## 2023-01-01 RX ORDER — ACETAMINOPHEN 325 MG/1
325 TABLET ORAL EVERY 6 HOURS PRN
Status: DISCONTINUED | OUTPATIENT
Start: 2023-01-01 | End: 2023-01-01

## 2023-01-01 RX ORDER — ASCORBIC ACID, THIAMINE, RIBOFLAVIN, NIACINAMIDE, PYRIDOXINE, FOLIC ACID, COBALAMIN, BIOTIN, PANTOTHENIC ACID 100; 1.5; 1.7; 20; 10; 1; 6; 300; 1 MG/1; MG/1; MG/1; MG/1; MG/1; MG/1; UG/1; UG/1; MG/1
1 TABLET, COATED ORAL DAILY
Status: DISCONTINUED | OUTPATIENT
Start: 2023-01-01 | End: 2023-01-01

## 2023-01-01 RX ORDER — IPRATROPIUM BROMIDE AND ALBUTEROL SULFATE 2.5; .5 MG/3ML; MG/3ML
3 SOLUTION RESPIRATORY (INHALATION)
Status: DISCONTINUED | OUTPATIENT
Start: 2023-01-01 | End: 2023-01-01

## 2023-01-01 RX ORDER — MELATONIN
325
Status: DISCONTINUED | OUTPATIENT
Start: 2023-01-01 | End: 2023-01-01

## 2023-01-01 RX ORDER — HEPARIN SODIUM 1000 [USP'U]/ML
1.5 INJECTION, SOLUTION INTRAVENOUS; SUBCUTANEOUS ONCE
Status: COMPLETED | OUTPATIENT
Start: 2023-01-01 | End: 2023-01-01

## 2023-01-01 RX ORDER — LOPERAMIDE HYDROCHLORIDE 2 MG/1
2 CAPSULE ORAL 4 TIMES DAILY PRN
Status: DISCONTINUED | OUTPATIENT
Start: 2023-01-01 | End: 2023-01-01

## 2023-01-01 RX ORDER — VANCOMYCIN HYDROCHLORIDE 250 MG/1
250 CAPSULE ORAL 3 TIMES DAILY
COMMUNITY

## 2023-01-01 RX ORDER — IPRATROPIUM BROMIDE AND ALBUTEROL SULFATE 2.5; .5 MG/3ML; MG/3ML
3 SOLUTION RESPIRATORY (INHALATION) 4 TIMES DAILY
Status: DISCONTINUED | OUTPATIENT
Start: 2023-01-01 | End: 2023-01-01

## 2023-01-01 RX ORDER — ALBUTEROL SULFATE 90 UG/1
2 AEROSOL, METERED RESPIRATORY (INHALATION) EVERY 6 HOURS PRN
Status: DISCONTINUED | OUTPATIENT
Start: 2023-01-01 | End: 2023-01-01

## 2023-01-01 RX ORDER — NICOTINE POLACRILEX 4 MG
15 LOZENGE BUCCAL
Status: DISCONTINUED | OUTPATIENT
Start: 2023-01-01 | End: 2023-01-01

## 2023-01-01 RX ORDER — MELATONIN
2000 DAILY
Status: DISCONTINUED | OUTPATIENT
Start: 2023-01-01 | End: 2023-01-01

## 2023-01-01 RX ORDER — DEXTROSE AND SODIUM CHLORIDE 5; .45 G/100ML; G/100ML
INJECTION, SOLUTION INTRAVENOUS CONTINUOUS
Status: DISCONTINUED | OUTPATIENT
Start: 2023-01-01 | End: 2023-01-01

## 2023-01-01 RX ORDER — VANCOMYCIN HYDROCHLORIDE 50 MG/ML
125 KIT ORAL DAILY
Status: DISCONTINUED | OUTPATIENT
Start: 2023-01-01 | End: 2023-01-01

## 2023-01-01 RX ORDER — METOCLOPRAMIDE HYDROCHLORIDE 5 MG/ML
5 INJECTION INTRAMUSCULAR; INTRAVENOUS EVERY 8 HOURS PRN
Status: DISCONTINUED | OUTPATIENT
Start: 2023-01-01 | End: 2023-01-01

## 2023-01-01 RX ORDER — ACETAMINOPHEN 500 MG
1000 TABLET ORAL 3 TIMES DAILY
COMMUNITY

## 2023-01-01 RX ORDER — ALPRAZOLAM 0.5 MG/1
0.5 TABLET ORAL NIGHTLY
Status: DISCONTINUED | OUTPATIENT
Start: 2023-01-01 | End: 2023-01-01

## 2023-01-01 RX ORDER — NICOTINE POLACRILEX 4 MG
30 LOZENGE BUCCAL
Status: DISCONTINUED | OUTPATIENT
Start: 2023-01-01 | End: 2023-01-01

## 2023-01-01 RX ORDER — FOLIC ACID/VIT B COMPLEX AND C 0.8 MG
0.8 TABLET ORAL DAILY
COMMUNITY

## 2023-01-01 RX ORDER — FINASTERIDE 5 MG/1
5 TABLET, FILM COATED ORAL DAILY
Status: DISCONTINUED | OUTPATIENT
Start: 2023-01-01 | End: 2023-01-01

## 2023-01-01 RX ORDER — AMLODIPINE BESYLATE 2.5 MG/1
2.5 TABLET ORAL DAILY
Qty: 90 TABLET | Refills: 3 | Status: SHIPPED | OUTPATIENT
Start: 2023-01-01 | End: 2023-09-06

## 2023-01-01 RX ORDER — DEXTROSE MONOHYDRATE 25 G/50ML
INJECTION, SOLUTION INTRAVENOUS
Status: COMPLETED
Start: 2023-01-01 | End: 2023-01-01

## 2023-01-01 RX ORDER — DEXTROSE MONOHYDRATE 25 G/50ML
50 INJECTION, SOLUTION INTRAVENOUS
Status: DISCONTINUED | OUTPATIENT
Start: 2023-01-01 | End: 2023-01-01

## 2023-01-01 RX ORDER — HEPARIN SODIUM 5000 [USP'U]/ML
5000 INJECTION, SOLUTION INTRAVENOUS; SUBCUTANEOUS EVERY 12 HOURS SCHEDULED
Status: DISCONTINUED | OUTPATIENT
Start: 2023-01-01 | End: 2023-01-01

## 2023-01-01 RX ORDER — FUROSEMIDE 40 MG/1
40 TABLET ORAL 2 TIMES DAILY
COMMUNITY

## 2023-01-01 RX ORDER — IPRATROPIUM BROMIDE AND ALBUTEROL SULFATE 2.5; .5 MG/3ML; MG/3ML
3 SOLUTION RESPIRATORY (INHALATION) EVERY 6 HOURS PRN
Status: DISCONTINUED | OUTPATIENT
Start: 2023-01-01 | End: 2023-01-01

## 2023-01-01 RX ORDER — MAGNESIUM OXIDE 400 MG/1
400 TABLET ORAL 2 TIMES DAILY
Status: DISCONTINUED | OUTPATIENT
Start: 2023-01-01 | End: 2023-01-01

## 2023-01-01 RX ORDER — CALCITRIOL 0.25 UG/1
0.25 CAPSULE, LIQUID FILLED ORAL DAILY
COMMUNITY

## 2023-01-01 RX ORDER — MIDODRINE HYDROCHLORIDE 5 MG/1
10 TABLET ORAL
Status: DISCONTINUED | OUTPATIENT
Start: 2023-01-01 | End: 2023-01-01

## 2023-01-01 RX ORDER — ALBUMIN (HUMAN) 12.5 G/50ML
100 SOLUTION INTRAVENOUS AS NEEDED
Status: DISCONTINUED | OUTPATIENT
Start: 2023-01-01 | End: 2023-01-01

## 2023-01-01 RX ORDER — ACETAMINOPHEN 500 MG
1000 TABLET ORAL 3 TIMES DAILY
Status: DISCONTINUED | OUTPATIENT
Start: 2023-01-01 | End: 2023-01-01

## 2023-01-01 RX ORDER — FOLIC ACID 1 MG/1
1 TABLET ORAL DAILY
Status: DISCONTINUED | OUTPATIENT
Start: 2023-01-01 | End: 2023-01-01

## 2023-01-01 RX ORDER — CHOLECALCIFEROL (VITAMIN D3) 125 MCG
1000 CAPSULE ORAL DAILY
Status: DISCONTINUED | OUTPATIENT
Start: 2023-01-01 | End: 2023-01-01

## 2023-01-01 RX ORDER — HEPARIN SODIUM 1000 [USP'U]/ML
1.5 INJECTION, SOLUTION INTRAVENOUS; SUBCUTANEOUS
Status: DISCONTINUED | OUTPATIENT
Start: 2023-01-01 | End: 2023-01-01

## 2023-01-01 SDOH — HEALTH STABILITY: MENTAL HEALTH: DEPRESSION SCREENING SCORE: 0

## 2023-01-01 SDOH — HEALTH STABILITY: PHYSICAL HEALTH: ON AVERAGE, HOW MANY DAYS PER WEEK DO YOU ENGAGE IN MODERATE TO STRENUOUS EXERCISE (LIKE A BRISK WALK)?: 3 DAYS

## 2023-01-01 SDOH — HEALTH STABILITY: PHYSICAL HEALTH: ON AVERAGE, HOW MANY MINUTES DO YOU ENGAGE IN EXERCISE AT THIS LEVEL?: 60 MIN

## 2023-01-01 SDOH — HEALTH STABILITY: MENTAL HEALTH: FEELING DOWN, DEPRESSED OR HOPELESS?: NOT AT ALL

## 2023-01-01 SDOH — HEALTH STABILITY: MENTAL HEALTH: LITTLE INTEREST OR PLEASURE IN ACTIVITY?: NOT AT ALL

## 2023-01-01 SDOH — HEALTH STABILITY: MENTAL HEALTH: PHQ2 INTERPRETATION: NO FURTHER SCREENING NEEDED

## 2023-01-01 ASSESSMENT — PATIENT HEALTH QUESTIONNAIRE - PHQ9: SUM OF ALL RESPONSES TO PHQ9 QUESTIONS 1 AND 2: 0

## 2023-01-02 ENCOUNTER — LAB ENCOUNTER (OUTPATIENT)
Dept: LAB | Facility: HOSPITAL | Age: 78
End: 2023-01-02
Attending: SURGERY
Payer: MEDICARE

## 2023-01-02 DIAGNOSIS — Z01.818 PREOP TESTING: ICD-10-CM

## 2023-01-02 LAB — SARS-COV-2 RNA RESP QL NAA+PROBE: NOT DETECTED

## 2023-01-03 RX ORDER — CEFAZOLIN SODIUM/WATER 2 G/20 ML
2 SYRINGE (ML) INTRAVENOUS ONCE
Status: CANCELLED | OUTPATIENT
Start: 2023-01-03 | End: 2023-01-03

## 2023-01-04 ENCOUNTER — HOSPITAL ENCOUNTER (OUTPATIENT)
Facility: HOSPITAL | Age: 78
Setting detail: HOSPITAL OUTPATIENT SURGERY
Discharge: HOME | End: 2023-01-04
Attending: SURGERY | Admitting: SURGERY
Payer: MEDICARE

## 2023-01-04 ENCOUNTER — ANESTHESIA (OUTPATIENT)
Dept: SURGERY | Facility: HOSPITAL | Age: 78
End: 2023-01-04
Payer: MEDICARE

## 2023-01-04 ENCOUNTER — ANESTHESIA EVENT (OUTPATIENT)
Dept: SURGERY | Facility: HOSPITAL | Age: 78
End: 2023-01-04
Payer: MEDICARE

## 2023-01-04 VITALS
OXYGEN SATURATION: 99 % | RESPIRATION RATE: 16 BRPM | WEIGHT: 144 LBS | DIASTOLIC BLOOD PRESSURE: 48 MMHG | TEMPERATURE: 98 F | SYSTOLIC BLOOD PRESSURE: 128 MMHG | BODY MASS INDEX: 21.33 KG/M2 | HEIGHT: 69 IN | HEART RATE: 55 BPM

## 2023-01-04 DIAGNOSIS — Z01.818 PREOP TESTING: Primary | ICD-10-CM

## 2023-01-04 PROCEDURE — 0DRU4JZ REPLACEMENT OF OMENTUM WITH SYNTHETIC SUBSTITUTE, PERCUTANEOUS ENDOSCOPIC APPROACH: ICD-10-PCS | Performed by: SURGERY

## 2023-01-04 PROCEDURE — 99214 OFFICE O/P EST MOD 30 MIN: CPT | Performed by: HOSPITALIST

## 2023-01-04 PROCEDURE — 0WHG43Z INSERTION OF INFUSION DEVICE INTO PERITONEAL CAVITY, PERCUTANEOUS ENDOSCOPIC APPROACH: ICD-10-PCS | Performed by: SURGERY

## 2023-01-04 DEVICE — PERITONEAL DIALYSIS CATHETER SWAN NECK CURL CATH, 2 CUFFS LEFT
Type: IMPLANTABLE DEVICE | Site: ABDOMEN | Status: FUNCTIONAL
Brand: ARGYLE

## 2023-01-04 RX ORDER — ACETAMINOPHEN 500 MG
500 TABLET ORAL EVERY 4 HOURS PRN
OUTPATIENT
Start: 2023-01-04

## 2023-01-04 RX ORDER — ONDANSETRON 2 MG/ML
INJECTION INTRAMUSCULAR; INTRAVENOUS AS NEEDED
Status: DISCONTINUED | OUTPATIENT
Start: 2023-01-04 | End: 2023-01-04 | Stop reason: SURG

## 2023-01-04 RX ORDER — TRAMADOL HYDROCHLORIDE 50 MG/1
TABLET ORAL EVERY 4 HOURS PRN
Qty: 20 TABLET | Refills: 0 | Status: SHIPPED | OUTPATIENT
Start: 2023-01-04

## 2023-01-04 RX ORDER — TEMAZEPAM 7.5 MG/1
15 CAPSULE ORAL NIGHTLY PRN
OUTPATIENT
Start: 2023-01-04

## 2023-01-04 RX ORDER — HYDROMORPHONE HYDROCHLORIDE 1 MG/ML
0.2 INJECTION, SOLUTION INTRAMUSCULAR; INTRAVENOUS; SUBCUTANEOUS EVERY 5 MIN PRN
Status: DISCONTINUED | OUTPATIENT
Start: 2023-01-04 | End: 2023-01-04

## 2023-01-04 RX ORDER — GLYCOPYRROLATE 0.2 MG/ML
INJECTION, SOLUTION INTRAMUSCULAR; INTRAVENOUS AS NEEDED
Status: DISCONTINUED | OUTPATIENT
Start: 2023-01-04 | End: 2023-01-04 | Stop reason: SURG

## 2023-01-04 RX ORDER — MORPHINE SULFATE 4 MG/ML
4 INJECTION, SOLUTION INTRAMUSCULAR; INTRAVENOUS EVERY 10 MIN PRN
Status: DISCONTINUED | OUTPATIENT
Start: 2023-01-04 | End: 2023-01-04

## 2023-01-04 RX ORDER — HYDROMORPHONE HYDROCHLORIDE 1 MG/ML
0.6 INJECTION, SOLUTION INTRAMUSCULAR; INTRAVENOUS; SUBCUTANEOUS EVERY 5 MIN PRN
Status: DISCONTINUED | OUTPATIENT
Start: 2023-01-04 | End: 2023-01-04

## 2023-01-04 RX ORDER — DEXAMETHASONE SODIUM PHOSPHATE 4 MG/ML
VIAL (ML) INJECTION AS NEEDED
Status: DISCONTINUED | OUTPATIENT
Start: 2023-01-04 | End: 2023-01-04 | Stop reason: SURG

## 2023-01-04 RX ORDER — NALOXONE HYDROCHLORIDE 0.4 MG/ML
80 INJECTION, SOLUTION INTRAMUSCULAR; INTRAVENOUS; SUBCUTANEOUS AS NEEDED
Status: DISCONTINUED | OUTPATIENT
Start: 2023-01-04 | End: 2023-01-04

## 2023-01-04 RX ORDER — BUPIVACAINE HYDROCHLORIDE 2.5 MG/ML
INJECTION, SOLUTION EPIDURAL; INFILTRATION; INTRACAUDAL AS NEEDED
Status: DISCONTINUED | OUTPATIENT
Start: 2023-01-04 | End: 2023-01-04 | Stop reason: HOSPADM

## 2023-01-04 RX ORDER — HYDROMORPHONE HYDROCHLORIDE 1 MG/ML
0.4 INJECTION, SOLUTION INTRAMUSCULAR; INTRAVENOUS; SUBCUTANEOUS EVERY 5 MIN PRN
Status: DISCONTINUED | OUTPATIENT
Start: 2023-01-04 | End: 2023-01-04

## 2023-01-04 RX ORDER — SODIUM CHLORIDE 9 MG/ML
10 INJECTION INTRAVENOUS ONCE
OUTPATIENT
Start: 2023-01-05

## 2023-01-04 RX ORDER — ROCURONIUM BROMIDE 10 MG/ML
INJECTION, SOLUTION INTRAVENOUS AS NEEDED
Status: DISCONTINUED | OUTPATIENT
Start: 2023-01-04 | End: 2023-01-04 | Stop reason: SURG

## 2023-01-04 RX ORDER — SODIUM CHLORIDE, SODIUM LACTATE, POTASSIUM CHLORIDE, CALCIUM CHLORIDE 600; 310; 30; 20 MG/100ML; MG/100ML; MG/100ML; MG/100ML
INJECTION, SOLUTION INTRAVENOUS CONTINUOUS
Status: DISCONTINUED | OUTPATIENT
Start: 2023-01-04 | End: 2023-01-04

## 2023-01-04 RX ORDER — ACETAMINOPHEN 500 MG
1000 TABLET ORAL ONCE
Status: COMPLETED | OUTPATIENT
Start: 2023-01-04 | End: 2023-01-04

## 2023-01-04 RX ORDER — MORPHINE SULFATE 10 MG/ML
6 INJECTION, SOLUTION INTRAMUSCULAR; INTRAVENOUS EVERY 10 MIN PRN
Status: DISCONTINUED | OUTPATIENT
Start: 2023-01-04 | End: 2023-01-04

## 2023-01-04 RX ORDER — LIDOCAINE HYDROCHLORIDE 10 MG/ML
INJECTION, SOLUTION EPIDURAL; INFILTRATION; INTRACAUDAL; PERINEURAL AS NEEDED
Status: DISCONTINUED | OUTPATIENT
Start: 2023-01-04 | End: 2023-01-04 | Stop reason: SURG

## 2023-01-04 RX ORDER — MORPHINE SULFATE 4 MG/ML
2 INJECTION, SOLUTION INTRAMUSCULAR; INTRAVENOUS EVERY 10 MIN PRN
Status: DISCONTINUED | OUTPATIENT
Start: 2023-01-04 | End: 2023-01-04

## 2023-01-04 RX ORDER — TRAMADOL HYDROCHLORIDE 50 MG/1
50 TABLET ORAL ONCE
Status: COMPLETED | OUTPATIENT
Start: 2023-01-04 | End: 2023-01-04

## 2023-01-04 RX ORDER — METOCLOPRAMIDE HYDROCHLORIDE 5 MG/ML
10 INJECTION INTRAMUSCULAR; INTRAVENOUS EVERY 8 HOURS PRN
OUTPATIENT
Start: 2023-01-04

## 2023-01-04 RX ORDER — SODIUM CHLORIDE 9 MG/ML
INJECTION, SOLUTION INTRAVENOUS CONTINUOUS
Status: DISCONTINUED | OUTPATIENT
Start: 2023-01-04 | End: 2023-01-04

## 2023-01-04 RX ORDER — ONDANSETRON 2 MG/ML
4 INJECTION INTRAMUSCULAR; INTRAVENOUS EVERY 6 HOURS PRN
OUTPATIENT
Start: 2023-01-04

## 2023-01-04 RX ORDER — HEPARIN SODIUM (PORCINE) LOCK FLUSH IV SOLN 100 UNIT/ML 100 UNIT/ML
5 SOLUTION INTRAVENOUS ONCE
OUTPATIENT
Start: 2023-01-05

## 2023-01-04 RX ADMIN — GLYCOPYRROLATE 0.2 MG: 0.2 INJECTION, SOLUTION INTRAMUSCULAR; INTRAVENOUS at 15:46:00

## 2023-01-04 RX ADMIN — ROCURONIUM BROMIDE 20 MG: 10 INJECTION, SOLUTION INTRAVENOUS at 15:46:00

## 2023-01-04 RX ADMIN — ROCURONIUM BROMIDE 20 MG: 10 INJECTION, SOLUTION INTRAVENOUS at 16:05:00

## 2023-01-04 RX ADMIN — ONDANSETRON 4 MG: 2 INJECTION INTRAMUSCULAR; INTRAVENOUS at 15:46:00

## 2023-01-04 RX ADMIN — LIDOCAINE HYDROCHLORIDE 25 MG: 10 INJECTION, SOLUTION EPIDURAL; INFILTRATION; INTRACAUDAL; PERINEURAL at 15:46:00

## 2023-01-04 RX ADMIN — DEXAMETHASONE SODIUM PHOSPHATE 4 MG: 4 MG/ML VIAL (ML) INJECTION at 15:46:00

## 2023-01-04 RX ADMIN — SODIUM CHLORIDE: 9 INJECTION, SOLUTION INTRAVENOUS at 17:08:00

## 2023-01-04 NOTE — H&P
[facility]    PATIENT'S NAME: Jose Eduardo Doe   ATTENDING PHYSICIAN: Asa Almeida MD   PATIENT ACCOUNT#:   [id number]    LOCATION:  [facility]  MEDICAL RECORD #:   [id number]       YOB: 1945  ADMISSION DATE:       01/04/2023    HISTORY AND PHYSICAL EXAMINATION    CHIEF COMPLAINT:  PD catheter insertion in preparation for peritoneal dialysis. HISTORY OF PRESENT ILLNESS:  The patient is an [de-identified]  male who came in today to the hospital for PD catheter insertion in preparation to start peritoneal dialysis. The patient has been having progressive decline in his kidney function, and he chose peritoneal dialysis over hemodialysis considering his critical hemodynamic status and underlying aortic stenosis. Preoperatively, it was requested for Internal Medicine and Cardiology evaluation to clear the patient for surgical intervention. The patient was also evaluated by Dr. Maria Esther Smith from cardiology department. PAST MEDICAL HISTORY:  Non-small-cell lung cancer, adenocarcinoma left upper lobe, metastatic to the bones, single metastatic lesion to the brain, status post SBRT treatment. 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 Nannette Honer was discontinued, and his disease has been relatively stable on PET scan. The patient has been having rapid decline in his kidney function and today he is getting PD catheter in preparation for peritoneal dialysis. Also, he was recently diagnosed with well-differentiated neuroendocrine tumor, retroperitoneal and liver metastases biopsy proven. Receives octreotide infusions. He has history of left malignant pericardial and pleural effusions requiring pericardiocentesis, recent left-sided empyema requiring chest tube.   History of COPD, coronary artery disease, multiple PCI stents, chronic left ventricular diastolic dysfunction, abdominal aortic aneurysm, moderate to severe aortic stenosis, chronic kidney disease, obstructive sleep apnea, osteoarthritis, hypertension, hyperlipidemia, and benign prostatic hypertrophy. PAST SURGICAL HISTORY:  Left ankle open reduction and internal fixation, hernia repair, pericardiocentesis, left-sided chest tube, and right cochlear implant and liver biopsy. MEDICATIONS:  Please see medication reconciliation list.     ALLERGIES:  No known drug allergies. SOCIAL HISTORY:  Ex-tobacco user. No current tobacco, alcohol, or drug use. Lives with his wife. Limited mobility. REVIEW OF SYSTEMS:  The patient had progressive leg edema, progressive fatigue. Reviewing laboratory records, also had progressive renal failure with rapid decline in GFR and uremia symptoms. No fever or chills. The patient was hospitalized recently with left-sided hydropneumothorax with empyema, positive for Klebsiella pneumoniae, treated with a chest tube and antibiotics and currently has recovered relatively well. PHYSICAL EXAMINATION:    GENERAL:  Alert and oriented to time, place and person, no acute distress. VITAL SIGNS:  Temperature 97.5, pulse 62, respiratory rate 18, blood pressure 133/54, pulse ox 98% on room air. HEENT:  Atraumatic. Oropharynx clear. Moist mucous membranes. Ears, nose normal.  Eyes:  Anicteric sclerae. NECK:  Supple. No lymphadenopathy. LUNGS:  Chest clear to auscultation bilaterally. Normal respiratory effort. HEART:  Regular rhythm. S1 and S2 auscultated. Systolic murmur best heard at the right second intercostal space. ABDOMEN:  Soft, nondistended. No tenderness. Positive bowel sounds. EXTREMITIES:  +2 edema both legs. No clubbing or cyanosis. NEUROLOGIC:  Motor and sensory intact. ASSESSMENT AND PLAN:    1. End-stage renal disease. In preparation for peritoneal dialysis, today laparoscopic insertion of peritoneal dialysis catheter.   2.   Moderate to severe aortic stenosis. 3.   Metastatic lung adenocarcinoma as outlined above. 4.   Neuroendocrine tumor, well differentiated, retroperitoneal and liver metastases. 5.   History of coronary artery disease. The patient is cleared from the cardiac standpoint for surgical intervention with moderate perioperative risks. Monitor his hemodynamic status closely. Will add Nephrology consult. Further recommendations to follow.     Dictated By Tereza Carpenter MD  d: 01/04/2023 14:39:54  t: 01/04/2023 14:41:44  Job [id number]  ZZ/

## 2023-01-04 NOTE — DISCHARGE SUMMARY
[facility]  Discharge Summary    Willie Wyatt Patient Status:  Hospital Outpatient Surgery    1945 MRN [id number]   Location [place] [address] Attending Radha Bland MD   Hosp Day # 0 PCP Berta Culver MD     Date of Admission: 2023    Date of Discharge: 2023    Admitting Diagnosis: acute renal failure    Discharge Diagnosis: Patient Active Problem List:     Hypoxia     Primary cancer of left upper lobe of lung ([address]     Brain metastasis ([address]     Malignant pericardial effusion     Bone metastasis ([address]     Lung metastasis ([address]     Malignant pleural effusion     Mesenteric mass     Medication monitoring encounter     Cancer related pain     Chemotherapy management, encounter for     Encounter for central line care     Anemia     Encounter for immunotherapy     Weakness     Hypothyroidism due to medication     Chronic obstructive pulmonary disease, unspecified ([address]     OVI (acute kidney injury) ([address]     Dehydration     Acute renal failure (ARF) (HCC)     Metabolic acidosis     Nephritis     Drug-induced nephritis     History of immunotherapy     Hypokalemia     Hypernatremia     AIN (acute interstitial nephritis)     Pressure injury of left ankle, stage 4 (HCC)     Open wound of left ankle     Healed wound     Stage 3 chronic kidney disease ([address]     Acute renal injury ([address]     Hyperkalemia     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)     Liver metastasis (HCC)     Acute metabolic acidosis     Diarrhea, unspecified type     Acute on chronic renal failure (HCC)     CKD (chronic kidney disease) stage 5, GFR less than 15 ml/min (HCC)     Secondary hyperparathyroidism ([address]     Respiratory distress     Neutropenia, unspecified type (HCC)     Multifocal pneumonia     Hydropneumothorax     Elevated troponin     Chronic kidney disease, unspecified CKD stage     Acute respiratory failure with hypoxia ([address]     Empyema lung ([address] Hyperphosphatemia      Reason for Admission: Acute renal failure      Hospital Course: outpatient surgery - laparoscopic insertion of peritoneal dialysis catheter, possible open. Consultations: none        Complications: none    Disposition: SNF    Discharge Condition: stable    Discharge Medications: Current Discharge Medication List    START taking these medications    traMADol 50 MG Oral Tab  Take 1-2 tablets ( mg total) by mouth every 4 (four) hours as needed for Pain. Qty: 20 tablet Refills: 0      CONTINUE these medications which have NOT CHANGED    ALPRAZolam 0.5 MG Oral Tab  Take 0.5 mg by mouth 2 (two) times daily as needed. potassium chloride 20 MEQ Oral Tab CR  Take 1 tablet by mouth daily with food. sodium bicarbonate 650 MG Oral Tab  Take 1 tablet (650 mg total) by mouth daily. Qty: 60 tablet Refills: 0    Sevelamer 800 MG Oral Tab  Take 800 mg by mouth 3 (three) times daily with meals. fluticasone-umeclidin-vilant (TRELEGY ELLIPTA) 100-62.5-25 MCG/ACT Inhalation Aerosol Powder, Breath Activated  Inhale 1 puff into the lungs daily. Qty: 3 each Refills: 3    calcitriol 0.25 MCG Oral Cap  Take 1 capsule (0.25 mcg total) by mouth daily. Qty: 30 capsule Refills: 0    TERAZOSIN 5 MG Oral Cap  TAKE 1 CAPSULE EVERY DAY  Qty: 90 capsule Refills: 1  Associated Diagnoses:Benign prostatic hyperplasia, unspecified whether lower urinary tract symptoms present    furosemide 40 MG Oral Tab  Take 1 tablet (40 mg total) by mouth daily. Qty: 30 tablet Refills: 1    predniSONE 2.5 MG Oral Tab  Take 2 tablets (5 mg total) by mouth daily with breakfast.  Qty: 30 tablet Refills: 0    acidophilus-pectin Oral Cap  Take 1 capsule by mouth 2 (two) times daily. Qty: 10 capsule Refills: 0    folic acid 1 MG Oral Tab  Take 1 tablet (1 mg total) by mouth in the morning.   Qty: 90 tablet Refills: 3  Associated Diagnoses:Macrocytic anemia    amLODIPine 5 MG Oral Tab  Take 5 mg by mouth every morning. Cyanocobalamin 1000 MCG Sublingual SL Tab  Place 1 tablet under the tongue daily. Refills: 0    metoprolol tartrate 25 MG Oral Tab  Take 0.5 tablets (12.5 mg total) by mouth in the morning and 0.5 tablets (12.5 mg total) before bedtime. Refills: 0    levothyroxine (EUTHYROX) 125 MCG Oral Tab  Take 1 tablet (125 mcg total) by mouth before breakfast.  Qty: 90 tablet Refills: 3  Associated Diagnoses:Hypothyroidism due to medication    ferrous sulfate 325 (65 FE) MG Oral Tab EC  Take 325 mg by mouth daily with breakfast.    Multiple Vitamins-Minerals (PX SENIOR VITAMIN OR)  Take 1 tablet by mouth daily. IPRATROPIUM-ALBUTEROL 0.5-2.5 (3) MG/3ML Inhalation Solution  USE 3 ML IN NEBULIZER  4 TIMES DAILY  Qty: 360 mL Refills: 5    finasteride 5 MG Oral Tab  Take 1 tablet (5 mg total) by mouth daily. Qty: 90 tablet Refills: 3  Associated Diagnoses:Benign prostatic hyperplasia, unspecified whether lower urinary tract symptoms present    Vitamin D3, Cholecalciferol, 25 MCG Oral Tab  Take 2 tablets (2,000 Units total) by mouth daily. Qty: 60 tablet Refills: 0    aspirin 81 MG Oral Chew Tab  Chew 1 tablet (81 mg total) by mouth daily. Qty: 30 tablet Refills: 2    cycloSPORINE 0.05 % Ophthalmic Emulsion  Place into both eyes 2 (two) times daily. atorvastatin 20 MG Oral Tab  Take 20 mg by mouth nightly. Albuterol Sulfate  (90 Base) MCG/ACT Inhalation Aero Soln  Inhale 2 puffs into the lungs every 6 (six) hours as needed for Wheezing or Shortness of Breath. Qty: 2 Inhaler Refills: 5  Associated Diagnoses:Chronic obstructive pulmonary disease, unspecified COPD type ([facility]          Follow up Visits: Follow-up with Dr. Nahid Almeida in 2 weeks. If Nahid Almeida is unavailable you may follow up with one of the other physicians in the group. Other Discharge Instructions: Any other questions none listed in the patient discharge instructions should be sent to the office, [phone].      At all points my collaborating physician Dr. Estrella Gardner was available to answer questions and update the plan as necessary. Dr. Estrella Gardner agrees with the plan stated above.      TUAN Us  1/4/2023  4:57 PM

## 2023-01-04 NOTE — BRIEF OP NOTE
Pre-Operative Diagnosis: acute renal failure     Post-Operative Diagnosis: Acute renal failure     Procedure Performed:   Laparoscopic insertion of  peritoneal diaylsis catheter, possible open    Surgeon(s) and Role:     * Fiorella Flores MD - Primary    Assistant(s):  Surgical Assistant.: Sana Garcia CSA  PA: TUAN Fields     Surgical Findings: [facility] peritoneal fluid, sent for cultures.      Specimen: none     Estimated Blood Loss: 5 mL    TUAN Thompson  1/4/2023  5:00 PM      Dictation     [id number]

## 2023-01-04 NOTE — ANESTHESIA POSTPROCEDURE EVALUATION
Patient: Sheldon Pinto    Procedure Summary     Date: 01/04/23 Room / Location: [address] [place] [address] MAIN OR 05 / [address] [place] [address] MAIN OR    Anesthesia Start: 6542 Anesthesia Stop: 8978    Procedure: Laparoscopic insertion of  peritoneal diaylsis catheter, possible open (Abdomen) Diagnosis: (acute renal failure)    Surgeons: Josiah Tapia MD Anesthesiologist: Roxi Turcios MD    Anesthesia Type: general ASA Status: 3          Anesthesia Type: general    Vitals Value Taken Time   /62 01/04/23 1708   Temp 97.9 01/04/23 1708   Pulse 60 01/04/23 1708   Resp 8 01/04/23 1708   SpO2 98 % 01/04/23 1708   Vitals shown include unvalidated device data.     [address] [place] [address] AN Post Evaluation:   Patient Evaluated in PACU  Patient Participation: complete - patient participated  Level of Consciousness: awake  Pain Management: adequate  Airway Patency:patent  Dental exam unchanged from preop  Yes    Cardiovascular Status: acceptable  Respiratory Status: acceptable  Postoperative Hydration acceptable      Deana Bar MD  1/4/2023 5:08 PM

## 2023-01-04 NOTE — ANESTHESIA PROCEDURE NOTES
Airway  Date/Time: 1/4/2023 3:48 PM  Urgency: Elective      General Information and Staff    Patient location during procedure: OR  Anesthesiologist: Thu Prescott MD  Performed: anesthesiologist     Indications and Patient Condition  Indications for airway management: anesthesia  Sedation level: deep  Preoxygenated: yes  Patient position: sniffing  Mask difficulty assessment: 1 - vent by mask    Final Airway Details  Final airway type: endotracheal airway      Successful airway: ETT  Cuffed: yes   Successful intubation technique: direct laryngoscopy  Endotracheal tube insertion site: oral  Blade: Queta  Blade size: #3  ETT size (mm): 7.5    Placement verified by: chest auscultation and capnometry   Measured from: teeth  Number of attempts at approach: 1

## 2023-01-05 ENCOUNTER — PATIENT OUTREACH (OUTPATIENT)
Dept: CASE MANAGEMENT | Age: 78
End: 2023-01-05

## 2023-01-05 ENCOUNTER — APPOINTMENT (OUTPATIENT)
Dept: HEMATOLOGY/ONCOLOGY | Facility: HOSPITAL | Age: 78
End: 2023-01-05
Attending: INTERNAL MEDICINE
Payer: MEDICARE

## 2023-01-05 DIAGNOSIS — C78.7 LIVER METASTASIS (HCC): ICD-10-CM

## 2023-01-05 DIAGNOSIS — E86.0 DEHYDRATION: ICD-10-CM

## 2023-01-05 DIAGNOSIS — Z92.89 HISTORY OF IMMUNOTHERAPY: ICD-10-CM

## 2023-01-05 DIAGNOSIS — R77.8 ELEVATED TROPONIN: ICD-10-CM

## 2023-01-05 DIAGNOSIS — D64.9 ANEMIA, UNSPECIFIED TYPE: ICD-10-CM

## 2023-01-05 DIAGNOSIS — N18.5 CKD (CHRONIC KIDNEY DISEASE) STAGE 5, GFR LESS THAN 15 ML/MIN (HCC): ICD-10-CM

## 2023-01-05 DIAGNOSIS — Z45.2 ENCOUNTER FOR CENTRAL LINE CARE: ICD-10-CM

## 2023-01-05 DIAGNOSIS — N25.81 SECONDARY HYPERPARATHYROIDISM (HCC): ICD-10-CM

## 2023-01-05 DIAGNOSIS — N18.9 CHRONIC KIDNEY DISEASE, UNSPECIFIED CKD STAGE: ICD-10-CM

## 2023-01-05 DIAGNOSIS — E87.20 METABOLIC ACIDOSIS: ICD-10-CM

## 2023-01-05 DIAGNOSIS — J86.9 EMPYEMA LUNG (HCC): ICD-10-CM

## 2023-01-05 DIAGNOSIS — C7B.8 NEUROENDOCRINE CARCINOMA METASTATIC TO INTRA-ABDOMINAL LYMPH NODE (HCC): Primary | ICD-10-CM

## 2023-01-05 DIAGNOSIS — C7B.8 NEUROENDOCRINE CARCINOMA METASTATIC TO INTRA-ABDOMINAL LYMPH NODE (HCC): ICD-10-CM

## 2023-01-05 DIAGNOSIS — J18.9 MULTIFOCAL PNEUMONIA: ICD-10-CM

## 2023-01-05 DIAGNOSIS — Z29.8 ENCOUNTER FOR IMMUNOTHERAPY: ICD-10-CM

## 2023-01-05 DIAGNOSIS — Z51.81 MEDICATION MONITORING ENCOUNTER: ICD-10-CM

## 2023-01-05 DIAGNOSIS — E03.2 HYPOTHYROIDISM DUE TO MEDICATION: ICD-10-CM

## 2023-01-05 DIAGNOSIS — E87.5 HYPERKALEMIA: ICD-10-CM

## 2023-01-05 DIAGNOSIS — C7A.8 NEUROENDOCRINE CARCINOMA METASTATIC TO INTRA-ABDOMINAL LYMPH NODE (HCC): Primary | ICD-10-CM

## 2023-01-05 DIAGNOSIS — N14.2: ICD-10-CM

## 2023-01-05 DIAGNOSIS — J94.8 HYDROPNEUMOTHORAX: ICD-10-CM

## 2023-01-05 DIAGNOSIS — C79.31 BRAIN METASTASIS (HCC): ICD-10-CM

## 2023-01-05 DIAGNOSIS — N17.9 ACUTE RENAL INJURY (HCC): ICD-10-CM

## 2023-01-05 DIAGNOSIS — C34.12 PRIMARY CANCER OF LEFT UPPER LOBE OF LUNG (HCC): ICD-10-CM

## 2023-01-05 DIAGNOSIS — C79.51 BONE METASTASIS (HCC): ICD-10-CM

## 2023-01-05 DIAGNOSIS — E83.39 HYPERPHOSPHATEMIA: ICD-10-CM

## 2023-01-05 DIAGNOSIS — Z51.11 CHEMOTHERAPY MANAGEMENT, ENCOUNTER FOR: ICD-10-CM

## 2023-01-05 DIAGNOSIS — N05.9 NEPHRITIS: ICD-10-CM

## 2023-01-05 DIAGNOSIS — E87.21 ACUTE METABOLIC ACIDOSIS: ICD-10-CM

## 2023-01-05 DIAGNOSIS — E87.0 HYPERNATREMIA: ICD-10-CM

## 2023-01-05 DIAGNOSIS — K63.89 MESENTERIC MASS: ICD-10-CM

## 2023-01-05 DIAGNOSIS — J91.0 MALIGNANT PLEURAL EFFUSION: ICD-10-CM

## 2023-01-05 DIAGNOSIS — C7A.8 NEUROENDOCRINE CARCINOMA METASTATIC TO INTRA-ABDOMINAL LYMPH NODE (HCC): ICD-10-CM

## 2023-01-05 DIAGNOSIS — R53.1 WEAKNESS: ICD-10-CM

## 2023-01-05 DIAGNOSIS — D70.9 NEUTROPENIA, UNSPECIFIED TYPE (HCC): ICD-10-CM

## 2023-01-05 DIAGNOSIS — N10 AIN (ACUTE INTERSTITIAL NEPHRITIS): ICD-10-CM

## 2023-01-05 DIAGNOSIS — J96.01 ACUTE RESPIRATORY FAILURE WITH HYPOXIA (HCC): ICD-10-CM

## 2023-01-05 DIAGNOSIS — R06.03 RESPIRATORY DISTRESS: ICD-10-CM

## 2023-01-05 DIAGNOSIS — E87.6 HYPOKALEMIA: ICD-10-CM

## 2023-01-05 DIAGNOSIS — L89.524 PRESSURE INJURY OF LEFT ANKLE, STAGE 4 (HCC): ICD-10-CM

## 2023-01-05 DIAGNOSIS — R09.02 HYPOXIA: ICD-10-CM

## 2023-01-09 ENCOUNTER — TELEPHONE (OUTPATIENT)
Dept: NEPHROLOGY | Facility: CLINIC | Age: 78
End: 2023-01-09

## 2023-01-09 NOTE — OPERATIVE REPORT
[facility]    PATIENT'S NAME: Ok Walden   ATTENDING PHYSICIAN: Asa Perry MD   OPERATING PHYSICIAN: Asa Perry MD   PATIENT ACCOUNT#:   [id number]    LOCATION:  [facility]  MEDICAL RECORD #:   [id number]       YOB: 1945  ADMISSION DATE:       01/04/2023      OPERATION DATE:  01/04/2023    OPERATIVE REPORT    PREOPERATIVE DIAGNOSIS:  Chronic renal failure. POSTOPERATIVE DIAGNOSIS:  Chronic renal failure. PROCEDURE:  Laparoscopic insertion of peritoneal dialysis catheter with omentopexy. ASSISTANT:  Shaniqua Santana CSA and Ana Walker PA-C. Mika Villa is a certified surgical assistant, needed for the entire procedure for both assisting with entering and closure, intraoperative assistance with port and laparoscope. INDICATIONS:  The patient is very pleasant 40-year-old male who has multiple medical problems. He has chronic renal failure. He does not want hemodialysis. We spoke multiple times. His wife and the patient both refuse any hemodialysis and want to have peritoneal dialysis. He understands he is at increased risk for surgery, particularly general anesthetic and pneumoperitoneum, but he wants to have peritoneal dialysis catheter placed. He understands the possibility of it not working and poor functioning, and asked to proceed. OPERATIVE TECHNIQUE:  Patient was prepped and draped in usual sterile fashion. A small incision was made in the right subcostal area, well away from the edge. We used a Visiport to get into the abdomen. Once we were in the peritoneum, we switched to a scope and inflated. There was no bowel injury or bleeding. The abdomen did have sort of a white liquid throughout the abdomen. This was sent for culture and cytology, though it appeared to be just benign fluid, but it was throughout, not a large amount. We then placed a second 5 mm port on the right side under direct vision.     We ended up doing an omentopexy. Omentum reached down to the pelvis. We pulled up slightly to the left side towards the middle, pulled it up and a small incision was made subcostally. Endo Close was used to place a Prolene through the omentum and through the same skin incision with 2 fascial incision port entrances and then this was tied, holding up the omentum. We then placed an incision in the left paramedian. This was taken down to the fascia. A pursestring of 0 Vicryl was placed. Went ahead and placed an insertion device through the rectus and then went along the peritoneum, all the way down to the pelvis, well above the bladder, angling slightly medially. The placement, the metal portion was removed. The catheter, which was a curved type, was placed into the abdomen through the sheath into the abdomen, sat well, curled and correctly oriented. Deeper pledget placed in the fascia. The catheter was tugged down to be sure there was no kink. We then eventually made an exit site in the left side, well above the pant line, and tunneled the catheter, bringing it out through this exit site. Pursestring was tied. We ran fluid in, 3 L ran in very easily and was emptied out completely. It sat well behind the bladder, behind the pubic bone. The air had been released. The skin was closed with 4-0 Vicryl and Dermabond. Sterile dressings were placed over the exit site and Tegaderm placed. Patient tolerated the procedure well, went to the recovery room in good condition. Dictated By Mariella Lewis MD  d: 01/07/2023 13:24:41  t: 01/07/2023 13:32:24  Ander Stevenson [id number]  [facility]/

## 2023-01-10 ENCOUNTER — NURSE ONLY (OUTPATIENT)
Dept: HEMATOLOGY/ONCOLOGY | Facility: HOSPITAL | Age: 78
End: 2023-01-10
Attending: INTERNAL MEDICINE
Payer: MEDICARE

## 2023-01-10 ENCOUNTER — OFFICE VISIT (OUTPATIENT)
Dept: HEMATOLOGY/ONCOLOGY | Facility: HOSPITAL | Age: 78
End: 2023-01-10
Attending: NURSE PRACTITIONER

## 2023-01-10 VITALS
SYSTOLIC BLOOD PRESSURE: 122 MMHG | HEIGHT: 69 IN | BODY MASS INDEX: 21.57 KG/M2 | WEIGHT: 145.63 LBS | HEART RATE: 60 BPM | OXYGEN SATURATION: 98 % | RESPIRATION RATE: 18 BRPM | TEMPERATURE: 98 F | DIASTOLIC BLOOD PRESSURE: 47 MMHG

## 2023-01-10 DIAGNOSIS — C7B.8 NEUROENDOCRINE CARCINOMA METASTATIC TO INTRA-ABDOMINAL LYMPH NODE (HCC): Primary | ICD-10-CM

## 2023-01-10 DIAGNOSIS — C7A.8 NEUROENDOCRINE CARCINOMA METASTATIC TO INTRA-ABDOMINAL LYMPH NODE (HCC): Primary | ICD-10-CM

## 2023-01-10 DIAGNOSIS — Z51.81 MEDICATION MONITORING ENCOUNTER: ICD-10-CM

## 2023-01-10 DIAGNOSIS — C79.51 BONE METASTASIS (HCC): ICD-10-CM

## 2023-01-10 DIAGNOSIS — E83.51 HYPOCALCEMIA: ICD-10-CM

## 2023-01-10 DIAGNOSIS — N18.5 CHRONIC KIDNEY DISEASE (CKD), STAGE V (HCC): ICD-10-CM

## 2023-01-10 DIAGNOSIS — Z45.2 ENCOUNTER FOR CENTRAL LINE CARE: ICD-10-CM

## 2023-01-10 DIAGNOSIS — C78.7 LIVER METASTASIS (HCC): ICD-10-CM

## 2023-01-10 DIAGNOSIS — E87.20 METABOLIC ACIDOSIS: ICD-10-CM

## 2023-01-10 DIAGNOSIS — E83.39 HYPERPHOSPHATEMIA: ICD-10-CM

## 2023-01-10 LAB
ALBUMIN SERPL-MCNC: 2.9 G/DL (ref 3.4–5)
ALBUMIN SERPL-MCNC: 2.9 G/DL (ref 3.4–5)
ALBUMIN/GLOB SERPL: 0.8 {RATIO} (ref 1–2)
ALP LIVER SERPL-CCNC: 40 U/L
ALT SERPL-CCNC: 13 U/L
ANION GAP SERPL CALC-SCNC: 12 MMOL/L (ref 0–18)
ANION GAP SERPL CALC-SCNC: 12 MMOL/L (ref 0–18)
AST SERPL-CCNC: 10 U/L (ref 15–37)
BASOPHILS # BLD AUTO: 0.02 X10(3) UL (ref 0–0.2)
BASOPHILS NFR BLD AUTO: 0.3 %
BILIRUB SERPL-MCNC: 0.3 MG/DL (ref 0.1–2)
BUN BLD-MCNC: 111 MG/DL (ref 7–18)
BUN BLD-MCNC: 111 MG/DL (ref 7–18)
BUN/CREAT SERPL: 22.1 (ref 10–20)
BUN/CREAT SERPL: 22.1 (ref 10–20)
CALCIUM BLD-MCNC: 8.2 MG/DL (ref 8.5–10.1)
CALCIUM BLD-MCNC: 8.2 MG/DL (ref 8.5–10.1)
CHLORIDE SERPL-SCNC: 116 MMOL/L (ref 98–112)
CHLORIDE SERPL-SCNC: 116 MMOL/L (ref 98–112)
CO2 SERPL-SCNC: 15 MMOL/L (ref 21–32)
CO2 SERPL-SCNC: 15 MMOL/L (ref 21–32)
CREAT BLD-MCNC: 5.02 MG/DL
CREAT BLD-MCNC: 5.02 MG/DL
DEPRECATED RDW RBC AUTO: 54.4 FL (ref 35.1–46.3)
EOSINOPHIL # BLD AUTO: 0.09 X10(3) UL (ref 0–0.7)
EOSINOPHIL NFR BLD AUTO: 1.3 %
ERYTHROCYTE [DISTWIDTH] IN BLOOD BY AUTOMATED COUNT: 15 % (ref 11–15)
GFR SERPLBLD BASED ON 1.73 SQ M-ARVRAT: 11 ML/MIN/1.73M2 (ref 60–?)
GFR SERPLBLD BASED ON 1.73 SQ M-ARVRAT: 11 ML/MIN/1.73M2 (ref 60–?)
GLOBULIN PLAS-MCNC: 3.5 G/DL (ref 2.8–4.4)
GLUCOSE BLD-MCNC: 183 MG/DL (ref 70–99)
GLUCOSE BLD-MCNC: 183 MG/DL (ref 70–99)
HCT VFR BLD AUTO: 32.4 %
HGB BLD-MCNC: 10.3 G/DL
IMM GRANULOCYTES # BLD AUTO: 0.05 X10(3) UL (ref 0–1)
IMM GRANULOCYTES NFR BLD: 0.7 %
LYMPHOCYTES # BLD AUTO: 0.34 X10(3) UL (ref 1–4)
LYMPHOCYTES NFR BLD AUTO: 5.1 %
MAGNESIUM SERPL-MCNC: 1.6 MG/DL (ref 1.6–2.6)
MCH RBC QN AUTO: 31 PG (ref 26–34)
MCHC RBC AUTO-ENTMCNC: 31.8 G/DL (ref 31–37)
MCV RBC AUTO: 97.6 FL
MONOCYTES # BLD AUTO: 0.44 X10(3) UL (ref 0.1–1)
MONOCYTES NFR BLD AUTO: 6.5 %
NEUTROPHILS # BLD AUTO: 5.78 X10 (3) UL (ref 1.5–7.7)
NEUTROPHILS # BLD AUTO: 5.78 X10(3) UL (ref 1.5–7.7)
NEUTROPHILS NFR BLD AUTO: 86.1 %
OSMOLALITY SERPL CALC.SUM OF ELEC: 336 MOSM/KG (ref 275–295)
OSMOLALITY SERPL CALC.SUM OF ELEC: 336 MOSM/KG (ref 275–295)
PHOSPHATE SERPL-MCNC: 5.8 MG/DL (ref 2.5–4.9)
PLATELET # BLD AUTO: 128 10(3)UL (ref 150–450)
POTASSIUM SERPL-SCNC: 4.5 MMOL/L (ref 3.5–5.1)
POTASSIUM SERPL-SCNC: 4.5 MMOL/L (ref 3.5–5.1)
PROT SERPL-MCNC: 6.4 G/DL (ref 6.4–8.2)
RBC # BLD AUTO: 3.32 X10(6)UL
SODIUM SERPL-SCNC: 143 MMOL/L (ref 136–145)
SODIUM SERPL-SCNC: 143 MMOL/L (ref 136–145)
WBC # BLD AUTO: 6.7 X10(3) UL (ref 4–11)

## 2023-01-10 PROCEDURE — 96372 THER/PROPH/DIAG INJ SC/IM: CPT

## 2023-01-10 PROCEDURE — 85025 COMPLETE CBC W/AUTO DIFF WBC: CPT

## 2023-01-10 PROCEDURE — 80053 COMPREHEN METABOLIC PANEL: CPT

## 2023-01-10 PROCEDURE — 36591 DRAW BLOOD OFF VENOUS DEVICE: CPT

## 2023-01-10 PROCEDURE — 99215 OFFICE O/P EST HI 40 MIN: CPT | Performed by: NURSE PRACTITIONER

## 2023-01-10 PROCEDURE — 84100 ASSAY OF PHOSPHORUS: CPT

## 2023-01-10 PROCEDURE — 83735 ASSAY OF MAGNESIUM: CPT

## 2023-01-10 PROCEDURE — 86316 IMMUNOASSAY TUMOR OTHER: CPT

## 2023-01-10 NOTE — PROGRESS NOTES
Patient here for Lab? Octreotide/ Possible Aranesp. Labs drawn from [facility] today. Patient had Peritoneal catheter placed last week for home dialysis. .  Patient states he will probably start next week. Reports fatigue today. Octreotide given IM today. Tolerated injection well. Site covered with a band-aide. Hgb 10.3. Aranesp held per parameters. Discharged back to [facility]. Will see Nate UNDERWOOD today.

## 2023-01-12 LAB — CHROMOGRANIN A: 221 NG/ML

## 2023-01-13 ENCOUNTER — TELEPHONE (OUTPATIENT)
Dept: NEPHROLOGY | Facility: CLINIC | Age: 78
End: 2023-01-13

## 2023-01-13 ENCOUNTER — OFFICE VISIT (OUTPATIENT)
Dept: NEPHROLOGY | Facility: CLINIC | Age: 78
End: 2023-01-13

## 2023-01-13 VITALS
DIASTOLIC BLOOD PRESSURE: 62 MMHG | BODY MASS INDEX: 21.33 KG/M2 | HEART RATE: 80 BPM | WEIGHT: 144 LBS | SYSTOLIC BLOOD PRESSURE: 94 MMHG | HEIGHT: 69 IN

## 2023-01-13 DIAGNOSIS — N18.5 CHRONIC KIDNEY DISEASE (CKD), STAGE V (HCC): ICD-10-CM

## 2023-01-13 DIAGNOSIS — E83.39 HYPERPHOSPHATEMIA: Primary | ICD-10-CM

## 2023-01-13 DIAGNOSIS — E87.20 METABOLIC ACIDOSIS: ICD-10-CM

## 2023-01-13 PROCEDURE — 99215 OFFICE O/P EST HI 40 MIN: CPT | Performed by: INTERNAL MEDICINE

## 2023-01-13 RX ORDER — CALCITRIOL 0.25 UG/1
0.25 CAPSULE, LIQUID FILLED ORAL DAILY
Qty: 30 CAPSULE | Refills: 2 | Status: SHIPPED | OUTPATIENT
Start: 2023-01-13

## 2023-01-13 RX ORDER — SEVELAMER CARBONATE 800 MG/1
800 TABLET, FILM COATED ORAL
Qty: 270 TABLET | Refills: 1 | Status: SHIPPED | OUTPATIENT
Start: 2023-01-13

## 2023-01-13 NOTE — TELEPHONE ENCOUNTER
Returned pharmacy call. Pt wife states pt takes 2 pills TID. Order calls for 1 pill TID. Pharmacy updated.

## 2023-01-13 NOTE — TELEPHONE ENCOUNTER
[facility]pharm calling for rx sevelamer, to verify dosage, two tablets, two times daily verses three times daily. Please call at [phone],thanks.

## 2023-01-17 ENCOUNTER — PATIENT MESSAGE (OUTPATIENT)
Dept: NEPHROLOGY | Facility: CLINIC | Age: 78
End: 2023-01-17

## 2023-01-17 NOTE — TELEPHONE ENCOUNTER
From: Yehuda Evans  To: Halie Brown MD  Sent: 1/17/2023 8:55 AM CST  Subject: Letter for the [address] This message is being sent by Annie Vazquez on behalf of Yehuda Evans. Good morning,  I need a letter for the [address] for Matthew Caldwell stating what stage is kidney diseases in, his condition, and the treatment he will be receiving and any other pertinent information you can provide. . I can either  the letter or you can post it in my chart and I will print it from there. Please let me know when it's done.   Thank you

## 2023-01-23 ENCOUNTER — MED REC SCAN ONLY (OUTPATIENT)
Dept: INTERNAL MEDICINE CLINIC | Facility: CLINIC | Age: 78
End: 2023-01-23

## 2023-01-23 NOTE — TELEPHONE ENCOUNTER
Spoke with wife in detail about need to start the training for PD and getting admitted given she has started to receive treatment in form of protein supplement, nursing care in change of dressing and dietician consult and etc.     Discussed that she needs to trust the process about getting paper work in place thru [employer] to access the port by PD nursing who are trained in accessing. Also discussed that for 2 weeks cut the PT to twice a week and get the training done three times per week.      Discussed with PD nursing twice today     Dr. Masha Christianson

## 2023-01-24 ENCOUNTER — TELEPHONE (OUTPATIENT)
Dept: HEMATOLOGY/ONCOLOGY | Facility: HOSPITAL | Age: 78
End: 2023-01-24

## 2023-01-24 NOTE — TELEPHONE ENCOUNTER
Nikolai Larsen RN- Renal phone [phone]    Work cell [phone]  Fax [phone]    Nikolai Larsen RN called from [facility]. Wife agreed to sign and start peritoneal dialysis and training. Wife will bring labeled lab tubes for port draw tomorrow and then bring to [facility] appointment. Per Nikolai Larsen RN concerned patient has been losing weight, sleeping a lot and becoming weaker. Patient will be evaluated tomorrow. Renal dietician spoke to patient and wife and educated to stop strict diet. Patient able to eat what patient wants. Faxed last [facility] labs per request for continuation of care.

## 2023-01-24 NOTE — TELEPHONE ENCOUNTER
Patient wife is calling because she states she has a couple questions regarding the patients appointment that was canceled and some other questions that she did not state.  Call back number is [phone]

## 2023-01-24 NOTE — TELEPHONE ENCOUNTER
Returned phone call. Wife informed Dr. Alejandro Espinal spoke with Dr. Adriane Ricks.  renal  tech will come with patient and provide lab tubes. We can access port and they will take lab tubes to process. Informed labs need to be completed via [facility] due to Medicare A bundle package. Informed in the future [facility] will be able to access port once paper work is completed through their legal department. Per wife patient becoming weaker and has not signed consent for dialysis. Per Dr. Adriane Ricks patient will be able to have labs drawn via port here at [facility] until paperwork completed via [address] renal to access port at [address] renal. Explained Medicare process. Wife agrees and will sign consent to start treatment and have labs drawn.

## 2023-01-25 ENCOUNTER — NURSE ONLY (OUTPATIENT)
Dept: HEMATOLOGY/ONCOLOGY | Facility: HOSPITAL | Age: 78
End: 2023-01-25
Attending: INTERNAL MEDICINE
Payer: MEDICARE

## 2023-01-25 DIAGNOSIS — Z45.2 ENCOUNTER FOR CENTRAL LINE CARE: Primary | ICD-10-CM

## 2023-01-25 PROCEDURE — 36591 DRAW BLOOD OFF VENOUS DEVICE: CPT

## 2023-01-25 RX ORDER — HEPARIN SODIUM (PORCINE) LOCK FLUSH IV SOLN 100 UNIT/ML 100 UNIT/ML
5 SOLUTION INTRAVENOUS ONCE
OUTPATIENT
Start: 2023-02-01

## 2023-01-25 RX ORDER — SODIUM CHLORIDE 9 MG/ML
10 INJECTION INTRAVENOUS ONCE
OUTPATIENT
Start: 2023-02-01

## 2023-01-25 RX ORDER — HEPARIN SODIUM (PORCINE) LOCK FLUSH IV SOLN 100 UNIT/ML 100 UNIT/ML
5 SOLUTION INTRAVENOUS ONCE
Status: COMPLETED | OUTPATIENT
Start: 2023-01-25 | End: 2023-01-25

## 2023-01-25 RX ADMIN — HEPARIN SODIUM (PORCINE) LOCK FLUSH IV SOLN 100 UNIT/ML 500 UNITS: 100 SOLUTION INTRAVENOUS at 14:31:00

## 2023-01-26 ENCOUNTER — HOSPITAL ENCOUNTER (OUTPATIENT)
Dept: GENERAL RADIOLOGY | Facility: HOSPITAL | Age: 78
Discharge: HOME | End: 2023-01-26
Attending: INTERNAL MEDICINE
Payer: MEDICARE

## 2023-01-26 ENCOUNTER — OFFICE VISIT (OUTPATIENT)
Dept: PULMONOLOGY | Facility: CLINIC | Age: 78
End: 2023-01-26

## 2023-01-26 VITALS
HEART RATE: 58 BPM | OXYGEN SATURATION: 98 % | RESPIRATION RATE: 16 BRPM | WEIGHT: 144 LBS | BODY MASS INDEX: 21.33 KG/M2 | DIASTOLIC BLOOD PRESSURE: 56 MMHG | SYSTOLIC BLOOD PRESSURE: 138 MMHG | HEIGHT: 69 IN

## 2023-01-26 DIAGNOSIS — J86.9 EMPYEMA LUNG (HCC): ICD-10-CM

## 2023-01-26 DIAGNOSIS — J42 CHRONIC BRONCHITIS, UNSPECIFIED CHRONIC BRONCHITIS TYPE (HCC): Primary | ICD-10-CM

## 2023-01-26 PROCEDURE — 71046 X-RAY EXAM CHEST 2 VIEWS: CPT | Performed by: INTERNAL MEDICINE

## 2023-01-26 PROCEDURE — 99213 OFFICE O/P EST LOW 20 MIN: CPT | Performed by: INTERNAL MEDICINE

## 2023-01-26 PROCEDURE — 1126F AMNT PAIN NOTED NONE PRSNT: CPT | Performed by: INTERNAL MEDICINE

## 2023-01-26 NOTE — PROGRESS NOTES
The patient is an [de-identified] male who comes in now for follow-up. In general, he is doing well. He had a very severe empyema with hydropneumothorax and this is improving on today's chest x-ray. 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 The patient got a peritoneal dialysis catheter. Review of Systems:  Vision normal. Ear nose and throat normal. Bowel normal. Bladder function normal. No depression. No thyroid disease. No lymphatic system concerns. No rash. Muscles and joints notable and that the patient is moving poorly and that he came up in a wheelchair. No weight loss no weight gain. Physical Examination:  Vital signs normal. HEENT examination is unremarkable with pupils equal round and reactive to light and accommodation. Neck without adenopathy, thyromegaly, JVD nor bruit. Lungs diminished breath sounds at left base to auscultation and percussion. Cardiac regular rate and rhythm no murmur. Abdomen nontender, without hepatosplenomegaly and no mass appreciable. Extremities and Musculoskeletal without clubbing cyanosis nor edema, and mobility acceptable. Neurologic grossly intact with symmetric tone and strength and reflex. Assessment and plan:  1. AFB positive BAL-Mycobacterium gordonae identified. No clinical importance. 2.  History of empyema with hydropneumothorax-better clinically and improving radiographically. Conservative management. Patient to see me at the 3-month interval or sooner if needed and contact me promptly if new trouble. 3.  Neuroendocrine tumor of the lung-metastases to bone and liver. Ongoing therapeutics as per oncology.

## 2023-02-02 ENCOUNTER — APPOINTMENT (OUTPATIENT)
Dept: HEMATOLOGY/ONCOLOGY | Facility: HOSPITAL | Age: 78
End: 2023-02-02
Attending: NURSE PRACTITIONER
Payer: MEDICARE

## 2023-02-02 ENCOUNTER — APPOINTMENT (OUTPATIENT)
Dept: HEMATOLOGY/ONCOLOGY | Facility: HOSPITAL | Age: 78
End: 2023-02-02
Attending: INTERNAL MEDICINE
Payer: MEDICARE

## 2023-02-04 ENCOUNTER — PATIENT MESSAGE (OUTPATIENT)
Dept: ENDOCRINOLOGY CLINIC | Facility: CLINIC | Age: 78
End: 2023-02-04

## 2023-02-04 DIAGNOSIS — E03.2 HYPOTHYROIDISM DUE TO MEDICATION: Primary | ICD-10-CM

## 2023-02-06 ENCOUNTER — PATIENT OUTREACH (OUTPATIENT)
Dept: CASE MANAGEMENT | Age: 78
End: 2023-02-06

## 2023-02-06 DIAGNOSIS — C7A.8 NEUROENDOCRINE CARCINOMA METASTATIC TO INTRA-ABDOMINAL LYMPH NODE (HCC): ICD-10-CM

## 2023-02-06 DIAGNOSIS — R06.03 RESPIRATORY DISTRESS: ICD-10-CM

## 2023-02-06 DIAGNOSIS — I31.31 MALIGNANT PERICARDIAL EFFUSION: ICD-10-CM

## 2023-02-06 DIAGNOSIS — J91.0 MALIGNANT PLEURAL EFFUSION: ICD-10-CM

## 2023-02-06 DIAGNOSIS — N05.9 NEPHRITIS: ICD-10-CM

## 2023-02-06 DIAGNOSIS — N18.9 CHRONIC KIDNEY DISEASE, UNSPECIFIED CKD STAGE: ICD-10-CM

## 2023-02-06 DIAGNOSIS — R19.7 DIARRHEA, UNSPECIFIED TYPE: ICD-10-CM

## 2023-02-06 DIAGNOSIS — Z29.8 ENCOUNTER FOR IMMUNOTHERAPY: ICD-10-CM

## 2023-02-06 DIAGNOSIS — G89.3 CANCER RELATED PAIN: ICD-10-CM

## 2023-02-06 DIAGNOSIS — C7B.8 NEUROENDOCRINE CARCINOMA METASTATIC TO INTRA-ABDOMINAL LYMPH NODE (HCC): ICD-10-CM

## 2023-02-06 DIAGNOSIS — E87.0 HYPERNATREMIA: ICD-10-CM

## 2023-02-06 DIAGNOSIS — C78.7 LIVER METASTASIS (HCC): ICD-10-CM

## 2023-02-06 DIAGNOSIS — N18.5 CKD (CHRONIC KIDNEY DISEASE) STAGE 5, GFR LESS THAN 15 ML/MIN (HCC): ICD-10-CM

## 2023-02-06 DIAGNOSIS — N10 AIN (ACUTE INTERSTITIAL NEPHRITIS): ICD-10-CM

## 2023-02-06 DIAGNOSIS — J18.9 MULTIFOCAL PNEUMONIA: ICD-10-CM

## 2023-02-06 DIAGNOSIS — J86.9 EMPYEMA LUNG (HCC): ICD-10-CM

## 2023-02-06 DIAGNOSIS — R53.1 WEAKNESS: ICD-10-CM

## 2023-02-06 DIAGNOSIS — R77.8 ELEVATED TROPONIN: ICD-10-CM

## 2023-02-06 DIAGNOSIS — R09.02 HYPOXIA: ICD-10-CM

## 2023-02-06 DIAGNOSIS — C79.31 BRAIN METASTASIS (HCC): ICD-10-CM

## 2023-02-06 DIAGNOSIS — N25.81 SECONDARY HYPERPARATHYROIDISM (HCC): ICD-10-CM

## 2023-02-06 DIAGNOSIS — J94.8 HYDROPNEUMOTHORAX: ICD-10-CM

## 2023-02-06 DIAGNOSIS — E03.2 HYPOTHYROIDISM DUE TO MEDICATION: ICD-10-CM

## 2023-02-06 DIAGNOSIS — N17.9 ACUTE RENAL INJURY (HCC): ICD-10-CM

## 2023-02-06 DIAGNOSIS — Z51.11 CHEMOTHERAPY MANAGEMENT, ENCOUNTER FOR: ICD-10-CM

## 2023-02-06 DIAGNOSIS — E87.6 HYPOKALEMIA: ICD-10-CM

## 2023-02-06 DIAGNOSIS — C34.12 PRIMARY CANCER OF LEFT UPPER LOBE OF LUNG (HCC): ICD-10-CM

## 2023-02-06 DIAGNOSIS — D70.9 NEUTROPENIA, UNSPECIFIED TYPE (HCC): ICD-10-CM

## 2023-02-06 DIAGNOSIS — E87.21 ACUTE METABOLIC ACIDOSIS: ICD-10-CM

## 2023-02-06 DIAGNOSIS — N18.30 STAGE 3 CHRONIC KIDNEY DISEASE, UNSPECIFIED WHETHER STAGE 3A OR 3B CKD (HCC): ICD-10-CM

## 2023-02-06 DIAGNOSIS — E86.0 DEHYDRATION: ICD-10-CM

## 2023-02-06 DIAGNOSIS — N17.9 AKI (ACUTE KIDNEY INJURY) (HCC): ICD-10-CM

## 2023-02-06 DIAGNOSIS — E83.39 HYPERPHOSPHATEMIA: ICD-10-CM

## 2023-02-06 DIAGNOSIS — C79.51 BONE METASTASIS (HCC): ICD-10-CM

## 2023-02-06 DIAGNOSIS — J96.01 ACUTE RESPIRATORY FAILURE WITH HYPOXIA (HCC): ICD-10-CM

## 2023-02-06 NOTE — TELEPHONE ENCOUNTER
From: Raquel Parada  To: Romeo Barragan. John Salazar MD  Sent: 2/4/2023 7:49 PM CST  Subject: blood test    This message is being sent by Ed Cole on behalf of Raquel Parada. Good morning, Ciara Hair has lost a lot of weight and hes starting to get nose bleeds and I'm wondering if it is his thyroid. The doctor has him on 125 mg of levothyroxen. He is taking a blood test at the cancer center on Thursday Can you possibly put an order for him to get a thyroid test so we can see if his medication needs to be adjusted and then I can make an appointment if he does need an adjustment?     Thank you

## 2023-02-09 ENCOUNTER — APPOINTMENT (OUTPATIENT)
Dept: HEMATOLOGY/ONCOLOGY | Facility: HOSPITAL | Age: 78
End: 2023-02-09
Attending: INTERNAL MEDICINE
Payer: MEDICARE

## 2023-02-09 ENCOUNTER — OFFICE VISIT (OUTPATIENT)
Dept: HEMATOLOGY/ONCOLOGY | Facility: HOSPITAL | Age: 78
End: 2023-02-09
Attending: NURSE PRACTITIONER

## 2023-02-09 VITALS
TEMPERATURE: 96 F | OXYGEN SATURATION: 97 % | HEART RATE: 59 BPM | BODY MASS INDEX: 23.18 KG/M2 | HEIGHT: 65.5 IN | DIASTOLIC BLOOD PRESSURE: 44 MMHG | RESPIRATION RATE: 24 BRPM | SYSTOLIC BLOOD PRESSURE: 113 MMHG | WEIGHT: 140.81 LBS

## 2023-02-09 DIAGNOSIS — C7B.8 NEUROENDOCRINE CARCINOMA METASTATIC TO INTRA-ABDOMINAL LYMPH NODE (HCC): Primary | ICD-10-CM

## 2023-02-09 DIAGNOSIS — C79.51 BONE METASTASIS (HCC): ICD-10-CM

## 2023-02-09 DIAGNOSIS — Z51.81 MEDICATION MONITORING ENCOUNTER: ICD-10-CM

## 2023-02-09 DIAGNOSIS — C7A.8 NEUROENDOCRINE CARCINOMA METASTATIC TO INTRA-ABDOMINAL LYMPH NODE (HCC): Primary | ICD-10-CM

## 2023-02-09 DIAGNOSIS — Z45.2 ENCOUNTER FOR CENTRAL LINE CARE: ICD-10-CM

## 2023-02-09 DIAGNOSIS — C34.12 PRIMARY CANCER OF LEFT UPPER LOBE OF LUNG (HCC): ICD-10-CM

## 2023-02-09 DIAGNOSIS — E03.2 HYPOTHYROIDISM DUE TO MEDICATION: ICD-10-CM

## 2023-02-09 DIAGNOSIS — N18.5 CHRONIC KIDNEY DISEASE (CKD), STAGE V (HCC): ICD-10-CM

## 2023-02-09 DIAGNOSIS — C78.7 LIVER METASTASIS (HCC): ICD-10-CM

## 2023-02-09 LAB
ALBUMIN SERPL-MCNC: 3 G/DL (ref 3.4–5)
ALBUMIN/GLOB SERPL: 0.8 {RATIO} (ref 1–2)
ALP LIVER SERPL-CCNC: 47 U/L
ALT SERPL-CCNC: 17 U/L
ANION GAP SERPL CALC-SCNC: 16 MMOL/L (ref 0–18)
AST SERPL-CCNC: 14 U/L (ref 15–37)
BASOPHILS # BLD AUTO: 0.02 X10(3) UL (ref 0–0.2)
BASOPHILS NFR BLD AUTO: 0.4 %
BILIRUB SERPL-MCNC: 0.3 MG/DL (ref 0.1–2)
BUN BLD-MCNC: 168 MG/DL (ref 7–18)
BUN/CREAT SERPL: 24 (ref 10–20)
CALCIUM BLD-MCNC: 8.2 MG/DL (ref 8.5–10.1)
CHLORIDE SERPL-SCNC: 116 MMOL/L (ref 98–112)
CO2 SERPL-SCNC: 12 MMOL/L (ref 21–32)
CREAT BLD-MCNC: 6.99 MG/DL
DEPRECATED RDW RBC AUTO: 52.5 FL (ref 35.1–46.3)
EOSINOPHIL # BLD AUTO: 0.07 X10(3) UL (ref 0–0.7)
EOSINOPHIL NFR BLD AUTO: 1.3 %
ERYTHROCYTE [DISTWIDTH] IN BLOOD BY AUTOMATED COUNT: 15.6 % (ref 11–15)
GFR SERPLBLD BASED ON 1.73 SQ M-ARVRAT: 7 ML/MIN/1.73M2 (ref 60–?)
GLOBULIN PLAS-MCNC: 3.6 G/DL (ref 2.8–4.4)
GLUCOSE BLD-MCNC: 205 MG/DL (ref 70–99)
HCT VFR BLD AUTO: 27.6 %
HGB BLD-MCNC: 9.2 G/DL
IMM GRANULOCYTES # BLD AUTO: 0.02 X10(3) UL (ref 0–1)
IMM GRANULOCYTES NFR BLD: 0.4 %
LYMPHOCYTES # BLD AUTO: 0.34 X10(3) UL (ref 1–4)
LYMPHOCYTES NFR BLD AUTO: 6.1 %
MCH RBC QN AUTO: 30.9 PG (ref 26–34)
MCHC RBC AUTO-ENTMCNC: 33.3 G/DL (ref 31–37)
MCV RBC AUTO: 92.6 FL
MONOCYTES # BLD AUTO: 0.29 X10(3) UL (ref 0.1–1)
MONOCYTES NFR BLD AUTO: 5.2 %
NEUTROPHILS # BLD AUTO: 4.84 X10 (3) UL (ref 1.5–7.7)
NEUTROPHILS # BLD AUTO: 4.84 X10(3) UL (ref 1.5–7.7)
NEUTROPHILS NFR BLD AUTO: 86.6 %
OSMOLALITY SERPL CALC.SUM OF ELEC: 359 MOSM/KG (ref 275–295)
PLATELET # BLD AUTO: 104 10(3)UL (ref 150–450)
POTASSIUM SERPL-SCNC: 3.7 MMOL/L (ref 3.5–5.1)
PROT SERPL-MCNC: 6.6 G/DL (ref 6.4–8.2)
RBC # BLD AUTO: 2.98 X10(6)UL
SODIUM SERPL-SCNC: 144 MMOL/L (ref 136–145)
T4 FREE SERPL-MCNC: 0.7 NG/DL (ref 0.8–1.7)
TSI SER-ACNC: 3.96 MIU/ML (ref 0.36–3.74)
WBC # BLD AUTO: 5.6 X10(3) UL (ref 4–11)

## 2023-02-09 PROCEDURE — 84439 ASSAY OF FREE THYROXINE: CPT

## 2023-02-09 PROCEDURE — 36591 DRAW BLOOD OFF VENOUS DEVICE: CPT

## 2023-02-09 PROCEDURE — 84443 ASSAY THYROID STIM HORMONE: CPT

## 2023-02-09 PROCEDURE — 96372 THER/PROPH/DIAG INJ SC/IM: CPT

## 2023-02-09 PROCEDURE — 85025 COMPLETE CBC W/AUTO DIFF WBC: CPT

## 2023-02-09 PROCEDURE — 86316 IMMUNOASSAY TUMOR OTHER: CPT

## 2023-02-09 PROCEDURE — 99215 OFFICE O/P EST HI 40 MIN: CPT | Performed by: NURSE PRACTITIONER

## 2023-02-09 PROCEDURE — 80053 COMPREHEN METABOLIC PANEL: CPT

## 2023-02-09 RX ORDER — SODIUM CHLORIDE 9 MG/ML
10 INJECTION INTRAVENOUS ONCE
OUTPATIENT
Start: 2023-02-16

## 2023-02-09 RX ORDER — HEPARIN SODIUM (PORCINE) LOCK FLUSH IV SOLN 100 UNIT/ML 100 UNIT/ML
5 SOLUTION INTRAVENOUS ONCE
Status: CANCELLED | OUTPATIENT
Start: 2023-02-15

## 2023-02-09 RX ORDER — HEPARIN SODIUM (PORCINE) LOCK FLUSH IV SOLN 100 UNIT/ML 100 UNIT/ML
5 SOLUTION INTRAVENOUS ONCE
Status: COMPLETED | OUTPATIENT
Start: 2023-02-09 | End: 2023-02-09

## 2023-02-09 RX ORDER — SODIUM CHLORIDE 9 MG/ML
10 INJECTION INTRAVENOUS ONCE
OUTPATIENT
Start: 2023-02-15

## 2023-02-09 RX ORDER — HEPARIN SODIUM (PORCINE) LOCK FLUSH IV SOLN 100 UNIT/ML 100 UNIT/ML
5 SOLUTION INTRAVENOUS ONCE
OUTPATIENT
Start: 2023-02-16

## 2023-02-09 RX ADMIN — HEPARIN SODIUM (PORCINE) LOCK FLUSH IV SOLN 100 UNIT/ML 500 UNITS: 100 SOLUTION INTRAVENOUS at 13:26:00

## 2023-02-09 NOTE — PROGRESS NOTES
Pt here for q4week octreotide (see other CSN for q4week aranesp).   Had labs via port and [facility] APN today  Octreotide given IM left upper outer glut  Tolerated well    Discharged stable to home with future appts  Gait slow, steady with rolling seated walker

## 2023-02-09 NOTE — PROGRESS NOTES
Pt here for q4week aranesp (see other CSN for q4week octreotide).   Had labs via port and [facility] APN today  hgb 9.2 today  aranesp given subcutaneous to RUQ abd  Tolerated well    Discharged stable to home with future appts  Gait slow, steady with rolling seated walker

## 2023-02-11 LAB — CHROMOGRANIN A: 230 NG/ML

## 2023-03-02 ENCOUNTER — OFFICE VISIT (OUTPATIENT)
Dept: WOUND CARE | Facility: HOSPITAL | Age: 78
End: 2023-03-02
Attending: NURSE PRACTITIONER
Payer: MEDICARE

## 2023-03-02 VITALS
SYSTOLIC BLOOD PRESSURE: 85 MMHG | BODY MASS INDEX: 20.53 KG/M2 | HEART RATE: 64 BPM | HEIGHT: 69 IN | WEIGHT: 138.63 LBS | TEMPERATURE: 98 F | DIASTOLIC BLOOD PRESSURE: 44 MMHG

## 2023-03-02 DIAGNOSIS — S01.401A: Primary | ICD-10-CM

## 2023-03-02 PROBLEM — L89.524 PRESSURE INJURY OF LEFT ANKLE, STAGE 4 (HCC): Status: RESOLVED | Noted: 2022-03-24 | Resolved: 2023-03-02

## 2023-03-02 PROBLEM — S91.002A OPEN WOUND OF LEFT ANKLE: Status: RESOLVED | Noted: 2022-03-30 | Resolved: 2023-03-02

## 2023-03-02 PROBLEM — L89.524 PRESSURE INJURY OF LEFT ANKLE, STAGE 4 (HCC): Status: RESOLVED | Noted: 2022-03-24 | Resolved: 2023-01-01

## 2023-03-02 PROBLEM — S91.002A OPEN WOUND OF LEFT ANKLE: Status: RESOLVED | Noted: 2022-03-30 | Resolved: 2023-01-01

## 2023-03-02 PROCEDURE — 88305 TISSUE EXAM BY PATHOLOGIST: CPT | Performed by: NURSE PRACTITIONER

## 2023-03-02 PROCEDURE — 11104 PUNCH BX SKIN SINGLE LESION: CPT

## 2023-03-04 ENCOUNTER — TELEPHONE (OUTPATIENT)
Dept: ENDOCRINOLOGY CLINIC | Facility: CLINIC | Age: 78
End: 2023-03-04

## 2023-03-04 DIAGNOSIS — E03.2 HYPOTHYROIDISM DUE TO MEDICATION: Primary | ICD-10-CM

## 2023-03-05 NOTE — TELEPHONE ENCOUNTER
Hi!  Please let patient know that when we checked his TSH and FT4, the levels were abnormal and so I would like to repeat them before we change the dose. I do not think that the change in dose could be the cause for the nosebleeds. Thank you!

## 2023-03-06 DIAGNOSIS — S01.401A: Primary | ICD-10-CM

## 2023-03-06 DIAGNOSIS — B00.9 HERPES SIMPLEX VIRUS (HSV) INFECTION: ICD-10-CM

## 2023-03-06 RX ORDER — ACYCLOVIR 50 MG/G
1 CREAM TOPICAL
Qty: 1 EACH | Refills: 5 | Status: SHIPPED | OUTPATIENT
Start: 2023-03-06

## 2023-03-06 RX ORDER — VALACYCLOVIR HYDROCHLORIDE 500 MG/1
500 TABLET, FILM COATED ORAL 2 TIMES DAILY
Qty: 10 TABLET | Refills: 0 | Status: SHIPPED | OUTPATIENT
Start: 2023-03-06 | End: 2023-03-11

## 2023-03-06 NOTE — TELEPHONE ENCOUNTER
Spoke to patient's wife to relay message below - she stated patient has apt at cancer center on Thursday and they will draw blood from port  Patient's wife also stated they installed humidifier on furnace to help with nose bleeds  patient's wife stated understanding that clinic will contact them with Dr. Daja Cramer recommendations once results are reviewed

## 2023-03-06 NOTE — PROGRESS NOTES
3/6/2023: called and reviewed the pathology result with patient and his wife on Friday 3/3. I called today and prescribed Valacyclovir 500 mg bid for 5 days for patient to treat viral infection in the skin lesion of the face/right jaw. Patient and his wife verbalized understanding and agreed to plan of care.

## 2023-03-07 ENCOUNTER — HOSPITAL ENCOUNTER (OUTPATIENT)
Dept: CT IMAGING | Facility: HOSPITAL | Age: 78
Discharge: HOME | End: 2023-03-07
Attending: RADIOLOGY
Payer: MEDICARE

## 2023-03-07 ENCOUNTER — HOSPITAL ENCOUNTER (OUTPATIENT)
Dept: CT IMAGING | Facility: HOSPITAL | Age: 78
Discharge: HOME | End: 2023-03-07
Attending: NURSE PRACTITIONER
Payer: MEDICARE

## 2023-03-07 DIAGNOSIS — C34.12 PRIMARY CANCER OF LEFT UPPER LOBE OF LUNG (HCC): ICD-10-CM

## 2023-03-07 DIAGNOSIS — C79.31 BRAIN METASTASIS (HCC): ICD-10-CM

## 2023-03-07 PROCEDURE — 74176 CT ABD & PELVIS W/O CONTRAST: CPT | Performed by: NURSE PRACTITIONER

## 2023-03-07 PROCEDURE — 71250 CT THORAX DX C-: CPT | Performed by: NURSE PRACTITIONER

## 2023-03-07 PROCEDURE — 70450 CT HEAD/BRAIN W/O DYE: CPT | Performed by: RADIOLOGY

## 2023-03-08 ENCOUNTER — TELEPHONE (OUTPATIENT)
Dept: RADIATION ONCOLOGY | Facility: HOSPITAL | Age: 78
End: 2023-03-08

## 2023-03-08 ENCOUNTER — APPOINTMENT (OUTPATIENT)
Dept: WOUND CARE | Facility: HOSPITAL | Age: 78
End: 2023-03-08
Attending: NURSE PRACTITIONER
Payer: MEDICARE

## 2023-03-08 NOTE — TELEPHONE ENCOUNTER
Spoke to wife to discuss the information below which is from Dr. King Nielsen. Wife states her understanding. Pt states that patient is possibly getting a cochlear implant replacement. Wife concerned about clearance from our office so the replacement. I reassured her that if clearance is needed, the office can always reach out to us or fax paperwork to us. She states her understanding.    Can you let pt know the Head CT is WNL and I put in an order for next CT 6 to 9 months,   Pt will be seeing Dr Johnny Hutson shortly so PRN with me, thx

## 2023-03-09 ENCOUNTER — OFFICE VISIT (OUTPATIENT)
Dept: HEMATOLOGY/ONCOLOGY | Facility: HOSPITAL | Age: 78
End: 2023-03-09
Attending: INTERNAL MEDICINE

## 2023-03-09 ENCOUNTER — PATIENT OUTREACH (OUTPATIENT)
Dept: CASE MANAGEMENT | Age: 78
End: 2023-03-09

## 2023-03-09 ENCOUNTER — NURSE ONLY (OUTPATIENT)
Dept: HEMATOLOGY/ONCOLOGY | Facility: HOSPITAL | Age: 78
End: 2023-03-09
Attending: INTERNAL MEDICINE
Payer: MEDICARE

## 2023-03-09 VITALS
TEMPERATURE: 98 F | OXYGEN SATURATION: 99 % | HEIGHT: 65.5 IN | BODY MASS INDEX: 22.99 KG/M2 | RESPIRATION RATE: 20 BRPM | DIASTOLIC BLOOD PRESSURE: 51 MMHG | WEIGHT: 139.63 LBS | SYSTOLIC BLOOD PRESSURE: 96 MMHG | HEART RATE: 60 BPM

## 2023-03-09 DIAGNOSIS — R53.1 WEAKNESS: ICD-10-CM

## 2023-03-09 DIAGNOSIS — J18.9 MULTIFOCAL PNEUMONIA: ICD-10-CM

## 2023-03-09 DIAGNOSIS — E03.2 HYPOTHYROIDISM DUE TO MEDICATION: ICD-10-CM

## 2023-03-09 DIAGNOSIS — Z99.2 ANEMIA DUE TO CHRONIC KIDNEY DISEASE, ON CHRONIC DIALYSIS (HCC): ICD-10-CM

## 2023-03-09 DIAGNOSIS — N18.5 ANEMIA DUE TO STAGE 5 CHRONIC KIDNEY DISEASE, NOT ON CHRONIC DIALYSIS (HCC): ICD-10-CM

## 2023-03-09 DIAGNOSIS — D63.1 ANEMIA DUE TO STAGE 5 CHRONIC KIDNEY DISEASE, NOT ON CHRONIC DIALYSIS (HCC): ICD-10-CM

## 2023-03-09 DIAGNOSIS — C79.51 BONE METASTASIS (HCC): ICD-10-CM

## 2023-03-09 DIAGNOSIS — Z87.828 HEALED WOUND: ICD-10-CM

## 2023-03-09 DIAGNOSIS — Z45.2 ENCOUNTER FOR CENTRAL LINE CARE: ICD-10-CM

## 2023-03-09 DIAGNOSIS — C78.7 LIVER METASTASIS (HCC): ICD-10-CM

## 2023-03-09 DIAGNOSIS — C7B.8 NEUROENDOCRINE CARCINOMA METASTATIC TO INTRA-ABDOMINAL LYMPH NODE (HCC): ICD-10-CM

## 2023-03-09 DIAGNOSIS — C34.12 PRIMARY CANCER OF LEFT UPPER LOBE OF LUNG (HCC): ICD-10-CM

## 2023-03-09 DIAGNOSIS — E87.21 ACUTE METABOLIC ACIDOSIS: ICD-10-CM

## 2023-03-09 DIAGNOSIS — C7A.8 NEUROENDOCRINE CARCINOMA METASTATIC TO INTRA-ABDOMINAL LYMPH NODE (HCC): Primary | ICD-10-CM

## 2023-03-09 DIAGNOSIS — E87.5 HYPERKALEMIA: ICD-10-CM

## 2023-03-09 DIAGNOSIS — N18.5 CHRONIC KIDNEY DISEASE (CKD), STAGE V (HCC): ICD-10-CM

## 2023-03-09 DIAGNOSIS — N14.2: ICD-10-CM

## 2023-03-09 DIAGNOSIS — J86.9 EMPYEMA LUNG (HCC): ICD-10-CM

## 2023-03-09 DIAGNOSIS — E83.39 HYPERPHOSPHATEMIA: ICD-10-CM

## 2023-03-09 DIAGNOSIS — R09.02 HYPOXIA: ICD-10-CM

## 2023-03-09 DIAGNOSIS — N25.81 SECONDARY HYPERPARATHYROIDISM (HCC): ICD-10-CM

## 2023-03-09 DIAGNOSIS — E87.20 METABOLIC ACIDOSIS: ICD-10-CM

## 2023-03-09 DIAGNOSIS — C7B.8 NEUROENDOCRINE CARCINOMA METASTATIC TO INTRA-ABDOMINAL LYMPH NODE (HCC): Primary | ICD-10-CM

## 2023-03-09 DIAGNOSIS — Z92.89 HISTORY OF IMMUNOTHERAPY: ICD-10-CM

## 2023-03-09 DIAGNOSIS — N18.9 CHRONIC KIDNEY DISEASE, UNSPECIFIED CKD STAGE: ICD-10-CM

## 2023-03-09 DIAGNOSIS — Z51.81 MEDICATION MONITORING ENCOUNTER: ICD-10-CM

## 2023-03-09 DIAGNOSIS — D63.1 ANEMIA DUE TO CHRONIC KIDNEY DISEASE, ON CHRONIC DIALYSIS (HCC): ICD-10-CM

## 2023-03-09 DIAGNOSIS — I31.31 MALIGNANT PERICARDIAL EFFUSION: ICD-10-CM

## 2023-03-09 DIAGNOSIS — J94.8 HYDROPNEUMOTHORAX: ICD-10-CM

## 2023-03-09 DIAGNOSIS — R77.8 ELEVATED TROPONIN: ICD-10-CM

## 2023-03-09 DIAGNOSIS — D70.9 NEUTROPENIA, UNSPECIFIED TYPE (HCC): ICD-10-CM

## 2023-03-09 DIAGNOSIS — E87.0 HYPERNATREMIA: ICD-10-CM

## 2023-03-09 DIAGNOSIS — N05.9 NEPHRITIS: ICD-10-CM

## 2023-03-09 DIAGNOSIS — E86.0 DEHYDRATION: ICD-10-CM

## 2023-03-09 DIAGNOSIS — Z51.81 ENCOUNTER FOR MONITORING DARBEPOETIN THERAPY: ICD-10-CM

## 2023-03-09 DIAGNOSIS — N17.9 ACUTE RENAL INJURY (HCC): ICD-10-CM

## 2023-03-09 DIAGNOSIS — E87.6 HYPOKALEMIA: ICD-10-CM

## 2023-03-09 DIAGNOSIS — C7A.8 NEUROENDOCRINE CARCINOMA METASTATIC TO INTRA-ABDOMINAL LYMPH NODE (HCC): ICD-10-CM

## 2023-03-09 DIAGNOSIS — J91.0 MALIGNANT PLEURAL EFFUSION: ICD-10-CM

## 2023-03-09 DIAGNOSIS — Z51.11 CHEMOTHERAPY MANAGEMENT, ENCOUNTER FOR: ICD-10-CM

## 2023-03-09 DIAGNOSIS — Z29.8 ENCOUNTER FOR IMMUNOTHERAPY: ICD-10-CM

## 2023-03-09 DIAGNOSIS — R19.7 DIARRHEA, UNSPECIFIED TYPE: ICD-10-CM

## 2023-03-09 DIAGNOSIS — N17.9 AKI (ACUTE KIDNEY INJURY) (HCC): ICD-10-CM

## 2023-03-09 DIAGNOSIS — J96.01 ACUTE RESPIRATORY FAILURE WITH HYPOXIA (HCC): ICD-10-CM

## 2023-03-09 DIAGNOSIS — N18.6 ANEMIA DUE TO CHRONIC KIDNEY DISEASE, ON CHRONIC DIALYSIS (HCC): ICD-10-CM

## 2023-03-09 DIAGNOSIS — N18.5 CKD (CHRONIC KIDNEY DISEASE) STAGE 5, GFR LESS THAN 15 ML/MIN (HCC): ICD-10-CM

## 2023-03-09 DIAGNOSIS — N10 AIN (ACUTE INTERSTITIAL NEPHRITIS): ICD-10-CM

## 2023-03-09 DIAGNOSIS — K63.89 MESENTERIC MASS: ICD-10-CM

## 2023-03-09 DIAGNOSIS — Z79.899 ENCOUNTER FOR MONITORING DARBEPOETIN THERAPY: ICD-10-CM

## 2023-03-09 DIAGNOSIS — G89.3 CANCER RELATED PAIN: ICD-10-CM

## 2023-03-09 LAB
ALBUMIN SERPL-MCNC: 2.6 G/DL (ref 3.4–5)
ALBUMIN/GLOB SERPL: 0.7 {RATIO} (ref 1–2)
ALP LIVER SERPL-CCNC: 50 U/L
ALT SERPL-CCNC: 23 U/L
ANION GAP SERPL CALC-SCNC: 14 MMOL/L (ref 0–18)
AST SERPL-CCNC: 12 U/L (ref 15–37)
BASOPHILS # BLD AUTO: 0.03 X10(3) UL (ref 0–0.2)
BASOPHILS NFR BLD AUTO: 0.6 %
BILIRUB SERPL-MCNC: 0.5 MG/DL (ref 0.1–2)
BUN BLD-MCNC: 157 MG/DL (ref 7–18)
BUN/CREAT SERPL: 18.5 (ref 10–20)
CALCIUM BLD-MCNC: 8.2 MG/DL (ref 8.5–10.1)
CHLORIDE SERPL-SCNC: 102 MMOL/L (ref 98–112)
CO2 SERPL-SCNC: 20 MMOL/L (ref 21–32)
CREAT BLD-MCNC: 8.5 MG/DL
DEPRECATED RDW RBC AUTO: 54.5 FL (ref 35.1–46.3)
EOSINOPHIL # BLD AUTO: 0.1 X10(3) UL (ref 0–0.7)
EOSINOPHIL NFR BLD AUTO: 2 %
ERYTHROCYTE [DISTWIDTH] IN BLOOD BY AUTOMATED COUNT: 15.5 % (ref 11–15)
GFR SERPLBLD BASED ON 1.73 SQ M-ARVRAT: 6 ML/MIN/1.73M2 (ref 60–?)
GLOBULIN PLAS-MCNC: 3.8 G/DL (ref 2.8–4.4)
GLUCOSE BLD-MCNC: 182 MG/DL (ref 70–99)
HCT VFR BLD AUTO: 27 %
HGB BLD-MCNC: 8.5 G/DL
IMM GRANULOCYTES # BLD AUTO: 0.02 X10(3) UL (ref 0–1)
IMM GRANULOCYTES NFR BLD: 0.4 %
LYMPHOCYTES # BLD AUTO: 0.31 X10(3) UL (ref 1–4)
LYMPHOCYTES NFR BLD AUTO: 6.2 %
MCH RBC QN AUTO: 30 PG (ref 26–34)
MCHC RBC AUTO-ENTMCNC: 31.5 G/DL (ref 31–37)
MCV RBC AUTO: 95.4 FL
MONOCYTES # BLD AUTO: 0.28 X10(3) UL (ref 0.1–1)
MONOCYTES NFR BLD AUTO: 5.6 %
NEUTROPHILS # BLD AUTO: 4.3 X10 (3) UL (ref 1.5–7.7)
NEUTROPHILS # BLD AUTO: 4.3 X10(3) UL (ref 1.5–7.7)
NEUTROPHILS NFR BLD AUTO: 85.2 %
OSMOLALITY SERPL CALC.SUM OF ELEC: 338 MOSM/KG (ref 275–295)
PLATELET # BLD AUTO: 108 10(3)UL (ref 150–450)
POTASSIUM SERPL-SCNC: 4.2 MMOL/L (ref 3.5–5.1)
PROT SERPL-MCNC: 6.4 G/DL (ref 6.4–8.2)
RBC # BLD AUTO: 2.83 X10(6)UL
SODIUM SERPL-SCNC: 136 MMOL/L (ref 136–145)
T4 FREE SERPL-MCNC: 0.6 NG/DL (ref 0.8–1.7)
TSI SER-ACNC: 10.8 MIU/ML (ref 0.36–3.74)
WBC # BLD AUTO: 5 X10(3) UL (ref 4–11)

## 2023-03-09 PROCEDURE — 99215 OFFICE O/P EST HI 40 MIN: CPT | Performed by: INTERNAL MEDICINE

## 2023-03-09 PROCEDURE — 36591 DRAW BLOOD OFF VENOUS DEVICE: CPT

## 2023-03-09 PROCEDURE — 80053 COMPREHEN METABOLIC PANEL: CPT

## 2023-03-09 PROCEDURE — 84439 ASSAY OF FREE THYROXINE: CPT

## 2023-03-09 PROCEDURE — 86316 IMMUNOASSAY TUMOR OTHER: CPT

## 2023-03-09 PROCEDURE — 85025 COMPLETE CBC W/AUTO DIFF WBC: CPT

## 2023-03-09 PROCEDURE — 84443 ASSAY THYROID STIM HORMONE: CPT

## 2023-03-09 PROCEDURE — 96372 THER/PROPH/DIAG INJ SC/IM: CPT

## 2023-03-09 RX ORDER — AMLODIPINE BESYLATE 2.5 MG/1
TABLET ORAL
COMMUNITY
Start: 2023-03-07 | End: 2023-03-11

## 2023-03-09 RX ORDER — SODIUM CHLORIDE 9 MG/ML
10 INJECTION INTRAVENOUS ONCE
OUTPATIENT
Start: 2023-03-16

## 2023-03-09 RX ORDER — THIAMINE HCL 100 MG
500 TABLET ORAL 2 TIMES DAILY
COMMUNITY

## 2023-03-09 RX ORDER — IPRATROPIUM BROMIDE AND ALBUTEROL SULFATE 2.5; .5 MG/3ML; MG/3ML
3 SOLUTION RESPIRATORY (INHALATION) 4 TIMES DAILY
Qty: 360 ML | Refills: 5 | Status: SHIPPED | OUTPATIENT
Start: 2023-03-09

## 2023-03-09 RX ORDER — HEPARIN SODIUM (PORCINE) LOCK FLUSH IV SOLN 100 UNIT/ML 100 UNIT/ML
5 SOLUTION INTRAVENOUS ONCE
OUTPATIENT
Start: 2023-03-16

## 2023-03-09 RX ORDER — HEPARIN SODIUM (PORCINE) LOCK FLUSH IV SOLN 100 UNIT/ML 100 UNIT/ML
5 SOLUTION INTRAVENOUS ONCE
Status: COMPLETED | OUTPATIENT
Start: 2023-03-09 | End: 2023-03-09

## 2023-03-09 RX ADMIN — HEPARIN SODIUM (PORCINE) LOCK FLUSH IV SOLN 100 UNIT/ML 500 UNITS: 100 SOLUTION INTRAVENOUS at 11:19:00

## 2023-03-09 NOTE — TELEPHONE ENCOUNTER
LOV: 1/26/2023  Last refill: 6/6/2022    Dr. Mal Lal review and sign pended prescription if agreeable.

## 2023-03-09 NOTE — PROGRESS NOTES
Patient here after MD visit for Octreotide and Aranesp. Hgb 8.8. Aranesp dose increased today to 50mcg. Given in 2 injections in left lower abdomen. Tolerated injections well. Octreotide dose increased to 30 mg today as well. Octreotide given IM in Left upper outer gluteal muscle. Tolerated injection well. Site covered with a band-aide. Patient states he is feeling better since starting peritoneal dialysis. Discharged ambulatory with a Rollator walker with steady gait. Future appointment made.

## 2023-03-10 ENCOUNTER — APPOINTMENT (OUTPATIENT)
Dept: ULTRASOUND IMAGING | Facility: HOSPITAL | Age: 78
End: 2023-03-10
Attending: HOSPITALIST
Payer: MEDICARE

## 2023-03-10 ENCOUNTER — APPOINTMENT (OUTPATIENT)
Dept: CT IMAGING | Facility: HOSPITAL | Age: 78
End: 2023-03-10
Attending: EMERGENCY MEDICINE
Payer: MEDICARE

## 2023-03-10 ENCOUNTER — HOSPITAL ENCOUNTER (OUTPATIENT)
Facility: HOSPITAL | Age: 78
Setting detail: OBSERVATION
Discharge: HOME | End: 2023-03-11
Attending: EMERGENCY MEDICINE | Admitting: HOSPITALIST
Payer: MEDICARE

## 2023-03-10 DIAGNOSIS — R55 SYNCOPE, NEAR: Primary | ICD-10-CM

## 2023-03-10 LAB
ALBUMIN SERPL-MCNC: 2.5 G/DL (ref 3.4–5)
ALBUMIN/GLOB SERPL: 0.7 {RATIO} (ref 1–2)
ALP LIVER SERPL-CCNC: 50 U/L
ALT SERPL-CCNC: 22 U/L
ANION GAP SERPL CALC-SCNC: 11 MMOL/L (ref 0–18)
APTT PPP: 26.3 SECONDS (ref 23.3–35.6)
AST SERPL-CCNC: 13 U/L (ref 15–37)
ATRIAL RATE: 59 BPM
BASOPHILS # BLD AUTO: 0.04 X10(3) UL (ref 0–0.2)
BASOPHILS NFR BLD AUTO: 0.7 %
BILIRUB SERPL-MCNC: 0.4 MG/DL (ref 0.1–2)
BILIRUB UR QL: NEGATIVE
BUN BLD-MCNC: 157 MG/DL (ref 7–18)
BUN/CREAT SERPL: 18.7 (ref 10–20)
CALCIUM BLD-MCNC: 8.1 MG/DL (ref 8.5–10.1)
CHLORIDE SERPL-SCNC: 103 MMOL/L (ref 98–112)
CLARITY UR: CLEAR
CO2 SERPL-SCNC: 21 MMOL/L (ref 21–32)
COLOR UR: YELLOW
CREAT BLD-MCNC: 8.39 MG/DL
DEPRECATED RDW RBC AUTO: 53.3 FL (ref 35.1–46.3)
EOSINOPHIL # BLD AUTO: 0.09 X10(3) UL (ref 0–0.7)
EOSINOPHIL NFR BLD AUTO: 1.6 %
ERYTHROCYTE [DISTWIDTH] IN BLOOD BY AUTOMATED COUNT: 15.4 % (ref 11–15)
GFR SERPLBLD BASED ON 1.73 SQ M-ARVRAT: 6 ML/MIN/1.73M2 (ref 60–?)
GLOBULIN PLAS-MCNC: 3.5 G/DL (ref 2.8–4.4)
GLUCOSE BLD-MCNC: 92 MG/DL (ref 70–99)
GLUCOSE BLDC GLUCOMTR-MCNC: 108 MG/DL (ref 70–99)
GLUCOSE UR-MCNC: 50 MG/DL
HCT VFR BLD AUTO: 25.6 %
HGB BLD-MCNC: 8.2 G/DL
IMM GRANULOCYTES # BLD AUTO: 0.05 X10(3) UL (ref 0–1)
IMM GRANULOCYTES NFR BLD: 0.9 %
INR BLD: 1.08 (ref 0.85–1.16)
KETONES UR-MCNC: NEGATIVE MG/DL
LEUKOCYTE ESTERASE UR QL STRIP.AUTO: NEGATIVE
LYMPHOCYTES # BLD AUTO: 0.24 X10(3) UL (ref 1–4)
LYMPHOCYTES NFR BLD AUTO: 4.2 %
MCH RBC QN AUTO: 30.6 PG (ref 26–34)
MCHC RBC AUTO-ENTMCNC: 32 G/DL (ref 31–37)
MCV RBC AUTO: 95.5 FL
MONOCYTES # BLD AUTO: 0.4 X10(3) UL (ref 0.1–1)
MONOCYTES NFR BLD AUTO: 6.9 %
NEUTROPHILS # BLD AUTO: 4.95 X10 (3) UL (ref 1.5–7.7)
NEUTROPHILS # BLD AUTO: 4.95 X10(3) UL (ref 1.5–7.7)
NEUTROPHILS NFR BLD AUTO: 85.7 %
NITRITE UR QL STRIP.AUTO: NEGATIVE
OSMOLALITY SERPL CALC.SUM OF ELEC: 331 MOSM/KG (ref 275–295)
P AXIS: 81 DEGREES
P-R INTERVAL: 210 MS
PH UR: 5 [PH] (ref 5–8)
PLATELET # BLD AUTO: 104 10(3)UL (ref 150–450)
POTASSIUM SERPL-SCNC: 3.9 MMOL/L (ref 3.5–5.1)
PROT SERPL-MCNC: 6 G/DL (ref 6.4–8.2)
PROT UR-MCNC: NEGATIVE MG/DL
PROTHROMBIN TIME: 13.9 SECONDS (ref 11.6–14.8)
Q-T INTERVAL: 472 MS
QRS DURATION: 170 MS
QTC CALCULATION (BEZET): 467 MS
R AXIS: 87 DEGREES
RBC # BLD AUTO: 2.68 X10(6)UL
SARS-COV-2 RNA RESP QL NAA+PROBE: NOT DETECTED
SODIUM SERPL-SCNC: 135 MMOL/L (ref 136–145)
SP GR UR STRIP: 1.02 (ref 1–1.03)
T AXIS: -76 DEGREES
UROBILINOGEN UR STRIP-ACNC: <2
VENTRICULAR RATE: 59 BPM
VIT C UR-MCNC: NEGATIVE MG/DL
WBC # BLD AUTO: 5.8 X10(3) UL (ref 4–11)

## 2023-03-10 PROCEDURE — 93880 EXTRACRANIAL BILAT STUDY: CPT | Performed by: HOSPITALIST

## 2023-03-10 PROCEDURE — 70450 CT HEAD/BRAIN W/O DYE: CPT | Performed by: EMERGENCY MEDICINE

## 2023-03-10 PROCEDURE — 99221 1ST HOSP IP/OBS SF/LOW 40: CPT | Performed by: HOSPITALIST

## 2023-03-10 PROCEDURE — 99223 1ST HOSP IP/OBS HIGH 75: CPT | Performed by: INTERNAL MEDICINE

## 2023-03-10 RX ORDER — LEVOTHYROXINE SODIUM 0.12 MG/1
125 TABLET ORAL
Status: DISCONTINUED | OUTPATIENT
Start: 2023-03-11 | End: 2023-03-11

## 2023-03-10 RX ORDER — ALPRAZOLAM 0.25 MG/1
0.5 TABLET ORAL 2 TIMES DAILY PRN
Status: DISCONTINUED | OUTPATIENT
Start: 2023-03-10 | End: 2023-03-11

## 2023-03-10 RX ORDER — SEVELAMER CARBONATE 800 MG/1
800 TABLET, FILM COATED ORAL
Status: DISCONTINUED | OUTPATIENT
Start: 2023-03-10 | End: 2023-03-10

## 2023-03-10 RX ORDER — TERAZOSIN 5 MG/1
5 CAPSULE ORAL NIGHTLY
Status: DISCONTINUED | OUTPATIENT
Start: 2023-03-10 | End: 2023-03-11

## 2023-03-10 RX ORDER — ALBUTEROL SULFATE 90 UG/1
2 AEROSOL, METERED RESPIRATORY (INHALATION) EVERY 6 HOURS PRN
Status: DISCONTINUED | OUTPATIENT
Start: 2023-03-10 | End: 2023-03-11

## 2023-03-10 RX ORDER — VANCOMYCIN HYDROCHLORIDE 125 MG/1
125 CAPSULE ORAL ONCE
Status: COMPLETED | OUTPATIENT
Start: 2023-03-10 | End: 2023-03-10

## 2023-03-10 RX ORDER — IPRATROPIUM BROMIDE AND ALBUTEROL SULFATE 2.5; .5 MG/3ML; MG/3ML
3 SOLUTION RESPIRATORY (INHALATION) EVERY 4 HOURS PRN
Status: DISCONTINUED | OUTPATIENT
Start: 2023-03-10 | End: 2023-03-11

## 2023-03-10 RX ORDER — ONDANSETRON 2 MG/ML
4 INJECTION INTRAMUSCULAR; INTRAVENOUS EVERY 6 HOURS PRN
Status: DISCONTINUED | OUTPATIENT
Start: 2023-03-10 | End: 2023-03-11

## 2023-03-10 RX ORDER — DEXTROSE MONOHYDRATE, SODIUM CHLORIDE, SODIUM LACTATE, CALCIUM CHLORIDE, MAGNESIUM CHLORIDE 1.5; 538; 448; 18.4; 5.08 G/100ML; MG/100ML; MG/100ML; MG/100ML; MG/100ML
5000 SOLUTION INTRAPERITONEAL
Status: DISCONTINUED | OUTPATIENT
Start: 2023-03-11 | End: 2023-03-11

## 2023-03-10 RX ORDER — HEPARIN SODIUM 5000 [USP'U]/ML
5000 INJECTION, SOLUTION INTRAVENOUS; SUBCUTANEOUS EVERY 12 HOURS SCHEDULED
Status: DISCONTINUED | OUTPATIENT
Start: 2023-03-10 | End: 2023-03-11

## 2023-03-10 RX ORDER — SEVELAMER CARBONATE 800 MG/1
1600 TABLET, FILM COATED ORAL
Status: DISCONTINUED | OUTPATIENT
Start: 2023-03-11 | End: 2023-03-11

## 2023-03-10 RX ORDER — PREDNISONE 1 MG/1
5 TABLET ORAL
Status: DISCONTINUED | OUTPATIENT
Start: 2023-03-11 | End: 2023-03-11

## 2023-03-10 RX ORDER — SODIUM BICARBONATE 650 MG/1
650 TABLET ORAL 2 TIMES DAILY
Status: DISCONTINUED | OUTPATIENT
Start: 2023-03-10 | End: 2023-03-11

## 2023-03-10 RX ORDER — FOLIC ACID 1 MG/1
1 TABLET ORAL DAILY
Status: DISCONTINUED | OUTPATIENT
Start: 2023-03-11 | End: 2023-03-11

## 2023-03-10 RX ORDER — FINASTERIDE 5 MG/1
5 TABLET, FILM COATED ORAL DAILY
Status: DISCONTINUED | OUTPATIENT
Start: 2023-03-11 | End: 2023-03-11

## 2023-03-10 RX ORDER — MELATONIN
325
Status: DISCONTINUED | OUTPATIENT
Start: 2023-03-11 | End: 2023-03-11

## 2023-03-10 RX ORDER — ASPIRIN 81 MG/1
81 TABLET, CHEWABLE ORAL DAILY
Status: DISCONTINUED | OUTPATIENT
Start: 2023-03-11 | End: 2023-03-11

## 2023-03-10 RX ORDER — ACETAMINOPHEN 500 MG
500 TABLET ORAL EVERY 4 HOURS PRN
Status: DISCONTINUED | OUTPATIENT
Start: 2023-03-10 | End: 2023-03-11

## 2023-03-10 RX ORDER — VALACYCLOVIR HYDROCHLORIDE 500 MG/1
500 TABLET, FILM COATED ORAL ONCE
Status: DISCONTINUED | OUTPATIENT
Start: 2023-03-12 | End: 2023-03-11

## 2023-03-10 RX ORDER — METOCLOPRAMIDE HYDROCHLORIDE 5 MG/ML
5 INJECTION INTRAMUSCULAR; INTRAVENOUS EVERY 8 HOURS PRN
Status: DISCONTINUED | OUTPATIENT
Start: 2023-03-10 | End: 2023-03-11

## 2023-03-10 RX ORDER — MULTIVITAMIN/IRON/FOLIC ACID 18MG-0.4MG
500 TABLET ORAL 2 TIMES DAILY
Status: DISCONTINUED | OUTPATIENT
Start: 2023-03-10 | End: 2023-03-11

## 2023-03-10 RX ORDER — DEXTROSE MONOHYDRATE, SODIUM CHLORIDE, SODIUM LACTATE, CALCIUM CHLORIDE, MAGNESIUM CHLORIDE 1.5; 538; 448; 18.4; 5.08 G/100ML; MG/100ML; MG/100ML; MG/100ML; MG/100ML
2000 SOLUTION INTRAPERITONEAL
Status: COMPLETED | OUTPATIENT
Start: 2023-03-11 | End: 2023-03-10

## 2023-03-10 RX ORDER — VALACYCLOVIR HYDROCHLORIDE 500 MG/1
500 TABLET, FILM COATED ORAL 2 TIMES DAILY
Status: DISCONTINUED | OUTPATIENT
Start: 2023-03-10 | End: 2023-03-10 | Stop reason: ALTCHOICE

## 2023-03-10 NOTE — ED INITIAL ASSESSMENT (HPI)
Pt presents to the ER from home. Per EMS pt gets daily dialysis at home and the family is concerned of infection d/t pt having AMS x 3 days. Family en route. Pt arrives A&Ox2.  Baseline A&Ox4

## 2023-03-10 NOTE — TELEPHONE ENCOUNTER
Toxicities: Darbepoetin sharlene on 3/9/2023    Epistaxis: (Polly reports that Alfred Rivas had a blood nose around 10 pm last night. He was bleeding from his right nare. Polly applied ice and had Nnamdi tilt his head back. The bleeding stopped under 5 minutes. Around 12 am Nnamdi's right nare started bleeding again. This time he passed a large clot. The bleeding stopped again under 5 minutes. Alfred Rivas did say he had one more nose bleed during the night. Again he bleed from the right nare. He also could feel blood draining down the back of his throat. He has not had anymore bleeding today. No other active bleeding.)    I told Polly I would update Dr Lino Morgan and her nurse. We will call back with any instructions.

## 2023-03-10 NOTE — ED QUICK NOTES
Orders for admission, patient is aware of plan and ready to go upstairs. Any questions, please call ED CINTIA Chamberlain at extension [phone]. Patient Covid vaccination status: Fully vaccinated and immunocompromised     COVID Test Ordered in ED: Rapid SARS-CoV-2 by PCR    COVID Suspicion at Admission: N/A    Running Infusions:  None    Mental Status/LOC at time of transport: a/o x2    Other pertinent information: Pt is from home. Pt typically uses walker. Wife states just prior to arrival was unable to use walker. Pt is typically a/o x 3-4 but currently a/o x 1-2. Pt has hearing aids with cochlear implant. Wife states hearing aids are not working completely.   CIWA score: N/A   NIH score:  N/A

## 2023-03-10 NOTE — ED QUICK NOTES
Rounding Completed. Assumed care from CINTIA Chamberlain at this time. Plan of Care reviewed. Waiting for bed. Bed is locked and in lowest position. Call light within reach.

## 2023-03-10 NOTE — TELEPHONE ENCOUNTER
Patient's wife called. Patient had 2 injections 3/9/23. One was for anemia. He has been up since 10/10:30 pm with a bloody nose. Please call Polly back. Thank you.

## 2023-03-10 NOTE — TELEPHONE ENCOUNTER
I attempted to reach [facility]. No answer. I left a voice mail message updating her that Dr Ming Lyon said the bleeding is due to uremia. She wants Polly to call the nephrologist. If Stefanie Peace has more bleeding she will need to bring him to the ER for evaluation. I left the office phone number if she has any questions or concerns.

## 2023-03-11 VITALS
BODY MASS INDEX: 19.75 KG/M2 | HEIGHT: 69 IN | SYSTOLIC BLOOD PRESSURE: 102 MMHG | RESPIRATION RATE: 16 BRPM | TEMPERATURE: 98 F | HEART RATE: 73 BPM | DIASTOLIC BLOOD PRESSURE: 49 MMHG | OXYGEN SATURATION: 93 % | WEIGHT: 133.31 LBS

## 2023-03-11 LAB
ANION GAP SERPL CALC-SCNC: 12 MMOL/L (ref 0–18)
BASOPHILS # BLD AUTO: 0.02 X10(3) UL (ref 0–0.2)
BASOPHILS NFR BLD AUTO: 0.4 %
BUN BLD-MCNC: 143 MG/DL (ref 7–18)
BUN/CREAT SERPL: 17.7 (ref 10–20)
CALCIUM BLD-MCNC: 8.1 MG/DL (ref 8.5–10.1)
CHLORIDE SERPL-SCNC: 101 MMOL/L (ref 98–112)
CHROMOGRANIN A: 178 NG/ML
CO2 SERPL-SCNC: 21 MMOL/L (ref 21–32)
CREAT BLD-MCNC: 8.07 MG/DL
DEPRECATED RDW RBC AUTO: 52.6 FL (ref 35.1–46.3)
EOSINOPHIL # BLD AUTO: 0.06 X10(3) UL (ref 0–0.7)
EOSINOPHIL NFR BLD AUTO: 1.3 %
ERYTHROCYTE [DISTWIDTH] IN BLOOD BY AUTOMATED COUNT: 15.4 % (ref 11–15)
GFR SERPLBLD BASED ON 1.73 SQ M-ARVRAT: 6 ML/MIN/1.73M2 (ref 60–?)
GLUCOSE BLD-MCNC: 155 MG/DL (ref 70–99)
HCT VFR BLD AUTO: 22.6 %
HCT VFR BLD AUTO: 24 %
HGB BLD-MCNC: 7.4 G/DL
HGB BLD-MCNC: 7.7 G/DL
IMM GRANULOCYTES # BLD AUTO: 0.02 X10(3) UL (ref 0–1)
IMM GRANULOCYTES NFR BLD: 0.4 %
IRON SATN MFR SERPL: 38 %
IRON SERPL-MCNC: 73 UG/DL
LYMPHOCYTES # BLD AUTO: 0.34 X10(3) UL (ref 1–4)
LYMPHOCYTES NFR BLD AUTO: 7.3 %
MCH RBC QN AUTO: 30.6 PG (ref 26–34)
MCHC RBC AUTO-ENTMCNC: 32.7 G/DL (ref 31–37)
MCV RBC AUTO: 93.4 FL
MONOCYTES # BLD AUTO: 0.38 X10(3) UL (ref 0.1–1)
MONOCYTES NFR BLD AUTO: 8.1 %
NEUTROPHILS # BLD AUTO: 3.85 X10 (3) UL (ref 1.5–7.7)
NEUTROPHILS # BLD AUTO: 3.85 X10(3) UL (ref 1.5–7.7)
NEUTROPHILS NFR BLD AUTO: 82.5 %
OSMOLALITY SERPL CALC.SUM OF ELEC: 328 MOSM/KG (ref 275–295)
PLATELET # BLD AUTO: 103 10(3)UL (ref 150–450)
POTASSIUM SERPL-SCNC: 3.9 MMOL/L (ref 3.5–5.1)
RBC # BLD AUTO: 2.42 X10(6)UL
SODIUM SERPL-SCNC: 134 MMOL/L (ref 136–145)
TIBC SERPL-MCNC: 192 UG/DL (ref 240–450)
TRANSFERRIN SERPL-MCNC: 129 MG/DL (ref 200–360)
WBC # BLD AUTO: 4.7 X10(3) UL (ref 4–11)

## 2023-03-11 PROCEDURE — 99233 SBSQ HOSP IP/OBS HIGH 50: CPT | Performed by: INTERNAL MEDICINE

## 2023-03-11 PROCEDURE — 99239 HOSP IP/OBS DSCHRG MGMT >30: CPT | Performed by: HOSPITALIST

## 2023-03-11 RX ORDER — VANCOMYCIN HYDROCHLORIDE 125 MG/1
125 CAPSULE ORAL 3 TIMES DAILY
Status: DISCONTINUED | OUTPATIENT
Start: 2023-03-11 | End: 2023-03-11

## 2023-03-11 RX ORDER — LEVOTHYROXINE SODIUM 0.07 MG/1
150 TABLET ORAL
Status: DISCONTINUED | OUTPATIENT
Start: 2023-03-12 | End: 2023-03-11

## 2023-03-11 RX ORDER — DEXTROSE MONOHYDRATE, SODIUM CHLORIDE, SODIUM LACTATE, CALCIUM CHLORIDE, MAGNESIUM CHLORIDE 1.5; 538; 448; 18.4; 5.08 G/100ML; MG/100ML; MG/100ML; MG/100ML; MG/100ML
5000 SOLUTION INTRAPERITONEAL
Status: DISCONTINUED | OUTPATIENT
Start: 2023-03-11 | End: 2023-03-11

## 2023-03-11 RX ORDER — SEVELAMER CARBONATE 800 MG/1
1600 TABLET, FILM COATED ORAL
Status: SHIPPED | COMMUNITY
Start: 2023-03-11

## 2023-03-11 RX ORDER — VANCOMYCIN HYDROCHLORIDE 125 MG/1
125 CAPSULE ORAL 3 TIMES DAILY
Refills: 0 | Status: SHIPPED | COMMUNITY
Start: 2023-03-11

## 2023-03-11 RX ORDER — LEVOTHYROXINE SODIUM 0.15 MG/1
150 TABLET ORAL
Qty: 30 TABLET | Refills: 1 | Status: SHIPPED | OUTPATIENT
Start: 2023-03-12 | End: 2023-03-20

## 2023-03-11 NOTE — PROGRESS NOTES
Pt received from ED. A&Ox4, on RA. Denies pain, nausea or vomiting. [place], right cochlear implant & left hearing aid in place. Bed rest with bathroom privileges, 2 assist with walker. Renal diet. Tx for cdiff, enteric precautions initiated. US carotid doppler ordered. Peritoneal dialysis catheter to ABD, [facility] notified for peritoneal dialysis this evening. Redness to sacrum, skin intact. Pt encouraged to notify RN to ambulate to bathroom, safety measures in place. Wife at bedside. All measures to prevent infection transmission during my interaction with the patient were taken.  Hand hygiene was performed prior to and after the exam. Stethoscope and any equipment used during my examination were cleaned following the exam.

## 2023-03-11 NOTE — PLAN OF CARE
Placed call to Katerine at 2005 to check status of pt's PD treatment. Marco A Petit RN is finishing up dialysis at the hospital and will see the pt after. Received call from [facility] stating that she'll be starting pt's PD tx around 2130. Mayur Hickey RN started pt's PD treatment at [phone].

## 2023-03-11 NOTE — H&P
[facility]    PATIENT'S NAME: Chelsea Carmona   ATTENDING PHYSICIAN: Ryan Etienne MD   PATIENT ACCOUNT#:   [de-identified]    LOCATION:  [address]  MEDICAL RECORD #:   [id number]       YOB: 1945  ADMISSION DATE:       03/10/2023    HISTORY AND PHYSICAL EXAMINATION    CHIEF COMPLAINT:  Presyncope. HISTORY OF PRESENT ILLNESS:  The patient is an [de-identified]  male with complex past medical history, currently on peritoneal dialysis, who was brought in today to the emergency department after he had a presyncopal episode at home. CBC showed hemoglobin of 8.2, which is at baseline. His hemoglobin was 10.3 in January, and then 8.5 yesterday, 9.2 in February, and has been drifting down slowly. BUN and creatinine today 157 and 8.39. He looked a little bit pale in color. Upon arrival to the emergency room, his systolic blood pressure was 139/59. CT scan of the brain showed no acute findings. There is previous encephalomalacia at the right parietal lobe at the site of previously-treated metastases. Patient will be admitted to the hospital for further observation. EKG showed sinus rhythm with right bundle branch block, which is chronic. 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 His lung cancer has been stable on PET scan studies. He has history of well-differentiated neuroendocrine tumor, retroperitoneal, and liver metastases, biopsy-proven, has been receiving octreotide infusions. Malignant pleural effusions requiring pericardiocentesis and left-sided empyema required chest tube placement.   History of COPD, coronary artery disease, multiple PCI stents, chronic left ventricular diastolic dysfunction, abdominal aortic aneurysm, severe aortic stenosis with moderate pulmonary hypertension, obstructive sleep apnea, osteoarthritis, hypertension, hyperlipidemia, and benign prostatic hypertrophy. PAST SURGICAL HISTORY:  Left ankle open reduction and internal fixation, hernia repair, pericardiocentesis, left-side chest tube, right cochlear implant, and liver biopsy. MEDICATIONS:  Please see medication reconciliation list.    ALLERGIES:  No known drug allergies. SOCIAL HISTORY:  Ex-tobacco user. No current tobacco, alcohol, or drug use. Lives with his wife. Uses a walker. Requires some assistance in his basic activities of daily living. REVIEW OF SYSTEMS:  Per the wife, patient has been having some dizziness when he stands up, and recently his medications, including amlodipine and metoprolol were cut down in half, amlodipine from 5 to 2.5 mg, and metoprolol from 25 to 12.5 mg b.i.d. Patient today woke up in the morning. He had his dialysis fluid drained, had breakfast, and then he was using his walker to walk to the bathroom when he felt his legs were wiggling, almost passed out, became pale in color, nauseated but no diaphoresis. His wife lowered him to the floor. Not clear if he hit his head. Currently feels asymptomatic. Other 12-point review of systems negative. Patient cannot tell me if he recently has been having difficulty or lightheadedness when he stands up quickly. PHYSICAL EXAMINATION:    GENERAL:  Alert and oriented to time, place and person. Hard of hearing. No acute distress. VITAL SIGNS:  Temperature 97.1, pulse 62, respiratory rate 18, blood pressure 139/59, pulse ox 100% on room air. HEENT:  Atraumatic. Oropharynx clear. Moist mucous membranes. Normal hard and soft palate. Eyes:  Anicteric sclerae. NECK:  Supple. No lymphadenopathy. LUNGS:  Diminished breathing sounds bilaterally.   HEART: Systolic murmur best heard at the apex. ABDOMEN:  Soft, nondistended. No tenderness. Positive bowel sounds. EXTREMITIES:  No edema, clubbing or cyanosis. NEUROLOGIC:  Motor and sensory intact. ASSESSMENT:    1. Presyncope, most likely orthostatic in nature. Predisposing risk factor is it happened right after emptying dialysis fluid. He has underlying severe aortic stenosis. Plus, he is on blood pressure medications. 2.   Metastatic neuroendocrine tumor. 3.   History of lung cancer. 4.   Chronic obstructive pulmonary disease. 5.   Coronary artery disease. 6.   End-stage renal disease on peritoneal dialysis. PLAN:  Patient will be admitted to telemetry floor for observation. I will discontinue amlodipine. Also, we will obtain carotid ultrasound. He is known to have aortic stenosis underlying condition. Obtain Cardiology and Nephrology consults. Fall precautions. Monitor his hemoglobin; if it drops further he will need Gastroenterology evaluation. Further recommendations to follow.      Dictated By An Mclaughlin MD  d: 03/10/2023 16:17:04  t: 03/10/2023 16:32:38  Job [id number]  /

## 2023-03-11 NOTE — PLAN OF CARE
Problem: Patient Centered Care  Goal: Patient preferences are identified and integrated in the patient's plan of care  Description: Interventions:  - What would you like us to know as we care for you? \"I am from home with my wife. \"  - Provide timely, complete, and accurate information to patient/family  - Incorporate patient and family knowledge, values, beliefs, and cultural backgrounds into the planning and delivery of care  - Encourage patient/family to participate in care and decision-making at the level they choose  - Honor patient and family perspectives and choices  Outcome: Progressing     Problem: Patient/Family Goals  Goal: Patient/Family Long Term Goal  Description: Patient's Long Term Goal: \"to go home\"    Interventions:  -Follow up MD appt  -Take meds as prescribed  -Use of assistive device if needed  - See additional Care Plan goals for specific interventions  Outcome: Progressing  Goal: Patient/Family Short Term Goal  Description: Patient's Short Term Goal: \"to prevent pain and recurrent falls\"    Interventions:   -Monitor VS  -Check labs results  -Provide pain meds as prescribed  - See additional Care Plan goals for specific interventions  Outcome: Progressing     Problem: SAFETY ADULT - FALL  Goal: Free from fall injury  Description: INTERVENTIONS:  - Assess pt frequently for physical needs  - Identify cognitive and physical deficits and behaviors that affect risk of falls.   - [place] fall precautions as indicated by assessment.  - Educate pt/family on patient safety including physical limitations  - Instruct pt to call for assistance with activity based on assessment  - Modify environment to reduce risk of injury  - Provide assistive devices as appropriate  - Consider OT/PT consult to assist with strengthening/mobility  - Encourage toileting schedule  Outcome: Progressing     Problem: DISCHARGE PLANNING  Goal: Discharge to home or other facility with appropriate resources  Description: INTERVENTIONS:  - Identify barriers to discharge w/pt and caregiver  - Include patient/family/discharge partner in discharge planning  - Arrange for needed discharge resources and transportation as appropriate  - Identify discharge learning needs (meds, wound care, etc)  - Arrange for interpreters to assist at discharge as needed  - Consider post-discharge preferences of patient/family/discharge partner  - Complete POLST form as appropriate  - Assess patient's ability to be responsible for managing their own health  - Refer to Case Management Department for coordinating discharge planning if the patient needs post-hospital services based on physician/LIP order or complex needs related to functional status, cognitive ability or social support system  Outcome: Progressing       Patient is alert and oriented x 4. He denies to be in any pain or discomfort at this time. No acute changes at this time. Safety and fall precautions maintained, bed alarm on. Ongoing PD treatment. Call light within reach. Frequent rounding by nursing staff.

## 2023-03-11 NOTE — PLAN OF CARE
Sanjay Ramirez is alert and forgetful. Vitals stable. Tolerating general diet. Denies pain this shift. Heparin for DVT prophylaxis. Eliminating waste adequately. Bed low, locked, and all safety measures in place. Received peritoneal dialysis overnight. Removed IV. Discharged home today. Reviewed discharge instructions including follow up appointments. All questions answered at this time. Problem: Patient Centered Care  Goal: Patient preferences are identified and integrated in the patient's plan of care  Description: Interventions:  - What would you like us to know as we care for you? \"I am from home with my wife. \"  - Provide timely, complete, and accurate information to patient/family  - Incorporate patient and family knowledge, values, beliefs, and cultural backgrounds into the planning and delivery of care  - Encourage patient/family to participate in care and decision-making at the level they choose  - Honor patient and family perspectives and choices  Outcome: Progressing     Problem: Patient/Family Goals  Goal: Patient/Family Long Term Goal  Description: Patient's Long Term Goal: \"to go home\"    Interventions:  -Follow up MD appt  -Take meds as prescribed  -Use of assistive device if needed  - See additional Care Plan goals for specific interventions  Outcome: Progressing  Goal: Patient/Family Short Term Goal  Description: Patient's Short Term Goal: \"to prevent pain and recurrent falls\"    Interventions:   -Monitor VS  -Check labs results  -Provide pain meds as prescribed  - See additional Care Plan goals for specific interventions  Outcome: Progressing     Problem: SAFETY ADULT - FALL  Goal: Free from fall injury  Description: INTERVENTIONS:  - Assess pt frequently for physical needs  - Identify cognitive and physical deficits and behaviors that affect risk of falls.   - [place] fall precautions as indicated by assessment.  - Educate pt/family on patient safety including physical limitations  - Instruct pt to call for assistance with activity based on assessment  - Modify environment to reduce risk of injury  - Provide assistive devices as appropriate  - Consider OT/PT consult to assist with strengthening/mobility  - Encourage toileting schedule  Outcome: Progressing     Problem: DISCHARGE PLANNING  Goal: Discharge to home or other facility with appropriate resources  Description: INTERVENTIONS:  - Identify barriers to discharge w/pt and caregiver  - Include patient/family/discharge partner in discharge planning  - Arrange for needed discharge resources and transportation as appropriate  - Identify discharge learning needs (meds, wound care, etc)  - Arrange for interpreters to assist at discharge as needed  - Consider post-discharge preferences of patient/family/discharge partner  - Complete POLST form as appropriate  - Assess patient's ability to be responsible for managing their own health  - Refer to Case Management Department for coordinating discharge planning if the patient needs post-hospital services based on physician/LIP order or complex needs related to functional status, cognitive ability or social support system  Outcome: Progressing

## 2023-03-14 ENCOUNTER — PATIENT OUTREACH (OUTPATIENT)
Dept: CASE MANAGEMENT | Age: 78
End: 2023-03-14

## 2023-03-14 ENCOUNTER — PATIENT MESSAGE (OUTPATIENT)
Dept: ENDOCRINOLOGY CLINIC | Facility: CLINIC | Age: 78
End: 2023-03-14

## 2023-03-14 ENCOUNTER — TELEPHONE (OUTPATIENT)
Dept: INTERNAL MEDICINE CLINIC | Facility: CLINIC | Age: 78
End: 2023-03-14

## 2023-03-14 ENCOUNTER — OFFICE VISIT (OUTPATIENT)
Dept: WOUND CARE | Facility: HOSPITAL | Age: 78
End: 2023-03-14
Attending: NURSE PRACTITIONER
Payer: MEDICARE

## 2023-03-14 VITALS
HEART RATE: 57 BPM | TEMPERATURE: 98 F | OXYGEN SATURATION: 100 % | RESPIRATION RATE: 18 BRPM | SYSTOLIC BLOOD PRESSURE: 108 MMHG | DIASTOLIC BLOOD PRESSURE: 51 MMHG

## 2023-03-14 DIAGNOSIS — Z02.9 ENCOUNTERS FOR UNSPECIFIED ADMINISTRATIVE PURPOSE: ICD-10-CM

## 2023-03-14 DIAGNOSIS — E03.2 HYPOTHYROIDISM DUE TO MEDICATION: Primary | ICD-10-CM

## 2023-03-14 DIAGNOSIS — S11.90XS OPEN NECK WOUND, SEQUELA: Primary | ICD-10-CM

## 2023-03-14 DIAGNOSIS — R55 SYNCOPE, NEAR: ICD-10-CM

## 2023-03-14 DIAGNOSIS — S01.401D: ICD-10-CM

## 2023-03-14 PROCEDURE — 1111F DSCHRG MED/CURRENT MED MERGE: CPT

## 2023-03-14 PROCEDURE — 99213 OFFICE O/P EST LOW 20 MIN: CPT | Performed by: NURSE PRACTITIONER

## 2023-03-14 NOTE — TELEPHONE ENCOUNTER
I will leave this decision to patient and family. Most of his care is being addressed by the specialist.  He does not need to see me if he is doing well and I would prefer he follow-up with his specialist.  If they have questions or concerns then agree follow-up with me.

## 2023-03-14 NOTE — PROGRESS NOTES
Left message on mailbox for spouse Polly to call NCM back for TCM. NCM contact information included in message.         Follow up appointments:    CARD ECHO 2D DOPPLER (CPT=93306)  Complete by: 03/11/23 (Approximate)  Follow up With Specialties Details Why Contact Info   Betty Selby MD Cardiovascular Diseases, CARDIOLOGY Follow up in 1 week(s)  Cinthia Humphrey 86         Your appointments     Date & Time Appointment Department [facility])    Mar 14, 2023  1:00 PM CDT [facility] Visit with Zi Noel [address] ([address])        Apr 06, 2023 10:30 AM CDT Lab Visit with  [facility])        Apr 06, 2023 11:30 AM CDT HEMATOLOGY ONCOLOGY FOLLOW UP with Katharina Espinoza, [facility]  Hematology Oncology [facility])        Apr 06, 2023 12:30 PM CDT Lab&Injection with  [facility])        Apr 27, 2023 12:30 PM CDT Follow Up Visit with MD Johnny Winters, [address] [facility] ([address] [facility] [address])            Juan A Martinez [address] [facility]  [address] [facility] [address] [address]  [address] [place] [address] Hematology Oncology  [facility]  [address] [facility]  [address] [facility] [address] [place] [address]  [phone]

## 2023-03-15 NOTE — TELEPHONE ENCOUNTER
From: Dayana Garcia  To: Rupinder Morocho. Shannon Gibson MD  Sent: 3/14/2023 8:52 PM CDT  Subject: Medication    This message is being sent by Kurtis Coleman on behalf of Dayana Garcia. Ricardo Sotomayor was in the hospital over the weekend and they said his thyroid was a little low and gave him medication of levothyroxin but 150 mcg. The doctor currently has him prescribed at 125 mcg I want to make sure that she wants me to change the medicine. Please advise and he gave him a 30 day supply.

## 2023-03-15 NOTE — TELEPHONE ENCOUNTER
Spoke with spouse Polly--still unable to schedule echo, requesting assistance with Scheduling. This NCM spoke with Alexandru Davila x [phone], with spouse on call--he will speak with his manager for appt availability and call spouse back. Spouse verbalizes understanding and agreement and will await return call.

## 2023-03-16 ENCOUNTER — HOSPITAL ENCOUNTER (OUTPATIENT)
Dept: CV DIAGNOSTICS | Facility: HOSPITAL | Age: 78
Discharge: HOME | End: 2023-03-16
Attending: NURSE PRACTITIONER
Payer: MEDICARE

## 2023-03-16 DIAGNOSIS — R55 SYNCOPE, NEAR: ICD-10-CM

## 2023-03-16 PROCEDURE — 93306 TTE W/DOPPLER COMPLETE: CPT | Performed by: NURSE PRACTITIONER

## 2023-03-17 ENCOUNTER — PATIENT OUTREACH (OUTPATIENT)
Dept: CASE MANAGEMENT | Age: 78
End: 2023-03-17

## 2023-03-17 NOTE — PROGRESS NOTES
Called patient regarding CCM monthly and left a message for patient to call back when they can. Reviewed patient chart. Time Spent This Encounter Total: 3  min medical record review  Monthly Minute Total including today: 3 minutes    Fabiola Perez, [address] [place] [address]  Phone: [phone]. Time [place] [address]

## 2023-03-17 NOTE — TELEPHONE ENCOUNTER
Echo completed 3/16/2023      03/16/2023 Prepared and electronically signed by   Cielo Hurst   03/16/2023 14:43 14:43

## 2023-03-19 RX ORDER — LEVOTHYROXINE SODIUM 137 UG/1
137 TABLET ORAL
Qty: 90 TABLET | Refills: 0 | Status: SHIPPED | OUTPATIENT
Start: 2023-03-19 | End: 2023-06-17

## 2023-03-19 NOTE — TELEPHONE ENCOUNTER
Hi!  Please let Ms. Melina Goodrich know that I would much rather increase the dose to 137mcg and recheck TSH and FT4 in 1 month ad re-evaluate the levothyroxine dose. Thank you!

## 2023-03-20 NOTE — TELEPHONE ENCOUNTER
Hi!  Please let her know that I would rather increase the dose from 125 to 137, rather than to 150mcg. I think that 150mcg is to high of an increase. Thank you!

## 2023-03-22 ENCOUNTER — MED REC SCAN ONLY (OUTPATIENT)
Dept: INTERNAL MEDICINE CLINIC | Facility: CLINIC | Age: 78
End: 2023-03-22

## 2023-03-28 ENCOUNTER — OFFICE VISIT (OUTPATIENT)
Dept: WOUND CARE | Facility: HOSPITAL | Age: 78
End: 2023-03-28
Attending: NURSE PRACTITIONER
Payer: MEDICARE

## 2023-03-28 VITALS
SYSTOLIC BLOOD PRESSURE: 92 MMHG | DIASTOLIC BLOOD PRESSURE: 47 MMHG | HEART RATE: 61 BPM | TEMPERATURE: 97 F | OXYGEN SATURATION: 97 % | RESPIRATION RATE: 19 BRPM

## 2023-03-28 DIAGNOSIS — S11.90XS OPEN NECK WOUND, SEQUELA: Primary | ICD-10-CM

## 2023-03-28 PROCEDURE — 99213 OFFICE O/P EST LOW 20 MIN: CPT | Performed by: NURSE PRACTITIONER

## 2023-03-29 NOTE — PLAN OF CARE
Problem: Patient Centered Care  Goal: Patient preferences are identified and integrated in the patient's plan of care  Description: Interventions:  - What would you like us to know as we care for you?  I'm suppose to have immunotherapy today  - Provide ti INTERVENTIONS:  - Maintain adequate hydration with IV or PO as ordered and tolerated  - Nasogastric tube to low intermittent suction as ordered  - Evaluate effectiveness of ordered antiemetic medications  - Provide nonpharmacologic comfort measures as appr strategies to promote bladder emptying  12/7/2021 2251 by Leonora Castañeda RN  Outcome: Progressing  12/7/2021 2248 by Leonora Castañeda RN  Outcome: Progressing   Alert and oriented X4. Vital signs stable. No complains of pain.  Voids adequately using the urinal. X Size Of Lesion In Cm (Optional): 0

## 2023-03-30 NOTE — PROGRESS NOTES
Attempted to reach the patient/spouse to complete a [facility]Hospital FU call. Left a message for the pt to call the [facility] back at, [phone].
Attempted to reach the patient/spouse to complete a [facility] FU call. Left a message for the pt/sposue to call the [facility] back at, [phone].
Multiple attempts to reach pt and messages left with no return call. Past TCM timeframe. Encounter closing.
Detail Level: Simple
Additional Notes: Discussed excision vs shave removal. I explained suturing this area would be difficult because the sutures would easily tear with movement of the thumb.
Render Risk Assessment In Note?: no

## 2023-04-03 ENCOUNTER — HOSPITAL ENCOUNTER (OUTPATIENT)
Dept: NUCLEAR MEDICINE | Facility: HOSPITAL | Age: 78
Discharge: HOME | End: 2023-04-03
Attending: INTERNAL MEDICINE
Payer: MEDICARE

## 2023-04-03 ENCOUNTER — HOSPITAL ENCOUNTER (OUTPATIENT)
Dept: NUCLEAR MEDICINE | Facility: HOSPITAL | Age: 78
Discharge: HOME | DRG: 177 | End: 2023-04-03
Attending: INTERNAL MEDICINE
Payer: MEDICARE

## 2023-04-03 DIAGNOSIS — C7B.8 NEUROENDOCRINE CARCINOMA METASTATIC TO INTRA-ABDOMINAL LYMPH NODE (HCC): ICD-10-CM

## 2023-04-03 DIAGNOSIS — C79.51 MALIGNANT NEOPLASM METASTATIC TO BONE (HCC): ICD-10-CM

## 2023-04-03 DIAGNOSIS — C7A.8 NEUROENDOCRINE CARCINOMA METASTATIC TO INTRA-ABDOMINAL LYMPH NODE (HCC): ICD-10-CM

## 2023-04-03 DIAGNOSIS — C78.7 MALIGNANT NEOPLASM METASTATIC TO LIVER (HCC): ICD-10-CM

## 2023-04-03 PROCEDURE — 78815 PET IMAGE W/CT SKULL-THIGH: CPT | Performed by: INTERNAL MEDICINE

## 2023-04-04 ENCOUNTER — TELEPHONE (OUTPATIENT)
Dept: HEMATOLOGY/ONCOLOGY | Facility: HOSPITAL | Age: 78
End: 2023-04-04

## 2023-04-04 PROBLEM — C79.51 MALIGNANT NEOPLASM METASTATIC TO BONE (HCC): Status: ACTIVE | Noted: 2020-09-28

## 2023-04-04 PROBLEM — C78.7 MALIGNANT NEOPLASM METASTATIC TO LIVER (HCC): Status: ACTIVE | Noted: 2022-09-28

## 2023-04-04 PROBLEM — C78.7 MALIGNANT NEOPLASM METASTATIC TO LIVER (HCC): Status: ACTIVE | Noted: 2022-01-01

## 2023-04-04 NOTE — TELEPHONE ENCOUNTER
Called patient's wife Leilani Aguilar yesterday and called for wife. No energy, sits in chair all day, does not want to do anything. No fevers, she check daily. No cough or sob. No PT this week because he is too weak. Confined to house. Last time went to ER. Pt developed sore to jaw. And time before cellulitis in leg. Wants to stay out of ER. Instructed to call if fevers. We will see him as scheduled on Thursday.

## 2023-04-05 ENCOUNTER — TELEPHONE (OUTPATIENT)
Dept: PULMONOLOGY | Facility: CLINIC | Age: 78
End: 2023-04-05

## 2023-04-05 ENCOUNTER — HOSPITAL ENCOUNTER (EMERGENCY)
Facility: HOSPITAL | Age: 78
Discharge: HOME | End: 2023-04-05
Attending: EMERGENCY MEDICINE
Payer: MEDICARE

## 2023-04-05 ENCOUNTER — HOSPITAL ENCOUNTER (EMERGENCY)
Facility: HOSPITAL | Age: 78
Discharge: HOME | DRG: 177 | End: 2023-04-05
Attending: EMERGENCY MEDICINE
Payer: MEDICARE

## 2023-04-05 VITALS
SYSTOLIC BLOOD PRESSURE: 102 MMHG | TEMPERATURE: 97 F | OXYGEN SATURATION: 97 % | WEIGHT: 132.25 LBS | HEART RATE: 71 BPM | BODY MASS INDEX: 20 KG/M2 | DIASTOLIC BLOOD PRESSURE: 43 MMHG | RESPIRATION RATE: 16 BRPM

## 2023-04-05 DIAGNOSIS — R04.0 EPISTAXIS: Primary | ICD-10-CM

## 2023-04-05 DIAGNOSIS — J18.9 PNEUMONIA OF RIGHT LUNG DUE TO INFECTIOUS ORGANISM, UNSPECIFIED PART OF LUNG: Primary | ICD-10-CM

## 2023-04-05 PROCEDURE — 99284 EMERGENCY DEPT VISIT MOD MDM: CPT

## 2023-04-05 PROCEDURE — 30903 CONTROL OF NOSEBLEED: CPT

## 2023-04-05 PROCEDURE — 2Y41X5Z PACKING OF NASAL REGION USING PACKING MATERIAL: ICD-10-PCS | Performed by: EMERGENCY MEDICINE

## 2023-04-05 PROCEDURE — 99283 EMERGENCY DEPT VISIT LOW MDM: CPT

## 2023-04-05 RX ORDER — DOXYCYCLINE 100 MG/1
100 CAPSULE ORAL 2 TIMES DAILY
Qty: 28 CAPSULE | Refills: 0 | Status: ON HOLD | OUTPATIENT
Start: 2023-04-05

## 2023-04-05 NOTE — ED QUICK NOTES
Attempted to dc patient. Small streak of blood noted from right nare. Patient's wife concerned. ERMD notified and at bedside. ERMD states patient is cleared for dc after assessment.

## 2023-04-05 NOTE — ED QUICK NOTES
ERMD at bedside. Tampon removed by ERMD. ERMD placed Rhino rocket to right nare. No additional bleeding seen.

## 2023-04-05 NOTE — ED INITIAL ASSESSMENT (HPI)
Patient presents to ED via EMS with c/o nosebleed from right nare since 8am this morning. Patient had dialysis today and given heparin, unsure if 1st time of heparin administration. No other thinners per EMS. Patient has tampon in right nare-placed by patient family member PTA. NO active bleeding seen at his time.

## 2023-04-05 NOTE — TELEPHONE ENCOUNTER
I spoke with the patient's wife. He has been fatigued but has not been having fever nor new cough or phlegm. The PET scan is impressive for pneumonia. I started doxycycline 100 mg twice daily and gave him a 2-week course. He already has an appointment to see me in a couple weeks. He should get a chest x-ray prior. I asked the wife to call us early next week to give an update.

## 2023-04-06 ENCOUNTER — APPOINTMENT (OUTPATIENT)
Dept: GENERAL RADIOLOGY | Facility: HOSPITAL | Age: 78
DRG: 177 | End: 2023-04-06
Attending: STUDENT IN AN ORGANIZED HEALTH CARE EDUCATION/TRAINING PROGRAM
Payer: MEDICARE

## 2023-04-06 ENCOUNTER — APPOINTMENT (OUTPATIENT)
Dept: CT IMAGING | Facility: HOSPITAL | Age: 78
End: 2023-04-06
Attending: STUDENT IN AN ORGANIZED HEALTH CARE EDUCATION/TRAINING PROGRAM
Payer: MEDICARE

## 2023-04-06 ENCOUNTER — TELEPHONE (OUTPATIENT)
Dept: OTOLARYNGOLOGY | Facility: CLINIC | Age: 78
End: 2023-04-06

## 2023-04-06 ENCOUNTER — APPOINTMENT (OUTPATIENT)
Dept: CT IMAGING | Facility: HOSPITAL | Age: 78
DRG: 177 | End: 2023-04-06
Attending: STUDENT IN AN ORGANIZED HEALTH CARE EDUCATION/TRAINING PROGRAM
Payer: MEDICARE

## 2023-04-06 ENCOUNTER — NURSE ONLY (OUTPATIENT)
Dept: HEMATOLOGY/ONCOLOGY | Facility: HOSPITAL | Age: 78
End: 2023-04-06
Attending: INTERNAL MEDICINE
Payer: MEDICARE

## 2023-04-06 ENCOUNTER — APPOINTMENT (OUTPATIENT)
Dept: GENERAL RADIOLOGY | Facility: HOSPITAL | Age: 78
End: 2023-04-06
Attending: STUDENT IN AN ORGANIZED HEALTH CARE EDUCATION/TRAINING PROGRAM
Payer: MEDICARE

## 2023-04-06 ENCOUNTER — HOSPITAL ENCOUNTER (INPATIENT)
Facility: HOSPITAL | Age: 78
LOS: 14 days | Discharge: NURSING, REHAB OR LONG-TERM CARE | End: 2023-04-20
Attending: STUDENT IN AN ORGANIZED HEALTH CARE EDUCATION/TRAINING PROGRAM | Admitting: HOSPITALIST
Payer: MEDICARE

## 2023-04-06 ENCOUNTER — TELEPHONE (OUTPATIENT)
Dept: INTERNAL MEDICINE CLINIC | Facility: CLINIC | Age: 78
End: 2023-04-06

## 2023-04-06 ENCOUNTER — HOSPITAL ENCOUNTER (INPATIENT)
Facility: HOSPITAL | Age: 78
LOS: 14 days | Discharge: NURSING, REHAB OR LONG-TERM CARE | DRG: 177 | End: 2023-04-20
Attending: STUDENT IN AN ORGANIZED HEALTH CARE EDUCATION/TRAINING PROGRAM | Admitting: HOSPITALIST
Payer: MEDICARE

## 2023-04-06 ENCOUNTER — OFFICE VISIT (OUTPATIENT)
Dept: HEMATOLOGY/ONCOLOGY | Facility: HOSPITAL | Age: 78
End: 2023-04-06
Attending: NURSE PRACTITIONER

## 2023-04-06 VITALS
HEIGHT: 65.5 IN | SYSTOLIC BLOOD PRESSURE: 109 MMHG | TEMPERATURE: 97 F | HEART RATE: 63 BPM | RESPIRATION RATE: 20 BRPM | WEIGHT: 135 LBS | BODY MASS INDEX: 22.22 KG/M2 | DIASTOLIC BLOOD PRESSURE: 44 MMHG | OXYGEN SATURATION: 100 %

## 2023-04-06 DIAGNOSIS — D69.6 THROMBOCYTOPENIA (HCC): ICD-10-CM

## 2023-04-06 DIAGNOSIS — C78.7 MALIGNANT NEOPLASM METASTATIC TO LIVER (HCC): ICD-10-CM

## 2023-04-06 DIAGNOSIS — D64.9 ANEMIA, UNSPECIFIED TYPE: ICD-10-CM

## 2023-04-06 DIAGNOSIS — C79.51 MALIGNANT NEOPLASM METASTATIC TO BONE (HCC): ICD-10-CM

## 2023-04-06 DIAGNOSIS — C7A.8 NEUROENDOCRINE CARCINOMA METASTATIC TO INTRA-ABDOMINAL LYMPH NODE (HCC): Primary | ICD-10-CM

## 2023-04-06 DIAGNOSIS — Z79.899 ENCOUNTER FOR MONITORING OCTREOTIDE THERAPY: ICD-10-CM

## 2023-04-06 DIAGNOSIS — C7B.8 NEUROENDOCRINE CARCINOMA METASTATIC TO INTRA-ABDOMINAL LYMPH NODE (HCC): Primary | ICD-10-CM

## 2023-04-06 DIAGNOSIS — Z51.81 ENCOUNTER FOR MONITORING OCTREOTIDE THERAPY: ICD-10-CM

## 2023-04-06 DIAGNOSIS — Z51.81 MEDICATION MONITORING ENCOUNTER: ICD-10-CM

## 2023-04-06 DIAGNOSIS — J18.9 COMMUNITY ACQUIRED PNEUMONIA, UNSPECIFIED LATERALITY: ICD-10-CM

## 2023-04-06 DIAGNOSIS — C34.12 PRIMARY CANCER OF LEFT UPPER LOBE OF LUNG (HCC): ICD-10-CM

## 2023-04-06 DIAGNOSIS — N19 UREMIA: Primary | ICD-10-CM

## 2023-04-06 DIAGNOSIS — Z45.2 ENCOUNTER FOR CENTRAL LINE CARE: ICD-10-CM

## 2023-04-06 DIAGNOSIS — N18.6 ANEMIA DUE TO CHRONIC KIDNEY DISEASE, ON CHRONIC DIALYSIS (HCC): ICD-10-CM

## 2023-04-06 DIAGNOSIS — D63.1 ANEMIA DUE TO CHRONIC KIDNEY DISEASE, ON CHRONIC DIALYSIS (HCC): ICD-10-CM

## 2023-04-06 DIAGNOSIS — Z99.2 ANEMIA DUE TO CHRONIC KIDNEY DISEASE, ON CHRONIC DIALYSIS (HCC): ICD-10-CM

## 2023-04-06 LAB
ALBUMIN SERPL-MCNC: 1.9 G/DL (ref 3.4–5)
ALBUMIN/GLOB SERPL: 0.5 {RATIO} (ref 1–2)
ALP LIVER SERPL-CCNC: 75 U/L
ALT SERPL-CCNC: 15 U/L
ANION GAP SERPL CALC-SCNC: 19 MMOL/L (ref 0–18)
ANTIBODY SCREEN: NEGATIVE
AST SERPL-CCNC: 7 U/L (ref 15–37)
ATRIAL RATE: 63 BPM
BASOPHILS # BLD AUTO: 0.03 X10(3) UL (ref 0–0.2)
BASOPHILS NFR BLD AUTO: 0.4 %
BILIRUB SERPL-MCNC: 0.4 MG/DL (ref 0.1–2)
BUN BLD-MCNC: 268 MG/DL (ref 7–18)
BUN/CREAT SERPL: 26.5 (ref 10–20)
CALCIUM BLD-MCNC: 8.6 MG/DL (ref 8.5–10.1)
CHLORIDE SERPL-SCNC: 96 MMOL/L (ref 98–112)
CO2 SERPL-SCNC: 15 MMOL/L (ref 21–32)
CREAT BLD-MCNC: 10.1 MG/DL
DEPRECATED RDW RBC AUTO: 73.6 FL (ref 35.1–46.3)
EOSINOPHIL # BLD AUTO: 0.05 X10(3) UL (ref 0–0.7)
EOSINOPHIL NFR BLD AUTO: 0.6 %
ERYTHROCYTE [DISTWIDTH] IN BLOOD BY AUTOMATED COUNT: 17.6 % (ref 11–15)
FASTING STATUS PATIENT QL REPORTED: NO
GFR SERPLBLD BASED ON 1.73 SQ M-ARVRAT: 5 ML/MIN/1.73M2 (ref 60–?)
GLOBULIN PLAS-MCNC: 3.9 G/DL (ref 2.8–4.4)
GLUCOSE BLD-MCNC: 194 MG/DL (ref 70–99)
GLUCOSE BLDC GLUCOMTR-MCNC: 158 MG/DL (ref 70–99)
HCT VFR BLD AUTO: 22.7 %
HGB BLD-MCNC: 6.6 G/DL
IMM GRANULOCYTES # BLD AUTO: 0.06 X10(3) UL (ref 0–1)
IMM GRANULOCYTES NFR BLD: 0.7 %
LYMPHOCYTES # BLD AUTO: 0.24 X10(3) UL (ref 1–4)
LYMPHOCYTES NFR BLD AUTO: 2.9 %
MCH RBC QN AUTO: 33 PG (ref 26–34)
MCHC RBC AUTO-ENTMCNC: 29.1 G/DL (ref 31–37)
MCV RBC AUTO: 113.5 FL
MONOCYTES # BLD AUTO: 0.23 X10(3) UL (ref 0.1–1)
MONOCYTES NFR BLD AUTO: 2.7 %
NEUTROPHILS # BLD AUTO: 7.77 X10 (3) UL (ref 1.5–7.7)
NEUTROPHILS # BLD AUTO: 7.77 X10(3) UL (ref 1.5–7.7)
NEUTROPHILS NFR BLD AUTO: 92.7 %
OSMOLALITY SERPL CALC.SUM OF ELEC: 366 MOSM/KG (ref 275–295)
P AXIS: 88 DEGREES
P-R INTERVAL: 192 MS
PLATELET # BLD AUTO: 132 10(3)UL (ref 150–450)
PLATELET MORPHOLOGY: NORMAL
POTASSIUM SERPL-SCNC: 4 MMOL/L (ref 3.5–5.1)
PROT SERPL-MCNC: 5.8 G/DL (ref 6.4–8.2)
Q-T INTERVAL: 544 MS
QRS DURATION: 188 MS
QTC CALCULATION (BEZET): 556 MS
R AXIS: 123 DEGREES
RBC # BLD AUTO: 2 X10(6)UL
RH BLOOD TYPE: POSITIVE
SARS-COV-2 RNA RESP QL NAA+PROBE: NOT DETECTED
SODIUM SERPL-SCNC: 130 MMOL/L (ref 136–145)
T AXIS: 153 DEGREES
VENTRICULAR RATE: 63 BPM
WBC # BLD AUTO: 8.4 X10(3) UL (ref 4–11)

## 2023-04-06 PROCEDURE — 85060 BLOOD SMEAR INTERPRETATION: CPT

## 2023-04-06 PROCEDURE — 85025 COMPLETE CBC W/AUTO DIFF WBC: CPT

## 2023-04-06 PROCEDURE — 99223 1ST HOSP IP/OBS HIGH 75: CPT | Performed by: INTERNAL MEDICINE

## 2023-04-06 PROCEDURE — 30233N1 TRANSFUSION OF NONAUTOLOGOUS RED BLOOD CELLS INTO PERIPHERAL VEIN, PERCUTANEOUS APPROACH: ICD-10-PCS | Performed by: STUDENT IN AN ORGANIZED HEALTH CARE EDUCATION/TRAINING PROGRAM

## 2023-04-06 PROCEDURE — 80053 COMPREHEN METABOLIC PANEL: CPT

## 2023-04-06 PROCEDURE — 99215 OFFICE O/P EST HI 40 MIN: CPT | Performed by: INTERNAL MEDICINE

## 2023-04-06 PROCEDURE — 36592 COLLECT BLOOD FROM PICC: CPT

## 2023-04-06 PROCEDURE — 86316 IMMUNOASSAY TUMOR OTHER: CPT

## 2023-04-06 PROCEDURE — 99223 1ST HOSP IP/OBS HIGH 75: CPT | Performed by: HOSPITALIST

## 2023-04-06 PROCEDURE — 96372 THER/PROPH/DIAG INJ SC/IM: CPT

## 2023-04-06 PROCEDURE — 71045 X-RAY EXAM CHEST 1 VIEW: CPT | Performed by: STUDENT IN AN ORGANIZED HEALTH CARE EDUCATION/TRAINING PROGRAM

## 2023-04-06 PROCEDURE — 71260 CT THORAX DX C+: CPT | Performed by: STUDENT IN AN ORGANIZED HEALTH CARE EDUCATION/TRAINING PROGRAM

## 2023-04-06 PROCEDURE — 3E1M39Z IRRIGATION OF PERITONEAL CAVITY USING DIALYSATE, PERCUTANEOUS APPROACH: ICD-10-PCS | Performed by: INTERNAL MEDICINE

## 2023-04-06 RX ORDER — HEPARIN SODIUM (PORCINE) LOCK FLUSH IV SOLN 100 UNIT/ML 100 UNIT/ML
5 SOLUTION INTRAVENOUS ONCE
OUTPATIENT
Start: 2023-04-13

## 2023-04-06 RX ORDER — DEXTROSE MONOHYDRATE, SODIUM CHLORIDE, SODIUM LACTATE, CALCIUM CHLORIDE, MAGNESIUM CHLORIDE 1.5; 538; 448; 18.4; 5.08 G/100ML; MG/100ML; MG/100ML; MG/100ML; MG/100ML
5000 SOLUTION INTRAPERITONEAL
Status: DISCONTINUED | OUTPATIENT
Start: 2023-04-07 | End: 2023-04-10

## 2023-04-06 RX ORDER — VANCOMYCIN HYDROCHLORIDE 125 MG/1
125 CAPSULE ORAL DAILY
Status: DISCONTINUED | OUTPATIENT
Start: 2023-04-06 | End: 2023-04-11

## 2023-04-06 RX ORDER — NICOTINE POLACRILEX 4 MG
30 LOZENGE BUCCAL
Status: DISCONTINUED | OUTPATIENT
Start: 2023-04-06 | End: 2023-04-20

## 2023-04-06 RX ORDER — ONDANSETRON 2 MG/ML
4 INJECTION INTRAMUSCULAR; INTRAVENOUS EVERY 6 HOURS PRN
Status: DISCONTINUED | OUTPATIENT
Start: 2023-04-06 | End: 2023-04-20

## 2023-04-06 RX ORDER — SODIUM CHLORIDE 9 MG/ML
10 INJECTION INTRAVENOUS ONCE
OUTPATIENT
Start: 2023-04-13

## 2023-04-06 RX ORDER — DEXTROSE MONOHYDRATE, SODIUM CHLORIDE, SODIUM LACTATE, CALCIUM CHLORIDE, MAGNESIUM CHLORIDE 1.5; 538; 448; 18.4; 5.08 G/100ML; MG/100ML; MG/100ML; MG/100ML; MG/100ML
2000 SOLUTION INTRAPERITONEAL
Status: DISCONTINUED | OUTPATIENT
Start: 2023-04-07 | End: 2023-04-10

## 2023-04-06 RX ORDER — METOCLOPRAMIDE HYDROCHLORIDE 5 MG/ML
5 INJECTION INTRAMUSCULAR; INTRAVENOUS EVERY 8 HOURS PRN
Status: DISCONTINUED | OUTPATIENT
Start: 2023-04-06 | End: 2023-04-20

## 2023-04-06 RX ORDER — HEPARIN SODIUM (PORCINE) LOCK FLUSH IV SOLN 100 UNIT/ML 100 UNIT/ML
5 SOLUTION INTRAVENOUS ONCE
Status: COMPLETED | OUTPATIENT
Start: 2023-04-06 | End: 2023-04-06

## 2023-04-06 RX ORDER — NICOTINE POLACRILEX 4 MG
15 LOZENGE BUCCAL
Status: DISCONTINUED | OUTPATIENT
Start: 2023-04-06 | End: 2023-04-20

## 2023-04-06 RX ORDER — ACETAMINOPHEN 500 MG
500 TABLET ORAL EVERY 4 HOURS PRN
Status: DISCONTINUED | OUTPATIENT
Start: 2023-04-06 | End: 2023-04-20

## 2023-04-06 RX ORDER — DEXTROSE MONOHYDRATE 25 G/50ML
50 INJECTION, SOLUTION INTRAVENOUS
Status: DISCONTINUED | OUTPATIENT
Start: 2023-04-06 | End: 2023-04-20

## 2023-04-06 RX ADMIN — HEPARIN SODIUM (PORCINE) LOCK FLUSH IV SOLN 100 UNIT/ML 500 UNITS: 100 SOLUTION INTRAVENOUS at 10:47:00

## 2023-04-06 NOTE — TELEPHONE ENCOUNTER
Karyn-Dr Harpal Jacques requesting to callback to discuss the patient last office visit with , requesting to have last office visit notes faxed to [phone], regarding care management program this patient is signed up for.

## 2023-04-06 NOTE — TELEPHONE ENCOUNTER
Per pt wife pt is not bleeding , just small dripping of nose. Per Anastasia Payne, agreed to see pt today in office, packing is still in place. Pt wife verbalized understanding.

## 2023-04-06 NOTE — H&P
[facility]    PATIENT'S NAME: Kervin Valiente   ATTENDING PHYSICIAN: Akash Salcedo MD   PATIENT ACCOUNT#:   [de-identified]    LOCATION:  [address]  MEDICAL RECORD #:   [id number]       YOB: 1945  ADMISSION DATE:       04/06/2023    HISTORY AND PHYSICAL EXAMINATION    CHIEF COMPLAINT:  Aspiration pneumonia, epistaxis, and posthemorrhagic anemia. HISTORY OF PRESENT ILLNESS:  Patient is an 76-year-old  male with end-stage renal disease, on hemodialysis, currently undergoing octreotide treatment for metastatic neuroendocrine cancer. Yesterday, he was seen in the emergency room for epistaxis and he had right nostril Rhino Rocket placement, discharged home. Today, he was seen by his hematologist and had blood work done as an outpatient, which showed hemoglobin of 6.6 which has dropped from 7.7 on March 11. Also, he had hemoglobin of 8.2 on March 10. Patient was sent to the emergency department for further evaluation and blood transfusion. CT scan of the chest showed dense airspace disease in the periphery of the right upper lobe suggestive of inflammatory changes. Started on IV Zosyn, blood transfusion. He will be admitted to the hospital for further management. 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 Milta Bologna has been discontinued, and PET scan has shown stability of disease. Also, he has a well-differentiated neuroendocrine cancer, retroperitoneal and liver metastases, biopsy-proven, has been receiving octreotide infusions, last infusion was this morning.   He had malignant pericardial and pleural effusions requiring pericardiocentesis and left-sided chest tube placement for empyema. History of chronic obstructive pulmonary disease. Coronary artery disease, multiple PCI stents. Chronic left ventricular diastolic dysfunction. Abdominal aortic aneurysm. Severe aortic stenosis with moderate pulmonary artery hypertension. Obstructive sleep apnea. Osteoarthritis. Hypertension. Hyperlipidemia. Benign prostate hypertrophy. Anemia of chronic kidney disease. PAST SURGICAL HISTORY:  Left ankle open reduction and internal fixation, hernia repair, pericardiocentesis, left-sided chest tube, right cochlear implant, and liver biopsy. MEDICATIONS:  Please see medication reconciliation list.      ALLERGIES:  No known drug allergies. SOCIAL HISTORY:  Ex-tobacco user. No current tobacco, alcohol, or drug use. Lives with his wife. Uses a walker to ambulate around. At baseline, requires some assistance in his basic activities of daily living. REVIEW OF SYSTEMS:  Epistaxis has been intense for the last 2 days. He had a Rhino Rocket placed last night, but he continued to ooze around the right nostril. Patient feels lightheaded, dizzy, and fatigued. He did receive peritoneal dialysis overnight last night per his wife. Other 12-point review of systems is negative. PHYSICAL EXAMINATION:    GENERAL:  Alert, oriented to time, place, and person. Moderate distress. Pale in color. VITAL SIGNS:  Temperature 97.4, pulse 68, respiratory rate 10, blood pressure 109/44, pulse ox 95% on room air. HEENT:  Atraumatic. Right nostril Rhino Rocket with clotted and oozing blood from around the right nostril area around the edges of the [employer] device. NECK:  Supple. No lymphadenopathy. Trachea midline. Full range of motion. LUNGS:  Clear to auscultation bilaterally. Normal respiratory effort. HEART:  Regular rate and rhythm. S1, S2 auscultated. No murmur. ABDOMEN:  Soft, nondistended. No tenderness. Positive bowel sounds.     EXTREMITIES: +1 edema. No clubbing or cyanosis. NEUROLOGIC:  Motor and sensory intact. ASSESSMENT:    1. Severe epistaxis and posthemorrhagic anemia. 2.   Neuroendocrine cancer, on octreotide infusions. 3.   Hypovolemia. 4.   Right upper lobe possible aspiration pneumonia secondary to epistaxis. 5.   End-stage renal disease, on peritoneal dialysis. PLAN:  Patient will be admitted to general medical floor. IV Zosyn. Oral vancomycin for C difficile prophylaxis. Nephrology consult, pulmonary consult, hematology/oncology consult, and also ENT consult regarding his right nostril epistaxis and Rhino Rocket. Monitor his hemodynamic status. Further recommendations to follow.     Dictated By Jeffery Rosa MD  d: 04/06/2023 18:00:01  t: 04/06/2023 18:19:11  [facility] [id number]  FB/    cc: Aidan Mckeon MD

## 2023-04-06 NOTE — ED INITIAL ASSESSMENT (HPI)
Pt to ED from [facility] for decline in health and abnormal labs drawn today. Pt with hx of peritoneal dialysis last drained this am. Wife at bedside in triage. Denies fever.    HGB 6.6  Creatinine 10.10  Pt with hx of Lung cancer

## 2023-04-06 NOTE — ED QUICK NOTES
Orders for admission, patient is aware of plan and ready to go upstairs. Any questions, please call ED CINTIA Trimble at extension [phone].      Patient Covid vaccination status: Fully vaccinated and immunocompromised     COVID Test Ordered in ED: Rapid SARS-CoV-2 by PCR    COVID Suspicion at Admission: N/A    Running Infusions:  None    Mental Status/LOC at time of transport: x3 very hard of hearing     Other pertinent information:   CIWA score: N/A   NIH score:  N/A

## 2023-04-07 ENCOUNTER — APPOINTMENT (OUTPATIENT)
Dept: INTERVENTIONAL RADIOLOGY/VASCULAR | Facility: HOSPITAL | Age: 78
DRG: 177 | End: 2023-04-07
Attending: INTERNAL MEDICINE
Payer: MEDICARE

## 2023-04-07 ENCOUNTER — PATIENT OUTREACH (OUTPATIENT)
Dept: CASE MANAGEMENT | Age: 78
End: 2023-04-07

## 2023-04-07 ENCOUNTER — APPOINTMENT (OUTPATIENT)
Dept: INTERVENTIONAL RADIOLOGY/VASCULAR | Facility: HOSPITAL | Age: 78
End: 2023-04-07
Attending: INTERNAL MEDICINE
Payer: MEDICARE

## 2023-04-07 LAB
ANION GAP SERPL CALC-SCNC: 17 MMOL/L (ref 0–18)
BASOPHILS # BLD AUTO: 0.02 X10(3) UL (ref 0–0.2)
BASOPHILS NFR BLD AUTO: 0.2 %
BUN BLD-MCNC: 269 MG/DL (ref 7–18)
BUN/CREAT SERPL: 27 (ref 10–20)
CALCIUM BLD-MCNC: 8.6 MG/DL (ref 8.5–10.1)
CHLORIDE SERPL-SCNC: 94 MMOL/L (ref 98–112)
CHROMOGRANIN A: 236 NG/ML
CO2 SERPL-SCNC: 18 MMOL/L (ref 21–32)
CREAT BLD-MCNC: 9.96 MG/DL
DEPRECATED RDW RBC AUTO: 59.2 FL (ref 35.1–46.3)
EOSINOPHIL # BLD AUTO: 0.04 X10(3) UL (ref 0–0.7)
EOSINOPHIL NFR BLD AUTO: 0.4 %
ERYTHROCYTE [DISTWIDTH] IN BLOOD BY AUTOMATED COUNT: 17 % (ref 11–15)
EST. AVERAGE GLUCOSE BLD GHB EST-MCNC: 82 MG/DL (ref 68–126)
GFR SERPLBLD BASED ON 1.73 SQ M-ARVRAT: 5 ML/MIN/1.73M2 (ref 60–?)
GLUCOSE BLD-MCNC: 192 MG/DL (ref 70–99)
GLUCOSE BLDC GLUCOMTR-MCNC: 137 MG/DL (ref 70–99)
GLUCOSE BLDC GLUCOMTR-MCNC: 143 MG/DL (ref 70–99)
GLUCOSE BLDC GLUCOMTR-MCNC: 233 MG/DL (ref 70–99)
HBA1C MFR BLD: 4.5 % (ref ?–5.7)
HBV CORE AB SERPL QL IA: NONREACTIVE
HBV SURFACE AB SER QL: NONREACTIVE
HBV SURFACE AB SER QL: NONREACTIVE
HBV SURFACE AB SERPL IA-ACNC: <3.1 MIU/ML
HBV SURFACE AB SERPL IA-ACNC: <3.1 MIU/ML
HBV SURFACE AG SER-ACNC: <0.1 [IU]/L
HBV SURFACE AG SER-ACNC: <0.1 [IU]/L
HBV SURFACE AG SERPL QL IA: NONREACTIVE
HBV SURFACE AG SERPL QL IA: NONREACTIVE
HCT VFR BLD AUTO: 21.8 %
HCT VFR BLD AUTO: 26.6 %
HGB BLD-MCNC: 7.1 G/DL
HGB BLD-MCNC: 9.2 G/DL
IMM GRANULOCYTES # BLD AUTO: 0.05 X10(3) UL (ref 0–1)
IMM GRANULOCYTES NFR BLD: 0.5 %
IRON SATN MFR SERPL: 49 %
IRON SERPL-MCNC: 90 UG/DL
LYMPHOCYTES # BLD AUTO: 0.18 X10(3) UL (ref 1–4)
LYMPHOCYTES NFR BLD AUTO: 1.9 %
MCH RBC QN AUTO: 31.3 PG (ref 26–34)
MCHC RBC AUTO-ENTMCNC: 32.6 G/DL (ref 31–37)
MCV RBC AUTO: 96 FL
MONOCYTES # BLD AUTO: 0.25 X10(3) UL (ref 0.1–1)
MONOCYTES NFR BLD AUTO: 2.7 %
NEUTROPHILS # BLD AUTO: 8.77 X10 (3) UL (ref 1.5–7.7)
NEUTROPHILS # BLD AUTO: 8.77 X10(3) UL (ref 1.5–7.7)
NEUTROPHILS NFR BLD AUTO: 94.3 %
OSMOLALITY SERPL CALC.SUM OF ELEC: 365 MOSM/KG (ref 275–295)
PLATELET # BLD AUTO: 137 10(3)UL (ref 150–450)
POTASSIUM SERPL-SCNC: 4.2 MMOL/L (ref 3.5–5.1)
RBC # BLD AUTO: 2.27 X10(6)UL
SODIUM SERPL-SCNC: 129 MMOL/L (ref 136–145)
TIBC SERPL-MCNC: 185 UG/DL (ref 240–450)
TRANSFERRIN SERPL-MCNC: 124 MG/DL (ref 200–360)
WBC # BLD AUTO: 9.3 X10(3) UL (ref 4–11)

## 2023-04-07 PROCEDURE — 99233 SBSQ HOSP IP/OBS HIGH 50: CPT | Performed by: INTERNAL MEDICINE

## 2023-04-07 PROCEDURE — 02H633Z INSERTION OF INFUSION DEVICE INTO RIGHT ATRIUM, PERCUTANEOUS APPROACH: ICD-10-PCS | Performed by: RADIOLOGY

## 2023-04-07 PROCEDURE — 99222 1ST HOSP IP/OBS MODERATE 55: CPT | Performed by: INTERNAL MEDICINE

## 2023-04-07 PROCEDURE — 5A1D70Z PERFORMANCE OF URINARY FILTRATION, INTERMITTENT, LESS THAN 6 HOURS PER DAY: ICD-10-PCS | Performed by: INTERNAL MEDICINE

## 2023-04-07 RX ORDER — ALBUMIN (HUMAN) 12.5 G/50ML
100 SOLUTION INTRAVENOUS AS NEEDED
Status: DISCONTINUED | OUTPATIENT
Start: 2023-04-07 | End: 2023-04-08

## 2023-04-07 RX ORDER — HEPARIN SODIUM 1000 [USP'U]/ML
INJECTION, SOLUTION INTRAVENOUS; SUBCUTANEOUS
Status: COMPLETED
Start: 2023-04-07 | End: 2023-04-07

## 2023-04-07 RX ORDER — LIDOCAINE HYDROCHLORIDE 20 MG/ML
INJECTION, SOLUTION INFILTRATION; PERINEURAL
Status: COMPLETED
Start: 2023-04-07 | End: 2023-04-07

## 2023-04-07 RX ORDER — SEVELAMER CARBONATE 800 MG/1
1600 TABLET, FILM COATED ORAL
Status: DISCONTINUED | OUTPATIENT
Start: 2023-04-08 | End: 2023-04-20

## 2023-04-07 RX ORDER — LIDOCAINE HYDROCHLORIDE 20 MG/ML
INJECTION, SOLUTION EPIDURAL; INFILTRATION; INTRACAUDAL; PERINEURAL
Status: COMPLETED
Start: 2023-04-07 | End: 2023-04-07

## 2023-04-07 RX ORDER — HEPARIN SODIUM 1000 [USP'U]/ML
1.5 INJECTION, SOLUTION INTRAVENOUS; SUBCUTANEOUS ONCE
Status: COMPLETED | OUTPATIENT
Start: 2023-04-07 | End: 2023-04-07

## 2023-04-07 RX ORDER — IPRATROPIUM BROMIDE AND ALBUTEROL SULFATE 2.5; .5 MG/3ML; MG/3ML
3 SOLUTION RESPIRATORY (INHALATION) EVERY 6 HOURS PRN
Status: DISCONTINUED | OUTPATIENT
Start: 2023-04-07 | End: 2023-04-20

## 2023-04-07 RX ORDER — HEPARIN SODIUM 1000 [USP'U]/ML
1.5 INJECTION, SOLUTION INTRAVENOUS; SUBCUTANEOUS ONCE
Status: DISCONTINUED | OUTPATIENT
Start: 2023-04-07 | End: 2023-04-08

## 2023-04-07 NOTE — PROGRESS NOTES
S/p R jugular non tunneled dialysis line placement today   Patient is bleeding from insertion site. Area prepped in sterile fashion, anesthestized with local lidocaine, Purse string suture applied. Covered with dressing. Bleeding stopped.

## 2023-04-07 NOTE — CM/SW NOTE
Patient discussed in rounds. Currently a PD patient, however will transition to HD. Message sent to MD via Epic secure chat requesting Hep B panel to be ordered. 332pm     04/07/23 1500   CM/SW Referral Data   Referral Source Social Work (self-referral)   Reason for Referral Discharge planning   Informant Spouse/Significant Other   Medical Hx   Does patient have an established PCP? Yes   Patient Info   Patient's Current Mental Status at Time of Assessment Confused or unable to complete assessment   Patient Communication Issues Hearing impairment   Patient's [address]   Number of Levels in Home 2   Number of Stair in Home 7   Patient lives with Spouse/Significant other   Patient Status Prior to Admission   Independent with ADLs and Mobility Yes   Services in place prior to admission DME/Supplies at home   Type of DME/Supplies Standard Walker   Discharge Needs   Anticipated D/C needs To be determined     SW initiated self referral for DC planning. Patient is [place]. Spoke with spouse, Vibha Walters. Introduced self and role. Patient lives with Polly at (address correct on face sheet). Patient is usually independent with ADL's, however has been very weak the last few days. Patient uses a walker to assist with ambulation. PCP is Dr. Nik Mccoy. Polly confirmed that patient is current with PD dialysis at home. Aware of the possibility that he will transition to outpatient HD. SW notified Polly that the closest [facility] is in [facility], Vibha Walters expressed understanding. Patient does not currently have home health services at this time. SW inquired with Polly if she would be agreeable to home health, Polly unsure at this time. States that they had a negative experience with home health in the past. SW to follow up w Polly closer to [place] to determine if agreeable to home health. PLAN: Return home w spouse. Unsure if they want home health, will follow up closer to [place].      Likely outpatient HD needed-Tentative referral sent to [address] [facility] for review.     Will need the following:  *Hepatitis B panel   *HD Cath placement report  *HD flowsheets       Andrew Fernandes, MSW, LSW    [id number]

## 2023-04-07 NOTE — PROGRESS NOTES
[facility] Pharmacy Note:  Renal Adjustment for piperacillin/tazobactam (Tracy Kinney)    Kanchan Tomlin is a [de-identified]year old patient who has been prescribed piperacillin/tazobactam (ZOSYN) 3375 mg every 8 hrs. The estimated creatinine clearance is 5 mL/min (A) (based on SCr of 10.1 mg/dL (H)). The dose has been adjusted to piperacillin/tazobactam (ZOSYN) 3375 mg every 12 hrs to be given after dialysis per hospital renal dose adjustment protocol for treatment of Pneumonia. Pharmacy will follow and adjust dose as warranted for additional renal function changes.     Thank you,    Mandeep Madrid, PharmD  4/6/2023  10:16 PM

## 2023-04-07 NOTE — RESPIRATORY THERAPY NOTE
Patient with nasal packing and nasal bleeding. Unable to use CPAP mask with nasal packing. Will hold off on CPAP for now and monitor.

## 2023-04-07 NOTE — PROGRESS NOTES
[facility]    Progress Note    Idris Cordova Patient Status:  Inpatient    1945 MRN [id number]   Location [facility] 4W/SW/SE Attending Cathryn Gan MD   Hosp Day # 1 PCP Radha Hannah MD       Subjective:   Idris Cordova is a(n) [de-identified]year old male     ROS:     Constitutional: Very lethargic  ENMT hard of hearing bleeding from nose has dried up  Eyes:  Negative for eye discharge and vision loss  Cardiovascular:  Negative for chest pain, sob, irregular heartbeat/palpitations  Respiratory:  Negative for cough, dyspnea and wheezing  Gastrointestinal:  Negative for abdominal pain, constipation, decreased appetite, diarrhea and vomiting  Genitourinary:  Negative for dysuria and hematuria  Endocrine:  Negative for abnormal sleep patterns, increased activity, polydipsia and polyphagia  Hema/Lymph:  Negative for easy bleeding and easy bruising  Integumentary:  Negative for pruritus and rash  Musculoskeletal:  Negative for bone/joint symptoms  Neurological:  Negative for gait disturbance  Psychiatric:  Negative for inappropriate interaction and psychiatric symptoms       23  1620   BP: (!) 89/53   Pulse: 79   Resp:    Temp:            PHYSICAL EXAM:   Constitutional: appears well hydrated alert and responsive no acute distress noted  Head/Face: normocephalic  Eyes/Vision: normal extraocular motion is intact  Nose/Mouth/dried blood around nose nasal packing remains  Neck/Thyroid: neck is supple without adenopathy  Lymphatic: no abnormal cervical, supraclavicular adenopathy is noted  Respiratory:  lungs are clear to auscultation bilaterally, normal respiratory effort  Cardiovascular: regular rate and rhythm no murmurs, gallups, or rubs  Abdomen: soft, non-tender, non-distended, BS normal PD catheter intact  Vascular: well perfused femoral, and pedal pulses normal  Skin/Hair: no unusual rashes present, no abnormal bruising noted  Back/Spine: no abnormalities noted  Musculoskeletal: full ROM all extremities good strength  no deformities  Extremities: 1+ edema  Neurological:  Grossly normal  Some bleeding from neck where permacath is  Results:     Laboratory Data:  Lab Results   Component Value Date    WBC 9.3 04/07/2023    HGB 7.1 (L) 04/07/2023    HCT 21.8 (L) 04/07/2023    .0 (L) 04/07/2023    CREATSERUM 9.96 (H) 04/07/2023     (H) 04/07/2023     (L) 04/07/2023    K 4.2 04/07/2023    CL 94 (L) 04/07/2023    CO2 18.0 (L) 04/07/2023     (H) 04/07/2023    CA 8.6 04/07/2023    ALB 1.9 (L) 04/06/2023    ALKPHO 75 04/06/2023    BILT 0.4 04/06/2023    TP 5.8 (L) 04/06/2023    AST 7 (L) 04/06/2023    ALT 15 (L) 04/06/2023    PTT 26.3 03/10/2023    INR 1.08 03/10/2023    T4F 0.6 (L) 03/09/2023    TSH 10.800 (H) 03/09/2023     06/15/2022    DDIMER 8.79 (H) 11/14/2022    ESRML 22 (H) 03/28/2022    CRP 1.06 (H) 03/28/2022    MG 1.6 01/10/2023    PHOS 5.8 (H) 01/10/2023    TROP <0.045 07/05/2021    CK 57 12/07/2021    B12 1,039 (H) 09/08/2022       Imaging:  [unfilled]    CENTRAL VENOUS ACCESS    Result Date: 4/7/2023  CONCLUSION: Technically successful placement of non-tunneled right internal jugular vascular catheter. Dictated by (CST): Ellen Ramos MD on 4/07/2023 at 3:02 PM     Finalized by (CST): Ellen Ramos MD on 4/07/2023 at 3:05 PM          CT CHEST(CONTRAST ONLY) (CPT=71260)    Result Date: 4/6/2023  CONCLUSION:  1. Compared to prior CT chest from 3/7/23 there has been development of a small nodular focus in the left apex and a larger consolidation in the right apex. Of these foci are suspicious for infection. Also new tree-in-bud changes surrounding the smaller left upper lobe nodule. There is also a new small left pleural effusion.   These findings are otherwise similar to recent PET-CT from 4/9/23. 2.  Large volume of probable retained mucus secretions within the trachea, which may be due to impaired mucociliary clearance, bronchitis or aspiration. Central airways are otherwise grossly patent. 3.  Chronic left pleural effusion and chronic consolidation in the left lung base. The consolidation could represent a region of pulmonary atelectasis/scar. 4.  Coronary atherosclerosis. 5.  No central pulmonary embolus. No acute aortic injury. 6.  Chronic sclerotic foci within the T3, T4, and T11 vertebrae, with chronic compression deformities at T4 and T11. Additional bone lesions were demonstrated on recent PET-CT, some which were PET/Dotatate avid. 7.  Body wall edema/anasarca. Dictated by (CST): Dino Renee MD on 4/06/2023 at 6:44 PM     Finalized by (CST): Dino Renee MD on 4/06/2023 at 6:54 PM          XR CHEST AP PORTABLE  (CPT=71045)    Result Date: 4/6/2023  CONCLUSION:  1. Dense airspace disease at the periphery of the right upper lobe, perhaps infectious/inflammatory in etiology or, less likely, related to malignancy. 2. Small left pleural effusion and associated compressive atelectasis, with or without superimposed pneumonia. Dictated by (CST): Joanne Miles MD on 4/06/2023 at 5:19 PM     Finalized by (CST): Joanne Miles MD on 4/06/2023 at 5:22 PM              ASSESSMENT/PLAN:   Assessment       #1 uremic poisoning PD not working we will discontinue peritoneal dialysis getting first dialysis treatment today blood pressure went down will not remove fluid saw on dialysis plan dialysis very slowly again tomorrow discussed with nurse Stefany Cotton    #2 nosebleeds most likely due to platelet dysfunction currently stable  #3 hypotension watch blood pressure  Not on BP meds  #4 lung infiltrates on Zosyn  #5 anemia hemoglobin 7.1 most likely from nosebleeds on Epogen and iron levels okay  Continue present plan           4/7/2023  Sharon Sanches MD

## 2023-04-07 NOTE — PLAN OF CARE
Patient is A/ox3, confused/forgetful at times, follows commands, bedrest with bathroom privileges, voiding via urinal, no BM overnight, currently receiving dialysis, right Rhino Rocket in place, x2 max assist, IV Zosyn to be given after dialysis via right chest port, denies nausea, received PRN Tylenol for pain, vital signs stable, remote tele in place, no acute changes overnight. Call light within reach, safety measures in place, frequent rounding done, plan of care continued.

## 2023-04-07 NOTE — ED QUICK NOTES
[facility] Per [facility] floor nurse to call once pt is in room for admission.

## 2023-04-07 NOTE — PROGRESS NOTES
Called patient's wife Polly to follow up to see how she is doing and how patient is doing. Left a message for patient's wife Polly to call back when they can. Reviewed patient chart. Time Spent This Encounter Total: 3  min medical record review  Monthly Minute Total including today: 3 minutes    Jannette Gore, [address] [place] [address], [address] [place] [address]  Phone: [phone]. Time [place] [address] [facility]

## 2023-04-08 LAB
ALBUMIN SERPL-MCNC: 1.8 G/DL (ref 3.4–5)
ANION GAP SERPL CALC-SCNC: 11 MMOL/L (ref 0–18)
BASOPHILS # BLD AUTO: 0.03 X10(3) UL (ref 0–0.2)
BASOPHILS NFR BLD AUTO: 0.2 %
BLOOD TYPE BARCODE: 5100
BLOOD TYPE BARCODE: 5100
BUN BLD-MCNC: 131 MG/DL (ref 7–18)
BUN/CREAT SERPL: 20.7 (ref 10–20)
CALCIUM BLD-MCNC: 8.4 MG/DL (ref 8.5–10.1)
CHLORIDE SERPL-SCNC: 99 MMOL/L (ref 98–112)
CO2 SERPL-SCNC: 26 MMOL/L (ref 21–32)
CREAT BLD-MCNC: 6.32 MG/DL
DEPRECATED RDW RBC AUTO: 56.2 FL (ref 35.1–46.3)
EOSINOPHIL # BLD AUTO: 0.07 X10(3) UL (ref 0–0.7)
EOSINOPHIL NFR BLD AUTO: 0.5 %
ERYTHROCYTE [DISTWIDTH] IN BLOOD BY AUTOMATED COUNT: 16.7 % (ref 11–15)
GFR SERPLBLD BASED ON 1.73 SQ M-ARVRAT: 8 ML/MIN/1.73M2 (ref 60–?)
GLUCOSE BLD-MCNC: 113 MG/DL (ref 70–99)
GLUCOSE BLDC GLUCOMTR-MCNC: 117 MG/DL (ref 70–99)
GLUCOSE BLDC GLUCOMTR-MCNC: 118 MG/DL (ref 70–99)
GLUCOSE BLDC GLUCOMTR-MCNC: 118 MG/DL (ref 70–99)
GLUCOSE BLDC GLUCOMTR-MCNC: 137 MG/DL (ref 70–99)
HCT VFR BLD AUTO: 25.2 %
HGB BLD-MCNC: 8.7 G/DL
IMM GRANULOCYTES # BLD AUTO: 0.08 X10(3) UL (ref 0–1)
IMM GRANULOCYTES NFR BLD: 0.6 %
LYMPHOCYTES # BLD AUTO: 0.24 X10(3) UL (ref 1–4)
LYMPHOCYTES NFR BLD AUTO: 1.7 %
MAGNESIUM SERPL-MCNC: 2.6 MG/DL (ref 1.6–2.6)
MCH RBC QN AUTO: 32.1 PG (ref 26–34)
MCHC RBC AUTO-ENTMCNC: 34.5 G/DL (ref 31–37)
MCV RBC AUTO: 93 FL
MONOCYTES # BLD AUTO: 0.47 X10(3) UL (ref 0.1–1)
MONOCYTES NFR BLD AUTO: 3.3 %
NEUTROPHILS # BLD AUTO: 13.44 X10 (3) UL (ref 1.5–7.7)
NEUTROPHILS # BLD AUTO: 13.44 X10(3) UL (ref 1.5–7.7)
NEUTROPHILS NFR BLD AUTO: 93.7 %
OSMOLALITY SERPL CALC.SUM OF ELEC: 325 MOSM/KG (ref 275–295)
PHOSPHATE SERPL-MCNC: 3.7 MG/DL (ref 2.5–4.9)
PLATELET # BLD AUTO: 126 10(3)UL (ref 150–450)
POTASSIUM SERPL-SCNC: 3.5 MMOL/L (ref 3.5–5.1)
RBC # BLD AUTO: 2.71 X10(6)UL
SODIUM SERPL-SCNC: 136 MMOL/L (ref 136–145)
T4 FREE SERPL-MCNC: 0.4 NG/DL (ref 0.8–1.7)
TSI SER-ACNC: 62.1 MIU/ML (ref 0.36–3.74)
UNIT VOLUME: 350 ML
UNIT VOLUME: 350 ML
WBC # BLD AUTO: 14.3 X10(3) UL (ref 4–11)

## 2023-04-08 PROCEDURE — 99232 SBSQ HOSP IP/OBS MODERATE 35: CPT | Performed by: INTERNAL MEDICINE

## 2023-04-08 PROCEDURE — 99233 SBSQ HOSP IP/OBS HIGH 50: CPT | Performed by: INTERNAL MEDICINE

## 2023-04-08 RX ORDER — LORAZEPAM 2 MG/ML
0.5 INJECTION INTRAMUSCULAR EVERY 4 HOURS PRN
Status: DISCONTINUED | OUTPATIENT
Start: 2023-04-08 | End: 2023-04-20

## 2023-04-08 RX ORDER — LEVOTHYROXINE SODIUM 137 UG/1
137 TABLET ORAL
Status: DISCONTINUED | OUTPATIENT
Start: 2023-04-08 | End: 2023-04-10

## 2023-04-08 RX ORDER — LEVOTHYROXINE SODIUM 20 UG/ML
37.5 INJECTION, SOLUTION INTRAVENOUS DAILY
Status: DISCONTINUED | OUTPATIENT
Start: 2023-04-08 | End: 2023-04-08

## 2023-04-08 RX ORDER — ALBUMIN (HUMAN) 12.5 G/50ML
100 SOLUTION INTRAVENOUS AS NEEDED
Status: DISCONTINUED | OUTPATIENT
Start: 2023-04-08 | End: 2023-04-19

## 2023-04-08 RX ORDER — HEPARIN SODIUM 1000 [USP'U]/ML
1.5 INJECTION, SOLUTION INTRAVENOUS; SUBCUTANEOUS ONCE
Status: DISCONTINUED | OUTPATIENT
Start: 2023-04-08 | End: 2023-04-16 | Stop reason: ALTCHOICE

## 2023-04-08 NOTE — PLAN OF CARE
Transfer  Double RN skin check done prior to transfer off Unit. Skin check performed by this RN and mattie    Wounds are as follows: reness to coccyx with mepilex, bruising to chest. Noted rt port accessed. Had dialysis off 1 l vitals were stable. Bruising to chest and noted scratch to rt forearm. Noted keep rhino rocket in rt nare as reported to have a plt dysfunction. Offers no c/o at this time. Blue boots to heals scd to lower legs intact. Will remain available for any further questions or concerns.

## 2023-04-08 NOTE — PROGRESS NOTES
Patient received this am in bed alert and oriented but lethargic, very hard of hearing, bilateral hearing aids but not fully working. Patient and wife consented for hemodialysis catheter placement. IR attempted placement at bedside was unsuccessful, patient noted to be bleeding from site, pressure applied but still bleeding, NP for IR came to bedside and changed dressing and applied more pressure. Bleeding stopped. Patient sent down to IR and returned with right side hemodialysis cath, site clean and dry but then noted to be bleeding again when dialysis started, pressure again applied but could not apply too much as not to cut off dialysis flow. NP came to bedside and applied a suture, bleeding stopped. Patient became hypotensive, 1 unit of pRBCs transfused with dialysis. BP slightly improved. Patient more lethargic than this am. Spoke with Dr Rivas Oakes and orders given to transfer to PCU after dialysis. Dr Binta Purdy to bedside. Dialysis ordered for tomorrow, informed sharona of a preference for afternoon start time. Dialysis completed. Patient started to bleed from catheter again. Pressure applied. Report called to receiving RN. Wife informed of transfer, at bedside. Rhinorocket to right nare in place. Bed locked and in lowest position. Endorsed to night TL to complete transfer once transport arrives to unit.

## 2023-04-08 NOTE — CONSULTS
[facility]    PATIENT'S NAME: Chelsea Carmona   ATTENDING PHYSICIAN: Amanda Carpenter MD   CONSULTING PHYSICIAN: Kenzie Taylor. Zeb Mcarthur MD   PATIENT ACCOUNT#:   [id number]    [facility]  [facility] [address] RECORD #:   [id number]       YOB: 1945  ADMISSION DATE:       04/06/2023      CONSULT DATE:  04/07/2023    REPORT OF CONSULTATION      REFERRING PHYSICIAN:  Amanda Carpenter MD      REASON FOR CONSULTATION:  Neuroendocrine tumor, lung cancer, uremia, epistaxis, possible pneumonia. HISTORY OF PRESENT ILLNESS:  Patient is a pleasant 78-year-old male who follows with Dr. Emelia Kilgore of Hematology/Oncology for metastatic adenocarcinoma of the lung, PD-L1 greater than 90%, and well-differentiated neuroendocrine tumor. The patient was previously on treatment with pembrolizumab; however, developed an interstitial nephritis and treatment has now been discontinued without progression of disease. He is on octreotide. He was hospitalized with epistaxis, uremia, possible pneumonia. Patient is seen by Nephrology in consultation. Dialysis is being initiated. Patient has also had anemia with hemoglobin 6.6, and he was sent for transfusion. He has been started on antibiotics with Zosyn for possible pneumonia. Currently denies any fevers. PAST MEDICAL HISTORY:  Adenocarcinoma of the lung, metastatic, PD-L1 greater than 90% as noted above; coronary artery disease; heart failure; abdominal aortic aneurysm; aortic stenosis; well-differentiated neuroendocrine tumor; osteoarthritis; TAO; hypertension; hyperlipidemia; BPH; anemia. ALLERGIES:  Cephalosporins, Demerol. SOCIAL HISTORY:  Denies any current alcohol, tobacco, or illicit drug use. FAMILY HISTORY:  No history of any hematologic disorders in the family, specifically mother and father. REVIEW OF SYSTEMS:  All other systems reviewed, negative x12. PHYSICAL EXAMINATION:    GENERAL:  No acute distress.   Alert and oriented. VITAL SIGNS:  ECOG performance status 1, temperature 36.2, pulse 60, respiratory rate 16, blood pressure is 138/77, pulse ox 95% on room air. HEENT:  Moist mucous membranes. Oropharynx clear. NECK:  Supple. LUNGS:  Symmetric expansion. HEART:  Good distal pulses. ABDOMEN:  Soft. EXTREMITIES:  No edema. SKIN:  No visible lesions. LYMPHATICS:  No visible adenopathy. NEUROLOGIC:  Moving all extremities. Cranial nerves intact. PSYCHIATRIC:  Appropriate mood, appropriate affect. MUSCULOSKELETAL:  No deformity. LABORATORY DATA:  Noted as per HPI. CBC today shows hemoglobin 7.1, white blood cell count 9000, platelets 870,925. Iron saturation 49%. Creatinine 9.9. ASSESSMENT AND PLAN:  The patient is a pleasant 53-year-old male who follows with Dr. Angella Anders of Oncology for metastatic adenocarcinoma of the lung, PD-L1 greater than 90%, previously with good control on pembrolizumab, however, development of interstitial nephritis, now off all treatment. He also has well-differentiated neuroendocrine tumor and is on octreotide. Hospitalized with epistaxis, fatigue, uremia, possible pneumonia. Appreciate nephrology consultation. Dialysis initiated. He is on antibiotics. For his cytopenia, transfuse if hemoglobin is less than 7. Iron is adequate. We will follow along inpatient. Thank you very much for this consultation request.    Dictated By Governor Norma.  Tamara Agustin MD  d: 04/07/2023 19:05:38  t: 04/07/2023 20:24:49  Ronny Drake [id number]  VIANCA/

## 2023-04-09 LAB
ALBUMIN SERPL-MCNC: 2.3 G/DL (ref 3.4–5)
ANION GAP SERPL CALC-SCNC: 8 MMOL/L (ref 0–18)
BASOPHILS # BLD AUTO: 0.03 X10(3) UL (ref 0–0.2)
BASOPHILS NFR BLD AUTO: 0.2 %
BUN BLD-MCNC: 35 MG/DL (ref 7–18)
BUN/CREAT SERPL: 11.8 (ref 10–20)
CALCIUM BLD-MCNC: 8.9 MG/DL (ref 8.5–10.1)
CHLORIDE SERPL-SCNC: 99 MMOL/L (ref 98–112)
CO2 SERPL-SCNC: 29 MMOL/L (ref 21–32)
CREAT BLD-MCNC: 2.97 MG/DL
DEPRECATED RDW RBC AUTO: 60.1 FL (ref 35.1–46.3)
EOSINOPHIL # BLD AUTO: 0.07 X10(3) UL (ref 0–0.7)
EOSINOPHIL NFR BLD AUTO: 0.4 %
ERYTHROCYTE [DISTWIDTH] IN BLOOD BY AUTOMATED COUNT: 16.9 % (ref 11–15)
GFR SERPLBLD BASED ON 1.73 SQ M-ARVRAT: 21 ML/MIN/1.73M2 (ref 60–?)
GLUCOSE BLD-MCNC: 180 MG/DL (ref 70–99)
GLUCOSE BLDC GLUCOMTR-MCNC: 106 MG/DL (ref 70–99)
GLUCOSE BLDC GLUCOMTR-MCNC: 134 MG/DL (ref 70–99)
GLUCOSE BLDC GLUCOMTR-MCNC: 148 MG/DL (ref 70–99)
GLUCOSE BLDC GLUCOMTR-MCNC: 151 MG/DL (ref 70–99)
GLUCOSE BLDC GLUCOMTR-MCNC: 77 MG/DL (ref 70–99)
HCT VFR BLD AUTO: 31.9 %
HGB BLD-MCNC: 10.3 G/DL
IMM GRANULOCYTES # BLD AUTO: 0.1 X10(3) UL (ref 0–1)
IMM GRANULOCYTES NFR BLD: 0.6 %
LYMPHOCYTES # BLD AUTO: 0.31 X10(3) UL (ref 1–4)
LYMPHOCYTES NFR BLD AUTO: 1.9 %
MCH RBC QN AUTO: 31.3 PG (ref 26–34)
MCHC RBC AUTO-ENTMCNC: 32.3 G/DL (ref 31–37)
MCV RBC AUTO: 97 FL
MONOCYTES # BLD AUTO: 0.54 X10(3) UL (ref 0.1–1)
MONOCYTES NFR BLD AUTO: 3.2 %
NEUTROPHILS # BLD AUTO: 15.66 X10 (3) UL (ref 1.5–7.7)
NEUTROPHILS # BLD AUTO: 15.66 X10(3) UL (ref 1.5–7.7)
NEUTROPHILS NFR BLD AUTO: 93.7 %
OSMOLALITY SERPL CALC.SUM OF ELEC: 295 MOSM/KG (ref 275–295)
PHOSPHATE SERPL-MCNC: 1.9 MG/DL (ref 2.5–4.9)
PLATELET # BLD AUTO: 157 10(3)UL (ref 150–450)
POTASSIUM SERPL-SCNC: 3.4 MMOL/L (ref 3.5–5.1)
RBC # BLD AUTO: 3.29 X10(6)UL
SODIUM SERPL-SCNC: 136 MMOL/L (ref 136–145)
WBC # BLD AUTO: 16.7 X10(3) UL (ref 4–11)

## 2023-04-09 PROCEDURE — 99233 SBSQ HOSP IP/OBS HIGH 50: CPT | Performed by: INTERNAL MEDICINE

## 2023-04-09 PROCEDURE — 99232 SBSQ HOSP IP/OBS MODERATE 35: CPT | Performed by: INTERNAL MEDICINE

## 2023-04-09 RX ORDER — CYCLOSPORINE 0.5 MG/ML
1 EMULSION OPHTHALMIC 2 TIMES DAILY
Status: DISCONTINUED | OUTPATIENT
Start: 2023-04-09 | End: 2023-04-10

## 2023-04-09 RX ORDER — MELATONIN
325
Status: DISCONTINUED | OUTPATIENT
Start: 2023-04-09 | End: 2023-04-20

## 2023-04-09 RX ORDER — IPRATROPIUM BROMIDE AND ALBUTEROL SULFATE 2.5; .5 MG/3ML; MG/3ML
3 SOLUTION RESPIRATORY (INHALATION) 4 TIMES DAILY
Status: DISCONTINUED | OUTPATIENT
Start: 2023-04-09 | End: 2023-04-11

## 2023-04-09 RX ORDER — ALBUTEROL SULFATE 90 UG/1
2 AEROSOL, METERED RESPIRATORY (INHALATION) EVERY 6 HOURS PRN
Status: DISCONTINUED | OUTPATIENT
Start: 2023-04-09 | End: 2023-04-20

## 2023-04-09 RX ORDER — THIAMINE HCL 100 MG
500 TABLET ORAL 2 TIMES DAILY
Status: DISCONTINUED | OUTPATIENT
Start: 2023-04-09 | End: 2023-04-09

## 2023-04-09 RX ORDER — FINASTERIDE 5 MG/1
5 TABLET, FILM COATED ORAL DAILY
Status: DISCONTINUED | OUTPATIENT
Start: 2023-04-09 | End: 2023-04-20

## 2023-04-09 RX ORDER — ALPRAZOLAM 0.5 MG/1
0.5 TABLET ORAL 2 TIMES DAILY PRN
Status: DISCONTINUED | OUTPATIENT
Start: 2023-04-09 | End: 2023-04-20

## 2023-04-09 NOTE — PROGRESS NOTES
04/09/23 0930   VISIT TYPE   OT Inpatient Visit Type (Documentation Required) Attempted Evaluation     Orders received and chart reviewed. Pt is currently receiving HD. Will cont to follow.

## 2023-04-09 NOTE — PROGRESS NOTES
[facility]    Progress Note    Dayana Garcia Patient Status:  Inpatient    1945 MRN [id number]   Location [facility] 4W/SW/SE Attending Karo Castillo MD   Hosp Day # 3 PCP Bryan Morales MD       Subjective:   Dayana Garcia is a(n) [de-identified]year old male     ROS:     Constitutional:  Feels much better  ENMT:  Negative for ear drainage, hearing loss and nasal drainage  Eyes:  Negative for eye discharge and vision loss  Cardiovascular:  Negative for chest pain, sob, irregular heartbeat/palpitations  Respiratory:  Negative for cough, dyspnea and wheezing  Gastrointestinal:  Negative for abdominal pain, constipation, decreased appetite, diarrhea and vomiting  Genitourinary:  Negative for dysuria and hematuria  Endocrine:  Negative for abnormal sleep patterns, increased activity, polydipsia and polyphagia  Hema/Lymph:  Negative for easy bleeding and easy bruising  Integumentary:  Negative for pruritus and rash  Musculoskeletal:  Negative for bone/joint symptoms  Neurological:  Negative for gait disturbance  Psychiatric:  Negative for inappropriate interaction and psychiatric symptoms       23  1250   BP: 112/54   Pulse: 78   Resp: 20   Temp:            PHYSICAL EXAM:   Constitutional: appears well hydrated alert and responsive no acute distress noted  Head/Face: normocephalic  Eyes/Vision: normal extraocular motion is intact  Nose/Mouth/Throat:mucous membranes are moist and no oral lesions are noted  Neck/Thyroid: neck is supple without adenopathy  Lymphatic: no abnormal cervical, supraclavicular adenopathy is noted  Respiratory:  lungs are clear to auscultation bilaterally, normal respiratory effort  Cardiovascular: regular rate and rhythm no murmurs, gallups, or rubs  Abdomen: soft, non-tender, non-distended, BS normal  Vascular: well perfused femoral, and pedal pulses normal  Skin/Hair: no unusual rashes present, no abnormal bruising noted  Back/Spine: no abnormalities noted  Musculoskeletal: full ROM all extremities good strength  no deformities  Extremities:1+ edeam  Neurological: improved mentation    Results:     Laboratory Data:  Lab Results   Component Value Date    WBC 16.7 (H) 04/09/2023    HGB 10.3 (L) 04/09/2023    HCT 31.9 (L) 04/09/2023    .0 04/09/2023    CREATSERUM 2.97 (H) 04/09/2023    BUN 35 (H) 04/09/2023     04/09/2023    K 3.4 (L) 04/09/2023    CL 99 04/09/2023    CO2 29.0 04/09/2023     (H) 04/09/2023    CA 8.9 04/09/2023    ALB 2.3 (L) 04/09/2023    ALKPHO 75 04/06/2023    BILT 0.4 04/06/2023    TP 5.8 (L) 04/06/2023    AST 7 (L) 04/06/2023    ALT 15 (L) 04/06/2023    PTT 26.3 03/10/2023    INR 1.08 03/10/2023    T4F 0.4 (L) 04/08/2023    TSH 62.100 (H) 04/08/2023     06/15/2022    DDIMER 8.79 (H) 11/14/2022    ESRML 22 (H) 03/28/2022    CRP 1.06 (H) 03/28/2022    MG 2.6 04/08/2023    PHOS 1.9 (L) 04/09/2023    TROP <0.045 07/05/2021    CK 57 12/07/2021    B12 1,039 (H) 09/08/2022       Imaging:  [unfilled]   No results found. ASSESSMENT/PLAN:   Assessment       1 profound uremia much better after 3 hd treatments   will try cycler  Mon night per wife request.   may not work follow lbs   will need rehab  2.  metastactic cancer  Per dr En Tavares  3. Severe hypothryod  On iv synthroid fornow   spoke w wife eboni in detail    Needs social service intervention. .    Call to dr Rice Reason             4/9/2023  Adonica Klinefelter.  Jay Sabillon MD

## 2023-04-10 ENCOUNTER — TELEPHONE (OUTPATIENT)
Dept: ENDOCRINOLOGY CLINIC | Facility: CLINIC | Age: 78
End: 2023-04-10

## 2023-04-10 LAB
ALBUMIN SERPL-MCNC: 1.9 G/DL (ref 3.4–5)
ANION GAP SERPL CALC-SCNC: 6 MMOL/L (ref 0–18)
BASOPHILS # BLD AUTO: 0.02 X10(3) UL (ref 0–0.2)
BASOPHILS NFR BLD AUTO: 0.2 %
BUN BLD-MCNC: 49 MG/DL (ref 7–18)
BUN/CREAT SERPL: 12.1 (ref 10–20)
CALCIUM BLD-MCNC: 8.3 MG/DL (ref 8.5–10.1)
CHLORIDE SERPL-SCNC: 99 MMOL/L (ref 98–112)
CO2 SERPL-SCNC: 31 MMOL/L (ref 21–32)
CREAT BLD-MCNC: 4.05 MG/DL
DEPRECATED RDW RBC AUTO: 60.7 FL (ref 35.1–46.3)
EOSINOPHIL # BLD AUTO: 0.05 X10(3) UL (ref 0–0.7)
EOSINOPHIL NFR BLD AUTO: 0.4 %
ERYTHROCYTE [DISTWIDTH] IN BLOOD BY AUTOMATED COUNT: 16.7 % (ref 11–15)
GFR SERPLBLD BASED ON 1.73 SQ M-ARVRAT: 14 ML/MIN/1.73M2 (ref 60–?)
GLUCOSE BLD-MCNC: 130 MG/DL (ref 70–99)
GLUCOSE BLDC GLUCOMTR-MCNC: 124 MG/DL (ref 70–99)
GLUCOSE BLDC GLUCOMTR-MCNC: 134 MG/DL (ref 70–99)
GLUCOSE BLDC GLUCOMTR-MCNC: 181 MG/DL (ref 70–99)
HCT VFR BLD AUTO: 27.4 %
HGB BLD-MCNC: 8.6 G/DL
IMM GRANULOCYTES # BLD AUTO: 0.07 X10(3) UL (ref 0–1)
IMM GRANULOCYTES NFR BLD: 0.5 %
LYMPHOCYTES # BLD AUTO: 0.36 X10(3) UL (ref 1–4)
LYMPHOCYTES NFR BLD AUTO: 2.7 %
MCH RBC QN AUTO: 31.2 PG (ref 26–34)
MCHC RBC AUTO-ENTMCNC: 31.4 G/DL (ref 31–37)
MCV RBC AUTO: 99.3 FL
MONOCYTES # BLD AUTO: 0.59 X10(3) UL (ref 0.1–1)
MONOCYTES NFR BLD AUTO: 4.4 %
NEUTROPHILS # BLD AUTO: 12.22 X10 (3) UL (ref 1.5–7.7)
NEUTROPHILS # BLD AUTO: 12.22 X10(3) UL (ref 1.5–7.7)
NEUTROPHILS NFR BLD AUTO: 91.8 %
OSMOLALITY SERPL CALC.SUM OF ELEC: 297 MOSM/KG (ref 275–295)
PHOSPHATE SERPL-MCNC: 3.1 MG/DL (ref 2.5–4.9)
PLATELET # BLD AUTO: 119 10(3)UL (ref 150–450)
POTASSIUM SERPL-SCNC: 3.4 MMOL/L (ref 3.5–5.1)
RBC # BLD AUTO: 2.76 X10(6)UL
SODIUM SERPL-SCNC: 136 MMOL/L (ref 136–145)
WBC # BLD AUTO: 13.3 X10(3) UL (ref 4–11)

## 2023-04-10 PROCEDURE — 99232 SBSQ HOSP IP/OBS MODERATE 35: CPT | Performed by: INTERNAL MEDICINE

## 2023-04-10 PROCEDURE — 99222 1ST HOSP IP/OBS MODERATE 55: CPT | Performed by: INTERNAL MEDICINE

## 2023-04-10 PROCEDURE — 99233 SBSQ HOSP IP/OBS HIGH 50: CPT | Performed by: INTERNAL MEDICINE

## 2023-04-10 RX ORDER — LEVOTHYROXINE SODIUM 20 UG/ML
50 INJECTION, SOLUTION INTRAVENOUS DAILY
Status: DISCONTINUED | OUTPATIENT
Start: 2023-04-11 | End: 2023-04-15

## 2023-04-10 RX ORDER — DEXTROSE MONOHYDRATE, SODIUM CHLORIDE, SODIUM LACTATE, CALCIUM CHLORIDE, MAGNESIUM CHLORIDE 2.5; 538; 448; 18.4; 5.08 G/100ML; MG/100ML; MG/100ML; MG/100ML; MG/100ML
5000 SOLUTION INTRAPERITONEAL
Status: COMPLETED | OUTPATIENT
Start: 2023-04-10 | End: 2023-04-10

## 2023-04-10 NOTE — SLP NOTE
SPEECH DAILY NOTE - INPATIENT    ASSESSMENT & PLAN   ASSESSMENT  PPE REQUIRED. THIS THERAPIST WORE GLOVES AND MASK FOR DURATION OF EVALUATION. HANDS WASHED UPON ENTRANCE/EXIT. SLP f/u for ongoing meal assessment per recommendations of soft and easy to chew diet/thin liquids per last dysphagia therapy session on 4/9/23. RN reports pt tolerates diet and medication well with no overt clinical s/s aspiration. Pt denies any swallowing challenges. Pt positioned upright in bed, alert/cooperative/[place]. Pt afebrile, tolerating room air with oxygen status 94% prior to the start of oral trials. SLP reviewed aspiration precautions and safe swallowing compensatory strategies with the patient. Safe swallow guidelines remain written on the white board in purple. Patient v/u. Provided mild assistance, pt tolerates soft easy to chew and thin liquids via cup with no overt clinical signs/symptoms of aspiration. Oxygen status remained stable t/o the entire session. Current diet remains appropriate. SLP to f/u with meal assessment x1, monitor  CXR, and VFSS if any overt CSA and/or decline in CXR. RN alerted with results and recommendations. Diet Recommendations - Solids: Soft/ Easy to chew  Diet Recommendations - Liquids: Thin Liquids    Compensatory Strategies Recommended: Alternate consistencies; No straws  Aspiration Precautions: Upright position; Slow rate;Small bites and sips; No straw  Medication Administration Recommendations: Whole in puree    Patient Experiencing Pain: No      Discharge Recommendations  Discharge Recommendations/Plan: Undetermined    Treatment Plan  Treatment Plan/Recommendations: Aspiration precautions    Interdisciplinary Communication: Discussed with RN          GOALS  Goal #2 The patient will utilize compensatory strategies as outlined by  BSSE (clinical evaluation) including Slow rate, Small bites, Small sips, Alternate liquids/solids, No straws, Upright 90 degrees with 1:1 feeding assistance 90 % of the time across 3 sessions. Pt utilized compensatory strategies as outlined above given mild assist 100% of the time across 2nd session  In Progress   Goal #3 The patient will tolerate trial upgrade of SOFT ETC consistency and thin liquids without overt signs or symptoms of aspiration with 100 % accuracy over 1-2 session(s). Tolerated soft easy to chew without overt s/s of aspiration with 100% acc over 2nd session.   In Progress     FOLLOW UP  Follow Up Needed (Documentation Required): Yes  SLP Follow-up Date: 04/12/23  Number of Visits to Meet Established Goals: 1    Session: 1    If you have any questions, please contact Wilfredo [place], [address] [facility]

## 2023-04-10 NOTE — TELEPHONE ENCOUNTER
Tried calling patient's wife to confirm compliance with thyroid medication x 3 on her phone , no answer   Tried calling the room and as able to talk to him     Patient was on LT4 125 mcg daily till hospitalization in March 2023. He was discharged on 150 mcg daily However. after speaking with Dr Scarlet Patel dose was changed to 137 mcg daily which is what he is on now.

## 2023-04-10 NOTE — CM/SW NOTE
HD Flowsheets, Hep B panel, Cath placement note faxed to [facility].     KARL Linares, [facility]    [id number]

## 2023-04-11 ENCOUNTER — APPOINTMENT (OUTPATIENT)
Dept: GENERAL RADIOLOGY | Facility: HOSPITAL | Age: 78
End: 2023-04-11
Attending: INTERNAL MEDICINE
Payer: MEDICARE

## 2023-04-11 ENCOUNTER — APPOINTMENT (OUTPATIENT)
Dept: GENERAL RADIOLOGY | Facility: HOSPITAL | Age: 78
DRG: 177 | End: 2023-04-11
Attending: INTERNAL MEDICINE
Payer: MEDICARE

## 2023-04-11 PROBLEM — E46 MALNUTRITION (HCC): Status: ACTIVE | Noted: 2023-01-01

## 2023-04-11 PROBLEM — E46 MALNUTRITION (HCC): Status: ACTIVE | Noted: 2023-04-11

## 2023-04-11 LAB
ALBUMIN SERPL-MCNC: 1.9 G/DL (ref 3.4–5)
ANION GAP SERPL CALC-SCNC: 8 MMOL/L (ref 0–18)
BASOPHILS NFR PRT: 0 %
BUN BLD-MCNC: 54 MG/DL (ref 7–18)
BUN/CREAT SERPL: 9.8 (ref 10–20)
C DIFF TOX B STL QL: POSITIVE
CALCIUM BLD-MCNC: 8.6 MG/DL (ref 8.5–10.1)
CHLORIDE SERPL-SCNC: 103 MMOL/L (ref 98–112)
CO2 SERPL-SCNC: 27 MMOL/L (ref 21–32)
COLOR FLD: COLORLESS
CREAT BLD-MCNC: 5.5 MG/DL
DEPRECATED RDW RBC AUTO: 60.7 FL (ref 35.1–46.3)
EOSINOPHIL NFR PRT: 0 %
ERYTHROCYTE [DISTWIDTH] IN BLOOD BY AUTOMATED COUNT: 16.4 % (ref 11–15)
GFR SERPLBLD BASED ON 1.73 SQ M-ARVRAT: 10 ML/MIN/1.73M2 (ref 60–?)
GLUCOSE BLD-MCNC: 149 MG/DL (ref 70–99)
GLUCOSE BLDC GLUCOMTR-MCNC: 106 MG/DL (ref 70–99)
GLUCOSE BLDC GLUCOMTR-MCNC: 126 MG/DL (ref 70–99)
GLUCOSE BLDC GLUCOMTR-MCNC: 136 MG/DL (ref 70–99)
HCT VFR BLD AUTO: 29.7 %
HGB BLD-MCNC: 9.3 G/DL
LYMPHOCYTES NFR PRT: 52 %
MCH RBC QN AUTO: 31.8 PG (ref 26–34)
MCHC RBC AUTO-ENTMCNC: 31.3 G/DL (ref 31–37)
MCV RBC AUTO: 101.7 FL
MESOTHL CELL NFR PRT: 1 %
MONOS+MACROS NFR PRT: 28 %
NEUTROPHILS NFR FLD: 18 %
OSMOLALITY SERPL CALC.SUM OF ELEC: 304 MOSM/KG (ref 275–295)
PHOSPHATE SERPL-MCNC: 5.5 MG/DL (ref 2.5–4.9)
PLATELET # BLD AUTO: 142 10(3)UL (ref 150–450)
POTASSIUM SERPL-SCNC: 3 MMOL/L (ref 3.5–5.1)
RBC # BLD AUTO: 2.92 X10(6)UL
RBC # FLD: 9 /CUMM (ref ?–1)
SODIUM SERPL-SCNC: 138 MMOL/L (ref 136–145)
TOTAL CELLS COUNTED FLD: 100
TOTAL CELLS COUNTED PRT: 51 /CUMM (ref ?–1)
TURBIDITY CSF QL: CLEAR
WBC # BLD AUTO: 12.2 X10(3) UL (ref 4–11)
WBC # PRT: 50 /CUMM
WBC OTHER NFR PRT: 1 %

## 2023-04-11 PROCEDURE — 71045 X-RAY EXAM CHEST 1 VIEW: CPT | Performed by: INTERNAL MEDICINE

## 2023-04-11 PROCEDURE — 99233 SBSQ HOSP IP/OBS HIGH 50: CPT | Performed by: INTERNAL MEDICINE

## 2023-04-11 PROCEDURE — 90945 DIALYSIS ONE EVALUATION: CPT | Performed by: INTERNAL MEDICINE

## 2023-04-11 PROCEDURE — 99232 SBSQ HOSP IP/OBS MODERATE 35: CPT | Performed by: INTERNAL MEDICINE

## 2023-04-11 RX ORDER — POTASSIUM CHLORIDE 20 MEQ/1
40 TABLET, EXTENDED RELEASE ORAL 2 TIMES DAILY
Status: COMPLETED | OUTPATIENT
Start: 2023-04-11 | End: 2023-04-12

## 2023-04-11 RX ORDER — LOPERAMIDE HYDROCHLORIDE 2 MG/1
2 CAPSULE ORAL 4 TIMES DAILY PRN
Status: DISCONTINUED | OUTPATIENT
Start: 2023-04-11 | End: 2023-04-20

## 2023-04-11 RX ORDER — DEXTROSE MONOHYDRATE, SODIUM CHLORIDE, SODIUM LACTATE, CALCIUM CHLORIDE, MAGNESIUM CHLORIDE 2.5; 538; 448; 18.4; 5.08 G/100ML; MG/100ML; MG/100ML; MG/100ML; MG/100ML
5000 SOLUTION INTRAPERITONEAL
Status: DISCONTINUED | OUTPATIENT
Start: 2023-04-11 | End: 2023-04-19

## 2023-04-11 RX ORDER — IPRATROPIUM BROMIDE AND ALBUTEROL SULFATE 2.5; .5 MG/3ML; MG/3ML
3 SOLUTION RESPIRATORY (INHALATION)
Status: DISCONTINUED | OUTPATIENT
Start: 2023-04-11 | End: 2023-04-18

## 2023-04-11 RX ORDER — VANCOMYCIN HYDROCHLORIDE 125 MG/1
125 CAPSULE ORAL 4 TIMES DAILY
Status: DISCONTINUED | OUTPATIENT
Start: 2023-04-11 | End: 2023-04-19

## 2023-04-11 RX ORDER — DEXTROSE MONOHYDRATE, SODIUM CHLORIDE, SODIUM LACTATE, CALCIUM CHLORIDE, MAGNESIUM CHLORIDE 2.5; 538; 448; 18.4; 5.08 G/100ML; MG/100ML; MG/100ML; MG/100ML; MG/100ML
2000 SOLUTION INTRAPERITONEAL
Status: DISCONTINUED | OUTPATIENT
Start: 2023-04-11 | End: 2023-04-19

## 2023-04-11 RX ORDER — IPRATROPIUM BROMIDE AND ALBUTEROL SULFATE 2.5; .5 MG/3ML; MG/3ML
3 SOLUTION RESPIRATORY (INHALATION)
Status: DISCONTINUED | OUTPATIENT
Start: 2023-04-11 | End: 2023-04-11

## 2023-04-11 NOTE — CM/SW NOTE
Per chart review, PT rec is [facility]. JASMIN called and spoke with wife, Gregory Samples expressed frustration with care patient has been getting at [facility] and [address]. SW provided active listening. Offered patient Advocate to reach out, Polly refused. Polly Requested that all kidney care be transferred to [facility] at the [place]. States that she spoke with someone at [facility] that told her SW would be able to arrange this transfer. JASMIN notified Darian Gramajo that [facility] does not typically work with the [place] for HD placement, so would have to research and get back to her. JASMIN notified Darian Gramajo that patient will have to find an accepting nephrologist through [facility] as current nephrologist is through [address]. Also notified that it is no guarantee that the [facility] would have space or accept patient without an internal referral. SW to research and notify Polly of updates on this. SW notified Polly of recommendation for [facility]. Polly would like to see list of options, states that she does not want patient to go to [facility]. Polly expressed frustration that at last admission, she felt rushed to secure a rehab choice and was not given enough notice. JASMIN notified Polly that once patient is cleared for DC and [facility] choice is given with available bed that DC will have to be initiated. SW will try to give Darian Gramajo as much notice as possible of DC. Polly expressed understanding. JASMIN notified Polly that SW will follow up with [facility] list and dialysis info is obtained. JASMIN discussed case with SW Supervisor Judie Thibodeaux. Called and spoke with Christy Foley [phone] to inquire about [facility]. Radha states that [facility] typically come internally, however she will reach out to the [facility] team to see what they can do. Radha requested referral to be faxed. JASMIN faxed referral via [facility] with all dialysis requirements. [facility] referrals pending in [facility] (list will be dependent on PD vs HD as not all [facility] locations can accommodate PD).      PASRR completed and attached to referral.     Update provided to RN and MD.    PLAN: Wife requesting transfer to [facility] at the [facility] for dialysis care. Referral pending to [facility], awaiting review and response. [facility] referrals pending in Aidin, need to follow up with choice list when avail. (final list will be dependent on PD vs HD as not all [facility] locations can accommodate PD).      KARL Stevens, [facility]    [id number]

## 2023-04-12 LAB
ALBUMIN SERPL-MCNC: 1.9 G/DL (ref 3.4–5)
ANION GAP SERPL CALC-SCNC: 10 MMOL/L (ref 0–18)
BUN BLD-MCNC: 59 MG/DL (ref 7–18)
BUN/CREAT SERPL: 10.2 (ref 10–20)
CALCIUM BLD-MCNC: 8.6 MG/DL (ref 8.5–10.1)
CHLORIDE SERPL-SCNC: 105 MMOL/L (ref 98–112)
CO2 SERPL-SCNC: 25 MMOL/L (ref 21–32)
CREAT BLD-MCNC: 5.79 MG/DL
DEPRECATED RDW RBC AUTO: 59.3 FL (ref 35.1–46.3)
ERYTHROCYTE [DISTWIDTH] IN BLOOD BY AUTOMATED COUNT: 16 % (ref 11–15)
GFR SERPLBLD BASED ON 1.73 SQ M-ARVRAT: 9 ML/MIN/1.73M2 (ref 60–?)
GLUCOSE BLD-MCNC: 167 MG/DL (ref 70–99)
GLUCOSE BLDC GLUCOMTR-MCNC: 131 MG/DL (ref 70–99)
GLUCOSE BLDC GLUCOMTR-MCNC: 141 MG/DL (ref 70–99)
GLUCOSE BLDC GLUCOMTR-MCNC: 164 MG/DL (ref 70–99)
GLUCOSE BLDC GLUCOMTR-MCNC: 169 MG/DL (ref 70–99)
GLUCOSE BLDC GLUCOMTR-MCNC: 78 MG/DL (ref 70–99)
HCT VFR BLD AUTO: 29.5 %
HGB BLD-MCNC: 9.2 G/DL
MCH RBC QN AUTO: 31.8 PG (ref 26–34)
MCHC RBC AUTO-ENTMCNC: 31.2 G/DL (ref 31–37)
MCV RBC AUTO: 102.1 FL
OSMOLALITY SERPL CALC.SUM OF ELEC: 310 MOSM/KG (ref 275–295)
PHOSPHATE SERPL-MCNC: 6.7 MG/DL (ref 2.5–4.9)
PLATELET # BLD AUTO: 152 10(3)UL (ref 150–450)
POTASSIUM SERPL-SCNC: 3.7 MMOL/L (ref 3.5–5.1)
RBC # BLD AUTO: 2.89 X10(6)UL
SODIUM SERPL-SCNC: 140 MMOL/L (ref 136–145)
WBC # BLD AUTO: 10.4 X10(3) UL (ref 4–11)

## 2023-04-12 PROCEDURE — 99233 SBSQ HOSP IP/OBS HIGH 50: CPT | Performed by: HOSPITALIST

## 2023-04-12 PROCEDURE — 90945 DIALYSIS ONE EVALUATION: CPT | Performed by: INTERNAL MEDICINE

## 2023-04-12 PROCEDURE — 99232 SBSQ HOSP IP/OBS MODERATE 35: CPT | Performed by: INTERNAL MEDICINE

## 2023-04-12 RX ORDER — DEXTROSE MONOHYDRATE, SODIUM CHLORIDE, SODIUM LACTATE, CALCIUM CHLORIDE, MAGNESIUM CHLORIDE 1.5; 538; 448; 18.4; 5.08 G/100ML; MG/100ML; MG/100ML; MG/100ML; MG/100ML
5000 SOLUTION INTRAPERITONEAL
Status: DISCONTINUED | OUTPATIENT
Start: 2023-04-12 | End: 2023-04-19

## 2023-04-12 RX ORDER — DEXTROSE MONOHYDRATE, SODIUM CHLORIDE, SODIUM LACTATE, CALCIUM CHLORIDE, MAGNESIUM CHLORIDE 1.5; 538; 448; 18.4; 5.08 G/100ML; MG/100ML; MG/100ML; MG/100ML; MG/100ML
2000 SOLUTION INTRAPERITONEAL
Status: DISCONTINUED | OUTPATIENT
Start: 2023-04-12 | End: 2023-04-19

## 2023-04-12 RX ORDER — SODIUM CHLORIDE 9 MG/ML
INJECTION INTRAVENOUS
Status: COMPLETED
Start: 2023-04-12 | End: 2023-04-12

## 2023-04-12 NOTE — PLAN OF CARE
A/O x 3-4, forgetful at times. Peritoneal dialysis completed this morning. Plan for peritoneal dialysis again this evening, [facility] called. Pt continuing to have frequent loose/watery BMs. Up to chair and walked in hallways. Tolerating diet, good appetite. Denies pain. Wife at bedside and updated on plan of care. Possible discharge tomorrow, however pts wife refusing to pick rehab facility stating \"they can't discharge him if I don't pick a facility! I don't think he's ready to discharge so I will not pick a place! \" Bed in lowest position, call light in reach, frequent rounding, nonskid footwear, fall precautions in place.

## 2023-04-12 NOTE — CM/SW NOTE
JASMIN received phone call from [facility] [phone]. Radha states that patient has been financially cleared, however they are requiring 27/28 form from [facility] to be faxed to them for review. Radha requested SW to reach out to [facility] to request. Bridget Yanes requesting admission form to also be filled out and faxed back: [phone]. JASMIN called and spoke with Maria Donovan at [facility] [phone] who agreed to fax form to [employer]. JASMIN provided [employer] fax #.     679am  [facility] Admissions check list faxed to Bridget Yanes per req. 1127am  Confirmed with RN that patient will require PD, not HD upon DC. JASMIN notified pending [facility] referrals, requested facilities to confirm if they can accommodate. Will share list with spouse when available. 215pm  Spoke with [facility] [phone], Radha reports that [facility] does not have an outpatient HD clinic, only for inpatients. Radha will hold referral in the case that patient is still interested. 239pm  Met with patient and spouse Polly at bedside. Provided list of SNF facilities that can accommodate PD dialysis. Polly questioned JASMIN regarding plan for PD dialysis. JASMIN notified Polly that this is the plan as of this morning after speaking with attending. Polly agrued that attending would not know about dialysis plan and that it should come from the nephrologist. Antonieta Monique notified Ministerio Campbell that plan will be verified with her from nephrology. Polly states that she only wants [facility] or [facility]. JASMIN notified Polly that patient has limited options due to dialysis needs. Polly states that she spoke with [facility] and they told her that they would have a bed available next week and can accommodate dialysis. SW to verify with [facility]. Polly in agreement with looking over list of options that can accept patient at this time. Per review of [facility], both [facility] and [facility] declined referral. JASMIN sent message to [facility] and [facility] liaisons requesting clarification. PLAN: As of today, patient will DC on PD per MD. List of rehab facilities that can accommodate PD provided to spouse.  Wife wants [facility]/ [facility] care, but they have declined referral.         KARL Mcqueen, [facility]    [id number]

## 2023-04-12 NOTE — PROGRESS NOTES
Patient has been on Levothyroxine iv starting 4/11/2023  Tolerating okay  Will repeat T4 around 4/15.    If it is in the normal range, can transition to po LT4    We will follow     Milly Steve  Endocrinology

## 2023-04-12 NOTE — CONSULTS
Cardiology (consult dictated)    Assessment:  1. Episodes of dizziness and near syncope. I suspect that these are related to relative dehydration in the setting of severe aortic stenosis. Arrhythmia less likely. No symptomatic bradycardia or tachyarrhythmias documented this admission. 2.  End-stage renal disease, now on hemodialysis. Oliguric. 3.  CAD, history of remote PCI. There does not appear to be any active ischemia. 4.  Hypothyroidism    5. Lung cancer and neuroendocrine carcinoma, in remission. Plan:  1. I spoke with renal service and they are not taking as much fluid off with dialysis. 2.  Remove fluid restriction. 3.  Thigh-high support stockings.       Thank you, will follow

## 2023-04-13 ENCOUNTER — APPOINTMENT (OUTPATIENT)
Dept: PICC SERVICES | Facility: HOSPITAL | Age: 78
End: 2023-04-13
Attending: HOSPITALIST
Payer: MEDICARE

## 2023-04-13 ENCOUNTER — MED REC SCAN ONLY (OUTPATIENT)
Dept: INTERNAL MEDICINE CLINIC | Facility: CLINIC | Age: 78
End: 2023-04-13

## 2023-04-13 ENCOUNTER — APPOINTMENT (OUTPATIENT)
Dept: PICC SERVICES | Facility: HOSPITAL | Age: 78
DRG: 177 | End: 2023-04-13
Attending: HOSPITALIST
Payer: MEDICARE

## 2023-04-13 LAB
ALBUMIN SERPL-MCNC: 1.8 G/DL (ref 3.4–5)
ANION GAP SERPL CALC-SCNC: 14 MMOL/L (ref 0–18)
BUN BLD-MCNC: 62 MG/DL (ref 7–18)
BUN/CREAT SERPL: 9.9 (ref 10–20)
CALCIUM BLD-MCNC: 7.9 MG/DL (ref 8.5–10.1)
CHLORIDE SERPL-SCNC: 104 MMOL/L (ref 98–112)
CO2 SERPL-SCNC: 22 MMOL/L (ref 21–32)
CREAT BLD-MCNC: 6.28 MG/DL
DEPRECATED RDW RBC AUTO: 58.1 FL (ref 35.1–46.3)
ERYTHROCYTE [DISTWIDTH] IN BLOOD BY AUTOMATED COUNT: 15.7 % (ref 11–15)
GFR SERPLBLD BASED ON 1.73 SQ M-ARVRAT: 8 ML/MIN/1.73M2 (ref 60–?)
GLUCOSE BLD-MCNC: 140 MG/DL (ref 70–99)
GLUCOSE BLDC GLUCOMTR-MCNC: 119 MG/DL (ref 70–99)
GLUCOSE BLDC GLUCOMTR-MCNC: 127 MG/DL (ref 70–99)
GLUCOSE BLDC GLUCOMTR-MCNC: 149 MG/DL (ref 70–99)
GLUCOSE BLDC GLUCOMTR-MCNC: 160 MG/DL (ref 70–99)
HCT VFR BLD AUTO: 26.6 %
HGB BLD-MCNC: 8.2 G/DL
MAGNESIUM SERPL-MCNC: 2.4 MG/DL (ref 1.6–2.6)
MCH RBC QN AUTO: 31.5 PG (ref 26–34)
MCHC RBC AUTO-ENTMCNC: 30.8 G/DL (ref 31–37)
MCV RBC AUTO: 102.3 FL
OSMOLALITY SERPL CALC.SUM OF ELEC: 310 MOSM/KG (ref 275–295)
PHOSPHATE SERPL-MCNC: 7.6 MG/DL (ref 2.5–4.9)
PLATELET # BLD AUTO: 142 10(3)UL (ref 150–450)
POTASSIUM SERPL-SCNC: 4.2 MMOL/L (ref 3.5–5.1)
RBC # BLD AUTO: 2.6 X10(6)UL
SODIUM SERPL-SCNC: 140 MMOL/L (ref 136–145)
WBC # BLD AUTO: 8.7 X10(3) UL (ref 4–11)

## 2023-04-13 PROCEDURE — 99233 SBSQ HOSP IP/OBS HIGH 50: CPT | Performed by: HOSPITALIST

## 2023-04-13 PROCEDURE — 90945 DIALYSIS ONE EVALUATION: CPT | Performed by: INTERNAL MEDICINE

## 2023-04-13 PROCEDURE — 99232 SBSQ HOSP IP/OBS MODERATE 35: CPT | Performed by: INTERNAL MEDICINE

## 2023-04-13 RX ORDER — DEXTROSE MONOHYDRATE, SODIUM CHLORIDE, SODIUM LACTATE, CALCIUM CHLORIDE, MAGNESIUM CHLORIDE 1.5; 538; 448; 18.4; 5.08 G/100ML; MG/100ML; MG/100ML; MG/100ML; MG/100ML
2000 SOLUTION INTRAPERITONEAL
Status: DISCONTINUED | OUTPATIENT
Start: 2023-04-13 | End: 2023-04-19

## 2023-04-13 RX ORDER — DEXTROSE MONOHYDRATE, SODIUM CHLORIDE, SODIUM LACTATE, CALCIUM CHLORIDE, MAGNESIUM CHLORIDE 1.5; 538; 448; 18.4; 5.08 G/100ML; MG/100ML; MG/100ML; MG/100ML; MG/100ML
5000 SOLUTION INTRAPERITONEAL
Status: DISCONTINUED | OUTPATIENT
Start: 2023-04-13 | End: 2023-04-19

## 2023-04-13 NOTE — CM/SW NOTE
JASMIN received VM from patient's wife Polly that all of the facilities provided are unacceptable. The facilities that are able to accommodate PD are:     Shonna Lucas  The [address] of [address] called and spoke with Dennie Blew states that she does not like any of the facilities. JASMIN notified Polly that due to patient's need for PD and bed availability, these are the only options at this time. Polly states that she has never spoken with a nephrologist to confirm plan for PD. JASMIN will request nephrologist to call wife to discuss. Polly inquired about [facility]. JASMIN notified Polly that [facility] is an acute rehab and right now patient is being recommended a sub acute rehab level of care. Polly states that she called someone at [facility] who told her that they take patients at a sub acute level and can accommodate PD. JASMIN notified Polly that this will be verified, however instructed her to please focus on the facilities that are able to accommodate patient at this time. Polly states that none of the facilities are affiliated with [facility] and she does not like that patient would be assigned to different care providers. JASMIN notified Sissy Olivares that [facility] is a preferred provider of [address] states  [facility], I would not send my cat there. \" JASMIN notified Sissy Olivares that all rehab facilities will have their issues and that the level of care and staffing ratios will be different than the hospital.     JASMIN notified Sissy Olivares that if she is unhappy with all available options for rehab then she would have to take patient home with home health care. JASMIN notified Polly of right to appeal dc through Medicare if she wishes, Polly expressed understanding. Polly reports that she and her son will be touring the facilities. JASMIN requested Polly to call if she sees a facility that she likes.  Polly in agreement to call JASMIN with updates on her decision. JASMIN sent message to [facility] to [facility] if they can take patient at [facility] level with PD. Per RN, Nephrologist will speak with Chuck Jang in person today to go over PD plan. PLAN: SNFs that can accommodate PD provided to wife, awaiting choice.        KARL Pittman, [facility]    [id number]

## 2023-04-13 NOTE — PROGRESS NOTES
04/13/23 1406   Clinical Encounter Type   Visited With Patient and family together   Routine Visit Introduction   Taxonomy   Intended Effects Demonstrate caring and concern   Methods Offer emotional support;Encourage sharing of feelings   Interventions Acknowledge current situation; Active listening;Ask questions to bring forth feelings     Discussion:  offered visit for support due to length of stay. Pt seen with wife present; pt in bed; hard of hearing, even with hearing aid in. Pt described frustration with not being able to go home and seeming to get different answers from doctor; indicated he feels that no one is there for him and that he wants his wife to be there, even though she was present in the room. Pt and wife arguing about next steps/ options. Pt then stated he doesn't want to talk about it anymore.  advised pt and wife that spiritual care is available as needed, to request visit when ready.  follow-up visit available prn and can be contacted at [id number].     Renzo Sow, Chaplain Resident

## 2023-04-13 NOTE — PROGRESS NOTES
Patient has been on Levothyroxine iv starting 4/11/2023  Tolerating okay  Will repeat T4 around 4/15.    If it is in the normal range, can transition to po LT4    We will follow     Daniel Sen MD  4/14/23

## 2023-04-13 NOTE — TELEPHONE ENCOUNTER
juliette darby is out of office  Medicare progress notes from [employer] signed and faxed to [phone] and [phone].  Fax confirmations received

## 2023-04-14 LAB
ANION GAP SERPL CALC-SCNC: 12 MMOL/L (ref 0–18)
BASOPHILS # BLD AUTO: 0.04 X10(3) UL (ref 0–0.2)
BASOPHILS NFR BLD AUTO: 0.6 %
BUN BLD-MCNC: 57 MG/DL (ref 7–18)
BUN/CREAT SERPL: 10.6 (ref 10–20)
CALCIUM BLD-MCNC: 6.4 MG/DL (ref 8.5–10.1)
CHLORIDE SERPL-SCNC: 113 MMOL/L (ref 98–112)
CHOLEST SERPL-MCNC: 107 MG/DL (ref ?–200)
CO2 SERPL-SCNC: 18 MMOL/L (ref 21–32)
CREAT BLD-MCNC: 5.36 MG/DL
DEPRECATED RDW RBC AUTO: 57.3 FL (ref 35.1–46.3)
EOSINOPHIL # BLD AUTO: 0.11 X10(3) UL (ref 0–0.7)
EOSINOPHIL NFR BLD AUTO: 1.7 %
ERYTHROCYTE [DISTWIDTH] IN BLOOD BY AUTOMATED COUNT: 15.6 % (ref 11–15)
GFR SERPLBLD BASED ON 1.73 SQ M-ARVRAT: 10 ML/MIN/1.73M2 (ref 60–?)
GLUCOSE BLD-MCNC: 80 MG/DL (ref 70–99)
GLUCOSE BLDC GLUCOMTR-MCNC: 109 MG/DL (ref 70–99)
HCT VFR BLD AUTO: 24.1 %
HDLC SERPL-MCNC: 39 MG/DL (ref 40–59)
HGB BLD-MCNC: 7.5 G/DL
IMM GRANULOCYTES # BLD AUTO: 0.04 X10(3) UL (ref 0–1)
IMM GRANULOCYTES NFR BLD: 0.6 %
LDLC SERPL CALC-MCNC: 55 MG/DL (ref ?–100)
LYMPHOCYTES # BLD AUTO: 0.27 X10(3) UL (ref 1–4)
LYMPHOCYTES NFR BLD AUTO: 4.2 %
MAGNESIUM SERPL-MCNC: 1.9 MG/DL (ref 1.6–2.6)
MCH RBC QN AUTO: 31.6 PG (ref 26–34)
MCHC RBC AUTO-ENTMCNC: 31.1 G/DL (ref 31–37)
MCV RBC AUTO: 101.7 FL
MONOCYTES # BLD AUTO: 0.54 X10(3) UL (ref 0.1–1)
MONOCYTES NFR BLD AUTO: 8.4 %
NEUTROPHILS # BLD AUTO: 5.42 X10 (3) UL (ref 1.5–7.7)
NEUTROPHILS # BLD AUTO: 5.42 X10(3) UL (ref 1.5–7.7)
NEUTROPHILS NFR BLD AUTO: 84.5 %
NONHDLC SERPL-MCNC: 68 MG/DL (ref ?–130)
OSMOLALITY SERPL CALC.SUM OF ELEC: 311 MOSM/KG (ref 275–295)
PHOSPHATE SERPL-MCNC: 6.6 MG/DL (ref 2.5–4.9)
PLATELET # BLD AUTO: 148 10(3)UL (ref 150–450)
POTASSIUM SERPL-SCNC: 3.4 MMOL/L (ref 3.5–5.1)
RBC # BLD AUTO: 2.37 X10(6)UL
SODIUM SERPL-SCNC: 143 MMOL/L (ref 136–145)
TRIGL SERPL-MCNC: 55 MG/DL (ref 30–149)
VLDLC SERPL CALC-MCNC: 8 MG/DL (ref 0–30)
WBC # BLD AUTO: 6.4 X10(3) UL (ref 4–11)

## 2023-04-14 PROCEDURE — 99233 SBSQ HOSP IP/OBS HIGH 50: CPT | Performed by: HOSPITALIST

## 2023-04-14 PROCEDURE — 99232 SBSQ HOSP IP/OBS MODERATE 35: CPT | Performed by: INTERNAL MEDICINE

## 2023-04-14 PROCEDURE — 99233 SBSQ HOSP IP/OBS HIGH 50: CPT | Performed by: INTERNAL MEDICINE

## 2023-04-14 RX ORDER — HEPARIN SODIUM 5000 [USP'U]/ML
5000 INJECTION, SOLUTION INTRAVENOUS; SUBCUTANEOUS EVERY 12 HOURS SCHEDULED
Status: DISCONTINUED | OUTPATIENT
Start: 2023-04-14 | End: 2023-04-14

## 2023-04-14 RX ORDER — DEXTROSE MONOHYDRATE, SODIUM CHLORIDE, SODIUM LACTATE, CALCIUM CHLORIDE, MAGNESIUM CHLORIDE 1.5; 538; 448; 18.4; 5.08 G/100ML; MG/100ML; MG/100ML; MG/100ML; MG/100ML
5000 SOLUTION INTRAPERITONEAL
Status: DISCONTINUED | OUTPATIENT
Start: 2023-04-14 | End: 2023-04-20

## 2023-04-14 RX ORDER — DEXTROSE MONOHYDRATE, SODIUM CHLORIDE, SODIUM LACTATE, CALCIUM CHLORIDE, MAGNESIUM CHLORIDE 1.5; 538; 448; 18.4; 5.08 G/100ML; MG/100ML; MG/100ML; MG/100ML; MG/100ML
2000 SOLUTION INTRAPERITONEAL
Status: DISCONTINUED | OUTPATIENT
Start: 2023-04-14 | End: 2023-04-20

## 2023-04-14 NOTE — CM/SW NOTE
SW notified by MD that plan is now for patient to have HD outpatient instead of PD. Updated pending referrals on Aidin. Messages sent to preferences of wife: [facility], [facility] to inquire about acceptance. Awaiting review and response. Update provided to [address] [place] [address] to notify that patient will require outpatient HD, also notified of plan for [facility]. Will need to provide [facility] information to [facility] once finalized. Attempted to call wife x2, no answer. Requested RN to notify SW when wife arrives to visit patient. 330pm  RASHARD CABALLERO assisted with speaking with patient's wife Stevie Martin to notify that [facility] has declined referral.Polly expressed understanding. Notified Polly that new referral pending for SNFs that can accommodate HD pending. Need to provide choice list when avail. PLAN: SNF with HD transport. Referrals pending in Aidin, need to provide choice list when avail.      *octreotide treatments/infusions may be barrier to [facility] placement, need to clarify plan while at [facility] [address], [place], [facility]    [id number]

## 2023-04-14 NOTE — PROGRESS NOTES
04/14/23 1025   VISIT TYPE   PT Inpatient Visit Type (Documentation Required) Attempted Treatment  Pt refusing treatment today, stating he is \"not going to do anything until I know what's going on\". Also, states, \"I just want to die. \" RN notified of statement. Attempted to see pt this AM, chart reviewed, RN approved participation. Pt in bed expressing frustration regarding his care, not understanding why he can't go home and why no one is helping him. Will follow up as schedule allows and pt is willing and able.      Keisha Neal  [facility]  [phone]'

## 2023-04-14 NOTE — OCCUPATIONAL THERAPY NOTE
Per PT, patient agitated and refusing to participate in therapy session at this time. OT will f/u as able/appropriate.       Jairon Blackwell, [address] [place] [address].  #[phone]

## 2023-04-15 LAB
ANION GAP SERPL CALC-SCNC: 15 MMOL/L (ref 0–18)
BASOPHILS # BLD AUTO: 0.06 X10(3) UL (ref 0–0.2)
BASOPHILS NFR BLD AUTO: 1 %
BUN BLD-MCNC: 69 MG/DL (ref 7–18)
BUN/CREAT SERPL: 9.3 (ref 10–20)
CALCIUM BLD-MCNC: 8 MG/DL (ref 8.5–10.1)
CHLORIDE SERPL-SCNC: 101 MMOL/L (ref 98–112)
CO2 SERPL-SCNC: 21 MMOL/L (ref 21–32)
CREAT BLD-MCNC: 7.39 MG/DL
DEPRECATED RDW RBC AUTO: 55.6 FL (ref 35.1–46.3)
EOSINOPHIL # BLD AUTO: 0.12 X10(3) UL (ref 0–0.7)
EOSINOPHIL NFR BLD AUTO: 2 %
ERYTHROCYTE [DISTWIDTH] IN BLOOD BY AUTOMATED COUNT: 15.2 % (ref 11–15)
GFR SERPLBLD BASED ON 1.73 SQ M-ARVRAT: 7 ML/MIN/1.73M2 (ref 60–?)
GLUCOSE BLD-MCNC: 115 MG/DL (ref 70–99)
GLUCOSE BLDC GLUCOMTR-MCNC: 161 MG/DL (ref 70–99)
GLUCOSE BLDC GLUCOMTR-MCNC: 174 MG/DL (ref 70–99)
HCT VFR BLD AUTO: 26.8 %
HGB BLD-MCNC: 8.5 G/DL
IMM GRANULOCYTES # BLD AUTO: 0.03 X10(3) UL (ref 0–1)
IMM GRANULOCYTES NFR BLD: 0.5 %
LYMPHOCYTES # BLD AUTO: 0.37 X10(3) UL (ref 1–4)
LYMPHOCYTES NFR BLD AUTO: 6.3 %
MAGNESIUM SERPL-MCNC: 2.4 MG/DL (ref 1.6–2.6)
MCH RBC QN AUTO: 31.8 PG (ref 26–34)
MCHC RBC AUTO-ENTMCNC: 31.7 G/DL (ref 31–37)
MCV RBC AUTO: 100.4 FL
MONOCYTES # BLD AUTO: 0.55 X10(3) UL (ref 0.1–1)
MONOCYTES NFR BLD AUTO: 9.3 %
NEUTROPHILS # BLD AUTO: 4.76 X10 (3) UL (ref 1.5–7.7)
NEUTROPHILS # BLD AUTO: 4.76 X10(3) UL (ref 1.5–7.7)
NEUTROPHILS NFR BLD AUTO: 80.9 %
OSMOLALITY SERPL CALC.SUM OF ELEC: 305 MOSM/KG (ref 275–295)
PHOSPHATE SERPL-MCNC: 9.6 MG/DL (ref 2.5–4.9)
PLATELET # BLD AUTO: 178 10(3)UL (ref 150–450)
POTASSIUM SERPL-SCNC: 4.1 MMOL/L (ref 3.5–5.1)
RBC # BLD AUTO: 2.67 X10(6)UL
SODIUM SERPL-SCNC: 137 MMOL/L (ref 136–145)
T4 FREE SERPL-MCNC: 0.6 NG/DL (ref 0.8–1.7)
WBC # BLD AUTO: 5.9 X10(3) UL (ref 4–11)

## 2023-04-15 PROCEDURE — 99232 SBSQ HOSP IP/OBS MODERATE 35: CPT | Performed by: INTERNAL MEDICINE

## 2023-04-15 PROCEDURE — 90945 DIALYSIS ONE EVALUATION: CPT | Performed by: INTERNAL MEDICINE

## 2023-04-15 PROCEDURE — 99232 SBSQ HOSP IP/OBS MODERATE 35: CPT | Performed by: HOSPITALIST

## 2023-04-15 RX ORDER — DEXTROSE MONOHYDRATE, SODIUM CHLORIDE, SODIUM LACTATE, CALCIUM CHLORIDE, MAGNESIUM CHLORIDE 1.5; 538; 448; 18.4; 5.08 G/100ML; MG/100ML; MG/100ML; MG/100ML; MG/100ML
5000 SOLUTION INTRAPERITONEAL
Status: DISCONTINUED | OUTPATIENT
Start: 2023-04-15 | End: 2023-04-19

## 2023-04-15 RX ORDER — LEVOTHYROXINE SODIUM 20 UG/ML
100 INJECTION, SOLUTION INTRAVENOUS DAILY
Status: DISCONTINUED | OUTPATIENT
Start: 2023-04-16 | End: 2023-04-19

## 2023-04-15 RX ORDER — CYCLOBENZAPRINE HCL 5 MG
2.5 TABLET ORAL EVERY 8 HOURS PRN
Status: DISCONTINUED | OUTPATIENT
Start: 2023-04-15 | End: 2023-04-16

## 2023-04-15 NOTE — PROGRESS NOTES
Patient has been on Levothyroxine iv starting 4/11/2023  Tolerating okay  FT4 today is 0.6  Will increase IV levothyroxine to 100mcg qday and recheck FT4 in 7 days.     We will follow     Alexandria Mensah MD  4/15/23

## 2023-04-16 LAB
ANION GAP SERPL CALC-SCNC: 16 MMOL/L (ref 0–18)
BASOPHILS # BLD AUTO: 0.04 X10(3) UL (ref 0–0.2)
BASOPHILS NFR BLD AUTO: 0.8 %
BUN BLD-MCNC: 71 MG/DL (ref 7–18)
BUN/CREAT SERPL: 9.3 (ref 10–20)
CALCIUM BLD-MCNC: 7.8 MG/DL (ref 8.5–10.1)
CHLORIDE SERPL-SCNC: 101 MMOL/L (ref 98–112)
CO2 SERPL-SCNC: 20 MMOL/L (ref 21–32)
CREAT BLD-MCNC: 7.66 MG/DL
DEPRECATED RDW RBC AUTO: 53.7 FL (ref 35.1–46.3)
EOSINOPHIL # BLD AUTO: 0.13 X10(3) UL (ref 0–0.7)
EOSINOPHIL NFR BLD AUTO: 2.7 %
ERYTHROCYTE [DISTWIDTH] IN BLOOD BY AUTOMATED COUNT: 15.2 % (ref 11–15)
GFR SERPLBLD BASED ON 1.73 SQ M-ARVRAT: 7 ML/MIN/1.73M2 (ref 60–?)
GLUCOSE BLD-MCNC: 114 MG/DL (ref 70–99)
HCT VFR BLD AUTO: 25.8 %
HGB BLD-MCNC: 8.4 G/DL
IMM GRANULOCYTES # BLD AUTO: 0.02 X10(3) UL (ref 0–1)
IMM GRANULOCYTES NFR BLD: 0.4 %
LYMPHOCYTES # BLD AUTO: 0.33 X10(3) UL (ref 1–4)
LYMPHOCYTES NFR BLD AUTO: 6.9 %
MAGNESIUM SERPL-MCNC: 2.4 MG/DL (ref 1.6–2.6)
MCH RBC QN AUTO: 31.9 PG (ref 26–34)
MCHC RBC AUTO-ENTMCNC: 32.6 G/DL (ref 31–37)
MCV RBC AUTO: 98.1 FL
MONOCYTES # BLD AUTO: 0.56 X10(3) UL (ref 0.1–1)
MONOCYTES NFR BLD AUTO: 11.7 %
NEUTROPHILS # BLD AUTO: 3.71 X10 (3) UL (ref 1.5–7.7)
NEUTROPHILS # BLD AUTO: 3.71 X10(3) UL (ref 1.5–7.7)
NEUTROPHILS NFR BLD AUTO: 77.5 %
OSMOLALITY SERPL CALC.SUM OF ELEC: 306 MOSM/KG (ref 275–295)
PHOSPHATE SERPL-MCNC: 9.3 MG/DL (ref 2.5–4.9)
PLATELET # BLD AUTO: 173 10(3)UL (ref 150–450)
POTASSIUM SERPL-SCNC: 4 MMOL/L (ref 3.5–5.1)
RBC # BLD AUTO: 2.63 X10(6)UL
SODIUM SERPL-SCNC: 137 MMOL/L (ref 136–145)
WBC # BLD AUTO: 4.8 X10(3) UL (ref 4–11)

## 2023-04-16 PROCEDURE — 99232 SBSQ HOSP IP/OBS MODERATE 35: CPT | Performed by: HOSPITALIST

## 2023-04-16 PROCEDURE — 99233 SBSQ HOSP IP/OBS HIGH 50: CPT | Performed by: INTERNAL MEDICINE

## 2023-04-16 PROCEDURE — 90945 DIALYSIS ONE EVALUATION: CPT | Performed by: INTERNAL MEDICINE

## 2023-04-16 RX ORDER — CYCLOBENZAPRINE HCL 5 MG
5 TABLET ORAL EVERY 8 HOURS PRN
Status: DISCONTINUED | OUTPATIENT
Start: 2023-04-16 | End: 2023-04-20

## 2023-04-16 RX ORDER — HEPARIN SODIUM 1000 [USP'U]/ML
1.5 INJECTION, SOLUTION INTRAVENOUS; SUBCUTANEOUS
Status: DISCONTINUED | OUTPATIENT
Start: 2023-04-17 | End: 2023-04-20

## 2023-04-16 RX ORDER — HYDROCODONE BITARTRATE AND ACETAMINOPHEN 5; 325 MG/1; MG/1
1 TABLET ORAL EVERY 8 HOURS PRN
Status: DISCONTINUED | OUTPATIENT
Start: 2023-04-16 | End: 2023-04-20

## 2023-04-16 RX ORDER — DEXTROSE MONOHYDRATE, SODIUM CHLORIDE, SODIUM LACTATE, CALCIUM CHLORIDE, MAGNESIUM CHLORIDE 1.5; 538; 448; 18.4; 5.08 G/100ML; MG/100ML; MG/100ML; MG/100ML; MG/100ML
5000 SOLUTION INTRAPERITONEAL
Status: DISCONTINUED | OUTPATIENT
Start: 2023-04-16 | End: 2023-04-19

## 2023-04-17 ENCOUNTER — APPOINTMENT (OUTPATIENT)
Dept: INTERVENTIONAL RADIOLOGY/VASCULAR | Facility: HOSPITAL | Age: 78
DRG: 177 | End: 2023-04-17
Attending: HOSPITALIST
Payer: MEDICARE

## 2023-04-17 ENCOUNTER — APPOINTMENT (OUTPATIENT)
Dept: INTERVENTIONAL RADIOLOGY/VASCULAR | Facility: HOSPITAL | Age: 78
End: 2023-04-17
Attending: HOSPITALIST
Payer: MEDICARE

## 2023-04-17 LAB
ALBUMIN SERPL-MCNC: 1.7 G/DL (ref 3.4–5)
ALBUMIN SERPL-MCNC: 1.8 G/DL (ref 3.4–5)
ANION GAP SERPL CALC-SCNC: 11 MMOL/L (ref 0–18)
ANION GAP SERPL CALC-SCNC: 15 MMOL/L (ref 0–18)
BASOPHILS # BLD AUTO: 0.05 X10(3) UL (ref 0–0.2)
BASOPHILS NFR BLD AUTO: 1.1 %
BUN BLD-MCNC: 77 MG/DL (ref 7–18)
BUN BLD-MCNC: 79 MG/DL (ref 7–18)
BUN/CREAT SERPL: 8.9 (ref 10–20)
BUN/CREAT SERPL: 9.6 (ref 10–20)
CALCIUM BLD-MCNC: 7.6 MG/DL (ref 8.5–10.1)
CALCIUM BLD-MCNC: 7.8 MG/DL (ref 8.5–10.1)
CHLORIDE SERPL-SCNC: 101 MMOL/L (ref 98–112)
CHLORIDE SERPL-SCNC: 99 MMOL/L (ref 98–112)
CO2 SERPL-SCNC: 21 MMOL/L (ref 21–32)
CO2 SERPL-SCNC: 24 MMOL/L (ref 21–32)
CREAT BLD-MCNC: 8.04 MG/DL
CREAT BLD-MCNC: 8.85 MG/DL
DEPRECATED RDW RBC AUTO: 53.5 FL (ref 35.1–46.3)
EOSINOPHIL # BLD AUTO: 0.14 X10(3) UL (ref 0–0.7)
EOSINOPHIL NFR BLD AUTO: 3 %
ERYTHROCYTE [DISTWIDTH] IN BLOOD BY AUTOMATED COUNT: 14.9 % (ref 11–15)
GFR SERPLBLD BASED ON 1.73 SQ M-ARVRAT: 6 ML/MIN/1.73M2 (ref 60–?)
GFR SERPLBLD BASED ON 1.73 SQ M-ARVRAT: 6 ML/MIN/1.73M2 (ref 60–?)
GLUCOSE BLD-MCNC: 118 MG/DL (ref 70–99)
GLUCOSE BLD-MCNC: 195 MG/DL (ref 70–99)
HCT VFR BLD AUTO: 27.5 %
HGB BLD-MCNC: 8.6 G/DL
IMM GRANULOCYTES # BLD AUTO: 0.02 X10(3) UL (ref 0–1)
IMM GRANULOCYTES NFR BLD: 0.4 %
LYMPHOCYTES # BLD AUTO: 0.36 X10(3) UL (ref 1–4)
LYMPHOCYTES NFR BLD AUTO: 7.6 %
MAGNESIUM SERPL-MCNC: 2.4 MG/DL (ref 1.6–2.6)
MCH RBC QN AUTO: 31.5 PG (ref 26–34)
MCHC RBC AUTO-ENTMCNC: 31.3 G/DL (ref 31–37)
MCV RBC AUTO: 100.7 FL
MONOCYTES # BLD AUTO: 0.55 X10(3) UL (ref 0.1–1)
MONOCYTES NFR BLD AUTO: 11.7 %
NEUTROPHILS # BLD AUTO: 3.6 X10 (3) UL (ref 1.5–7.7)
NEUTROPHILS # BLD AUTO: 3.6 X10(3) UL (ref 1.5–7.7)
NEUTROPHILS NFR BLD AUTO: 76.2 %
OSMOLALITY SERPL CALC.SUM OF ELEC: 307 MOSM/KG (ref 275–295)
OSMOLALITY SERPL CALC.SUM OF ELEC: 308 MOSM/KG (ref 275–295)
PHOSPHATE SERPL-MCNC: 10 MG/DL (ref 2.5–4.9)
PHOSPHATE SERPL-MCNC: 12.7 MG/DL (ref 2.5–4.9)
PLATELET # BLD AUTO: 188 10(3)UL (ref 150–450)
POTASSIUM SERPL-SCNC: 3.9 MMOL/L (ref 3.5–5.1)
POTASSIUM SERPL-SCNC: 4.5 MMOL/L (ref 3.5–5.1)
RBC # BLD AUTO: 2.73 X10(6)UL
SODIUM SERPL-SCNC: 134 MMOL/L (ref 136–145)
SODIUM SERPL-SCNC: 137 MMOL/L (ref 136–145)
T PALLIDUM AB SER QL: NEGATIVE
WBC # BLD AUTO: 4.7 X10(3) UL (ref 4–11)

## 2023-04-17 PROCEDURE — 02H633Z INSERTION OF INFUSION DEVICE INTO RIGHT ATRIUM, PERCUTANEOUS APPROACH: ICD-10-PCS | Performed by: RADIOLOGY

## 2023-04-17 PROCEDURE — 99233 SBSQ HOSP IP/OBS HIGH 50: CPT | Performed by: INTERNAL MEDICINE

## 2023-04-17 PROCEDURE — 99232 SBSQ HOSP IP/OBS MODERATE 35: CPT | Performed by: HOSPITALIST

## 2023-04-17 PROCEDURE — 0JH63XZ INSERTION OF TUNNELED VASCULAR ACCESS DEVICE INTO CHEST SUBCUTANEOUS TISSUE AND FASCIA, PERCUTANEOUS APPROACH: ICD-10-PCS | Performed by: RADIOLOGY

## 2023-04-17 RX ORDER — CEFAZOLIN SODIUM/WATER 2 G/20 ML
SYRINGE (ML) INTRAVENOUS
Status: DISCONTINUED
Start: 2023-04-17 | End: 2023-04-17 | Stop reason: WASHOUT

## 2023-04-17 RX ORDER — LIDOCAINE HYDROCHLORIDE 20 MG/ML
INJECTION, SOLUTION INFILTRATION; PERINEURAL
Status: COMPLETED
Start: 2023-04-17 | End: 2023-04-17

## 2023-04-17 RX ORDER — SODIUM CHLORIDE 9 MG/ML
INJECTION INTRAVENOUS
Status: COMPLETED
Start: 2023-04-17 | End: 2023-04-17

## 2023-04-17 RX ORDER — MORPHINE SULFATE 2 MG/ML
2 INJECTION, SOLUTION INTRAMUSCULAR; INTRAVENOUS ONCE
Status: COMPLETED | OUTPATIENT
Start: 2023-04-17 | End: 2023-04-17

## 2023-04-17 RX ORDER — HEPARIN SODIUM 1000 [USP'U]/ML
INJECTION, SOLUTION INTRAVENOUS; SUBCUTANEOUS
Status: COMPLETED
Start: 2023-04-17 | End: 2023-04-17

## 2023-04-17 RX ORDER — MIDAZOLAM HYDROCHLORIDE 1 MG/ML
INJECTION INTRAMUSCULAR; INTRAVENOUS
Status: DISCONTINUED
Start: 2023-04-17 | End: 2023-04-17 | Stop reason: WASHOUT

## 2023-04-18 ENCOUNTER — APPOINTMENT (OUTPATIENT)
Dept: INTERVENTIONAL RADIOLOGY/VASCULAR | Facility: HOSPITAL | Age: 78
End: 2023-04-18
Attending: CLINICAL NURSE SPECIALIST
Payer: MEDICARE

## 2023-04-18 ENCOUNTER — APPOINTMENT (OUTPATIENT)
Dept: INTERVENTIONAL RADIOLOGY/VASCULAR | Facility: HOSPITAL | Age: 78
DRG: 177 | End: 2023-04-18
Attending: CLINICAL NURSE SPECIALIST
Payer: MEDICARE

## 2023-04-18 LAB
ANION GAP SERPL CALC-SCNC: 14 MMOL/L (ref 0–18)
BASOPHILS # BLD AUTO: 0.04 X10(3) UL (ref 0–0.2)
BASOPHILS NFR BLD AUTO: 0.8 %
BUN BLD-MCNC: 86 MG/DL (ref 7–18)
BUN/CREAT SERPL: 9.6 (ref 10–20)
CALCIUM BLD-MCNC: 7.8 MG/DL (ref 8.5–10.1)
CHLORIDE SERPL-SCNC: 101 MMOL/L (ref 98–112)
CO2 SERPL-SCNC: 20 MMOL/L (ref 21–32)
CREAT BLD-MCNC: 8.96 MG/DL
DEPRECATED RDW RBC AUTO: 53.7 FL (ref 35.1–46.3)
EOSINOPHIL # BLD AUTO: 0.1 X10(3) UL (ref 0–0.7)
EOSINOPHIL NFR BLD AUTO: 2 %
ERYTHROCYTE [DISTWIDTH] IN BLOOD BY AUTOMATED COUNT: 14.9 % (ref 11–15)
GFR SERPLBLD BASED ON 1.73 SQ M-ARVRAT: 5 ML/MIN/1.73M2 (ref 60–?)
GLUCOSE BLD-MCNC: 77 MG/DL (ref 70–99)
HCT VFR BLD AUTO: 27.5 %
HGB BLD-MCNC: 8.6 G/DL
IMM GRANULOCYTES # BLD AUTO: 0.02 X10(3) UL (ref 0–1)
IMM GRANULOCYTES NFR BLD: 0.4 %
LYMPHOCYTES # BLD AUTO: 0.35 X10(3) UL (ref 1–4)
LYMPHOCYTES NFR BLD AUTO: 7 %
MAGNESIUM SERPL-MCNC: 2.4 MG/DL (ref 1.6–2.6)
MCH RBC QN AUTO: 31.2 PG (ref 26–34)
MCHC RBC AUTO-ENTMCNC: 31.3 G/DL (ref 31–37)
MCV RBC AUTO: 99.6 FL
MONOCYTES # BLD AUTO: 0.53 X10(3) UL (ref 0.1–1)
MONOCYTES NFR BLD AUTO: 10.6 %
NEUTROPHILS # BLD AUTO: 3.94 X10 (3) UL (ref 1.5–7.7)
NEUTROPHILS # BLD AUTO: 3.94 X10(3) UL (ref 1.5–7.7)
NEUTROPHILS NFR BLD AUTO: 79.2 %
OSMOLALITY SERPL CALC.SUM OF ELEC: 305 MOSM/KG (ref 275–295)
PHOSPHATE SERPL-MCNC: 11.8 MG/DL (ref 2.5–4.9)
PLATELET # BLD AUTO: 190 10(3)UL (ref 150–450)
POTASSIUM SERPL-SCNC: 4.3 MMOL/L (ref 3.5–5.1)
RBC # BLD AUTO: 2.76 X10(6)UL
SODIUM SERPL-SCNC: 135 MMOL/L (ref 136–145)
WBC # BLD AUTO: 5 X10(3) UL (ref 4–11)

## 2023-04-18 PROCEDURE — 0J2TXYZ CHANGE OTHER DEVICE IN TRUNK SUBCUTANEOUS TISSUE AND FASCIA, EXTERNAL APPROACH: ICD-10-PCS | Performed by: RADIOLOGY

## 2023-04-18 PROCEDURE — 99232 SBSQ HOSP IP/OBS MODERATE 35: CPT | Performed by: HOSPITALIST

## 2023-04-18 PROCEDURE — 99233 SBSQ HOSP IP/OBS HIGH 50: CPT | Performed by: INTERNAL MEDICINE

## 2023-04-18 RX ORDER — HEPARIN SODIUM 1000 [USP'U]/ML
INJECTION, SOLUTION INTRAVENOUS; SUBCUTANEOUS
Status: COMPLETED
Start: 2023-04-18 | End: 2023-04-18

## 2023-04-18 RX ORDER — MIDAZOLAM HYDROCHLORIDE 1 MG/ML
INJECTION INTRAMUSCULAR; INTRAVENOUS
Status: DISCONTINUED
Start: 2023-04-18 | End: 2023-04-18 | Stop reason: WASHOUT

## 2023-04-18 RX ORDER — CLINDAMYCIN PHOSPHATE 600 MG/50ML
INJECTION INTRAVENOUS
Status: COMPLETED
Start: 2023-04-18 | End: 2023-04-18

## 2023-04-18 RX ORDER — LIDOCAINE HYDROCHLORIDE 20 MG/ML
INJECTION, SOLUTION INFILTRATION; PERINEURAL
Status: COMPLETED
Start: 2023-04-18 | End: 2023-04-18

## 2023-04-18 RX ORDER — IPRATROPIUM BROMIDE AND ALBUTEROL SULFATE 2.5; .5 MG/3ML; MG/3ML
3 SOLUTION RESPIRATORY (INHALATION)
Status: DISCONTINUED | OUTPATIENT
Start: 2023-04-19 | End: 2023-04-20

## 2023-04-18 NOTE — CM/SW NOTE
JASMIN received phone call from spouse, Clyde Rodgers stating that she would like for patient to go to [facility] for CHERYL. [facility] is not on list of accepting facilities. JASMIN sent updated clinical information to [facility]. Sent message via [facility] and LVM for admissions team requested an update on acceptance, notified that they are preference. JASMIN called and LVM for spouse, Polly with update. Notified that referral has been sent to [facility], but that patient is not yet clinically accepted and cannot guarantee that patient will be accepted. Requested Polly to continue to review list of accepting providers that was provided yesterday. JASMIN will continue to provide updates to Clyde Rodgers regarding acceptance to [facility]. PLAN: CHERYL list provided to spouse, awaiting choice. 50 referrals have been sent. Wife is refusing to choose from provided list.     Referral sent to [facility] per request of spouse, awaiting review and response from facility.      KARL Mcdaniel, [facility]    [id number]

## 2023-04-18 NOTE — PHYSICAL THERAPY NOTE
Chart reviewed for PT treatment. Patient had HD this morning but did not have good blood flow so is now going for tunneled HD catheter exchange. Will check back tomorrow.

## 2023-04-19 LAB
ALBUMIN SERPL-MCNC: 1.6 G/DL (ref 3.4–5)
ANION GAP SERPL CALC-SCNC: 11 MMOL/L (ref 0–18)
BASOPHILS # BLD AUTO: 0.04 X10(3) UL (ref 0–0.2)
BASOPHILS NFR BLD AUTO: 1 %
BUN BLD-MCNC: 68 MG/DL (ref 7–18)
BUN/CREAT SERPL: 9.3 (ref 10–20)
CALCIUM BLD-MCNC: 7.6 MG/DL (ref 8.5–10.1)
CHLORIDE SERPL-SCNC: 102 MMOL/L (ref 98–112)
CO2 SERPL-SCNC: 22 MMOL/L (ref 21–32)
CREAT BLD-MCNC: 7.28 MG/DL
DEPRECATED RDW RBC AUTO: 53.7 FL (ref 35.1–46.3)
EOSINOPHIL # BLD AUTO: 0.08 X10(3) UL (ref 0–0.7)
EOSINOPHIL NFR BLD AUTO: 2 %
ERYTHROCYTE [DISTWIDTH] IN BLOOD BY AUTOMATED COUNT: 14.9 % (ref 11–15)
GFR SERPLBLD BASED ON 1.73 SQ M-ARVRAT: 7 ML/MIN/1.73M2 (ref 60–?)
GLUCOSE BLD-MCNC: 82 MG/DL (ref 70–99)
HCT VFR BLD AUTO: 26.4 %
HGB BLD-MCNC: 8.3 G/DL
IMM GRANULOCYTES # BLD AUTO: 0.02 X10(3) UL (ref 0–1)
IMM GRANULOCYTES NFR BLD: 0.5 %
LYMPHOCYTES # BLD AUTO: 0.36 X10(3) UL (ref 1–4)
LYMPHOCYTES NFR BLD AUTO: 9.2 %
MCH RBC QN AUTO: 31.3 PG (ref 26–34)
MCHC RBC AUTO-ENTMCNC: 31.4 G/DL (ref 31–37)
MCV RBC AUTO: 99.6 FL
MONOCYTES # BLD AUTO: 0.49 X10(3) UL (ref 0.1–1)
MONOCYTES NFR BLD AUTO: 12.5 %
NEUTROPHILS # BLD AUTO: 2.94 X10 (3) UL (ref 1.5–7.7)
NEUTROPHILS # BLD AUTO: 2.94 X10(3) UL (ref 1.5–7.7)
NEUTROPHILS NFR BLD AUTO: 74.8 %
OSMOLALITY SERPL CALC.SUM OF ELEC: 299 MOSM/KG (ref 275–295)
PHOSPHATE SERPL-MCNC: 9.1 MG/DL (ref 2.5–4.9)
PLATELET # BLD AUTO: 172 10(3)UL (ref 150–450)
POTASSIUM SERPL-SCNC: 4.3 MMOL/L (ref 3.5–5.1)
RBC # BLD AUTO: 2.65 X10(6)UL
SODIUM SERPL-SCNC: 135 MMOL/L (ref 136–145)
WBC # BLD AUTO: 3.9 X10(3) UL (ref 4–11)

## 2023-04-19 PROCEDURE — 99233 SBSQ HOSP IP/OBS HIGH 50: CPT | Performed by: INTERNAL MEDICINE

## 2023-04-19 PROCEDURE — 99233 SBSQ HOSP IP/OBS HIGH 50: CPT | Performed by: HOSPITALIST

## 2023-04-19 RX ORDER — SODIUM CHLORIDE 9 MG/ML
INJECTION INTRAVENOUS
Status: COMPLETED
Start: 2023-04-19 | End: 2023-04-19

## 2023-04-19 RX ORDER — CALCIUM ACETATE 667 MG/1
1 CAPSULE ORAL
Status: DISCONTINUED | OUTPATIENT
Start: 2023-04-19 | End: 2023-04-20

## 2023-04-19 RX ORDER — HEPARIN SODIUM 1000 [USP'U]/ML
1.5 INJECTION, SOLUTION INTRAVENOUS; SUBCUTANEOUS ONCE
Status: DISCONTINUED | OUTPATIENT
Start: 2023-04-19 | End: 2023-04-20

## 2023-04-19 RX ORDER — VANCOMYCIN HYDROCHLORIDE 125 MG/1
250 CAPSULE ORAL 4 TIMES DAILY
Status: DISCONTINUED | OUTPATIENT
Start: 2023-04-19 | End: 2023-04-20

## 2023-04-19 RX ORDER — ALBUMIN (HUMAN) 12.5 G/50ML
100 SOLUTION INTRAVENOUS AS NEEDED
Status: DISCONTINUED | OUTPATIENT
Start: 2023-04-19 | End: 2023-04-20

## 2023-04-19 RX ORDER — LEVOTHYROXINE SODIUM 0.1 MG/1
200 TABLET ORAL
Status: DISCONTINUED | OUTPATIENT
Start: 2023-04-19 | End: 2023-04-20

## 2023-04-19 NOTE — PHYSICAL THERAPY NOTE
PHYSICAL THERAPY TREATMENT NOTE - INPATIENT     Room Number: 460/460-A       Presenting Problem: uremia, hypovolemia, epistaxis  Co-Morbidities : end stage renal disease, metastatic neuroendocrine cancer    Problem List  Principal Problem:    Uremia  Active Problems:    Anemia    ESRD (end stage renal disease) on dialysis (HCC)    Thrombocytopenia (HCC)    Anemia, unspecified type    Community acquired pneumonia, unspecified laterality    Malignant neoplasm of lung ([facility] [address]    Neuroendocrine tumor    Malnutrition ([facility] [address]      PHYSICAL THERAPY ASSESSMENT   Patient is a [de-identified]year old male admitted 4/6/2023 for uremia, hypovolemia, epistaxis. Pt is now s/p HD catheter exchange 4/18/23. Pt cont to present with dec strength, dec balance, dec activity tolerance, and remains below his baseline for all fxal mobility. Patient will benefit from continued inpatient physical therapy to address above issues so that patient may achieve highest functional mobility level. The patient's Approx Degree of Impairment: 54.16% has been calculated based on documentation in the [facility] '6 clicks' Inpatient Basic Mobility Short Form. Research supports that patients with this level of impairment may benefit from CHERYL. Pt ok to see per rn. Pt recd sitting up in recliner, spouse present. Pt educated in goals for session, importance of consistent mobility. Pt agreeable to therapy. Pt performed bilat Le therex with good tolerance. Pt stood to rw with min a, gait training with rw with emphasis on upright posture, safe rw use. Pt required standing rest breaks x 3 due to fatigue, but motivated to cont to ambulate. Pt also demonstrating toilet transfer with min a. Pt returned to recliner at his request.  HR 92bpm, BP 91/52, LE elevated, rn, Katey, made aware of session, BP. Pt O2sat once in chair on room air 94%. Pt made comfortable, chair alarm activated.     --pt with small amount of blood from his nose during ambulation, stopped once in recliner. RN made aware. DISCHARGE RECOMMENDATIONS  PT Discharge Recommendations: Sub-acute rehabilitation     PLAN  PT Treatment Plan: Bed mobility; Body mechanics; Coordination; Endurance; Energy conservation;Patient education;Gait training;Strengthening;Transfer training;Balance training  Frequency (Obs): 3-5x/week    SUBJECTIVE  \"I am just so tired\"    OBJECTIVE  Precautions: Hard of hearing;Bed/chair alarm    WEIGHT BEARING RESTRICTION                PAIN ASSESSMENT   Rating: Unable to rate  Location: low back, buttocks  Management Techniques: Repositioning; Activity promotion    BALANCE  Static Sitting: Good  Dynamic Sitting: Fair  Static Standing: Fair -  Dynamic Standing: Poor +      O2 WALK  Oxygen Therapy  SPO2% on Room Air at Rest: 96  SPO2% Ambulation on Room Air: 94    AM-PAC '6-Clicks' INPATIENT SHORT FORM - BASIC MOBILITY  How much difficulty does the patient currently have. .. Patient Difficulty: Turning over in bed (including adjusting bedclothes, sheets and blankets)?: A Little   Patient Difficulty: Sitting down on and standing up from a chair with arms (e.g., wheelchair, bedside commode, etc.): A Little   Patient Difficulty: Moving from lying on back to sitting on the side of the bed?: A Little   How much help from another person does the patient currently need. .. Help from Another: Moving to and from a bed to a chair (including a wheelchair)?: A Little   Help from Another: Need to walk in hospital room?: A Lot   Help from Another: Climbing 3-5 steps with a railing?: A Lot     AM-PAC Score:  Raw Score: 16   Approx Degree of Impairment: 54.16%   Standardized Score (AM-PAC Scale): 40.78   CMS Modifier (G-Code): CK    FUNCTIONAL ABILITY STATUS  Functional Mobility/Gait Assessment  Gait Assistance:  Moderate assistance  Distance (ft): 30  Assistive Device: Rolling walker  Pattern: Shuffle (standing rest breaks x 3 due to fatigue)    THERAPEUTIC EXERCISES  Lower Extremity Alternating marching  Ankle pumps  LAQ     Position Sitting       Patient End of Session: Up in chair;Needs met;Call light within reach;RN aware of session/findings; All patient questions and concerns addressed; Alarm set    CURRENT GOALS   Goals to be met by: 22  Patient Goal Patient's self-stated goal is: to get better   Goal #1 Patient is able to demonstrate supine - sit EOB @ level: moderate assistance      Goal #1   Current Status     Goal #2 Patient is able to demonstrate transfers Sit to/from Stand at assistance level: moderate assistance with walker - rolling      Goal #2  Current Status     Goal #3 Patient is able to ambulate 25 feet with assist device: walker - rolling at assistance level: moderate assistance and SpO2 of 93% or better   Goal #3   Current Status     Goal #4 Patient will negotiate bed to/from chair transfers with RW with moderate assistance and SpO2 of 93% or better   Goal #4   Current Status     Goal #5 Patient to demonstrate independence with home activity/exercise instructions provided to patient in preparation for discharge.    Goal #5   Current Status     Goal #6     Goal #6  Current Status       PT Session Time: 30 minutes  Gait Training: 15 minutes  Therapeutic Activity: 5 minutes  Neuromuscular Re-education: 0 minutes  Therapeutic Exercise: 10 minutes   Canalith Repositionin minutes  Manual Therapy: 0 minutes  Can add/delete any of these

## 2023-04-19 NOTE — CM/SW NOTE
JASMIN received phone call from Joaquin huffman at [facility] stating that they have a bed available. Joaquin huffman states that she called spouse, Carlyon Aschoff to notify and Carlyon Aschoff reports that she now would like patient to be switched to [facility] for dialysis so that the transport cost would be cheaper. JASMIN called and LVM for spouse, Polly requesting a call back to discuss. 233pm  Received call back from spouse, Neetu Ibrahim reports that she did speak with Joaquin huffman at [facility] and would prefer patient to DC there. Polly states that she wants to switch to [facility] not only due to transport cost but also because she is unhappy with the care patient has been receiving from [facility]. JASMIN notified Polly that patient will not be followed by the same nephrologist if transferred to [facility]. Also notified that patient is at risk of losing bed at [facility] as the process to switch dialysis chairs can take time. Polly expressed understanding. Requested to arrange tour at [facility]. JASMIN sent message to Joaquin huffman, requested her to reach out to set tour with wife. 235pm  JASMIN called and spoke with Sophia Santana at [facility] [phone], was transferred to [facility] clinic [address] [place], [place] [id number] [place] who reports that she will call SW back with decision on whether or not they will accept patient. 336pm  Nephrologist Dr. Ange Cassidy, made aware of request to switch to [facility]. PLAN: [address] now willing to accept, however spouse is refusing outpatient HD at [facility] and would like it to be switched to [facility].     *Awaiting call back from [facility] regarding acceptance.      Annetta Zurita, KARL, [facility]    [id number]

## 2023-04-19 NOTE — PROGRESS NOTES
[facility] Pharmacy Note: Route Optimization for Levothyroxine (SYNTHROID)    Patient is currently on Levothyroxine (SYNTHROID) 100 mcg IV daily. The patient meets the criteria to convert to the oral equivalent as established by the IV to Oral conversion protocol approved by the P&T committee. Medication was changed from IV formulation to Levothyroxine (SYNTHROID) 200 mcg PO daily per protocol.       Addison Moreau [facility]  4/19/2023,  8:51 AM

## 2023-04-19 NOTE — CDS QUERY
.Potential for Impaired Nutritional Status  DOCUMENTATION CLARIFICATION FORM  Dear Doctor: Juve Phillips    A Registered Dietician evaluated this patient and found them to meet Severe Malnutrition Criteria using the [place] Guidelines based off the following clinical indicators:      * BMI 19.66  * Per physical exam patient is noted with moderate body fat depletion to orbital and triceps region  * Per physical exam patient is noted with moderate muscle mass depletion to temple, clavicle, scapular and shoulder region  * Noted with dysphagia, placed on a dypshgia friendly diet  * Decreased po intake this past week due to not wanting to be in the hospital, angry at staff. * RD recommends adding Ensure compact BID and magic daily   * RD also educated on importance of increasing protein/calorie intake. * Medical history: ESRD ON HD, Metastatic Neuroendocrine cancer      Clinical information suggests potential for impaired nutritional status. For accurate ICD-10-CM code assignment to reflect severity of illness and risk of mortality,  PLEASE (X) ALL DIAGNOSES THAT APPLY. SELECTION BY PHYSICIAN ONLY      ( x)  Severe Protein-Calorie Malnutrition    ( )  Undernutrition    ( )  Other (please specify):     ( )  Unable to Determine (please comment)            If you have any questions, please contact Clinical :  Catie Moreno RN at [phone]     Thank You!     THIS FORM IS A PERMANENT PART OF THE MEDICAL RECORD

## 2023-04-20 VITALS
HEART RATE: 78 BPM | WEIGHT: 133.13 LBS | HEIGHT: 69 IN | RESPIRATION RATE: 18 BRPM | OXYGEN SATURATION: 99 % | BODY MASS INDEX: 19.72 KG/M2 | SYSTOLIC BLOOD PRESSURE: 97 MMHG | TEMPERATURE: 98 F | DIASTOLIC BLOOD PRESSURE: 48 MMHG

## 2023-04-20 LAB
ANION GAP SERPL CALC-SCNC: 17 MMOL/L (ref 0–18)
BASOPHILS # BLD AUTO: 0.05 X10(3) UL (ref 0–0.2)
BASOPHILS NFR BLD AUTO: 1.4 %
BUN BLD-MCNC: 88 MG/DL (ref 7–18)
BUN/CREAT SERPL: 11.1 (ref 10–20)
CALCIUM BLD-MCNC: 7.8 MG/DL (ref 8.5–10.1)
CHLORIDE SERPL-SCNC: 100 MMOL/L (ref 98–112)
CO2 SERPL-SCNC: 19 MMOL/L (ref 21–32)
CREAT BLD-MCNC: 7.96 MG/DL
DEPRECATED RDW RBC AUTO: 53.4 FL (ref 35.1–46.3)
EOSINOPHIL # BLD AUTO: 0.08 X10(3) UL (ref 0–0.7)
EOSINOPHIL NFR BLD AUTO: 2.3 %
ERYTHROCYTE [DISTWIDTH] IN BLOOD BY AUTOMATED COUNT: 14.8 % (ref 11–15)
GFR SERPLBLD BASED ON 1.73 SQ M-ARVRAT: 6 ML/MIN/1.73M2 (ref 60–?)
GLUCOSE BLD-MCNC: 92 MG/DL (ref 70–99)
HCT VFR BLD AUTO: 25.4 %
HGB BLD-MCNC: 8.1 G/DL
IMM GRANULOCYTES # BLD AUTO: 0.01 X10(3) UL (ref 0–1)
IMM GRANULOCYTES NFR BLD: 0.3 %
LYMPHOCYTES # BLD AUTO: 0.37 X10(3) UL (ref 1–4)
LYMPHOCYTES NFR BLD AUTO: 10.6 %
MCH RBC QN AUTO: 31.5 PG (ref 26–34)
MCHC RBC AUTO-ENTMCNC: 31.9 G/DL (ref 31–37)
MCV RBC AUTO: 98.8 FL
MONOCYTES # BLD AUTO: 0.43 X10(3) UL (ref 0.1–1)
MONOCYTES NFR BLD AUTO: 12.4 %
NEUTROPHILS # BLD AUTO: 2.54 X10 (3) UL (ref 1.5–7.7)
NEUTROPHILS # BLD AUTO: 2.54 X10(3) UL (ref 1.5–7.7)
NEUTROPHILS NFR BLD AUTO: 73 %
OSMOLALITY SERPL CALC.SUM OF ELEC: 309 MOSM/KG (ref 275–295)
PHOSPHATE SERPL-MCNC: 9.6 MG/DL (ref 2.5–4.9)
PLATELET # BLD AUTO: 152 10(3)UL (ref 150–450)
POTASSIUM SERPL-SCNC: 4.6 MMOL/L (ref 3.5–5.1)
RBC # BLD AUTO: 2.57 X10(6)UL
SARS-COV-2 RNA RESP QL NAA+PROBE: NOT DETECTED
SODIUM SERPL-SCNC: 136 MMOL/L (ref 136–145)
WBC # BLD AUTO: 3.5 X10(3) UL (ref 4–11)

## 2023-04-20 PROCEDURE — 99233 SBSQ HOSP IP/OBS HIGH 50: CPT | Performed by: INTERNAL MEDICINE

## 2023-04-20 PROCEDURE — 99239 HOSP IP/OBS DSCHRG MGMT >30: CPT | Performed by: HOSPITALIST

## 2023-04-20 RX ORDER — CALCIUM ACETATE 667 MG/1
2 CAPSULE ORAL
Status: DISCONTINUED | OUTPATIENT
Start: 2023-04-20 | End: 2023-04-20

## 2023-04-20 RX ORDER — CALCIUM ACETATE 667 MG/1
2 CAPSULE ORAL
Refills: 0 | Status: SHIPPED | COMMUNITY
Start: 2023-04-20

## 2023-04-20 RX ORDER — VANCOMYCIN HYDROCHLORIDE 250 MG/1
CAPSULE ORAL
Qty: 1 CAPSULE | Refills: 0 | Status: SHIPPED | OUTPATIENT
Start: 2023-04-20

## 2023-04-20 NOTE — CM/SW NOTE
04/20/23 1328   Discharge disposition   Expected discharge disposition [facility] Provider PP SNF   Outpatient services Dialysis  ([address], MWF at 5:30am)   Discharge transportation [facility]     Pt discussed during nursing rounds. Pt is stable for dc today. MD quiñones order entered. Pt will dc to [facility] [address], [employer] confirmed bed is available today. Pt has new chair time at [address] outpatient HD, MWF at 5:30am. Pt and spouse agreeable to transfer today and cost of medicar which is $35. [facility] scheduled for 4pm . Number for nurse report is [phone]. Plan: PP for CHERYL w/SELINA Iverson for outpatient HD today. / to remain available for support and/or discharge planning.      MICHELLE Dodd    [phone]

## 2023-04-21 ENCOUNTER — TELEPHONE (OUTPATIENT)
Dept: INTERNAL MEDICINE UNIT | Facility: HOSPITAL | Age: 78
End: 2023-04-21

## 2023-04-21 NOTE — TELEPHONE ENCOUNTER
Received call from [facility] DR FLORINDA LI in [facility]. The pharmacist needed clarification on quantity and capsule size. I spoke to Dr. Juve Phillips regarding the medication. She said the patient was discharged to SNF, and to disregard. I then told the pharmacist to cancel the medication. No further questions/concerns.

## 2023-04-22 ENCOUNTER — EXTERNAL FACILITY (OUTPATIENT)
Dept: INTERNAL MEDICINE CLINIC | Facility: CLINIC | Age: 78
End: 2023-04-22

## 2023-04-22 DIAGNOSIS — E87.20 METABOLIC ACIDOSIS: ICD-10-CM

## 2023-04-22 DIAGNOSIS — Z99.2 ESRD (END STAGE RENAL DISEASE) ON DIALYSIS (HCC): ICD-10-CM

## 2023-04-22 DIAGNOSIS — C7B.8 NEUROENDOCRINE CARCINOMA METASTATIC TO INTRA-ABDOMINAL LYMPH NODE (HCC): ICD-10-CM

## 2023-04-22 DIAGNOSIS — E03.2 HYPOTHYROIDISM DUE TO MEDICATION: ICD-10-CM

## 2023-04-22 DIAGNOSIS — D69.6 THROMBOCYTOPENIA (HCC): ICD-10-CM

## 2023-04-22 DIAGNOSIS — J42 CHRONIC BRONCHITIS, UNSPECIFIED CHRONIC BRONCHITIS TYPE (HCC): ICD-10-CM

## 2023-04-22 DIAGNOSIS — R53.1 WEAKNESS: ICD-10-CM

## 2023-04-22 DIAGNOSIS — D63.1 ANEMIA DUE TO CHRONIC KIDNEY DISEASE, ON CHRONIC DIALYSIS (HCC): ICD-10-CM

## 2023-04-22 DIAGNOSIS — E87.21 ACUTE METABOLIC ACIDOSIS: ICD-10-CM

## 2023-04-22 DIAGNOSIS — C78.7 MALIGNANT NEOPLASM METASTATIC TO LIVER (HCC): ICD-10-CM

## 2023-04-22 DIAGNOSIS — N25.81 SECONDARY HYPERPARATHYROIDISM (HCC): ICD-10-CM

## 2023-04-22 DIAGNOSIS — J96.01 ACUTE RESPIRATORY FAILURE WITH HYPOXIA (HCC): ICD-10-CM

## 2023-04-22 DIAGNOSIS — C7A.8 NEUROENDOCRINE CARCINOMA METASTATIC TO INTRA-ABDOMINAL LYMPH NODE (HCC): ICD-10-CM

## 2023-04-22 DIAGNOSIS — C34.90 MALIGNANT NEOPLASM OF LUNG, UNSPECIFIED LATERALITY, UNSPECIFIED PART OF LUNG (HCC): ICD-10-CM

## 2023-04-22 DIAGNOSIS — N19 UREMIA: ICD-10-CM

## 2023-04-22 DIAGNOSIS — C79.51 MALIGNANT NEOPLASM METASTATIC TO BONE (HCC): ICD-10-CM

## 2023-04-22 DIAGNOSIS — N18.6 ANEMIA DUE TO CHRONIC KIDNEY DISEASE, ON CHRONIC DIALYSIS (HCC): ICD-10-CM

## 2023-04-22 DIAGNOSIS — Z99.2 ANEMIA DUE TO CHRONIC KIDNEY DISEASE, ON CHRONIC DIALYSIS (HCC): ICD-10-CM

## 2023-04-22 DIAGNOSIS — R19.7 DIARRHEA, UNSPECIFIED TYPE: ICD-10-CM

## 2023-04-22 DIAGNOSIS — N18.6 ESRD (END STAGE RENAL DISEASE) ON DIALYSIS (HCC): ICD-10-CM

## 2023-04-24 ENCOUNTER — PATIENT OUTREACH (OUTPATIENT)
Dept: CASE MANAGEMENT | Age: 78
End: 2023-04-24

## 2023-04-24 ENCOUNTER — EXTERNAL FACILITY (OUTPATIENT)
Dept: PULMONOLOGY | Facility: CLINIC | Age: 78
End: 2023-04-24

## 2023-04-24 ENCOUNTER — TELEPHONE (OUTPATIENT)
Dept: PULMONOLOGY | Facility: CLINIC | Age: 78
End: 2023-04-24

## 2023-04-24 DIAGNOSIS — C34.12 PRIMARY CANCER OF LEFT UPPER LOBE OF LUNG (HCC): ICD-10-CM

## 2023-04-24 DIAGNOSIS — G47.33 OSA (OBSTRUCTIVE SLEEP APNEA): ICD-10-CM

## 2023-04-24 DIAGNOSIS — J18.9 PNEUMONIA OF RIGHT UPPER LOBE DUE TO INFECTIOUS ORGANISM: Primary | ICD-10-CM

## 2023-04-24 DIAGNOSIS — J44.9 CHRONIC OBSTRUCTIVE PULMONARY DISEASE, UNSPECIFIED COPD TYPE (HCC): ICD-10-CM

## 2023-04-24 PROCEDURE — 99306 1ST NF CARE HIGH MDM 50: CPT | Performed by: PHYSICIAN ASSISTANT

## 2023-04-24 PROCEDURE — 1111F DSCHRG MED/CURRENT MED MERGE: CPT | Performed by: PHYSICIAN ASSISTANT

## 2023-04-24 NOTE — PROGRESS NOTES
Attempted to reach patient's wife for CCM monthly outreach. The phone was just ringing no answer. Time Spent This Encounter Total: 3  min medical record review  Monthly Minute Total including today: 6 minutes    Sneha Singh 64, [address] [facility], [address] [place] [address]  Phone: [phone]. Time [place] 3rd Floor

## 2023-04-24 NOTE — TELEPHONE ENCOUNTER
Dr. Evelyn Gudino - Patient discharged 4/20/23. Has appt scheduled with you 4/27/23, wife asking to re-schedule. When would you like to see patient?

## 2023-04-25 PROCEDURE — 99308 SBSQ NF CARE LOW MDM 20: CPT | Performed by: INTERNAL MEDICINE

## 2023-04-25 PROCEDURE — 1111F DSCHRG MED/CURRENT MED MERGE: CPT | Performed by: INTERNAL MEDICINE

## 2023-04-25 NOTE — TELEPHONE ENCOUNTER
Spoke with patient's wife Mayito Hampton, hospital follow up appointment scheduled with Dr. Monique Godoy on 5/15/23 at 12pm.  Verified date, time, location & parking, wife verbalized understanding.

## 2023-04-25 NOTE — TELEPHONE ENCOUNTER
RN: I saw patient at [facility] yesterday. Dr. Odilia Meyer previously recommended f/u chest x-ray morning of patient's follow up appointment. Please let patient/wife know I put order in for this.

## 2023-04-27 ENCOUNTER — TELEPHONE (OUTPATIENT)
Dept: NEPHROLOGY | Facility: CLINIC | Age: 78
End: 2023-04-27

## 2023-04-27 PROCEDURE — 99308 SBSQ NF CARE LOW MDM 20: CPT | Performed by: INTERNAL MEDICINE

## 2023-04-27 PROCEDURE — 1111F DSCHRG MED/CURRENT MED MERGE: CPT | Performed by: INTERNAL MEDICINE

## 2023-04-27 NOTE — CM/SW NOTE
JASMIN received VM from [facility] at [address] RENAL requesting update. JASMIN called and notified [address] intake (April) to cancel referral as patient is set with [facility].      KARL Mcqueen, [facility]    [id number]

## 2023-04-27 NOTE — TELEPHONE ENCOUNTER
Received a call from Dr Anderson Nieto to call pt wife if can do video visit this Monday 5/1/23 at noon,per pt wife pt still in Rehab for 2 more weeks,agreed with video Kaylah Lamar will schedule pt.

## 2023-04-28 ENCOUNTER — INITIAL APN SNF VISIT (OUTPATIENT)
Dept: INTERNAL MEDICINE CLINIC | Facility: SKILLED NURSING FACILITY | Age: 78
End: 2023-04-28

## 2023-04-28 ENCOUNTER — EXTERNAL FACILITY (OUTPATIENT)
Dept: INTERNAL MEDICINE CLINIC | Facility: CLINIC | Age: 78
End: 2023-04-28

## 2023-04-28 DIAGNOSIS — N18.6 ESRD (END STAGE RENAL DISEASE) ON DIALYSIS (HCC): ICD-10-CM

## 2023-04-28 DIAGNOSIS — Z78.9 DECREASED ACTIVITIES OF DAILY LIVING (ADL): ICD-10-CM

## 2023-04-28 DIAGNOSIS — C7B.8 NEUROENDOCRINE CARCINOMA METASTATIC TO INTRA-ABDOMINAL LYMPH NODE (HCC): ICD-10-CM

## 2023-04-28 DIAGNOSIS — Z99.2 ESRD (END STAGE RENAL DISEASE) ON DIALYSIS (HCC): ICD-10-CM

## 2023-04-28 DIAGNOSIS — C34.90 MALIGNANT NEOPLASM OF LUNG, UNSPECIFIED LATERALITY, UNSPECIFIED PART OF LUNG (HCC): ICD-10-CM

## 2023-04-28 DIAGNOSIS — Z87.898 HISTORY OF EPISTAXIS: ICD-10-CM

## 2023-04-28 DIAGNOSIS — D62 ANEMIA DUE TO ACUTE BLOOD LOSS: ICD-10-CM

## 2023-04-28 DIAGNOSIS — A04.72 C. DIFFICILE COLITIS: ICD-10-CM

## 2023-04-28 DIAGNOSIS — C7A.8 NEUROENDOCRINE CARCINOMA METASTATIC TO INTRA-ABDOMINAL LYMPH NODE (HCC): ICD-10-CM

## 2023-04-28 DIAGNOSIS — M25.562 ACUTE PAIN OF LEFT KNEE: ICD-10-CM

## 2023-04-28 DIAGNOSIS — C78.7 MALIGNANT NEOPLASM METASTATIC TO LIVER (HCC): ICD-10-CM

## 2023-04-28 DIAGNOSIS — Z79.899 MEDICATION MANAGEMENT: ICD-10-CM

## 2023-04-28 DIAGNOSIS — D3A.8 NEUROENDOCRINE TUMOR: ICD-10-CM

## 2023-04-28 DIAGNOSIS — N25.81 SECONDARY HYPERPARATHYROIDISM (HCC): ICD-10-CM

## 2023-04-28 DIAGNOSIS — J42 CHRONIC BRONCHITIS, UNSPECIFIED CHRONIC BRONCHITIS TYPE (HCC): ICD-10-CM

## 2023-04-28 DIAGNOSIS — C79.51 MALIGNANT NEOPLASM METASTATIC TO BONE (HCC): ICD-10-CM

## 2023-04-28 DIAGNOSIS — C79.89 MALIGNANT NEOPLASM METASTATIC TO OTHER SITE (HCC): ICD-10-CM

## 2023-04-28 DIAGNOSIS — D64.9 ANEMIA, UNSPECIFIED TYPE: ICD-10-CM

## 2023-04-28 RX ORDER — HYDROCODONE BITARTRATE AND ACETAMINOPHEN 5; 325 MG/1; MG/1
1 TABLET ORAL EVERY 4 HOURS PRN
COMMUNITY

## 2023-04-28 RX ORDER — LIDOCAINE 4 G/G
1 PATCH TOPICAL EVERY 24 HOURS
COMMUNITY

## 2023-04-28 RX ORDER — CHOLECALCIFEROL (VITAMIN D3) 125 MCG
5 CAPSULE ORAL NIGHTLY
COMMUNITY

## 2023-04-28 NOTE — PROGRESS NOTES
pt seen 4/27/23 at [facility]    Continue with current care    Vitals ok    Latest labs ok    Nursing notes noted     sub   No cp, sob   No c/d   No n/v   Vit stable   obj: alert, appears stated age and cooperative   Pulmonary: clear to auscultation bilaterally   Cardiovascular: S1, S2 normal, no murmur, click, rub or gallop, regular rate and rhythm   Abdominal: soft, non-tender;bowel sounds normal; no masses, no organomegaly   Extremities: extremities normal, atraumatic, no cyanosis or edema           Profound uremia from renal failure with PD    End-stage renal disease, on peritoneal dialysis. Nephrology on consult   IR consulted for HD access on admission   - daily HD 4/7/23 -4/9/23 due to uremia w/ immediate improvement of MS   -         Severe epistaxis and posthemorrhagic anemia: Resolved   from PLT dysfunction from  ESRD/uremia   ENT was on consult   Tranfused 1 unit with HD 4/7/23   Small recurrence 4/15 now ok    vrfvr       Neuroendocrine cancer-   Heme on consult        episodes of hypotention   Will monitor           Diarrhea/+ CDI recurrent: diagnosed 4/11   - was on OVP but despite this developed CDI   -  Started therapeutic po vancomycin qid x 14 days   - isolation    - increase po vanco       Severe Hypothyroidism:   Recently adjusted dose of synthroid by Dr Regina Guido 3/20/2023 from 125 to 137 mcg-currently on this dose   MS improved with HD so myxedema coma unlikely contributing to AMS on admission   Repeat TFTs worse since 3/9/2023 : TSH 62 T4 0.4   Endocrine consulted for further adjustments           History of chronic obstructive pulmonary disease:   Stable-no O2 requirements or SOB   trelegy and albuterol prn   Monitor            CAD, multiple PCI stents/ Chronic L ventricular DDx,  CHFpEF 55-60%   -Last TTE 3/16/2023   -Euvolemic       Severe as with near syncope seen by santa          moderate pulmonary artery hypertension- medical management   \    Obstructive sleep apnea. Osteoarthritis. Hypertension.   Hyperlipidemia, ho CDI

## 2023-04-28 NOTE — PROGRESS NOTES
pt seen 4/25/23 at [facility]  seen in room, no distresss    vitals stable     nursing notes noted    pt more alert       sub   No cp, sob   No c/d   No n/v   Vit stable   obj: alert, appears stated age and cooperative   Pulmonary: clear to auscultation bilaterally   Cardiovascular: S1, S2 normal, no murmur, click, rub or gallop, regular rate and rhythm   Abdominal: soft, non-tender;bowel sounds normal; no masses, no organomegaly   Extremities: extremities normal, atraumatic, no cyanosis or edema           Profound uremia from renal failure with PD    End-stage renal disease, on peritoneal dialysis. Nephrology on consult   IR consulted for HD access on admission   - daily HD 4/7/23 -4/9/23 due to uremia w/ immediate improvement of MS   -         Severe epistaxis and posthemorrhagic anemia: Resolved   from PLT dysfunction from  ESRD/uremia   ENT was on consult   Tranfused 1 unit with HD 4/7/23   Small recurrence 4/15 now ok    vrfvr       Neuroendocrine cancer-   Heme on consult        episodes of hypotention   Will monitor           Diarrhea/+ CDI recurrent: diagnosed 4/11   - was on OVP but despite this developed CDI   -  Started therapeutic po vancomycin qid x 14 days   - isolation    - increase po vanco       Severe Hypothyroidism:   Recently adjusted dose of synthroid by Dr Shannon Gibson 3/20/2023 from 125 to 137 mcg-currently on this dose   MS improved with HD so myxedema coma unlikely contributing to AMS on admission   Repeat TFTs worse since 3/9/2023 : TSH 62 T4 0.4   Endocrine consulted for further adjustments           History of chronic obstructive pulmonary disease:   Stable-no O2 requirements or SOB   trelegy and albuterol prn   Monitor            CAD, multiple PCI stents/ Chronic L ventricular DDx,  CHFpEF 55-60%   -Last TTE 3/16/2023   -Euvolemic       Severe as with near syncope seen by [facility]          moderate pulmonary artery hypertension- medical management   \    Obstructive sleep apnea. Osteoarthritis. Hypertension.   Hyperlipidemia, ho CDI

## 2023-05-01 ENCOUNTER — TELEMEDICINE (OUTPATIENT)
Dept: NEPHROLOGY | Facility: CLINIC | Age: 78
End: 2023-05-01

## 2023-05-01 ENCOUNTER — EXTERNAL FACILITY (OUTPATIENT)
Dept: PULMONOLOGY | Facility: CLINIC | Age: 78
End: 2023-05-01

## 2023-05-01 DIAGNOSIS — Z99.2 ESRD (END STAGE RENAL DISEASE) ON DIALYSIS (HCC): Primary | ICD-10-CM

## 2023-05-01 DIAGNOSIS — J44.9 CHRONIC OBSTRUCTIVE PULMONARY DISEASE, UNSPECIFIED COPD TYPE (HCC): ICD-10-CM

## 2023-05-01 DIAGNOSIS — N18.6 ESRD (END STAGE RENAL DISEASE) ON DIALYSIS (HCC): Primary | ICD-10-CM

## 2023-05-01 DIAGNOSIS — G47.33 OSA (OBSTRUCTIVE SLEEP APNEA): ICD-10-CM

## 2023-05-01 DIAGNOSIS — J18.9 PNEUMONIA OF RIGHT UPPER LOBE DUE TO INFECTIOUS ORGANISM: Primary | ICD-10-CM

## 2023-05-01 PROCEDURE — 99998 NO SHOW: CPT | Performed by: INTERNAL MEDICINE

## 2023-05-01 PROCEDURE — 1111F DSCHRG MED/CURRENT MED MERGE: CPT | Performed by: INTERNAL MEDICINE

## 2023-05-01 PROCEDURE — 99308 SBSQ NF CARE LOW MDM 20: CPT | Performed by: INTERNAL MEDICINE

## 2023-05-02 NOTE — PROGRESS NOTES
Patient seen today at  [facility] while participating in [facility] [address]. He is also receiving care and oversight by Collaborating Physician, Dr. Eric Champion: Demetrio Jones. Jessica Murphy     1945 MRN [id number]   Kobi Cabrera  Admission 23      Last Hospital Discharge 23 PCP Leatha Tripp MD     HPI:      Jaci Field is a [de-identified]year old male admitted to SNF for sub-acute rehabilitation. He had presented to the ED with epistaxis, received treatment. The following day he had an appt with heme and returned to the ED due to anemia for blood transfusion. CT chest abn, treated with IV Zosyn/ blood cultures neg. C diff; on Vanco. Severe hypothyroid; endo consulted for management. Hospital Discharge Diagnoses:  C Diff  Uremia  Thrombocytopenia  Anemia  CAP    Chief Complaint/ Indication for bedside visit today:  Patient presents with:  Hospital F/U       ALLERGIES    He is allergic to cephalosporins and demerol hcl [meperidine]. CURRENT MEDS:    HYDROcodone-acetaminophen 5-325 MG Oral Tab, Take 1 tablet by mouth every 4 (four) hours as needed for Pain.  lidocaine (ASPERCREME LIDOCAINE) 4 % External Patch, Place 1 patch onto the skin daily. Melatonin 5 MG Oral Tab, Take 1 tablet (5 mg total) by mouth at bedtime. calcium acetate 667 MG Oral Cap, Take 2 capsules (1,334 mg total) by mouth 3 (three) times daily with meals. vancomycin 250 MG Oral Cap, 125 mg orally 4 times daily for 10 days, then 125 mg orally 2 times daily for 7 days, then 125 mg orally once daily for 7 days,  then 125 mg orally every 2 to 3 days for 2  weeks  levothyroxine 137 MCG Oral Tab, Take 137 mcg by mouth before breakfast.  ipratropium-albuterol 0.5-2.5 (3) MG/3ML Inhalation Solution, Take 3 mL by nebulization 4 (four) times daily.   Magnesium 500 MG Oral Tab, Take 1 tablet (500 mg total) by mouth in the morning and 1 tablet (500 mg total) before bedtime. fluticasone-umeclidin-vilant (Min Burner) 100-62.5-25 MCG/ACT Inhalation Aerosol Powder, Breath Activated, Inhale 1 puff into the lungs daily. acidophilus-pectin Oral Cap, Take 1 capsule by mouth 2 (two) times daily. folic acid 1 MG Oral Tab, Take 1 tablet (1 mg total) by mouth in the morning. Cyanocobalamin 1000 MCG Sublingual SL Tab, Place 1 tablet under the tongue daily. ferrous sulfate 325 (65 FE) MG Oral Tab EC, Take 1 tablet (325 mg total) by mouth daily with breakfast.  Multiple Vitamins-Minerals (PX SENIOR VITAMIN OR), Take 1 tablet by mouth daily. finasteride 5 MG Oral Tab, Take 1 tablet (5 mg total) by mouth daily. Vitamin D3, Cholecalciferol, 25 MCG Oral Tab, Take 2 tablets (2,000 Units total) by mouth daily. Albuterol Sulfate  (90 Base) MCG/ACT Inhalation Aero Soln, Inhale 2 puffs into the lungs every 6 (six) hours as needed for Wheezing or Shortness of Breath. cycloSPORINE 0.05 % Ophthalmic Emulsion, Place into both eyes 2 (two) times daily. No current facility-administered medications on file prior to visit. HISTORY:    He  has a past medical history of Anesthesia complication, Aneurysm of abdominal vessel ([address] Ankle wound, left, initial encounter (03/24/2022), Ankle wound, left, sequela (05/19/2022), Aortic valve defect, Back problem, Bone cancer ([address] Brain cancer ([address] Cellulitis of left ankle (03/24/2022), COPD (chronic obstructive pulmonary disease) ([address] Disorder of thyroid, Essential hypertension, Hearing impairment, Heart attack ([address] High blood pressure, High cholesterol, History of blood transfusion, Hyperlipidemia, Lung cancer ([address] Muscle weakness, Open wound of left ankle (3/30/2022), Pericardial effusion, Pressure injury of left ankle, stage 4 ([address] (3/24/2022), Renal disorder, Shortness of breath, Sleep apnea, and Visual impairment.     He  has a past surgical history that includes other surgical history; other surgical history (Left); cath bare metal stent (bms); hernia surgery; carpal tunnel release; cath pericardiocentesis (09/12/2020); Brain Surgery; and other. CODE STATUS VERIFIED: full code    SUBJECTIVE/ ROS:     Patient seen in his room, up in [facility], very [address]. Tells me his hearing aids are in but don't work. No observations or c/o pain; 4/21: pt with c/o left knee pain; continue lido patches as rx'd, Norco PRN mod-severe pain. No observations or c/o SOB/MICHAEL/cough; on room air/ COPD, sleep apnea, s/p CAP. No observations or c/o chest pain/palpitation/dizziness. No observations or c/o abdominal discomfort/nausea/vomiting/diarrhea/constipation; Vanco for c diff; off Iso in rehab. ESRD on HD; still making urine per patient. Had been getting peritoneal dialysis, now getting hemodialysis. Still with PD catheter to abd. Nurse Jorje Garcia present at bedside; no questions/concerns reported. He will continue working with therapy to return to [facility] if able. Goal is to return home with his wife. OBJECTIVE:     Vital signs reviewed in [facility] EMR.  107/60, 98.6, 83, 17, 96%, 4/24; 143.8 lbs; 4/28: 147.4 lbs    Hospital and rehab lab results and imaging reviewed as available.     Lab Results   Component Value Date    GLU 92 04/20/2023    BUN 88 (H) 04/20/2023    BUNCREA 11.1 04/20/2023    CREATSERUM 7.96 (H) 04/20/2023    ANIONGAP 17 04/20/2023    GFRNAA 12 (L) 08/01/2022    GFRAA 13 (L) 08/01/2022    CA 7.8 (L) 04/20/2023    OSMOCALC 309 (H) 04/20/2023    ALKPHO 75 04/06/2023    AST 7 (L) 04/06/2023    ALT 15 (L) 04/06/2023    BILT 0.4 04/06/2023    TP 5.8 (L) 04/06/2023    ALB 1.6 (L) 04/19/2023    GLOBULIN 3.9 04/06/2023     04/20/2023    K 4.6 04/20/2023     04/20/2023    CO2 19.0 (L) 04/20/2023       Lab Results   Component Value Date    WBC 3.5 (L) 04/20/2023    RBC 2.57 (L) 04/20/2023    HGB 8.1 (L) 04/20/2023    HCT 25.4 (L) 04/20/2023    .0 04/20/2023    MCV 98.8 04/20/2023    MCH 31.5 04/20/2023    MCHC 31.9 04/20/2023    RDW 14.8 04/20/2023    NEPRELIM 2.54 04/20/2023    NEUTABS 3.26 02/22/2021    LYMPHABS 0.30 (L) 02/22/2021    EOSABS 0.07 02/22/2021    BASABS 0.04 02/22/2021    NEUT 80 02/22/2021    LYMPH 8 02/22/2021    MON 1 02/22/2021    EOS 2 02/22/2021    BASO 1 02/22/2021    NEPERCENT 73.0 04/20/2023    LYPERCENT 10.6 04/20/2023    MOPERCENT 12.4 04/20/2023    EOPERCENT 2.3 04/20/2023    BAPERCENT 1.4 04/20/2023    NE 2.54 04/20/2023    LYMABS 0.37 (L) 04/20/2023    MOABSO 0.43 04/20/2023    EOABSO 0.08 04/20/2023    BAABSO 0.05 04/20/2023       ASSESSMENT/ PHYSICAL EXAM:     GENERAL HEALTH: elderly male, appears malnourished, in nad  LINES, TUBES, DRAINS: Left chest permacath  SKIN: no rashes, no suspicious lesions to exposed skin or reported by patient/nursing staff   WOUND: none visualized; staff monitor/report concerns routine practice  EYES: conjunctiva normal, no drainage from eyes  HENT: [place]++/ hearing aids malfx; normocephalic; normal nose, no nasal drainage  RESPIRATORY: no accessory muscle use, normal inspiratory effort, on RA  ABDOMEN: active BS+, soft, non-distended; no visible masses; non-tender, no guarding; PD cath+  : deferred; ESRD on HD 3 days/week  MUSCULOSKELETAL: weakness r/t recent hospitalization/diagnoses/sequelae; will undergo therapies to rehab and improve strength, endurance and independence with ADLs as able/tolerated  EXTREMITIES/VASCULAR: no cyanosis or edema noted   NEUROLOGIC: follows commands  PSYCHIATRIC: alert and oriented x 3; affect appropriate ; frustrated when he couldn't hear me well but able to read lips even at a distance    MEDICAL DECISION MAKING and PLAN OF CARE   (*Provider orders are entered into the subacute rehab EMR*):      *This patient will undergo PT/OT/ST evaluation with treatment as needed. *Skilled nursing care including assistance with ADLs and medication administration.     Epistaxis; underwent nostril rhino rocket with ENT with resolution    CAP,COPD, TAO (malignant neoplasm of lung, mod pulm art HTN)  Completed IV Zosyn in-patient; Blood cultures negative  Continue Finasteride  Continue Trelegy and Albuterol as rx'd   Duoneb as rx'd    Post-hemorrhagic Anemia (s/p epistaxis; sent to ED by Hematologist- Hgb 6.6)  Continue Ferrous sulfate  Monitor s/s bleeding  Monitor labs as clinically indicated    C Diff; diarrhea -diagnosed 4/11/23  Isolation as per IDPH/rehab protocol  Vancomycin po course as rx'd   Probiotic BID     Metastatic Neuroendocrine Cancer, malignant neoplasm of lung; mets to liver, brain, bone  Octreotide treatment per Heme  Follow-up with Heme/Onc    ESRD on dialysis; currently on Hemodialysis (admitted with Uremia)  HD as scheduled    Left knee pain; per patient; verified by PT  X-ray left knee ordered 4/21:     1. Old healed distal left femur fracture with long lateral compression  plate and transcondylar screws. 2. Moderate degenerative changes at the retropatellar joint. 3. Prominent atherosclerotic vascular calcifications. Continue Lido/aspercreme patch daily   Norco 5/325 mg tab--1 po Q 4 hrs PRN mod-severe pain/ 6-10 on pain scale-rx given to nurse (max 3gm Tylenol ALL sources in 24 hours)  Cold/ice packs PRN    CAD, HTN, HL, AAA, CHF; EF 55-60%.  -Last TTE 3/16/23  *Meds stopped: ASA, Metoprolol Tartrate  Follow-up with PCP/Cards    Severe Hypothyroidism (dose adj 3/20/23 from 125 to 137 mcg)  *acute metabolic encephalopathy improved from Levothyroxine dose adj/infection/[place], forgetfulness*  Continue Levothyroxine 137 mcg po daily; before breakfast    Hyperphosphatemia  Continue calcium acetate TID w/meals    Physical Deconditioning/Weakness; at risk for falling  Fall Precautions  PT/OT/ST to evaluate and treat  CHERYL team to establish care plan meeting with patient and POA/family as appropriate  CHERYL team/ & discharge planner to assist with establishing safe discharge plan for next level of care     DVT Prophylaxis   Encourage exercise and participation with therapy as much as able     Malnutrition   Vitamins/supplements as r/t deficiencies  CHERYL RD to follow while in rehab; supplementation/diet as per [facility]  Folic acid, cyanocobalamin, MVI, Magnesium, Vit D3    Future Appointments   Date Time Provider Oswaldo Morini   5/4/2023  1:30 PM EM CC LAB1 [facility] CHEMO EMO   5/4/2023  2:30 PM Candido Urias, APRN [address] [facility] HEM ONC EMO   5/4/2023  3:00 PM EM CC LAB2 [facility] CHEMO EMO   5/15/2023 10:30 AM [facility] XR RM1 [facility] DIAG RAD EM [facility]   5/15/2023 12:00 PM Christina Delgado MD [facility]       *Follow-Up with PCP within 1-2 weeks following CHERYL discharge. *Follow-Up with specialists as recommended. *Greater than 65 minutes spent w/ patient and family, reviewing medical records, labs, completing medication reconciliation and entering orders to establish plan of care in [facility]. Note to patient: The [id number] makes medical notes like these available to patients in the interest of transparency. However, this is a medical document intended as peer to peer communication. It is written in medical language and may contain abbreviations or verbiage that are unfamiliar. It may appear blunt or direct. Medical documents are intended to carry relevant information, facts as evident, and the clinical opinion of the practitioner who signs the document.     Harry Oreilly  NPI# [id number]  [address], Post Acute APRN  4/28/23 1:00 PM

## 2023-05-03 ENCOUNTER — EXTERNAL FACILITY (OUTPATIENT)
Dept: INTERNAL MEDICINE CLINIC | Facility: CLINIC | Age: 78
End: 2023-05-03

## 2023-05-03 DIAGNOSIS — C79.51 MALIGNANT NEOPLASM METASTATIC TO BONE (HCC): ICD-10-CM

## 2023-05-03 DIAGNOSIS — N25.81 SECONDARY HYPERPARATHYROIDISM (HCC): ICD-10-CM

## 2023-05-03 DIAGNOSIS — C78.7 MALIGNANT NEOPLASM METASTATIC TO LIVER (HCC): ICD-10-CM

## 2023-05-03 DIAGNOSIS — C7A.8 NEUROENDOCRINE CARCINOMA METASTATIC TO INTRA-ABDOMINAL LYMPH NODE (HCC): ICD-10-CM

## 2023-05-03 DIAGNOSIS — R53.1 WEAKNESS: ICD-10-CM

## 2023-05-03 DIAGNOSIS — C7B.8 NEUROENDOCRINE CARCINOMA METASTATIC TO INTRA-ABDOMINAL LYMPH NODE (HCC): ICD-10-CM

## 2023-05-03 DIAGNOSIS — Z51.81 MEDICATION MONITORING ENCOUNTER: ICD-10-CM

## 2023-05-03 DIAGNOSIS — E87.5 HYPERKALEMIA: ICD-10-CM

## 2023-05-03 DIAGNOSIS — J42 CHRONIC BRONCHITIS, UNSPECIFIED CHRONIC BRONCHITIS TYPE (HCC): ICD-10-CM

## 2023-05-03 DIAGNOSIS — Z99.2 ESRD (END STAGE RENAL DISEASE) ON DIALYSIS (HCC): ICD-10-CM

## 2023-05-03 DIAGNOSIS — C34.12 PRIMARY CANCER OF LEFT UPPER LOBE OF LUNG (HCC): Primary | ICD-10-CM

## 2023-05-03 DIAGNOSIS — C34.90 MALIGNANT NEOPLASM OF LUNG, UNSPECIFIED LATERALITY, UNSPECIFIED PART OF LUNG (HCC): ICD-10-CM

## 2023-05-03 DIAGNOSIS — N18.6 ESRD (END STAGE RENAL DISEASE) ON DIALYSIS (HCC): ICD-10-CM

## 2023-05-03 DIAGNOSIS — D64.9 ANEMIA, UNSPECIFIED TYPE: ICD-10-CM

## 2023-05-03 DIAGNOSIS — E03.2 HYPOTHYROIDISM DUE TO MEDICATION: ICD-10-CM

## 2023-05-03 PROCEDURE — 99308 SBSQ NF CARE LOW MDM 20: CPT | Performed by: INTERNAL MEDICINE

## 2023-05-03 PROCEDURE — 1111F DSCHRG MED/CURRENT MED MERGE: CPT | Performed by: INTERNAL MEDICINE

## 2023-05-03 NOTE — PROGRESS NOTES
pt seen 5/1/23 at [facility]    no distress noted    pt feels good says wants to go home    dc date is set for 5/11 as per pt     Nursing notes noted   sub   No cp, sob   No c/d   No n/v   Vit stable   obj: alert, appears stated age and cooperative   Pulmonary: clear to auscultation bilaterally   Cardiovascular: S1, S2 normal, no murmur, click, rub or gallop, regular rate and rhythm   Abdominal: soft, non-tender;bowel sounds normal; no masses, no organomegaly   Extremities: extremities normal, atraumatic, no cyanosis or edema   Profound uremia from renal failure with PD   End-stage renal disease, on peritoneal dialysis. Nephrology on consult   IR consulted for HD access on admission   - daily HD 4/7/23 -4/9/23 due to uremia w/ immediate improvement of MS   -   Severe epistaxisand posthemorrhagic anemia: Resolved   from PLT dysfunction from ESRD/uremia   ENT was on consult   Tranfused 1 unit with HD 4/7/23   Small recurrence 4/15 now ok   vrfvr   Neuroendocrine cancer-   Heme on consult   episodes of hypotention   Will monitor   Diarrhea/+ CDI recurrent: diagnosed 4/11   - was on OVP but despite this developed CDI   - Started therapeutic po vancomycin qid x 14 days   - isolation   - increase po vanco   Severe Hypothyroidism:   Recently adjusted dose ofsynthroid by Dr Massiel Dahl 3/20/2023 from 125 to 80 mcg-currently on this dose   MS improved with HD so myxedema coma unlikely contributing to AMS on admission   Repeat TFTs worse since 3/9/2023 : TSH 62 T4 0.4   Endocrine consulted for further adjustments   History of chronic obstructive pulmonary disease:   Stable-no O2 requirements or SOB   trelegy and albuterol prn   Monitor   CAD, multiple PCI stents/ Chronic L ventricular DDx, CHFpEF 55-60%   -Last TTE 3/16/2023   -EuvolemicSevere as with near syncope seen by santa   moderate pulmonary artery hypertension- medical management   \   Obstructive sleep apnea. Osteoarthritis. Hypertension.  Hyperlipidemia, ho CDI
pt seen 5/3/23 at [facility]    no distress noted    Continue with current care    Labs and vitals noted      sub   No cp, sob   No c/d   No n/v   Vit stable   obj: alert, appears stated age and cooperative   Pulmonary: clear to auscultation bilaterally   Cardiovascular: S1, S2 normal, no murmur, click, rub or gallop, regular rate and rhythm   Abdominal: soft, non-tender;bowel sounds normal; no masses, no organomegaly   Extremities: extremities normal, atraumatic, no cyanosis or edema   Profound uremia from renal failure with PD   End-stage renal disease, on peritoneal dialysis. Nephrology on consult   IR consulted for HD access on admission   - daily HD 4/7/23 -4/9/23 due to uremia w/ immediate improvement of MS   -   Severe epistaxisand posthemorrhagic anemia: Resolved   from PLT dysfunction from ESRD/uremia   ENT was on consult   Tranfused 1 unit with HD 4/7/23   Small recurrence 4/15 now ok   vrfvr   Neuroendocrine cancer-   Heme on consult   episodes of hypotention   Will monitor   Diarrhea/+ CDI recurrent: diagnosed 4/11   - was on OVP but despite this developed CDI   - Started therapeutic po vancomycin qid x 14 days   - isolation   - increase po vanco   Severe Hypothyroidism:   Recently adjusted dose ofsynthroid by Dr Adonay Mendoza 3/20/2023 from 125 to 80 mcg-currently on this dose   MS improved with HD so myxedema coma unlikely contributing to AMS on admission   Repeat TFTs worse since 3/9/2023 : TSH 62 T4 0.4   Endocrine consulted for further adjustments   History of chronic obstructive pulmonary disease:   Stable-no O2 requirements or SOB   trelegy and albuterol prn   Monitor   CAD, multiple PCI stents/ Chronic L ventricular DDx, CHFpEF 55-60%   -Last TTE 3/16/2023   -EuvolemicSevere as with near syncope seen by santa   moderate pulmonary artery hypertension- medical management   \   Obstructive sleep apnea. Osteoarthritis. Hypertension.  Hyperlipidemia, ho CDI
normal...

## 2023-05-04 ENCOUNTER — NURSE ONLY (OUTPATIENT)
Dept: HEMATOLOGY/ONCOLOGY | Facility: HOSPITAL | Age: 78
End: 2023-05-04
Attending: NURSE PRACTITIONER
Payer: MEDICARE

## 2023-05-04 VITALS
DIASTOLIC BLOOD PRESSURE: 55 MMHG | OXYGEN SATURATION: 98 % | SYSTOLIC BLOOD PRESSURE: 115 MMHG | TEMPERATURE: 98 F | WEIGHT: 150 LBS | BODY MASS INDEX: 24.69 KG/M2 | HEIGHT: 65.5 IN | RESPIRATION RATE: 18 BRPM | HEART RATE: 91 BPM

## 2023-05-04 DIAGNOSIS — C7A.8 NEUROENDOCRINE CARCINOMA METASTATIC TO INTRA-ABDOMINAL LYMPH NODE (HCC): ICD-10-CM

## 2023-05-04 DIAGNOSIS — C34.12 PRIMARY CANCER OF LEFT UPPER LOBE OF LUNG (HCC): ICD-10-CM

## 2023-05-04 DIAGNOSIS — D63.1 ANEMIA DUE TO CHRONIC KIDNEY DISEASE, ON CHRONIC DIALYSIS (HCC): ICD-10-CM

## 2023-05-04 DIAGNOSIS — C79.51 MALIGNANT NEOPLASM METASTATIC TO BONE (HCC): ICD-10-CM

## 2023-05-04 DIAGNOSIS — Z45.2 ENCOUNTER FOR CENTRAL LINE CARE: ICD-10-CM

## 2023-05-04 DIAGNOSIS — C7B.8 NEUROENDOCRINE CARCINOMA METASTATIC TO INTRA-ABDOMINAL LYMPH NODE (HCC): Primary | ICD-10-CM

## 2023-05-04 DIAGNOSIS — C7A.8 NEUROENDOCRINE CARCINOMA METASTATIC TO INTRA-ABDOMINAL LYMPH NODE (HCC): Primary | ICD-10-CM

## 2023-05-04 DIAGNOSIS — C78.7 MALIGNANT NEOPLASM METASTATIC TO LIVER (HCC): ICD-10-CM

## 2023-05-04 DIAGNOSIS — Z99.2 ANEMIA DUE TO CHRONIC KIDNEY DISEASE, ON CHRONIC DIALYSIS (HCC): ICD-10-CM

## 2023-05-04 DIAGNOSIS — Z51.81 MEDICATION MONITORING ENCOUNTER: ICD-10-CM

## 2023-05-04 DIAGNOSIS — N18.6 ANEMIA DUE TO CHRONIC KIDNEY DISEASE, ON CHRONIC DIALYSIS (HCC): ICD-10-CM

## 2023-05-04 DIAGNOSIS — C7B.8 NEUROENDOCRINE CARCINOMA METASTATIC TO INTRA-ABDOMINAL LYMPH NODE (HCC): ICD-10-CM

## 2023-05-04 DIAGNOSIS — C79.51 MALIGNANT NEOPLASM METASTATIC TO BONE (HCC): Primary | ICD-10-CM

## 2023-05-04 LAB
ALBUMIN SERPL-MCNC: 2 G/DL (ref 3.4–5)
ALBUMIN/GLOB SERPL: 0.5 {RATIO} (ref 1–2)
ALP LIVER SERPL-CCNC: 66 U/L
ALT SERPL-CCNC: 11 U/L
ANION GAP SERPL CALC-SCNC: 10 MMOL/L (ref 0–18)
AST SERPL-CCNC: 16 U/L (ref 15–37)
BASOPHILS # BLD AUTO: 0.06 X10(3) UL (ref 0–0.2)
BASOPHILS NFR BLD AUTO: 0.9 %
BILIRUB SERPL-MCNC: 0.3 MG/DL (ref 0.1–2)
BUN BLD-MCNC: 37 MG/DL (ref 7–18)
BUN/CREAT SERPL: 7.4 (ref 10–20)
CALCIUM BLD-MCNC: 8.5 MG/DL (ref 8.5–10.1)
CHLORIDE SERPL-SCNC: 104 MMOL/L (ref 98–112)
CO2 SERPL-SCNC: 27 MMOL/L (ref 21–32)
CREAT BLD-MCNC: 5.02 MG/DL
DEPRECATED RDW RBC AUTO: 53.3 FL (ref 35.1–46.3)
EOSINOPHIL # BLD AUTO: 0.13 X10(3) UL (ref 0–0.7)
EOSINOPHIL NFR BLD AUTO: 1.9 %
ERYTHROCYTE [DISTWIDTH] IN BLOOD BY AUTOMATED COUNT: 14.2 % (ref 11–15)
GFR SERPLBLD BASED ON 1.73 SQ M-ARVRAT: 11 ML/MIN/1.73M2 (ref 60–?)
GLOBULIN PLAS-MCNC: 4.1 G/DL (ref 2.8–4.4)
GLUCOSE BLD-MCNC: 133 MG/DL (ref 70–99)
HCT VFR BLD AUTO: 31.9 %
HGB BLD-MCNC: 9.5 G/DL
IMM GRANULOCYTES # BLD AUTO: 0.03 X10(3) UL (ref 0–1)
IMM GRANULOCYTES NFR BLD: 0.4 %
LYMPHOCYTES # BLD AUTO: 0.59 X10(3) UL (ref 1–4)
LYMPHOCYTES NFR BLD AUTO: 8.7 %
MCH RBC QN AUTO: 30.3 PG (ref 26–34)
MCHC RBC AUTO-ENTMCNC: 29.8 G/DL (ref 31–37)
MCV RBC AUTO: 101.6 FL
MONOCYTES # BLD AUTO: 0.63 X10(3) UL (ref 0.1–1)
MONOCYTES NFR BLD AUTO: 9.3 %
NEUTROPHILS # BLD AUTO: 5.33 X10 (3) UL (ref 1.5–7.7)
NEUTROPHILS # BLD AUTO: 5.33 X10(3) UL (ref 1.5–7.7)
NEUTROPHILS NFR BLD AUTO: 78.8 %
OSMOLALITY SERPL CALC.SUM OF ELEC: 303 MOSM/KG (ref 275–295)
PLATELET # BLD AUTO: 191 10(3)UL (ref 150–450)
POTASSIUM SERPL-SCNC: 4 MMOL/L (ref 3.5–5.1)
PROT SERPL-MCNC: 6.1 G/DL (ref 6.4–8.2)
RBC # BLD AUTO: 3.14 X10(6)UL
SODIUM SERPL-SCNC: 141 MMOL/L (ref 136–145)
WBC # BLD AUTO: 6.8 X10(3) UL (ref 4–11)

## 2023-05-04 PROCEDURE — 80053 COMPREHEN METABOLIC PANEL: CPT

## 2023-05-04 PROCEDURE — 85025 COMPLETE CBC W/AUTO DIFF WBC: CPT

## 2023-05-04 PROCEDURE — 96372 THER/PROPH/DIAG INJ SC/IM: CPT

## 2023-05-04 PROCEDURE — 99215 OFFICE O/P EST HI 40 MIN: CPT | Performed by: NURSE PRACTITIONER

## 2023-05-04 PROCEDURE — 36591 DRAW BLOOD OFF VENOUS DEVICE: CPT

## 2023-05-04 PROCEDURE — 86316 IMMUNOASSAY TUMOR OTHER: CPT

## 2023-05-04 RX ORDER — SODIUM CHLORIDE 9 MG/ML
10 INJECTION INTRAVENOUS ONCE
OUTPATIENT
Start: 2023-05-11

## 2023-05-04 RX ORDER — HEPARIN SODIUM (PORCINE) LOCK FLUSH IV SOLN 100 UNIT/ML 100 UNIT/ML
5 SOLUTION INTRAVENOUS ONCE
OUTPATIENT
Start: 2023-05-11

## 2023-05-04 RX ORDER — HEPARIN SODIUM (PORCINE) LOCK FLUSH IV SOLN 100 UNIT/ML 100 UNIT/ML
5 SOLUTION INTRAVENOUS ONCE
Status: COMPLETED | OUTPATIENT
Start: 2023-05-04 | End: 2023-05-04

## 2023-05-04 RX ADMIN — HEPARIN SODIUM (PORCINE) LOCK FLUSH IV SOLN 100 UNIT/ML 500 UNITS: 100 SOLUTION INTRAVENOUS at 13:30:00

## 2023-05-05 ENCOUNTER — APPOINTMENT (OUTPATIENT)
Dept: HEMATOLOGY/ONCOLOGY | Facility: HOSPITAL | Age: 78
End: 2023-05-05
Attending: NURSE PRACTITIONER
Payer: MEDICARE

## 2023-05-05 ENCOUNTER — APPOINTMENT (OUTPATIENT)
Dept: HEMATOLOGY/ONCOLOGY | Facility: HOSPITAL | Age: 78
End: 2023-05-05
Attending: INTERNAL MEDICINE
Payer: MEDICARE

## 2023-05-05 ENCOUNTER — TELEPHONE (OUTPATIENT)
Dept: HEMATOLOGY/ONCOLOGY | Facility: HOSPITAL | Age: 78
End: 2023-05-05

## 2023-05-05 ENCOUNTER — SNF VISIT (OUTPATIENT)
Dept: INTERNAL MEDICINE CLINIC | Facility: SKILLED NURSING FACILITY | Age: 78
End: 2023-05-05

## 2023-05-05 DIAGNOSIS — C7A.8 NEUROENDOCRINE CARCINOMA METASTATIC TO INTRA-ABDOMINAL LYMPH NODE (HCC): ICD-10-CM

## 2023-05-05 DIAGNOSIS — Z99.2 END-STAGE RENAL DISEASE ON HEMODIALYSIS (HCC): ICD-10-CM

## 2023-05-05 DIAGNOSIS — Z78.9 DECREASED ACTIVITIES OF DAILY LIVING (ADL): ICD-10-CM

## 2023-05-05 DIAGNOSIS — C7B.8 NEUROENDOCRINE CARCINOMA METASTATIC TO INTRA-ABDOMINAL LYMPH NODE (HCC): ICD-10-CM

## 2023-05-05 DIAGNOSIS — Z86.19 HISTORY OF CLOSTRIDIOIDES DIFFICILE INFECTION: ICD-10-CM

## 2023-05-05 DIAGNOSIS — Z79.899 MEDICATION MANAGEMENT: ICD-10-CM

## 2023-05-05 DIAGNOSIS — Z99.2: ICD-10-CM

## 2023-05-05 DIAGNOSIS — N18.6 END-STAGE RENAL DISEASE ON HEMODIALYSIS (HCC): ICD-10-CM

## 2023-05-05 NOTE — TELEPHONE ENCOUNTER
Aranesp per nephrology protocol during dialysis. RN will follow their protocol. No further questions.

## 2023-05-05 NOTE — TELEPHONE ENCOUNTER
Erme is calling from dialysis, she is wanting to know the current dose of Aranesp to treat the pt. Please call Emre back with information at [phone].

## 2023-05-08 LAB — CHROMOGRANIN A: 123.7 NG/ML

## 2023-05-09 ENCOUNTER — PATIENT MESSAGE (OUTPATIENT)
Dept: NEPHROLOGY | Facility: CLINIC | Age: 78
End: 2023-05-09

## 2023-05-09 ENCOUNTER — SNF DISCHARGE (OUTPATIENT)
Dept: INTERNAL MEDICINE CLINIC | Facility: SKILLED NURSING FACILITY | Age: 78
End: 2023-05-09

## 2023-05-09 DIAGNOSIS — C34.90 MALIGNANT NEOPLASM OF LUNG, UNSPECIFIED LATERALITY, UNSPECIFIED PART OF LUNG (HCC): ICD-10-CM

## 2023-05-09 DIAGNOSIS — D64.9 ANEMIA, UNSPECIFIED TYPE: ICD-10-CM

## 2023-05-09 DIAGNOSIS — Z86.19 HISTORY OF CLOSTRIDIOIDES DIFFICILE INFECTION: ICD-10-CM

## 2023-05-09 DIAGNOSIS — Z99.2 ESRD (END STAGE RENAL DISEASE) ON DIALYSIS (HCC): ICD-10-CM

## 2023-05-09 DIAGNOSIS — Z79.899 MEDICATION MANAGEMENT: ICD-10-CM

## 2023-05-09 DIAGNOSIS — N18.6 ESRD (END STAGE RENAL DISEASE) ON DIALYSIS (HCC): ICD-10-CM

## 2023-05-09 DIAGNOSIS — Z87.01 HISTORY OF COMMUNITY ACQUIRED PNEUMONIA: ICD-10-CM

## 2023-05-09 DIAGNOSIS — Z78.9 DECREASED ACTIVITIES OF DAILY LIVING (ADL): ICD-10-CM

## 2023-05-09 PROCEDURE — 1111F DSCHRG MED/CURRENT MED MERGE: CPT | Performed by: NURSE PRACTITIONER

## 2023-05-09 PROCEDURE — 99309 SBSQ NF CARE MODERATE MDM 30: CPT | Performed by: NURSE PRACTITIONER

## 2023-05-09 NOTE — TELEPHONE ENCOUNTER
From: Óscar Jennings  To: Wil Gamez MD  Sent: 5/9/2023 11:52 AM CDT  Subject: letter for [facility] ear [facility]    This message is being sent by Nataliya Han on behalf of Óscar Jennings. Yulia Garcia is scheduled for the replacement of his Cocklear the 1st week of Raissa at the [place] and he needs clearance faxed to Michel Callahan at [phone]. Apparently the letter needs to state that he is medically clear to proceed with ear surgery and no further testing is needed at this time. Will you give him clearance and send the letter?

## 2023-05-10 NOTE — TELEPHONE ENCOUNTER
Dr. Kahn Betters, please see Trekea messages. Pt's wife is requesting a clearance letter for pt's cochlear implants. States they are requesting clearance from all doctors, not just PCP. Thanks!

## 2023-05-10 NOTE — TELEPHONE ENCOUNTER
Dr. Gasper Patel, please see note below/oziel locke. Spoke with pt's wife and let know Dr. Arnel Varma will be back on Monday to send clearance letter. She is insisting this can not wait as she needs the clearance letter to be able to schedule pre surgery appointment with [address] [place] [address]

## 2023-05-12 ENCOUNTER — TELEPHONE (OUTPATIENT)
Dept: INTERNAL MEDICINE CLINIC | Facility: CLINIC | Age: 78
End: 2023-05-12

## 2023-05-12 NOTE — TELEPHONE ENCOUNTER
Per Maria De Jesus/RN, would like to confirm if doctor will follow patient for home health and sign the plan of care and Orders.

## 2023-05-12 NOTE — TELEPHONE ENCOUNTER
Spoke to Lyla 1690 with [facility]. Relayed that Dr. Jan Bravo is out of the office until June 6th so the recommendation is that patient make an appointment as soon as possible with any available IM provider. Giacomo Renteria will contact patient and have him schedule right away.

## 2023-05-14 ENCOUNTER — EXTERNAL FACILITY (OUTPATIENT)
Dept: INTERNAL MEDICINE CLINIC | Facility: CLINIC | Age: 78
End: 2023-05-14

## 2023-05-14 DIAGNOSIS — J96.01 ACUTE RESPIRATORY FAILURE WITH HYPOXIA (HCC): ICD-10-CM

## 2023-05-14 DIAGNOSIS — J42 CHRONIC BRONCHITIS, UNSPECIFIED CHRONIC BRONCHITIS TYPE (HCC): ICD-10-CM

## 2023-05-14 DIAGNOSIS — D64.9 ANEMIA, UNSPECIFIED TYPE: ICD-10-CM

## 2023-05-14 DIAGNOSIS — C78.7 MALIGNANT NEOPLASM METASTATIC TO LIVER (HCC): ICD-10-CM

## 2023-05-14 DIAGNOSIS — R53.1 WEAKNESS: ICD-10-CM

## 2023-05-14 DIAGNOSIS — N18.6 ESRD (END STAGE RENAL DISEASE) ON DIALYSIS (HCC): ICD-10-CM

## 2023-05-14 DIAGNOSIS — Z99.2 ESRD (END STAGE RENAL DISEASE) ON DIALYSIS (HCC): ICD-10-CM

## 2023-05-14 NOTE — PROGRESS NOTES
pt seen 5/8/23 at [facility]     no distress noted     Continue with current care   vitals noted    nursing notes noted      sub   No cp, sob   No c/d   No n/v     Vit stable     obj: alert, appears stated age and cooperative   Pulmonary: clear to auscultation bilaterally   Cardiovascular: S1, S2 normal, no murmur, click, rub or gallop, regular rate and rhythm   Abdominal: soft, non-tender;bowel sounds normal; no masses, no organomegaly   Extremities: extremities normal, atraumatic, no cyanosis or edema   Profound uremia from renal failure with PD   End-stage renal disease, on peritoneal dialysis. Nephrology on consult   IR consulted for HD access on admission   - daily HD 4/7/23 -4/9/23 due to uremia w/ immediate improvement of MS   -   Severe epistaxisand posthemorrhagic anemia: Resolved   from PLT dysfunction from ESRD/uremia   ENT was on consult   Tranfused 1 unit with HD 4/7/23   Small recurrence 4/15 now ok   vrfvr   Neuroendocrine cancer-   Heme on consult   episodes of hypotention   Will monitor   Diarrhea/+ CDI recurrent: diagnosed 4/11   - was on OVP but despite this developed CDI   - Started therapeutic po vancomycin qid x 14 days   - isolation   - increase po vanco   Severe Hypothyroidism:   Recently adjusted dose ofsynthroid by Dr Aure Rudd 3/20/2023 from 125 to137 mcg-currently on this dose   MS improved with HD so myxedema coma unlikely contributing to AMS on admission   Repeat TFTs worse since 3/9/2023 : TSH 62 T4 0.4   Endocrine consulted for further adjustments   History of chronic obstructive pulmonary disease:   Stable-no O2 requirements or SOB   trelegy and albuterol prn   Monitor   CAD, multiple PCI stents/ Chronic L ventricular DDx, CHFpEF 55-60%   -Last TTE 3/16/2023   -EuvolemicSevere as with near syncope seen by [facility]   moderate pulmonary artery hypertension- medical management   \   Obstructive sleep apnea. Osteoarthritis. Hypertension.  Hyperlipidemia, ho CDI

## 2023-05-15 ENCOUNTER — OFFICE VISIT (OUTPATIENT)
Dept: PULMONOLOGY | Facility: CLINIC | Age: 78
End: 2023-05-15

## 2023-05-15 ENCOUNTER — HOSPITAL ENCOUNTER (OUTPATIENT)
Dept: GENERAL RADIOLOGY | Facility: HOSPITAL | Age: 78
Discharge: HOME | End: 2023-05-15
Attending: PHYSICIAN ASSISTANT
Payer: MEDICARE

## 2023-05-15 ENCOUNTER — PATIENT OUTREACH (OUTPATIENT)
Dept: CASE MANAGEMENT | Age: 78
End: 2023-05-15

## 2023-05-15 VITALS
OXYGEN SATURATION: 97 % | SYSTOLIC BLOOD PRESSURE: 114 MMHG | WEIGHT: 147 LBS | HEART RATE: 71 BPM | BODY MASS INDEX: 24 KG/M2 | DIASTOLIC BLOOD PRESSURE: 65 MMHG

## 2023-05-15 DIAGNOSIS — I31.31 MALIGNANT PERICARDIAL EFFUSION: ICD-10-CM

## 2023-05-15 DIAGNOSIS — J94.8 HYDROPNEUMOTHORAX: ICD-10-CM

## 2023-05-15 DIAGNOSIS — D3A.8 NEUROENDOCRINE TUMOR: ICD-10-CM

## 2023-05-15 DIAGNOSIS — C79.31 MALIGNANT NEOPLASM METASTATIC TO BRAIN (HCC): ICD-10-CM

## 2023-05-15 DIAGNOSIS — Z45.2 ENCOUNTER FOR CENTRAL LINE CARE: ICD-10-CM

## 2023-05-15 DIAGNOSIS — E86.0 DEHYDRATION: ICD-10-CM

## 2023-05-15 DIAGNOSIS — Z29.8 ENCOUNTER FOR IMMUNOTHERAPY: ICD-10-CM

## 2023-05-15 DIAGNOSIS — N17.9 AKI (ACUTE KIDNEY INJURY) (HCC): ICD-10-CM

## 2023-05-15 DIAGNOSIS — J18.9 PNEUMONIA OF RIGHT UPPER LOBE DUE TO INFECTIOUS ORGANISM: ICD-10-CM

## 2023-05-15 DIAGNOSIS — Z99.2 ESRD (END STAGE RENAL DISEASE) ON DIALYSIS (HCC): ICD-10-CM

## 2023-05-15 DIAGNOSIS — C34.12 PRIMARY CANCER OF LEFT UPPER LOBE OF LUNG (HCC): ICD-10-CM

## 2023-05-15 DIAGNOSIS — K63.89 MESENTERIC MASS: ICD-10-CM

## 2023-05-15 DIAGNOSIS — G89.3 CANCER RELATED PAIN: ICD-10-CM

## 2023-05-15 DIAGNOSIS — N18.6 ESRD (END STAGE RENAL DISEASE) ON DIALYSIS (HCC): ICD-10-CM

## 2023-05-15 DIAGNOSIS — R53.1 WEAKNESS: ICD-10-CM

## 2023-05-15 DIAGNOSIS — N17.9 ACUTE RENAL INJURY (HCC): ICD-10-CM

## 2023-05-15 DIAGNOSIS — R06.03 RESPIRATORY DISTRESS: ICD-10-CM

## 2023-05-15 DIAGNOSIS — Z92.89 HISTORY OF IMMUNOTHERAPY: ICD-10-CM

## 2023-05-15 DIAGNOSIS — D64.9 ANEMIA, UNSPECIFIED TYPE: ICD-10-CM

## 2023-05-15 DIAGNOSIS — N05.9 NEPHRITIS: ICD-10-CM

## 2023-05-15 DIAGNOSIS — E83.39 HYPERPHOSPHATEMIA: ICD-10-CM

## 2023-05-15 DIAGNOSIS — R06.03 RESPIRATORY DISTRESS: Primary | ICD-10-CM

## 2023-05-15 DIAGNOSIS — R09.02 HYPOXIA: ICD-10-CM

## 2023-05-15 DIAGNOSIS — E87.0 HYPERNATREMIA: ICD-10-CM

## 2023-05-15 DIAGNOSIS — N10 AIN (ACUTE INTERSTITIAL NEPHRITIS): ICD-10-CM

## 2023-05-15 DIAGNOSIS — N18.9 CHRONIC KIDNEY DISEASE, UNSPECIFIED CKD STAGE: ICD-10-CM

## 2023-05-15 DIAGNOSIS — R55 SYNCOPE, NEAR: ICD-10-CM

## 2023-05-15 DIAGNOSIS — D70.9 NEUTROPENIA, UNSPECIFIED TYPE (HCC): ICD-10-CM

## 2023-05-15 DIAGNOSIS — E87.6 HYPOKALEMIA: ICD-10-CM

## 2023-05-15 DIAGNOSIS — J96.01 ACUTE RESPIRATORY FAILURE WITH HYPOXIA (HCC): ICD-10-CM

## 2023-05-15 DIAGNOSIS — E87.20 METABOLIC ACIDOSIS: ICD-10-CM

## 2023-05-15 DIAGNOSIS — J18.9 MULTIFOCAL PNEUMONIA: ICD-10-CM

## 2023-05-15 DIAGNOSIS — N19 UREMIA: ICD-10-CM

## 2023-05-15 DIAGNOSIS — E87.5 HYPERKALEMIA: ICD-10-CM

## 2023-05-15 DIAGNOSIS — E87.21 ACUTE METABOLIC ACIDOSIS: ICD-10-CM

## 2023-05-15 DIAGNOSIS — R77.8 ELEVATED TROPONIN: ICD-10-CM

## 2023-05-15 DIAGNOSIS — C7B.8 NEUROENDOCRINE CARCINOMA METASTATIC TO INTRA-ABDOMINAL LYMPH NODE (HCC): ICD-10-CM

## 2023-05-15 DIAGNOSIS — C78.7 MALIGNANT NEOPLASM METASTATIC TO LIVER (HCC): ICD-10-CM

## 2023-05-15 DIAGNOSIS — E03.2 HYPOTHYROIDISM DUE TO MEDICATION: ICD-10-CM

## 2023-05-15 DIAGNOSIS — J91.0 MALIGNANT PLEURAL EFFUSION: ICD-10-CM

## 2023-05-15 DIAGNOSIS — N25.81 SECONDARY HYPERPARATHYROIDISM (HCC): ICD-10-CM

## 2023-05-15 DIAGNOSIS — Z51.11 CHEMOTHERAPY MANAGEMENT, ENCOUNTER FOR: ICD-10-CM

## 2023-05-15 DIAGNOSIS — N18.5 CKD (CHRONIC KIDNEY DISEASE) STAGE 5, GFR LESS THAN 15 ML/MIN (HCC): ICD-10-CM

## 2023-05-15 DIAGNOSIS — R19.7 DIARRHEA, UNSPECIFIED TYPE: ICD-10-CM

## 2023-05-15 DIAGNOSIS — D69.6 THROMBOCYTOPENIA (HCC): ICD-10-CM

## 2023-05-15 DIAGNOSIS — Z87.828 HEALED WOUND: ICD-10-CM

## 2023-05-15 DIAGNOSIS — J86.9 EMPYEMA LUNG (HCC): ICD-10-CM

## 2023-05-15 DIAGNOSIS — C79.51 MALIGNANT NEOPLASM METASTATIC TO BONE (HCC): ICD-10-CM

## 2023-05-15 DIAGNOSIS — C7A.8 NEUROENDOCRINE CARCINOMA METASTATIC TO INTRA-ABDOMINAL LYMPH NODE (HCC): ICD-10-CM

## 2023-05-15 PROCEDURE — 1126F AMNT PAIN NOTED NONE PRSNT: CPT | Performed by: INTERNAL MEDICINE

## 2023-05-15 PROCEDURE — 1111F DSCHRG MED/CURRENT MED MERGE: CPT | Performed by: INTERNAL MEDICINE

## 2023-05-15 PROCEDURE — 99213 OFFICE O/P EST LOW 20 MIN: CPT | Performed by: INTERNAL MEDICINE

## 2023-05-15 PROCEDURE — 71046 X-RAY EXAM CHEST 2 VIEWS: CPT | Performed by: PHYSICIAN ASSISTANT

## 2023-05-15 NOTE — TELEPHONE ENCOUNTER
Dr Tika Minor made aware of note below and ok to send a letter states  per renal standpoint Alley Polanco is ok to proceed with the surgery and no further testing is needed. ,Rn faxed copy of letter to #[phone] with confirmation and sent patient a message thru [address] [place] [address] [address].

## 2023-05-15 NOTE — PROGRESS NOTES
Faxed visit note regarding clearance for ear surgery and facesheet to Zeferino Herrmann at fax # [phone] per Dr. Fredis Zee request. Confirmation received.

## 2023-05-15 NOTE — PROGRESS NOTES
The patient is an [de-identified] male who I know well from prior evaluation comes in now for follow-up. He was admitted with severe uremia. He is now getting hemodialysis. He has a peritoneal dialysis catheter still in place. He is going to get a follow-up CT scan of the abdomen. He was admitted with right upper lobe pneumonia which was very severe and he has a prior history of left empyema. The x-ray looks much better today. The epistaxis is better. He has a history of metastatic cancer of the lung. He notes that he is breathing well. There is no fever chills or shakes and he is anxious to get his cochlear surgery. Review of Systems:  Vision normal. Ear nose and throat abnormal. Bowel normal. Bladder function normal. No depression. No thyroid disease. No lymphatic system concerns. No rash. Muscles and joints unremarkable. No weight loss no weight gain. Physical Examination:  Vital signs normal. HEENT examination is unremarkable with pupils equal round and reactive to light and accommodation. Neck without adenopathy, thyromegaly, JVD nor bruit. Lungs diminished with right basilar crackle to auscultation and percussion. Cardiac regular rate and rhythm no murmur. Abdomen nontender, without hepatosplenomegaly and no mass appreciable. Extremities and Musculoskeletal without clubbing cyanosis nor edema, and mobility poor. Neurologic grossly intact with symmetric tone and strength and reflex. Assessment and plan:  1. End-stage renal disease-on hemodialysis    2. Preoperative evaluation- the patient looks as good as have ever seen him. He will always be at modest risk for perioperative pulmonary compromise due to underlying COPD and prior history of pneumonia; however, he is otherwise optimized from a pulmonary perspective at this juncture and is okay to go to the operating room from my perspective. Recommendations: Okay to operating room.     3.  History of right upper lobe pneumonia-minimal residual subpleural abnormality at the right upper lobe. No evidence of active infection. 4.  History of left empyema-no evidence of active infection. 5.  Metastatic carcinoma of the lung-ongoing conservative management and follow-up with oncology.

## 2023-05-16 ENCOUNTER — MED REC SCAN ONLY (OUTPATIENT)
Dept: INTERNAL MEDICINE CLINIC | Facility: CLINIC | Age: 78
End: 2023-05-16

## 2023-05-17 ENCOUNTER — PATIENT OUTREACH (OUTPATIENT)
Dept: CASE MANAGEMENT | Age: 78
End: 2023-05-17

## 2023-05-17 NOTE — PROGRESS NOTES
Polly called requesting appointment with Dr. Doreen Lipscomb partners. Polly stated that patient wants to go with [facility] for his physical therapy that SNF is sending him for. I called [phone] tried to schedule an appointment went back and forth between [facility] and the scheduling department. Appointment was scheduled with Juan DORSEY on 05/22/2023 video visit at 1:40 pm.  [facility] has to take patient for dialysis at 3:00 pm that day. Just as FYI. Total time - 10 min  Total Monthly time- 10 min    Nannette Napoles, [address] [place] [address]  Phone: [phone]. Time [place] [address]

## 2023-05-19 NOTE — TELEPHONE ENCOUNTER
Latex:  Patient reports allergy to latex.  Medications reviewed with patient.  Tobacco use verified.  Allergies verified.    REFERRING MD:  Luis Sheridan MD  Primary Medical Doctor: Luis Sheridan MD   DATE OF INJURY/SURGERY:  DOS R TKR 2014  WORK RELATED: no  OCCUPATION: retired    Patient is here for Left knee Orthovisc injection #1 follow up.  Patient states left knee is very painful. Her right knee does have some aches, but otherwise fine.  She is taking nothing for pain.      RN, please call the patient and facilitate getting the chest x-ray tomorrow. He may have reaccumulation of his chest fluid.

## 2023-05-22 ENCOUNTER — TELEMEDICINE (OUTPATIENT)
Dept: INTERNAL MEDICINE CLINIC | Facility: CLINIC | Age: 78
End: 2023-05-22

## 2023-05-22 DIAGNOSIS — R53.1 GENERALIZED WEAKNESS: Primary | ICD-10-CM

## 2023-05-22 DIAGNOSIS — C79.51 MALIGNANT NEOPLASM METASTATIC TO BONE (HCC): ICD-10-CM

## 2023-05-24 ENCOUNTER — TELEPHONE (OUTPATIENT)
Dept: INTERNAL MEDICINE CLINIC | Facility: CLINIC | Age: 78
End: 2023-05-24

## 2023-05-24 NOTE — TELEPHONE ENCOUNTER
Pt spouse Polly called and stated that she called [facility] and they did not receive referral as of yet. staff please fax referral to [facility] fax # [phone]. Pt was seen by Ronald Tomas on 5/22/2023 telemedicine. Thank you.

## 2023-05-25 NOTE — TELEPHONE ENCOUNTER
[facility] contacted and fax confirmed they did receive the referral.  Pt's  Polly contacted and informed. Polly verbal understanding.

## 2023-05-30 ENCOUNTER — TELEPHONE (OUTPATIENT)
Dept: INTERNAL MEDICINE CLINIC | Facility: CLINIC | Age: 78
End: 2023-05-30

## 2023-05-30 ENCOUNTER — HOSPITAL ENCOUNTER (OUTPATIENT)
Dept: CT IMAGING | Facility: HOSPITAL | Age: 78
Discharge: HOME | End: 2023-05-30
Attending: SURGERY
Payer: MEDICARE

## 2023-05-30 DIAGNOSIS — T85.611A PERITONEAL DIALYSIS CATHETER DYSFUNCTION, INITIAL ENCOUNTER (HCC): ICD-10-CM

## 2023-05-30 PROCEDURE — 74176 CT ABD & PELVIS W/O CONTRAST: CPT | Performed by: SURGERY

## 2023-05-30 NOTE — TELEPHONE ENCOUNTER
Patient's wife Polly called and stated that [facility] only received Occupational physical Therapy and that they did not receive Physical Therapy. Staff please fax Physical Therapy referral to [phone].

## 2023-05-31 ENCOUNTER — TELEPHONE (OUTPATIENT)
Dept: PULMONOLOGY | Facility: CLINIC | Age: 78
End: 2023-05-31

## 2023-05-31 RX ORDER — MIDODRINE HYDROCHLORIDE 10 MG/1
10 TABLET ORAL
Qty: 60 TABLET | Refills: 5 | Status: SHIPPED | OUTPATIENT
Start: 2023-05-31

## 2023-05-31 NOTE — TELEPHONE ENCOUNTER
I spoke to the patient's wife. The CT scan of the abdomen recently demonstrated anasarca and new right pleural effusion. He may benefit from thoracentesis; however, he is going to have surgery next week and I am concerned that if he has a pneumothorax, his surgery would have to be canceled. I spoke to his dialysis physician who is going to give him an extra dialysis tomorrow and I added midodrine so that he could have better blood pressures to better tolerate drawn off more fluid.

## 2023-06-01 ENCOUNTER — APPOINTMENT (OUTPATIENT)
Dept: HEMATOLOGY/ONCOLOGY | Facility: HOSPITAL | Age: 78
End: 2023-06-01
Attending: INTERNAL MEDICINE
Payer: MEDICARE

## 2023-06-01 ENCOUNTER — OFFICE VISIT (OUTPATIENT)
Dept: HEMATOLOGY/ONCOLOGY | Facility: HOSPITAL | Age: 78
End: 2023-06-01
Attending: NURSE PRACTITIONER
Payer: MEDICARE

## 2023-06-01 VITALS
SYSTOLIC BLOOD PRESSURE: 90 MMHG | HEART RATE: 81 BPM | HEIGHT: 65.5 IN | DIASTOLIC BLOOD PRESSURE: 45 MMHG | TEMPERATURE: 98 F | BODY MASS INDEX: 23.18 KG/M2 | WEIGHT: 140.81 LBS | OXYGEN SATURATION: 94 % | RESPIRATION RATE: 20 BRPM

## 2023-06-01 DIAGNOSIS — C79.51 MALIGNANT NEOPLASM METASTATIC TO BONE (HCC): Primary | ICD-10-CM

## 2023-06-01 DIAGNOSIS — C7B.8 NEUROENDOCRINE CARCINOMA METASTATIC TO INTRA-ABDOMINAL LYMPH NODE (HCC): ICD-10-CM

## 2023-06-01 DIAGNOSIS — C34.12 PRIMARY CANCER OF LEFT UPPER LOBE OF LUNG (HCC): ICD-10-CM

## 2023-06-01 DIAGNOSIS — Z51.81 MEDICATION MONITORING ENCOUNTER: ICD-10-CM

## 2023-06-01 DIAGNOSIS — C78.7 MALIGNANT NEOPLASM METASTATIC TO LIVER (HCC): ICD-10-CM

## 2023-06-01 DIAGNOSIS — C7A.8 NEUROENDOCRINE CARCINOMA METASTATIC TO INTRA-ABDOMINAL LYMPH NODE (HCC): ICD-10-CM

## 2023-06-01 DIAGNOSIS — C7A.8 NEUROENDOCRINE CARCINOMA METASTATIC TO INTRA-ABDOMINAL LYMPH NODE (HCC): Primary | ICD-10-CM

## 2023-06-01 DIAGNOSIS — Z45.2 ENCOUNTER FOR CENTRAL LINE CARE: ICD-10-CM

## 2023-06-01 DIAGNOSIS — C7B.8 NEUROENDOCRINE CARCINOMA METASTATIC TO INTRA-ABDOMINAL LYMPH NODE (HCC): Primary | ICD-10-CM

## 2023-06-01 LAB
ALBUMIN SERPL-MCNC: 2.2 G/DL (ref 3.4–5)
ALBUMIN/GLOB SERPL: 0.5 {RATIO} (ref 1–2)
ALP LIVER SERPL-CCNC: 63 U/L
ALT SERPL-CCNC: 14 U/L
ANION GAP SERPL CALC-SCNC: 6 MMOL/L (ref 0–18)
AST SERPL-CCNC: 19 U/L (ref 15–37)
BASOPHILS # BLD AUTO: 0.05 X10(3) UL (ref 0–0.2)
BASOPHILS NFR BLD AUTO: 0.9 %
BILIRUB SERPL-MCNC: 0.3 MG/DL (ref 0.1–2)
BUN BLD-MCNC: 36 MG/DL (ref 7–18)
BUN/CREAT SERPL: 8.2 (ref 10–20)
CALCIUM BLD-MCNC: 8.7 MG/DL (ref 8.5–10.1)
CHLORIDE SERPL-SCNC: 102 MMOL/L (ref 98–112)
CO2 SERPL-SCNC: 32 MMOL/L (ref 21–32)
CREAT BLD-MCNC: 4.39 MG/DL
DEPRECATED RDW RBC AUTO: 56.8 FL (ref 35.1–46.3)
EOSINOPHIL # BLD AUTO: 0.13 X10(3) UL (ref 0–0.7)
EOSINOPHIL NFR BLD AUTO: 2.3 %
ERYTHROCYTE [DISTWIDTH] IN BLOOD BY AUTOMATED COUNT: 15.9 % (ref 11–15)
GFR SERPLBLD BASED ON 1.73 SQ M-ARVRAT: 13 ML/MIN/1.73M2 (ref 60–?)
GLOBULIN PLAS-MCNC: 4.3 G/DL (ref 2.8–4.4)
GLUCOSE BLD-MCNC: 145 MG/DL (ref 70–99)
HCT VFR BLD AUTO: 30.8 %
HGB BLD-MCNC: 9.1 G/DL
IMM GRANULOCYTES # BLD AUTO: 0.02 X10(3) UL (ref 0–1)
IMM GRANULOCYTES NFR BLD: 0.4 %
LYMPHOCYTES # BLD AUTO: 0.35 X10(3) UL (ref 1–4)
LYMPHOCYTES NFR BLD AUTO: 6.3 %
MCH RBC QN AUTO: 29.3 PG (ref 26–34)
MCHC RBC AUTO-ENTMCNC: 29.5 G/DL (ref 31–37)
MCV RBC AUTO: 99 FL
MONOCYTES # BLD AUTO: 0.46 X10(3) UL (ref 0.1–1)
MONOCYTES NFR BLD AUTO: 8.3 %
NEUTROPHILS # BLD AUTO: 4.54 X10 (3) UL (ref 1.5–7.7)
NEUTROPHILS # BLD AUTO: 4.54 X10(3) UL (ref 1.5–7.7)
NEUTROPHILS NFR BLD AUTO: 81.8 %
OSMOLALITY SERPL CALC.SUM OF ELEC: 301 MOSM/KG (ref 275–295)
PLATELET # BLD AUTO: 158 10(3)UL (ref 150–450)
POTASSIUM SERPL-SCNC: 4 MMOL/L (ref 3.5–5.1)
PROT SERPL-MCNC: 6.5 G/DL (ref 6.4–8.2)
RBC # BLD AUTO: 3.11 X10(6)UL
SODIUM SERPL-SCNC: 140 MMOL/L (ref 136–145)
WBC # BLD AUTO: 5.6 X10(3) UL (ref 4–11)

## 2023-06-01 PROCEDURE — 99215 OFFICE O/P EST HI 40 MIN: CPT | Performed by: NURSE PRACTITIONER

## 2023-06-01 PROCEDURE — 86316 IMMUNOASSAY TUMOR OTHER: CPT

## 2023-06-01 PROCEDURE — 80053 COMPREHEN METABOLIC PANEL: CPT

## 2023-06-01 PROCEDURE — 85025 COMPLETE CBC W/AUTO DIFF WBC: CPT

## 2023-06-01 PROCEDURE — 96372 THER/PROPH/DIAG INJ SC/IM: CPT

## 2023-06-02 ENCOUNTER — SOCIAL WORK SERVICES (OUTPATIENT)
Dept: HEMATOLOGY/ONCOLOGY | Facility: HOSPITAL | Age: 78
End: 2023-06-02

## 2023-06-02 DIAGNOSIS — N18.5 CHRONIC KIDNEY DISEASE (CKD), STAGE V (HCC): ICD-10-CM

## 2023-06-02 DIAGNOSIS — C79.51 MALIGNANT NEOPLASM METASTATIC TO BONE (HCC): ICD-10-CM

## 2023-06-02 DIAGNOSIS — C34.12 PRIMARY CANCER OF LEFT UPPER LOBE OF LUNG (HCC): Primary | ICD-10-CM

## 2023-06-02 NOTE — PROGRESS NOTES
JASMIN followed of with patient's spouse Polly regarding her request for an electric scooter. JASMIN faxed referral to [facility] with [facility] and she explained process is lengthy. JASMIN faxed initial order to [facility] [phone] to initiate process to have patient evaluated for an electric scooter.

## 2023-06-04 ENCOUNTER — TELEPHONE (OUTPATIENT)
Dept: SURGERY | Facility: HOSPITAL | Age: 78
End: 2023-06-04

## 2023-06-04 NOTE — TELEPHONE ENCOUNTER
Had a phone conversation with the patient's wife. Patient is having problems with fluid overload and requiring hemodialysis daily. Did discuss that the peritoneal dialysis catheter is in the patient's left inguinal canal with incarcerated inguinal hernia. This is not causing any problems with his fluid overload since the catheter is not being used. 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 This may be contributing to the patient's fluid overload. I did asked the patient's wife to contact the patient's cardiologist for further evaluation. He was supposed to see the cardiologist tomorrow but this was canceled due to the cochlear implant to be placed on Thursday. Patient will require cardiac evaluation prior to removal peritoneal dialysis catheter which is scheduled for Thursday, June 22, 2023. Asked patient's wife keep me updated.

## 2023-06-06 LAB — CHROMOGRANIN A: 116.7 NG/ML

## 2023-06-09 ENCOUNTER — PATIENT MESSAGE (OUTPATIENT)
Dept: NEPHROLOGY | Facility: CLINIC | Age: 78
End: 2023-06-09

## 2023-06-09 NOTE — TELEPHONE ENCOUNTER
From: Katja Pino  To: Radha Ken MD  Sent: 6/9/2023 3:41 PM CDT  Subject: P D catheter    This message is being sent by Rose Becerril on behalf of Katja Pino. I need to know if there is any way that Amarjit Lori would be able to go on PD again before I ha e the catheter removed? He is currently in the [facility] and they are asking me this question.

## 2023-06-11 NOTE — TELEPHONE ENCOUNTER
Pls find out if patient is back home or in rehab?     Also, my last conversation with surgeon, his PD catheter tip is malpositioned and is in inguinal hernia and cannot be used

## 2023-06-13 ENCOUNTER — SOCIAL WORK SERVICES (OUTPATIENT)
Dept: HEMATOLOGY/ONCOLOGY | Facility: HOSPITAL | Age: 78
End: 2023-06-13

## 2023-06-13 NOTE — PROGRESS NOTES
JASMIN followed up with Ibeth May, with [facility] regarding referral for electric scooter. Keeley informed writer that the scooter is covered by insurance, however the patient still needs to qualify. Jade Burdick will contact patient spouse Polly to complete questionnaire to determine if patient qualifies. Vaibhav Rosas will provide writer with a status update as more information becomes available.

## 2023-06-14 ENCOUNTER — TELEPHONE (OUTPATIENT)
Dept: NEPHROLOGY | Facility: CLINIC | Age: 78
End: 2023-06-14

## 2023-06-14 NOTE — TELEPHONE ENCOUNTER
Spoke with pt's wife, eboni. Verified date and time of video visit.  Let Linda Oquendo know to schedule pt for 6-16-23 at 11:30 am per MD.

## 2023-06-14 NOTE — TELEPHONE ENCOUNTER
Dr Gregg Gomez please see note below patient wife really wanted to speak to you has a lot of questions about the PD ,plans,informed about using the cycler-  From pt advise on 6/9/23-On night time cycler 4-5 exchanges a day and one day time exchange   Is concerned if it can get clogged again and again wanted to speak with you,thanks.

## 2023-06-14 NOTE — TELEPHONE ENCOUNTER
Pt wife calling in regards to 6/9/23 [employer] message. She states that she wants to speak with an RN or Dr Criss Cintron. She states that she has numerous of questions regarding P D Catheter and does not want to communicate through [address]   Please call

## 2023-06-16 ENCOUNTER — TELEMEDICINE (OUTPATIENT)
Dept: NEPHROLOGY | Facility: CLINIC | Age: 78
End: 2023-06-16

## 2023-06-16 DIAGNOSIS — N25.81 SECONDARY HYPERPARATHYROIDISM (HCC): ICD-10-CM

## 2023-06-16 DIAGNOSIS — Z99.2 ESRD (END STAGE RENAL DISEASE) ON DIALYSIS (HCC): Primary | ICD-10-CM

## 2023-06-16 DIAGNOSIS — N18.6 ANEMIA DUE TO CHRONIC KIDNEY DISEASE, ON CHRONIC DIALYSIS (HCC): ICD-10-CM

## 2023-06-16 DIAGNOSIS — Z99.2 ANEMIA DUE TO CHRONIC KIDNEY DISEASE, ON CHRONIC DIALYSIS (HCC): ICD-10-CM

## 2023-06-16 DIAGNOSIS — N18.6 ESRD (END STAGE RENAL DISEASE) ON DIALYSIS (HCC): Primary | ICD-10-CM

## 2023-06-16 DIAGNOSIS — D63.1 ANEMIA DUE TO CHRONIC KIDNEY DISEASE, ON CHRONIC DIALYSIS (HCC): ICD-10-CM

## 2023-06-16 PROCEDURE — 99215 OFFICE O/P EST HI 40 MIN: CPT | Performed by: INTERNAL MEDICINE

## 2023-06-19 RX ORDER — SODIUM CHLORIDE 9 MG/ML
INJECTION, SOLUTION INTRAVENOUS CONTINUOUS
Status: DISCONTINUED | OUTPATIENT
Start: 2023-06-19 | End: 2023-06-19

## 2023-06-19 RX ORDER — SEVELAMER CARBONATE 800 MG/1
800 TABLET, FILM COATED ORAL
COMMUNITY

## 2023-06-19 RX ORDER — SODIUM CHLORIDE, SODIUM LACTATE, POTASSIUM CHLORIDE, CALCIUM CHLORIDE 600; 310; 30; 20 MG/100ML; MG/100ML; MG/100ML; MG/100ML
INJECTION, SOLUTION INTRAVENOUS CONTINUOUS
Status: DISCONTINUED | OUTPATIENT
Start: 2023-06-19 | End: 2023-06-19

## 2023-06-22 ENCOUNTER — ANESTHESIA EVENT (OUTPATIENT)
Dept: SURGERY | Facility: HOSPITAL | Age: 78
End: 2023-06-22
Payer: MEDICARE

## 2023-06-22 ENCOUNTER — ANESTHESIA (OUTPATIENT)
Dept: SURGERY | Facility: HOSPITAL | Age: 78
End: 2023-06-22
Payer: MEDICARE

## 2023-06-22 ENCOUNTER — HOSPITAL ENCOUNTER (OUTPATIENT)
Facility: HOSPITAL | Age: 78
Setting detail: HOSPITAL OUTPATIENT SURGERY
Discharge: HOME | End: 2023-06-22
Attending: SURGERY | Admitting: SURGERY
Payer: MEDICARE

## 2023-06-22 VITALS
WEIGHT: 142 LBS | SYSTOLIC BLOOD PRESSURE: 87 MMHG | RESPIRATION RATE: 14 BRPM | TEMPERATURE: 97 F | DIASTOLIC BLOOD PRESSURE: 41 MMHG | HEIGHT: 69 IN | HEART RATE: 64 BPM | OXYGEN SATURATION: 97 % | BODY MASS INDEX: 21.03 KG/M2

## 2023-06-22 LAB — POTASSIUM SERPL-SCNC: 4.5 MMOL/L (ref 3.5–5.1)

## 2023-06-22 PROCEDURE — 0WPG03Z REMOVAL OF INFUSION DEVICE FROM PERITONEAL CAVITY, OPEN APPROACH: ICD-10-PCS | Performed by: SURGERY

## 2023-06-22 PROCEDURE — 87075 CULTR BACTERIA EXCEPT BLOOD: CPT | Performed by: SURGERY

## 2023-06-22 PROCEDURE — 84132 ASSAY OF SERUM POTASSIUM: CPT | Performed by: SURGERY

## 2023-06-22 PROCEDURE — 87070 CULTURE OTHR SPECIMN AEROBIC: CPT | Performed by: SURGERY

## 2023-06-22 PROCEDURE — 87176 TISSUE HOMOGENIZATION CULTR: CPT | Performed by: SURGERY

## 2023-06-22 PROCEDURE — 87102 FUNGUS ISOLATION CULTURE: CPT | Performed by: SURGERY

## 2023-06-22 PROCEDURE — 87206 SMEAR FLUORESCENT/ACID STAI: CPT | Performed by: SURGERY

## 2023-06-22 PROCEDURE — 87205 SMEAR GRAM STAIN: CPT | Performed by: SURGERY

## 2023-06-22 RX ORDER — GLYCOPYRROLATE 0.2 MG/ML
INJECTION, SOLUTION INTRAMUSCULAR; INTRAVENOUS AS NEEDED
Status: DISCONTINUED | OUTPATIENT
Start: 2023-06-22 | End: 2023-06-22 | Stop reason: SURG

## 2023-06-22 RX ORDER — SODIUM CHLORIDE 9 MG/ML
INJECTION, SOLUTION INTRAVENOUS CONTINUOUS
Status: DISCONTINUED | OUTPATIENT
Start: 2023-06-22 | End: 2023-06-22

## 2023-06-22 RX ORDER — ONDANSETRON 2 MG/ML
INJECTION INTRAMUSCULAR; INTRAVENOUS AS NEEDED
Status: DISCONTINUED | OUTPATIENT
Start: 2023-06-22 | End: 2023-06-22 | Stop reason: SURG

## 2023-06-22 RX ORDER — HYDROMORPHONE HYDROCHLORIDE 1 MG/ML
0.6 INJECTION, SOLUTION INTRAMUSCULAR; INTRAVENOUS; SUBCUTANEOUS EVERY 5 MIN PRN
Status: DISCONTINUED | OUTPATIENT
Start: 2023-06-22 | End: 2023-06-22

## 2023-06-22 RX ORDER — SODIUM CHLORIDE 9 MG/ML
INJECTION, SOLUTION INTRAVENOUS CONTINUOUS PRN
Status: DISCONTINUED | OUTPATIENT
Start: 2023-06-22 | End: 2023-06-22 | Stop reason: SURG

## 2023-06-22 RX ORDER — MORPHINE SULFATE 4 MG/ML
2 INJECTION, SOLUTION INTRAMUSCULAR; INTRAVENOUS EVERY 10 MIN PRN
Status: DISCONTINUED | OUTPATIENT
Start: 2023-06-22 | End: 2023-06-22

## 2023-06-22 RX ORDER — DOCUSATE SODIUM 100 MG/1
100 CAPSULE, LIQUID FILLED ORAL 2 TIMES DAILY
Qty: 14 CAPSULE | Refills: 1 | Status: SHIPPED | OUTPATIENT
Start: 2023-06-22 | End: 2023-07-06

## 2023-06-22 RX ORDER — ACETAMINOPHEN 500 MG
1000 TABLET ORAL ONCE
Status: COMPLETED | OUTPATIENT
Start: 2023-06-22 | End: 2023-06-22

## 2023-06-22 RX ORDER — HYDROMORPHONE HYDROCHLORIDE 1 MG/ML
0.2 INJECTION, SOLUTION INTRAMUSCULAR; INTRAVENOUS; SUBCUTANEOUS EVERY 5 MIN PRN
Status: DISCONTINUED | OUTPATIENT
Start: 2023-06-22 | End: 2023-06-22

## 2023-06-22 RX ORDER — METOCLOPRAMIDE HYDROCHLORIDE 5 MG/ML
10 INJECTION INTRAMUSCULAR; INTRAVENOUS EVERY 8 HOURS PRN
Status: DISCONTINUED | OUTPATIENT
Start: 2023-06-22 | End: 2023-06-22

## 2023-06-22 RX ORDER — EPHEDRINE SULFATE 50 MG/ML
INJECTION, SOLUTION INTRAVENOUS AS NEEDED
Status: DISCONTINUED | OUTPATIENT
Start: 2023-06-22 | End: 2023-06-22 | Stop reason: SURG

## 2023-06-22 RX ORDER — PHENYLEPHRINE HCL 10 MG/ML
VIAL (ML) INJECTION AS NEEDED
Status: DISCONTINUED | OUTPATIENT
Start: 2023-06-22 | End: 2023-06-22 | Stop reason: SURG

## 2023-06-22 RX ORDER — MORPHINE SULFATE 4 MG/ML
4 INJECTION, SOLUTION INTRAMUSCULAR; INTRAVENOUS EVERY 10 MIN PRN
Status: DISCONTINUED | OUTPATIENT
Start: 2023-06-22 | End: 2023-06-22

## 2023-06-22 RX ORDER — ROCURONIUM BROMIDE 10 MG/ML
INJECTION, SOLUTION INTRAVENOUS AS NEEDED
Status: DISCONTINUED | OUTPATIENT
Start: 2023-06-22 | End: 2023-06-22 | Stop reason: SURG

## 2023-06-22 RX ORDER — NALOXONE HYDROCHLORIDE 0.4 MG/ML
80 INJECTION, SOLUTION INTRAMUSCULAR; INTRAVENOUS; SUBCUTANEOUS AS NEEDED
Status: DISCONTINUED | OUTPATIENT
Start: 2023-06-22 | End: 2023-06-22

## 2023-06-22 RX ORDER — TRAMADOL HYDROCHLORIDE 50 MG/1
50 TABLET ORAL EVERY 6 HOURS PRN
Qty: 10 TABLET | Refills: 0 | Status: SHIPPED | OUTPATIENT
Start: 2023-06-22

## 2023-06-22 RX ORDER — HYDROMORPHONE HYDROCHLORIDE 1 MG/ML
0.4 INJECTION, SOLUTION INTRAMUSCULAR; INTRAVENOUS; SUBCUTANEOUS EVERY 5 MIN PRN
Status: DISCONTINUED | OUTPATIENT
Start: 2023-06-22 | End: 2023-06-22

## 2023-06-22 RX ORDER — CEFAZOLIN SODIUM/WATER 2 G/20 ML
2 SYRINGE (ML) INTRAVENOUS ONCE
Status: COMPLETED | OUTPATIENT
Start: 2023-06-22 | End: 2023-06-22

## 2023-06-22 RX ORDER — SODIUM CHLORIDE, SODIUM LACTATE, POTASSIUM CHLORIDE, CALCIUM CHLORIDE 600; 310; 30; 20 MG/100ML; MG/100ML; MG/100ML; MG/100ML
INJECTION, SOLUTION INTRAVENOUS CONTINUOUS
Status: DISCONTINUED | OUTPATIENT
Start: 2023-06-22 | End: 2023-06-22

## 2023-06-22 RX ORDER — MORPHINE SULFATE 10 MG/ML
6 INJECTION, SOLUTION INTRAMUSCULAR; INTRAVENOUS EVERY 10 MIN PRN
Status: DISCONTINUED | OUTPATIENT
Start: 2023-06-22 | End: 2023-06-22

## 2023-06-22 RX ORDER — ONDANSETRON 2 MG/ML
4 INJECTION INTRAMUSCULAR; INTRAVENOUS EVERY 6 HOURS PRN
Status: DISCONTINUED | OUTPATIENT
Start: 2023-06-22 | End: 2023-06-22

## 2023-06-22 RX ORDER — BUPIVACAINE HYDROCHLORIDE AND EPINEPHRINE 2.5; 5 MG/ML; UG/ML
INJECTION, SOLUTION INFILTRATION; PERINEURAL AS NEEDED
Status: DISCONTINUED | OUTPATIENT
Start: 2023-06-22 | End: 2023-06-22 | Stop reason: HOSPADM

## 2023-06-22 RX ORDER — DEXAMETHASONE SODIUM PHOSPHATE 4 MG/ML
VIAL (ML) INJECTION AS NEEDED
Status: DISCONTINUED | OUTPATIENT
Start: 2023-06-22 | End: 2023-06-22 | Stop reason: SURG

## 2023-06-22 RX ORDER — HEPARIN SODIUM (PORCINE) LOCK FLUSH IV SOLN 100 UNIT/ML 100 UNIT/ML
500 SOLUTION INTRAVENOUS ONCE
Status: COMPLETED | OUTPATIENT
Start: 2023-06-22 | End: 2023-06-22

## 2023-06-22 RX ADMIN — SODIUM CHLORIDE: 9 INJECTION, SOLUTION INTRAVENOUS at 11:16:00

## 2023-06-22 RX ADMIN — GLYCOPYRROLATE 0.1 MG: 0.2 INJECTION, SOLUTION INTRAMUSCULAR; INTRAVENOUS at 11:16:00

## 2023-06-22 RX ADMIN — PHENYLEPHRINE HCL 100 MCG: 10 MG/ML VIAL (ML) INJECTION at 11:27:00

## 2023-06-22 RX ADMIN — EPHEDRINE SULFATE 5 MG: 50 INJECTION, SOLUTION INTRAVENOUS at 11:40:00

## 2023-06-22 RX ADMIN — PHENYLEPHRINE HCL 100 MCG: 10 MG/ML VIAL (ML) INJECTION at 11:34:00

## 2023-06-22 RX ADMIN — EPHEDRINE SULFATE 10 MG: 50 INJECTION, SOLUTION INTRAVENOUS at 11:45:00

## 2023-06-22 RX ADMIN — SODIUM CHLORIDE: 9 INJECTION, SOLUTION INTRAVENOUS at 12:04:00

## 2023-06-22 RX ADMIN — EPHEDRINE SULFATE 5 MG: 50 INJECTION, SOLUTION INTRAVENOUS at 11:34:00

## 2023-06-22 RX ADMIN — PHENYLEPHRINE HCL 100 MCG: 10 MG/ML VIAL (ML) INJECTION at 11:37:00

## 2023-06-22 RX ADMIN — ROCURONIUM BROMIDE 5 MG: 10 INJECTION, SOLUTION INTRAVENOUS at 11:41:00

## 2023-06-22 RX ADMIN — ROCURONIUM BROMIDE 20 MG: 10 INJECTION, SOLUTION INTRAVENOUS at 11:26:00

## 2023-06-22 RX ADMIN — DEXAMETHASONE SODIUM PHOSPHATE 4 MG: 4 MG/ML VIAL (ML) INJECTION at 11:34:00

## 2023-06-22 RX ADMIN — ONDANSETRON 4 MG: 2 INJECTION INTRAMUSCULAR; INTRAVENOUS at 11:57:00

## 2023-06-22 RX ADMIN — CEFAZOLIN SODIUM/WATER 2 G: 2 G/20 ML SYRINGE (ML) INTRAVENOUS at 11:16:00

## 2023-06-22 RX ADMIN — PHENYLEPHRINE HCL 100 MCG: 10 MG/ML VIAL (ML) INJECTION at 11:38:00

## 2023-06-22 RX ADMIN — PHENYLEPHRINE HCL 100 MCG: 10 MG/ML VIAL (ML) INJECTION at 11:40:00

## 2023-06-22 NOTE — INTERVAL H&P NOTE
Pre-op Diagnosis: Peritoneal dialysis catheter dysfunction    The above referenced H&P was reviewed by Nica Hui MD on 6/22/2023, the patient was examined and no significant changes have occurred in the patient's condition since the H&P was performed. I discussed with the patient and/or legal representative the potential benefits, risks and side effects of this procedure; the likelihood of the patient achieving goals; and potential problems that might occur during recuperation. I discussed reasonable alternatives to the procedure, including risks, benefits and side effects related to the alternatives and risks related to not receiving this procedure. We will proceed with procedure as planned.

## 2023-06-22 NOTE — BRIEF OP NOTE
Pre-Operative Diagnosis:   nonfunctional peritoneal dialysis catheter     Post-Operative Diagnosis:   nonfunctional peritoneal dialysis catheter      Procedure Performed:   Removal of peritoneal dilaysis catheter    Surgeon(s) and Role:     Chandrakant Garcia MD - Primary    Assistant(s):  PA: TUAN Cisneros     Surgical Findings: Nonfunctional peritoneal dialysis catheter     Specimen: Peritoneal dialysis catheter tip     Estimated Blood Loss: 2 mL  Dictation Number:      Marciano Yao MD  6/22/2023  12:04 PM

## 2023-06-22 NOTE — ANESTHESIA PROCEDURE NOTES
Airway  Date/Time: 6/22/2023 11:29 AM  Urgency: Elective      General Information and Staff    Patient location during procedure: OR  Anesthesiologist: Roxi Ricks MD  Resident/CRNA: Anabella Herrera CRNA  Performed: CRNA   Performed by: Anabella Herrera CRNA  Authorized by: Roxi Ricks MD      Indications and Patient Condition  Indications for airway management: anesthesia  Sedation level: deep  Preoxygenated: yes  Patient position: sniffing  Mask difficulty assessment: 2 - vent by mask + OA or adjuvant +/- NMBA    Final Airway Details  Final airway type: endotracheal airway      Successful airway: ETT  Cuffed: yes   Successful intubation technique: direct laryngoscopy  Endotracheal tube insertion site: oral  Blade: Queta  Blade size: #3  ETT size (mm): 7.5    Cormack-Lehane Classification: grade IIB - view of arytenoids or posterior of glottis only  Placement verified by: capnometry   Measured from: lips  ETT to lips (cm): 22  Number of attempts at approach: 1    Additional Comments  Dental exam unchanged from pre op

## 2023-06-22 NOTE — H&P
[place] is a [de-identified]year old male. Patient presents with:  New Patient: PD Cath     HPI:   The etiology of the patient's renal failure is from chemo for lung cancer. The patient had peritoneal dialysis catheter placed for Dr. Jorge Luis Feliz on 1/4/2023  Patient is presently on hemodialysis q. MWF    The patient's nephrologist is Dr. Kaye Wyatt . The patient does make urine. Patient with a history of c diff 4/6/2023    Operative Report - Pily Alejo MD - 01/07/2023 1:24 PM CST  Formatting of this note is different from the original.  Former[facility]    PATIENT'S NAME: Kervin Valiente   ATTENDING PHYSICIAN: Asa Feliz MD   OPERATING PHYSICIAN: Asa Feliz MD   PATIENT ACCOUNT#: [id number] LOCATION: [place] [address]  MEDICAL RECORD #: [id number] YOB: 1945  ADMISSION DATE: 01/04/2023 OPERATION DATE: 01/04/2023    OPERATIVE REPORT    PREOPERATIVE DIAGNOSIS: Chronic renal failure. POSTOPERATIVE DIAGNOSIS: Chronic renal failure. PROCEDURE: Laparoscopic insertion of peritoneal dialysis catheter with omentopexy. ASSISTANT: Colonel Darby CSA and Anjali Sawant PA-C. Roxana Mari is a certified surgical assistant, needed for the entire procedure for both assisting with entering and closure, intraoperative assistance with port and laparoscope. Allergies:    Cephalosporins RENAL INSUFFICIENCY  Demerol Hcl [Meperi* UNKNOWN   Current Meds:  Current Outpatient Medications   Medication Sig Dispense Refill   Multiple Vitamin (MULTI-VITAMIN) Oral Tab Take 1 tablet by mouth daily with food. ALPRAZolam 0.5 MG Oral Tab Take 1 tablet (0.5 mg total) by mouth 2 (two) times daily as needed for Anxiety. 60 tablet 1   levothyroxine (EUTHYROX) 125 MCG Oral Tab Take 1 tablet (125 mcg total) by mouth before breakfast. 90 tablet 0   Vitamin D3, Cholecalciferol, 25 MCG Oral Tab Take 2 tablets (2,000 Units total) by mouth daily.  60 tablet 0   ipratropium-albuterol 0.5-2.5 (3) MG/3ML Inhalation Solution Take 3 mL by nebulization 4 (four) times daily. DX code: R09.02, 360 mL 5   Terazosin HCl 5 MG Oral Cap Take 1 capsule (5 mg total) by mouth daily. AT 4PM 90 capsule 3   fluticasone-Umeclidin-Vilant (TRELEGY ELLIPTA) 100-62.5-25 MCG/INH Inhalation Aerosol Powder, Breath Activated Inhale 1 puff into the lungs daily. 1 each 6   Albuterol Sulfate  (90 Base) MCG/ACT Inhalation Aero Soln Inhale 2 puffs into the lungs every 6 (six) hours as needed for Wheezing or Shortness of Breath. 2 Inhaler 5   cycloSPORINE 0.05 % Ophthalmic Emulsion Place into both eyes 2 (two) times daily. vancomycin 250 MG Oral Cap Take 1 capsule (250 mg total) by mouth in the morning, at noon, and at bedtime. X 2 weeks (Patient not taking: Reported on 5/10/2023) 42 capsule 1   HYDROcodone-acetaminophen 5-325 MG Oral Tab Take 1-2 tablets by mouth every 8 (eight) hours as needed for Pain. (Patient not taking: Reported on 5/10/2023) 30 tablet 0   predniSONE 20 MG Oral Tab Take 1 tablet (20 mg total) by mouth daily. (Patient not taking: Reported on 5/10/2023) 30 tablet 0   sulfamethoxazole-trimethoprim -160 MG Oral Tab per tablet Take 1 tablet by mouth 2 (two) times daily. 14 tablet 0   sodium bicarbonate 650 MG Oral Tab Take 1 tablet (650 mg total) by mouth daily. (Patient not taking: Reported on 5/10/2023) 90 tablet 0   sodium bicarbonate 650 MG Oral Tab Take 650 mg by mouth 2 (two) times daily. (Patient not taking: Reported on 5/10/2023)   pantoprazole 40 MG Oral Tab EC Take 1 tablet (40 mg total) by mouth every morning before breakfast. (Patient not taking: Reported on 5/10/2023) 28 tablet 2   finasteride 5 MG Oral Tab Take 1 tablet (5 mg total) by mouth daily. (Patient not taking: Reported on 5/10/2023) 90 tablet 3   aspirin 81 MG Oral Chew Tab Chew 1 tablet (81 mg total) by mouth daily. (Patient not taking: Reported on 5/10/2023) 30 tablet 2   AmLODIPine Besylate 5 MG Oral Tab Take 5 mg by mouth daily.  (Patient not taking: Reported on 5/10/2023)   atorvastatin 20 MG Oral Tab Take 20 mg by mouth nightly. (Patient not taking: Reported on 5/10/2023)   metoprolol Tartrate 25 MG Oral Tab Take 25 mg by mouth 2 (two) times daily.  (Patient not taking: Reported on 5/10/2023)       HISTORY:  Past Medical History:   Diagnosis Date   Aneurysm of abdominal vessel (HCC)   Aortic valve defect   Brain cancer (HCC)   C. difficile diarrhea   9/22, 2/23   Essential hypertension   Heart attack ([facility] [address]   High blood pressure   High cholesterol   Hyperlipidemia   Lung cancer ([facility] [address]   metastatic adenocarcinoma of the lung   Pericardial effusion     Past Surgical History:   Procedure Laterality Date   BRAIN SURGERY   CABG   CARPAL TUNNEL RELEASE   CATH BARE METAL STENT ([facility])   CATH PERICARDIOCENTESIS 09/12/2020   urgent pericardiocentesis   HERNIA SURGERY Right   inguinal hernia repair   OTHER SURGICAL HISTORY   cardiac stent   OTHER SURGICAL HISTORY Left   leg surgery   PD CATHETER ANCHOR BELT     Family History   Problem Relation Age of Onset   Cancer Father   Hypertension Mother   Hypertension Brother     Social History  Tobacco Use  Smoking status: Former  Smokeless tobacco: Never  Tobacco comments: quit in 2004  Vaping Use  Vaping Use: Never used  Alcohol use: No  Drug use: No      ROS:     GENERAL HEALTH: otherwise feels well; denies weight loss  SKIN: denies any unusual skin lesions or rashes  EYES: no visual complaints or deficits  HEENT: denies nasal congestion, sinus pain or sore throat; hearing loss negative  RESPIRATORY: denies shortness of breath, wheezing or cough   CARDIOVASCULAR: denies chest pain or MICHAEL; no palpitations   GI: denies rectal bleeding; narrow caliber bowel movements -    MUSCULOSKELETAL: no joint complaints upper or lower extremities, no back or rib pain  NEURO: no sensory or motor complaint  PSYCHE: no symptoms of depression or anxiety  HEMATOLOGY: denies hx anemia; denies bruising or excessive bleeding  ENDOCRINE: denies excessive thirst or urination; denies unexpected wt gain or wt loss    PHYSICAL EXAM:   GENERAL: well developed, well nourished male, in no apparent distress  SKIN: anicteric  EYES: PERRLA, EOMI, sclera anicteric  HEENT: normocephalic; normal nose, there is no supraclavicular adenopathy present  NECK: supple, no JVD,   RESPIRATORY: clear to percussion and auscultation, equal chest wall excursions  CARDIOVASCULAR: RRR, good peripheral perfusion  ABDOMEN: normal active BS, soft, non distended, nontender; there is no hepatosplenomegaly present, no palpable discrete masses present  LYMPHATIC: no lymphadenopathy  EXTREMITIES: no cyanosis, clubbing or edema    ASSESSMENT/ PLAN:   This is a [de-identified]year old male presents with chronic renal failure presently on hemodialysis. Patient has a PD catheter which is not functioning properly. Patient does require removal of peritoneal dialysis catheter. We discussed timing of removal of peritoneal dialysis catheter. Patient is on hemodialysis Monday Wednesday Friday  Patient cannot have surgery this coming Monday. I am out of town the following week and unable to perform surgery. Patient is then having a cochlear implant exchange form in early June 2023. Patient been waiting 1 year for change to occur. The patient's wife states that this is a quality-of-life issue and she does not want to delay surgery for the cochlear implant exchange. Therefore we will plan removal. Dialysis catheter on Thursday, June 22, 2023. I discussed the risk of procedure including bleeding, infection, nonhealing wound, scar formation, inability replace peritoneal dialysis catheter, need for lifelong hemodialysis, as well as risk with anesthesia include myocardial infarction, surgery failure, renal failure, pulmonary mass, DVT, and even death were all discussed in detail with the patient and his wife who understand and wish to proceed with the operation.  We will plan removal of peritoneal dialysis catheter under general anesthetic at [facility] AND [facility] on Thursday, June 22, 2023. Total time spent in direct patient contact and decision-making And coordinating the patient's care including greater than 50% face-to-face was 45 minutes.     Nesha Sams MD, Dr. Corie Snow

## 2023-06-26 ENCOUNTER — PATIENT OUTREACH (OUTPATIENT)
Dept: CASE MANAGEMENT | Age: 78
End: 2023-06-26

## 2023-06-26 ENCOUNTER — TELEPHONE (OUTPATIENT)
Dept: INTERNAL MEDICINE CLINIC | Facility: CLINIC | Age: 78
End: 2023-06-26

## 2023-06-26 NOTE — TELEPHONE ENCOUNTER
Pt's wife Polly called and stated that patient was at [facility] 2 weeks ago and had hard time swallowing food. Polly stated that patient is at home and has hard time swallowing food like cereal.  Polly was advised by [facility] to get a swallow test done. Dr. Cyril Shore do you approve a swallow test for this patient.   If any questions please call wife Polly.

## 2023-06-26 NOTE — TELEPHONE ENCOUNTER
Spoke with wife Polly (verbal release confirmed). She states patient was choking on cereal this morning. Per wife patient is not able to come into office due to stage 4 lung cancer and a lot of other appointments. Patient is seeing oncologist tomorrow, will discuss with them and if they are unable to assist she will call back to schedule video visit for further evaluation.

## 2023-06-26 NOTE — PROGRESS NOTES
6/26/2023  Spoke to Giovani Nazario for CCM. Updates to patient care team/ comments: UTD  Patient reported changes in medications: UTD  Med Adherence  Comment: Pt is taking as prescribed. Health Maintenance: Reviewed     Annual Physical Never done  Zoster Vaccines(1 of 2) due on 10/02/2014  COVID-19 Vaccine(6 - Pfizer series) due on 04/12/2023  Influenza Vaccine Completed  Annual Depression Screening Completed  Fall Risk Screening (Annual) Completed  Pneumococcal Vaccine: 65+ Years Completed    Patient current concerns:       Patients wife William Elkins called back for CCM monthly outreach. Polly stated that patient was at [facility] like 2 weeks ago and that patient was having hard time swallowing food and choking. Polly stated that she was told by [facility] that patient needs a swallow test done. Polly stated that patient is chocking even when eating cereal at home. Polly is trying to make soft food for patient, but he is having hard time swallowing the food. William Elkins wants to know if swallow test can be ordered. I informed Polly that I will be sending a communication to Dr. Ryan Lazo office for a swallow test.      Future Appointments   Date Time Provider Oswaldo Pacheco   6/29/2023 12:30 PM EM CC LAB1 [facility] CHEMO EMO   6/29/2023  1:30 PM Sammi Sarmiento APRN [facility] HEM ONC EMO   6/29/2023  2:30 PM EM CC LAB2 [facility] CHEMO EMO         Previous goal met: Ongoing     Self Management Goals/Action Plan: Active goal from previous outreach:                       Cancer     Patient reported progress toward goal: Progressing       Update to previous barriers:     Patient Reported New Barriers And Concerns: Swallowing problems.                    - Plan for overcoming all barriers: Communication sent to Dr. Ryan Lazo office for a Swallow test.            Patient agrees to goal action plan.   Self-Management Abilities (patient reported)             1= least confident in achieving goal, 5= very confident               - confidence: : 3      Care Manager Follow Up: 1 Month   Reason For Follow Up: review progress and or barriers towards patients goals. Care managers interventions: Continue to provide encouragement and education for healthy coping and diagnosis. Time Spent This Encounter Total: 20 min medical record review, telephone communication, care plan updates where needed, education, goals and action plan recreation/update. Provided acknowledgment and validation to patient's concerns. Monthly Minute Total including today: 20    Physical assessment, complete health history, and care plan established by Amanda Garcia MD, need for CCM determined from these assessments and data.

## 2023-06-26 NOTE — PROGRESS NOTES
Called patient wife Polly for CCM monthly and left a message for patient to call back when they can. Reviewed patient chart. Time Spent This Encounter Total: 3  min medical record review  Monthly Minute Total including today: 3 minutes    Dariel Naylor, [address] [place] [address]  Phone: [phone]. Time [place] [address]

## 2023-06-27 DIAGNOSIS — R13.10 DYSPHAGIA, UNSPECIFIED TYPE: Primary | ICD-10-CM

## 2023-06-28 ENCOUNTER — HOSPITAL ENCOUNTER (INPATIENT)
Facility: HOSPITAL | Age: 78
LOS: 12 days | Discharge: NURSING, REHAB OR LONG-TERM CARE | End: 2023-07-10
Attending: EMERGENCY MEDICINE | Admitting: HOSPITALIST
Payer: MEDICARE

## 2023-06-28 ENCOUNTER — APPOINTMENT (OUTPATIENT)
Dept: GENERAL RADIOLOGY | Facility: HOSPITAL | Age: 78
End: 2023-06-28
Attending: EMERGENCY MEDICINE
Payer: MEDICARE

## 2023-06-28 ENCOUNTER — APPOINTMENT (OUTPATIENT)
Dept: CT IMAGING | Facility: HOSPITAL | Age: 78
End: 2023-06-28
Attending: EMERGENCY MEDICINE
Payer: MEDICARE

## 2023-06-28 DIAGNOSIS — R06.02 SOB (SHORTNESS OF BREATH): Primary | ICD-10-CM

## 2023-06-28 DIAGNOSIS — N18.9 CHRONIC RENAL FAILURE, UNSPECIFIED CKD STAGE: ICD-10-CM

## 2023-06-28 LAB
ANION GAP SERPL CALC-SCNC: 8 MMOL/L (ref 0–18)
BASOPHILS # BLD AUTO: 0.05 X10(3) UL (ref 0–0.2)
BASOPHILS NFR BLD AUTO: 0.8 %
BUN BLD-MCNC: 25 MG/DL (ref 7–18)
BUN/CREAT SERPL: 7.1 (ref 10–20)
CALCIUM BLD-MCNC: 8.9 MG/DL (ref 8.5–10.1)
CHLORIDE SERPL-SCNC: 99 MMOL/L (ref 98–112)
CO2 SERPL-SCNC: 31 MMOL/L (ref 21–32)
CREAT BLD-MCNC: 3.51 MG/DL
DEPRECATED RDW RBC AUTO: 58.5 FL (ref 35.1–46.3)
EOSINOPHIL # BLD AUTO: 0.01 X10(3) UL (ref 0–0.7)
EOSINOPHIL NFR BLD AUTO: 0.2 %
ERYTHROCYTE [DISTWIDTH] IN BLOOD BY AUTOMATED COUNT: 16.5 % (ref 11–15)
GFR SERPLBLD BASED ON 1.73 SQ M-ARVRAT: 17 ML/MIN/1.73M2 (ref 60–?)
GLUCOSE BLD-MCNC: 104 MG/DL (ref 70–99)
HCT VFR BLD AUTO: 35.3 %
HGB BLD-MCNC: 10.9 G/DL
IMM GRANULOCYTES # BLD AUTO: 0.02 X10(3) UL (ref 0–1)
IMM GRANULOCYTES NFR BLD: 0.3 %
LYMPHOCYTES # BLD AUTO: 0.46 X10(3) UL (ref 1–4)
LYMPHOCYTES NFR BLD AUTO: 7.5 %
MCH RBC QN AUTO: 29.8 PG (ref 26–34)
MCHC RBC AUTO-ENTMCNC: 30.9 G/DL (ref 31–37)
MCV RBC AUTO: 96.4 FL
MONOCYTES # BLD AUTO: 0.5 X10(3) UL (ref 0.1–1)
MONOCYTES NFR BLD AUTO: 8.2 %
NEUTROPHILS # BLD AUTO: 5.06 X10 (3) UL (ref 1.5–7.7)
NEUTROPHILS # BLD AUTO: 5.06 X10(3) UL (ref 1.5–7.7)
NEUTROPHILS NFR BLD AUTO: 83 %
NT-PROBNP SERPL-MCNC: ABNORMAL PG/ML (ref ?–450)
NT-PROBNP SERPL-MCNC: ABNORMAL PG/ML (ref ?–450)
OSMOLALITY SERPL CALC.SUM OF ELEC: 291 MOSM/KG (ref 275–295)
PLATELET # BLD AUTO: 199 10(3)UL (ref 150–450)
POTASSIUM SERPL-SCNC: 4 MMOL/L (ref 3.5–5.1)
PROCALCITONIN SERPL-MCNC: 0.85 NG/ML (ref ?–0.16)
RBC # BLD AUTO: 3.66 X10(6)UL
SARS-COV-2 RNA RESP QL NAA+PROBE: NOT DETECTED
SODIUM SERPL-SCNC: 138 MMOL/L (ref 136–145)
TROPONIN I HIGH SENSITIVITY: 74 NG/L
WBC # BLD AUTO: 6.1 X10(3) UL (ref 4–11)

## 2023-06-28 PROCEDURE — 71045 X-RAY EXAM CHEST 1 VIEW: CPT | Performed by: EMERGENCY MEDICINE

## 2023-06-28 PROCEDURE — 99223 1ST HOSP IP/OBS HIGH 75: CPT | Performed by: INTERNAL MEDICINE

## 2023-06-28 PROCEDURE — 99223 1ST HOSP IP/OBS HIGH 75: CPT | Performed by: HOSPITALIST

## 2023-06-28 RX ORDER — HEPARIN SODIUM 5000 [USP'U]/ML
5000 INJECTION, SOLUTION INTRAVENOUS; SUBCUTANEOUS EVERY 12 HOURS SCHEDULED
Status: DISCONTINUED | OUTPATIENT
Start: 2023-06-28 | End: 2023-07-10

## 2023-06-28 RX ORDER — ONDANSETRON 2 MG/ML
4 INJECTION INTRAMUSCULAR; INTRAVENOUS EVERY 6 HOURS PRN
Status: DISCONTINUED | OUTPATIENT
Start: 2023-06-28 | End: 2023-07-10

## 2023-06-28 RX ORDER — METOCLOPRAMIDE HYDROCHLORIDE 5 MG/ML
5 INJECTION INTRAMUSCULAR; INTRAVENOUS EVERY 8 HOURS PRN
Status: DISCONTINUED | OUTPATIENT
Start: 2023-06-28 | End: 2023-07-10

## 2023-06-28 RX ORDER — METHYLPREDNISOLONE SODIUM SUCCINATE 40 MG/ML
40 INJECTION, POWDER, LYOPHILIZED, FOR SOLUTION INTRAMUSCULAR; INTRAVENOUS EVERY 12 HOURS
Status: DISCONTINUED | OUTPATIENT
Start: 2023-06-28 | End: 2023-07-02

## 2023-06-28 RX ORDER — VANCOMYCIN HYDROCHLORIDE 50 MG/ML
125 KIT ORAL DAILY
Status: DISCONTINUED | OUTPATIENT
Start: 2023-06-28 | End: 2023-07-10

## 2023-06-28 RX ORDER — IPRATROPIUM BROMIDE AND ALBUTEROL SULFATE 2.5; .5 MG/3ML; MG/3ML
3 SOLUTION RESPIRATORY (INHALATION)
Status: DISCONTINUED | OUTPATIENT
Start: 2023-06-28 | End: 2023-07-08

## 2023-06-28 RX ORDER — ACETAMINOPHEN 500 MG
500 TABLET ORAL EVERY 4 HOURS PRN
Status: DISCONTINUED | OUTPATIENT
Start: 2023-06-28 | End: 2023-07-10

## 2023-06-28 RX ORDER — METHYLPREDNISOLONE SODIUM SUCCINATE 125 MG/2ML
125 INJECTION, POWDER, LYOPHILIZED, FOR SOLUTION INTRAMUSCULAR; INTRAVENOUS ONCE
Status: COMPLETED | OUTPATIENT
Start: 2023-06-28 | End: 2023-06-28

## 2023-06-28 RX ORDER — IPRATROPIUM BROMIDE AND ALBUTEROL SULFATE 2.5; .5 MG/3ML; MG/3ML
3 SOLUTION RESPIRATORY (INHALATION) EVERY 6 HOURS PRN
Status: DISCONTINUED | OUTPATIENT
Start: 2023-06-28 | End: 2023-07-10

## 2023-06-28 NOTE — ED QUICK NOTES
Orders for admission, patient is aware of plan and ready to go upstairs. Any questions, please call ED RN Caity Ortiz at extension [phone].      Patient Covid vaccination status: Fully vaccinated     COVID Test Ordered in ED: Rapid SARS-CoV-2 by PCR    COVID Suspicion at Admission: N/A    Running Infusions:  None    Mental Status/LOC at time of transport: ALERT    Other pertinent information: On 3L nasal cannula, uses 3L at home, port accessed, wife at bedside    CIWA score: N/A   NIH score:  N/A

## 2023-06-28 NOTE — PLAN OF CARE
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 In the ED, EKG NSR HR 90, CXR c/w pneumonitis, troponin 74, pBNP in process. Cardiology consulted for SOB. Primary Cardiologist Dr. Sheena Councilman, last OV 23    Vital Signs:       2023     5:00 PM 2023     6:00 PM   Vitals History   /67    Pulse 86 83   Resp 14 13   SpO2 96 % 94 %        Labs:   Lab Results   Component Value Date    WBC 6.1 2023    HGB 10.9 2023    HCT 35.3 2023    .0 2023    CREATSERUM 3.51 2023    BUN 25 2023     2023    K 4.0 2023    CL 99 2023    CO2 31.0 2023     2023    CA 8.9 2023    TROPHS 74 2023       Diagnostics:   XR CHEST AP PORTABLE  (CPT=71045)    Result Date: 2023  CONCLUSION:  1. Cardiomegaly. Tortuous thoracic aorta. 2. Persistent left basilar pleural parenchymal abnormality with worsening bilateral perihilar alveolitis. 3. Right basilar pleural parenchymal abnormality has developed suspicious for acute pneumonitis. 4. Bilateral central venous catheters unchanged in position. No pneumothorax.     Dictated by (CST): Shyanne Roldan MD on 2023 at 5:13 PM     Finalized by (CST): Shyanne Roldan MD on 2023 at 5:16 PM           Allergies:    Cephalosporins          RENAL INSUFFICIENCY  Demerol Hcl [Meperi*    OTHER (SEE COMMENTS)    Comment:confused    Medications:  No current facility-administered medications for this encounter.     Assessment:   #SOB chronically on supplemental oxygen h/o metastatic lung CA with e/o pneumonitis on CXR on albuterol and trelegy   #Severe AoS non surgical candidate, echo 3/2023 mean gradient 44 mmHg  #HFpEF, EF 55-60%   #CAD s/p PCI  #HTN (controlled) on midodrine with HD  #HLD on statin  #ESRD on HD takes renvela, terazosin   #Hypothyroid on synthroid       Plan:  - Follow up pBNP  - Evaluate in AM if likely pulmonary driven or if cardiac component to SOB given known chronic cardiac conditions   - Recent echo, evaluate in AM if utility in repeating   - Remaining care per primary, pulmonology, and renal   - Continue to monitor overnight  - Formal Cardiology consult to follow in [place], [address]  6/28/2023  6:59 PM

## 2023-06-28 NOTE — ED INITIAL ASSESSMENT (HPI)
Patient A&Ox3 brought in from dialysis for shortness of breath. Did have his full HD session today. Hx of dorment lung CA, on 3L O2 at baseline.

## 2023-06-29 ENCOUNTER — APPOINTMENT (OUTPATIENT)
Dept: GENERAL RADIOLOGY | Facility: HOSPITAL | Age: 78
End: 2023-06-29
Attending: HOSPITALIST
Payer: MEDICARE

## 2023-06-29 PROBLEM — N18.6 ESRD (END STAGE RENAL DISEASE) (HCC): Status: ACTIVE | Noted: 2023-06-28

## 2023-06-29 PROBLEM — N18.6 ESRD (END STAGE RENAL DISEASE) (HCC): Status: ACTIVE | Noted: 2023-01-01

## 2023-06-29 LAB
ANION GAP SERPL CALC-SCNC: 9 MMOL/L (ref 0–18)
ATRIAL RATE: 90 BPM
BASOPHILS # BLD AUTO: 0 X10(3) UL (ref 0–0.2)
BASOPHILS NFR BLD AUTO: 0 %
BUN BLD-MCNC: 34 MG/DL (ref 7–18)
BUN/CREAT SERPL: 7.8 (ref 10–20)
CALCIUM BLD-MCNC: 8.8 MG/DL (ref 8.5–10.1)
CHLORIDE SERPL-SCNC: 97 MMOL/L (ref 98–112)
CO2 SERPL-SCNC: 29 MMOL/L (ref 21–32)
CREAT BLD-MCNC: 4.34 MG/DL
DEPRECATED RDW RBC AUTO: 58.4 FL (ref 35.1–46.3)
EOSINOPHIL # BLD AUTO: 0 X10(3) UL (ref 0–0.7)
EOSINOPHIL NFR BLD AUTO: 0 %
ERYTHROCYTE [DISTWIDTH] IN BLOOD BY AUTOMATED COUNT: 16.6 % (ref 11–15)
GFR SERPLBLD BASED ON 1.73 SQ M-ARVRAT: 13 ML/MIN/1.73M2 (ref 60–?)
GLUCOSE BLD-MCNC: 181 MG/DL (ref 70–99)
HCT VFR BLD AUTO: 33.3 %
HGB BLD-MCNC: 10.4 G/DL
IMM GRANULOCYTES # BLD AUTO: 0.01 X10(3) UL (ref 0–1)
IMM GRANULOCYTES NFR BLD: 0.3 %
LYMPHOCYTES # BLD AUTO: 0.16 X10(3) UL (ref 1–4)
LYMPHOCYTES NFR BLD AUTO: 5.3 %
MCH RBC QN AUTO: 30 PG (ref 26–34)
MCHC RBC AUTO-ENTMCNC: 31.2 G/DL (ref 31–37)
MCV RBC AUTO: 96 FL
MONOCYTES # BLD AUTO: 0.06 X10(3) UL (ref 0.1–1)
MONOCYTES NFR BLD AUTO: 2 %
NEUTROPHILS # BLD AUTO: 2.8 X10 (3) UL (ref 1.5–7.7)
NEUTROPHILS # BLD AUTO: 2.8 X10(3) UL (ref 1.5–7.7)
NEUTROPHILS NFR BLD AUTO: 92.4 %
OSMOLALITY SERPL CALC.SUM OF ELEC: 292 MOSM/KG (ref 275–295)
P AXIS: 14 DEGREES
P-R INTERVAL: 204 MS
PLATELET # BLD AUTO: 190 10(3)UL (ref 150–450)
POTASSIUM SERPL-SCNC: 4.5 MMOL/L (ref 3.5–5.1)
Q-T INTERVAL: 428 MS
QRS DURATION: 158 MS
QTC CALCULATION (BEZET): 523 MS
R AXIS: -40 DEGREES
RBC # BLD AUTO: 3.47 X10(6)UL
SODIUM SERPL-SCNC: 135 MMOL/L (ref 136–145)
T AXIS: 229 DEGREES
VENTRICULAR RATE: 90 BPM
WBC # BLD AUTO: 3 X10(3) UL (ref 4–11)

## 2023-06-29 PROCEDURE — 99223 1ST HOSP IP/OBS HIGH 75: CPT | Performed by: INTERNAL MEDICINE

## 2023-06-29 PROCEDURE — 99233 SBSQ HOSP IP/OBS HIGH 50: CPT | Performed by: HOSPITALIST

## 2023-06-29 PROCEDURE — 99232 SBSQ HOSP IP/OBS MODERATE 35: CPT | Performed by: INTERNAL MEDICINE

## 2023-06-29 PROCEDURE — 71045 X-RAY EXAM CHEST 1 VIEW: CPT | Performed by: HOSPITALIST

## 2023-06-29 RX ORDER — ALBUTEROL SULFATE 90 UG/1
2 AEROSOL, METERED RESPIRATORY (INHALATION) EVERY 6 HOURS PRN
Status: DISCONTINUED | OUTPATIENT
Start: 2023-06-29 | End: 2023-07-10

## 2023-06-29 RX ORDER — HEPARIN SODIUM 1000 [USP'U]/ML
1.5 INJECTION, SOLUTION INTRAVENOUS; SUBCUTANEOUS ONCE
Status: COMPLETED | OUTPATIENT
Start: 2023-06-29 | End: 2023-06-30

## 2023-06-29 RX ORDER — ALPRAZOLAM 0.5 MG/1
0.5 TABLET ORAL 4 TIMES DAILY PRN
Status: DISCONTINUED | OUTPATIENT
Start: 2023-06-29 | End: 2023-07-06

## 2023-06-29 RX ORDER — ALBUMIN (HUMAN) 12.5 G/50ML
100 SOLUTION INTRAVENOUS AS NEEDED
Status: DISCONTINUED | OUTPATIENT
Start: 2023-06-29 | End: 2023-07-10

## 2023-06-29 RX ORDER — SEVELAMER CARBONATE 800 MG/1
800 TABLET, FILM COATED ORAL
Status: DISCONTINUED | OUTPATIENT
Start: 2023-06-29 | End: 2023-07-01

## 2023-06-29 NOTE — PLAN OF CARE
Pt is alert and oriented x4. Very hard of hearing bilateral hearing aids and right  cochlear implant. NPO until speech evaluates him. Denies any pain. CPAP at night otherwise on 3L O2 NC. Remote tele in place. Zosyn for IV abx. Call light within reach. Fall precautions maintained. Problem: Patient Centered Care  Goal: Patient preferences are identified and integrated in the patient's plan of care  Description: Interventions:  - What would you like us to know as we care for you? I am hard of hearing.    - Provide timely, complete, and accurate information to patient/family  - Incorporate patient and family knowledge, values, beliefs, and cultural backgrounds into the planning and delivery of care  - Encourage patient/family to participate in care and decision-making at the level they choose  - Honor patient and family perspectives and choices  Outcome: Progressing     Problem: PAIN - ADULT  Goal: Verbalizes/displays adequate comfort level or patient's stated pain goal  Description: INTERVENTIONS:  - Encourage pt to monitor pain and request assistance  - Assess pain using appropriate pain scale  - Administer analgesics based on type and severity of pain and evaluate response  - Implement non-pharmacological measures as appropriate and evaluate response  - Consider cultural and social influences on pain and pain management  - Manage/alleviate anxiety  - Utilize distraction and/or relaxation techniques  - Monitor for opioid side effects  - Notify MD/LIP if interventions unsuccessful or patient reports new pain  - Anticipate increased pain with activity and pre-medicate as appropriate  Outcome: Progressing     Problem: SAFETY ADULT - FALL  Goal: Free from fall injury  Description: INTERVENTIONS:  - Assess pt frequently for physical needs  - Identify cognitive and physical deficits and behaviors that affect risk of falls.   - [place] fall precautions as indicated by assessment.  - Educate pt/family on patient safety including physical limitations  - Instruct pt to call for assistance with activity based on assessment  - Modify environment to reduce risk of injury  - Provide assistive devices as appropriate  - Consider OT/PT consult to assist with strengthening/mobility  - Encourage toileting schedule  Outcome: Progressing     Problem: CARDIOVASCULAR - ADULT  Goal: Maintains optimal cardiac output and hemodynamic stability  Description: INTERVENTIONS:  - Monitor vital signs, rhythm, and trends  - Monitor for bleeding, hypotension and signs of decreased cardiac output  - Evaluate effectiveness of vasoactive medications to optimize hemodynamic stability  - Monitor arterial and/or venous puncture sites for bleeding and/or hematoma  - Assess quality of pulses, skin color and temperature  - Assess for signs of decreased coronary artery perfusion - ex.  Angina  - Evaluate fluid balance, assess for edema, trend weights  Outcome: Progressing     Problem: RESPIRATORY - ADULT  Goal: Achieves optimal ventilation and oxygenation  Description: INTERVENTIONS:  - Assess for changes in respiratory status  - Assess for changes in mentation and behavior  - Position to facilitate oxygenation and minimize respiratory effort  - Oxygen supplementation based on oxygen saturation or ABGs  - Provide Smoking Cessation handout, if applicable  - Encourage broncho-pulmonary hygiene including cough, deep breathe, Incentive Spirometry  - Assess the need for suctioning and perform as needed  - Assess and instruct to report SOB or any respiratory difficulty  - Respiratory Therapy support as indicated  - Manage/alleviate anxiety  - Monitor for signs/symptoms of CO2 retention  Outcome: Progressing     Problem: METABOLIC/FLUID AND ELECTROLYTES - ADULT  Goal: Electrolytes maintained within normal limits  Description: INTERVENTIONS:  - Monitor labs and rhythm and assess patient for signs and symptoms of electrolyte imbalances  - Administer electrolyte replacement as ordered  - Monitor response to electrolyte replacements, including rhythm and repeat lab results as appropriate  - Fluid restriction as ordered  - Instruct patient on fluid and nutrition restrictions as appropriate  Outcome: Progressing  Goal: Hemodynamic stability and optimal renal function maintained  Description: INTERVENTIONS:  - Monitor labs and assess for signs and symptoms of volume excess or deficit  - Monitor intake, output and patient weight  - Monitor urine specific gravity, serum osmolarity and serum sodium as indicated or ordered  - Monitor response to interventions for patient's volume status, including labs, urine output, blood pressure (other measures as available)  - Encourage oral intake as appropriate  - Instruct patient on fluid and nutrition restrictions as appropriate  Outcome: Progressing     Problem: SKIN/TISSUE INTEGRITY - ADULT  Goal: Incision(s), wounds(s) or drain site(s) healing without S/S of infection  Description: INTERVENTIONS:  - Assess and document risk factors for pressure ulcer development  - Assess and document skin integrity  - Assess and document dressing/incision, wound bed, drain sites and surrounding tissue  - Implement wound care per orders  - Initiate isolation precautions as appropriate  - Initiate Pressure Ulcer prevention bundle as indicated  Outcome: Progressing     Problem: Impaired Swallowing  Goal: Minimize aspiration risk  Description: Interventions:  - Patient should be alert and upright for all feedings (90 degrees preferred)  - Offer food and liquids at a slow rate  - No straws  - Encourage small bites of food and small sips of liquid  - Offer pills one at a time, crush or deliver with applesauce as needed  - Discontinue feeding and notify MD (or speech pathologist) if coughing or persistent throat clearing or wet/gurgly vocal quality is noted  Outcome: Progressing

## 2023-06-29 NOTE — RESPIRATORY THERAPY NOTE
Pt has a history of TAO and uses PAP therapy at home. Hospital's equipment is assembled and available at the bedside.

## 2023-06-29 NOTE — CM/SW NOTE
Met with patient's spouse Polly for assessment, patient asleep in bed. Patient and Polly live in a 2 level home w/ 7 stairs. Patient uses a walker, cane, CPAP, and home O2 (3L [employer]). Polly assists with ADLs when needed and she manages IADLs. Patient had home health in the past but not current. Patient to to [facility] in April. He goes to [address] [place] [address]. Discussed discharge plan. Polly anticipates need for SNF & prefers he return to [facility], discussed bed availability. Initiated SNF referral via [facility]. SW/CM to f/u closer to discharge regarding [facility] bed availability.     Franklyn Favre, [address]

## 2023-06-29 NOTE — H&P
[facility]    PATIENT'S NAME: Maddie Burnett   ATTENDING PHYSICIAN: Karen Zuniga DO   PATIENT ACCOUNT#:   [de-identified]    LOCATION:  [facility] [address]  MEDICAL RECORD #:   [id number]       YOB: 1945  ADMISSION DATE:       06/28/2023    HISTORY AND PHYSICAL EXAMINATION    CHIEF COMPLAINT:  Recurrent shortness of breath and pleural effusion. HISTORY OF PRESENT ILLNESS:  Patient is an 47-year-old  male who was in dialysis today and he was complaining about shortness of breath. Dialysis was finished and he was sent to the emergency department for evaluation. Chemistry showed numbers at baseline. GFR is 17. Troponin was negative. CBC showed hemoglobin of 10.9. No leukocytosis or left shift. Chest x-ray showed cardiomegaly with worsening perihilar alveolitis and right basilar pleural parenchymal abnormality developed suspicious for acute pneumonitis. Bilateral central venous catheters unchanged. Chest x-ray looks like reaccumulation of possible pleural effusion. Patient had procalcitonin ordered, and he will be admitted to the hospital for further management. PAST MEDICAL HISTORY:  Non-small cell lung cancer adenocarcinoma, left upper lobe metastases to bones and single metastatic lesion to the right parietal lobe status post SBRT treatment. He was on maintenance Keytruda and developed immune-mediated checkpoint, interstitial nephritis with progressive renal failure ended up initially on peritoneal dialysis then hemodialysis. His immunotherapy has been discontinued. 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 He has been on octreotide infusions. He had anemia of chronic kidney disease, history of malignant pericardial effusion and pleural effusions requiring chest tube and pericardiocentesis.   He had history of chronic obstructive pulmonary disease, coronary artery disease, multiple PCI stents, chronic left ventricular diastolic dysfunction, abdominal aortic aneurysm, severe aortic stenosis and moderate pulmonary artery hypertension, obstructive sleep apnea, osteoarthritis, hypertension, hyperlipidemia, benign prostate hypertrophy. PAST SURGICAL HISTORY:  Left ankle open reduction and internal fixation, hernia repair, pericardiocentesis, left-sided chest tube and then removal, right cochlear implant, liver biopsy, PD catheter insertion and then removal and recent left internal jugular tunneled catheter for dialysis. MEDICATIONS:  Please see medication reconciliation list.      ALLERGIES:  No known drug allergies. SOCIAL HISTORY:  Ex-tobacco user. No current tobacco, alcohol, or drug use. Lives with his wife. Uses a walker. Requires assistance in his basic activities of daily living. REVIEW OF SYSTEMS:  Patient was hospitalized at [facility] for cochlear implant revision earlier this month and he was noted to be short of breath. He had right pleural effusion pleurocentesis, 500 mL were removed, negative for malignancy and improvement in his shortness of breath. His wife said his shortness of breath recurred around 10 days ago and has been progressive ever since. No chest pain. No fever or chills. No nausea or vomiting. Other 12-point review of systems is negative. PHYSICAL EXAMINATION:    GENERAL:  Patient overall appears cachectic. VITAL SIGNS:  Temperature 97.7, pulse 83, respiratory rate 13, blood pressure 112/67, pulse ox 94% on room air. HEENT:  Atraumatic. Oropharynx clear. Dry mucous membranes. Right-sided retroauricular cochlear implant. LUNGS:  Diminished breathing sounds both lung bases. No wheezing. Poor air exchange. HEART:  Regular rate, rhythm. S1 and S2 auscultated. Systolic murmur, best heard at right second intercostal space. ABDOMEN:  Soft, nondistended. No tenderness. Positive bowel sounds.     EXTREMITIES:  No edema, clubbing, or cyanosis. NEUROLOGIC:  Motor and sensory intact. ASSESSMENT AND PLAN:    1. Recurrent shortness of breath and possible re-accumulation of pleural effusion, possible perihilar and bibasilar pneumonia. Procalcitonin level still pending. 2.   Severe aortic stenosis. 3.   Neuroendocrine well differentiated cancer. 4.   History of non-small cell lung cancer as outlined above. 5.   End-stage renal disease. Patient will be admitted to telemetry floor. Obtain cardiology, pulmonary and nephrology consult. If procalcitonin is negative, no need for antibiotics. We will monitor his hemodynamic status. Pulmonary to evaluate patient for possible recurrent pleural effusions. Patient had underlying severe aortic stenosis and he is probably high risk for TAVR procedure with his underlying complex medical conditions. Will continue DuoNebs for now and oxygen support. Further recommendations to follow.     Dictated By Dontae Bray MD  d: 06/28/2023 18:20:58  t: 06/28/2023 60:55:40  [facility] [id number]  DD/

## 2023-06-29 NOTE — SLP NOTE
ADULT SWALLOWING EVALUATION    ASSESSMENT    ASSESSMENT/OVERALL IMPRESSION:    Proper PPE worn. Surgical mask and gloves. Hands sanitized upon entrance/exit Pt room. This BSE was ordered 2/2 RN dysphagia screen. Pt admitted 6/28/23 with SOB. Pt on solid/thin liquids at home, with spouse. PMH includes COPD, Brain Ca, Lung Ca, Bone Ca, MI. CKD. No PMH of dysphagia at [facility]. Pt reported \"sometimes cough on liquids at home. \" Currently, Pt NPO. CXR 6/28/23:  CONCLUSION:   1. Cardiomegaly. Tortuous thoracic aorta. 2. Persistent left basilar pleural parenchymal abnormality with worsening bilateral perihilar alveolitis. 3. Right basilar pleural parenchymal abnormality has developed suspicious for acute pneumonitis. 4. Bilateral central venous catheters unchanged in position. No pneumothorax. Pt alert, on 3.5L/min 02 NC, afebrile and assessed sitting upright in  (after consulting with RN). Pt agreed to participate. Vocal quality/intensity weak. Volitional swallow and cough present; Pt expectorated phlegm prior to oral trials. Oral motor exam unremarkable. Functional natural dentition. Pt self-fed solid, mildly thick and thin liquid trials. Bilabial seal adequate; no anterior loss. Lingual skills slow for bolus formation, preparation and control of solid consistency. Bite reflex of solid functional in strength. Prolonged effortful mastication on solids d/t reduced endurance of task. Oral cavity essentially clear post swallows. Pharyngeal response appeared delayed per hyolaryngeal elevation to completion (considered reduced in strength/rise to palpation). No overt clinical signs/symptoms of aspiration on solid nor mildly thick liquids (no throat clearing, no coughing, no SOB and clear vocal quality),. SOB noted with controlled thin liquid trials. Overall, swallow function appeared weak. Sp02 ~95% during this assessment.       IMPRESSIONS:    Pt presents with mild to moderate oral dysphagia and possible pharyngeal dysfunction. Collaborated with RN regarding Pt's swallowing plan of care. BSE results/recommendations discussed with Pt/ Pt with fair understanding; would benefit from additional reinforcement. Swallowing precautions written on white board in room for reinforcement/carry-over of skills for Pt, family and staff. Call light within Pt's reach upon SLP discharge from room. PLAN:    To f/u x3 sessions to ensure safe intake of diet and reinforce swallowing/aspiration precautions. To monitor for new CXR results. VFSS IF appropriate. Family education as available. RECOMMENDATIONS   Diet Recommendations - Solids: Mechanical soft chopped/ Soft & Bite Sized  Diet Recommendations - Liquids: Nectar thick liquids/ Mildly thick    Compensatory Strategies Recommended: No straws; Alternate consistencies;Small bites and sips  Aspiration Precautions: Upright position; Slow rate;Small bites and sips; No straw  Medication Administration Recommendations: Whole in puree  Treatment Plan/Recommendations: Aspiration precautions  Discharge Recommendations/Plan: Undetermined    HISTORY   MEDICAL HISTORY  Reason for Referral: R/O aspiration    Problem List  Principal Problem:    SOB (shortness of breath)  Active Problems:    Primary malignant neoplasm of lung metastatic to other site [facility])    ESRD (end stage renal disease) ([facility]      Past Medical History  Past Medical History:   Diagnosis Date    Anesthesia complication     blood pressure drops    Aneurysm of abdominal vessel (HCC)     Ankle wound, left, initial encounter 03/24/2022    probes to bone.  rule out ostemyelitis    Ankle wound, left, sequela 05/19/2022    Aortic valve defect     Back problem     Bone cancer ([address]     Brain cancer ([address]     Cataract     Cellulitis of left ankle 03/24/2022    COPD (chronic obstructive pulmonary disease) ([facility] [address]     Dialysis patient [facility])     Disorder of thyroid     Essential hypertension     Hearing impairment     Connie implant- right    Heart attack ([facility] [address]     High blood pressure     High cholesterol     History of blood transfusion     9/2022    Hyperlipidemia     Lung cancer ([facility] [address]     metastatic adenocarcinoma of the lung    Muscle weakness     Open wound of left ankle 03/30/2022    Osteoarthritis     Pericardial effusion     Pressure injury of left ankle, stage 4 ([facility] [address] 03/24/2022    Renal disorder     CKD    Shortness of breath     Sleep apnea     Visual impairment     glasses       Prior Living Situation: Home with spouse  Diet Prior to Admission: Regular; Thin liquids  Precautions: Aspiration    Patient/Family Goals: to eat    SWALLOWING HISTORY  Current Diet Consistency: NPO    OBJECTIVE   ORAL MOTOR EXAMINATION  Dentition: Natural;Functional  Symmetry: Within Functional Limits  Strength: Within Functional Limits  Range of Motion: Within Functional Limits  Rate of Motion: Within Functional Limits    Voice Quality: Weak  Respiratory Status: Supplemental O2;Nasal cannula  Consistencies Trialed: Thin liquids; Nectar thick liquids; Hard solid  Method of Presentation: Self presentation;Cup;Straw  Patient Positioning: Midline;Upright    Oral Phase of Swallow: Impaired  Bolus Formation: Impaired  Mastication: Impaired  Retention: Impaired  Pharyngeal Phase of Swallow: Impaired  Laryngeal Elevation Timing: Appears impaired    (Please note: Silent aspiration cannot be evaluated clinically. Videofluoroscopic Swallow Study is required to rule-out silent aspiration.)    GOALS  Goal #1 The patient will tolerate bite size consistency and mildly thick liquids without overt signs or symptoms of aspiration with 100 % accuracy over 3 session(s). In Progress   Goal #2 The patient/family/caregiver will demonstrate understanding and implementation of aspiration precautions and swallow strategies independently over 3 session(s).     In Progress   Goal #3 The patient will utilize compensatory strategies as outlined by  BSSE (clinical evaluation) including Slow rate, Small bites, Small sips, No straws, Upright 90 degrees with no  assistance 90 % of the time across 2 sessions.   In Progress   FOLLOW UP  Treatment Plan/Recommendations: Aspiration precautions  Number of Visits to Meet Established Goals: 3  Follow Up Needed (Documentation Required): Yes  SLP Follow-up Date: 06/30/23    Thank you for your referral.   If you have any questions, please contact   Mariza Mayes M.S. CCC/SLP  Speech-Language Pathologist  [facility]  #[phone]

## 2023-06-29 NOTE — PLAN OF CARE
Problem: Patient Centered Care  Goal: Patient preferences are identified and integrated in the patient's plan of care  Description: Interventions:  - What would you like us to know as we care for you?   - Provide timely, complete, and accurate information to patient/family  - Incorporate patient and family knowledge, values, beliefs, and cultural backgrounds into the planning and delivery of care  - Encourage patient/family to participate in care and decision-making at the level they choose  - Honor patient and family perspectives and choices  Outcome: Progressing     Problem: PAIN - ADULT  Goal: Verbalizes/displays adequate comfort level or patient's stated pain goal  Description: INTERVENTIONS:  - Encourage pt to monitor pain and request assistance  - Assess pain using appropriate pain scale  - Administer analgesics based on type and severity of pain and evaluate response  - Implement non-pharmacological measures as appropriate and evaluate response  - Consider cultural and social influences on pain and pain management  - Manage/alleviate anxiety  - Utilize distraction and/or relaxation techniques  - Monitor for opioid side effects  - Notify MD/LIP if interventions unsuccessful or patient reports new pain  - Anticipate increased pain with activity and pre-medicate as appropriate  Outcome: Progressing     Problem: SAFETY ADULT - FALL  Goal: Free from fall injury  Description: INTERVENTIONS:  - Assess pt frequently for physical needs  - Identify cognitive and physical deficits and behaviors that affect risk of falls.   - [place] fall precautions as indicated by assessment.  - Educate pt/family on patient safety including physical limitations  - Instruct pt to call for assistance with activity based on assessment  - Modify environment to reduce risk of injury  - Provide assistive devices as appropriate  - Consider OT/PT consult to assist with strengthening/mobility  - Encourage toileting schedule  Outcome: Progressing     Problem: CARDIOVASCULAR - ADULT  Goal: Maintains optimal cardiac output and hemodynamic stability  Description: INTERVENTIONS:  - Monitor vital signs, rhythm, and trends  - Monitor for bleeding, hypotension and signs of decreased cardiac output  - Evaluate effectiveness of vasoactive medications to optimize hemodynamic stability  - Monitor arterial and/or venous puncture sites for bleeding and/or hematoma  - Assess quality of pulses, skin color and temperature  - Assess for signs of decreased coronary artery perfusion - ex.  Angina  - Evaluate fluid balance, assess for edema, trend weights  Outcome: Progressing     Problem: RESPIRATORY - ADULT  Goal: Achieves optimal ventilation and oxygenation  Description: INTERVENTIONS:  - Assess for changes in respiratory status  - Assess for changes in mentation and behavior  - Position to facilitate oxygenation and minimize respiratory effort  - Oxygen supplementation based on oxygen saturation or ABGs  - Provide Smoking Cessation handout, if applicable  - Encourage broncho-pulmonary hygiene including cough, deep breathe, Incentive Spirometry  - Assess the need for suctioning and perform as needed  - Assess and instruct to report SOB or any respiratory difficulty  - Respiratory Therapy support as indicated  - Manage/alleviate anxiety  - Monitor for signs/symptoms of CO2 retention  Outcome: Progressing     Problem: METABOLIC/FLUID AND ELECTROLYTES - ADULT  Goal: Electrolytes maintained within normal limits  Description: INTERVENTIONS:  - Monitor labs and rhythm and assess patient for signs and symptoms of electrolyte imbalances  - Administer electrolyte replacement as ordered  - Monitor response to electrolyte replacements, including rhythm and repeat lab results as appropriate  - Fluid restriction as ordered  - Instruct patient on fluid and nutrition restrictions as appropriate  Outcome: Progressing  Goal: Hemodynamic stability and optimal renal function maintained  Description: INTERVENTIONS:  - Monitor labs and assess for signs and symptoms of volume excess or deficit  - Monitor intake, output and patient weight  - Monitor urine specific gravity, serum osmolarity and serum sodium as indicated or ordered  - Monitor response to interventions for patient's volume status, including labs, urine output, blood pressure (other measures as available)  - Encourage oral intake as appropriate  - Instruct patient on fluid and nutrition restrictions as appropriate  Outcome: Progressing     Problem: SKIN/TISSUE INTEGRITY - ADULT  Goal: Incision(s), wounds(s) or drain site(s) healing without S/S of infection  Description: INTERVENTIONS:  - Assess and document risk factors for pressure ulcer development  - Assess and document skin integrity  - Assess and document dressing/incision, wound bed, drain sites and surrounding tissue  - Implement wound care per orders  - Initiate isolation precautions as appropriate  - Initiate Pressure Ulcer prevention bundle as indicated  Outcome: Progressing     Problem: Impaired Swallowing  Goal: Minimize aspiration risk  Description: Interventions:  - Patient should be alert and upright for all feedings (90 degrees preferred)  - Offer food and liquids at a slow rate  - No straws  - Encourage small bites of food and small sips of liquid  - Offer pills one at a time, crush or deliver with applesauce as needed  - Discontinue feeding and notify MD (or speech pathologist) if coughing or persistent throat clearing or wet/gurgly vocal quality is noted  Outcome: Casie Montemayor is a&o, on 3-4L o2. Tele monitor in place. Speech saw pt today, started on a bite size/renal diet, nectar thick liquids. MD paged over multiple patient complaints of being unable to breathe, pt satting % on 3L. RR slightly elevated. CXR and prn xanax ordered. Zosyn as abx coverage. IV solu-medrol continued. Plans for dialysis tomorrow, [facility] called.  PT/OT orders, yet to see. Family at bedside. Call light within reach, frequent rounding.

## 2023-06-30 ENCOUNTER — PATIENT OUTREACH (OUTPATIENT)
Dept: CASE MANAGEMENT | Age: 78
End: 2023-06-30

## 2023-06-30 ENCOUNTER — TELEPHONE (OUTPATIENT)
Dept: INTERNAL MEDICINE CLINIC | Facility: CLINIC | Age: 78
End: 2023-06-30

## 2023-06-30 PROBLEM — J18.9 PNEUMONIA DUE TO INFECTIOUS ORGANISM: Status: ACTIVE | Noted: 2023-01-01

## 2023-06-30 PROBLEM — J18.9 PNEUMONIA DUE TO INFECTIOUS ORGANISM: Status: ACTIVE | Noted: 2023-06-30

## 2023-06-30 PROBLEM — J44.1 COPD EXACERBATION (HCC): Status: ACTIVE | Noted: 2021-11-22

## 2023-06-30 LAB
ALBUMIN SERPL-MCNC: 4.2 G/DL (ref 3.4–5)
ANION GAP SERPL CALC-SCNC: 13 MMOL/L (ref 0–18)
BASOPHILS # BLD AUTO: 0 X10(3) UL (ref 0–0.2)
BASOPHILS NFR BLD AUTO: 0 %
BUN BLD-MCNC: 46 MG/DL (ref 7–18)
BUN/CREAT SERPL: 9 (ref 10–20)
CALCIUM BLD-MCNC: 8.6 MG/DL (ref 8.5–10.1)
CHLORIDE SERPL-SCNC: 98 MMOL/L (ref 98–112)
CO2 SERPL-SCNC: 22 MMOL/L (ref 21–32)
CREAT BLD-MCNC: 5.1 MG/DL
DEPRECATED RDW RBC AUTO: 58.5 FL (ref 35.1–46.3)
EOSINOPHIL # BLD AUTO: 0 X10(3) UL (ref 0–0.7)
EOSINOPHIL NFR BLD AUTO: 0 %
ERYTHROCYTE [DISTWIDTH] IN BLOOD BY AUTOMATED COUNT: 16.7 % (ref 11–15)
GFR SERPLBLD BASED ON 1.73 SQ M-ARVRAT: 11 ML/MIN/1.73M2 (ref 60–?)
GLUCOSE BLD-MCNC: 143 MG/DL (ref 70–99)
HCT VFR BLD AUTO: 38.1 %
HGB BLD-MCNC: 11.9 G/DL
IMM GRANULOCYTES # BLD AUTO: 0.02 X10(3) UL (ref 0–1)
IMM GRANULOCYTES NFR BLD: 0.4 %
LYMPHOCYTES # BLD AUTO: 0.15 X10(3) UL (ref 1–4)
LYMPHOCYTES NFR BLD AUTO: 2.9 %
MCH RBC QN AUTO: 30.1 PG (ref 26–34)
MCHC RBC AUTO-ENTMCNC: 31.2 G/DL (ref 31–37)
MCV RBC AUTO: 96.5 FL
MONOCYTES # BLD AUTO: 0.3 X10(3) UL (ref 0.1–1)
MONOCYTES NFR BLD AUTO: 5.8 %
NEUTROPHILS # BLD AUTO: 4.66 X10 (3) UL (ref 1.5–7.7)
NEUTROPHILS # BLD AUTO: 4.66 X10(3) UL (ref 1.5–7.7)
NEUTROPHILS NFR BLD AUTO: 90.9 %
OSMOLALITY SERPL CALC.SUM OF ELEC: 290 MOSM/KG (ref 275–295)
PHOSPHATE SERPL-MCNC: 2 MG/DL (ref 2.5–4.9)
PLATELET # BLD AUTO: 146 10(3)UL (ref 150–450)
POTASSIUM SERPL-SCNC: 5.3 MMOL/L (ref 3.5–5.1)
RBC # BLD AUTO: 3.95 X10(6)UL
SODIUM SERPL-SCNC: 133 MMOL/L (ref 136–145)
WBC # BLD AUTO: 5.1 X10(3) UL (ref 4–11)

## 2023-06-30 PROCEDURE — 99233 SBSQ HOSP IP/OBS HIGH 50: CPT | Performed by: INTERNAL MEDICINE

## 2023-06-30 PROCEDURE — 5A1D70Z PERFORMANCE OF URINARY FILTRATION, INTERMITTENT, LESS THAN 6 HOURS PER DAY: ICD-10-PCS | Performed by: INTERNAL MEDICINE

## 2023-06-30 PROCEDURE — 99233 SBSQ HOSP IP/OBS HIGH 50: CPT | Performed by: HOSPITALIST

## 2023-06-30 RX ORDER — LEVOTHYROXINE SODIUM 0.15 MG/1
150 TABLET ORAL
Status: DISCONTINUED | OUTPATIENT
Start: 2023-06-30 | End: 2023-07-10

## 2023-06-30 RX ORDER — FOLIC ACID 1 MG/1
1 TABLET ORAL DAILY
Status: DISCONTINUED | OUTPATIENT
Start: 2023-06-30 | End: 2023-07-10

## 2023-06-30 RX ORDER — FINASTERIDE 5 MG/1
5 TABLET, FILM COATED ORAL DAILY
Status: DISCONTINUED | OUTPATIENT
Start: 2023-06-30 | End: 2023-07-10

## 2023-06-30 RX ORDER — MIDODRINE HYDROCHLORIDE 5 MG/1
10 TABLET ORAL
Status: DISCONTINUED | OUTPATIENT
Start: 2023-06-30 | End: 2023-07-05

## 2023-06-30 NOTE — CDS QUERY
Dr. Mayberry Mems: . To answer this query: DON'T [id number] [facility], 1st click on 3 dots to the RIGHT OF CO-SIGN Button AND CLICK EDIT.    2nd type an \"X\" in the bracket for the diagnosis that applies. (You may type in any additional clinical details you feel necessary to substantiate your response). 3rd Then Click on the Sign Button to complete your response. Thank you. .Pneumonia Specificity  CLINICAL DOCUMENTATION CLARIFICATION FORM    Dear Doctor Jefe Mcclain:   Clinical information (provided below) indicates  community-acquired pneumonia. For accurate ICD-10-CM code assignment to reflect severity of illness and risk of mortality,   PLEASE (X)THE DIAGNOSES THAT APPLIES, SELECTION BY PROVIDER ONLY    Please specify the associated etiology:  (  ) Gram Positive Pneumonia  (  )  Gram Negative Pneumonia  ( x ) Other microbe or cause (please specify): _________community acquired pna, exact microbiology unknown___________________             (  ) Other Explanation (please specify): ____________________________________        Documentation  from the Medical Record    06/28/23 H&P HPI: 78-year-old  male who was in dialysis today and he was complaining about shortness of breath. Chest x-ray showed cardiomegaly with worsening perihilar alveolitis and right basilar pleural parenchymal abnormality developed suspicious for acute pneumonitis. PMH: Non-small cell lung cancer adenocarcinoma, left upper lobe metastases to bones and single metastatic lesion to the right parietal lobe  status post SBRT treatment. 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  Anemia of chronic kidney disease,  Malignant pericardial effusion and pleural effusions requiring chest tube and pericardiocentesis, COPD, CAD, multiple PCI stents, chronic left ventricular diastolic  dysfunction, AAA, severe aortic stenosis and moderate pulmonary artery hypertension TAO. OA. HTN, BPH,  Hyperlipidemia.    06/29/23 Pulmonary Progress Note: Acute on chronic respiratory failure-the patient was admitted with suspected  lobe right lower lobe pneumonitis; however, he has multiple contributors to dyspnea including cardiorenal syndrome, prior history of empyema and scarification at the left lung base, history of lung cancer metastatic. Recommendations: 1. Nebulizer 2. Empiric antibiotic 3. Trelegy 5. Solu-Medrol    06/30/23: Your Progress Note: Pneumonia. community-acquired. Patient is hypoxic (at baseline, though) and not  septic. Patient with no leukocytosis, no fever, RLL infiltrate on chest imaging. . Acute COPD exacerbation. Due to pneumonia. Treatment:  Pulmonary Consult, IV antibiotics: zosyn, IV Solu-Medrol, Nebs           If you have any questions, please contact Clinical :  Ronnell Givens RN at [phone]     Thank You!     THIS FORM IS A PERMANENT PART OF THE MEDICAL RECORD

## 2023-06-30 NOTE — PLAN OF CARE
A/O x 4, forgetful. Up to chair this morning with assist and RW, SOB with exertion. Mildly SOB at rest, continuing scheduled nebs, zosyn, and solumedrol. HD this afternoon, 2L removed. Port with sluggish blood return, alteplase ordered. Wife at bedside and updated on plan of care. Bed in lowest position, call light in reach, frequent rounding, nonskid footwear, fall precautions in place.

## 2023-06-30 NOTE — CDS QUERY
Dr. Jeanne Blevins: . To answer this query: DON'T [id number] [facility], 1st click on 3 dots to the RIGHT OF CO-SIGN Button AND CLICK EDIT.    2nd type an \"X\" in the bracket for the diagnosis that applies. (You may type in any additional clinical details you feel necessary to substantiate your response). 3rd Then Click on the Sign Button to complete your response. Thank you. Melissa Wright     Dear Doctor Jeanne Blevins:   In your Progress Note: Hypoxia,  In the Pulmonary Progress Note Acute on chronic respiratory failure is documented. Please see information below from the Medical Record. For accurate ICD-10-CM code assignment to reflect severity of illness and risk of mortality,  PLEASE (X) THE DIAGNOSIS THAT APPLIES  THIS SECTION FOR PROVIDER DOCUMENTATION ONLY    (   ) Hypoxia     (   ) Acute on chronic respiratory failure    ( x  ) Other Explanation, (please specify): Chronic hypoxemic resp failure    _    __________________________________________________________________________________________________  Information from the Medical Record:     H&P  56-year-old  male with PMH: Non-small cell lung cancer adenocarcinoma, left upper lobe metastases to bones and single metastatic lesion to the right parietal lobe status post SBRT treatment. He was on maintenance Keytruda and developed immune-mediated checkpoint, interstitial nephritis with progressive renal failure ended up initially on peritoneal dialysis then hemodialysis. His immunotherapy has been discontinued. He also had neuroendocrine cancer, well  differentiated with liver metastasis and retroperitoneal metastasis. He has been on octreotide infusions. He had anemia of chronic kidney disease, history of malignant pericardial effusion and pleural effusions requiring chest tube and pericardiocentesis. He had history of chronic obstructive pulmonary disease, coronary artery disease, multiple PCI stents, chronic left ventricular diastolic dysfunction, abdominal aortic aneurysm, severe aortic stenosis and moderate  pulmonary artery hypertension, obstructive sleep apnea, osteoarthritis, hypertension, hyperlipidemia, benign prostate hypertrophy. 06/29/23 Pulmonary Consult PN: Acute on chronic respiratory failure-the patient was admitted with suspected  lobe right lower lobe pneumonitis; however, he has multiple contributors to dyspnea including cardiorenal syndrome, prior history of empyema and scarification at the left lung base, history of lung cancer metastatic.    06/30/23 Your PN: Pneumonia. community-acquired. Patient is hypoxic (at baseline, though) and not  septic    Nursing Flow Sheets from admit through 6/30 am: 02 3-4L per nc    Treatment: Pulmonary consulted, IV antibiotics: zosyn , Solu-Medrol 40 every 12, Nebulizers: As needed, Inhalers: Trelegy  Supplemental oxygen as needed, titrate down as tolerated    __________________________________________________________________________________________  If you have any questions, please contact Clinical :  Edwin Genao RN at [phone].     Thank You     THIS FORM IS A PERMANENT PART OF THE MEDICAL RECORD

## 2023-06-30 NOTE — PROGRESS NOTES
06/30/23 1355   VISIT TYPE   SLP Inpatient Visit Type (Documentation Required) Attempted Treatment  (pt at dialysis)   FOLLOW UP/PLAN   Follow Up Needed (Documentation Required) Yes   SLP Follow-up Date 07/01/23     Barney Martinez 40 Pathologist  Phone Number Ext. [phone]

## 2023-06-30 NOTE — HOME CARE LIAISON
Received referral [facility]. Left VM for pt's spouse to discuss. Left brochure & choice list in pt's chart for wife to review when at the hospital. Will follow up when appropriate.

## 2023-06-30 NOTE — PHYSICAL THERAPY NOTE
PHYSICAL THERAPY EVALUATION - INPATIENT     Room Number: 454/454-A  Evaluation Date: 6/30/2023  Type of Evaluation: Initial   Physician Order: PT Eval and Treat    Presenting Problem: SOB  Co-Morbidities : ESRD on HD 3x/week, lung CA c bone/brain mets, BPH, neuroendocrine CA  Reason for Therapy: Mobility Dysfunction and Discharge Planning    PHYSICAL THERAPY ASSESSMENT     Patient is a [de-identified]year old male admitted 6/28/2023 for SOB. Pt with hx of lung CA and uses 3L supplemental 02 at baseline. RN cleared pt to participate in PT evaluation this morning, coordinated session with OT. Pt also on HD 3x/week. At base, pt lives with spouse in 2 story home. He uses RW around home and spouse assists intermittently with ADLs. There are 6 stairs up to bed/bath. Reviewed role of therapy in acute care setting, goals of session, importance of out of bed mobility and safety precautions. Pt receptive to education but also noted to be severely [place]. Pt received in bathroom at start, agreeable to participate. Gait belt donned. Pt notes SOB with sitting and minimal activity. He is presently on 4L supplemental 02. Pt performed functional transfers with use of RW and CGA/min A. He ambulated 15 ft to bedside chair. Poor command following, likely due to hearing deficit. Pt attempting to sit too early before safely reaching chair and leaves RW behind. Provided instruction on how to safely complete transfers. Pt moderately SOB, difficult to obtain reading but pt desaturated down to 80's with ambulation and required 1-2 min seated rest break to recover back to 100% on 4L. Pt remained in bedside chair at end of session, needs left in reach and alarm set. RN present to give meds and breakfast at end of session. Pt appears to be functioning close to his baseline and does not require much physical assistance. Pt is most limited by endurance and breathing at this time.  Recommend pt [facility] services and 24 hr care from spouse. Also recommend he continue to use RW and pulse ox to monitor 02. Patient's current functional deficits include endurance, activity tolerance, gait and transfers, which are below the patient's pre-admission status. The patient's Approx Degree of Impairment: 46.58% has been calculated based on documentation in the [facility] '6 clicks' Inpatient Basic Mobility Short Form. Research supports that patients with this level of impairment may benefit from HHPT and 24 hr supervision. Patient will benefit from continued IP PT services to address these deficits in preparation for discharge. DISCHARGE RECOMMENDATIONS  PT Discharge Recommendations: Home with home health PT;24 hour care/supervision    PLAN  PT Treatment Plan: Bed mobility; Endurance; Patient education;Gait training;Neuromuscular re-educate;Range of motion;Strengthening;Stair training;Transfer training;Balance training  Rehab Potential : Fair  Frequency (Obs): 3-5x/week       PHYSICAL THERAPY MEDICAL/SOCIAL HISTORY     History related to current admission:     Problem List  Principal Problem:    SOB (shortness of breath)  Active Problems:    COPD exacerbation (HCC)    Primary malignant neoplasm of lung metastatic to other site [facility])    ESRD (end stage renal disease) ([facility] [address]      HOME SITUATION  Home Situation  Type of Home: House  Home Layout: Two level  Stairs to Bedroom: 6  Railing: Yes  Lives With: Spouse  Patient Owned Equipment: Rolling walker;Cane     Prior Level of [place]: mod I with RW, assist ADLs    SUBJECTIVE  \"I can do it but my wife helps. \"    PHYSICAL THERAPY EXAMINATION     OBJECTIVE  Precautions: Bed/chair alarm;Hard of hearing  Fall Risk: Standard fall risk    WEIGHT BEARING RESTRICTION                PAIN ASSESSMENT  Rating: Unable to rate     Management Techniques:  Activity promotion    COGNITION  Overall Cognitive Status:  WFL - within functional limits    RANGE OF MOTION AND STRENGTH ASSESSMENT  Upper extremity ROM and strength are within functional limits   Lower extremity ROM is within functional limits   Lower extremity strength is within functional limits     BALANCE  Static Sitting: Good  Dynamic Sitting: Fair +  Static Standing: Fair -  Dynamic Standing: Fair -      ACTIVITY TOLERANCE  Pulse: 90  Heart Rate Source: Monitor                   O2 WALK  Oxygen Therapy  SPO2% on Oxygen at Rest: 100  At rest oxygen flow (liters per minute): 4  SPO2% Ambulation on Oxygen:  (80's with activity)  Ambulation oxygen flow (liters per minute): 4    AM-PAC '6-Clicks' INPATIENT SHORT FORM - BASIC MOBILITY  How much difficulty does the patient currently have. .. Patient Difficulty: Turning over in bed (including adjusting bedclothes, sheets and blankets)?: A Little   Patient Difficulty: Sitting down on and standing up from a chair with arms (e.g., wheelchair, bedside commode, etc.): A Little   Patient Difficulty: Moving from lying on back to sitting on the side of the bed?: A Little   How much help from another person does the patient currently need. ..    Help from Another: Moving to and from a bed to a chair (including a wheelchair)?: A Little   Help from Another: Need to walk in hospital room?: A Little   Help from Another: Climbing 3-5 steps with a railing?: A Little     AM-PAC Score:  Raw Score: 18   Approx Degree of Impairment: 46.58%   Standardized Score (AM-PAC Scale): 43.63   CMS Modifier (G-Code): CK    FUNCTIONAL ABILITY STATUS  Functional Mobility/Gait Assessment  Gait Assistance: Contact guard assist  Distance (ft): 15 ft  Assistive Device: Rolling walker  Pattern: Shuffle (stooped posture)    Bed Mobility: NT (pt up in bathroom at start)    Transfers: CGA/min A    Exercise/Education Provided:  Bed mobility  Energy conservation  Functional activity tolerated  Gait training  Neuromuscular re-educate  Posture  ROM  Strengthening  Transfer training    Patient End of Session: Up in chair;Needs met;Call light within reach;RN aware of session/findings; All patient questions and concerns addressed; Alarm set; With [facility] staff    CURRENT GOALS    Goals to be met by: 7/15  Patient Goal Patient's self-stated goal is: Pt does not state. Goal #1 Patient is able to demonstrate supine - sit EOB @ level: independent     Goal #1   Current Status    Goal #2 Patient is able to demonstrate transfers EOB to/from Chair/Wheelchair at assistance level: modified independent with walker - rolling     Goal #2  Current Status    Goal #3 Patient is able to ambulate 50 feet with assist device: walker - rolling at assistance level: modified independent   Goal #3   Current Status    Goal #4 Patient will negotiate 6 stairs/one curb w/ assistive device and supervision   Goal #4   Current Status    Goal #5 Patient to demonstrate independence with home activity/exercise instructions provided to patient in preparation for discharge.    Goal #5   Current Status    Goal #6    Goal #6  Current Status      Patient Evaluation Complexity Level:  History Moderate - 1 or 2 personal factors and/or co-morbidities   Examination of body systems Moderate - addressing a total of 3 or more elements   Clinical Presentation Moderate - Evolving   Clinical Decision Making Moderate Complexity     Therapeutic Activity: 15 minutes

## 2023-06-30 NOTE — CM/SW NOTE
Therapy is recommending home with [facility]. SW contacted the pt's wife who stated she would be agreeable to the pt. Returning home with [address] if it is recommended. [address] referral has been sent in [address] [place] [address]. [address] orders and face to face have been entered. Plan: Home with [address] (pending list/choice) vs. [facility] (pending list/choice) pending progress. The pt's wife is aware and agreeable to the above.       Mariann Silva, [facility] ext [phone]

## 2023-07-01 ENCOUNTER — APPOINTMENT (OUTPATIENT)
Dept: GENERAL RADIOLOGY | Facility: HOSPITAL | Age: 78
End: 2023-07-01
Attending: INTERNAL MEDICINE
Payer: MEDICARE

## 2023-07-01 PROBLEM — E83.39 HYPOPHOSPHATEMIA: Status: ACTIVE | Noted: 2023-07-01

## 2023-07-01 PROBLEM — E83.39 HYPOPHOSPHATEMIA: Status: ACTIVE | Noted: 2023-01-01

## 2023-07-01 PROBLEM — E87.1 HYPONATREMIA: Status: ACTIVE | Noted: 2023-01-01

## 2023-07-01 PROBLEM — E87.1 HYPONATREMIA: Status: ACTIVE | Noted: 2023-07-01

## 2023-07-01 LAB
ANION GAP SERPL CALC-SCNC: 13 MMOL/L (ref 0–18)
BASOPHILS # BLD AUTO: 0 X10(3) UL (ref 0–0.2)
BASOPHILS NFR BLD AUTO: 0 %
BUN BLD-MCNC: 53 MG/DL (ref 7–18)
BUN/CREAT SERPL: 9.7 (ref 10–20)
CALCIUM BLD-MCNC: 8.7 MG/DL (ref 8.5–10.1)
CHLORIDE SERPL-SCNC: 95 MMOL/L (ref 98–112)
CO2 SERPL-SCNC: 23 MMOL/L (ref 21–32)
CREAT BLD-MCNC: 5.48 MG/DL
DEPRECATED RDW RBC AUTO: 58.2 FL (ref 35.1–46.3)
EOSINOPHIL # BLD AUTO: 0 X10(3) UL (ref 0–0.7)
EOSINOPHIL NFR BLD AUTO: 0 %
ERYTHROCYTE [DISTWIDTH] IN BLOOD BY AUTOMATED COUNT: 16.8 % (ref 11–15)
GFR SERPLBLD BASED ON 1.73 SQ M-ARVRAT: 10 ML/MIN/1.73M2 (ref 60–?)
GLUCOSE BLD-MCNC: 138 MG/DL (ref 70–99)
HCT VFR BLD AUTO: 40.9 %
HGB BLD-MCNC: 12.4 G/DL
IMM GRANULOCYTES # BLD AUTO: 0.02 X10(3) UL (ref 0–1)
IMM GRANULOCYTES NFR BLD: 0.3 %
LYMPHOCYTES # BLD AUTO: 0.23 X10(3) UL (ref 1–4)
LYMPHOCYTES NFR BLD AUTO: 4 %
MCH RBC QN AUTO: 29.7 PG (ref 26–34)
MCHC RBC AUTO-ENTMCNC: 30.3 G/DL (ref 31–37)
MCV RBC AUTO: 98.1 FL
MONOCYTES # BLD AUTO: 0.22 X10(3) UL (ref 0.1–1)
MONOCYTES NFR BLD AUTO: 3.8 %
NEUTROPHILS # BLD AUTO: 5.27 X10 (3) UL (ref 1.5–7.7)
NEUTROPHILS # BLD AUTO: 5.27 X10(3) UL (ref 1.5–7.7)
NEUTROPHILS NFR BLD AUTO: 91.9 %
OSMOLALITY SERPL CALC.SUM OF ELEC: 289 MOSM/KG (ref 275–295)
PLATELET # BLD AUTO: 177 10(3)UL (ref 150–450)
POTASSIUM SERPL-SCNC: 5.6 MMOL/L (ref 3.5–5.1)
PROCALCITONIN SERPL-MCNC: 1.09 NG/ML (ref ?–0.16)
RBC # BLD AUTO: 4.17 X10(6)UL
SODIUM SERPL-SCNC: 131 MMOL/L (ref 136–145)
WBC # BLD AUTO: 5.7 X10(3) UL (ref 4–11)

## 2023-07-01 PROCEDURE — 99232 SBSQ HOSP IP/OBS MODERATE 35: CPT | Performed by: INTERNAL MEDICINE

## 2023-07-01 PROCEDURE — 99233 SBSQ HOSP IP/OBS HIGH 50: CPT | Performed by: INTERNAL MEDICINE

## 2023-07-01 PROCEDURE — 71046 X-RAY EXAM CHEST 2 VIEWS: CPT | Performed by: INTERNAL MEDICINE

## 2023-07-01 PROCEDURE — 99233 SBSQ HOSP IP/OBS HIGH 50: CPT | Performed by: HOSPITALIST

## 2023-07-01 RX ORDER — LOPERAMIDE HYDROCHLORIDE 2 MG/1
2 CAPSULE ORAL 4 TIMES DAILY PRN
Status: DISCONTINUED | OUTPATIENT
Start: 2023-07-01 | End: 2023-07-10

## 2023-07-01 NOTE — SLP NOTE
ADULT SWALLOWING RE EVALUATION    ASSESSMENT    ASSESSMENT/OVERALL IMPRESSION:  PPE REQUIRED. THIS THERAPIST WORE GLOVES AND MASK FOR DURATION OF EVALUATION. HANDS WASHED UPON ENTRANCE/EXIT. SLP BSSE orders received and acknowledged. A swallow re evaluation warranted per \"swallow eval\". Pt refusing thickened liquids, diet was changed to thin liquids prior to re evaluation. Pt afebrile with weak/clear vocal quality, on 3L/Min, with oxygen saturation at 100%. Pt with hx of dysphagia at [facility], 4/09/23 with recommendations for soft easy to chew/thin liquids. Pt positioned 90 degrees in bed, fatigued/cooperative. No changes in oral motor skills. Pt presented with trials of hard solids and thin liquids via straw. Pt with adequate oral acceptance and bilabial seal across all trials. Pt with intact bite, prolonged mastication of solids, and delayed A-P transit. Pt's swallow response appears delayed with reduced hyolaryngeal elevation/excursion. No clinical signs of aspiration (e.g., immediate/delayed throat clear, immediate/delayed cough, wet vocal quality, increased O2 effort) observed across all trials. 7/1 CXR indicates \"No new abnormality. Persistent left pleural effusion and suspected pulmonary edema\". Oxygen status remained stable t/o the entire evaluation. At this time, pt presents with mild oral dysphagia and probable pharyngeal dysfunction. Recommend a soft bite sized diet and thin liquids with strict adherence to safe swallowing compensatory strategies. Results and recommendations reviewed with RN and pt. PLAN: SLP to f/u x1-2 meal assessments, monitor imaging, and VFSS if clinically indicated         RECOMMENDATIONS   Diet Recommendations - Solids: Mechanical soft chopped/ Soft & Bite Sized  Diet Recommendations - Liquids: Thin Liquids                        Compensatory Strategies Recommended: Slow rate;Small bites and sips  Aspiration Precautions: Upright position; Slow rate;Small bites and sips  Medication Administration Recommendations:  (as tolerated)  Treatment Plan/Recommendations: Aspiration precautions  Discharge Recommendations/Plan: Undetermined    HISTORY   MEDICAL HISTORY  Reason for Referral: Other (Comment) (\"swallow eval\")    Problem List  Principal Problem:    SOB (shortness of breath)  Active Problems:    COPD exacerbation (HCC)    Primary malignant neoplasm of lung metastatic to other site [facility])    ESRD (end stage renal disease) ([facility] [address]    Pneumonia due to infectious organism      Past Medical History  Past Medical History:   Diagnosis Date    Anesthesia complication     blood pressure drops    Aneurysm of abdominal vessel ([address]     Ankle wound, left, initial encounter 03/24/2022    probes to bone. rule out ostemyelitis    Ankle wound, left, sequela 05/19/2022    Aortic valve defect     Back problem     Bone cancer ([address]     Brain cancer ([address]     Cataract     Cellulitis of left ankle 03/24/2022    COPD (chronic obstructive pulmonary disease) ([address]     Dialysis patient [facility])     Disorder of thyroid     Essential hypertension     Hearing impairment     Cocher implant- right    Heart attack ([address]     High blood pressure     High cholesterol     History of blood transfusion     9/2022    Hyperlipidemia     Lung cancer ([address]     metastatic adenocarcinoma of the lung    Muscle weakness     Open wound of left ankle 03/30/2022    Osteoarthritis     Pericardial effusion     Pressure injury of left ankle, stage 4 ([address] 03/24/2022    Renal disorder     CKD    Shortness of breath     Sleep apnea     Visual impairment     glasses       Prior Living Situation: Home with spouse  Diet Prior to Admission: Regular; Thin liquids  Precautions: Aspiration    Patient/Family Goals: pt did not state this session, family not present    SWALLOWING HISTORY  Current Diet Consistency: Mechanical soft chopped/ Soft & Bite Sized; Thin liquids  Dysphagia History: see above  Imaging Results: see above    SUBJECTIVE OBJECTIVE   ORAL MOTOR EXAMINATION  Dentition: Natural;Functional  Symmetry: Within Functional Limits  Strength: Within Functional Limits     Range of Motion: Within Functional Limits  Rate of Motion: Within Functional Limits    Voice Quality: Weak  Respiratory Status: Supplemental O2;Nasal cannula  Consistencies Trialed: Thin liquids; Hard solid  Method of Presentation: Self presentation;Cup;Straw  Patient Positioning: Midline;Upright    Oral Phase of Swallow: Impaired  Bolus Retrieval: Intact  Bilabial Seal: Intact  Bolus Formation: Impaired  Bolus Propulsion: Impaired  Mastication: Impaired  Retention: Intact    Pharyngeal Phase of Swallow: Impaired  Laryngeal Elevation Timing: Appears impaired  Laryngeal Elevation Strength: Appears impaired  Laryngeal Elevation Coordination: Appears intact  (Please note: Silent aspiration cannot be evaluated clinically. Videofluoroscopic Swallow Study is required to rule-out silent aspiration.)    Esophageal Phase of Swallow: No complaints consistent with possible esophageal involvement  Comments: NA              GOALS  Goal #1 The patient will tolerate soft bite sized consistency and thin liquids without overt signs or symptoms of aspiration with 100 % accuracy over 1-2 session(s). In Progress   Goal #2 The patient/family/caregiver will demonstrate understanding and implementation of aspiration precautions and swallow strategies independently over 1-2 session(s). In Progress   Goal #3 The patient will utilize compensatory strategies as outlined by  BSSE (clinical evaluation) including Slow rate, Small bites, Small sips, Upright 90 degrees with min assistance 100 % of the time across 1-2 sessions.   In Progress     FOLLOW UP  Treatment Plan/Recommendations: Aspiration precautions  Number of Visits to Meet Established Goals:  (1-2)  Follow Up Needed (Documentation Required): Yes  SLP Follow-up Date: 07/03/23    Thank you for your referral.   If you have any questions, please contact January Mohamud, SHAKIRA Cassidy M.S. [id number] [facility]  Speech Language Pathologist  Phone Number [phone]

## 2023-07-01 NOTE — PLAN OF CARE
Problem: Patient Centered Care  Goal: Patient preferences are identified and integrated in the patient's plan of care  Description: Interventions:  - What would you like us to know as we care for you?   - Provide timely, complete, and accurate information to patient/family  - Incorporate patient and family knowledge, values, beliefs, and cultural backgrounds into the planning and delivery of care  - Encourage patient/family to participate in care and decision-making at the level they choose  - Honor patient and family perspectives and choices  Outcome: Progressing     Problem: PAIN - ADULT  Goal: Verbalizes/displays adequate comfort level or patient's stated pain goal  Description: INTERVENTIONS:  - Encourage pt to monitor pain and request assistance  - Assess pain using appropriate pain scale  - Administer analgesics based on type and severity of pain and evaluate response  - Implement non-pharmacological measures as appropriate and evaluate response  - Consider cultural and social influences on pain and pain management  - Manage/alleviate anxiety  - Utilize distraction and/or relaxation techniques  - Monitor for opioid side effects  - Notify MD/LIP if interventions unsuccessful or patient reports new pain  - Anticipate increased pain with activity and pre-medicate as appropriate  Outcome: Progressing     Problem: SAFETY ADULT - FALL  Goal: Free from fall injury  Description: INTERVENTIONS:  - Assess pt frequently for physical needs  - Identify cognitive and physical deficits and behaviors that affect risk of falls.   - [place] fall precautions as indicated by assessment.  - Educate pt/family on patient safety including physical limitations  - Instruct pt to call for assistance with activity based on assessment  - Modify environment to reduce risk of injury  - Provide assistive devices as appropriate  - Consider OT/PT consult to assist with strengthening/mobility  - Encourage toileting schedule  Outcome: Progressing     Problem: CARDIOVASCULAR - ADULT  Goal: Maintains optimal cardiac output and hemodynamic stability  Description: INTERVENTIONS:  - Monitor vital signs, rhythm, and trends  - Monitor for bleeding, hypotension and signs of decreased cardiac output  - Evaluate effectiveness of vasoactive medications to optimize hemodynamic stability  - Monitor arterial and/or venous puncture sites for bleeding and/or hematoma  - Assess quality of pulses, skin color and temperature  - Assess for signs of decreased coronary artery perfusion - ex.  Angina  - Evaluate fluid balance, assess for edema, trend weights  Outcome: Progressing     Problem: RESPIRATORY - ADULT  Goal: Achieves optimal ventilation and oxygenation  Description: INTERVENTIONS:  - Assess for changes in respiratory status  - Assess for changes in mentation and behavior  - Position to facilitate oxygenation and minimize respiratory effort  - Oxygen supplementation based on oxygen saturation or ABGs  - Provide Smoking Cessation handout, if applicable  - Encourage broncho-pulmonary hygiene including cough, deep breathe, Incentive Spirometry  - Assess the need for suctioning and perform as needed  - Assess and instruct to report SOB or any respiratory difficulty  - Respiratory Therapy support as indicated  - Manage/alleviate anxiety  - Monitor for signs/symptoms of CO2 retention  Outcome: Progressing     Problem: METABOLIC/FLUID AND ELECTROLYTES - ADULT  Goal: Electrolytes maintained within normal limits  Description: INTERVENTIONS:  - Monitor labs and rhythm and assess patient for signs and symptoms of electrolyte imbalances  - Administer electrolyte replacement as ordered  - Monitor response to electrolyte replacements, including rhythm and repeat lab results as appropriate  - Fluid restriction as ordered  - Instruct patient on fluid and nutrition restrictions as appropriate  Outcome: Progressing  Goal: Hemodynamic stability and optimal renal function maintained  Description: INTERVENTIONS:  - Monitor labs and assess for signs and symptoms of volume excess or deficit  - Monitor intake, output and patient weight  - Monitor urine specific gravity, serum osmolarity and serum sodium as indicated or ordered  - Monitor response to interventions for patient's volume status, including labs, urine output, blood pressure (other measures as available)  - Encourage oral intake as appropriate  - Instruct patient on fluid and nutrition restrictions as appropriate  Outcome: Progressing     Problem: SKIN/TISSUE INTEGRITY - ADULT  Goal: Incision(s), wounds(s) or drain site(s) healing without S/S of infection  Description: INTERVENTIONS:  - Assess and document risk factors for pressure ulcer development  - Assess and document skin integrity  - Assess and document dressing/incision, wound bed, drain sites and surrounding tissue  - Implement wound care per orders  - Initiate isolation precautions as appropriate  - Initiate Pressure Ulcer prevention bundle as indicated  Outcome: Progressing     Problem: Impaired Swallowing  Goal: Minimize aspiration risk  Description: Interventions:  - Patient should be alert and upright for all feedings (90 degrees preferred)  - Offer food and liquids at a slow rate  - No straws  - Encourage small bites of food and small sips of liquid  - Offer pills one at a time, crush or deliver with applesauce as needed  - Discontinue feeding and notify MD (or speech pathologist) if coughing or persistent throat clearing or wet/gurgly vocal quality is noted  Outcome: Progressing     Problem: HEMATOLOGIC - ADULT  Goal: Free from bleeding injury  Description: (Example usage: patient with low platelets)  INTERVENTIONS:  - Avoid intramuscular injections, enemas and rectal medication administration  - Ensure safe mobilization of patient  - Hold pressure on venipuncture sites to achieve adequate hemostasis  - Assess for signs and symptoms of internal bleeding  - Monitor lab trends  - Patient is to report abnormal signs of bleeding to staff  - Avoid use of toothpicks and dental floss  - Use electric shaver for shaving  - Use soft bristle tooth brush  - Limit straining and forceful nose blowing  Outcome: Progressing

## 2023-07-01 NOTE — PLAN OF CARE
Patient denies of pain. On 3L of O2. Scheduled, prn nebs. Patient refused CPAP overnight. On tele, no calls overnight. Melatonin given overnight. Lab draw this morning, port has no blood return. Wife updated on pt. Repositioned as needed. Safety precautions in place. Goal: Patient preferences are identified and integrated in the patient's plan of care  Description: Interventions:  - What would you like us to know as we care for you? [facility]  - Provide timely, complete, and accurate information to patient/family  - Incorporate patient and family knowledge, values, beliefs, and cultural backgrounds into the planning and delivery of care  - Encourage patient/family to participate in care and decision-making at the level they choose  - Honor patient and family perspectives and choices  Outcome: Progressing     Problem: PAIN - ADULT  Goal: Verbalizes/displays adequate comfort level or patient's stated pain goal  Description: INTERVENTIONS:  - Encourage pt to monitor pain and request assistance  - Assess pain using appropriate pain scale  - Administer analgesics based on type and severity of pain and evaluate response  - Implement non-pharmacological measures as appropriate and evaluate response  - Consider cultural and social influences on pain and pain management  - Manage/alleviate anxiety  - Utilize distraction and/or relaxation techniques  - Monitor for opioid side effects  - Notify MD/LIP if interventions unsuccessful or patient reports new pain  - Anticipate increased pain with activity and pre-medicate as appropriate  Outcome: Progressing     Problem: SAFETY ADULT - FALL  Goal: Free from fall injury  Description: INTERVENTIONS:  - Assess pt frequently for physical needs  - Identify cognitive and physical deficits and behaviors that affect risk of falls.   - [place] fall precautions as indicated by assessment.  - Educate pt/family on patient safety including physical limitations  - Instruct pt to call for assistance with activity based on assessment  - Modify environment to reduce risk of injury  - Provide assistive devices as appropriate  - Consider OT/PT consult to assist with strengthening/mobility  - Encourage toileting schedule  Outcome: Progressing     Problem: CARDIOVASCULAR - ADULT  Goal: Maintains optimal cardiac output and hemodynamic stability  Description: INTERVENTIONS:  - Monitor vital signs, rhythm, and trends  - Monitor for bleeding, hypotension and signs of decreased cardiac output  - Evaluate effectiveness of vasoactive medications to optimize hemodynamic stability  - Monitor arterial and/or venous puncture sites for bleeding and/or hematoma  - Assess quality of pulses, skin color and temperature  - Assess for signs of decreased coronary artery perfusion - ex.  Angina  - Evaluate fluid balance, assess for edema, trend weights  Outcome: Progressing     Problem: RESPIRATORY - ADULT  Goal: Achieves optimal ventilation and oxygenation  Description: INTERVENTIONS:  - Assess for changes in respiratory status  - Assess for changes in mentation and behavior  - Position to facilitate oxygenation and minimize respiratory effort  - Oxygen supplementation based on oxygen saturation or ABGs  - Provide Smoking Cessation handout, if applicable  - Encourage broncho-pulmonary hygiene including cough, deep breathe, Incentive Spirometry  - Assess the need for suctioning and perform as needed  - Assess and instruct to report SOB or any respiratory difficulty  - Respiratory Therapy support as indicated  - Manage/alleviate anxiety  - Monitor for signs/symptoms of CO2 retention  Outcome: Progressing     Problem: METABOLIC/FLUID AND ELECTROLYTES - ADULT  Goal: Electrolytes maintained within normal limits  Description: INTERVENTIONS:  - Monitor labs and rhythm and assess patient for signs and symptoms of electrolyte imbalances  - Administer electrolyte replacement as ordered  - Monitor response to electrolyte replacements, including rhythm and repeat lab results as appropriate  - Fluid restriction as ordered  - Instruct patient on fluid and nutrition restrictions as appropriate  Outcome: Progressing  Goal: Hemodynamic stability and optimal renal function maintained  Description: INTERVENTIONS:  - Monitor labs and assess for signs and symptoms of volume excess or deficit  - Monitor intake, output and patient weight  - Monitor urine specific gravity, serum osmolarity and serum sodium as indicated or ordered  - Monitor response to interventions for patient's volume status, including labs, urine output, blood pressure (other measures as available)  - Encourage oral intake as appropriate  - Instruct patient on fluid and nutrition restrictions as appropriate  Outcome: Progressing     Problem: SKIN/TISSUE INTEGRITY - ADULT  Goal: Incision(s), wounds(s) or drain site(s) healing without S/S of infection  Description: INTERVENTIONS:  - Assess and document risk factors for pressure ulcer development  - Assess and document skin integrity  - Assess and document dressing/incision, wound bed, drain sites and surrounding tissue  - Implement wound care per orders  - Initiate isolation precautions as appropriate  - Initiate Pressure Ulcer prevention bundle as indicated  Outcome: Progressing     Problem: Impaired Swallowing  Goal: Minimize aspiration risk  Description: Interventions:  - Patient should be alert and upright for all feedings (90 degrees preferred)  - Offer food and liquids at a slow rate  - No straws  - Encourage small bites of food and small sips of liquid  - Offer pills one at a time, crush or deliver with applesauce as needed  - Discontinue feeding and notify MD (or speech pathologist) if coughing or persistent throat clearing or wet/gurgly vocal quality is noted  Outcome: Progressing     Problem: HEMATOLOGIC - ADULT  Goal: Free from bleeding injury  Description: (Example usage: patient with low platelets)  INTERVENTIONS:  - Avoid intramuscular injections, enemas and rectal medication administration  - Ensure safe mobilization of patient  - Hold pressure on venipuncture sites to achieve adequate hemostasis  - Assess for signs and symptoms of internal bleeding  - Monitor lab trends  - Patient is to report abnormal signs of bleeding to staff  - Avoid use of toothpicks and dental floss  - Use electric shaver for shaving  - Use soft bristle tooth brush  - Limit straining and forceful nose blowing  Outcome: Progressing

## 2023-07-02 LAB
ALBUMIN SERPL-MCNC: 2.7 G/DL (ref 3.4–5)
ANION GAP SERPL CALC-SCNC: 15 MMOL/L (ref 0–18)
BUN BLD-MCNC: 66 MG/DL (ref 7–18)
BUN/CREAT SERPL: 10.7 (ref 10–20)
CALCIUM BLD-MCNC: 7.7 MG/DL (ref 8.5–10.1)
CHLORIDE SERPL-SCNC: 98 MMOL/L (ref 98–112)
CO2 SERPL-SCNC: 21 MMOL/L (ref 21–32)
CORTIS SERPL-MCNC: 22.9 UG/DL
CREAT BLD-MCNC: 6.18 MG/DL
GFR SERPLBLD BASED ON 1.73 SQ M-ARVRAT: 9 ML/MIN/1.73M2 (ref 60–?)
GLUCOSE BLD-MCNC: 130 MG/DL (ref 70–99)
OSMOLALITY SERPL CALC.SUM OF ELEC: 299 MOSM/KG (ref 275–295)
PHOSPHATE SERPL-MCNC: 8.4 MG/DL (ref 2.5–4.9)
POTASSIUM SERPL-SCNC: 4.9 MMOL/L (ref 3.5–5.1)
SODIUM SERPL-SCNC: 134 MMOL/L (ref 136–145)

## 2023-07-02 PROCEDURE — 99232 SBSQ HOSP IP/OBS MODERATE 35: CPT | Performed by: INTERNAL MEDICINE

## 2023-07-02 PROCEDURE — 99232 SBSQ HOSP IP/OBS MODERATE 35: CPT | Performed by: HOSPITALIST

## 2023-07-02 RX ORDER — SEVELAMER CARBONATE 800 MG/1
800 TABLET, FILM COATED ORAL
Status: DISCONTINUED | OUTPATIENT
Start: 2023-07-02 | End: 2023-07-10

## 2023-07-02 RX ORDER — ALPRAZOLAM 0.5 MG/1
0.5 TABLET ORAL NIGHTLY
Status: ON HOLD | COMMUNITY
End: 2023-07-10

## 2023-07-02 NOTE — PLAN OF CARE
Pt lethargic throughout shift. A/O x3-4, frequent reorientation. 3.5L of O2. Schd nebs continued. Xanax PRN for anxiety. IV abx continued. No calls from tele. Tolerating diet. IV steroids dc'd. HD ordered for tomorrow. Fall precautions in place. Call light within reach. Frequent rounding. Wife update over the phone on care of plan. Plan to dc to [facility] vs home when medically stable. Problem: Patient Centered Care  Goal: Patient preferences are identified and integrated in the patient's plan of care  Description: Interventions:  - What would you like us to know as we care for you  - Provide timely, complete, and accurate information to patient/family  - Incorporate patient and family knowledge, values, beliefs, and cultural backgrounds into the planning and delivery of care  - Encourage patient/family to participate in care and decision-making at the level they choose  - Honor patient and family perspectives and choices  Outcome: Progressing     Problem: PAIN - ADULT  Goal: Verbalizes/displays adequate comfort level or patient's stated pain goal  Description: INTERVENTIONS:  - Encourage pt to monitor pain and request assistance  - Assess pain using appropriate pain scale  - Administer analgesics based on type and severity of pain and evaluate response  - Implement non-pharmacological measures as appropriate and evaluate response  - Consider cultural and social influences on pain and pain management  - Manage/alleviate anxiety  - Utilize distraction and/or relaxation techniques  - Monitor for opioid side effects  - Notify MD/LIP if interventions unsuccessful or patient reports new pain  - Anticipate increased pain with activity and pre-medicate as appropriate  Outcome: Progressing     Problem: SAFETY ADULT - FALL  Goal: Free from fall injury  Description: INTERVENTIONS:  - Assess pt frequently for physical needs  - Identify cognitive and physical deficits and behaviors that affect risk of falls.   - [place] fall precautions as indicated by assessment.  - Educate pt/family on patient safety including physical limitations  - Instruct pt to call for assistance with activity based on assessment  - Modify environment to reduce risk of injury  - Provide assistive devices as appropriate  - Consider OT/PT consult to assist with strengthening/mobility  - Encourage toileting schedule  Outcome: Progressing     Problem: CARDIOVASCULAR - ADULT  Goal: Maintains optimal cardiac output and hemodynamic stability  Description: INTERVENTIONS:  - Monitor vital signs, rhythm, and trends  - Monitor for bleeding, hypotension and signs of decreased cardiac output  - Evaluate effectiveness of vasoactive medications to optimize hemodynamic stability  - Monitor arterial and/or venous puncture sites for bleeding and/or hematoma  - Assess quality of pulses, skin color and temperature  - Assess for signs of decreased coronary artery perfusion - ex.  Angina  - Evaluate fluid balance, assess for edema, trend weights  Outcome: Progressing     Problem: RESPIRATORY - ADULT  Goal: Achieves optimal ventilation and oxygenation  Description: INTERVENTIONS:  - Assess for changes in respiratory status  - Assess for changes in mentation and behavior  - Position to facilitate oxygenation and minimize respiratory effort  - Oxygen supplementation based on oxygen saturation or ABGs  - Provide Smoking Cessation handout, if applicable  - Encourage broncho-pulmonary hygiene including cough, deep breathe, Incentive Spirometry  - Assess the need for suctioning and perform as needed  - Assess and instruct to report SOB or any respiratory difficulty  - Respiratory Therapy support as indicated  - Manage/alleviate anxiety  - Monitor for signs/symptoms of CO2 retention  Outcome: Progressing     Problem: METABOLIC/FLUID AND ELECTROLYTES - ADULT  Goal: Electrolytes maintained within normal limits  Description: INTERVENTIONS:  - Monitor labs and rhythm and assess patient for signs and symptoms of electrolyte imbalances  - Administer electrolyte replacement as ordered  - Monitor response to electrolyte replacements, including rhythm and repeat lab results as appropriate  - Fluid restriction as ordered  - Instruct patient on fluid and nutrition restrictions as appropriate  Outcome: Progressing  Goal: Hemodynamic stability and optimal renal function maintained  Description: INTERVENTIONS:  - Monitor labs and assess for signs and symptoms of volume excess or deficit  - Monitor intake, output and patient weight  - Monitor urine specific gravity, serum osmolarity and serum sodium as indicated or ordered  - Monitor response to interventions for patient's volume status, including labs, urine output, blood pressure (other measures as available)  - Encourage oral intake as appropriate  - Instruct patient on fluid and nutrition restrictions as appropriate  Outcome: Progressing     Problem: SKIN/TISSUE INTEGRITY - ADULT  Goal: Incision(s), wounds(s) or drain site(s) healing without S/S of infection  Description: INTERVENTIONS:  - Assess and document risk factors for pressure ulcer development  - Assess and document skin integrity  - Assess and document dressing/incision, wound bed, drain sites and surrounding tissue  - Implement wound care per orders  - Initiate isolation precautions as appropriate  - Initiate Pressure Ulcer prevention bundle as indicated  Outcome: Progressing     Problem: Impaired Swallowing  Goal: Minimize aspiration risk  Description: Interventions:  - Patient should be alert and upright for all feedings (90 degrees preferred)  - Offer food and liquids at a slow rate  - No straws  - Encourage small bites of food and small sips of liquid  - Offer pills one at a time, crush or deliver with applesauce as needed  - Discontinue feeding and notify MD (or speech pathologist) if coughing or persistent throat clearing or wet/gurgly vocal quality is noted  Outcome: Progressing     Problem: HEMATOLOGIC - ADULT  Goal: Free from bleeding injury  Description: (Example usage: patient with low platelets)  INTERVENTIONS:  - Avoid intramuscular injections, enemas and rectal medication administration  - Ensure safe mobilization of patient  - Hold pressure on venipuncture sites to achieve adequate hemostasis  - Assess for signs and symptoms of internal bleeding  - Monitor lab trends  - Patient is to report abnormal signs of bleeding to staff  - Avoid use of toothpicks and dental floss  - Use electric shaver for shaving  - Use soft bristle tooth brush  - Limit straining and forceful nose blowing  Outcome: Progressing     Problem: DISCHARGE PLANNING  Goal: Discharge to home or other facility with appropriate resources  Description: INTERVENTIONS:  - Identify barriers to discharge w/pt and caregiver  - Include patient/family/discharge partner in discharge planning  - Arrange for needed discharge resources and transportation as appropriate  - Identify discharge learning needs (meds, wound care, etc)  - Arrange for interpreters to assist at discharge as needed  - Consider post-discharge preferences of patient/family/discharge partner  - Complete POLST form as appropriate  - Assess patient's ability to be responsible for managing their own health  - Refer to Case Management Department for coordinating discharge planning if the patient needs post-hospital services based on physician/LIP order or complex needs related to functional status, cognitive ability or social support system  Outcome: Progressing

## 2023-07-02 NOTE — PLAN OF CARE
Patient is alert and oriented x4. 3L O2. Scheduled nebs. Remote tele in place. Vital signs stable. R port remains accessed, no blood return. IV abx and steroids continued. Multiple loose BM overnight. Incontinence care provided. Prn imodium given. Anuric. Prn xanax given for anxiety. Call light and personal belongings within reach. Safety precautions in place. Problem: Patient Centered Care  Goal: Patient preferences are identified and integrated in the patient's plan of care  Description: Interventions:  - Provide timely, complete, and accurate information to patient/family  - Incorporate patient and family knowledge, values, beliefs, and cultural backgrounds into the planning and delivery of care  - Encourage patient/family to participate in care and decision-making at the level they choose  - Honor patient and family perspectives and choices  Outcome: Progressing     Problem: PAIN - ADULT  Goal: Verbalizes/displays adequate comfort level or patient's stated pain goal  Description: INTERVENTIONS:  - Encourage pt to monitor pain and request assistance  - Assess pain using appropriate pain scale  - Administer analgesics based on type and severity of pain and evaluate response  - Implement non-pharmacological measures as appropriate and evaluate response  - Consider cultural and social influences on pain and pain management  - Manage/alleviate anxiety  - Utilize distraction and/or relaxation techniques  - Monitor for opioid side effects  - Notify MD/LIP if interventions unsuccessful or patient reports new pain  - Anticipate increased pain with activity and pre-medicate as appropriate  Outcome: Progressing     Problem: SAFETY ADULT - FALL  Goal: Free from fall injury  Description: INTERVENTIONS:  - Assess pt frequently for physical needs  - Identify cognitive and physical deficits and behaviors that affect risk of falls.   - [place] fall precautions as indicated by assessment.  - Educate pt/family on patient safety including physical limitations  - Instruct pt to call for assistance with activity based on assessment  - Modify environment to reduce risk of injury  - Provide assistive devices as appropriate  - Consider OT/PT consult to assist with strengthening/mobility  - Encourage toileting schedule  Outcome: Progressing     Problem: CARDIOVASCULAR - ADULT  Goal: Maintains optimal cardiac output and hemodynamic stability  Description: INTERVENTIONS:  - Monitor vital signs, rhythm, and trends  - Monitor for bleeding, hypotension and signs of decreased cardiac output  - Evaluate effectiveness of vasoactive medications to optimize hemodynamic stability  - Monitor arterial and/or venous puncture sites for bleeding and/or hematoma  - Assess quality of pulses, skin color and temperature  - Assess for signs of decreased coronary artery perfusion - ex.  Angina  - Evaluate fluid balance, assess for edema, trend weights  Outcome: Progressing     Problem: RESPIRATORY - ADULT  Goal: Achieves optimal ventilation and oxygenation  Description: INTERVENTIONS:  - Assess for changes in respiratory status  - Assess for changes in mentation and behavior  - Position to facilitate oxygenation and minimize respiratory effort  - Oxygen supplementation based on oxygen saturation or ABGs  - Provide Smoking Cessation handout, if applicable  - Encourage broncho-pulmonary hygiene including cough, deep breathe, Incentive Spirometry  - Assess the need for suctioning and perform as needed  - Assess and instruct to report SOB or any respiratory difficulty  - Respiratory Therapy support as indicated  - Manage/alleviate anxiety  - Monitor for signs/symptoms of CO2 retention  Outcome: Progressing     Problem: METABOLIC/FLUID AND ELECTROLYTES - ADULT  Goal: Electrolytes maintained within normal limits  Description: INTERVENTIONS:  - Monitor labs and rhythm and assess patient for signs and symptoms of electrolyte imbalances  - Administer electrolyte replacement as ordered  - Monitor response to electrolyte replacements, including rhythm and repeat lab results as appropriate  - Fluid restriction as ordered  - Instruct patient on fluid and nutrition restrictions as appropriate  Outcome: Progressing  Goal: Hemodynamic stability and optimal renal function maintained  Description: INTERVENTIONS:  - Monitor labs and assess for signs and symptoms of volume excess or deficit  - Monitor intake, output and patient weight  - Monitor urine specific gravity, serum osmolarity and serum sodium as indicated or ordered  - Monitor response to interventions for patient's volume status, including labs, urine output, blood pressure (other measures as available)  - Encourage oral intake as appropriate  - Instruct patient on fluid and nutrition restrictions as appropriate  Outcome: Progressing     Problem: SKIN/TISSUE INTEGRITY - ADULT  Goal: Incision(s), wounds(s) or drain site(s) healing without S/S of infection  Description: INTERVENTIONS:  - Assess and document risk factors for pressure ulcer development  - Assess and document skin integrity  - Assess and document dressing/incision, wound bed, drain sites and surrounding tissue  - Implement wound care per orders  - Initiate isolation precautions as appropriate  - Initiate Pressure Ulcer prevention bundle as indicated  Outcome: Progressing     Problem: HEMATOLOGIC - ADULT  Goal: Free from bleeding injury  Description: (Example usage: patient with low platelets)  INTERVENTIONS:  - Avoid intramuscular injections, enemas and rectal medication administration  - Ensure safe mobilization of patient  - Hold pressure on venipuncture sites to achieve adequate hemostasis  - Assess for signs and symptoms of internal bleeding  - Monitor lab trends  - Patient is to report abnormal signs of bleeding to staff  - Avoid use of toothpicks and dental floss  - Use electric shaver for shaving  - Use soft bristle tooth brush  - Limit straining and forceful nose blowing  Outcome: Progressing

## 2023-07-03 ENCOUNTER — APPOINTMENT (OUTPATIENT)
Dept: GENERAL RADIOLOGY | Facility: HOSPITAL | Age: 78
End: 2023-07-03
Attending: INTERNAL MEDICINE
Payer: MEDICARE

## 2023-07-03 PROBLEM — J96.21 ACUTE ON CHRONIC RESPIRATORY FAILURE WITH HYPOXIA (HCC): Status: ACTIVE | Noted: 2023-01-01

## 2023-07-03 PROBLEM — J96.21 ACUTE ON CHRONIC RESPIRATORY FAILURE WITH HYPOXIA (HCC): Status: ACTIVE | Noted: 2023-07-03

## 2023-07-03 LAB
ANION GAP SERPL CALC-SCNC: 19 MMOL/L (ref 0–18)
BASOPHILS # BLD AUTO: 0 X10(3) UL (ref 0–0.2)
BASOPHILS NFR BLD AUTO: 0 %
BUN BLD-MCNC: 82 MG/DL (ref 7–18)
BUN/CREAT SERPL: 11.8 (ref 10–20)
CALCIUM BLD-MCNC: 7.2 MG/DL (ref 8.5–10.1)
CHLORIDE SERPL-SCNC: 95 MMOL/L (ref 98–112)
CO2 SERPL-SCNC: 17 MMOL/L (ref 21–32)
CREAT BLD-MCNC: 6.92 MG/DL
DEPRECATED RDW RBC AUTO: 57.4 FL (ref 35.1–46.3)
EOSINOPHIL # BLD AUTO: 0 X10(3) UL (ref 0–0.7)
EOSINOPHIL NFR BLD AUTO: 0 %
ERYTHROCYTE [DISTWIDTH] IN BLOOD BY AUTOMATED COUNT: 16.7 % (ref 11–15)
GFR SERPLBLD BASED ON 1.73 SQ M-ARVRAT: 7 ML/MIN/1.73M2 (ref 60–?)
GLUCOSE BLD-MCNC: 140 MG/DL (ref 70–99)
HCT VFR BLD AUTO: 35.7 %
HGB BLD-MCNC: 11.2 G/DL
IMM GRANULOCYTES # BLD AUTO: 0.06 X10(3) UL (ref 0–1)
IMM GRANULOCYTES NFR BLD: 0.6 %
LYMPHOCYTES # BLD AUTO: 0.11 X10(3) UL (ref 1–4)
LYMPHOCYTES NFR BLD AUTO: 1.1 %
MCH RBC QN AUTO: 30.9 PG (ref 26–34)
MCHC RBC AUTO-ENTMCNC: 31.4 G/DL (ref 31–37)
MCV RBC AUTO: 98.3 FL
MONOCYTES # BLD AUTO: 0.55 X10(3) UL (ref 0.1–1)
MONOCYTES NFR BLD AUTO: 5.7 %
NEUTROPHILS # BLD AUTO: 8.85 X10 (3) UL (ref 1.5–7.7)
NEUTROPHILS # BLD AUTO: 8.85 X10(3) UL (ref 1.5–7.7)
NEUTROPHILS NFR BLD AUTO: 92.6 %
OSMOLALITY SERPL CALC.SUM OF ELEC: 299 MOSM/KG (ref 275–295)
PLATELET # BLD AUTO: 145 10(3)UL (ref 150–450)
POTASSIUM SERPL-SCNC: 5.2 MMOL/L (ref 3.5–5.1)
RBC # BLD AUTO: 3.63 X10(6)UL
SODIUM SERPL-SCNC: 131 MMOL/L (ref 136–145)
WBC # BLD AUTO: 9.6 X10(3) UL (ref 4–11)

## 2023-07-03 PROCEDURE — 99233 SBSQ HOSP IP/OBS HIGH 50: CPT | Performed by: HOSPITALIST

## 2023-07-03 PROCEDURE — 99233 SBSQ HOSP IP/OBS HIGH 50: CPT | Performed by: INTERNAL MEDICINE

## 2023-07-03 PROCEDURE — 71045 X-RAY EXAM CHEST 1 VIEW: CPT | Performed by: INTERNAL MEDICINE

## 2023-07-03 RX ORDER — HEPARIN SODIUM 1000 [USP'U]/ML
INJECTION, SOLUTION INTRAVENOUS; SUBCUTANEOUS
Status: COMPLETED
Start: 2023-07-03 | End: 2023-07-03

## 2023-07-03 NOTE — PLAN OF CARE
Patient is lethargic throughout shift. Can be confused at times, reoriented as needed. 3L O2. Scheduled nebs. Remote tele in place. Vital signs stable. R port remains accessed, no blood return. IV abx continued. Anuric. Prn xanax given for anxiety. Repositioned as needed. Call light and personal belongings within reach. Safety precautions in place. Wife updated on care over the phone. Plan for repeat chest xray this AM and dialysis today. Problem: Patient Centered Care  Goal: Patient preferences are identified and integrated in the patient's plan of care  Description: Interventions:  - Provide timely, complete, and accurate information to patient/family  - Incorporate patient and family knowledge, values, beliefs, and cultural backgrounds into the planning and delivery of care  - Encourage patient/family to participate in care and decision-making at the level they choose  - Honor patient and family perspectives and choices  Outcome: Progressing     Problem: PAIN - ADULT  Goal: Verbalizes/displays adequate comfort level or patient's stated pain goal  Description: INTERVENTIONS:  - Encourage pt to monitor pain and request assistance  - Assess pain using appropriate pain scale  - Administer analgesics based on type and severity of pain and evaluate response  - Implement non-pharmacological measures as appropriate and evaluate response  - Consider cultural and social influences on pain and pain management  - Manage/alleviate anxiety  - Utilize distraction and/or relaxation techniques  - Monitor for opioid side effects  - Notify MD/LIP if interventions unsuccessful or patient reports new pain  - Anticipate increased pain with activity and pre-medicate as appropriate  Outcome: Progressing     Problem: SAFETY ADULT - FALL  Goal: Free from fall injury  Description: INTERVENTIONS:  - Assess pt frequently for physical needs  - Identify cognitive and physical deficits and behaviors that affect risk of falls.   - [place] fall precautions as indicated by assessment.  - Educate pt/family on patient safety including physical limitations  - Instruct pt to call for assistance with activity based on assessment  - Modify environment to reduce risk of injury  - Provide assistive devices as appropriate  - Consider OT/PT consult to assist with strengthening/mobility  - Encourage toileting schedule  Outcome: Progressing     Problem: CARDIOVASCULAR - ADULT  Goal: Maintains optimal cardiac output and hemodynamic stability  Description: INTERVENTIONS:  - Monitor vital signs, rhythm, and trends  - Monitor for bleeding, hypotension and signs of decreased cardiac output  - Evaluate effectiveness of vasoactive medications to optimize hemodynamic stability  - Monitor arterial and/or venous puncture sites for bleeding and/or hematoma  - Assess quality of pulses, skin color and temperature  - Assess for signs of decreased coronary artery perfusion - ex.  Angina  - Evaluate fluid balance, assess for edema, trend weights  Outcome: Progressing     Problem: RESPIRATORY - ADULT  Goal: Achieves optimal ventilation and oxygenation  Description: INTERVENTIONS:  - Assess for changes in respiratory status  - Assess for changes in mentation and behavior  - Position to facilitate oxygenation and minimize respiratory effort  - Oxygen supplementation based on oxygen saturation or ABGs  - Provide Smoking Cessation handout, if applicable  - Encourage broncho-pulmonary hygiene including cough, deep breathe, Incentive Spirometry  - Assess the need for suctioning and perform as needed  - Assess and instruct to report SOB or any respiratory difficulty  - Respiratory Therapy support as indicated  - Manage/alleviate anxiety  - Monitor for signs/symptoms of CO2 retention  Outcome: Progressing     Problem: METABOLIC/FLUID AND ELECTROLYTES - ADULT  Goal: Electrolytes maintained within normal limits  Description: INTERVENTIONS:  - Monitor labs and rhythm and assess patient for signs and symptoms of electrolyte imbalances  - Administer electrolyte replacement as ordered  - Monitor response to electrolyte replacements, including rhythm and repeat lab results as appropriate  - Fluid restriction as ordered  - Instruct patient on fluid and nutrition restrictions as appropriate  Outcome: Progressing  Goal: Hemodynamic stability and optimal renal function maintained  Description: INTERVENTIONS:  - Monitor labs and assess for signs and symptoms of volume excess or deficit  - Monitor intake, output and patient weight  - Monitor urine specific gravity, serum osmolarity and serum sodium as indicated or ordered  - Monitor response to interventions for patient's volume status, including labs, urine output, blood pressure (other measures as available)  - Encourage oral intake as appropriate  - Instruct patient on fluid and nutrition restrictions as appropriate  Outcome: Progressing     Problem: SKIN/TISSUE INTEGRITY - ADULT  Goal: Incision(s), wounds(s) or drain site(s) healing without S/S of infection  Description: INTERVENTIONS:  - Assess and document risk factors for pressure ulcer development  - Assess and document skin integrity  - Assess and document dressing/incision, wound bed, drain sites and surrounding tissue  - Implement wound care per orders  - Initiate isolation precautions as appropriate  - Initiate Pressure Ulcer prevention bundle as indicated  Outcome: Progressing     Problem: Impaired Swallowing  Goal: Minimize aspiration risk  Description: Interventions:  - Patient should be alert and upright for all feedings (90 degrees preferred)  - Offer food and liquids at a slow rate  - No straws  - Encourage small bites of food and small sips of liquid  - Offer pills one at a time, crush or deliver with applesauce as needed  - Discontinue feeding and notify MD (or speech pathologist) if coughing or persistent throat clearing or wet/gurgly vocal quality is noted  Outcome: Progressing Problem: HEMATOLOGIC - ADULT  Goal: Free from bleeding injury  Description: (Example usage: patient with low platelets)  INTERVENTIONS:  - Avoid intramuscular injections, enemas and rectal medication administration  - Ensure safe mobilization of patient  - Hold pressure on venipuncture sites to achieve adequate hemostasis  - Assess for signs and symptoms of internal bleeding  - Monitor lab trends  - Patient is to report abnormal signs of bleeding to staff  - Avoid use of toothpicks and dental floss  - Use electric shaver for shaving  - Use soft bristle tooth brush  - Limit straining and forceful nose blowing  Outcome: Progressing     Problem: DISCHARGE PLANNING  Goal: Discharge to home or other facility with appropriate resources  Description: INTERVENTIONS:  - Identify barriers to discharge w/pt and caregiver  - Include patient/family/discharge partner in discharge planning  - Arrange for needed discharge resources and transportation as appropriate  - Identify discharge learning needs (meds, wound care, etc)  - Arrange for interpreters to assist at discharge as needed  - Consider post-discharge preferences of patient/family/discharge partner  - Complete POLST form as appropriate  - Assess patient's ability to be responsible for managing their own health  - Refer to Case Management Department for coordinating discharge planning if the patient needs post-hospital services based on physician/LIP order or complex needs related to functional status, cognitive ability or social support system  Outcome: Progressing

## 2023-07-03 NOTE — HOME CARE LIAISON
Received referral via [facility] for Home Health services. Spoke w/ patient and his wife and provided with list of [place] [address] providers from [address] [place] [address], choice is [phone]. Agency reserved in [address] [place] [address] and contact information placed on AVS.Financial interest disclosure provided.  Notified RASHARD Meza

## 2023-07-03 NOTE — DISCHARGE INSTRUCTIONS
Sometimes managing your health at home requires assistance. The [facility] team has recognized your preference to use [facility]. They can be reached by phone at [phone]. The fax number for your reference is [phone]. A representative from the home health agency will contact you or your family to schedule your first visit.

## 2023-07-04 PROCEDURE — 99232 SBSQ HOSP IP/OBS MODERATE 35: CPT | Performed by: INTERNAL MEDICINE

## 2023-07-04 PROCEDURE — 99233 SBSQ HOSP IP/OBS HIGH 50: CPT | Performed by: HOSPITALIST

## 2023-07-04 NOTE — PLAN OF CARE
Patient denies of pain. On 3L of O2. Scheduled, prn nebs. Patient refused CPAP overnight. On tele, no calls overnight. Melatonin given overnight. Xanax given as well, pt. Still somewhat restless over night. Lab draw this morning, port has no blood return. Wife updated on pt. Repositioned as needed. Safety precautions in place. Problem: Patient Centered Care  Goal: Patient preferences are identified and integrated in the patient's plan of care  Description: Interventions:  - What would you like us to know as we care for you?   - Provide timely, complete, and accurate information to patient/family  - Incorporate patient and family knowledge, values, beliefs, and cultural backgrounds into the planning and delivery of care  - Encourage patient/family to participate in care and decision-making at the level they choose  - Honor patient and family perspectives and choices  Outcome: Progressing     Problem: PAIN - ADULT  Goal: Verbalizes/displays adequate comfort level or patient's stated pain goal  Description: INTERVENTIONS:  - Encourage pt to monitor pain and request assistance  - Assess pain using appropriate pain scale  - Administer analgesics based on type and severity of pain and evaluate response  - Implement non-pharmacological measures as appropriate and evaluate response  - Consider cultural and social influences on pain and pain management  - Manage/alleviate anxiety  - Utilize distraction and/or relaxation techniques  - Monitor for opioid side effects  - Notify MD/LIP if interventions unsuccessful or patient reports new pain  - Anticipate increased pain with activity and pre-medicate as appropriate  Outcome: Progressing     Problem: SAFETY ADULT - FALL  Goal: Free from fall injury  Description: INTERVENTIONS:  - Assess pt frequently for physical needs  - Identify cognitive and physical deficits and behaviors that affect risk of falls.   - [place] fall precautions as indicated by assessment.  - Educate pt/family on patient safety including physical limitations  - Instruct pt to call for assistance with activity based on assessment  - Modify environment to reduce risk of injury  - Provide assistive devices as appropriate  - Consider OT/PT consult to assist with strengthening/mobility  - Encourage toileting schedule  Outcome: Progressing     Problem: CARDIOVASCULAR - ADULT  Goal: Maintains optimal cardiac output and hemodynamic stability  Description: INTERVENTIONS:  - Monitor vital signs, rhythm, and trends  - Monitor for bleeding, hypotension and signs of decreased cardiac output  - Evaluate effectiveness of vasoactive medications to optimize hemodynamic stability  - Monitor arterial and/or venous puncture sites for bleeding and/or hematoma  - Assess quality of pulses, skin color and temperature  - Assess for signs of decreased coronary artery perfusion - ex.  Angina  - Evaluate fluid balance, assess for edema, trend weights  Outcome: Progressing     Problem: RESPIRATORY - ADULT  Goal: Achieves optimal ventilation and oxygenation  Description: INTERVENTIONS:  - Assess for changes in respiratory status  - Assess for changes in mentation and behavior  - Position to facilitate oxygenation and minimize respiratory effort  - Oxygen supplementation based on oxygen saturation or ABGs  - Provide Smoking Cessation handout, if applicable  - Encourage broncho-pulmonary hygiene including cough, deep breathe, Incentive Spirometry  - Assess the need for suctioning and perform as needed  - Assess and instruct to report SOB or any respiratory difficulty  - Respiratory Therapy support as indicated  - Manage/alleviate anxiety  - Monitor for signs/symptoms of CO2 retention  Outcome: Progressing     Problem: METABOLIC/FLUID AND ELECTROLYTES - ADULT  Goal: Electrolytes maintained within normal limits  Description: INTERVENTIONS:  - Monitor labs and rhythm and assess patient for signs and symptoms of electrolyte imbalances  - Administer electrolyte replacement as ordered  - Monitor response to electrolyte replacements, including rhythm and repeat lab results as appropriate  - Fluid restriction as ordered  - Instruct patient on fluid and nutrition restrictions as appropriate  Outcome: Progressing  Goal: Hemodynamic stability and optimal renal function maintained  Description: INTERVENTIONS:  - Monitor labs and assess for signs and symptoms of volume excess or deficit  - Monitor intake, output and patient weight  - Monitor urine specific gravity, serum osmolarity and serum sodium as indicated or ordered  - Monitor response to interventions for patient's volume status, including labs, urine output, blood pressure (other measures as available)  - Encourage oral intake as appropriate  - Instruct patient on fluid and nutrition restrictions as appropriate  Outcome: Progressing     Problem: SKIN/TISSUE INTEGRITY - ADULT  Goal: Incision(s), wounds(s) or drain site(s) healing without S/S of infection  Description: INTERVENTIONS:  - Assess and document risk factors for pressure ulcer development  - Assess and document skin integrity  - Assess and document dressing/incision, wound bed, drain sites and surrounding tissue  - Implement wound care per orders  - Initiate isolation precautions as appropriate  - Initiate Pressure Ulcer prevention bundle as indicated  Outcome: Progressing     Problem: Impaired Swallowing  Goal: Minimize aspiration risk  Description: Interventions:  - Patient should be alert and upright for all feedings (90 degrees preferred)  - Offer food and liquids at a slow rate  - No straws  - Encourage small bites of food and small sips of liquid  - Offer pills one at a time, crush or deliver with applesauce as needed  - Discontinue feeding and notify MD (or speech pathologist) if coughing or persistent throat clearing or wet/gurgly vocal quality is noted  Outcome: Progressing     Problem: HEMATOLOGIC - ADULT  Goal: Free from bleeding injury  Description: (Example usage: patient with low platelets)  INTERVENTIONS:  - Avoid intramuscular injections, enemas and rectal medication administration  - Ensure safe mobilization of patient  - Hold pressure on venipuncture sites to achieve adequate hemostasis  - Assess for signs and symptoms of internal bleeding  - Monitor lab trends  - Patient is to report abnormal signs of bleeding to staff  - Avoid use of toothpicks and dental floss  - Use electric shaver for shaving  - Use soft bristle tooth brush  - Limit straining and forceful nose blowing  Outcome: Progressing     Problem: DISCHARGE PLANNING  Goal: Discharge to home or other facility with appropriate resources  Description: INTERVENTIONS:  - Identify barriers to discharge w/pt and caregiver  - Include patient/family/discharge partner in discharge planning  - Arrange for needed discharge resources and transportation as appropriate  - Identify discharge learning needs (meds, wound care, etc)  - Arrange for interpreters to assist at discharge as needed  - Consider post-discharge preferences of patient/family/discharge partner  - Complete POLST form as appropriate  - Assess patient's ability to be responsible for managing their own health  - Refer to Case Management Department for coordinating discharge planning if the patient needs post-hospital services based on physician/LIP order or complex needs related to functional status, cognitive ability or social support system  Outcome: Progressing

## 2023-07-04 NOTE — PROGRESS NOTES
Patient alert and oriented X3, forgetful at times. Hard of hearing. Hearing aids at bedside. Continues with Zosyn. Still having soft stools (X2 this shift). Up to the chair with walker and 2 person assist. Patient wife requesting a plan of care meeting with all consulting physicians as she feels she is not being updated from all sources about his plan. Informed patient that Dr Deni Domínguez did state to me that he was going to call her today with an update. Polly stated that she has talked with Dr Deni Domínguez before and that if he did try calling there may be a chance he won't get through due to her not having good reception in the patient's room. Patient back to bed with bed alarm, call light within reach, rounds made.

## 2023-07-05 PROBLEM — Z51.5 PALLIATIVE CARE BY SPECIALIST: Status: ACTIVE | Noted: 2023-01-01

## 2023-07-05 PROBLEM — Z71.89 GOALS OF CARE, COUNSELING/DISCUSSION: Status: ACTIVE | Noted: 2023-01-01

## 2023-07-05 PROBLEM — Z71.89 GOALS OF CARE, COUNSELING/DISCUSSION: Status: ACTIVE | Noted: 2023-07-05

## 2023-07-05 PROBLEM — Z71.89 ADVANCE CARE PLANNING: Status: ACTIVE | Noted: 2023-07-05

## 2023-07-05 PROBLEM — Z51.5 PALLIATIVE CARE BY SPECIALIST: Status: ACTIVE | Noted: 2023-07-05

## 2023-07-05 PROBLEM — Z71.89 ADVANCE CARE PLANNING: Status: ACTIVE | Noted: 2023-01-01

## 2023-07-05 LAB
ANION GAP SERPL CALC-SCNC: 10 MMOL/L (ref 0–18)
BASOPHILS # BLD AUTO: 0 X10(3) UL (ref 0–0.2)
BASOPHILS NFR BLD AUTO: 0 %
BUN BLD-MCNC: 48 MG/DL (ref 7–18)
BUN/CREAT SERPL: 13.3 (ref 10–20)
CALCIUM BLD-MCNC: 7.6 MG/DL (ref 8.5–10.1)
CHLORIDE SERPL-SCNC: 103 MMOL/L (ref 98–112)
CO2 SERPL-SCNC: 25 MMOL/L (ref 21–32)
CREAT BLD-MCNC: 3.62 MG/DL
DEPRECATED RDW RBC AUTO: 57.4 FL (ref 35.1–46.3)
EOSINOPHIL # BLD AUTO: 0.06 X10(3) UL (ref 0–0.7)
EOSINOPHIL NFR BLD AUTO: 0.8 %
ERYTHROCYTE [DISTWIDTH] IN BLOOD BY AUTOMATED COUNT: 18.3 % (ref 11–15)
GFR SERPLBLD BASED ON 1.73 SQ M-ARVRAT: 16 ML/MIN/1.73M2 (ref 60–?)
GLUCOSE BLD-MCNC: 109 MG/DL (ref 70–99)
HBV CORE AB SERPL QL IA: NONREACTIVE
HBV SURFACE AB SER QL: NONREACTIVE
HBV SURFACE AB SERPL IA-ACNC: <3.1 MIU/ML
HBV SURFACE AG SER-ACNC: <0.1 [IU]/L
HBV SURFACE AG SERPL QL IA: NONREACTIVE
HCT VFR BLD AUTO: 34.8 %
HGB BLD-MCNC: 11 G/DL
IMM GRANULOCYTES # BLD AUTO: 0.03 X10(3) UL (ref 0–1)
IMM GRANULOCYTES NFR BLD: 0.4 %
LYMPHOCYTES # BLD AUTO: 0.21 X10(3) UL (ref 1–4)
LYMPHOCYTES NFR BLD AUTO: 2.9 %
MCH RBC QN AUTO: 31 PG (ref 26–34)
MCHC RBC AUTO-ENTMCNC: 31.6 G/DL (ref 31–37)
MCV RBC AUTO: 98 FL
MONOCYTES # BLD AUTO: 0.24 X10(3) UL (ref 0.1–1)
MONOCYTES NFR BLD AUTO: 3.3 %
NEUTROPHILS # BLD AUTO: 6.65 X10 (3) UL (ref 1.5–7.7)
NEUTROPHILS # BLD AUTO: 6.65 X10(3) UL (ref 1.5–7.7)
NEUTROPHILS NFR BLD AUTO: 92.6 %
OSMOLALITY SERPL CALC.SUM OF ELEC: 299 MOSM/KG (ref 275–295)
PHOSPHATE SERPL-MCNC: 3.8 MG/DL (ref 2.5–4.9)
PLATELET # BLD AUTO: 108 10(3)UL (ref 150–450)
POTASSIUM SERPL-SCNC: 3.3 MMOL/L (ref 3.5–5.1)
RBC # BLD AUTO: 3.55 X10(6)UL
SODIUM SERPL-SCNC: 138 MMOL/L (ref 136–145)
WBC # BLD AUTO: 7.2 X10(3) UL (ref 4–11)

## 2023-07-05 PROCEDURE — 99222 1ST HOSP IP/OBS MODERATE 55: CPT | Performed by: REGISTERED NURSE

## 2023-07-05 PROCEDURE — 99232 SBSQ HOSP IP/OBS MODERATE 35: CPT | Performed by: INTERNAL MEDICINE

## 2023-07-05 PROCEDURE — 99497 ADVNCD CARE PLAN 30 MIN: CPT | Performed by: NURSE PRACTITIONER

## 2023-07-05 PROCEDURE — 99233 SBSQ HOSP IP/OBS HIGH 50: CPT | Performed by: INTERNAL MEDICINE

## 2023-07-05 PROCEDURE — 99233 SBSQ HOSP IP/OBS HIGH 50: CPT | Performed by: HOSPITALIST

## 2023-07-05 RX ORDER — HEPARIN SODIUM 1000 [USP'U]/ML
INJECTION, SOLUTION INTRAVENOUS; SUBCUTANEOUS
Status: COMPLETED
Start: 2023-07-05 | End: 2023-07-05

## 2023-07-05 RX ORDER — MIDODRINE HYDROCHLORIDE 5 MG/1
10 TABLET ORAL
Status: DISCONTINUED | OUTPATIENT
Start: 2023-07-07 | End: 2023-07-10

## 2023-07-05 RX ORDER — TRAMADOL HYDROCHLORIDE 50 MG/1
50 TABLET ORAL EVERY 12 HOURS PRN
Status: DISCONTINUED | OUTPATIENT
Start: 2023-07-05 | End: 2023-07-10

## 2023-07-05 NOTE — PLAN OF CARE
Patient denies of pain. On 3L of O2. Scheduled, prn nebs. Patient refused CPAP overnight. On tele, no calls overnight. Melatonin given overnight. Lab draw this morning, port has no blood return. Wife updated on pt. Repositioned as needed. Plan for dialysis today. Safety precautions in place. Problem: Patient Centered Care  Goal: Patient preferences are identified and integrated in the patient's plan of care  Description: Interventions:  - What would you like us to know as we care for you? [facility]  - Provide timely, complete, and accurate information to patient/family  - Incorporate patient and family knowledge, values, beliefs, and cultural backgrounds into the planning and delivery of care  - Encourage patient/family to participate in care and decision-making at the level they choose  - Honor patient and family perspectives and choices  Outcome: Progressing     Problem: PAIN - ADULT  Goal: Verbalizes/displays adequate comfort level or patient's stated pain goal  Description: INTERVENTIONS:  - Encourage pt to monitor pain and request assistance  - Assess pain using appropriate pain scale  - Administer analgesics based on type and severity of pain and evaluate response  - Implement non-pharmacological measures as appropriate and evaluate response  - Consider cultural and social influences on pain and pain management  - Manage/alleviate anxiety  - Utilize distraction and/or relaxation techniques  - Monitor for opioid side effects  - Notify MD/LIP if interventions unsuccessful or patient reports new pain  - Anticipate increased pain with activity and pre-medicate as appropriate  Outcome: Progressing     Problem: SAFETY ADULT - FALL  Goal: Free from fall injury  Description: INTERVENTIONS:  - Assess pt frequently for physical needs  - Identify cognitive and physical deficits and behaviors that affect risk of falls.   - [place] fall precautions as indicated by assessment.  - Educate pt/family on patient safety including physical limitations  - Instruct pt to call for assistance with activity based on assessment  - Modify environment to reduce risk of injury  - Provide assistive devices as appropriate  - Consider OT/PT consult to assist with strengthening/mobility  - Encourage toileting schedule  Outcome: Progressing     Problem: CARDIOVASCULAR - ADULT  Goal: Maintains optimal cardiac output and hemodynamic stability  Description: INTERVENTIONS:  - Monitor vital signs, rhythm, and trends  - Monitor for bleeding, hypotension and signs of decreased cardiac output  - Evaluate effectiveness of vasoactive medications to optimize hemodynamic stability  - Monitor arterial and/or venous puncture sites for bleeding and/or hematoma  - Assess quality of pulses, skin color and temperature  - Assess for signs of decreased coronary artery perfusion - ex.  Angina  - Evaluate fluid balance, assess for edema, trend weights  Outcome: Progressing     Problem: RESPIRATORY - ADULT  Goal: Achieves optimal ventilation and oxygenation  Description: INTERVENTIONS:  - Assess for changes in respiratory status  - Assess for changes in mentation and behavior  - Position to facilitate oxygenation and minimize respiratory effort  - Oxygen supplementation based on oxygen saturation or ABGs  - Provide Smoking Cessation handout, if applicable  - Encourage broncho-pulmonary hygiene including cough, deep breathe, Incentive Spirometry  - Assess the need for suctioning and perform as needed  - Assess and instruct to report SOB or any respiratory difficulty  - Respiratory Therapy support as indicated  - Manage/alleviate anxiety  - Monitor for signs/symptoms of CO2 retention  Outcome: Progressing     Problem: METABOLIC/FLUID AND ELECTROLYTES - ADULT  Goal: Electrolytes maintained within normal limits  Description: INTERVENTIONS:  - Monitor labs and rhythm and assess patient for signs and symptoms of electrolyte imbalances  - Administer electrolyte replacement as ordered  - Monitor response to electrolyte replacements, including rhythm and repeat lab results as appropriate  - Fluid restriction as ordered  - Instruct patient on fluid and nutrition restrictions as appropriate  Outcome: Progressing  Goal: Hemodynamic stability and optimal renal function maintained  Description: INTERVENTIONS:  - Monitor labs and assess for signs and symptoms of volume excess or deficit  - Monitor intake, output and patient weight  - Monitor urine specific gravity, serum osmolarity and serum sodium as indicated or ordered  - Monitor response to interventions for patient's volume status, including labs, urine output, blood pressure (other measures as available)  - Encourage oral intake as appropriate  - Instruct patient on fluid and nutrition restrictions as appropriate  Outcome: Progressing     Problem: SKIN/TISSUE INTEGRITY - ADULT  Goal: Incision(s), wounds(s) or drain site(s) healing without S/S of infection  Description: INTERVENTIONS:  - Assess and document risk factors for pressure ulcer development  - Assess and document skin integrity  - Assess and document dressing/incision, wound bed, drain sites and surrounding tissue  - Implement wound care per orders  - Initiate isolation precautions as appropriate  - Initiate Pressure Ulcer prevention bundle as indicated  Outcome: Progressing     Problem: Impaired Swallowing  Goal: Minimize aspiration risk  Description: Interventions:  - Patient should be alert and upright for all feedings (90 degrees preferred)  - Offer food and liquids at a slow rate  - No straws  - Encourage small bites of food and small sips of liquid  - Offer pills one at a time, crush or deliver with applesauce as needed  - Discontinue feeding and notify MD (or speech pathologist) if coughing or persistent throat clearing or wet/gurgly vocal quality is noted  Outcome: Progressing     Problem: HEMATOLOGIC - ADULT  Goal: Free from bleeding injury  Description: (Example usage: patient with low platelets)  INTERVENTIONS:  - Avoid intramuscular injections, enemas and rectal medication administration  - Ensure safe mobilization of patient  - Hold pressure on venipuncture sites to achieve adequate hemostasis  - Assess for signs and symptoms of internal bleeding  - Monitor lab trends  - Patient is to report abnormal signs of bleeding to staff  - Avoid use of toothpicks and dental floss  - Use electric shaver for shaving  - Use soft bristle tooth brush  - Limit straining and forceful nose blowing  Outcome: Progressing     Problem: DISCHARGE PLANNING  Goal: Discharge to home or other facility with appropriate resources  Description: INTERVENTIONS:  - Identify barriers to discharge w/pt and caregiver  - Include patient/family/discharge partner in discharge planning  - Arrange for needed discharge resources and transportation as appropriate  - Identify discharge learning needs (meds, wound care, etc)  - Arrange for interpreters to assist at discharge as needed  - Consider post-discharge preferences of patient/family/discharge partner  - Complete POLST form as appropriate  - Assess patient's ability to be responsible for managing their own health  - Refer to Case Management Department for coordinating discharge planning if the patient needs post-hospital services based on physician/LIP order or complex needs related to functional status, cognitive ability or social support system  Outcome: Progressing

## 2023-07-05 NOTE — PROGRESS NOTES
07/05/23 1138   Clinical Encounter Type   Visited With Patient   Routine Visit Introduction   Jewish Encounters   Spiritual Requests During Visit / Hospitalization Declines  Visit   Trigger for Consult   Trigger for Spiritual Care Consult No     Discussion:  offered visit due to pt's length of stay. Pt presents in bed; indicated he was having trouble hearing , and that he was not interested in  visit for emotional and spiritual support.  follow-up visit available prn and can be contacted at [phone].     Wilfredo Wright, Chaplain Resident

## 2023-07-05 NOTE — PROGRESS NOTES
07/05/23 1630   Clinical Encounter Type   Visited With Patient   Taxonomy   Intended Effects Aligning care plan with patient's values   Methods Offer emotional support; Offer spiritual/Judaism support   Interventions Active listening; Acknowledge current situation;Explain  role     Discussion: Received referral for visit and prayer from Palliative Care. Pt seen sitting in bedside chair;  offered visit; pt indicated he does not want  visit or prayer.  explained role of providing support and to request visit as needed.  follow-up visit available prn and can be contacted at [phone].     Mehrdad Baum, Chaplain Resident

## 2023-07-05 NOTE — PROGRESS NOTES
Patient alert and oriented X3, forgetful at times. Hard of hearing. Hearing aids at bedside. Continues with Zosyn. Dialysis this am with 3L removed. Patient Up to the chair with walker and 2 person assist. Tolerating his diet. Patient with increased c/o pain today, moaning and yelling out, also stating that tylenol is not going to cut it. Tramadol ordered and given. RN informed 3 physicians of wife's request for a plan of care meeting. Patient currently sitting in chair, call light within reach, wife at bedside.

## 2023-07-06 ENCOUNTER — APPOINTMENT (OUTPATIENT)
Dept: GENERAL RADIOLOGY | Facility: HOSPITAL | Age: 78
End: 2023-07-06
Attending: NURSE PRACTITIONER
Payer: MEDICARE

## 2023-07-06 ENCOUNTER — MED REC SCAN ONLY (OUTPATIENT)
Dept: INTERNAL MEDICINE CLINIC | Facility: CLINIC | Age: 78
End: 2023-07-06

## 2023-07-06 ENCOUNTER — APPOINTMENT (OUTPATIENT)
Dept: GENERAL RADIOLOGY | Facility: HOSPITAL | Age: 78
End: 2023-07-06
Attending: INTERNAL MEDICINE
Payer: MEDICARE

## 2023-07-06 PROCEDURE — 36598 INJ W/FLUOR EVAL CV DEVICE: CPT | Performed by: NURSE PRACTITIONER

## 2023-07-06 PROCEDURE — 71045 X-RAY EXAM CHEST 1 VIEW: CPT | Performed by: INTERNAL MEDICINE

## 2023-07-06 PROCEDURE — 99231 SBSQ HOSP IP/OBS SF/LOW 25: CPT | Performed by: REGISTERED NURSE

## 2023-07-06 PROCEDURE — 99233 SBSQ HOSP IP/OBS HIGH 50: CPT | Performed by: HOSPITALIST

## 2023-07-06 PROCEDURE — 99233 SBSQ HOSP IP/OBS HIGH 50: CPT | Performed by: INTERNAL MEDICINE

## 2023-07-06 PROCEDURE — 99232 SBSQ HOSP IP/OBS MODERATE 35: CPT | Performed by: INTERNAL MEDICINE

## 2023-07-06 RX ORDER — SODIUM CHLORIDE 9 MG/ML
INJECTION INTRAVENOUS
Status: COMPLETED
Start: 2023-07-06 | End: 2023-07-06

## 2023-07-06 RX ORDER — HEPARIN SODIUM 1000 [USP'U]/ML
1.5 INJECTION, SOLUTION INTRAVENOUS; SUBCUTANEOUS ONCE
Status: DISCONTINUED | OUTPATIENT
Start: 2023-07-06 | End: 2023-07-10

## 2023-07-06 RX ORDER — ALPRAZOLAM 0.5 MG/1
0.5 TABLET ORAL 4 TIMES DAILY PRN
Status: DISCONTINUED | OUTPATIENT
Start: 2023-07-06 | End: 2023-07-10

## 2023-07-06 RX ORDER — TRAZODONE HYDROCHLORIDE 50 MG/1
25 TABLET ORAL NIGHTLY
Status: DISCONTINUED | OUTPATIENT
Start: 2023-07-06 | End: 2023-07-10

## 2023-07-06 NOTE — PHYSICAL THERAPY NOTE
PHYSICAL THERAPY TREATMENT NOTE - INPATIENT     Room Number: 454/454-A       Presenting Problem: SOB  Co-Morbidities : ESRD on HD 3x/week, lung CA c bone/brain mets, BPH, neuroendocrine CA    Problem List  Principal Problem:    SOB (shortness of breath)  Active Problems:    COPD exacerbation (HCC)    Primary malignant neoplasm of lung metastatic to other site ([facility] [address]    ESRD (end stage renal disease) ([facility] [address]    Pneumonia due to infectious organism    Hyponatremia    Hypophosphatemia    Acute on chronic respiratory failure with hypoxia ([facility]    Palliative care by specialist    Goals of care, counseling/discussion    Advance care planning      PHYSICAL THERAPY ASSESSMENT     RN approved participation with physical therapy. Pt was received resting in bed and requesting to use the bathroom, initially lethargic but agreeable to activity with max encouragement and education provided. Educated on role of PT and goals for this session, benefits of OOB mobility. Pt verbalized understanding but remained forgetful throughout session. Bed mobility: mod A x 2 for supine>sit and scooting to EOB. Mod A for static sitting balance at EOB with BUE support on bed; significant posterior lean noted. Tolerated sitting at EOB for up to 5 minutes for vitals monitoring and to acclimate to position change. Pt c/o dizziness; educated to perform ankle pumps and deep breathing techniques. Transfers: Max A for SPT to commode chair. Wheeled into the bathroom and remained seated at toilet for up to 5 minutes to complete toileting; pt requested more time and was therefore handed off to PCT. Pt was left sitting on toilet chair in bathroom with needs within reach, PCT present to supervise and assist, handoff to RN complete. The patient's Approx Degree of Impairment: 72.57% has been calculated based on documentation in the [facility] '6 clicks' Inpatient Basic Mobility Short Form.   Research supports that patients with this level of impairment may benefit from LTAC. PT recommends DC to [facility] as pt's burden of care is significant and he is far below his functional baseline; needs further skilled therapy to maximize safety and independence. DISCHARGE RECOMMENDATIONS  PT Discharge Recommendations: Sub-acute rehabilitation     PLAN  PT Treatment Plan: Bed mobility; Body mechanics; Endurance; Patient education; Energy conservation;Gait training;Transfer training;Stair training;Balance training;Strengthening  Frequency (Obs): 3-5x/week    SUBJECTIVE  \"I just want to sleep\"     OBJECTIVE  Precautions: Bed/chair alarm;Hard of hearing    WEIGHT BEARING RESTRICTION  none    PAIN ASSESSMENT   Rating:  (did not rate)  Location: BLE with movement  Management Techniques: Relaxation;Repositioning; Body mechanics    BALANCE  Static Sitting: Poor +  Dynamic Sitting: Poor  Static Standing: Not tested  Dynamic Standing: Not tested    ACTIVITY TOLERANCE  BP: 107/61  BP Location: Left arm  BP Method: Automatic  Patient Position: Sitting    AM-PAC '6-Clicks' INPATIENT SHORT FORM - BASIC MOBILITY  How much difficulty does the patient currently have. .. Patient Difficulty: Turning over in bed (including adjusting bedclothes, sheets and blankets)?: A Lot   Patient Difficulty: Sitting down on and standing up from a chair with arms (e.g., wheelchair, bedside commode, etc.): A Lot   Patient Difficulty: Moving from lying on back to sitting on the side of the bed?: A Lot   How much help from another person does the patient currently need. ..    Help from Another: Moving to and from a bed to a chair (including a wheelchair)?: A Lot   Help from Another: Need to walk in hospital room?: A Lot   Help from Another: Climbing 3-5 steps with a railing?: Total     AM-PAC Score:  Raw Score: 11   Approx Degree of Impairment: 72.57%   Standardized Score (AM-PAC Scale): 33.86   CMS Modifier (G-Code): CL    FUNCTIONAL ABILITY STATUS  Functional Mobility/Gait Assessment  Gait Assistance: Not tested  Distance (ft): 15 ft  Assistive Device: Rolling walker  Pattern: Shuffle (stooped posture)    Patient End of Session: Needs met;Call light within reach;RN aware of session/findings; All patient questions and concerns addressed; With [facility] staff; In bathroom - nursing staff aware    CURRENT GOALS   Goals to be met by: 7/15  Patient Goal Patient's self-stated goal is: Pt does not state. Goal #1 Patient is able to demonstrate supine - sit EOB @ level: independent      Goal #1   Current Status  mod A x 2    Goal #2 Patient is able to demonstrate transfers EOB to/from Chair/Wheelchair at assistance level: modified independent with walker - rolling      Goal #2  Current Status  Max A SPT bed>commode chair   Goal #3 Patient is able to ambulate 50 feet with assist device: walker - rolling at assistance level: modified independent   Goal #3   Current Status NT   Goal #4 Patient will negotiate 6 stairs/one curb w/ assistive device and supervision   Goal #4   Current Status NT   Goal #5 Patient to demonstrate independence with home activity/exercise instructions provided to patient in preparation for discharge.    Goal #5   Current Status In progress   Goal #6     Goal #6  Current Status        Therapeutic Activity: 18 minutes

## 2023-07-06 NOTE — PLAN OF CARE
AXO3, can be forgetful. 3L O2 NC. On remote tele. No calls. Hard of hearing. Enteric precautions maintained. Upx2 w/walker. Dressing in sacrum changed. No complains of pain. Continues on IV zosyn. Call light within reach. Frequent rounding provided. Problem: PAIN - ADULT  Goal: Verbalizes/displays adequate comfort level or patient's stated pain goal  Description: INTERVENTIONS:  - Encourage pt to monitor pain and request assistance  - Assess pain using appropriate pain scale  - Administer analgesics based on type and severity of pain and evaluate response  - Implement non-pharmacological measures as appropriate and evaluate response  - Consider cultural and social influences on pain and pain management  - Manage/alleviate anxiety  - Utilize distraction and/or relaxation techniques  - Monitor for opioid side effects  - Notify MD/LIP if interventions unsuccessful or patient reports new pain  - Anticipate increased pain with activity and pre-medicate as appropriate  Outcome: Progressing     Problem: SAFETY ADULT - FALL  Goal: Free from fall injury  Description: INTERVENTIONS:  - Assess pt frequently for physical needs  - Identify cognitive and physical deficits and behaviors that affect risk of falls.   - [place] fall precautions as indicated by assessment.  - Educate pt/family on patient safety including physical limitations  - Instruct pt to call for assistance with activity based on assessment  - Modify environment to reduce risk of injury  - Provide assistive devices as appropriate  - Consider OT/PT consult to assist with strengthening/mobility  - Encourage toileting schedule  Outcome: Progressing     Problem: DISCHARGE PLANNING  Goal: Discharge to home or other facility with appropriate resources  Description: INTERVENTIONS:  - Identify barriers to discharge w/pt and caregiver  - Include patient/family/discharge partner in discharge planning  - Arrange for needed discharge resources and transportation as appropriate  - Identify discharge learning needs (meds, wound care, etc)  - Arrange for interpreters to assist at discharge as needed  - Consider post-discharge preferences of patient/family/discharge partner  - Complete POLST form as appropriate  - Assess patient's ability to be responsible for managing their own health  - Refer to Case Management Department for coordinating discharge planning if the patient needs post-hospital services based on physician/LIP order or complex needs related to functional status, cognitive ability or social support system  Outcome: Progressing     Problem: CARDIOVASCULAR - ADULT  Goal: Maintains optimal cardiac output and hemodynamic stability  Description: INTERVENTIONS:  - Monitor vital signs, rhythm, and trends  - Monitor for bleeding, hypotension and signs of decreased cardiac output  - Evaluate effectiveness of vasoactive medications to optimize hemodynamic stability  - Monitor arterial and/or venous puncture sites for bleeding and/or hematoma  - Assess quality of pulses, skin color and temperature  - Assess for signs of decreased coronary artery perfusion - ex.  Angina  - Evaluate fluid balance, assess for edema, trend weights  Outcome: Progressing     Problem: RESPIRATORY - ADULT  Goal: Achieves optimal ventilation and oxygenation  Description: INTERVENTIONS:  - Assess for changes in respiratory status  - Assess for changes in mentation and behavior  - Position to facilitate oxygenation and minimize respiratory effort  - Oxygen supplementation based on oxygen saturation or ABGs  - Provide Smoking Cessation handout, if applicable  - Encourage broncho-pulmonary hygiene including cough, deep breathe, Incentive Spirometry  - Assess the need for suctioning and perform as needed  - Assess and instruct to report SOB or any respiratory difficulty  - Respiratory Therapy support as indicated  - Manage/alleviate anxiety  - Monitor for signs/symptoms of CO2 retention  Outcome: Progressing     Problem: METABOLIC/FLUID AND ELECTROLYTES - ADULT  Goal: Electrolytes maintained within normal limits  Description: INTERVENTIONS:  - Monitor labs and rhythm and assess patient for signs and symptoms of electrolyte imbalances  - Administer electrolyte replacement as ordered  - Monitor response to electrolyte replacements, including rhythm and repeat lab results as appropriate  - Fluid restriction as ordered  - Instruct patient on fluid and nutrition restrictions as appropriate  Outcome: Progressing  Goal: Hemodynamic stability and optimal renal function maintained  Description: INTERVENTIONS:  - Monitor labs and assess for signs and symptoms of volume excess or deficit  - Monitor intake, output and patient weight  - Monitor urine specific gravity, serum osmolarity and serum sodium as indicated or ordered  - Monitor response to interventions for patient's volume status, including labs, urine output, blood pressure (other measures as available)  - Encourage oral intake as appropriate  - Instruct patient on fluid and nutrition restrictions as appropriate  Outcome: Progressing     Problem: Impaired Swallowing  Goal: Minimize aspiration risk  Description: Interventions:  - Patient should be alert and upright for all feedings (90 degrees preferred)  - Offer food and liquids at a slow rate  - No straws  - Encourage small bites of food and small sips of liquid  - Offer pills one at a time, crush or deliver with applesauce as needed  - Discontinue feeding and notify MD (or speech pathologist) if coughing or persistent throat clearing or wet/gurgly vocal quality is noted  Outcome: Progressing     Problem: SKIN/TISSUE INTEGRITY - ADULT  Goal: Incision(s), wounds(s) or drain site(s) healing without S/S of infection  Description: INTERVENTIONS:  - Assess and document risk factors for pressure ulcer development  - Assess and document skin integrity  - Assess and document dressing/incision, wound bed, drain sites and surrounding tissue  - Implement wound care per orders  - Initiate isolation precautions as appropriate  - Initiate Pressure Ulcer prevention bundle as indicated  Outcome: Progressing     Problem: HEMATOLOGIC - ADULT  Goal: Free from bleeding injury  Description: (Example usage: patient with low platelets)  INTERVENTIONS:  - Avoid intramuscular injections, enemas and rectal medication administration  - Ensure safe mobilization of patient  - Hold pressure on venipuncture sites to achieve adequate hemostasis  - Assess for signs and symptoms of internal bleeding  - Monitor lab trends  - Patient is to report abnormal signs of bleeding to staff  - Avoid use of toothpicks and dental floss  - Use electric shaver for shaving  - Use soft bristle tooth brush  - Limit straining and forceful nose blowing  Outcome: Progressing

## 2023-07-06 NOTE — CM/SW NOTE
APN order to call wife re [facility] choices. PT recis now [facility]. CM spoke with wife. She is agreeable [facility] and her choice is [facility], [facility] and [facility] but does not want ref sent to any [facility]. CM req DSC send ref as above. PASRR done    1430  Per liaison at [facility] they will have a semi private room available tomorrow. Liaison spoke with wife who is agreeable. CM reserved PP in aidin and notified MD/RN of bed avail on 7/7 if pt is med cleared. [id number]     / to remain available for support and/or discharge planning.      Jarocho Hebert, CINTIA    Ext [phone]

## 2023-07-06 NOTE — SLP NOTE
SPEECH DAILY NOTE - INPATIENT    ASSESSMENT & PLAN   ASSESSMENT  PPE REQUIRED. THIS THERAPIST WORE GLOVES, DROPLET MASK, AND GOGGLES FOR DURATION OF EVALUATION. HANDS WASHED UPON ENTRANCE/EXIT. SLP f/u for ongoing dysphagia tx and meal assessment per recommendations for a soft and bite size diet with thin liquids. Pt was upgraded to thin liquids on 6/30/23, refusing to take any mild thick liquids after the initial clinical dysphagia evaluation on 6/29/23. RN reports pt tolerates upgraded diet consistencies and medication well with no overt clinical s/s aspiration. Pt denies any swallowing challenges. Pt positioned upright in the hospital bed with head of bed elevated to 90 degrees. Pt afebrile, tolerating 3L/Min O2NC with oxygen status 99% prior to the start of oral trials. SLP reviewed aspiration precautions and safe swallowing compensatory strategies with the patient. PO trials of varied consistencies including thin liquids, purees, soft and hard solids completed. There were no overt clinical s/s of aspiration assessed with 100% of the trials provided. Pt able to take single small sips of thin liquid via cup and straw with no s/s of aspiration or respiratory distress. Safe swallow guidelines remain written on the white board in purple. Patient v/u of education on s/s of aspiration and compensatory strategies to maintain safety with PO. Provided mild-moderate assistance, pt tolerates soft and bite size diet consistencies and THIN liquids via tsp, cup, and single sips with straw with no overt clinical signs/symptoms of aspiration. Oxygen status remained consistent and >97% t/o the entire session. Respiratory Rate maintained. SLP to f/u with x1 meal assessment, monitor  CXR, and VFSS if any overt CSA and/or decline in CXR. RN alerted with results and recommendations. MOST RECENT CXR is from 7/3/23:  1. Cardiomegaly. Tortuous thoracic aorta.    2. Bilateral perihilar bibasilar airspace opacification and bilateral effusions, worse on the left. Slight interval progression on the right side since prior chest.   3. Bilateral central venous catheters with double-lumen left-sided catheter tip at the atrial caval junction. New line no pneumothorax. Diet Recommendations - Solids: Mechanical soft chopped/ Soft & Bite Sized  Diet Recommendations - Liquids: Thin Liquids    Compensatory Strategies Recommended: Slow rate;Small bites and sips  Aspiration Precautions: Upright position; Slow rate;Small bites and sips  Medication Administration Recommendations:  (as tolerated)    Patient Experiencing Pain: No        Discharge Recommendations  Discharge Recommendations/Plan: Undetermined    Treatment Plan  Treatment Plan/Recommendations: Aspiration precautions    Interdisciplinary Communication: Discussed with RN  Plan posted at bedside               GOALS  Goal #1 The patient will tolerate soft bite sized consistency and thin liquids without overt signs or symptoms of aspiration with 100 % accuracy over 1-2 session(s). No s/s of aspiration assessed with 100% of the observed trials of soft solid and thin liquids via cup, single straw sips, and tsp sips during this first session. In Progress   Goal #2 The patient/family/caregiver will demonstrate understanding and implementation of aspiration precautions and swallow strategies independently over 1-2 session(s). Pt v/u of aspiration precautions, s/s of aspiration, and compensatory strategy education/training/demonstration provided by slp. Suspect reinforcement of the information will be essential for generalization purposes. In Progress   Goal #3 The patient will utilize compensatory strategies as outlined by  BSSE (clinical evaluation) including Slow rate, Small bites, Small sips, Upright 90 degrees with min assistance 100 % of the time across 1-2 sessions.     Pt required mild-moderate cues to maintain compensatory strategies recommended at last therapy session and written on the board in pt's room.    In progress     FOLLOW UP  Follow Up Needed (Documentation Required): Yes  SLP Follow-up Date: 07/07/23  Number of Visits to Meet Established Goals:  (1-2)      If you have any questions, please contact SHAKIRA Jeter

## 2023-07-07 ENCOUNTER — PATIENT OUTREACH (OUTPATIENT)
Dept: CASE MANAGEMENT | Age: 78
End: 2023-07-07

## 2023-07-07 LAB
ANION GAP SERPL CALC-SCNC: 17 MMOL/L (ref 0–18)
BUN BLD-MCNC: 80 MG/DL (ref 7–18)
BUN/CREAT SERPL: 14.7 (ref 10–20)
CALCIUM BLD-MCNC: 7.8 MG/DL (ref 8.5–10.1)
CHLORIDE SERPL-SCNC: 97 MMOL/L (ref 98–112)
CO2 SERPL-SCNC: 17 MMOL/L (ref 21–32)
CREAT BLD-MCNC: 5.44 MG/DL
DEPRECATED RDW RBC AUTO: 61.1 FL (ref 35.1–46.3)
ERYTHROCYTE [DISTWIDTH] IN BLOOD BY AUTOMATED COUNT: 18.9 % (ref 11–15)
GFR SERPLBLD BASED ON 1.73 SQ M-ARVRAT: 10 ML/MIN/1.73M2 (ref 60–?)
GLUCOSE BLD-MCNC: 108 MG/DL (ref 70–99)
HCT VFR BLD AUTO: 34.5 %
HGB BLD-MCNC: 11 G/DL
MCH RBC QN AUTO: 31.3 PG (ref 26–34)
MCHC RBC AUTO-ENTMCNC: 31.9 G/DL (ref 31–37)
MCV RBC AUTO: 98 FL
OSMOLALITY SERPL CALC.SUM OF ELEC: 297 MOSM/KG (ref 275–295)
PHOSPHATE SERPL-MCNC: 7.8 MG/DL (ref 2.5–4.9)
PLATELET # BLD AUTO: 93 10(3)UL (ref 150–450)
POTASSIUM SERPL-SCNC: 4.3 MMOL/L (ref 3.5–5.1)
RBC # BLD AUTO: 3.52 X10(6)UL
SODIUM SERPL-SCNC: 131 MMOL/L (ref 136–145)
WBC # BLD AUTO: 6.2 X10(3) UL (ref 4–11)

## 2023-07-07 PROCEDURE — 99233 SBSQ HOSP IP/OBS HIGH 50: CPT | Performed by: HOSPITALIST

## 2023-07-07 PROCEDURE — 99497 ADVNCD CARE PLAN 30 MIN: CPT | Performed by: NURSE PRACTITIONER

## 2023-07-07 PROCEDURE — 90935 HEMODIALYSIS ONE EVALUATION: CPT | Performed by: INTERNAL MEDICINE

## 2023-07-07 PROCEDURE — 99232 SBSQ HOSP IP/OBS MODERATE 35: CPT | Performed by: INTERNAL MEDICINE

## 2023-07-07 PROCEDURE — 99231 SBSQ HOSP IP/OBS SF/LOW 25: CPT | Performed by: REGISTERED NURSE

## 2023-07-07 NOTE — PLAN OF CARE
AXO3, can be forgetful. 3L O2 NC. CPAP at night. On remote tele. No calls. Hard of hearing. Enteric precautions maintained. Upx2 w/walker. Dressing in sacrum changed. PRN tramadol given for pain. Continues on IV zosyn. Call light within reach. Frequent rounding provided. Problem: PAIN - ADULT  Goal: Verbalizes/displays adequate comfort level or patient's stated pain goal  Description: INTERVENTIONS:  - Encourage pt to monitor pain and request assistance  - Assess pain using appropriate pain scale  - Administer analgesics based on type and severity of pain and evaluate response  - Implement non-pharmacological measures as appropriate and evaluate response  - Consider cultural and social influences on pain and pain management  - Manage/alleviate anxiety  - Utilize distraction and/or relaxation techniques  - Monitor for opioid side effects  - Notify MD/LIP if interventions unsuccessful or patient reports new pain  - Anticipate increased pain with activity and pre-medicate as appropriate  Outcome: Progressing     Problem: SAFETY ADULT - FALL  Goal: Free from fall injury  Description: INTERVENTIONS:  - Assess pt frequently for physical needs  - Identify cognitive and physical deficits and behaviors that affect risk of falls.   - [place] fall precautions as indicated by assessment.  - Educate pt/family on patient safety including physical limitations  - Instruct pt to call for assistance with activity based on assessment  - Modify environment to reduce risk of injury  - Provide assistive devices as appropriate  - Consider OT/PT consult to assist with strengthening/mobility  - Encourage toileting schedule  Outcome: Progressing     Problem: DISCHARGE PLANNING  Goal: Discharge to home or other facility with appropriate resources  Description: INTERVENTIONS:  - Identify barriers to discharge w/pt and caregiver  - Include patient/family/discharge partner in discharge planning  - Arrange for needed discharge resources and transportation as appropriate  - Identify discharge learning needs (meds, wound care, etc)  - Arrange for interpreters to assist at discharge as needed  - Consider post-discharge preferences of patient/family/discharge partner  - Complete POLST form as appropriate  - Assess patient's ability to be responsible for managing their own health  - Refer to Case Management Department for coordinating discharge planning if the patient needs post-hospital services based on physician/LIP order or complex needs related to functional status, cognitive ability or social support system  Outcome: Progressing     Problem: METABOLIC/FLUID AND ELECTROLYTES - ADULT  Goal: Electrolytes maintained within normal limits  Description: INTERVENTIONS:  - Monitor labs and rhythm and assess patient for signs and symptoms of electrolyte imbalances  - Administer electrolyte replacement as ordered  - Monitor response to electrolyte replacements, including rhythm and repeat lab results as appropriate  - Fluid restriction as ordered  - Instruct patient on fluid and nutrition restrictions as appropriate  Outcome: Progressing  Goal: Hemodynamic stability and optimal renal function maintained  Description: INTERVENTIONS:  - Monitor labs and assess for signs and symptoms of volume excess or deficit  - Monitor intake, output and patient weight  - Monitor urine specific gravity, serum osmolarity and serum sodium as indicated or ordered  - Monitor response to interventions for patient's volume status, including labs, urine output, blood pressure (other measures as available)  - Encourage oral intake as appropriate  - Instruct patient on fluid and nutrition restrictions as appropriate  Outcome: Progressing     Problem: RESPIRATORY - ADULT  Goal: Achieves optimal ventilation and oxygenation  Description: INTERVENTIONS:  - Assess for changes in respiratory status  - Assess for changes in mentation and behavior  - Position to facilitate oxygenation and minimize respiratory effort  - Oxygen supplementation based on oxygen saturation or ABGs  - Provide Smoking Cessation handout, if applicable  - Encourage broncho-pulmonary hygiene including cough, deep breathe, Incentive Spirometry  - Assess the need for suctioning and perform as needed  - Assess and instruct to report SOB or any respiratory difficulty  - Respiratory Therapy support as indicated  - Manage/alleviate anxiety  - Monitor for signs/symptoms of CO2 retention  Outcome: Progressing     Problem: SKIN/TISSUE INTEGRITY - ADULT  Goal: Incision(s), wounds(s) or drain site(s) healing without S/S of infection  Description: INTERVENTIONS:  - Assess and document risk factors for pressure ulcer development  - Assess and document skin integrity  - Assess and document dressing/incision, wound bed, drain sites and surrounding tissue  - Implement wound care per orders  - Initiate isolation precautions as appropriate  - Initiate Pressure Ulcer prevention bundle as indicated  Outcome: Progressing     Problem: Impaired Swallowing  Goal: Minimize aspiration risk  Description: Interventions:  - Patient should be alert and upright for all feedings (90 degrees preferred)  - Offer food and liquids at a slow rate  - No straws  - Encourage small bites of food and small sips of liquid  - Offer pills one at a time, crush or deliver with applesauce as needed  - Discontinue feeding and notify MD (or speech pathologist) if coughing or persistent throat clearing or wet/gurgly vocal quality is noted  Outcome: Progressing     Problem: HEMATOLOGIC - ADULT  Goal: Free from bleeding injury  Description: (Example usage: patient with low platelets)  INTERVENTIONS:  - Avoid intramuscular injections, enemas and rectal medication administration  - Ensure safe mobilization of patient  - Hold pressure on venipuncture sites to achieve adequate hemostasis  - Assess for signs and symptoms of internal bleeding  - Monitor lab trends  - Patient is to report abnormal signs of bleeding to staff  - Avoid use of toothpicks and dental floss  - Use electric shaver for shaving  - Use soft bristle tooth brush  - Limit straining and forceful nose blowing  Outcome: Progressing

## 2023-07-07 NOTE — SLP NOTE
SPEECH DAILY NOTE - INPATIENT    ASSESSMENT & PLAN   ASSESSMENT  PPE REQUIRED. THIS THERAPIST WORE GLOVES AND MASK FOR DURATION OF EVALUATION. HANDS WASHED UPON ENTRANCE/EXIT. SLP f/u for ongoing meal assessment per recommendations of soft bite sized/thin liquids per BSE. RN reports pt tolerates diet and medication well with no overt clinical s/s aspiration. Pt denies any swallowing challenges. Pt positioned upright in bed, fatigued/cooperative/wife present. Pt afebrile, tolerating 3L/Min O2NC with oxygen status 100% prior to the start of oral trials. SLP reviewed aspiration precautions and safe swallowing compensatory strategies with the patient. Safe swallow guidelines remain written on the white board in purple. Patient and family v/u. Provided 1:1 assistance, pt tolerates thin liquids via straw with no overt clinical signs/symptoms of aspiration. Of note, 1x delayed/dry cough observed after session, suspect not related to swallow function. Pt's wife reports he will not drink thickened liquids. Pt declining solid trials at this time. Oxygen status remained stable t/o the entire session. Current diet remains appropriate, will continue to monitor. SLP to f/u with meal assessment x1, monitor  CXR, and VFSS if any overt CSA and/or decline in CXR. RN alerted with results and recommendations. MOST RECENT CXR 7/6  CONCLUSION: Persistent bilateral pleural effusions and pulmonary edema. No new abnormality. Diet Recommendations - Solids: Mechanical soft chopped/ Soft & Bite Sized  Diet Recommendations - Liquids: Thin Liquids    Compensatory Strategies Recommended: Slow rate;Small bites and sips  Aspiration Precautions: Upright position; Slow rate;Small bites and sips  Medication Administration Recommendations:  (as tolerated)    Patient Experiencing Pain: No                Discharge Recommendations  Discharge Recommendations/Plan: Undetermined    Treatment Plan  Treatment Plan/Recommendations: Aspiration precautions    Interdisciplinary Communication: Plan posted at bedside            GOALS  Goal #1 The patient will tolerate soft bite sized consistency and thin liquids without overt signs or symptoms of aspiration with 100 % accuracy over 1-2 session(s). Pt declining solid trials at this time. No overt clinical s/s of aspiration with thin liquids via straw across 2nd session. 1x cough after session  In Progress   Goal #2 The patient/family/caregiver will demonstrate understanding and implementation of aspiration precautions and swallow strategies independently over 1-2 session(s). Pt  and family v/u of aspiration precautions, s/s of aspiration, and compensatory strategy education/training/demonstration provided by slp. Suspect reinforcement of the information will be essential for generalization purposes. In Progress   Goal #3 The patient will utilize compensatory strategies as outlined by  BSSE (clinical evaluation) including Slow rate, Small bites, Small sips, Upright 90 degrees with min assistance 100 % of the time across 1-2 sessions.     Wife utilized compensatory strategies outlined above when assisting pt given min cues In progress     FOLLOW UP  Follow Up Needed (Documentation Required): Yes  SLP Follow-up Date: 07/09/23  Number of Visits to Meet Established Goals:  (1-2)    Session: 2    If you have any questions, please contact George Shipley, SLP Amy Theoplis Klinefelter. Michelle Prasad Pathologist  Phone Number Ext. [phone]

## 2023-07-07 NOTE — PROGRESS NOTES
Briefly spoke with patients wife Karyle Rohrer.  She stated that patient is still at the hospital then he will be going for rehabilitation. Informed Polly if she needs anything to call. Polly thanked me for calling and checking up. Total time - 5 min  Total Monthly time- 5 min    Sneha Clifford 69, [address] [facility], [address] [place] [address]  Phone: [phone]. Time [place] [address]

## 2023-07-07 NOTE — PROGRESS NOTES
Patient does not have blood return from his port. Port study done last night shows that the port catheter is patent. Turbulent flow is noted at the tip of the port catheter suggestive of fibrin sheath formation. This finding may explain difficulty in obtaining blood return from the port.      Plan for fibrin sheath stripping with possible port replacement on Monday  Discussed procedure, risks, benefits with patient who agrees to proceed

## 2023-07-08 PROCEDURE — 99233 SBSQ HOSP IP/OBS HIGH 50: CPT | Performed by: HOSPITALIST

## 2023-07-08 PROCEDURE — 99233 SBSQ HOSP IP/OBS HIGH 50: CPT | Performed by: INTERNAL MEDICINE

## 2023-07-08 RX ORDER — IPRATROPIUM BROMIDE AND ALBUTEROL SULFATE 2.5; .5 MG/3ML; MG/3ML
3 SOLUTION RESPIRATORY (INHALATION)
Status: DISCONTINUED | OUTPATIENT
Start: 2023-07-08 | End: 2023-07-09

## 2023-07-08 NOTE — PROGRESS NOTES
Patient alert and oriented X3, forgetful at times. Hard of hearing. Hearing aids at bedside. Dialysis this am with 2L removed. Patient Up to the chair with walker and 2 person assist. Tolerating his diet. Spoke with IR and was informed that patient will require a procedure to get the port to draw back blood. Patient and wife made aware of the situation. Plans for IR on Monday at 1130 am. Patient still with soft stools. Up to the bathroom with rolling chair. Patient currently resting in bed, call light within reach, calls appropriately for assistance, bed alarm active.

## 2023-07-09 LAB
ALBUMIN SERPL-MCNC: 2.3 G/DL (ref 3.4–5)
ANION GAP SERPL CALC-SCNC: 13 MMOL/L (ref 0–18)
BASOPHILS # BLD AUTO: 0.01 X10(3) UL (ref 0–0.2)
BASOPHILS NFR BLD AUTO: 0.2 %
BUN BLD-MCNC: 72 MG/DL (ref 7–18)
BUN/CREAT SERPL: 14.4 (ref 10–20)
CALCIUM BLD-MCNC: 7.8 MG/DL (ref 8.5–10.1)
CHLORIDE SERPL-SCNC: 94 MMOL/L (ref 98–112)
CO2 SERPL-SCNC: 19 MMOL/L (ref 21–32)
CREAT BLD-MCNC: 5.01 MG/DL
DEPRECATED RDW RBC AUTO: 63 FL (ref 35.1–46.3)
EOSINOPHIL # BLD AUTO: 0.16 X10(3) UL (ref 0–0.7)
EOSINOPHIL NFR BLD AUTO: 2.4 %
ERYTHROCYTE [DISTWIDTH] IN BLOOD BY AUTOMATED COUNT: 18.6 % (ref 11–15)
GFR SERPLBLD BASED ON 1.73 SQ M-ARVRAT: 11 ML/MIN/1.73M2 (ref 60–?)
GLUCOSE BLD-MCNC: 82 MG/DL (ref 70–99)
HCT VFR BLD AUTO: 35 %
HGB BLD-MCNC: 11.3 G/DL
IMM GRANULOCYTES # BLD AUTO: 0.03 X10(3) UL (ref 0–1)
IMM GRANULOCYTES NFR BLD: 0.5 %
LYMPHOCYTES # BLD AUTO: 0.27 X10(3) UL (ref 1–4)
LYMPHOCYTES NFR BLD AUTO: 4.1 %
MAGNESIUM SERPL-MCNC: 2.3 MG/DL (ref 1.6–2.6)
MCH RBC QN AUTO: 31.2 PG (ref 26–34)
MCHC RBC AUTO-ENTMCNC: 32.3 G/DL (ref 31–37)
MCV RBC AUTO: 96.7 FL
MONOCYTES # BLD AUTO: 0.54 X10(3) UL (ref 0.1–1)
MONOCYTES NFR BLD AUTO: 8.2 %
NEUTROPHILS # BLD AUTO: 5.55 X10 (3) UL (ref 1.5–7.7)
NEUTROPHILS # BLD AUTO: 5.55 X10(3) UL (ref 1.5–7.7)
NEUTROPHILS NFR BLD AUTO: 84.6 %
OSMOLALITY SERPL CALC.SUM OF ELEC: 282 MOSM/KG (ref 275–295)
PHOSPHATE SERPL-MCNC: 7.8 MG/DL (ref 2.5–4.9)
PLATELET # BLD AUTO: 102 10(3)UL (ref 150–450)
POTASSIUM SERPL-SCNC: 4.8 MMOL/L (ref 3.5–5.1)
RBC # BLD AUTO: 3.62 X10(6)UL
SODIUM SERPL-SCNC: 126 MMOL/L (ref 136–145)
WBC # BLD AUTO: 6.6 X10(3) UL (ref 4–11)

## 2023-07-09 PROCEDURE — 99233 SBSQ HOSP IP/OBS HIGH 50: CPT | Performed by: HOSPITALIST

## 2023-07-09 PROCEDURE — 99233 SBSQ HOSP IP/OBS HIGH 50: CPT | Performed by: INTERNAL MEDICINE

## 2023-07-09 PROCEDURE — 99232 SBSQ HOSP IP/OBS MODERATE 35: CPT | Performed by: INTERNAL MEDICINE

## 2023-07-09 RX ORDER — IPRATROPIUM BROMIDE AND ALBUTEROL SULFATE 2.5; .5 MG/3ML; MG/3ML
3 SOLUTION RESPIRATORY (INHALATION)
Status: DISCONTINUED | OUTPATIENT
Start: 2023-07-09 | End: 2023-07-10

## 2023-07-09 NOTE — PHYSICAL THERAPY NOTE
PHYSICAL THERAPY TREATMENT NOTE - INPATIENT     Room Number: 454/454-A       Presenting Problem: SOB    Problem List  Principal Problem:    SOB (shortness of breath)  Active Problems:    COPD exacerbation (HCC)    Primary malignant neoplasm of lung metastatic to other site (HCC)    ESRD (end stage renal disease) ([address]    Pneumonia due to infectious organism    Hyponatremia    Hypophosphatemia    Acute on chronic respiratory failure with hypoxia ([address]    Palliative care by specialist    Goals of care, counseling/discussion    Advance care planning      PHYSICAL THERAPY ASSESSMENT   Chart reviewed. CINTIA Ryan approved participation in physical therapy. PPE worn by therapist: mask, gloves, and gown. Patient was not wearing a mask during session. Patient presented in bed and alarm activated with unrated pain. Patient with poor progress towards goals during this session. Education provided on Physical therapy plan of care and physiological benefits of out of bed mobility. Patient with fair carryover. PT Rodrick Lyle present to assist with mobility this session. Pt wife at bedside. Pt lethargic during session but able to follow commands. Pt presenting with severe deconditioning and global weakness during all functional mobility tasks. Pt agreeable to therapy, transfer to bedside chair. Pt tolerated EOB sitting with Mod A to maintain and BUE support about 6 mins prior to transfer, Reporting some dizziness, allowed extra time to let it pass. Pt transferred to chair via SPT, Dependent A x2, lift assist back to bed, RN aware. Pt on track to discharge to [facility] once medically cleared.     Bed mobility: Max assist x2 supine to sit with HOB elevated and to scoot EOB  Transfers: Max assist x2 for STS transfer holding on to therapist. Stood about 1.5 mins with Max A x2 to maintain prior to transfer to chair  Gait Assistance: Dependent assistance (x2)  Distance (ft): 3  Assistive Device:  (SPT bed to chair)  Pattern: Shuffle          Patient was left in bedside chair and alarm activated at end of session with all needs in reach. The patient's Approx Degree of Impairment: 81.38% has been calculated based on documentation in the [facility] '6 clicks' Inpatient Basic Mobility Short Form. Research supports that patients with this level of impairment may benefit from Subacute Rehab. RN aware of patient status post session. DISCHARGE RECOMMENDATIONS  PT Discharge Recommendations: Sub-acute rehabilitation     PLAN  PT Treatment Plan: Bed mobility; Body mechanics; Endurance; Patient education; Energy conservation;Gait training;Transfer training;Stair training;Balance training;Strengthening    SUBJECTIVE  Pt moaning out in pain during transfer , \"Polly! It hurts so bad! \"    OBJECTIVE  Precautions: Bed/chair alarm;Hard of hearing    WEIGHT BEARING RESTRICTION  Weight Bearing Restriction: None                PAIN ASSESSMENT   Rating: Unable to rate  Location: buttocks wound  Management Techniques: Activity promotion;Repositioning    BALANCE                                                                                                                       Static Sitting: Poor +  Dynamic Sitting: Poor           Static Standing: Poor -  Dynamic Standing: Poor -    ACTIVITY TOLERANCE  Pulse: 68  Heart Rate Source: Monitor     BP: 96/51  BP Location: Right arm  BP Method: Automatic  Patient Position: Semi-[place]      AM-PAC '6-Clicks' INPATIENT SHORT FORM - BASIC MOBILITY  How much difficulty does the patient currently have. .. Patient Difficulty: Turning over in bed (including adjusting bedclothes, sheets and blankets)?: A Lot   Patient Difficulty: Sitting down on and standing up from a chair with arms (e.g., wheelchair, bedside commode, etc.): Unable   Patient Difficulty: Moving from lying on back to sitting on the side of the bed?: A Lot   How much help from another person does the patient currently need. ..    Help from Another: Moving to and from a bed to a chair (including a wheelchair)?: A Lot   Help from Another: Need to walk in hospital room?: Total   Help from Another: Climbing 3-5 steps with a railing?: Total     AM-PAC Score:  Raw Score: 9   Approx Degree of Impairment: 81.38%   Standardized Score (AM-PAC Scale): 30.55   CMS Modifier (G-Code): CM          Patient End of Session: Up in chair;Needs met;RN aware of session/findings;Call light within reach; Alarm set; Family present    CURRENT GOALS   Goals to be met by: 7/15  Patient Goal Patient's self-stated goal is: Pt does not state. Goal #1 Patient is able to demonstrate supine - sit EOB @ level: independent      Goal #1   Current Status  max A x 2    Goal #2 Patient is able to demonstrate transfers EOB to/from Chair/Wheelchair at assistance level: modified independent with walker - rolling      Goal #2  Current Status  Max A x2 STS   Goal #3 Patient is able to ambulate 50 feet with assist device: walker - rolling at assistance level: modified independent   Goal #3   Current Status Dependent x2 SPT bed to chair   Goal #4 Patient will negotiate 6 stairs/one curb w/ assistive device and supervision   Goal #4   Current Status NT   Goal #5 Patient to demonstrate independence with home activity/exercise instructions provided to patient in preparation for discharge.    Goal #5   Current Status In progress   Goal #6     Goal #6  Current Status           Therapeutic Activity: 15 minutes    [address] [facility]  [phone]

## 2023-07-09 NOTE — PLAN OF CARE
Problem: PAIN - ADULT  Goal: Verbalizes/displays adequate comfort level or patient's stated pain goal  Description: INTERVENTIONS:  - Encourage pt to monitor pain and request assistance  - Assess pain using appropriate pain scale  - Administer analgesics based on type and severity of pain and evaluate response  - Implement non-pharmacological measures as appropriate and evaluate response  - Consider cultural and social influences on pain and pain management  - Manage/alleviate anxiety  - Utilize distraction and/or relaxation techniques  - Monitor for opioid side effects  - Notify MD/LIP if interventions unsuccessful or patient reports new pain  - Anticipate increased pain with activity and pre-medicate as appropriate  Outcome: Progressing     Problem: SAFETY ADULT - FALL  Goal: Free from fall injury  Description: INTERVENTIONS:  - Assess pt frequently for physical needs  - Identify cognitive and physical deficits and behaviors that affect risk of falls. - [place] fall precautions as indicated by assessment.  - Educate pt/family on patient safety including physical limitations  - Instruct pt to call for assistance with activity based on assessment  - Modify environment to reduce risk of injury  - Provide assistive devices as appropriate  - Consider OT/PT consult to assist with strengthening/mobility  - Encourage toileting schedule  Outcome: Progressing     Problem: CARDIOVASCULAR - ADULT  Goal: Maintains optimal cardiac output and hemodynamic stability  Description: INTERVENTIONS:  - Monitor vital signs, rhythm, and trends  - Monitor for bleeding, hypotension and signs of decreased cardiac output  - Evaluate effectiveness of vasoactive medications to optimize hemodynamic stability  - Monitor arterial and/or venous puncture sites for bleeding and/or hematoma  - Assess quality of pulses, skin color and temperature  - Assess for signs of decreased coronary artery perfusion - ex.  Angina  - Evaluate fluid balance, assess for edema, trend weights  Outcome: Progressing     Problem: RESPIRATORY - ADULT  Goal: Achieves optimal ventilation and oxygenation  Description: INTERVENTIONS:  - Assess for changes in respiratory status  - Assess for changes in mentation and behavior  - Position to facilitate oxygenation and minimize respiratory effort  - Oxygen supplementation based on oxygen saturation or ABGs  - Provide Smoking Cessation handout, if applicable  - Encourage broncho-pulmonary hygiene including cough, deep breathe, Incentive Spirometry  - Assess the need for suctioning and perform as needed  - Assess and instruct to report SOB or any respiratory difficulty  - Respiratory Therapy support as indicated  - Manage/alleviate anxiety  - Monitor for signs/symptoms of CO2 retention  Outcome: Progressing     Problem: METABOLIC/FLUID AND ELECTROLYTES - ADULT  Goal: Electrolytes maintained within normal limits  Description: INTERVENTIONS:  - Monitor labs and rhythm and assess patient for signs and symptoms of electrolyte imbalances  - Administer electrolyte replacement as ordered  - Monitor response to electrolyte replacements, including rhythm and repeat lab results as appropriate  - Fluid restriction as ordered  - Instruct patient on fluid and nutrition restrictions as appropriate  Outcome: Progressing  Goal: Hemodynamic stability and optimal renal function maintained  Description: INTERVENTIONS:  - Monitor labs and assess for signs and symptoms of volume excess or deficit  - Monitor intake, output and patient weight  - Monitor urine specific gravity, serum osmolarity and serum sodium as indicated or ordered  - Monitor response to interventions for patient's volume status, including labs, urine output, blood pressure (other measures as available)  - Encourage oral intake as appropriate  - Instruct patient on fluid and nutrition restrictions as appropriate  Outcome: Progressing     Problem: SKIN/TISSUE INTEGRITY - ADULT  Goal: Incision(s), wounds(s) or drain site(s) healing without S/S of infection  Description: INTERVENTIONS:  - Assess and document risk factors for pressure ulcer development  - Assess and document skin integrity  - Assess and document dressing/incision, wound bed, drain sites and surrounding tissue  - Implement wound care per orders  - Initiate isolation precautions as appropriate  - Initiate Pressure Ulcer prevention bundle as indicated  Outcome: Progressing     Problem: Impaired Swallowing  Goal: Minimize aspiration risk  Description: Interventions:  - Patient should be alert and upright for all feedings (90 degrees preferred)  - Offer food and liquids at a slow rate  - No straws  - Encourage small bites of food and small sips of liquid  - Offer pills one at a time, crush or deliver with applesauce as needed  - Discontinue feeding and notify MD (or speech pathologist) if coughing or persistent throat clearing or wet/gurgly vocal quality is noted  Outcome: Progressing     Problem: HEMATOLOGIC - ADULT  Goal: Free from bleeding injury  Description: (Example usage: patient with low platelets)  INTERVENTIONS:  - Avoid intramuscular injections, enemas and rectal medication administration  - Ensure safe mobilization of patient  - Hold pressure on venipuncture sites to achieve adequate hemostasis  - Assess for signs and symptoms of internal bleeding  - Monitor lab trends  - Patient is to report abnormal signs of bleeding to staff  - Avoid use of toothpicks and dental floss  - Use electric shaver for shaving  - Use soft bristle tooth brush  - Limit straining and forceful nose blowing  Outcome: Progressing     Problem: DISCHARGE PLANNING  Goal: Discharge to home or other facility with appropriate resources  Description: INTERVENTIONS:  - Identify barriers to discharge w/pt and caregiver  - Include patient/family/discharge partner in discharge planning  - Arrange for needed discharge resources and transportation as appropriate  - Identify discharge learning needs (meds, wound care, etc)  - Arrange for interpreters to assist at discharge as needed  - Consider post-discharge preferences of patient/family/discharge partner  - Complete POLST form as appropriate  - Assess patient's ability to be responsible for managing their own health  - Refer to Case Management Department for coordinating discharge planning if the patient needs post-hospital services based on physician/LIP order or complex needs related to functional status, cognitive ability or social support system  Outcome: Natalia Ruddr is alert and oriented though increasingly forgetful/confused overnight. VSS on 3L O2. Remote tele in place. Heparin subcutaneous for DVT prophylaxis. Significant weakness noted, pt unable to stand up even with x2 assist and walker. Xanax given at 2100, despite this pt was restless overnight. Polly updated. Pt continued to be restless. Bathed overnight. C/o pain this AM, spoke with Polly, pt given PRN tramadol. Pt's wife, Husam Owens, refusing trazodone and tramadol for pt, stating he only needs xanax. Had extensive conversation with Polly regarding the indications for the above medications: sleep vs pain vs anxiety. Despite this Polly states she only wants pt to receive the xanax as this is what worked for the pt at home she feels he was too groggy during the day after receiving tramadol and trazodone. Polly also expressed frustration that she is not receiving updates from MD and stated that the pt is unable to understand what doctors are saying to him d/t his impaired hearing. Further, she expressed that no one has explained the procedure planned for Monday for the pt's port. Also expressed that PT has not been seeing the pt and as a result \"he is in worse shape than when he was admitted. \"

## 2023-07-09 NOTE — PLAN OF CARE
Problem: Patient Centered Care  Goal: Patient preferences are identified and integrated in the patient's plan of care  Description: Interventions:  - Provide timely, complete, and accurate information to patient/family  - Incorporate patient and family knowledge, values, beliefs, and cultural backgrounds into the planning and delivery of care  - Encourage patient/family to participate in care and decision-making at the level they choose  - Honor patient and family perspectives and choices  Outcome: Progressing   Patient  has been drowsy today, ate late breakfast able to feed himself. He was able to take medications without difficulties. Patient up to the chair with PT, very weak and is complaining of pain especially to his sacrum, lift was used to transfer patient back to bed. Patient very weak, having difficulty staying awake during the shift , HD ordered for tomorrow. Fall precautions in place.

## 2023-07-09 NOTE — PLAN OF CARE
Problem: SAFETY ADULT - FALL  Goal: Free from fall injury  Description: INTERVENTIONS:  - Assess pt frequently for physical needs  - Identify cognitive and physical deficits and behaviors that affect risk of falls. - [place] fall precautions as indicated by assessment.  - Educate pt/family on patient safety including physical limitations  - Instruct pt to call for assistance with activity based on assessment  - Modify environment to reduce risk of injury  - Provide assistive devices as appropriate  - Consider OT/PT consult to assist with strengthening/mobility  - Encourage toileting schedule  Outcome: Progressing   Patient very drowsy this morning. Alert and oriented X 4 now,  able to visit with his family. Patient up in the chair for dinner, very weak, son helped to transfer patient to the chair. Patient was able to eat dinner, had good appetite. Fall precautions in place.

## 2023-07-10 ENCOUNTER — APPOINTMENT (OUTPATIENT)
Dept: INTERVENTIONAL RADIOLOGY/VASCULAR | Facility: HOSPITAL | Age: 78
End: 2023-07-10
Attending: NURSE PRACTITIONER
Payer: MEDICARE

## 2023-07-10 VITALS
RESPIRATION RATE: 16 BRPM | WEIGHT: 159.69 LBS | TEMPERATURE: 98 F | SYSTOLIC BLOOD PRESSURE: 95 MMHG | HEART RATE: 67 BPM | DIASTOLIC BLOOD PRESSURE: 55 MMHG | OXYGEN SATURATION: 97 % | BODY MASS INDEX: 24 KG/M2

## 2023-07-10 LAB — SARS-COV-2 RNA RESP QL NAA+PROBE: NOT DETECTED

## 2023-07-10 PROCEDURE — 02PY33Z REMOVAL OF INFUSION DEVICE FROM GREAT VESSEL, PERCUTANEOUS APPROACH: ICD-10-PCS | Performed by: STUDENT IN AN ORGANIZED HEALTH CARE EDUCATION/TRAINING PROGRAM

## 2023-07-10 PROCEDURE — 99232 SBSQ HOSP IP/OBS MODERATE 35: CPT | Performed by: INTERNAL MEDICINE

## 2023-07-10 PROCEDURE — 02HV33Z INSERTION OF INFUSION DEVICE INTO SUPERIOR VENA CAVA, PERCUTANEOUS APPROACH: ICD-10-PCS | Performed by: STUDENT IN AN ORGANIZED HEALTH CARE EDUCATION/TRAINING PROGRAM

## 2023-07-10 PROCEDURE — 99239 HOSP IP/OBS DSCHRG MGMT >30: CPT | Performed by: HOSPITALIST

## 2023-07-10 PROCEDURE — 0JPT0WZ REMOVAL OF TOTALLY IMPLANTABLE VASCULAR ACCESS DEVICE FROM TRUNK SUBCUTANEOUS TISSUE AND FASCIA, OPEN APPROACH: ICD-10-PCS | Performed by: STUDENT IN AN ORGANIZED HEALTH CARE EDUCATION/TRAINING PROGRAM

## 2023-07-10 PROCEDURE — 0JH60WZ INSERTION OF TOTALLY IMPLANTABLE VASCULAR ACCESS DEVICE INTO CHEST SUBCUTANEOUS TISSUE AND FASCIA, OPEN APPROACH: ICD-10-PCS | Performed by: STUDENT IN AN ORGANIZED HEALTH CARE EDUCATION/TRAINING PROGRAM

## 2023-07-10 RX ORDER — LIDOCAINE HYDROCHLORIDE 20 MG/ML
INJECTION, SOLUTION EPIDURAL; INFILTRATION; INTRACAUDAL; PERINEURAL
Status: COMPLETED
Start: 2023-07-10 | End: 2023-07-10

## 2023-07-10 RX ORDER — MIDAZOLAM HYDROCHLORIDE 1 MG/ML
INJECTION INTRAMUSCULAR; INTRAVENOUS
Status: DISCONTINUED
Start: 2023-07-10 | End: 2023-07-10 | Stop reason: WASHOUT

## 2023-07-10 RX ORDER — LIDOCAINE HYDROCHLORIDE 20 MG/ML
INJECTION, SOLUTION INFILTRATION; PERINEURAL
Status: COMPLETED
Start: 2023-07-10 | End: 2023-07-10

## 2023-07-10 RX ORDER — HEPARIN SODIUM (PORCINE) LOCK FLUSH IV SOLN 100 UNIT/ML 100 UNIT/ML
1.5 SOLUTION INTRAVENOUS ONCE
Status: DISCONTINUED | OUTPATIENT
Start: 2023-07-10 | End: 2023-07-10

## 2023-07-10 RX ORDER — HEPARIN SODIUM 1000 [USP'U]/ML
INJECTION, SOLUTION INTRAVENOUS; SUBCUTANEOUS
Status: COMPLETED
Start: 2023-07-10 | End: 2023-07-10

## 2023-07-10 RX ORDER — HEPARIN SODIUM (PORCINE) LOCK FLUSH IV SOLN 100 UNIT/ML 100 UNIT/ML
SOLUTION INTRAVENOUS
Status: COMPLETED
Start: 2023-07-10 | End: 2023-07-10

## 2023-07-10 RX ORDER — TRAZODONE HYDROCHLORIDE 50 MG/1
25 TABLET ORAL NIGHTLY
Qty: 30 TABLET | Refills: 0 | Status: SHIPPED | OUTPATIENT
Start: 2023-07-10

## 2023-07-10 RX ORDER — LEVOTHYROXINE SODIUM 0.15 MG/1
150 TABLET ORAL
Qty: 30 TABLET | Refills: 1 | Status: SHIPPED | OUTPATIENT
Start: 2023-07-11

## 2023-07-10 RX ORDER — LOPERAMIDE HYDROCHLORIDE 2 MG/1
2 CAPSULE ORAL 4 TIMES DAILY PRN
Refills: 0 | Status: SHIPPED | COMMUNITY
Start: 2023-07-10

## 2023-07-10 RX ORDER — ALPRAZOLAM 0.5 MG/1
0.5 TABLET ORAL 3 TIMES DAILY PRN
Qty: 20 TABLET | Refills: 0 | Status: SHIPPED | OUTPATIENT
Start: 2023-07-10

## 2023-07-10 RX ORDER — CEFAZOLIN SODIUM/WATER 2 G/20 ML
SYRINGE (ML) INTRAVENOUS
Status: COMPLETED
Start: 2023-07-10 | End: 2023-07-10

## 2023-07-10 RX ORDER — TRAMADOL HYDROCHLORIDE 50 MG/1
50 TABLET ORAL EVERY 12 HOURS PRN
Qty: 14 TABLET | Refills: 0 | Status: SHIPPED | OUTPATIENT
Start: 2023-07-10

## 2023-07-10 RX ORDER — MIDODRINE HYDROCHLORIDE 10 MG/1
10 TABLET ORAL
Status: SHIPPED | COMMUNITY
Start: 2023-07-10

## 2023-07-10 NOTE — PLAN OF CARE
Patient denies of pain. On 3L of O2. Scheduled, prn nebs. Patient wore CPAP in the evening but eventually wanted only the NC. On tele, no calls overnight. Wife updated on pt. Repositioned as needed. Plan for dialysis today, and port reassessing. Safety and contact precautions in place. Problem: PAIN - ADULT  Goal: Verbalizes/displays adequate comfort level or patient's stated pain goal  Description: INTERVENTIONS:  - Encourage pt to monitor pain and request assistance  - Assess pain using appropriate pain scale  - Administer analgesics based on type and severity of pain and evaluate response  - Implement non-pharmacological measures as appropriate and evaluate response  - Consider cultural and social influences on pain and pain management  - Manage/alleviate anxiety  - Utilize distraction and/or relaxation techniques  - Monitor for opioid side effects  - Notify MD/LIP if interventions unsuccessful or patient reports new pain  - Anticipate increased pain with activity and pre-medicate as appropriate  Outcome: Progressing     Problem: SAFETY ADULT - FALL  Goal: Free from fall injury  Description: INTERVENTIONS:  - Assess pt frequently for physical needs  - Identify cognitive and physical deficits and behaviors that affect risk of falls.   - [place] fall precautions as indicated by assessment.  - Educate pt/family on patient safety including physical limitations  - Instruct pt to call for assistance with activity based on assessment  - Modify environment to reduce risk of injury  - Provide assistive devices as appropriate  - Consider OT/PT consult to assist with strengthening/mobility  - Encourage toileting schedule  Outcome: Progressing     Problem: CARDIOVASCULAR - ADULT  Goal: Maintains optimal cardiac output and hemodynamic stability  Description: INTERVENTIONS:  - Monitor vital signs, rhythm, and trends  - Monitor for bleeding, hypotension and signs of decreased cardiac output  - Evaluate effectiveness of vasoactive medications to optimize hemodynamic stability  - Monitor arterial and/or venous puncture sites for bleeding and/or hematoma  - Assess quality of pulses, skin color and temperature  - Assess for signs of decreased coronary artery perfusion - ex.  Angina  - Evaluate fluid balance, assess for edema, trend weights  Outcome: Progressing     Problem: RESPIRATORY - ADULT  Goal: Achieves optimal ventilation and oxygenation  Description: INTERVENTIONS:  - Assess for changes in respiratory status  - Assess for changes in mentation and behavior  - Position to facilitate oxygenation and minimize respiratory effort  - Oxygen supplementation based on oxygen saturation or ABGs  - Provide Smoking Cessation handout, if applicable  - Encourage broncho-pulmonary hygiene including cough, deep breathe, Incentive Spirometry  - Assess the need for suctioning and perform as needed  - Assess and instruct to report SOB or any respiratory difficulty  - Respiratory Therapy support as indicated  - Manage/alleviate anxiety  - Monitor for signs/symptoms of CO2 retention  Outcome: Progressing     Problem: METABOLIC/FLUID AND ELECTROLYTES - ADULT  Goal: Electrolytes maintained within normal limits  Description: INTERVENTIONS:  - Monitor labs and rhythm and assess patient for signs and symptoms of electrolyte imbalances  - Administer electrolyte replacement as ordered  - Monitor response to electrolyte replacements, including rhythm and repeat lab results as appropriate  - Fluid restriction as ordered  - Instruct patient on fluid and nutrition restrictions as appropriate  Outcome: Progressing  Goal: Hemodynamic stability and optimal renal function maintained  Description: INTERVENTIONS:  - Monitor labs and assess for signs and symptoms of volume excess or deficit  - Monitor intake, output and patient weight  - Monitor urine specific gravity, serum osmolarity and serum sodium as indicated or ordered  - Monitor response to interventions for patient's volume status, including labs, urine output, blood pressure (other measures as available)  - Encourage oral intake as appropriate  - Instruct patient on fluid and nutrition restrictions as appropriate  Outcome: Progressing     Problem: SKIN/TISSUE INTEGRITY - ADULT  Goal: Incision(s), wounds(s) or drain site(s) healing without S/S of infection  Description: INTERVENTIONS:  - Assess and document risk factors for pressure ulcer development  - Assess and document skin integrity  - Assess and document dressing/incision, wound bed, drain sites and surrounding tissue  - Implement wound care per orders  - Initiate isolation precautions as appropriate  - Initiate Pressure Ulcer prevention bundle as indicated  Outcome: Progressing     Problem: Impaired Swallowing  Goal: Minimize aspiration risk  Description: Interventions:  - Patient should be alert and upright for all feedings (90 degrees preferred)  - Offer food and liquids at a slow rate  - No straws  - Encourage small bites of food and small sips of liquid  - Offer pills one at a time, crush or deliver with applesauce as needed  - Discontinue feeding and notify MD (or speech pathologist) if coughing or persistent throat clearing or wet/gurgly vocal quality is noted  Outcome: Progressing     Problem: HEMATOLOGIC - ADULT  Goal: Free from bleeding injury  Description: (Example usage: patient with low platelets)  INTERVENTIONS:  - Avoid intramuscular injections, enemas and rectal medication administration  - Ensure safe mobilization of patient  - Hold pressure on venipuncture sites to achieve adequate hemostasis  - Assess for signs and symptoms of internal bleeding  - Monitor lab trends  - Patient is to report abnormal signs of bleeding to staff  - Avoid use of toothpicks and dental floss  - Use electric shaver for shaving  - Use soft bristle tooth brush  - Limit straining and forceful nose blowing  Outcome: Progressing     Problem: DISCHARGE PLANNING  Goal: Discharge to home or other facility with appropriate resources  Description: INTERVENTIONS:  - Identify barriers to discharge w/pt and caregiver  - Include patient/family/discharge partner in discharge planning  - Arrange for needed discharge resources and transportation as appropriate  - Identify discharge learning needs (meds, wound care, etc)  - Arrange for interpreters to assist at discharge as needed  - Consider post-discharge preferences of patient/family/discharge partner  - Complete POLST form as appropriate  - Assess patient's ability to be responsible for managing their own health  - Refer to Case Management Department for coordinating discharge planning if the patient needs post-hospital services based on physician/LIP order or complex needs related to functional status, cognitive ability or social support system  Outcome: Progressing

## 2023-07-10 NOTE — PROGRESS NOTES
07/10/23 0700   VISIT TYPE   SLP Inpatient Visit Type (Documentation Required) Attempted Treatment   FOLLOW UP/PLAN   Follow Up Needed (Documentation Required) Yes   SLP Follow-up Date 07/11/23     SLP attempt this AM. Pt currently NPO for procedure this AM. SLP to follow up as pt cleared for PO and/or as schedule allows. Thank you. Yvonne Ann [facility]  Speech Language Pathologist  Phone Number Ext. [phone]

## 2023-07-10 NOTE — PROCEDURES
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

## 2023-07-11 NOTE — PROGRESS NOTES
Patient alert and oriented X4, forgetful at times. Hard of hearing, left hearing aid at bedside, wife took the cochlear aid home. NPO maintained throughout the day for IR to try and fix port. Patient sent down and returned stable. Upon assessing patient he was noted to have some bleeding from the port site, this RN applied pressure for 5 minutes without success. Dr Toshia Esquivel paged and informed of the situation. Dr Annette More came to bedside and stated it was a small hematoma which he manipulated at the bedside and then closed with skin glue. Per Dr Annette More port was flushed with heparin at the end of the IR case. He also stated that patient was clear for discharge. Informed Dr Caryn Gordon and she is also ok with transfer. Report called to Gisele at [facility]. Patient endorsed to Gerardo Frausto RN for continuation of care until transfer. Plans for [place]  between 8 and 8:30.

## 2023-07-11 NOTE — PLAN OF CARE
Patient cleared by MD. Je Mars to [facility]. R port with skin glue applied by Dr. Killian Nuñez prior to discharge. Discharge education provided. Sent to [facility] via [facility] ambulance. Wife at beside, took personal belongings. Pt sent to [facility] with all appropriate paper work, scripts, and discharge instructions. Report given to CINTIA Jaquez by VALOREM. Addressed additional questions.         Problem: Patient Centered Care  Goal: Patient preferences are identified and integrated in the patient's plan of care  Description: Interventions:  - What would you like us to know as we care for you  - Provide timely, complete, and accurate information to patient/family  - Incorporate patient and family knowledge, values, beliefs, and cultural backgrounds into the planning and delivery of care  - Encourage patient/family to participate in care and decision-making at the level they choose  - Honor patient and family perspectives and choices  Outcome: Adequate for Discharge     Problem: PAIN - ADULT  Goal: Verbalizes/displays adequate comfort level or patient's stated pain goal  Description: INTERVENTIONS:  - Encourage pt to monitor pain and request assistance  - Assess pain using appropriate pain scale  - Administer analgesics based on type and severity of pain and evaluate response  - Implement non-pharmacological measures as appropriate and evaluate response  - Consider cultural and social influences on pain and pain management  - Manage/alleviate anxiety  - Utilize distraction and/or relaxation techniques  - Monitor for opioid side effects  - Notify MD/LIP if interventions unsuccessful or patient reports new pain  - Anticipate increased pain with activity and pre-medicate as appropriate  Outcome: Adequate for Discharge     Problem: SAFETY ADULT - FALL  Goal: Free from fall injury  Description: INTERVENTIONS:  - Assess pt frequently for physical needs  - Identify cognitive and physical deficits and behaviors that affect risk of falls. - [place] fall precautions as indicated by assessment.  - Educate pt/family on patient safety including physical limitations  - Instruct pt to call for assistance with activity based on assessment  - Modify environment to reduce risk of injury  - Provide assistive devices as appropriate  - Consider OT/PT consult to assist with strengthening/mobility  - Encourage toileting schedule  Outcome: Adequate for Discharge     Problem: DISCHARGE PLANNING  Goal: Discharge to home or other facility with appropriate resources  Description: INTERVENTIONS:  - Identify barriers to discharge w/pt and caregiver  - Include patient/family/discharge partner in discharge planning  - Arrange for needed discharge resources and transportation as appropriate  - Identify discharge learning needs (meds, wound care, etc)  - Arrange for interpreters to assist at discharge as needed  - Consider post-discharge preferences of patient/family/discharge partner  - Complete POLST form as appropriate  - Assess patient's ability to be responsible for managing their own health  - Refer to Case Management Department for coordinating discharge planning if the patient needs post-hospital services based on physician/LIP order or complex needs related to functional status, cognitive ability or social support system  Outcome: Adequate for Discharge     Problem: CARDIOVASCULAR - ADULT  Goal: Maintains optimal cardiac output and hemodynamic stability  Description: INTERVENTIONS:  - Monitor vital signs, rhythm, and trends  - Monitor for bleeding, hypotension and signs of decreased cardiac output  - Evaluate effectiveness of vasoactive medications to optimize hemodynamic stability  - Monitor arterial and/or venous puncture sites for bleeding and/or hematoma  - Assess quality of pulses, skin color and temperature  - Assess for signs of decreased coronary artery perfusion - ex.  Angina  - Evaluate fluid balance, assess for edema, trend weights  Outcome: Adequate for Discharge     Problem: RESPIRATORY - ADULT  Goal: Achieves optimal ventilation and oxygenation  Description: INTERVENTIONS:  - Assess for changes in respiratory status  - Assess for changes in mentation and behavior  - Position to facilitate oxygenation and minimize respiratory effort  - Oxygen supplementation based on oxygen saturation or ABGs  - Provide Smoking Cessation handout, if applicable  - Encourage broncho-pulmonary hygiene including cough, deep breathe, Incentive Spirometry  - Assess the need for suctioning and perform as needed  - Assess and instruct to report SOB or any respiratory difficulty  - Respiratory Therapy support as indicated  - Manage/alleviate anxiety  - Monitor for signs/symptoms of CO2 retention  Outcome: Adequate for Discharge     Problem: METABOLIC/FLUID AND ELECTROLYTES - ADULT  Goal: Electrolytes maintained within normal limits  Description: INTERVENTIONS:  - Monitor labs and rhythm and assess patient for signs and symptoms of electrolyte imbalances  - Administer electrolyte replacement as ordered  - Monitor response to electrolyte replacements, including rhythm and repeat lab results as appropriate  - Fluid restriction as ordered  - Instruct patient on fluid and nutrition restrictions as appropriate  Outcome: Adequate for Discharge  Goal: Hemodynamic stability and optimal renal function maintained  Description: INTERVENTIONS:  - Monitor labs and assess for signs and symptoms of volume excess or deficit  - Monitor intake, output and patient weight  - Monitor urine specific gravity, serum osmolarity and serum sodium as indicated or ordered  - Monitor response to interventions for patient's volume status, including labs, urine output, blood pressure (other measures as available)  - Encourage oral intake as appropriate  - Instruct patient on fluid and nutrition restrictions as appropriate  Outcome: Adequate for Discharge     Problem: SKIN/TISSUE INTEGRITY - ADULT  Goal: Incision(s), wounds(s) or drain site(s) healing without S/S of infection  Description: INTERVENTIONS:  - Assess and document risk factors for pressure ulcer development  - Assess and document skin integrity  - Assess and document dressing/incision, wound bed, drain sites and surrounding tissue  - Implement wound care per orders  - Initiate isolation precautions as appropriate  - Initiate Pressure Ulcer prevention bundle as indicated  Outcome: Adequate for Discharge     Problem: Impaired Swallowing  Goal: Minimize aspiration risk  Description: Interventions:  - Patient should be alert and upright for all feedings (90 degrees preferred)  - Offer food and liquids at a slow rate  - No straws  - Encourage small bites of food and small sips of liquid  - Offer pills one at a time, crush or deliver with applesauce as needed  - Discontinue feeding and notify MD (or speech pathologist) if coughing or persistent throat clearing or wet/gurgly vocal quality is noted  Outcome: Adequate for Discharge     Problem: HEMATOLOGIC - ADULT  Goal: Free from bleeding injury  Description: (Example usage: patient with low platelets)  INTERVENTIONS:  - Avoid intramuscular injections, enemas and rectal medication administration  - Ensure safe mobilization of patient  - Hold pressure on venipuncture sites to achieve adequate hemostasis  - Assess for signs and symptoms of internal bleeding  - Monitor lab trends  - Patient is to report abnormal signs of bleeding to staff  - Avoid use of toothpicks and dental floss  - Use electric shaver for shaving  - Use soft bristle tooth brush  - Limit straining and forceful nose blowing  Outcome: Adequate for Discharge

## 2023-07-21 ENCOUNTER — TELEPHONE (OUTPATIENT)
Dept: INTERNAL MEDICINE CLINIC | Facility: CLINIC | Age: 78
End: 2023-07-21

## 2023-07-25 NOTE — PROGRESS NOTES
Katja Pino, 34/1945, [de-identified]year old, male    [address] [facility]  This patient is also routinely followed by collaborating physician, Dr. Cynthia Garcia and other specialists upon consultation/facility practice. HPI:      Katja Pino is a [de-identified]year old male admitted to SNF for sub-acute rehabilitation. Sent to the ED after HD due to SOB. Post-hosp was subsequently sent to rehab for skilled nursing and subacute rehab with therapy modalities. Hospital Discharge Diagnoses:  Bacterial PNA  Acute COPD exacerbation  Chronic Hypoxemic Resp Failure secondary to COPD exacerbation  ESRD on HD  Severe aortic stenosis    PMHx, PSHx:      He  has a past medical history of Anesthesia complication, Aneurysm of abdominal vessel ([address] Ankle wound, left, initial encounter (03/24/2022), Ankle wound, left, sequela (05/19/2022), Aortic valve defect, Back problem, Bone cancer ([address] Brain cancer ([address] Cataract, Cellulitis of left ankle (03/24/2022), COPD (chronic obstructive pulmonary disease) ([address] Dialysis patient [facility]), Disorder of thyroid, Essential hypertension, Hearing impairment, Heart attack ([address] High blood pressure, High cholesterol, History of blood transfusion, Hyperlipidemia, Lung cancer ([address] Muscle weakness, Open wound of left ankle (03/30/2022), Osteoarthritis, Pericardial effusion, Pressure injury of left ankle, stage 4 ([address] (03/24/2022), Renal disorder, Shortness of breath, Sleep apnea, and Visual impairment. He  has a past surgical history that includes other surgical history; other surgical history (Left); cath bare metal stent (bms); hernia surgery; carpal tunnel release; cath pericardiocentesis (09/12/2020); Brain Surgery; other; and dialysis procedure. TODAY:  Patient presents with: Follow - Up     SUBJECTIVE:     Patient seen today in his room. No c/o pain, no observations c/w discomfort.    No c/o or observations significant for cough/chronic SOB; 94-97% on 4 L O2 via NC. Afebrile. No c/o or observation significant for CP/palpitations. No c/o or observations of abdominal pain/nausea/vomiting/diarrhea/constipation. No urinary symptoms; ESRD on HD 3 days/week. [employer]; working with therapy to improve endurance, strength, balance and independence with ADLs as much as possible. Continue discharge planning with patient, spouse and SNF team.   Spouse has told me they are not interested in palliative or hospice care. He remains full code status. No further questions/concerns per patient, rehab staff or nurse. OBJECTIVE:     Vital signs: reviewed in SNF EMR  Current medications: reviewed in [facility] EMR  Labs and imaging: reviewed in [facility] EMR    ASSESSMENT, PHYSICAL EXAM:     GENERAL HEALTH: petite, elderly male, lying in bed, disheveled appearance   LINES, TUBES, DRAINS:   SKIN: no rashes to exposed skin  WOUND: right chest; area of previous malfunctioning port/ no drainage; monitor   EYES: conjunctiva normal, no drainage from eyes  HENT: [place], hearing aids, normocephalic; normal nose, no nasal drainage  RESPIRATORY: coarse, no wheezing, on continuous O2 via NC  ABDOMEN: active BS+, soft, non-distended; no visible masses; non-tender, no guarding  : deferred; HD  MUSCULOSKELETAL: muscle wasting, appears malnourished; gen weakness r/t recent hospitalization/diagnoses/sequelae; will undergo therapies to rehab and improve strength, endurance and independence with ADLs as able/tolerated  EXTREMITIES/VASCULAR: no cyanosis or edema noted   NEUROLOGIC: follows commands  PSYCHIATRIC: alert and oriented x 3    MEDICAL DECISION MAKING/ PLAN OF CARE:     Therapy progress update/ according to SNF therapy director: Mod-max assist is needed with bed mob. CGA-min assist with transfers.   Walking approx 5-20 feet using RW at [facility]  Set up assist with eating and grooming  Min assist with UB ADLs  Total assist with LB ADLs  Max assist with toileting/clothing management  Had been home with spouse but more capable previously. Continue working with therapy to maximize abilities as able. SNF team and  staff assisting with discharge planning; DME recommendations and needs are TBD. Home health services including PT/OT, RN and bath aide are often recommended after rehab. SLP/SS if indicated.      Chronic Hypoxemic Resp Failure, dependent on O2; hx COPD exac and PNA (Hx Malignant Neoplasm Left upper lobe lung, mets to bone and brain)  Monitor VS  Pulm and RT consultations appreciated  Supp O2 ATC; increase during therapy if indicated  Duonebs INH QID  Continue Trelegy Ellipta inhaler daily      ESRD on HD (MWF)  Follow-up with Neph  Renagel po TID with meals     Low BP  Midodrine daily before HD     Anemia in chronic disease  Ferrous Sulfate po daily with breakfast     Severe Aortic Stenosis, Chronic Diastolic Heart Failure  Follow-up with Cards     Malfunctioning Port; removed 7/10/23  Monitor; wound care     Difficulty sleeping   Melatonin nightly     Major Depressive Disorder  Trazodone po nightly    Pressure related injury reported  Wound care per staff  Reposition frequently; off-load  Arginaid supplementation      Anxiety  Alprazolam TID PRN anxiety, insomnia     Pain Management  Tramadol BID; changed to BID PRN  Offer to pre-medicate 30-60 min prior to therapy  Physiatry evaluation with management appreciated     Hypothyroidism  Levothyroxine po q am with breakfast     Physical Deconditioning/Weakness; at risk for falling  Fall Precautions  PT/OT/ST to evaluate and treat  CHERYL team to establish care plan meeting with patient and POA/family as appropriate  CHERYL team/ & discharge planner to assist with establishing safe discharge plan for next level of care     DVT Prophylaxis   Encourage exercise and participation with therapy as much as able     Loose stools, Bowel Management Regimen/Constipation   Continue Loperamide QID PRN diarrhea  Florastor      Eye gtts as rx'd      Unspecified Severe Protein-Calorie Malnutrition  [facility] RD to follow while in rehab; supplementation/diet as per [facility] RD  Vitamins/supplements as r/t deficiencies  Cyanocobalamin sublingual daily   Folic Acid  Magnesium  Vit D 3    Future Appointments   Date Time Provider Oswaldo Tara   8/3/2023 12:45 PM EM [facility] LAB2 [facility] CHEMO EMO   8/3/2023  1:30 PM WILLIAN Helm [facility] ONC EMO   8/3/2023  2:00 PM EM [facility] LAB2 [facility] CHEMO EMO     Note to patient: The [id number] makes medical notes like these available to patients in the interest of transparency. However, this is a medical document intended as peer to peer communication. It is written in medical language and may contain abbreviations or verbiage that are unfamiliar. It may appear blunt or direct. Medical documents are intended to carry relevant information, facts as evident, and the clinical opinion of the practitioner who signs the document. WILLIAN Guidry  NPI# [id number]  [place] [id number] Acute Team  7/20/2023 4:00 PM    *Established patient; follow-up moderately complex visit spent w/ patient and staff, including but not limited to/ reviewing present status, needs, abilities with disciplines, reviewing medical records, vital signs, labs, completing medication reconciliation and entering orders for continued care in [facility].

## 2023-07-25 NOTE — PROGRESS NOTES
Patient seen today at  [facility] while participating in Subacute Rehab  They are also receiving care and oversight by Collaborating Physician, Dr. Woods Pettis: Emily Burnett. Makayla Vargas     1945 MRN [id number]   Kobi ClarkDeborah  Admission 23      Last Hospital Discharge 7/10/23 PCP Melissa Aguilar MD     HPI:      Branden Pepe is a [de-identified]year old male admitted to SNF for sub-acute rehabilitation. Sent to the ED after HD due to SOB. Post-hosp was subsequently sent to rehab for skilled nursing and subacute rehab with therapy modalities. Hospital Discharge Diagnoses:  Bacterial PNA  Acute COPD exacerbation  Chronic Hypoxemic Resp Failure secondary to COPD exacerbation  ESRD on HD  Severe aortic stenosis    Chief Complaint/ Indication for bedside visit today:  Patient presents with:  Hospital F/U       ALLERGIES    He is allergic to cephalosporins and demerol hcl [meperidine]. CURRENT MEDS:    ALPRAZolam 0.5 MG Oral Tab, Take 1 tablet (0.5 mg total) by mouth 3 (three) times daily as needed for Sleep or Anxiety. midodrine 10 MG Oral Tab, Take 1 tablet (10 mg total) by mouth daily as needed (30 mins before HD). 30 mins before HD  loperamide 2 MG Oral Cap, Take 1 capsule (2 mg total) by mouth 4 (four) times daily as needed for Diarrhea.  traMADol 50 MG Oral Tab, Take 1 tablet (50 mg total) by mouth every 12 (twelve) hours as needed (moderate to severe pain). levothyroxine 150 MCG Oral Tab, Take 1 tablet (150 mcg total) by mouth before breakfast.  traZODone 50 MG Oral Tab, Take 0.5 tablets (25 mg total) by mouth nightly. Saccharomyces boulardii (FLORASTOR OR), 2 (two) times a day. sevelamer carbonate 800 MG Oral Tab, Take 1 tablet (800 mg total) by mouth 3 (three) times daily with meals. finasteride 5 MG Oral Tab, Take 1 tablet (5 mg total) by mouth daily.   Melatonin 5 MG Oral Tab, Take 1 tablet (5 mg total) by mouth at bedtime. ipratropium-albuterol 0.5-2.5 (3) MG/3ML Inhalation Solution, Take 3 mL by nebulization 4 (four) times daily. Magnesium 500 MG Oral Tab, Take 1 tablet (500 mg total) by mouth in the morning and 1 tablet (500 mg total) before bedtime. fluticasone-umeclidin-vilant (Fernandez Togolese) 100-62.5-25 MCG/ACT Inhalation Aerosol Powder, Breath Activated, Inhale 1 puff into the lungs daily. folic acid 1 MG Oral Tab, Take 1 tablet (1 mg total) by mouth in the morning. Cyanocobalamin 1000 MCG Sublingual SL Tab, Place 1 tablet under the tongue daily. ferrous sulfate 325 (65 FE) MG Oral Tab EC, Take 1 tablet (325 mg total) by mouth daily with breakfast.  Vitamin D3, Cholecalciferol, 25 MCG Oral Tab, Take 2 tablets (2,000 Units total) by mouth daily. Albuterol Sulfate  (90 Base) MCG/ACT Inhalation Aero Soln, Inhale 2 puffs into the lungs every 6 (six) hours as needed for Wheezing or Shortness of Breath. cycloSPORINE 0.05 % Ophthalmic Emulsion, Place into both eyes 2 (two) times daily. No current facility-administered medications on file prior to visit. HISTORY:    He  has a past medical history of Anesthesia complication, Aneurysm of abdominal vessel ([address] Ankle wound, left, initial encounter (03/24/2022), Ankle wound, left, sequela (05/19/2022), Aortic valve defect, Back problem, Bone cancer ([address] Brain cancer ([address] Cataract, Cellulitis of left ankle (03/24/2022), COPD (chronic obstructive pulmonary disease) ([address] Dialysis patient [facility]), Disorder of thyroid, Essential hypertension, Hearing impairment, Heart attack ([address] High blood pressure, High cholesterol, History of blood transfusion, Hyperlipidemia, Lung cancer ([address] Muscle weakness, Open wound of left ankle (03/30/2022), Osteoarthritis, Pericardial effusion, Pressure injury of left ankle, stage 4 ([address] (03/24/2022), Renal disorder, Shortness of breath, Sleep apnea, and Visual impairment.     He  has a past surgical history that includes other surgical history; other surgical history (Left); cath bare metal stent (bms); hernia surgery; carpal tunnel release; cath pericardiocentesis (09/12/2020); Brain Surgery; other; and dialysis procedure. CODE STATUS VERIFIED: full code    SUBJECTIVE/ ROS:     Pt seen in his room; lying in bed, wife visiting at bedside. Pt is very [place]; wife assisting with communication. No observations or c/o pain; staff were asked by wife to stop the rx'd scheduled Tramadol due to concern of excessive sleepiness; poor participation with therapy. No observations of cough; on O2 chronically. No observations or c/o chest pain/palpitation/dizziness  No observations or c/o abdominal discomfort/nausea/vomiting/diarrhea/constipation  ESRD on HD: appts Q MWF. No further questions/concerns per patient, spouse, staff or nurse. OBJECTIVE:     Vital signs reviewed in [facility] EMR: 93/37, 97.3 F, 71, 16, 96%, wt 7/11: 139.2 lbs  Hospital and rehab lab results and imaging reviewed as available.     Lab Results   Component Value Date    GLU 82 07/09/2023    BUN 72 (H) 07/09/2023    BUNCREA 14.4 07/09/2023    CREATSERUM 5.01 (H) 07/09/2023    ANIONGAP 13 07/09/2023    GFRNAA 12 (L) 08/01/2022    GFRAA 13 (L) 08/01/2022    CA 7.8 (L) 07/09/2023    OSMOCALC 282 07/09/2023    ALKPHO 63 06/01/2023    AST 19 06/01/2023    ALT 14 (L) 06/01/2023    BILT 0.3 06/01/2023    TP 6.5 06/01/2023    ALB 2.3 (L) 07/09/2023    GLOBULIN 4.3 06/01/2023     (L) 07/09/2023    K 4.8 07/09/2023    CL 94 (L) 07/09/2023    CO2 19.0 (L) 07/09/2023       Lab Results   Component Value Date    WBC 6.6 07/09/2023    RBC 3.62 (L) 07/09/2023    HGB 11.3 (L) 07/09/2023    HCT 35.0 (L) 07/09/2023    .0 (L) 07/09/2023    MCV 96.7 07/09/2023    MCH 31.2 07/09/2023    MCHC 32.3 07/09/2023    RDW 18.6 (H) 07/09/2023    NEPRELIM 5.55 07/09/2023    NEUTABS 3.26 02/22/2021    LYMPHABS 0.30 (L) 02/22/2021    EOSABS 0.07 02/22/2021    BASABS 0.04 02/22/2021    NEUT 80 02/22/2021    LYMPH 8 02/22/2021    MON 1 02/22/2021    EOS 2 02/22/2021    BASO 1 02/22/2021    NEPERCENT 84.6 07/09/2023    LYPERCENT 4.1 07/09/2023    MOPERCENT 8.2 07/09/2023    EOPERCENT 2.4 07/09/2023    BAPERCENT 0.2 07/09/2023    NE 5.55 07/09/2023    LYMABS 0.27 (L) 07/09/2023    MOABSO 0.54 07/09/2023    EOABSO 0.16 07/09/2023    BAABSO 0.01 07/09/2023       ASSESSMENT/ PHYSICAL EXAM:     GENERAL HEALTH: petite, elderly male, lying in bed, disheveled appearance   LINES, TUBES, DRAINS:   SKIN: no rashes to exposed skin  WOUND: right chest; area of previous malfunctioning port/ no drainage; monitor   EYES: conjunctiva normal, no drainage from eyes  HENT: [place], hearing aids, normocephalic; normal nose, no nasal drainage  RESPIRATORY: coarse, no wheezing, on continuous O2 via NC  ABDOMEN: active BS+, soft, non-distended; no visible masses; non-tender, no guarding  : deferred; HD  MUSCULOSKELETAL: muscle wasting, appears malnourished; gen weakness r/t recent hospitalization/diagnoses/sequelae; will undergo therapies to rehab and improve strength, endurance and independence with ADLs as able/tolerated  EXTREMITIES/VASCULAR: no cyanosis or edema noted   NEUROLOGIC: follows commands  PSYCHIATRIC: alert and oriented x 3    MEDICAL DECISION MAKING and PLAN OF CARE:     *This patient will undergo PT/OT/ST evaluation with treatment as needed. *Skilled nursing care including assistance with ADLs and medication administration.     Chronic Hypoxemic Resp Failure, dependent on O2; hx COPD exac and PNA (Hx Malignant Neoplasm Left upper lobe lung, mets to bone and brain)  Monitor VS  Pulm and RT consultations appreciated  Supp O2 ATC; increase during therapy if indicated  Duonebs INH QID  Continue Trelegy Ellipta inhaler daily     ESRD on HD (MWF)  Follow-up with Neph  Renagel po TID with meals    Low BP  Midodrine daily before HD    Anemia in chronic disease  Ferrous Sulfate po daily with breakfast    Severe Aortic Stenosis, Chronic Diastolic Heart Failure  Follow-up with Cards    Malfunctioning Port; removed 7/10/23  Monitor; wound care    Difficulty sleeping   Melatonin nightly    Major Depressive Disorder  Trazodone po nightly    Anxiety  Alprazolam TID PRN anxiety, insomnia    Pain Management  Tramadol BID; changed to BID PRN  Offer to pre-medicate 30-60 min prior to therapy  Physiatry evaluation with management appreciated    Hypothyroidism  Levothyroxine po q am with breakfast    Physical Deconditioning/Weakness; at risk for falling  Fall Precautions  PT/OT/ST to evaluate and treat  CHERYL team to establish care plan meeting with patient and POA/family as appropriate  CHERYL team/ & discharge planner to assist with establishing safe discharge plan for next level of care     DVT Prophylaxis   Encourage exercise and participation with therapy as much as able    Loose stools, Bowel Management Regimen/Constipation   Continue Loperamide QID PRN diarrhea  Florastor     Eye gtts as rx'd      Unspecified Severe Protein-Calorie Malnutrition  [facility] RD to follow while in rehab; supplementation/diet as per [facility] RD  Vitamins/supplements as r/t deficiencies  Cyanocobalamin sublingual daily   Folic Acid  Magnesium   Vit D 3    Future Appointments   Date Time Provider Oswaldo Pacheco   8/3/2023 12:45 PM EM CC LAB2 [facility] CHEMO EMO   8/3/2023  1:30 PM Narda Renee, APRN [address] [facility] HEM ONC EMO   8/3/2023  2:00 PM EM CC LAB2 [facility] CHEMO EMO     *Follow-Up with PCP within 1-2 weeks following [facility] discharge. *Follow-Up with specialists as recommended. *Greater than 45 minutes spent w/ patient and staff, including but not limited to/ reviewing present status, needs, abilities with disciplines, reviewing medical records, vital signs, labs, completing med rec and entering orders to establish plan of care in [facility]. Note to patient: The [id number] makes medical notes like these available to patients in the interest of transparency. However, this is a medical document intended as peer to peer communication. It is written in medical language and may contain abbreviations or verbiage that are unfamiliar. It may appear blunt or direct. Medical documents are intended to carry relevant information, facts as evident, and the clinical opinion of the practitioner who signs the document.     Hattie Epps  NPI# [id number]  [address], Post Acute APRN  07/13/23   1:30 PM
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
## 2023-07-26 PROBLEM — J90 PLEURAL EFFUSION: Status: ACTIVE | Noted: 2023-01-01

## 2023-07-26 PROBLEM — J69.0 ASPIRATION PNEUMONIA (HCC): Status: ACTIVE | Noted: 2023-01-01

## 2023-07-26 PROBLEM — S09.90XA INJURY OF HEAD, INITIAL ENCOUNTER: Status: ACTIVE | Noted: 2023-01-01

## 2023-07-26 NOTE — H&P
[facility]    PATIENT'S NAME: Jodi Bray   ATTENDING PHYSICIAN: Sixto Dudley MD   PATIENT ACCOUNT#:   [id number]    LOCATION:  [address]  MEDICAL RECORD #:   [id number]       YOB: 1945  ADMISSION DATE:       07/26/2023    HISTORY AND PHYSICAL EXAMINATION    CHIEF COMPLAINT:  Recurrent aspiration pneumonia. HISTORY OF PRESENT ILLNESS:  Patient is an 78-year-old  male who was hospitalized on June 28 and discharged on July 12 with a similar presentation, treated with antibiotics with improvement in his clinical status. He has been deteriorating in terms of physical ability, and he has been in a rehab facility. Today, he fell off his wheelchair, sustaining an abrasion on his forehead. He was brought into the emergency department for evaluation. Imaging studies including CT scan of the cervical spine and CT scan of the brain showed no acute findings. There is new loculated right apical pleural effusion and mild pleural thickening in the left apex which are all new. Chest x-ray showed heart size upper limits of normal, and there is prominence of the pulmonary vascularity compatible with mild pulmonary congestive finding; worsening opacity in the right mid and lower chest may be related to posterior layering right pleural effusion, could not exclude worsening asymmetric pulmonary edema versus worsening right upper and mid chest pneumonia; moderate right pleural effusion which has increased in size from previous study; persistent left basal opacification suggestive of left basal scarring, atelectasis or pneumonia. Procalcitonin still pending. CBC showed white blood cell count of 3.8, hemoglobin 12.9, no left shift. Chemistry showed GFR is 9, sodium 132, and chloride 96. Patient did not get dialysis today. Arterial blood gas showed PO2 of 67, pCO2 of 45. Started on IV Zosyn, and he will be admitted to the hospital for further management.      PAST MEDICAL HISTORY: Non-small cell lung cancer, adenocarcinoma, left upper lobe, metastasized to the bone, single metastatic lesion to the right parietal lobe, status post SBRT. He was on maintenance single-agent Keytruda and developed immune-mediated checkpoint interstitial nephritis, ended up with peritoneal then hemodialysis. Immunotherapy was discontinued. He also has a history of neuroendocrine cancer, well differentiated, with liver and retroperitoneal metastases and has been on octreotide infusions up until recently, interrupted by his hospitalization. He had pleural effusions requiring chest tube and pericardial effusions required pericardiocentesis. History of chronic obstructive pulmonary disease; coronary artery disease, multiple PCI stents; chronic left ventricular diastolic dysfunction; abdominal aortic aneurysm with moderate to severe aortic stenosis and moderate pulmonary artery hypertension; obstructive sleep apnea; osteoarthritis; hypertension; hyperlipidemia; and benign prostatic hypertrophy. PAST SURGICAL HISTORY:  Left ankle open reduction and internal fixation; hernia repair; pericardiocentesis, left-sided chest tube and then removal; right cochlear implant; liver biopsy; peritoneal dialysis catheter insertion and then removal; recent left internal jugular tunneled catheter for dialysis, which was replaced by Interventional Radiology. He had a Port-A-Cath on the right side. MEDICATIONS:  Please see medication reconciliation list.     ALLERGIES:  No known drug allergies. SOCIAL HISTORY:  Ex-tobacco user. No current tobacco, alcohol, or drug use. Lives in a nursing facility. Requires assistance in his basic activities of daily living. REVIEW OF SYSTEMS:  Difficult to obtain from the patient. Per his wife, he is progressively getting weaker lately. Today, he was sitting in his wheelchair and reportedly fell off.   He has been coughing per his wife, and he sometimes chokes on his foods despite the fact that he is able to chew and swallow his food. PHYSICAL EXAMINATION:    GENERAL:  Patient appears cachectic. VITAL SIGNS:  Temperature 98.0, pulse 79, respiratory rate 24, blood pressure 122/61, pulse ox 99% on nasal cannula oxygen. HEENT:  Dry mucous membranes. Small skin superficial abrasion noted on the upper forehead area. NECK:  Supple. No lymphadenopathy. LUNGS:  Crackles and rhonchi auscultated on both lung fields. HEART:  Regular rate and rhythm. S1 and S2 auscultated. ABDOMEN:  Soft, nondistended. No tenderness. EXTREMITIES:  Trace edema, both legs. No clubbing or cyanosis. NEUROLOGIC:  No clear focal defect. ASSESSMENT:    1. Recurrent aspiration pneumonia with hypoxemia. 2.   Musculoskeletal deconditioning. 3.   Aortic stenosis. 4.   End-stage renal disease. 5.   History of lung cancer and neuroendocrine cancer. PLAN:  Patient will be admitted to general medical floor. IV Zosyn. Speech therapy evaluation. Pureed thickened diet. Fall precautions. Monitor his hemodynamic status. Obtain nephrology and pulmonary consults. Overall, very poor prognosis. Discussed the case with the wife, and patient is probably hospice appropriate in the near future. We will reopen the discussion pending the clinical course.       Dictated By Alen Lima MD  d: 07/26/2023 16:28:35  t: 07/26/2023 16:43:04  Job [id number]  WW/

## 2023-07-26 NOTE — ED INITIAL ASSESSMENT (HPI)
Patient presents to ER via EMS for concerns of fall. Per medics, patient was leaning in wheelchair and fell over to right side. Patient has skin tears to right shoulder and right side of his head. Per medics, patient at baseline orientation. No thinners. C-collar in place.    Alert to verbal
2-4 times/mo

## 2023-07-26 NOTE — ED QUICK NOTES
Orders for admission, patient is aware of plan and ready to go upstairs. Any questions, please call ED RN victoriano at extension [phone].      Patient Covid vaccination status: Fully vaccinated     COVID Test Ordered in ED: None    COVID Suspicion at Admission: N/A    Running Infusions:      Mental Status/LOC at time of transport: x1    Other pertinent information:   CIWA score: N/A   NIH score:  N/A

## 2023-07-27 PROBLEM — R62.7 FAILURE TO THRIVE IN ADULT: Status: ACTIVE | Noted: 2023-01-01

## 2023-07-27 NOTE — PROGRESS NOTES
07/27/23 1009   VISIT TYPE   SLP Inpatient Visit Type (Documentation Required) Attempted Evaluation  (pt not appropriate/safe for po at this time)   FOLLOW UP/PLAN   Follow Up Needed (Documentation Required) Yes   SLP Follow-up Date 07/28/23     Will f/u when/if safe/appropriate for evaluation.  D/W RN and palliative MD Riky Prasad Pathologist  Phone Number Ext. [phone]

## 2023-07-27 NOTE — PROGRESS NOTES
Renal-Vicki TABS 0.8 mg  daily  is Non-Formulary Medication &  Auto-Substituted to Dialvite tablet daily Per P&T PROTOCOL

## 2023-07-27 NOTE — PROGRESS NOTES
[facility] Pharmacy Note:  Renal Adjustment for piperacillin/tazobactam (Duc Fam)    Niharika Salas is a [de-identified]year old patient who has been prescribed piperacillin/tazobactam (ZOSYN) 3.375 mg every 8 hrs. The estimated creatinine clearance is 9.2 mL/min (A) (based on SCr of 5.67 mg/dL (H)). The dose has been adjusted to piperacillin/tazobactam (ZOSYN) 3.375 mg every 12 hrs per hospital renal dose adjustment protocol for treatment of pneumonia. Pharmacy will follow and adjust dose as warranted for additional renal function changes.     Thank you,    Della Oneal, PharmD  7/26/2023  7:59 PM

## 2023-07-27 NOTE — PROGRESS NOTES
PT orders received chart reviewed. PT spoke with RN who reports pt has a palliative consult at this time and he is not able to participate or tolerate PT services today resting in bed for comfort. PT will continue to follow and re attempt 7/28 as pt tolerance and medical progress allows.

## 2023-07-28 NOTE — SLP NOTE
ADULT SWALLOWING EVALUATION    ASSESSMENT    ASSESSMENT/OVERALL IMPRESSION:  PPE REQUIRED. THIS THERAPIST WORE GLOVES AND MASK FOR DURATION OF EVALUATION. HANDS WASHED UPON ENTRANCE/EXIT. Per MD note, \"Patient is an 20-year-old  male who was hospitalized on June 28 and discharged on July 12 with a similar presentation, treated with antibiotics with improvement in his clinical status. He has been deteriorating in terms of physical ability, and he has been in a rehab facility. Today, he fell off his wheelchair, sustaining an abrasion on his forehead. He was brought into the emergency department for evaluation. \"    SLP BSSE orders received and acknowledged. A swallow evaluation warranted per \"RN dysphagia screen/aspiration pneumonia\". Pt afebrile with clear/weak vocal quality, on 2L/Min, with oxygen saturation at 100%. Pt with hx of dysphagia at [address] [place] [address], BSE 7/1/23 with recommendations for soft bite sized/thin liquids. Pt positioned 90 degrees in bed, lethargic/cooperative. Oral motor examination revealed significant overall reduction in strength, ROM, and rate of motion. Pt presented with trials of puree, moderately and mildly thick liquids via tsp. Pt with adequate oral acceptance and bilabial seal across all trials. Pt with reduced bolus formation/propulsion. Pt's swallow response appears delayed with reduced hyolaryngeal elevation/excursion. No clinical signs of aspiration (e.g., immediate/delayed throat clear, immediate/delayed cough, wet vocal quality, increased O2 effort) observed across all trials. 7/26 CXR indicates \"Heart size upper limits of normal to mildly prominent, and there is prominence of the the pulmonary vascularity compatible with mild pulmonary congestive change and mild interstitial edema.   Worsening opacity in the right mid and lower chest may be related to posterior layering right pleural effusion, although I cannot exclude worsening asymmetric pulmonary edema pattern versus worsening right upper and right mid chest pneumonia. Correlate clinically and follow-up studies are advised. Small to moderate right pleural effusion may have increased in size since previous study. Persistent left base opacification suggesting left basal scarring/atelectasis/pneumonia. Findings are grossly stable. Follow-up studies are advised\". Oxygen status remained stable t/o the entire evaluation. At this time, pt presents with mild/moderate oral dysphagia and probable pharyngeal dysfunction. Recommend a puree diet and mildly thick liquids with strict adherence to safe swallowing compensatory strategies. Results and recommendations reviewed with RN. SLP collaborated with RN for MD diet orders. PLAN: SLP to f/u x2-3 meal assessments, monitor imaging, and VFSS if clinically indicated           RECOMMENDATIONS   Diet Recommendations - Solids: Puree  Diet Recommendations - Liquids: Nectar thick liquids/ Mildly thick                        Compensatory Strategies Recommended: No straws; Liquids via spoon; Slow rate; Alternate consistencies;Small bites and sips  Aspiration Precautions: Upright position; Slow rate;Small bites and sips; No straw  Medication Administration Recommendations: Crushed in puree  Treatment Plan/Recommendations: Aspiration precautions  Discharge Recommendations/Plan: Undetermined    HISTORY   MEDICAL HISTORY  Reason for Referral: RN dysphagia screen;R/O aspiration    Problem List  Principal Problem:    Weakness  Active Problems:    Chronic kidney disease, unspecified CKD stage    Pleural effusion    Fall, initial encounter    Injury of head, initial encounter    Aspiration pneumonia ([address]    Failure to thrive in adult      Past Medical History  Past Medical History:   Diagnosis Date    Anesthesia complication     blood pressure drops    Aneurysm of abdominal vessel (HCC)     Ankle wound, left, initial encounter 03/24/2022    probes to bone.  rule out ostemyelitis    Ankle wound, left, sequela 05/19/2022    Aortic stenosis     Severe    Bone cancer (HCC)     Cataract     Cellulitis of left ankle 03/24/2022    COPD (chronic obstructive pulmonary disease) ([facility])     Dialysis patient [facility])     Disorder of thyroid     Essential hypertension     Hearing impairment     Cocher implant- right    Heart attack ([facility]     High blood pressure     High cholesterol     History of blood transfusion     9/2022    Hyperlipidemia     Lung cancer ([facility]     metastatic adenocarcinoma of the lung    Muscle weakness     Open wound of left ankle 03/30/2022    Osteoarthritis     Pericardial effusion     Pressure injury of left ankle, stage 4 ([facility] 03/24/2022    Pulmonary hypertension (HCC)     Renal disorder     CKD    Sleep apnea     Visual impairment     glasses       Prior Living Situation: Skilled nursing facility  Diet Prior to Admission: Regular; Thin liquids  Precautions: Aspiration    Patient/Family Goals: pt did not state, family not present    SWALLOWING HISTORY  Current Diet Consistency: NPO  Dysphagia History: see above  Imaging Results: 7/26 CT BRAIN  CONCLUSION: No skull fracture or intracranial hemorrhage. SUBJECTIVE       OBJECTIVE   ORAL MOTOR EXAMINATION     Symmetry: Unable to assess  Strength:  (reduced)     Range of Motion:  (reduced)  Rate of Motion: Reduced    Voice Quality: Weak;Clear  Respiratory Status: Supplemental O2;Nasal cannula  Consistencies Trialed: Nectar thick liquids; Honey thick liquids;Puree  Method of Presentation: Staff/Clinician assistance;Spoon  Patient Positioning: Upright;Midline    Oral Phase of Swallow: Impaired  Bolus Retrieval: Intact  Bilabial Seal: Intact  Bolus Formation: Impaired  Bolus Propulsion: Impaired     Retention: Intact    Pharyngeal Phase of Swallow: Impaired  Laryngeal Elevation Timing: Appears impaired  Laryngeal Elevation Strength: Appears impaired  Laryngeal Elevation Coordination: Appears intact  (Please note: Silent aspiration cannot be evaluated clinically. Videofluoroscopic Swallow Study is required to rule-out silent aspiration.)    Esophageal Phase of Swallow: No complaints consistent with possible esophageal involvement  Comments: NA              GOALS  Goal #1 The patient will tolerate puree consistency and mildly thick liquids without overt signs or symptoms of aspiration with 100 % accuracy over 1-2 session(s). In Progress   Goal #2 The patient/family/caregiver will demonstrate understanding and implementation of aspiration precautions and swallow strategies independently over 1-2 session(s). In Progress   Goal #3 The patient will utilize compensatory strategies as outlined by  BSSE (clinical evaluation) including Slow rate, Small bites, Small sips, Alternate liquids/solids, No straws, Upright 90 degrees, Liquids via tsp amount only, Feed patient with 1:1 assistance 100 % of the time across 2 sessions. In Progress   Goal #4 The patient will tolerate trial upgrade of soft/hard solids consistency and thin liquids without overt signs or symptoms of aspiration with 100 % accuracy over 1-2 session(s).     In Progress     FOLLOW UP  Treatment Plan/Recommendations: Aspiration precautions  Number of Visits to Meet Established Goals: 2  Follow Up Needed (Documentation Required): Yes  SLP Follow-up Date: 07/29/23    Thank you for your referral.   If you have any questions, please contact Tamia De Souza, SLP Amy Kraig Angelucci. Michelle Solomon  Phone Number Ext. [phone]

## 2023-07-28 NOTE — PHYSICAL THERAPY NOTE
PHYSICAL THERAPY EVALUATION - INPATIENT     Room Number: 562/562-A  Evaluation Date: 7/28/2023  Type of Evaluation: Initial   Physician Order: PT Eval and Treat    Presenting Problem: Weakness, CKD  Co-Morbidities : PD  Reason for Therapy: Mobility Dysfunction and Discharge Planning    PHYSICAL THERAPY ASSESSMENT     Patient is a [de-identified]year old male admitted 7/26/2023 for Weakness, Parkinson's Disease, metastatic CA brain, bone, pericardial effusion, CKD on HD, COPD  Patient's current functional deficits include decreased strength, endurance, bed mobility, transfers, balance, w/c mobility, which are below the patient's pre-admission status. Pt ed with AAROM / PROM in supine. Pt ed with repositioning in bed with max A required for all repositioning and sitting positioning with poor balance. Pt not able to tolerate sitting without support fall risk is elevated and pt presents as drowsy and fatigable. Pt has HD scheduled for alter today. Pt is very [place] and drowsy demonstrating minimal ability to follow verbal cues with AAROM. Pt was assisted into a comfortable position to rest in bed prior to HD. Pt's overall presentation and prognosis appears guarded to poor d/t significant PMH with mets and CKD. Pt will be placed on a trial for PT to promote improved functional bed mobility and transfers to allow progress back to w/c mobility as medical progress allows. CHERYL with PT is recommended as medical progress allows. Family does not want hospice at this time because they do not want to stop HD treatments. Pt will promote improved mobility to ease burden of care and to promote quality of life for pt and family. The patient's Approx Degree of Impairment: 81.38% has been calculated based on documentation in the [facility] '6 clicks' Inpatient Basic Mobility Short Form.   Research supports that patients with this level of impairment may benefit from CHERYL with PT as medical progress allows vs Palliative care support and LTC as per pt and family wishes. .    Patient will benefit from continued IP PT services to address these deficits in preparation for discharge. DISCHARGE RECOMMENDATIONS  PT Discharge Recommendations: Sub-acute rehabilitation (PT)    PLAN  PT Treatment Plan: Bed mobility; Body mechanics; Endurance; Energy conservation;Patient education;Gait training;Range of motion;Strengthening;Transfer training;Balance training  Rehab Potential : Guarded  Frequency (Obs): 3x/week       PHYSICAL THERAPY MEDICAL/SOCIAL HISTORY     History related to current admission:   Patient is a [de-identified]year old male with pmh of ESRD was on PD now s/p PD catheter removal and on HD MWF, ESRD likely secondary to AIN from [facility], metastatic lung ca (brain, bone, malignant pericardial effusion) s/p chemo and off of immunotherapy s/p cycle 19 of pembrolizumab), HTN, HL, s/p cochlear implant, hypothyroidism, BPH, grade II DD, moderate moderate regurgitation and aorotic stenosis, S/p cochlear implant surgery, 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 Chest x-ray showed prominence of pulmonary vascularity and pulmonary congestive changes with worsening of opacity in right middle and lower lung with moderate right pleural effusion. Patient started on IV Zosyn for recurrent aspiration pneumonia. Patient skipped dialysis yesterday. Patient currently more lethargic, lot of upper airway secretion. Unable to provide any detailed history. Seems to have lost weight since last admission last month. Limited. Complain of shortness of breath. Currently on nasal cannula. As per chart review from palliative MD notes.      Problem List  Principal Problem:    Weakness  Active Problems:    Chronic kidney disease, unspecified CKD stage    Pleural effusion    Fall, initial encounter    Injury of head, initial encounter    Aspiration pneumonia ([facility]    Failure to thrive in adult      HOME SITUATION  Home Situation  Type of Home: House (Came from [facility])  Home Layout: One level; Able to live on main level  Lives With: Spouse  Patient Owned Equipment: Rolling walker; Wheelchair  Patient Regularly Uses: Home O2 (4L at baseline per chart)     Prior Level of [place]: Living with supportive spouse mostly in bed or w/c with increasing weakness and inability to care for self. Spouse having trouble caring for spouse at home. Pt came from [facility] recently fell out of w/c.     SUBJECTIVE  Moaning with movement. PHYSICAL THERAPY EXAMINATION     OBJECTIVE  Precautions: Bed/chair alarm;Hard of hearing  Fall Risk: High fall risk    WEIGHT BEARING RESTRICTION                PAIN ASSESSMENT  Rating: Unable to rate (moans with movement)     Management Techniques: Activity promotion; Body mechanics;Breathing techniques;Relaxation;Repositioning    COGNITION  Overall Cognitive Status:  Impaired Drowsy, lethargic unable to provide history A x name Telida    RANGE OF MOTION AND STRENGTH ASSESSMENT  Upper extremity ROM and strength are within functional limits 3/5  Lower extremity ROM is within functional limits   Lower extremity strength is within functional limits 2+/5    BALANCE  Static Sitting: Poor  Dynamic Sitting: Poor  Static Standing: Dependent  Dynamic Standing: Dependent    ACTIVITY TOLERANCE  Pulse: 72  Heart Rate Source: Monitor  Resp: 18  BP: 104/59  BP Location: Left arm  BP Method: Automatic  Patient Position: Lying    O2 WALK  Oxygen Therapy  SPO2% on Oxygen at Rest: 99  At rest oxygen flow (liters per minute): 2  SPO2% Ambulation on Room Air: 98  SPO2% Ambulation on Oxygen: 2    AM-PAC '6-Clicks' INPATIENT SHORT FORM - BASIC MOBILITY  How much difficulty does the patient currently have. ..   Patient Difficulty: Turning over in bed (including adjusting bedclothes, sheets and blankets)?: A Lot   Patient Difficulty: Sitting down on and standing up from a chair with arms (e.g., wheelchair, bedside commode, etc.): A Lot   Patient Difficulty: Moving from lying on back to sitting on the side of the bed?: A Lot   How much help from another person does the patient currently need. .. Help from Another: Moving to and from a bed to a chair (including a wheelchair)?: Total   Help from Another: Need to walk in hospital room?: Total   Help from Another: Climbing 3-5 steps with a railing?: Total     AM-PAC Score:  Raw Score: 9   Approx Degree of Impairment: 81.38%   Standardized Score (AM-PAC Scale): 30.55   CMS Modifier (G-Code): CM    FUNCTIONAL ABILITY STATUS  Functional Mobility/Gait Assessment  Gait Assistance: Not tested    Bed Mobility: Max A rolling and supine to sit with support in sitting poor balance fatgiued    Transfers: NT unable mendez transfers recommended    Exercise/Education Provided:  Bed mobility  Body mechanics  Energy conservation  Functional activity tolerated  Posture  ROM  Strengthening  Lower therapeutic exercise: Ankle pumps  Hip AB/AD  Heel slides  SLR  Transfer training    Patient End of Session: In bed;Needs met; With [facility] staff;Call light within reach;RN aware of session/findings; All patient questions and concerns addressed; Alarm set    CURRENT GOALS    Goals to be met by: 8/20/2023  Patient Goal Patient's self-stated goal is: Return home    Goal #1 Patient is able to demonstrate supine - sit EOB @ level: minimum assistance     Goal #1   Current Status    Goal #2 Patient is able to demonstrate transfers Sit to/from Stand at assistance level: moderate assistance with walker - rolling     Goal #2  Current Status    Goal #3 Patient is able to SPT to chair 4' feet with assist device: walker - rolling at assistance level: moderate assistance   Goal #3   Current Status    Goal #4 Patient will negotiate w/c 48' with supervision with UE propulsion   Goal #4   Current Status    Goal #5 Patient to demonstrate independence with home activity/exercise instructions provided to patient in preparation for discharge.    Goal #5   Current Status    Goal #6    Goal #6  Current Status      Patient Evaluation Complexity Level:  History Low - no personal factors and/or co-morbidities   Examination of body systems Low - addressing 1-2 elements   Clinical Presentation Low - Stable   Clinical Decision Making Low Complexity

## 2023-07-30 NOTE — PROGRESS NOTES
07/30/23 1638   Clinical Encounter Type   Referral From Nurse   Referral To    Sacramental Encounters   Sacrament of Sick-Anointing Family requested anointing; Patient requested anointing   Sacrament Other last rites   Patient Spiritual Encounters   Spiritual Assessment Completed No     Nurse phoned  to request patient be placed on list for last rites as requested by family/wife.

## 2023-07-31 NOTE — SIGNIFICANT EVENT
[facility] notified and patient is not a candidate for organ, tissue or cornea donation per Lucy Rojas at [facility] ref # [id number].  notified and authorized release of body to  home.  Per [place] (badge #[phone]) \"Patient is not a [place]\"

## 2023-07-31 NOTE — SIGNIFICANT EVENT
This RN was called to patients room to pronounce time of death. Patient DNAR/select. Upon arrival patient had no palpable carotid pulse, no audible heart sounds and no spontaneous respirations. Patient pronounced at [place] [id number].

## 2023-07-31 NOTE — DISCHARGE SUMMARY
[facility]    Discharge Summary    Sheldon Pinto Patient Status:  Inpatient    1945 MRN [id number]   Location [facility] 5SW/SE Attending No att. providers found   Hosp Day # 5 PCP Bridget Diaz MD     Date of Admission: 2023 Disposition:      Date of Discharge: 23      Admitting Diagnosis: Weakness [R53.1]  Pleural effusion [J90]  Injury of head, initial encounter [S09.90XA]  Fall, initial encounter [W19. 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 [facility])     Malignant pericardial effusion     Malignant neoplasm metastatic to bone (HCC)     Lung metastasis     Malignant pleural effusion     Mesenteric mass     Medication monitoring encounter     Cancer related pain     Chemotherapy management, encounter for     Encounter for central line care     Anemia     Encounter for immunotherapy     Weakness     Hypothyroidism due to medication     COPD exacerbation ([address]     OVI (acute kidney injury) ([address]     Dehydration     Acute renal failure (ARF) (HCC)     Metabolic acidosis     Nephritis     Drug-induced nephritis     History of immunotherapy     Hypokalemia     Hypernatremia     AIN (acute interstitial nephritis)     Healed wound     Stage 3 chronic kidney disease ([address]     Acute renal injury ([address]     Hyperkalemia     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)     Malignant neoplasm metastatic to liver (HCC)     Acute metabolic acidosis     Diarrhea, unspecified type     Acute on chronic renal failure      CKD (chronic kidney disease) stage 5, GFR less than 15 ml/min (HCC)     Secondary hyperparathyroidism ([address]     Respiratory distress     Neutropenia, unspecified type ([address]     Multifocal pneumonia     Hydropneumothorax Elevated troponin     Chronic kidney disease, unspecified CKD stage     Acute respiratory failure with hypoxia (HCC)     Empyema lung (HCC)     Hyperphosphatemia     Open wound of right cheek with complication     Syncope, near     ESRD (end stage renal disease) on dialysis ([address]     Open neck wound, sequela     Uremia     Thrombocytopenia (HCC)     Anemia, unspecified type     Community acquired pneumonia, unspecified laterality     Primary malignant neoplasm of lung metastatic to other site [facility])     Neuroendocrine tumor     Malnutrition ([address]     SOB (shortness of breath)     ESRD (end stage renal disease) ([address]     Pneumonia due to infectious organism     Hyponatremia     Hypophosphatemia     Acute on chronic respiratory failure with hypoxia (HCC)     Palliative care by specialist     Goals of care, counseling/discussion     Advance care planning     Pleural effusion     Fall, initial encounter     Injury of head, initial encounter     Aspiration pneumonia ([address]     Failure to thrive in adult      Reason for Admission:   Fall 2/2 increasing weakness, mechanical, Failure to thrive  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 Halie Wyman  Patient is an 27-year-old  male who was hospitalized on June 28 and discharged on July 12 with a similar presentation, treated with antibiotics with improvement in his clinical status. He has been deteriorating in terms of physical ability, and he has been in a rehab facility. Today, he fell off his wheelchair, sustaining an abrasion on his forehead. He was brought into the emergency department for evaluation.  Imaging studies including CT scan of the cervical spine and CT scan of the brain showed no acute findings. There is new loculated right apical pleural effusion and mild pleural thickening in the left apex which are all new. Chest x-ray showed heart size upper limits of normal, and there is prominence of the pulmonary vascularity compatible with mild pulmonary congestive finding; worsening opacity in the right mid and lower chest may be related to posterior layering right pleural effusion, could not exclude worsening asymmetric pulmonary edema versus worsening right upper and mid chest pneumonia; moderate right pleural effusion which has increased in size from previous study; persistent left basal opacification suggestive of left basal scarring, atelectasis or pneumonia. Procalcitonin still pending. CBC showed white blood cell count of 3.8, hemoglobin 12.9, no left shift. Chemistry showed GFR is 9, sodium 132, and chloride 96. Patient did not get dialysis today. Arterial blood gas showed PO2 of 67, pCO2 of 45. Started on IV Zosyn, and he will be admitted to the hospital for further management.      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  Aspiration PNA, Loculated Pleural effusion 2/2 above  Profound debility and muscle waisting 2/2 above  Acute hypoxic RF 2/2 above  Hx of COPD  -rec palliative eval appreciated  -cxr appears worse  -on abx  -suction, trelety, o2, nebs  -appreciate pulm recs  -malnutrition assessment  -added scopalamine patch     Hypoglycemia  -added fluids with dextrose  -Wean IV fluids with dextrose as able     Dysphagia  -Continued modified diet     ESRD on HD  -[facility] [place]  -per renal     Hx aortic stenosis  -stable    Dispo- patient passed away at 2:05 am. He was DNR/select and cpr was not attempted    Consultations:   Nephrology  Palliative care  Pulmonology    Discharge Condition:     Discharge Medications:      Discharge Medications        ASK your doctor about these medications        Instructions Prescription details   acetaminophen 325 MG Tabs  Commonly known as: Tylenol      Take 1 tablet (325 mg total) by mouth every 6 (six) hours as needed for Pain. Refills: 0     acetaminophen 500 MG Tabs  Commonly known as: Tylenol Extra Strength      Take 2 tablets (1,000 mg total) by mouth 3 (three) times daily. Refills: 0     albuterol 108 (90 Base) MCG/ACT Aers  Commonly known as: Ventolin HFA      Inhale 2 puffs into the lungs every 6 (six) hours as needed for Wheezing or Shortness of Breath. Quantity: 2 Inhaler  Refills: 5     ALPRAZolam 0.5 MG Tabs  Commonly known as: Xanax      Take 1 tablet (0.5 mg total) by mouth 3 (three) times daily as needed for Sleep or Anxiety. Quantity: 20 tablet  Refills: 0     Cyanocobalamin 1000 MCG Subl      Place 1 tablet under the tongue daily. Refills: 0     cycloSPORINE 0.05 % Emul  Commonly known as: RESTASIS      Place into both eyes 2 (two) times daily. Refills: 0     ferrous sulfate 325 (65 FE) MG Tbec      Take 1 tablet (325 mg total) by mouth daily with breakfast.   Refills: 0     finasteride 5 MG Tabs  Commonly known as: Proscar      Take 1 tablet (5 mg total) by mouth daily. Quantity: 90 tablet  Refills: 3     FLORASTOR OR      2 (two) times a day. Refills: 0     folic acid 1 MG Tabs  Commonly known as: Folvite      Take 1 tablet (1 mg total) by mouth in the morning. Quantity: 90 tablet  Refills: 3     furosemide 40 MG Tabs  Commonly known as: Lasix      Take 1 tablet (40 mg total) by mouth 2 (two) times daily. Refills: 0     ipratropium-albuterol 0.5-2.5 (3) MG/3ML Soln  Commonly known as: Duoneb      Take 3 mL by nebulization 4 (four) times daily.    Quantity: 360 mL  Refills: 5     levothyroxine 150 MCG Tabs  Commonly known as: Synthroid      Take 1 tablet (150 mcg total) by mouth before breakfast.   Quantity: 30 tablet  Refills: 1     loperamide 2 MG Caps  Commonly known as: Imodium      Take 1 capsule (2 mg total) by mouth 4 (four) times daily as needed for Diarrhea. Refills: 0     Magnesium 500 MG Tabs      Take 1 tablet (500 mg total) by mouth in the morning and 1 tablet (500 mg total) before bedtime. Refills: 0     Melatonin 5 MG Tabs      Take 1 tablet (5 mg total) by mouth at bedtime. Refills: 0     midodrine 10 MG Tabs  Commonly known as: ProAmatine      Take 1 tablet (10 mg total) by mouth daily as needed (30 mins before HD). 30 mins before HD   Refills: 0     Renal-Vicki 0.8 MG Tabs      Take 0.8 mg by mouth daily. Refills: 0     sevelamer carbonate 800 MG Tabs  Commonly known as: Renvela      Take 1 tablet (800 mg total) by mouth 3 (three) times daily with meals. Refills: 0     traMADol 50 MG Tabs  Commonly known as: Ultram      Take 1 tablet (50 mg total) by mouth every 12 (twelve) hours as needed (moderate to severe pain). Quantity: 14 tablet  Refills: 0     traZODone 50 MG Tabs  Commonly known as: Desyrel      Take 0.5 tablets (25 mg total) by mouth nightly. Quantity: 30 tablet  Refills: 0     Trelegy Ellipta 100-62.5-25 MCG/ACT Aepb  Generic drug: fluticasone-umeclidin-vilant      Inhale 1 puff into the lungs daily. Quantity: 3 each  Refills: 3     Vitamin D3 25 MCG Tabs      Take 2 tablets (2,000 Units total) by mouth daily.    Quantity: 60 tablet  Refills: 0                Millicent Fothergill, MD  7/31/2023

## 2023-08-03 ENCOUNTER — APPOINTMENT (OUTPATIENT)
Dept: HEMATOLOGY/ONCOLOGY | Facility: HOSPITAL | Age: 78
End: 2023-08-03
Attending: INTERNAL MEDICINE
Payer: MEDICARE

## 2023-08-03 ENCOUNTER — APPOINTMENT (OUTPATIENT)
Dept: HEMATOLOGY/ONCOLOGY | Facility: HOSPITAL | Age: 78
End: 2023-08-03
Attending: NURSE PRACTITIONER
Payer: MEDICARE

## 2023-09-11 ENCOUNTER — APPOINTMENT (OUTPATIENT)
Dept: CARDIOLOGY | Age: 78
End: 2023-09-11

## 2023-12-15 NOTE — OPERATIVE REPORT
[facility] REPORT    PATIENT NAME: Lyubov Franz  : 1945   MRN: [id number]  SITE: [facility] B:   23    PREOPERATIVE DIAGNOSIS:  nonfunctional peritoneal dialysis catheter end-stage chronic kidney disease      POSTOPERATIVE DIAGNOSIS:   nonfunctional peritoneal dialysis catheter end-stage chronic kidney disease     PROCEDURE PERFORMED:   Removal of intraperitoneal peritoneal dialysis catheter    SURGEON:  Ashlee Live MD    ASST:  TUAN Juan (Assistant helped position patient and helped with positioning, retraction, suturing, closure, dressings etc.) PA: TUAN Schrader      ANESTHESIA: General endotracheal tube    ESTIMATED BLOOD LOSS:   2 ml    COMPLICATIONS: none    INDICATIONS:   This is a [de-identified]year old male who presents with a history of end-stage chronic kidney disease. Patient has a nonfunctional peritoneal dialysis catheter that needs to be removed. Risk of procedure including bleeding, infection, postoperative hematoma, wound infection, perforated hollow viscus, vascular injury, inability to place peritoneal dialysis catheter in the future,need for lifelong hemodialysis, as well as risk with anesthesia including myocardial infarction, respiratory failure, renal failure, pulmonary embolus, DVT, CVA, and even death were all discussed in detail with the patient who understands consents and wishes to proceed with the operation. OPERATIVE TECHNIQUE: The Patient was brought to the operating room, and placed on the operating table in supine position. General anesthetic was administered via endotracheal tube by anesthesia. The abdomen was prepped and draped in a sterile fashion. A small incision was made in the  left and with the periumbilical region at the entry site into the peritoneum. Dissection continued down through subcutaneous tissues electrocautery.   The peritoneal dialysis catheter was identified and dissected free circumferentially. The anterior rectus sheath was identified at the entrance site. This was freed up with electrocautery. The tear cuff was freed up circumferentially with electrocautery taking care to avoid any intra-abdominal contents. Once the cuff was freed up completely catheter was removed. At this time the outer cuff was freed up circumferentially with electrocautery. The catheter was cut and the outer catheter was removed in its entirety. Next the fascia was closed with 0 Vicryl simple interrupted sutures. Wound was irrigated thoroughly with saline solution. Hemostasis was obtained using electrocautery. The catheter entrance site was closed with 2-0 Vicryl simple interrupted suture as well as 3-0 Vicryl running subcuticular suture. Mastisol and Steri-Strips were applied to the wound. The exit site was covered with 2 x 2 gauze and Tegaderm    Patient was transferred off the operative table to recovery room, extubated, in stable condition. All counts were correct at the end of the case.         [facility]    6/22/2023  12:05 PM  Ryan Calvillo MD Alert and oriented to person, place and time

## 2023-12-16 NOTE — PLAN OF CARE
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  He reports chest pain.  No weakness.    Diet:  Adequate intake.  No nausea or vomiting.    Activity level: Returning to normal.    Pain:  He complains of pain that is mild.  Pain is well controlled.    Up to chair. On 1-2L    Objective:  General Appearance:  Comfortable and in no acute distress.    Vital signs: (most recent): Blood pressure 112/51, pulse 89, temperature 97.7 °F (36.5 °C), temperature source Oral, resp. rate 16, height 180.3 cm (71\"), SpO2 93%.  Vital signs are normal.  No fever.    Output: Minimal urine output and no stool output.    Lungs:  Normal effort and normal respiratory rate.  There are decreased breath sounds.    Heart: Normal rate.  Regular rhythm.    Chest: Chest wall tenderness present.    Abdomen: Abdomen is soft.  Bowel sounds are normal.     Neurological: Patient is alert and oriented to person, place and time.    Skin:  Warm and dry.                Chest tube:   Site: Left, Clean, Dry, Intact, and Securement device intact  Suction: waterseal  Air Leak: positive    Results Review:     I reviewed the patient's new clinical results.  I reviewed the patient's new imaging results and agree with the interpretation.  I reviewed the patient's other test results and agree with the interpretation  Discussed with patient, family at bedside.    Assessment & Plan     Mr. Hung is a pleasant 65 year-old gentleman who is s/p POD 3 a da[place] robot assisted left lower lobectomy.  Persistent airleak to chest tubes.  Stable subq air on CXR today. Leave chest tube # 2 to -10 cm and re-check a CXR in the morning.  Appreciate cardiology recommendations.  Increase " Patient denies of pain. On 4L of O2. Scheduled nebs. Patient refused CPAP overnight, says it does not fit very well. On tele, no calls overnight. Melatonin given overnight. Lab draw this morning, port has no blood return. Wife updated on pt. Plan for HD today. Safety precautions in place. Problem: Patient Centered Care  Goal: Patient preferences are identified and integrated in the patient's plan of care  Description: Interventions:  - What would you like us to know as we care for you? Pt. [employer] [place]   - Provide timely, complete, and accurate information to patient/family  - Incorporate patient and family knowledge, values, beliefs, and cultural backgrounds into the planning and delivery of care  - Encourage patient/family to participate in care and decision-making at the level they choose  - Honor patient and family perspectives and choices  Outcome: Progressing     Problem: PAIN - ADULT  Goal: Verbalizes/displays adequate comfort level or patient's stated pain goal  Description: INTERVENTIONS:  - Encourage pt to monitor pain and request assistance  - Assess pain using appropriate pain scale  - Administer analgesics based on type and severity of pain and evaluate response  - Implement non-pharmacological measures as appropriate and evaluate response  - Consider cultural and social influences on pain and pain management  - Manage/alleviate anxiety  - Utilize distraction and/or relaxation techniques  - Monitor for opioid side effects  - Notify MD/LIP if interventions unsuccessful or patient reports new pain  - Anticipate increased pain with activity and pre-medicate as appropriate  Outcome: Progressing     Problem: SAFETY ADULT - FALL  Goal: Free from fall injury  Description: INTERVENTIONS:  - Assess pt frequently for physical needs  - Identify cognitive and physical deficits and behaviors that affect risk of falls.   - [place] fall precautions as indicated by assessment.  - Educate pt/family on patient safety including physical limitations  - Instruct pt to call for assistance with activity based on assessment  - Modify environment to reduce risk of injury  - Provide assistive devices as appropriate  - Consider OT/PT consult to assist with strengthening/mobility  - Encourage toileting schedule  Outcome: Progressing     Problem: CARDIOVASCULAR - ADULT  Goal: Maintains optimal cardiac output and hemodynamic stability  Description: INTERVENTIONS:  - Monitor vital signs, rhythm, and trends  - Monitor for bleeding, hypotension and signs of decreased cardiac output  - Evaluate effectiveness of vasoactive medications to optimize hemodynamic stability  - Monitor arterial and/or venous puncture sites for bleeding and/or hematoma  - Assess quality of pulses, skin color and temperature  - Assess for signs of decreased coronary artery perfusion - ex.  Angina  - Evaluate fluid balance, assess for edema, trend weights  Outcome: Progressing     Problem: RESPIRATORY - ADULT  Goal: Achieves optimal ventilation and oxygenation  Description: INTERVENTIONS:  - Assess for changes in respiratory status  - Assess for changes in mentation and behavior  - Position to facilitate oxygenation and minimize respiratory effort  - Oxygen supplementation based on oxygen saturation or ABGs  - Provide Smoking Cessation handout, if applicable  - Encourage broncho-pulmonary hygiene including cough, deep breathe, Incentive Spirometry  - Assess the need for suctioning and perform as needed  - Assess and instruct to report SOB or any respiratory difficulty  - Respiratory Therapy support as indicated  - Manage/alleviate anxiety  - Monitor for signs/symptoms of CO2 retention  Outcome: Progressing     Problem: METABOLIC/FLUID AND ELECTROLYTES - ADULT  Goal: Electrolytes maintained within normal limits  Description: INTERVENTIONS:  - Monitor labs and rhythm and assess patient for signs and symptoms of electrolyte imbalances  - Administer electrolyte replacement as mobilization and pulmonary toilet.      Rosalio Scott MD  [facility]  12/16/23  13:54 EST    Patient was seen and assessed while wearing personal protective equipment including facemask, protective eyewear and gloves.  Hand hygiene performed prior to entering the room and upon exiting with doffing of gloves.        ordered  - Monitor response to electrolyte replacements, including rhythm and repeat lab results as appropriate  - Fluid restriction as ordered  - Instruct patient on fluid and nutrition restrictions as appropriate  Outcome: Progressing  Goal: Hemodynamic stability and optimal renal function maintained  Description: INTERVENTIONS:  - Monitor labs and assess for signs and symptoms of volume excess or deficit  - Monitor intake, output and patient weight  - Monitor urine specific gravity, serum osmolarity and serum sodium as indicated or ordered  - Monitor response to interventions for patient's volume status, including labs, urine output, blood pressure (other measures as available)  - Encourage oral intake as appropriate  - Instruct patient on fluid and nutrition restrictions as appropriate  Outcome: Progressing     Problem: SKIN/TISSUE INTEGRITY - ADULT  Goal: Incision(s), wounds(s) or drain site(s) healing without S/S of infection  Description: INTERVENTIONS:  - Assess and document risk factors for pressure ulcer development  - Assess and document skin integrity  - Assess and document dressing/incision, wound bed, drain sites and surrounding tissue  - Implement wound care per orders  - Initiate isolation precautions as appropriate  - Initiate Pressure Ulcer prevention bundle as indicated  Outcome: Progressing     Problem: Impaired Swallowing  Goal: Minimize aspiration risk  Description: Interventions:  - Patient should be alert and upright for all feedings (90 degrees preferred)  - Offer food and liquids at a slow rate  - No straws  - Encourage small bites of food and small sips of liquid  - Offer pills one at a time, crush or deliver with applesauce as needed  - Discontinue feeding and notify MD (or speech pathologist) if coughing or persistent throat clearing or wet/gurgly vocal quality is noted  Outcome: Progressing

## 2024-05-13 NOTE — CONSULTS
[facility]    PATIENT'S NAME: Kervin Valiente   ATTENDING PHYSICIAN: Taylor Berry MD   CONSULTING PHYSICIAN: Richard Sanchez. Nini Mcgarry MD   PATIENT ACCOUNT#:   [id number]    LOCATION:  [address] [place] [address] #:   [id number]       YOB: 1945  ADMISSION DATE:       04/06/2023      CONSULT DATE:  04/12/2023    REPORT OF CONSULTATION      HISTORY OF PRESENT ILLNESS:  The patient is an 25-year-old gentleman whom we were asked to see because of dizziness, falls, and near syncope. He has multiple medical problems and was admitted with severe uremia. He has had several dialysis sessions, and the severe uremia has significantly improved. He was also found to be severely hypothyroid. He has been a patient of ours for coronary artery disease and had interventions performed in 1996 and 2004. He is not having any symptoms of active ischemia. He has had documentation that he has severe aortic stenosis. LV function remains normal.    MEDICATIONS:  Current medications:  Epogen, ferrous sulfate, Proscar, heparin, insulin, antibiotics, levothyroxine. ALLERGIES:  Cephalosporins and Demerol. SOCIAL HISTORY:  Former smoker. No current alcohol or tobacco use. He is , and his wife is with him here today. REVIEW OF SYSTEMS:  Positive for severe nosebleeds, present on admission. He has an abdominal aortic aneurysm, sleep apnea, osteoarthritis, hypertension, dyslipidemia, benign prostatic hypertrophy, chronic anemia. PHYSICAL EXAMINATION:    GENERAL:  Well-developed, well-nourished male in no acute distress. Appears thin and chronically ill. VITAL SIGNS:  Blood pressure 105/56; respirations 18, breathing unlabored; pulse 90, regular; afebrile; saturation 99%. HEENT:  Unremarkable. NECK:  Supple. There is no jugular venous distention. Carotids are brisk without bruits. No thyromegaly. LUNGS:  Clear bilaterally. HEART:  S1 and S2 are distant.   There is a grade 2/6 Placed call to the patient to review labs / schedule appt to come in later this week for a BP check. Left a voicemail for her to return my call re: the message that was left for Violette.   harsh systolic ejection murmur. Heart tones are very distant. ABDOMEN:  Soft, nontender. EXTREMITIES:  Without edema. Adequate distal perfusion. LABORATORY DATA:  Sodium 140, potassium 3.7, BUN 59, creatinine 5.79. His BUN had been as high as 269, albumin 1.9.  TSH 62. Hemoglobin 9.2, platelets 720, WBC 27.6. EKG:  Sinus rhythm with a right bundle-branch block, left anterior hemiblock. Chest x-ray shows no CHF and normal vascularity. ASSESSMENT:    1.   Episodes of dizziness and near syncope, which I suspect are related to relative dehydration in the setting of his severe aortic stenosis. Arrhythmia is less likely. No symptomatic bradycardia or tachyarrhythmias have been documented this admission. 2.   End-stage renal disease, now on hemodialysis. Oliguric. 3.   Coronary artery disease, stable and asymptomatic. No evidence for active ischemia. PLAN:    1.   I spoke with the renal service, and they are not taking off fluid with dialysis. 2.   Remove fluid restriction. 3.   Thigh-high support stockings. 4.   Consider low-dose midodrine as a last resort. Thank you for this consultation. Dictated By Abraham Decker.  Rufina Dan MD  d: 04/12/2023 15:12:38  t: 04/12/2023 15:27:00  Job [id number]  [facility]/

## 2025-01-28 NOTE — TELEPHONE ENCOUNTER
Jan 14th - Friday at 11:40 pm Spoke w/ pts wife , she said that he saw pulmonologist today and he recommended that he have a CT chest lung screening .   He is sched on 2/4/25 to have a ct abdo . She was told that it could be done the same day .       Please advise, thanks

## 2025-02-13 NOTE — TELEPHONE ENCOUNTER
Alternate # given for VV
Pt wife called in stating they are having issues with the video visit appt at 11:45am and wondering if the appt can just be a phone call. Attempted to ask Rn no answer.  Please call thank you
yes

## 2025-05-04 NOTE — TELEPHONE ENCOUNTER
From: Katja Pino  Sent: 3/11/2022 11:39 AM CST  To: [facility] Clinical Staff  Subject: Medication     Good morning, Amarjit Lori took his blood test this morning and his creative level went up. Is there something I need to do here? No

## 2025-06-24 NOTE — TELEPHONE ENCOUNTER
Pt here today for ACV with Patrica Rich PA for increased weakness. Report called to ER triage and spoke to [facility]. Informed hx of lung cancer, Hgb 7.2, c/o left should pain (hx of cardiac tamponade).
1

## (undated) DIAGNOSIS — E87.0 HYPERNATREMIA: ICD-10-CM

## (undated) DIAGNOSIS — C79.31 BRAIN METASTASIS (HCC): Primary | ICD-10-CM

## (undated) DIAGNOSIS — N17.9 AKI (ACUTE KIDNEY INJURY) (HCC): Primary | ICD-10-CM

## (undated) DIAGNOSIS — E87.6 HYPOKALEMIA: ICD-10-CM

## (undated) DIAGNOSIS — N17.9 AKI (ACUTE KIDNEY INJURY) (HCC): ICD-10-CM

## (undated) DIAGNOSIS — N14.2: ICD-10-CM

## (undated) DIAGNOSIS — Z02.9 ENCOUNTERS FOR UNSPECIFIED ADMINISTRATIVE PURPOSE: ICD-10-CM

## (undated) DIAGNOSIS — S31.000A WOUND OF SACRAL REGION, INITIAL ENCOUNTER: ICD-10-CM

## (undated) DIAGNOSIS — L03.116 CELLULITIS OF LEFT ANKLE: Primary | ICD-10-CM

## (undated) DIAGNOSIS — E03.2 HYPOTHYROIDISM DUE TO MEDICATION: Primary | ICD-10-CM

## (undated) DIAGNOSIS — N18.30 STAGE 3 CHRONIC KIDNEY DISEASE, UNSPECIFIED WHETHER STAGE 3A OR 3B CKD (HCC): Primary | ICD-10-CM

## (undated) DIAGNOSIS — D64.9 ANEMIA, UNSPECIFIED TYPE: ICD-10-CM

## (undated) DEVICE — STAY.SAFE® CAP: Brand: STAY.SAFE®

## (undated) DEVICE — KIT CLEAN ENDOKIT 1.1OZ GOWNX2

## (undated) DEVICE — SYRINGE MNJCT 10ML LF STRL REG

## (undated) DEVICE — SOL NACL IRRIG 0.9% 1000ML BTL

## (undated) DEVICE — CLOSURE EXOFIN 1.0ML

## (undated) DEVICE — TROCAR: Brand: KII FIOS FIRST ENTRY

## (undated) DEVICE — 6 ML SYRINGE LUER-LOCK TIP: Brand: MONOJECT

## (undated) DEVICE — VIOLET BRAIDED (POLYGLACTIN 910), SYNTHETIC ABSORBABLE SUTURE: Brand: COATED VICRYL

## (undated) DEVICE — SUT VICRYL 0 UR-6 J603H

## (undated) DEVICE — GAMMEX® PI HYBRID SIZE 7.5, STERILE POWDER-FREE SURGICAL GLOVE, POLYISOPRENE AND NEOPRENE BLEND: Brand: GAMMEX

## (undated) DEVICE — DEVICE PORT SITE CLOSURE

## (undated) DEVICE — DISPOSABLE SUCTION/IRRIGATOR TUBE SET: Brand: AHTO

## (undated) DEVICE — TUBING CYSTO TUR DUAL

## (undated) DEVICE — SOLUTION  .9 3000ML

## (undated) DEVICE — YANKAUER SUCTION INSTRUMENT NO CONTROL VENT, BULB TIP, CLEAR: Brand: YANKAUER

## (undated) DEVICE — COVER SGL STRL LGHT HNDL BLU

## (undated) DEVICE — MEGADYNE E-Z CLEAN BLADE 2.75"

## (undated) DEVICE — STAY.SAFE® CATHETER EXTENSION SET WITH LUER-LOCK, 18 INCH: Brand: STAY.SAFE®

## (undated) DEVICE — ENCORE® LATEX MICRO SIZE 8, STERILE LATEX POWDER-FREE SURGICAL GLOVE: Brand: ENCORE

## (undated) DEVICE — TUBING MEGADYNE LAPAROSCOPIC

## (undated) DEVICE — SINGLE USE SUCTION VALVE MAJ-209: Brand: SINGLE USE SUCTION VALVE (STERILE)

## (undated) DEVICE — SUT SILK 2-0 SA65H

## (undated) DEVICE — TROCAR: Brand: KII® SLEEVE

## (undated) DEVICE — [HIGH FLOW INSUFFLATOR,  DO NOT USE IF PACKAGE IS DAMAGED,  KEEP DRY,  KEEP AWAY FROM SUNLIGHT,  PROTECT FROM HEAT AND RADIOACTIVE SOURCES.]: Brand: PNEUMOSURE

## (undated) DEVICE — UNDYED BRAIDED (POLYGLACTIN 910), SYNTHETIC ABSORBABLE SUTURE: Brand: COATED VICRYL

## (undated) DEVICE — SINGLE USE BIOPSY VALVE MAJ-210: Brand: SINGLE USE BIOPSY VALVE (STERILE)

## (undated) DEVICE — MASK PROC W/VISOR ANTIGLARE

## (undated) DEVICE — GAUZE TRAY STERILE 4X4 12PLY

## (undated) DEVICE — CONTAINER SPEC CLIKSEAL 4OZ

## (undated) DEVICE — ENCORE® LATEX ACCLAIM SIZE 8, STERILE LATEX POWDER-FREE SURGICAL GLOVE: Brand: ENCORE

## (undated) DEVICE — LINE MNTR ADLT SET O2 INTMD

## (undated) DEVICE — LAP CHOLE: Brand: MEDLINE INDUSTRIES, INC.

## (undated) DEVICE — MINOR GENERAL: Brand: MEDLINE INDUSTRIES, INC.

## (undated) DEVICE — 3M(TM) TEGADERM(TM) TRANSPARENT FILM DRESSING FRAME STYLE 1628: Brand: 3M™ TEGADERM™

## (undated) DEVICE — SYRINGE 10ML SLIP TIP

## (undated) DEVICE — 3M™ IOBAN™ 2 ANTIMICROBIAL INCISE DRAPE 6640EZ: Brand: IOBAN™ 2

## (undated) DEVICE — MEDI-VAC NON-CONDUCTIVE SUCTION TUBING: Brand: CARDINAL HEALTH

## (undated) DEVICE — 3 ML SYRINGE LUER-LOCK TIP: Brand: MONOJECT

## (undated) DEVICE — SUT PROLENE 2-0 SH 8833H

## (undated) DEVICE — CATH PACK Y-TEC

## (undated) DEVICE — FALLER TUNNELING TROCAR: Brand: ARGYLE

## (undated) DEVICE — 35 ML SYRINGE REGULAR TIP: Brand: MONOJECT

## (undated) DEVICE — SUT CHROMIC GUT 3-0 SH G122H

## (undated) DEVICE — ADHESIVE MASTISOL 2/3ML

## (undated) DEVICE — CONNECTOR DLYS PERITONEUM 2

## (undated) DEVICE — STERILE LATEX POWDER-FREE SURGICAL GLOVESWITH NITRILE COATING: Brand: PROTEXIS

## (undated) DEVICE — SUT SILK 0 A306H

## (undated) DEVICE — TRAP MCS 40ML 5IN PLS SCR CAP

## (undated) NOTE — MR AVS SNAPSHOT
After Visit Summary   10/5/2021    Kalia Camacho   MRN: [id number]           Visit Information     Date & Time  10/5/2021  1:00 PM Provider   [address] [place] [address] - Infusion Dept.  Phone  [phone]      Your V lungs daily. maalox/diphenhydramine/lidocaine viscous Oral Suspension Take 5 mL by mouth 4 (four) times daily before meals and nightly. hydrochlorothiazide 12.5 MG Oral Tab Take 12.5 mg by mouth daily.     Naloxone HCl 4 MG/0.1ML Nasal Liquid 4 mg by Oralia Norris [facility]    10/26/2021 1:00 PM EM [facility] [address] - Infusion    11/9/2021 11:00 AM EM [address] - Infusion    11/16/2021 11:00 AM EM [facility] [place]

## (undated) NOTE — LETTER
Floyd Borges Md  [address] [place]       09/27/18        Patient: Adrian Rosa   YOB: 1945   Date of Visit: 9/27/2018       Dear  Dr. Helena Goldman MD,      Thank you for referring Adrian Roas to my practice.   Pl

## (undated) NOTE — LETTER
[address], [place]  Authorization for Invasive Procedures  Date: 4/18/2023           Time: 1114    I hereby authorize Dr. Mio Cifuentes, my physician and his/her assistants (if applicable), which may include medical students, residents, and/or fellows, to perform the following surgical operation/ procedure and administer such anesthesia as may be determined necessary by my physician: *** on [facility]  2. I recognize that during the surgical operation/procedure, unforeseen conditions may necessitate additional or different procedures than those listed above. I, therefore, further authorize and request that the above-named surgeon, assistants, or designees perform such procedures as are, in their judgment, necessary and desirable. 3.   My surgeon/physician has discussed prior to my surgery the potential benefits, risks and side effects of this procedure; the likelihood of achieving goals; and potential problems that might occur during recuperation. They also discussed reasonable alternatives to the procedure, including risks, benefits, and side effects related to the alternatives and risks related to not receiving this procedure. I have had all my questions answered and I acknowledge that no guarantee has been made as to the result that may be obtained. 4.   Should the need arise during my operation/procedure, which includes change of level of care prior to discharge, I also consent to the administration of blood and/or blood products. Further, I understand that despite careful testing and screening of blood or blood products by collecting agencies, I may still be subject to ill effects as a result of receiving a blood transfusion and/or blood products. The following are some, but not all, of the potential risks that can occur: fever and allergic reactions, hemolytic reactions, transmission of diseases such as Hepatitis, AIDS and Cytomegalovirus (CMV) and fluid overload.   In the event that I wish to have an autologous transfusion of my own blood, or a directed donor transfusion, I will discuss this with my physician. Check only if Refusing Blood or Blood Products  I understand refusal of blood or blood products as deemed necessary by my physician may have serious consequences to my condition to include possible death. I hereby assume responsibility for my refusal and release the hospital, its personnel, and my physicians from any responsibility for the consequences of my refusal.         o  Refuse         5. I authorize the use of any specimen, organs, tissues, body parts or foreign objects that may be removed from my body during the operation/procedure for diagnosis, research or teaching purposes and their subsequent disposal by hospital authorities. I also authorize the release of specimen test results and/or written reports to my treating physician on the hospital medical staff or other referring or consulting physicians involved in my care, at the discretion of the Pathologist or my treating physician. 6.   I consent to the photographing or videotaping of the operations or procedures to be performed, including appropriate portions of my body for medical, scientific, or educational purposes, provided my identity is not revealed by the pictures or by descriptive texts accompanying them. If the procedure has been photographed/videotaped, the surgeon will obtain the original picture, image, videotape or CD. The hospital will not be responsible for storage, release or maintenance of the picture, image, tape or CD.    7.   I consent to the presence of a  or observers in the operating room as deemed necessary by my physician or their designees. 8.   I recognize that in the event my procedure results in extended X-Ray/fluoroscopy time, I may develop a skin reaction. 9.  If I have a Do Not Attempt Resuscitation (DNAR) order in place, that status will be suspended while in the operating room, procedural suite, and during the recovery period unless otherwise explicitly stated by me (or a person authorized to consent on my behalf). The surgeon or my attending physician will determine when the applicable recovery period ends for purposes of reinstating the DNAR order. 10. Patients having a sterilization procedure: I understand that if the procedure is successful the results will be permanent and it will therefore be impossible for me to inseminate, conceive, or bear children. I also understand that the procedure is intended to result in sterility, although the result has not been guaranteed. 11. I acknowledge that my physician has explained sedation/analgesia administration to me including the risk and benefits I consent to the administration of sedation/analgesia as may be necessary or desirable in the judgment of my physician. I CERTIFY THAT I HAVE READ AND FULLY UNDERSTAND THE ABOVE CONSENT TO OPERATION and/or OTHER PROCEDURE.        ____________________________________       _________________________________      ______________________________  Signature of Patient         Signature of Responsible Person        Printed Name of Responsible Person        ____________________________________      _________________________________      ______________________________       Signature of Witness          Relationship to Patient                       Date                                       Time    Patient Name: Yehuda Evans     : 1945                 Printed: 2023      Medical Record #: [id number]                      Page 1 of 2          STATEMENT OF PHYSICIAN My signature below affirms that prior to the time of the procedure; I have explained to the patient and/or his/her legal representative, the risks and benefits involved in the proposed treatment and any reasonable alternative to the proposed treatment.  I have also explained the risks and benefits involved in refusal of the proposed treatment and alternatives to the proposed treatment and have answered the patient's questions.  If I have a significant financial interest in a co-management agreement or a significant financial interest in any product or implant, or other significant relationship used in this procedure/surgery, I have disclosed this and had a discussion with my patient.     _______________________________________________________________ _____________________________  Hoa Barba Physician)                                                                                         (Date)                                   (Time)    Patient Name: Killian Stearns     : 1945                 Printed: 2023      Medical Record #: [id number]                      Page 2 of 2

## (undated) NOTE — LETTER
[address] [place] [address]  Authorization for Invasive Procedures  Date: ***           Time: ***    {[facility] consent:7244}

## (undated) NOTE — LETTER
Hospital Discharge Documentation  Please phone to schedule a hospital follow up appointment.     From: [address] Hospitalist's Office  Phone: [phone]    Patient discharged time/date: 9/18/2020  5:36 PM  Patient discharge disposition:  [address] HISTORY AND HOSPITAL COURSE:  The patient is a 52-year-old  male with a past medical history of metastatic lung cancer, history of coronary artery disease, COPD, CKD, hypertension and obstructive sleep apnea who had come to the hospital for progre negative. Patient has been given Imodium for diarrhea. At this time, he has been cleared from discharge from Cardiology and Cardiothoracic Surgery as well as Pulmonology.       PHYSICAL EXAMINATION:    GENERAL:  Patient is lying in bed, appears to be in n

## (undated) NOTE — LETTER
Date: 3/24/2022  Patient name: Je Ying  YOB: 1945  Medical Record Number: [id number]  Coverage: Payor: MEDICARE / Plan: MEDICARE PART A&B / Product Type: No Product type /   Insurance ID: [id number]  Patient Address: [place] [id number]  Telephone Information:   Home Phone [phone]   Mobile [phone]       Encounter Date: 3/24/2022  Physician: Sherel Phoenix, APRN  Diagnosis: Ankle wound, left, initial encounter  (primary encounter diagnosis)  Cellulitis of left ankle  Pressure injury of left ankle, stage 4 ([address]    Wound 03/24/22 1 Ankle Left;Lateral (Active)   Date First Assessed/Time First Assessed: 03/24/22 1000    Wound Number (Wound Clinic Only): 1  Location: Ankle  Wound Location Orientation: Left;Lateral      Assessments 3/24/2022 10:01 AM   Wound Image     Site Assessment Black; Brown;Red   Closure Not approximated   Drainage Amount None   Dressing Vaseline gauze; Aquacel Foam   Dressing Changed Changed   Dressing Status Clean; Intact;Dry   Wound Length (cm) 0.5 cm   Wound Width (cm) 0.4 cm   Wound Surface Area (cm^2) 0.2 cm^2   Wound Depth (cm) 0.2 cm   Wound Volume (cm^3) 0.04 cm^3   Sherice-wound Assessment Edema; Red   Wound Granulation Tissue Red;Pink   Wound Bed Granulation (%) 10 %   Wound Bed Epithelium (%) 0 %   Wound Bed Slough (%) 0 %   Wound Bed Eschar (%) 90 %   State of Healing Non-healing       No Linked orders to display       Wound Number: 1     Wound Cleaning and Dressings:  Showering directions: May shower with protection  Wound cleansing:  Cleanse with normal saline or wound cleanser  Wound cleaning frequency: cleanse at each dressing change  Wound product: Triad, vaseline gauze, foam border dressing. Dressing change frequency:  Change dressing 2x per week  [facility] to change dressing 2 x per week. Patient to follow up in outpatient wound clinic every 2 weeks.          Compression Therapy:   No compression therapy needed          Miscellaneous/Additional Orders:  Offloading: Thin adhesive felt circular x1, and Thick adhesive felt horshoe shape x1 for offloading to left lateral ankle. Miscellaneous orders: Patient to purchase offloading boot by [facility]. Patient sleeps on left side    Care Summary:  Care Summary: Discussed Plan of Care at beside with patient. Patient verbally acknowledges understanding of all instructions and all questions were answered. Spouse verbalized understanding of instructions. Patient Kake          Follow Up:  Return in 2 weeks (on 4/7/2022) for Follow up in outpatient wound clinic in 7-10 days. Additional Notes: Call Gabriele DORSEY at [phone] for any questions.

## (undated) NOTE — LETTER
5/25/2022             RE:  Susy Stanford        [address] [facility] [address]         To Whom It May Concern:    Anthony Cheng is a patient of mine. He may proceed with surgery from a pulmonary standpoint to replace his cochlear implant which has been recalled.      Sincerely,    Ava Harman MD  [address]  [address] [place] [address]  [phone]

## (undated) NOTE — LETTER
Ra Fonseca [address], [place], [place]  [address]  INFORMED CONSENT FOR TRANSFUSION OF BLOOD OR BLOOD PRODUCTS  My physician has informed me of the nature, purpose, benefits and risks of transfusion for blood and blood components that he/she may deem necessary during my treatment or hospitalization. He/she has also discussed alternatives to receiving blood from the voluntary blood supply with me, such as self-donation (autologous) and directed donation (blood donated by family or friends to be used specifically for me). I further understand that while the [address] [place] [address] will attempt to supply any autologous or directed donor blood prior to transfusion of blood from the routine blood supply, medical circumstances may require that other or additional blood components may be required for my care. In giving consent, I acknowledge that my physician has also informed me that despite careful screening and testing in accordance with national and regional regulations, there is still a small risk of transmission of infectious agents including hepatitis, HIV-1/2, cytomegalovirus and other viruses or diseases as yet unknown for which licensed definitive screening tests do not currently exist. Additionally, my physician has informed me of the potential for transfusion reactions not related to an infectious agent. [  ]  Check here for Recurring Outpatient Transfusion Therapy (valid for 1 year) In addition to the above, my physician has informed me that I shall receive numerous transfusions over a period of time and that these can lead to other increased risks. I hereby authorize the transfusion of blood and/or blood products to me as deemed necessary and ordered by physicians participating in my care.  My physician has given me the opportunity to ask questions and any questions asked have been answered to my satisfaction  __________________________________________ ______________________________________________  (Signature of Patient)                                                            (Responsible party in case of Minor,                                                                                                 Incompetent, or unconscious Patient)  ___________________________________________       ________ ______________________________________  (Relationship to Patient)                                                       (Signature of Witness)  ______________________________________________________________________________________________   (Date)                                                                           (Time)  REFUSAL OF CONSENT FOR BLOOD TRANSFUSIONS   Sign only if Refusing   [  ] I understand refusal of blood or blood products as deemed necessary by my physician may have serious consequences to my condition to include possible death.  I hereby assume responsibility for my refusal and release the hospital, its personnel, and my physicians from any responsibility for the consequences of my refusal.    ________________________________________________________________________________  (Signature of Patient)                                                         (Responsible Party/Relationship to Patient)    ________________________________________________________________________________  (Signature of Witness)                                                       (Date/Time)     Patient Name: Nakita Mejia     : 1945                 Printed: 10/22/2022      Medical Record #: [id number]                                 Page 1 of 1

## (undated) NOTE — LETTER
[address] [place] [address]  Authorization for Invasive Procedures  Date: 07/09/2023           Time: 06:00    I hereby authorize Dr Maryl Phalen , my physician and his/her assistants (if applicable), which may include medical students, residents, and/or fellows, to perform the following surgical operation/ procedure and administer such anesthesia as may be determined necessary by my physician:  Fibrin sheath stripping with possible port replacement on [place]  2. I recognize that during the surgical operation/procedure, unforeseen conditions may necessitate additional or different procedures than those listed above. I, therefore, further authorize and request that the above-named surgeon, assistants, or designees perform such procedures as are, in their judgment, necessary and desirable. 3.   My surgeon/physician has discussed prior to my surgery the potential benefits, risks and side effects of this procedure; the likelihood of achieving goals; and potential problems that might occur during recuperation. They also discussed reasonable alternatives to the procedure, including risks, benefits, and side effects related to the alternatives and risks related to not receiving this procedure. I have had all my questions answered and I acknowledge that no guarantee has been made as to the result that may be obtained. 4.   Should the need arise during my operation/procedure, which includes change of level of care prior to discharge, I also consent to the administration of blood and/or blood products. Further, I understand that despite careful testing and screening of blood or blood products by collecting agencies, I may still be subject to ill effects as a result of receiving a blood transfusion and/or blood products.   The following are some, but not all, of the potential risks that can occur: fever and allergic reactions, hemolytic reactions, transmission of diseases such as Hepatitis, AIDS and Cytomegalovirus (CMV) and fluid overload. In the event that I wish to have an autologous transfusion of my own blood, or a directed donor transfusion, I will discuss this with my physician. Check only if Refusing Blood or Blood Products  I understand refusal of blood or blood products as deemed necessary by my physician may have serious consequences to my condition to include possible death. I hereby assume responsibility for my refusal and release the hospital, its personnel, and my physicians from any responsibility for the consequences of my refusal.         o  Refuse         5. I authorize the use of any specimen, organs, tissues, body parts or foreign objects that may be removed from my body during the operation/procedure for diagnosis, research or teaching purposes and their subsequent disposal by hospital authorities. I also authorize the release of specimen test results and/or written reports to my treating physician on the hospital medical staff or other referring or consulting physicians involved in my care, at the discretion of the Pathologist or my treating physician. 6.   I consent to the photographing or videotaping of the operations or procedures to be performed, including appropriate portions of my body for medical, scientific, or educational purposes, provided my identity is not revealed by the pictures or by descriptive texts accompanying them. If the procedure has been photographed/videotaped, the surgeon will obtain the original picture, image, videotape or CD. The hospital will not be responsible for storage, release or maintenance of the picture, image, tape or CD.    7.   I consent to the presence of a  or observers in the operating room as deemed necessary by my physician or their designees. 8.   I recognize that in the event my procedure results in extended X-Ray/fluoroscopy time, I may develop a skin reaction. 9.  If I have a Do Not Attempt Resuscitation (DNAR) order in place, that status will be suspended while in the operating room, procedural suite, and during the recovery period unless otherwise explicitly stated by me (or a person authorized to consent on my behalf). The surgeon or my attending physician will determine when the applicable recovery period ends for purposes of reinstating the DNAR order. 10. Patients having a sterilization procedure: I understand that if the procedure is successful the results will be permanent and it will therefore be impossible for me to inseminate, conceive, or bear children. I also understand that the procedure is intended to result in sterility, although the result has not been guaranteed. 11. I acknowledge that my physician has explained sedation/analgesia administration to me including the risk and benefits I consent to the administration of sedation/analgesia as may be necessary or desirable in the judgment of my physician.     I CERTIFY THAT I HAVE READ AND FULLY UNDERSTAND THE ABOVE CONSENT TO OPERATION and/or OTHER PROCEDURE.        ____________________________________       _________________________________      ______________________________  Signature of Patient         Signature of Responsible Person        Printed Name of Responsible Person        ____________________________________      _________________________________      ______________________________       Signature of Witness          Relationship to Patient                       Date                                       Time    Patient Name: Debbie Lyle     : 1945                 Printed: 2023      Medical Record #: [id number]                      Page 1 of 2          STATEMENT OF PHYSICIAN My signature below affirms that prior to the time of the procedure; I have explained to the patient and/or his/her legal representative, the risks and benefits involved in the proposed treatment and any reasonable alternative to the proposed treatment. I have also explained the risks and benefits involved in refusal of the proposed treatment and alternatives to the proposed treatment and have answered the patient's questions.  If I have a significant financial interest in a co-management agreement or a significant financial interest in any product or implant, or other significant relationship used in this procedure/surgery, I have disclosed this and had a discussion with my patient.     _______________________________________________________________ _____________________________  Rebecca Colin of Physician)                                                                                         (Date)                                   (Time)    Patient Name: Minesh Black     : 1945                 Printed: 2023      Medical Record #: [id number]                      Page 2 of 2

## (undated) NOTE — LETTER
2/26/2021              Neha Weaver        [address] [facility] [address]         Dear Judi Woods,    [facility] records indicate that the tests ordered for you by Apurva Corona MD  have not been done.   If you have, in fact, already complete

## (undated) NOTE — LETTER
[address]  Authorization for Surgical Operation and Procedure                                                                                           1. I hereby * Surgery not found *, my physician and his/her assistants (if applicable), which may include medical students, residents, and/or fellows, to perform the following surgical operation/ procedure and administer such anesthesia as may be determined necessary by my physician: Operation/Procedure name (s)  on [facility]   2. I recognize that during the surgical operation/procedure, unforeseen conditions may necessitate additional or different procedures than those listed above. I, therefore, further authorize and request that the above-named surgeon, assistants, or designees perform such procedures as are, in their judgment, necessary and desirable. 3.   My surgeon/physician has discussed prior to my surgery the potential benefits, risks and side effects of this procedure; the likelihood of achieving goals; and potential problems that might occur during recuperation. They also discussed reasonable alternatives to the procedure, including risks, benefits, and side effects related to the alternatives and risks related to not receiving this procedure. I have had all my questions answered and I acknowledge that no guarantee has been made as to the result that may be obtained. 4.   Should the need arise during my operation/procedure, which includes change of level of care prior to discharge, I also consent to the administration of blood and/or blood products. Further, I understand that despite careful testing and screening of blood or blood products by collecting agencies, I may still be subject to ill effects as a result of receiving a blood transfusion and/or blood products.   The following are some, but not all, of the potential risks that can occur: fever and allergic reactions, hemolytic reactions, transmission of diseases such as Hepatitis, AIDS and Cytomegalovirus (CMV) and fluid overload. In the event that I wish to have an autologous transfusion of my own blood, or a directed donor transfusion, I will discuss this with my physician. Check only if Refusing Blood or Blood Products  I understand refusal of blood or blood products as deemed necessary by my physician may have serious consequences to my condition to include possible death. I hereby assume responsibility for my refusal and release the hospital, its personnel, and my physicians from any responsibility for the consequences of my refusal.           ____ Refuse      5. I authorize the use of any specimen, organs, tissues, body parts or foreign objects that may be removed from my body during the operation/procedure for diagnosis, research or teaching purposes and their subsequent disposal by hospital authorities. I also authorize the release of specimen test results and/or written reports to my treating physician on the hospital medical staff or other referring or consulting physicians involved in my care, at the discretion of the Pathologist or my treating physician. 6.   I consent to the photographing or videotaping of the operations or procedures to be performed, including appropriate portions of my body for medical, scientific, or educational purposes, provided my identity is not revealed by the pictures or by descriptive texts accompanying them. If the procedure has been photographed/videotaped, the surgeon will obtain the original picture, image, videotape or CD. The hospital will not be responsible for storage, release or maintenance of the picture, image, tape or CD.    7.   I consent to the presence of a  or observers in the operating room as deemed necessary by my physician or their designees.     8.   I recognize that in the event my procedure results in extended X-Ray/fluoroscopy time, I may develop a skin reaction. 9. If I have a Do Not Attempt Resuscitation (DNAR) order in place, that status will be suspended while in the operating room, procedural suite, and during the recovery period unless otherwise explicitly stated by me (or a person authorized to consent on my behalf). The surgeon or my attending physician will determine when the applicable recovery period ends for purposes of reinstating the DNAR order. 10. Patients having a sterilization procedure: I understand that if the procedure is successful the results will be permanent and it will therefore be impossible for me to inseminate, conceive, or bear children. I also understand that the procedure is intended to result in sterility, although the result has not been guaranteed. 11. I acknowledge that my physician has explained sedation/analgesia administration to me including the risk and benefits I consent to the administration of sedation/analgesia as may be necessary or desirable in the judgment of my physician. I CERTIFY THAT I HAVE READ AND FULLY UNDERSTAND THE ABOVE CONSENT TO OPERATION and/or OTHER PROCEDURE.     _________________________________________ _________________________________     ___________________________________  Signature of Patient     Signature of Responsible Person                   Printed Name of Responsible Person                              _________________________________________ ______________________________        ___________________________________  Signature of Witness         Date  Time         Relationship to Patient    STATEMENT OF PHYSICIAN My signature below affirms that prior to the time of the procedure; I have explained to the patient and/or his/her legal representative, the risks and benefits involved in the proposed treatment and any reasonable alternative to the proposed treatment.  I have also explained the risks and benefits involved in refusal of the proposed treatment and alternatives to the proposed treatment and have answered the patient's questions.  If I have a significant financial interest in a co-management agreement or a significant financial interest in any product or implant, or other significant relationship used in this procedure/surgery, I have disclosed this and had a discussion with my patient.     _______________________________________________________________ _____________________________  Skylar Swanson of Physician)                                                                                         (Date)                                   (Time)  Patient Name: Elizabeth Sosa    : 1945   Printed: 11/15/2022      Medical Record #: [id number]                                              Page 1 of 1

## (undated) NOTE — IP AVS SNAPSHOT
[facility]            (For Outpatient Use Only) Initial Admit Date: 2022   Inpt/Obs Admit Date: Inpt: 22 / Obs: N/A   Discharge Date:    Myah Gipson:  [de-identified]   MRN: [de-identified]   CSN: [id number]   CEID: [id number]        ENCOUNTER  Patient Class: Inpatient Admitting Provider: Efrain Leonard MD Unit: [address]//SE   Hospital Service: Oncology Attending Provider: Shivani Guan MD   Bed: 455-A   Visit Type:   Referring Physician: No ref. provider found Billing Flag:    Admit Diagnosis: Hypoxia [R09.02]      PATIENT  Legal Name:   Jannet Blanco   Legal Sex: Male  Gender ID:              Pref Name:    PCP:  Mark Manley MD Home: [phone]   Address:  [facility] : 1945 (77 yrs) Mobile: [phone]         City/State/Zip: [facility], [address] [place] [address] Marital:  Language: [address] [place] [address] Drive: [employer] SSN4: [id number] Adventist: [address]*     Race: White Ethnicity: Non  Or [address]   Name Relationship Legal Guardian? Home Phone Work Phone Mobile Phone   1.  Polly Zhao  2. *No Contact Specified* Spouse      [phone]       GUARANTOR  Guarantor: Colby Anthony : 1945 Home Phone: [phone]   Address: [facility]  Sex: Male Work Phone:    [facility] [address]   Rel. to Patient: Self Guarantor ID: [id number]   [address]   Employer:  Status: RETIRED     COVERAGE  PRIMARY INSURANCE   Payor: MEDICARE Plan: MEDICARE PART A&B   Group Number: [id number] Insurance Type: [id number] Name: Christine Martinez : 1945   Subscriber ID: [id number] Pt Rel to Subscriber: Self   SECONDARY INSURANCE   [place]   Group Number: [id number] Insurance Type: INDEMNITY   Subscriber Name: Christine Martinez : 1945   Subscriber ID: [id number] Pt Rel to Subscriber: SELF   TERTIARY INSURANCE   Payor:  Plan:    Group Number:  Insurance Type:    Subscriber Name:  Subscriber :    Subscriber ID:  Pt Rel to Subscriber:    Hospital Account Financial Class: Medicare    2022

## (undated) NOTE — LETTER
[address] [place] [address], [place]  Authorization for Surgical Operation and Procedure                                                                                           1. I hereby Anaismile Jang MD, my physician and his/her assistants (if applicable), which may include medical students, residents, and/or fellows, to perform the following surgical operation/ procedure and administer such anesthesia as may be determined necessary by my physician: Operation/Procedure name (s) BRONCHOSCOPY on [facility]   2. I recognize that during the surgical operation/procedure, unforeseen conditions may necessitate additional or different procedures than those listed above. I, therefore, further authorize and request that the above-named surgeon, assistants, or designees perform such procedures as are, in their judgment, necessary and desirable. 3.   My surgeon/physician has discussed prior to my surgery the potential benefits, risks and side effects of this procedure; the likelihood of achieving goals; and potential problems that might occur during recuperation. They also discussed reasonable alternatives to the procedure, including risks, benefits, and side effects related to the alternatives and risks related to not receiving this procedure. I have had all my questions answered and I acknowledge that no guarantee has been made as to the result that may be obtained. 4.   Should the need arise during my operation/procedure, which includes change of level of care prior to discharge, I also consent to the administration of blood and/or blood products. Further, I understand that despite careful testing and screening of blood or blood products by collecting agencies, I may still be subject to ill effects as a result of receiving a blood transfusion and/or blood products.   The following are some, but not all, of the potential risks that can occur: fever and allergic reactions, hemolytic reactions, transmission of diseases such as Hepatitis, AIDS and Cytomegalovirus (CMV) and fluid overload. In the event that I wish to have an autologous transfusion of my own blood, or a directed donor transfusion, I will discuss this with my physician. Check only if Refusing Blood or Blood Products  I understand refusal of blood or blood products as deemed necessary by my physician may have serious consequences to my condition to include possible death. I hereby assume responsibility for my refusal and release the hospital, its personnel, and my physicians from any responsibility for the consequences of my refusal.           ____ Refuse      5. I authorize the use of any specimen, organs, tissues, body parts or foreign objects that may be removed from my body during the operation/procedure for diagnosis, research or teaching purposes and their subsequent disposal by hospital authorities. I also authorize the release of specimen test results and/or written reports to my treating physician on the hospital medical staff or other referring or consulting physicians involved in my care, at the discretion of the Pathologist or my treating physician. 6.   I consent to the photographing or videotaping of the operations or procedures to be performed, including appropriate portions of my body for medical, scientific, or educational purposes, provided my identity is not revealed by the pictures or by descriptive texts accompanying them. If the procedure has been photographed/videotaped, the surgeon will obtain the original picture, image, videotape or CD. The hospital will not be responsible for storage, release or maintenance of the picture, image, tape or CD.    7.   I consent to the presence of a  or observers in the operating room as deemed necessary by my physician or their designees.     8.   I recognize that in the event my procedure results in extended X-Ray/fluoroscopy time, I may develop a skin reaction. 9. If I have a Do Not Attempt Resuscitation (DNAR) order in place, that status will be suspended while in the operating room, procedural suite, and during the recovery period unless otherwise explicitly stated by me (or a person authorized to consent on my behalf). The surgeon or my attending physician will determine when the applicable recovery period ends for purposes of reinstating the DNAR order. 10. Patients having a sterilization procedure: I understand that if the procedure is successful the results will be permanent and it will therefore be impossible for me to inseminate, conceive, or bear children. I also understand that the procedure is intended to result in sterility, although the result has not been guaranteed. 11. I acknowledge that my physician has explained sedation/analgesia administration to me including the risk and benefits I consent to the administration of sedation/analgesia as may be necessary or desirable in the judgment of my physician. I CERTIFY THAT I HAVE READ AND FULLY UNDERSTAND THE ABOVE CONSENT TO OPERATION and/or OTHER PROCEDURE.     _________________________________________ _________________________________     ___________________________________  Signature of Patient     Signature of Responsible Person                   Printed Name of Responsible Person                              _________________________________________ ______________________________        ___________________________________  Signature of Witness         Date  Time         Relationship to Patient    STATEMENT OF PHYSICIAN My signature below affirms that prior to the time of the procedure; I have explained to the patient and/or his/her legal representative, the risks and benefits involved in the proposed treatment and any reasonable alternative to the proposed treatment.  I have also explained the risks and benefits involved in refusal of the proposed treatment and alternatives to the proposed treatment and have answered the patient's questions.  If I have a significant financial interest in a co-management agreement or a significant financial interest in any product or implant, or other significant relationship used in this procedure/surgery, I have disclosed this and had a discussion with my patient.     _______________________________________________________________ _____________________________  Estrella Coke of Physician)                                                                                         (Date)                                   (Time)  Patient Name: Susy Stanford    : 1945   Printed: 11/15/2022      Medical Record #: [id number]                                              Page 1 of 1

## (undated) NOTE — LETTER
[address], [place]  Authorization for Invasive Procedures  Date: 04/18/2023           Time:1333    I hereby authorize Dr. Luigi Jamil, my physician and his/her assistants (if applicable), which may include medical students, residents, and/or fellows, to perform the following surgical operation/ procedure and administer such anesthesia as may be determined necessary by my physician: Tunneled Dialysis Catheter Exchange on K[facility]  2. I recognize that during the surgical operation/procedure, unforeseen conditions may necessitate additional or different procedures than those listed above. I, therefore, further authorize and request that the above-named surgeon, assistants, or designees perform such procedures as are, in their judgment, necessary and desirable. 3.   My surgeon/physician has discussed prior to my surgery the potential benefits, risks and side effects of this procedure; the likelihood of achieving goals; and potential problems that might occur during recuperation. They also discussed reasonable alternatives to the procedure, including risks, benefits, and side effects related to the alternatives and risks related to not receiving this procedure. I have had all my questions answered and I acknowledge that no guarantee has been made as to the result that may be obtained. 4.   Should the need arise during my operation/procedure, which includes change of level of care prior to discharge, I also consent to the administration of blood and/or blood products. Further, I understand that despite careful testing and screening of blood or blood products by collecting agencies, I may still be subject to ill effects as a result of receiving a blood transfusion and/or blood products.   The following are some, but not all, of the potential risks that can occur: fever and allergic reactions, hemolytic reactions, transmission of diseases such as Hepatitis, AIDS and Cytomegalovirus (CMV) and fluid overload. In the event that I wish to have an autologous transfusion of my own blood, or a directed donor transfusion, I will discuss this with my physician. Check only if Refusing Blood or Blood Products  I understand refusal of blood or blood products as deemed necessary by my physician may have serious consequences to my condition to include possible death. I hereby assume responsibility for my refusal and release the hospital, its personnel, and my physicians from any responsibility for the consequences of my refusal.         o  Refuse         5. I authorize the use of any specimen, organs, tissues, body parts or foreign objects that may be removed from my body during the operation/procedure for diagnosis, research or teaching purposes and their subsequent disposal by hospital authorities. I also authorize the release of specimen test results and/or written reports to my treating physician on the hospital medical staff or other referring or consulting physicians involved in my care, at the discretion of the Pathologist or my treating physician. 6.   I consent to the photographing or videotaping of the operations or procedures to be performed, including appropriate portions of my body for medical, scientific, or educational purposes, provided my identity is not revealed by the pictures or by descriptive texts accompanying them. If the procedure has been photographed/videotaped, the surgeon will obtain the original picture, image, videotape or CD. The hospital will not be responsible for storage, release or maintenance of the picture, image, tape or CD.    7.   I consent to the presence of a  or observers in the operating room as deemed necessary by my physician or their designees. 8.   I recognize that in the event my procedure results in extended X-Ray/fluoroscopy time, I may develop a skin reaction. 9.  If I have a Do Not Attempt Resuscitation (DNAR) order in place, that status will be suspended while in the operating room, procedural suite, and during the recovery period unless otherwise explicitly stated by me (or a person authorized to consent on my behalf). The surgeon or my attending physician will determine when the applicable recovery period ends for purposes of reinstating the DNAR order. 10. Patients having a sterilization procedure: I understand that if the procedure is successful the results will be permanent and it will therefore be impossible for me to inseminate, conceive, or bear children. I also understand that the procedure is intended to result in sterility, although the result has not been guaranteed. 11. I acknowledge that my physician has explained sedation/analgesia administration to me including the risk and benefits I consent to the administration of sedation/analgesia as may be necessary or desirable in the judgment of my physician. I CERTIFY THAT I HAVE READ AND FULLY UNDERSTAND THE ABOVE CONSENT TO OPERATION and/or OTHER PROCEDURE.        ____________________________________       _________________________________      ______________________________  Signature of Patient         Signature of Responsible Person        Printed Name of Responsible Person        ____________________________________      _________________________________      ______________________________       Signature of Witness          Relationship to Patient                       Date                                       Time    Patient Name: Aracelis Miose     : 1945                 Printed: 2023      Medical Record #: [id number]                      Page 1 of 2          STATEMENT OF PHYSICIAN My signature below affirms that prior to the time of the procedure; I have explained to the patient and/or his/her legal representative, the risks and benefits involved in the proposed treatment and any reasonable alternative to the proposed treatment.  I have also explained the risks and benefits involved in refusal of the proposed treatment and alternatives to the proposed treatment and have answered the patient's questions.  If I have a significant financial interest in a co-management agreement or a significant financial interest in any product or implant, or other significant relationship used in this procedure/surgery, I have disclosed this and had a discussion with my patient.     _______________________________________________________________ _____________________________  Brendia Fat of Physician)                                                                                         (Date)                                   (Time)    Patient Name: Medardo Perry     : 1945                 Printed: 2023      Medical Record #: [id number]                      Page 2 of 2

## (undated) NOTE — LETTER
12/23/2022                  [phone]             Patient cleared for surgery from a Pulmonary perspective.            Sincerely,          Ava Harman MD  [address], [address] [place] [address]  [address] [facility] [address]  [place] [facility]   [phone]

## (undated) NOTE — LETTER
[address]  Authorization for Surgical Operation and Procedure                                                                                           1. I hereby Tavo Sagastume MD, my physician and his/her assistants (if applicable), which may include medical students, residents, and/or fellows, to perform the following surgical operation/ procedure and administer such anesthesia as may be determined necessary by my physician: Operation/Procedure name (s) BRONCHOSCOPY on Óscar Jennings   2. I recognize that during the surgical operation/procedure, unforeseen conditions may necessitate additional or different procedures than those listed above. I, therefore, further authorize and request that the above-named surgeon, assistants, or designees perform such procedures as are, in their judgment, necessary and desirable. 3.   My surgeon/physician has discussed prior to my surgery the potential benefits, risks and side effects of this procedure; the likelihood of achieving goals; and potential problems that might occur during recuperation. They also discussed reasonable alternatives to the procedure, including risks, benefits, and side effects related to the alternatives and risks related to not receiving this procedure. I have had all my questions answered and I acknowledge that no guarantee has been made as to the result that may be obtained. 4.   Should the need arise during my operation/procedure, which includes change of level of care prior to discharge, I also consent to the administration of blood and/or blood products. Further, I understand that despite careful testing and screening of blood or blood products by collecting agencies, I may still be subject to ill effects as a result of receiving a blood transfusion and/or blood products.   The following are some, but not all, of the potential risks that can occur: fever and allergic reactions, hemolytic reactions, transmission of diseases such as Hepatitis, AIDS and Cytomegalovirus (CMV) and fluid overload. In the event that I wish to have an autologous transfusion of my own blood, or a directed donor transfusion, I will discuss this with my physician. Check only if Refusing Blood or Blood Products  I understand refusal of blood or blood products as deemed necessary by my physician may have serious consequences to my condition to include possible death. I hereby assume responsibility for my refusal and release the hospital, its personnel, and my physicians from any responsibility for the consequences of my refusal.           ____ Refuse      5. I authorize the use of any specimen, organs, tissues, body parts or foreign objects that may be removed from my body during the operation/procedure for diagnosis, research or teaching purposes and their subsequent disposal by hospital authorities. I also authorize the release of specimen test results and/or written reports to my treating physician on the hospital medical staff or other referring or consulting physicians involved in my care, at the discretion of the Pathologist or my treating physician. 6.   I consent to the photographing or videotaping of the operations or procedures to be performed, including appropriate portions of my body for medical, scientific, or educational purposes, provided my identity is not revealed by the pictures or by descriptive texts accompanying them. If the procedure has been photographed/videotaped, the surgeon will obtain the original picture, image, videotape or CD. The hospital will not be responsible for storage, release or maintenance of the picture, image, tape or CD.    7.   I consent to the presence of a  or observers in the operating room as deemed necessary by my physician or their designees.     8.   I recognize that in the event my procedure results in extended X-Ray/fluoroscopy time, I may develop a skin reaction. 9. If I have a Do Not Attempt Resuscitation (DNAR) order in place, that status will be suspended while in the operating room, procedural suite, and during the recovery period unless otherwise explicitly stated by me (or a person authorized to consent on my behalf). The surgeon or my attending physician will determine when the applicable recovery period ends for purposes of reinstating the DNAR order. 10. Patients having a sterilization procedure: I understand that if the procedure is successful the results will be permanent and it will therefore be impossible for me to inseminate, conceive, or bear children. I also understand that the procedure is intended to result in sterility, although the result has not been guaranteed. 11. I acknowledge that my physician has explained sedation/analgesia administration to me including the risk and benefits I consent to the administration of sedation/analgesia as may be necessary or desirable in the judgment of my physician. I CERTIFY THAT I HAVE READ AND FULLY UNDERSTAND THE ABOVE CONSENT TO OPERATION and/or OTHER PROCEDURE.     _________________________________________ _________________________________     ___________________________________  Signature of Patient     Signature of Responsible Person                   Printed Name of Responsible Person                              _________________________________________ ______________________________        ___________________________________  Signature of Witness         Date  Time         Relationship to Patient    STATEMENT OF PHYSICIAN My signature below affirms that prior to the time of the procedure; I have explained to the patient and/or his/her legal representative, the risks and benefits involved in the proposed treatment and any reasonable alternative to the proposed treatment.  I have also explained the risks and benefits involved in refusal of the proposed treatment and alternatives to the proposed treatment and have answered the patient's questions.  If I have a significant financial interest in a co-management agreement or a significant financial interest in any product or implant, or other significant relationship used in this procedure/surgery, I have disclosed this and had a discussion with my patient.     _______________________________________________________________ _____________________________  Ritchie Gonzalezming of Physician)                                                                                         (Date)                                   (Time)  Patient Name: Talon Green    : 1945   Printed: 11/15/2022      Medical Record #: [id number]                                              Page 1 of 1

## (undated) NOTE — LETTER
[address], [place]  Authorization for Invasive Procedures  1.  I hereby authorize Dr. Sophie Levine , my physician and whomever may be designated as the doctor's assistant, to perform the following operation and/or procedure: pericardi following are some, but not all, of the potential risks that can occur: fever and allergic reactions, hemolytic reactions, transmission of disease such as hepatitis, AIDS, cytomegalovirus (CMV), and flluid overload.  In the event that I wish to have autolog administration of sedation/analgesia as may be necessary or desirable in the judgment of my physician.      Signature of Patient:  ________________________________________________ Date: _________Time: _________    Responsible person in case of minor or unco

## (undated) NOTE — LETTER
[address], [place]  Authorization for Invasive Procedures  Date: 04/18/2023           Time:1333    I hereby authorize Dr. Roxana Castro, my physician and his/her assistants (if applicable), which may include medical students, residents, and/or fellows, to perform the following surgical operation/ procedure and administer such anesthesia as may be determined necessary by my physician: Tunneled Dialysis Catheter Exchange on Marthann Conception  2. I recognize that during the surgical operation/procedure, unforeseen conditions may necessitate additional or different procedures than those listed above. I, therefore, further authorize and request that the above-named surgeon, assistants, or designees perform such procedures as are, in their judgment, necessary and desirable. 3.   My surgeon/physician has discussed prior to my surgery the potential benefits, risks and side effects of this procedure; the likelihood of achieving goals; and potential problems that might occur during recuperation. They also discussed reasonable alternatives to the procedure, including risks, benefits, and side effects related to the alternatives and risks related to not receiving this procedure. I have had all my questions answered and I acknowledge that no guarantee has been made as to the result that may be obtained. 4.   Should the need arise during my operation/procedure, which includes change of level of care prior to discharge, I also consent to the administration of blood and/or blood products. Further, I understand that despite careful testing and screening of blood or blood products by collecting agencies, I may still be subject to ill effects as a result of receiving a blood transfusion and/or blood products.   The following are some, but not all, of the potential risks that can occur: fever and allergic reactions, hemolytic reactions, transmission of diseases such as Hepatitis, AIDS and Cytomegalovirus (CMV) and fluid overload. In the event that I wish to have an autologous transfusion of my own blood, or a directed donor transfusion, I will discuss this with my physician. Check only if Refusing Blood or Blood Products  I understand refusal of blood or blood products as deemed necessary by my physician may have serious consequences to my condition to include possible death. I hereby assume responsibility for my refusal and release the hospital, its personnel, and my physicians from any responsibility for the consequences of my refusal.         o  Refuse         5. I authorize the use of any specimen, organs, tissues, body parts or foreign objects that may be removed from my body during the operation/procedure for diagnosis, research or teaching purposes and their subsequent disposal by hospital authorities. I also authorize the release of specimen test results and/or written reports to my treating physician on the hospital medical staff or other referring or consulting physicians involved in my care, at the discretion of the Pathologist or my treating physician. 6.   I consent to the photographing or videotaping of the operations or procedures to be performed, including appropriate portions of my body for medical, scientific, or educational purposes, provided my identity is not revealed by the pictures or by descriptive texts accompanying them. If the procedure has been photographed/videotaped, the surgeon will obtain the original picture, image, videotape or CD. The hospital will not be responsible for storage, release or maintenance of the picture, image, tape or CD.    7.   I consent to the presence of a  or observers in the operating room as deemed necessary by my physician or their designees. 8.   I recognize that in the event my procedure results in extended X-Ray/fluoroscopy time, I may develop a skin reaction. 9.  If I have a Do Not Attempt Resuscitation (DNAR) order in place, that status will be suspended while in the operating room, procedural suite, and during the recovery period unless otherwise explicitly stated by me (or a person authorized to consent on my behalf). The surgeon or my attending physician will determine when the applicable recovery period ends for purposes of reinstating the DNAR order. 10. Patients having a sterilization procedure: I understand that if the procedure is successful the results will be permanent and it will therefore be impossible for me to inseminate, conceive, or bear children. I also understand that the procedure is intended to result in sterility, although the result has not been guaranteed. 11. I acknowledge that my physician has explained sedation/analgesia administration to me including the risk and benefits I consent to the administration of sedation/analgesia as may be necessary or desirable in the judgment of my physician. I CERTIFY THAT I HAVE READ AND FULLY UNDERSTAND THE ABOVE CONSENT TO OPERATION and/or OTHER PROCEDURE.        ____________________________________       _________________________________      ______________________________  Signature of Patient         Signature of Responsible Person        Printed Name of Responsible Person        ____________________________________      _________________________________      ______________________________       Signature of Witness          Relationship to Patient                       Date                                       Time    Patient Name: Frannie Kwong     : 1945                 Printed: 2023      Medical Record #: [id number]                      Page 1 of 2          STATEMENT OF PHYSICIAN My signature below affirms that prior to the time of the procedure; I have explained to the patient and/or his/her legal representative, the risks and benefits involved in the proposed treatment and any reasonable alternative to the proposed treatment.  I have also explained the risks and benefits involved in refusal of the proposed treatment and alternatives to the proposed treatment and have answered the patient's questions.  If I have a significant financial interest in a co-management agreement or a significant financial interest in any product or implant, or other significant relationship used in this procedure/surgery, I have disclosed this and had a discussion with my patient.     _______________________________________________________________ _____________________________  Sherwin Orf of Physician)                                                                                         (Date)                                   (Time)    Patient Name: Narendra Haider     : 1945                 Printed: 2023      Medical Record #: [id number]                      Page 2 of 2

## (undated) NOTE — LETTER
[address], [place]  Authorization for Invasive Procedures  Date: 4/17/2023           Time: 5596    I hereby authorize Dr. Sole Britt, my physician and his/her assistants (if applicable), which may include medical students, residents, and/or fellows, to perform the following surgical operation/ procedure and administer such anesthesia as may be determined necessary by my physician: Tunneled hemodialysis catheter insertion on Odessa Viramontes  2. I recognize that during the surgical operation/procedure, unforeseen conditions may necessitate additional or different procedures than those listed above. I, therefore, further authorize and request that the above-named surgeon, assistants, or designees perform such procedures as are, in their judgment, necessary and desirable. 3.   My surgeon/physician has discussed prior to my surgery the potential benefits, risks and side effects of this procedure; the likelihood of achieving goals; and potential problems that might occur during recuperation. They also discussed reasonable alternatives to the procedure, including risks, benefits, and side effects related to the alternatives and risks related to not receiving this procedure. I have had all my questions answered and I acknowledge that no guarantee has been made as to the result that may be obtained. 4.   Should the need arise during my operation/procedure, which includes change of level of care prior to discharge, I also consent to the administration of blood and/or blood products. Further, I understand that despite careful testing and screening of blood or blood products by collecting agencies, I may still be subject to ill effects as a result of receiving a blood transfusion and/or blood products.   The following are some, but not all, of the potential risks that can occur: fever and allergic reactions, hemolytic reactions, transmission of diseases such as Hepatitis, AIDS and Cytomegalovirus (CMV) and fluid overload. In the event that I wish to have an autologous transfusion of my own blood, or a directed donor transfusion, I will discuss this with my physician. Check only if Refusing Blood or Blood Products  I understand refusal of blood or blood products as deemed necessary by my physician may have serious consequences to my condition to include possible death. I hereby assume responsibility for my refusal and release the hospital, its personnel, and my physicians from any responsibility for the consequences of my refusal.         o  Refuse         5. I authorize the use of any specimen, organs, tissues, body parts or foreign objects that may be removed from my body during the operation/procedure for diagnosis, research or teaching purposes and their subsequent disposal by hospital authorities. I also authorize the release of specimen test results and/or written reports to my treating physician on the hospital medical staff or other referring or consulting physicians involved in my care, at the discretion of the Pathologist or my treating physician. 6.   I consent to the photographing or videotaping of the operations or procedures to be performed, including appropriate portions of my body for medical, scientific, or educational purposes, provided my identity is not revealed by the pictures or by descriptive texts accompanying them. If the procedure has been photographed/videotaped, the surgeon will obtain the original picture, image, videotape or CD. The hospital will not be responsible for storage, release or maintenance of the picture, image, tape or CD.    7.   I consent to the presence of a  or observers in the operating room as deemed necessary by my physician or their designees. 8.   I recognize that in the event my procedure results in extended X-Ray/fluoroscopy time, I may develop a skin reaction. 9.  If I have a Do Not Attempt Resuscitation (DNAR) order in place, that status will be suspended while in the operating room, procedural suite, and during the recovery period unless otherwise explicitly stated by me (or a person authorized to consent on my behalf). The surgeon or my attending physician will determine when the applicable recovery period ends for purposes of reinstating the DNAR order. 10. Patients having a sterilization procedure: I understand that if the procedure is successful the results will be permanent and it will therefore be impossible for me to inseminate, conceive, or bear children. I also understand that the procedure is intended to result in sterility, although the result has not been guaranteed. 11. I acknowledge that my physician has explained sedation/analgesia administration to me including the risk and benefits I consent to the administration of sedation/analgesia as may be necessary or desirable in the judgment of my physician.     I CERTIFY THAT I HAVE READ AND FULLY UNDERSTAND THE ABOVE CONSENT TO OPERATION and/or OTHER PROCEDURE.        ____________________________________       _________________________________      ______________________________  Signature of Patient         Signature of Responsible Person        Printed Name of Responsible Person        ____________________________________      _________________________________      ______________________________       Signature of Witness          Relationship to Patient                       Date                                       Time    Patient Name: Susy Stanford     : 1945                 Printed: 2023      Medical Record #: [id number]                      Page 1 of 2          [place] My signature below affirms that prior to the time of the procedure; I have explained to the patient and/or his/her legal representative, the risks and benefits involved in the proposed treatment and any reasonable alternative to the proposed treatment. I have also explained the risks and benefits involved in refusal of the proposed treatment and alternatives to the proposed treatment and have answered the patient's questions.  If I have a significant financial interest in a co-management agreement or a significant financial interest in any product or implant, or other significant relationship used in this procedure/surgery, I have disclosed this and had a discussion with my patient.     _______________________________________________________________ _____________________________  Sage Barbo of Physician)                                                                                         (Date)                                   (Time)    Patient Name: Deyanira Cabrera     : 1945                 Printed: 2023      Medical Record #: [id number]                      Page 2 of 2

## (undated) NOTE — LETTER
10/14/2020              [phone]         To Whom this may concern,    Joel [place] is a patient under my care for pulmonary disease.  He uses a home oxygen concentrator and nebulizer machine

## (undated) NOTE — MR AVS SNAPSHOT
After Visit Summary   7/27/2021    Chuck Chávez    MRN: [id number]           Visit Information     Date & Time  7/27/2021  1:00 PM Provider  [facility] - Infusion Dept.  Phone  [phone] Inhale 2 puffs into the lungs every 6 (six) hours as needed for Wheezing or Shortness of Breath. Prochlorperazine Maleate (COMPAZINE) 10 mg tablet Take 1 tablet (10 mg total) by mouth every 8 (eight) hours as needed for Nausea.     Ondansetron HCl (ZOFRA [facility]    8/3/2021 1:15 PM [facility] [address] - Infusion    8/3/2021 2:00 PM [address] [facility]    8/3/2021 3:00 PM [facility] [address] - Infusion    8/ Treatment for mild illness or injury that does not require immediate attention.  Average cost  $70*   [facility]  Monday – Friday  8:00 am – 8:00 pm   Saturday – Sunday  8:00 am – 4:00 pm    WALK-IN CARE  Primary Care

## (undated) NOTE — LETTER
[address]  Authorization for Surgical Operation and Procedure                                                                                           1. I hereby Juan Brown MD, my physician and his/her assistants (if applicable), which may include medical students, residents, and/or fellows, to perform the following surgical operation/ procedure and administer such anesthesia as may be determined necessary by my physician: Operation/Procedure name (s) BRONCHOSCOPY on Katja Pino   2. I recognize that during the surgical operation/procedure, unforeseen conditions may necessitate additional or different procedures than those listed above. I, therefore, further authorize and request that the above-named surgeon, assistants, or designees perform such procedures as are, in their judgment, necessary and desirable. 3.   My surgeon/physician has discussed prior to my surgery the potential benefits, risks and side effects of this procedure; the likelihood of achieving goals; and potential problems that might occur during recuperation. They also discussed reasonable alternatives to the procedure, including risks, benefits, and side effects related to the alternatives and risks related to not receiving this procedure. I have had all my questions answered and I acknowledge that no guarantee has been made as to the result that may be obtained. 4.   Should the need arise during my operation/procedure, which includes change of level of care prior to discharge, I also consent to the administration of blood and/or blood products. Further, I understand that despite careful testing and screening of blood or blood products by collecting agencies, I may still be subject to ill effects as a result of receiving a blood transfusion and/or blood products.   The following are some, but not all, of the potential risks that can occur: fever and allergic reactions, hemolytic reactions, transmission of diseases such as Hepatitis, AIDS and Cytomegalovirus (CMV) and fluid overload. In the event that I wish to have an autologous transfusion of my own blood, or a directed donor transfusion, I will discuss this with my physician. Check only if Refusing Blood or Blood Products  I understand refusal of blood or blood products as deemed necessary by my physician may have serious consequences to my condition to include possible death. I hereby assume responsibility for my refusal and release the hospital, its personnel, and my physicians from any responsibility for the consequences of my refusal.           ____ Refuse      5. I authorize the use of any specimen, organs, tissues, body parts or foreign objects that may be removed from my body during the operation/procedure for diagnosis, research or teaching purposes and their subsequent disposal by hospital authorities. I also authorize the release of specimen test results and/or written reports to my treating physician on the hospital medical staff or other referring or consulting physicians involved in my care, at the discretion of the Pathologist or my treating physician. 6.   I consent to the photographing or videotaping of the operations or procedures to be performed, including appropriate portions of my body for medical, scientific, or educational purposes, provided my identity is not revealed by the pictures or by descriptive texts accompanying them. If the procedure has been photographed/videotaped, the surgeon will obtain the original picture, image, videotape or CD. The hospital will not be responsible for storage, release or maintenance of the picture, image, tape or CD.    7.   I consent to the presence of a  or observers in the operating room as deemed necessary by my physician or their designees.     8.   I recognize that in the event my procedure results in extended X-Ray/fluoroscopy time, I may develop a skin reaction. 9. If I have a Do Not Attempt Resuscitation (DNAR) order in place, that status will be suspended while in the operating room, procedural suite, and during the recovery period unless otherwise explicitly stated by me (or a person authorized to consent on my behalf). The surgeon or my attending physician will determine when the applicable recovery period ends for purposes of reinstating the DNAR order. 10. Patients having a sterilization procedure: I understand that if the procedure is successful the results will be permanent and it will therefore be impossible for me to inseminate, conceive, or bear children. I also understand that the procedure is intended to result in sterility, although the result has not been guaranteed. 11. I acknowledge that my physician has explained sedation/analgesia administration to me including the risk and benefits I consent to the administration of sedation/analgesia as may be necessary or desirable in the judgment of my physician. I CERTIFY THAT I HAVE READ AND FULLY UNDERSTAND THE ABOVE CONSENT TO OPERATION and/or OTHER PROCEDURE.     _________________________________________ _________________________________     ___________________________________  Signature of Patient     Signature of Responsible Person                   Printed Name of Responsible Person                              _________________________________________ ______________________________        ___________________________________  Signature of Witness         Date  Time         Relationship to Patient    STATEMENT OF PHYSICIAN My signature below affirms that prior to the time of the procedure; I have explained to the patient and/or his/her legal representative, the risks and benefits involved in the proposed treatment and any reasonable alternative to the proposed treatment.  I have also explained the risks and benefits involved in refusal of the proposed treatment and alternatives to the proposed treatment and have answered the patient's questions.  If I have a significant financial interest in a co-management agreement or a significant financial interest in any product or implant, or other significant relationship used in this procedure/surgery, I have disclosed this and had a discussion with my patient.     _______________________________________________________________ _____________________________  Brendia Fat of Physician)                                                                                         (Date)                                   (Time)  Patient Name: Medardo Perry    : 1945   Printed: 11/15/2022      Medical Record #: [id number]                                              Page 1 of 1

## (undated) NOTE — MR AVS SNAPSHOT
After Visit Summary   7/7/2021    Bekah Collins    MRN: [id number]           Visit Information     Date & Time  7/7/2021  3:00 PM Provider  [facility] Department  [address] [place] [address] Infusion Dept.  Phone  [phone]      Your Vi ipratropium-albuterol 0.5-2.5 (3) MG/3ML Inhalation Solution Take 3 mL by nebulization 4 (four) times daily. DX code: R09.02,    maalox/diphenhydramine/lidocaine viscous Oral Suspension Take 5 mL by mouth 4 (four) times daily before meals and nightly.     h Encounter for central line care   [960484]             Future Appointments        Provider Department    8/3/2021 9:30 AM [address] [place] [address]    8/3/2021 12:15 PM Yuniel, [address] [place] [address], [address] [place] [address],  Illness are never convenient. If you are dealing with a   non-emergency, consider your options before heading to an ER.   VIDEO VISITS  Visit face-to-face with a [facility] physician or   [facility] using your mobile device or computer   using [employer].    e-VISITS  [employer]

## (undated) NOTE — LETTER
[address], [place]  Authorization for Invasive Procedures  Date: 7/10/23           Time: ***    I hereby authorize Dr Ramon Morrell, my physician and his/her assistants (if applicable), which may include medical students, residents, and/or fellows, to perform the following surgical operation/ procedure and administer such anesthesia as may be determined necessary by my physician: Fibrin Sheath Stripping with possible Port Replacement on [facility]  2. I recognize that during the surgical operation/procedure, unforeseen conditions may necessitate additional or different procedures than those listed above. I, therefore, further authorize and request that the above-named surgeon, assistants, or designees perform such procedures as are, in their judgment, necessary and desirable. 3.   My surgeon/physician has discussed prior to my surgery the potential benefits, risks and side effects of this procedure; the likelihood of achieving goals; and potential problems that might occur during recuperation. They also discussed reasonable alternatives to the procedure, including risks, benefits, and side effects related to the alternatives and risks related to not receiving this procedure. I have had all my questions answered and I acknowledge that no guarantee has been made as to the result that may be obtained. 4.   Should the need arise during my operation/procedure, which includes change of level of care prior to discharge, I also consent to the administration of blood and/or blood products. Further, I understand that despite careful testing and screening of blood or blood products by collecting agencies, I may still be subject to ill effects as a result of receiving a blood transfusion and/or blood products.   The following are some, but not all, of the potential risks that can occur: fever and allergic reactions, hemolytic reactions, transmission of diseases such as Hepatitis, AIDS and Cytomegalovirus (CMV) and fluid overload. In the event that I wish to have an autologous transfusion of my own blood, or a directed donor transfusion, I will discuss this with my physician. Check only if Refusing Blood or Blood Products  I understand refusal of blood or blood products as deemed necessary by my physician may have serious consequences to my condition to include possible death. I hereby assume responsibility for my refusal and release the hospital, its personnel, and my physicians from any responsibility for the consequences of my refusal.         o  Refuse         5. I authorize the use of any specimen, organs, tissues, body parts or foreign objects that may be removed from my body during the operation/procedure for diagnosis, research or teaching purposes and their subsequent disposal by hospital authorities. I also authorize the release of specimen test results and/or written reports to my treating physician on the hospital medical staff or other referring or consulting physicians involved in my care, at the discretion of the Pathologist or my treating physician. 6.   I consent to the photographing or videotaping of the operations or procedures to be performed, including appropriate portions of my body for medical, scientific, or educational purposes, provided my identity is not revealed by the pictures or by descriptive texts accompanying them. If the procedure has been photographed/videotaped, the surgeon will obtain the original picture, image, videotape or CD. The hospital will not be responsible for storage, release or maintenance of the picture, image, tape or CD.    7.   I consent to the presence of a  or observers in the operating room as deemed necessary by my physician or their designees. 8.   I recognize that in the event my procedure results in extended X-Ray/fluoroscopy time, I may develop a skin reaction. 9.  If I have a Do Not Attempt Resuscitation (DNAR) order in place, that status will be suspended while in the operating room, procedural suite, and during the recovery period unless otherwise explicitly stated by me (or a person authorized to consent on my behalf). The surgeon or my attending physician will determine when the applicable recovery period ends for purposes of reinstating the DNAR order. 10. Patients having a sterilization procedure: I understand that if the procedure is successful the results will be permanent and it will therefore be impossible for me to inseminate, conceive, or bear children. I also understand that the procedure is intended to result in sterility, although the result has not been guaranteed. 11. I acknowledge that my physician has explained sedation/analgesia administration to me including the risk and benefits I consent to the administration of sedation/analgesia as may be necessary or desirable in the judgment of my physician.     I CERTIFY THAT I HAVE READ AND FULLY UNDERSTAND THE ABOVE CONSENT TO OPERATION and/or OTHER PROCEDURE.        ____________________________________       _________________________________      ______________________________  Signature of Patient         Signature of Responsible Person        Printed Name of Responsible Person        ____________________________________      _________________________________      ______________________________       Signature of Witness          Relationship to Patient                       Date                                       Time    Patient Name: Royal Santos     : 1945                 Printed: July 10, 2023      Medical Record #: [id number]                      Page 1 of 2          [place] My signature below affirms that prior to the time of the procedure; I have explained to the patient and/or his/her legal representative, the risks and benefits involved in the proposed treatment and any reasonable alternative to the proposed treatment. I have also explained the risks and benefits involved in refusal of the proposed treatment and alternatives to the proposed treatment and have answered the patient's questions.  If I have a significant financial interest in a co-management agreement or a significant financial interest in any product or implant, or other significant relationship used in this procedure/surgery, I have disclosed this and had a discussion with my patient.     _______________________________________________________________ _____________________________  Rianna Listen of Physician)                                                                                         (Date)                                   (Time)    Patient Name: Odessa Viramontes     : 1945                 Printed: July 10, 2023      Medical Record #: [id number]                      Page 2 of 2

## (undated) NOTE — LETTER
Hospital Discharge Documentation  Please phone to schedule a hospital follow up appointment. From: [id number] Hospitalist's Office  Phone: [phone]    Patient discharged time/date: 2023  5:09 AM  Patient discharge disposition:         Discharge Summary - D/C Summary        Discharge Summary signed by Elisha Garcia MD at 2023  8:17 AM  Version 1 of 1      Author: Elisha Garcia MD Service: Hospitalist Author Type: Physician    Filed: 2023  8:17 AM Date of Service: 2023  8:08 AM Status: Signed    : Elisha Garcia MD (Physician)         [facility]    Discharge Summary    Branden Pepe Patient Status:  Inpatient    1945 MRN [id number]   Location [facility] 5SW/SE Attending No att. providers found   Hosp Day # 5 PCP Marijean Harada, MD     Date of Admission: 2023 Disposition:      Date of Discharge: 23      Admitting Diagnosis: Weakness [R53.1]  Pleural effusion [J90]  Injury of head, initial encounter [S09.90XA]  Fall, initial encounter [W19. 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 [facility])     Malignant pericardial effusion     Malignant neoplasm metastatic to bone (HCC)     Lung metastasis     Malignant pleural effusion     Mesenteric mass     Medication monitoring encounter     Cancer related pain     Chemotherapy management, encounter for     Encounter for central line care     Anemia     Encounter for immunotherapy     Weakness     Hypothyroidism due to medication     COPD exacerbation ([address]     OVI (acute kidney injury) ([address]     Dehydration     Acute renal failure (ARF) (HCC)     Metabolic acidosis     Nephritis     Drug-induced nephritis     History of immunotherapy     Hypokalemia     Hypernatremia     AIN (acute interstitial nephritis)     Healed wound     Stage 3 chronic kidney disease (HCC)     Acute renal injury ([address]     Hyperkalemia     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)     Malignant neoplasm metastatic to liver (HCC)     Acute metabolic acidosis     Diarrhea, unspecified type     Acute on chronic renal failure      CKD (chronic kidney disease) stage 5, GFR less than 15 ml/min (HCC)     Secondary hyperparathyroidism (HCC)     Respiratory distress     Neutropenia, unspecified type (HCC)     Multifocal pneumonia     Hydropneumothorax     Elevated troponin     Chronic kidney disease, unspecified CKD stage     Acute respiratory failure with hypoxia (HCC)     Empyema lung (HCC)     Hyperphosphatemia     Open wound of right cheek with complication     Syncope, near     ESRD (end stage renal disease) on dialysis ([address]     Open neck wound, sequela     Uremia     Thrombocytopenia (HCC)     Anemia, unspecified type     Community acquired pneumonia, unspecified laterality     Primary malignant neoplasm of lung metastatic to other site [facility])     Neuroendocrine tumor     Malnutrition ([address]     SOB (shortness of breath)     ESRD (end stage renal disease) ([address]     Pneumonia due to infectious organism     Hyponatremia     Hypophosphatemia     Acute on chronic respiratory failure with hypoxia ([address]     Palliative care by specialist     Goals of care, counseling/discussion     Advance care planning     Pleural effusion     Fall, initial encounter     Injury of head, initial encounter     Aspiration pneumonia ([address]     Failure to thrive in adult      Reason for Admission:   Fall 2/2 increasing weakness, mechanical, Failure to thrive  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 Kaity Theodore  Patient is an 67-year-old  male who was hospitalized on June 28 and discharged on July 12 with a similar presentation, treated with antibiotics with improvement in his clinical status. He has been deteriorating in terms of physical ability, and he has been in a rehab facility. Today, he fell off his wheelchair, sustaining an abrasion on his forehead. He was brought into the emergency department for evaluation. Imaging studies including CT scan of the cervical spine and CT scan of the brain showed no acute findings. There is new loculated right apical pleural effusion and mild pleural thickening in the left apex which are all new. Chest x-ray showed heart size upper limits of normal, and there is prominence of the pulmonary vascularity compatible with mild pulmonary congestive finding; worsening opacity in the right mid and lower chest may be related to posterior layering right pleural effusion, could not exclude worsening asymmetric pulmonary edema versus worsening right upper and mid chest pneumonia; moderate right pleural effusion which has increased in size from previous study; persistent left basal opacification suggestive of left basal scarring, atelectasis or pneumonia. Procalcitonin still pending. CBC showed white blood cell count of 3.8, hemoglobin 12.9, no left shift. Chemistry showed GFR is 9, sodium 132, and chloride 96. Patient did not get dialysis today. Arterial blood gas showed PO2 of 67, pCO2 of 45. Started on IV Zosyn, and he will be admitted to the hospital for further management.      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  Aspiration PNA, Loculated Pleural effusion 2/2 above  Profound debility and muscle waisting 2/2 above  Acute hypoxic RF 2/2 above  Hx of COPD  -rec palliative eval appreciated  -cxr appears worse  -on abx  -suction, trelety, o2, nebs  -appreciate pulm recs  -malnutrition assessment  -added scopalamine patch     Hypoglycemia  -added fluids with dextrose  -Wean IV fluids with dextrose as able     Dysphagia  -Continued modified diet     ESRD on HD  -2/2 AIN from [facility] ([facility])  -per renal     Hx aortic stenosis  -stable    Dispo- patient passed away at 2:05 am. He was DNR/select and cpr was not attempted    Consultations:   Nephrology  Palliative care  Pulmonology    Discharge Condition:     Discharge Medications:      Discharge Medications        ASK your doctor about these medications        Instructions Prescription details   acetaminophen 325 MG Tabs  Commonly known as: Tylenol      Take 1 tablet (325 mg total) by mouth every 6 (six) hours as needed for Pain. Refills: 0     acetaminophen 500 MG Tabs  Commonly known as: Tylenol Extra Strength      Take 2 tablets (1,000 mg total) by mouth 3 (three) times daily. Refills: 0     albuterol 108 (90 Base) MCG/ACT Aers  Commonly known as: Ventolin HFA      Inhale 2 puffs into the lungs every 6 (six) hours as needed for Wheezing or Shortness of Breath. Quantity: 2 Inhaler  Refills: 5     ALPRAZolam 0.5 MG Tabs  Commonly known as: Xanax      Take 1 tablet (0.5 mg total) by mouth 3 (three) times daily as needed for Sleep or Anxiety. Quantity: 20 tablet  Refills: 0     Cyanocobalamin 1000 MCG Subl      Place 1 tablet under the tongue daily. Refills: 0     cycloSPORINE 0.05 % Emul  Commonly known as: RESTASIS      Place into both eyes 2 (two) times daily. Refills: 0     ferrous sulfate 325 (65 FE) MG Tbec      Take 1 tablet (325 mg total) by mouth daily with breakfast.   Refills: 0     finasteride 5 MG Tabs  Commonly known as: Proscar      Take 1 tablet (5 mg total) by mouth daily.    Quantity: 90 tablet  Refills: 3     FLORASTOR OR      2 (two) times a day. Refills: 0     folic acid 1 MG Tabs  Commonly known as: Folvite      Take 1 tablet (1 mg total) by mouth in the morning. Quantity: 90 tablet  Refills: 3     furosemide 40 MG Tabs  Commonly known as: Lasix      Take 1 tablet (40 mg total) by mouth 2 (two) times daily. Refills: 0     ipratropium-albuterol 0.5-2.5 (3) MG/3ML Soln  Commonly known as: Duoneb      Take 3 mL by nebulization 4 (four) times daily. Quantity: 360 mL  Refills: 5     levothyroxine 150 MCG Tabs  Commonly known as: Synthroid      Take 1 tablet (150 mcg total) by mouth before breakfast.   Quantity: 30 tablet  Refills: 1     loperamide 2 MG Caps  Commonly known as: Imodium      Take 1 capsule (2 mg total) by mouth 4 (four) times daily as needed for Diarrhea. Refills: 0     Magnesium 500 MG Tabs      Take 1 tablet (500 mg total) by mouth in the morning and 1 tablet (500 mg total) before bedtime. Refills: 0     Melatonin 5 MG Tabs      Take 1 tablet (5 mg total) by mouth at bedtime. Refills: 0     midodrine 10 MG Tabs  Commonly known as: ProAmatine      Take 1 tablet (10 mg total) by mouth daily as needed (30 mins before HD). 30 mins before HD   Refills: 0     Renal-Vicki 0.8 MG Tabs      Take 0.8 mg by mouth daily. Refills: 0     sevelamer carbonate 800 MG Tabs  Commonly known as: Renvela      Take 1 tablet (800 mg total) by mouth 3 (three) times daily with meals. Refills: 0     traMADol 50 MG Tabs  Commonly known as: Ultram      Take 1 tablet (50 mg total) by mouth every 12 (twelve) hours as needed (moderate to severe pain). Quantity: 14 tablet  Refills: 0     traZODone 50 MG Tabs  Commonly known as: Desyrel      Take 0.5 tablets (25 mg total) by mouth nightly. Quantity: 30 tablet  Refills: 0     Trelegy Ellipta 100-62.5-25 MCG/ACT Aepb  Generic drug: fluticasone-umeclidin-vilant      Inhale 1 puff into the lungs daily.    Quantity: 3 each  Refills: 3     Vitamin D3 25 MCG Tabs      Take 2 tablets (2,000 Units total) by mouth daily.    Quantity: 60 tablet  Refills: 0                Geovanny Grady MD  7/31/2023        Electronically signed by Felipe Burleson MD on 7/31/2023  8:17 AM

## (undated) NOTE — LETTER
[address], [place]  Authorization for Invasive Procedures  Date: 4/18/2023           Time: 1038    I hereby authorize Dr. Ralph Samuels, my physician and his/her assistants (if applicable), which may include medical students, residents, and/or fellows, to perform the following surgical operation/ procedure and administer such anesthesia as may be determined necessary by my physician: Central line exchange on [address]  2. I recognize that during the surgical operation/procedure, unforeseen conditions may necessitate additional or different procedures than those listed above. I, therefore, further authorize and request that the above-named surgeon, assistants, or designees perform such procedures as are, in their judgment, necessary and desirable. 3.   My surgeon/physician has discussed prior to my surgery the potential benefits, risks and side effects of this procedure; the likelihood of achieving goals; and potential problems that might occur during recuperation. They also discussed reasonable alternatives to the procedure, including risks, benefits, and side effects related to the alternatives and risks related to not receiving this procedure. I have had all my questions answered and I acknowledge that no guarantee has been made as to the result that may be obtained. 4.   Should the need arise during my operation/procedure, which includes change of level of care prior to discharge, I also consent to the administration of blood and/or blood products. Further, I understand that despite careful testing and screening of blood or blood products by collecting agencies, I may still be subject to ill effects as a result of receiving a blood transfusion and/or blood products.   The following are some, but not all, of the potential risks that can occur: fever and allergic reactions, hemolytic reactions, transmission of diseases such as Hepatitis, AIDS and Cytomegalovirus (CMV) and fluid overload. In the event that I wish to have an autologous transfusion of my own blood, or a directed donor transfusion, I will discuss this with my physician. Check only if Refusing Blood or Blood Products  I understand refusal of blood or blood products as deemed necessary by my physician may have serious consequences to my condition to include possible death. I hereby assume responsibility for my refusal and release the hospital, its personnel, and my physicians from any responsibility for the consequences of my refusal.         o  Refuse         5. I authorize the use of any specimen, organs, tissues, body parts or foreign objects that may be removed from my body during the operation/procedure for diagnosis, research or teaching purposes and their subsequent disposal by hospital authorities. I also authorize the release of specimen test results and/or written reports to my treating physician on the hospital medical staff or other referring or consulting physicians involved in my care, at the discretion of the Pathologist or my treating physician. 6.   I consent to the photographing or videotaping of the operations or procedures to be performed, including appropriate portions of my body for medical, scientific, or educational purposes, provided my identity is not revealed by the pictures or by descriptive texts accompanying them. If the procedure has been photographed/videotaped, the surgeon will obtain the original picture, image, videotape or CD. The hospital will not be responsible for storage, release or maintenance of the picture, image, tape or CD.    7.   I consent to the presence of a  or observers in the operating room as deemed necessary by my physician or their designees. 8.   I recognize that in the event my procedure results in extended X-Ray/fluoroscopy time, I may develop a skin reaction. 9.  If I have a Do Not Attempt Resuscitation (DNAR) order in place, that status will be suspended while in the operating room, procedural suite, and during the recovery period unless otherwise explicitly stated by me (or a person authorized to consent on my behalf). The surgeon or my attending physician will determine when the applicable recovery period ends for purposes of reinstating the DNAR order. 10. Patients having a sterilization procedure: I understand that if the procedure is successful the results will be permanent and it will therefore be impossible for me to inseminate, conceive, or bear children. I also understand that the procedure is intended to result in sterility, although the result has not been guaranteed. 11. I acknowledge that my physician has explained sedation/analgesia administration to me including the risk and benefits I consent to the administration of sedation/analgesia as may be necessary or desirable in the judgment of my physician. I CERTIFY THAT I HAVE READ AND FULLY UNDERSTAND THE ABOVE CONSENT TO OPERATION and/or OTHER PROCEDURE.        ____________________________________       _________________________________      ______________________________  Signature of Patient         Signature of Responsible Person        Printed Name of Responsible Person        ____________________________________      _________________________________      ______________________________       Signature of Witness          Relationship to Patient                       Date                                       Time    Patient Name: Idris Cordova     : 1945                 Printed: 2023      Medical Record #: [id number]                      Page 1 of 2          STATEMENT OF PHYSICIAN My signature below affirms that prior to the time of the procedure; I have explained to the patient and/or his/her legal representative, the risks and benefits involved in the proposed treatment and any reasonable alternative to the proposed treatment.  I have also explained the risks and benefits involved in refusal of the proposed treatment and alternatives to the proposed treatment and have answered the patient's questions.  If I have a significant financial interest in a co-management agreement or a significant financial interest in any product or implant, or other significant relationship used in this procedure/surgery, I have disclosed this and had a discussion with my patient.     _______________________________________________________________ _____________________________  Tevin Reis  Physician)                                                                                         (Date)                                   (Time)    Patient Name: Aracelis Moise     : 1945                 Printed: 2023      Medical Record #: [id number]                      Page 2 of 2

## (undated) NOTE — LETTER
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 He should be able to proceed as planned with exchange of his cochlear implant which has been recalled.        Sincerely,      Zaki Cotton MD  [address] [place] [phone]        Document electronically generated by:  Zaki Cotton MD

## (undated) NOTE — Clinical Note
Spoke with spouse today for TCM--sent TE to office staff for appt clarification and f/u, per TCM protocol--thank you.

## (undated) NOTE — LETTER
Ra Fonseca [address], [place], [place]  [address]  INFORMED CONSENT FOR TRANSFUSION OF BLOOD OR BLOOD PRODUCTS  My physician has informed me of the nature, purpose, benefits and risks of transfusion for blood and blood components that he/she may deem necessary during my treatment or hospitalization. He/she has also discussed alternatives to receiving blood from the voluntary blood supply with me, such as self-donation (autologous) and directed donation (blood donated by family or friends to be used specifically for me). I further understand that while the [address] [place] [address] will attempt to supply any autologous or directed donor blood prior to transfusion of blood from the routine blood supply, medical circumstances may require that other or additional blood components may be required for my care. In giving consent, I acknowledge that my physician has also informed me that despite careful screening and testing in accordance with national and regional regulations, there is still a small risk of transmission of infectious agents including hepatitis, HIV-1/2, cytomegalovirus and other viruses or diseases as yet unknown for which licensed definitive screening tests do not currently exist. Additionally, my physician has informed me of the potential for transfusion reactions not related to an infectious agent. [  ]  Check here for Recurring Outpatient Transfusion Therapy (valid for 1 year) In addition to the above, my physician has informed me that I shall receive numerous transfusions over a period of time and that these can lead to other increased risks. I hereby authorize the transfusion of blood and/or blood products to me as deemed necessary and ordered by physicians participating in my care.  My physician has given me the opportunity to ask questions and any questions asked have been answered to my satisfaction  __________________________________________ ______________________________________________  (Signature of Patient)                                                            (Responsible party in case of Minor,                                                                                                 Incompetent, or unconscious Patient)  ___________________________________________       ________ ______________________________________  (Relationship to Patient)                                                       (Signature of Witness)  ______________________________________________________________________________________________   (Date)                                                                           (Time)  REFUSAL OF CONSENT FOR BLOOD TRANSFUSIONS   Sign only if Refusing   [  ] I understand refusal of blood or blood products as deemed necessary by my physician may have serious consequences to my condition to include possible death.  I hereby assume responsibility for my refusal and release the hospital, its personnel, and my physicians from any responsibility for the consequences of my refusal.    ________________________________________________________________________________  (Signature of Patient)                                                         (Responsible Party/Relationship to Patient)    ________________________________________________________________________________  (Signature of Witness)                                                       (Date/Time)     Patient Name: Willie Wyatt     : 1945                 Printed: 2022      Medical Record #: [id number]                                 Page 1 of 1

## (undated) NOTE — Clinical Note
JULES, TCM call made, see notes. HAROON attempted to schedule a TCM HFU, patients wife Polly declined at this time, she states Elson Cabot is following up with his specialists. Message sent to MD's office.

## (undated) NOTE — MR AVS SNAPSHOT
After Visit Summary   8/24/2021    Chuck Chávez    MRN: [id number]           Visit Information     Date & Time  8/24/2021 12:30 PM Provider  [facility] [address] [facility] Dept.  Phone  [phone] patient remains unresponsive, repeat dose in other nostril 2-5 minutes after first dose. Albuterol Sulfate  (90 Base) MCG/ACT Inhalation Aero Soln Inhale 2 puffs into the lungs every 6 (six) hours as needed for Wheezing or Shortness of Breath. Center - Infusion    10/5/2021 11:00 AM EM [facility] - Infusion    10/5/2021 12:00 PM [address], [facility] Hematology Oncology    10/5/2021 1:00 PM EM [address]AL [address] [facility] - Infusion    10/12/2 [facility]  Monday – Friday  8:00 am – 8:00 pm   Saturday – Sunday  8:00 am – 4:00 pm    WALK-IN CARE  Primary Care Providers  Treatment for acute illness   or injury that are   non-life-threatening.   Also available by appointment

## (undated) NOTE — Clinical Note
Spoke with spouse Polly today--declines TCM/HFU--sent office staff TE for appt clarification and f/u, as needed--thank you.

## (undated) NOTE — LETTER
Ra Fonseca [address], [place], [place]  [address]  INFORMED CONSENT FOR TRANSFUSION OF BLOOD OR BLOOD PRODUCTS  My physician has informed me of the nature, purpose, benefits and risks of transfusion for blood and blood components that he/she may deem necessary during my treatment or hospitalization. He/she has also discussed alternatives to receiving blood from the voluntary blood supply with me, such as self-donation (autologous) and directed donation (blood donated by family or friends to be used specifically for me). I further understand that while the [address] [place] [address] will attempt to supply any autologous or directed donor blood prior to transfusion of blood from the routine blood supply, medical circumstances may require that other or additional blood components may be required for my care. In giving consent, I acknowledge that my physician has also informed me that despite careful screening and testing in accordance with national and regional regulations, there is still a small risk of transmission of infectious agents including hepatitis, HIV-1/2, cytomegalovirus and other viruses or diseases as yet unknown for which licensed definitive screening tests do not currently exist. Additionally, my physician has informed me of the potential for transfusion reactions not related to an infectious agent. [  ]  Check here for Recurring Outpatient Transfusion Therapy (valid for 1 year) In addition to the above, my physician has informed me that I shall receive numerous transfusions over a period of time and that these can lead to other increased risks. I hereby authorize the transfusion of blood and/or blood products to me as deemed necessary and ordered by physicians participating in my care.  My physician has given me the opportunity to ask questions and any questions asked have been answered to my satisfaction  __________________________________________ ______________________________________________  (Signature of Patient)                                                            (Responsible party in case of Minor,                                                                                                 Incompetent, or unconscious Patient)  ___________________________________________       ________ ______________________________________  (Relationship to Patient)                                                       (Signature of Witness)  ______________________________________________________________________________________________   (Date)                                                                           (Time)  REFUSAL OF CONSENT FOR BLOOD TRANSFUSIONS   Sign only if Refusing   [  ] I understand refusal of blood or blood products as deemed necessary by my physician may have serious consequences to my condition to include possible death.  I hereby assume responsibility for my refusal and release the hospital, its personnel, and my physicians from any responsibility for the consequences of my refusal.    ________________________________________________________________________________  (Signature of Patient)                                                         (Responsible Party/Relationship to Patient)    ________________________________________________________________________________  (Signature of Witness)                                                       (Date/Time)     Patient Name: Lyubov Franz     : 1945                 Printed: 2023      Medical Record #: [id number]                                 Page 1 of 1